# Patient Record
Sex: FEMALE | Race: WHITE | NOT HISPANIC OR LATINO | Employment: OTHER | URBAN - METROPOLITAN AREA
[De-identification: names, ages, dates, MRNs, and addresses within clinical notes are randomized per-mention and may not be internally consistent; named-entity substitution may affect disease eponyms.]

---

## 2018-02-26 ENCOUNTER — OFFICE VISIT (OUTPATIENT)
Dept: NEUROLOGY | Facility: HOSPITAL | Age: 53
End: 2018-02-26
Attending: SURGERY
Payer: COMMERCIAL

## 2018-02-26 VITALS
DIASTOLIC BLOOD PRESSURE: 74 MMHG | TEMPERATURE: 98 F | HEIGHT: 61 IN | BODY MASS INDEX: 32.89 KG/M2 | HEART RATE: 79 BPM | WEIGHT: 174.19 LBS | SYSTOLIC BLOOD PRESSURE: 104 MMHG

## 2018-02-26 DIAGNOSIS — Z09 POSTOP CHECK: Primary | ICD-10-CM

## 2018-02-26 PROCEDURE — 99213 OFFICE O/P EST LOW 20 MIN: CPT | Performed by: SURGERY

## 2018-02-26 RX ORDER — VALSARTAN 160 MG/1
160 TABLET ORAL DAILY
COMMUNITY
End: 2023-02-01

## 2018-02-26 NOTE — PROGRESS NOTES
S/p hep jej    Doing well  Eating having Bm    abd soft stplaes removed      Feeling great    Will f/u one month    Will obatain cbc and cmp today

## 2018-06-11 ENCOUNTER — TELEPHONE (OUTPATIENT)
Dept: NEUROLOGY | Facility: HOSPITAL | Age: 53
End: 2018-06-11

## 2018-06-11 NOTE — TELEPHONE ENCOUNTER
----- Message from Rajni Knox sent at 6/11/2018  9:29 AM CDT -----   Patients phone numbers are no longer working. Left a message on the patients mothers phone for patient to call back to schedule a follow up. Thank you  ----- Message -----  From: Marcela Bales LPN  Sent: 6/11/2018   9:22 AM  To: Rajni Mendoza    Can one of you please contact this pt to schedule f/u?  She has cancelled/no show multiple times, but Dr. Villanueva would like to see her at least 1 more time due to having major abdominal surgery    Thanks!  Whit

## 2022-07-07 ENCOUNTER — OFFICE VISIT (OUTPATIENT)
Dept: FAMILY MEDICINE | Facility: CLINIC | Age: 57
End: 2022-07-07
Payer: MEDICARE

## 2022-07-07 VITALS
HEART RATE: 92 BPM | OXYGEN SATURATION: 97 % | DIASTOLIC BLOOD PRESSURE: 72 MMHG | SYSTOLIC BLOOD PRESSURE: 118 MMHG | TEMPERATURE: 100 F | BODY MASS INDEX: 23.88 KG/M2 | HEIGHT: 61 IN | WEIGHT: 126.5 LBS

## 2022-07-07 DIAGNOSIS — Z13.31 POSITIVE DEPRESSION SCREENING: ICD-10-CM

## 2022-07-07 DIAGNOSIS — R05.9 COUGH: Primary | ICD-10-CM

## 2022-07-07 DIAGNOSIS — R21 RASH: ICD-10-CM

## 2022-07-07 DIAGNOSIS — Z13.220 ENCOUNTER FOR LIPID SCREENING FOR CARDIOVASCULAR DISEASE: ICD-10-CM

## 2022-07-07 DIAGNOSIS — B20 SYMPTOMATIC HIV INFECTION: ICD-10-CM

## 2022-07-07 DIAGNOSIS — Z13.6 ENCOUNTER FOR LIPID SCREENING FOR CARDIOVASCULAR DISEASE: ICD-10-CM

## 2022-07-07 PROCEDURE — 99204 PR OFFICE/OUTPT VISIT, NEW, LEVL IV, 45-59 MIN: ICD-10-PCS | Mod: S$GLB,,, | Performed by: FAMILY MEDICINE

## 2022-07-07 PROCEDURE — 3078F PR MOST RECENT DIASTOLIC BLOOD PRESSURE < 80 MM HG: ICD-10-PCS | Mod: CPTII,S$GLB,, | Performed by: FAMILY MEDICINE

## 2022-07-07 PROCEDURE — 1159F MED LIST DOCD IN RCRD: CPT | Mod: CPTII,S$GLB,, | Performed by: FAMILY MEDICINE

## 2022-07-07 PROCEDURE — 99204 OFFICE O/P NEW MOD 45 MIN: CPT | Mod: S$GLB,,, | Performed by: FAMILY MEDICINE

## 2022-07-07 PROCEDURE — 3078F DIAST BP <80 MM HG: CPT | Mod: CPTII,S$GLB,, | Performed by: FAMILY MEDICINE

## 2022-07-07 PROCEDURE — 3074F PR MOST RECENT SYSTOLIC BLOOD PRESSURE < 130 MM HG: ICD-10-PCS | Mod: CPTII,S$GLB,, | Performed by: FAMILY MEDICINE

## 2022-07-07 PROCEDURE — 1159F PR MEDICATION LIST DOCUMENTED IN MEDICAL RECORD: ICD-10-PCS | Mod: CPTII,S$GLB,, | Performed by: FAMILY MEDICINE

## 2022-07-07 PROCEDURE — 3008F PR BODY MASS INDEX (BMI) DOCUMENTED: ICD-10-PCS | Mod: CPTII,S$GLB,, | Performed by: FAMILY MEDICINE

## 2022-07-07 PROCEDURE — 3008F BODY MASS INDEX DOCD: CPT | Mod: CPTII,S$GLB,, | Performed by: FAMILY MEDICINE

## 2022-07-07 PROCEDURE — 3074F SYST BP LT 130 MM HG: CPT | Mod: CPTII,S$GLB,, | Performed by: FAMILY MEDICINE

## 2022-07-07 RX ORDER — MUPIROCIN 20 MG/G
OINTMENT TOPICAL 3 TIMES DAILY
Qty: 30 G | Refills: 2 | Status: SHIPPED | OUTPATIENT
Start: 2022-07-07 | End: 2022-07-20 | Stop reason: SDUPTHER

## 2022-07-07 RX ORDER — SULFAMETHOXAZOLE AND TRIMETHOPRIM 400; 80 MG/1; MG/1
1 TABLET ORAL 2 TIMES DAILY
Qty: 20 TABLET | Refills: 1 | Status: ON HOLD | OUTPATIENT
Start: 2022-07-07 | End: 2022-11-17 | Stop reason: HOSPADM

## 2022-07-07 NOTE — PROGRESS NOTES
SUBJECTIVE:    Patient ID: Amie Salmreon is a 57 y.o. female.    Chief Complaint: Establish Care, Rash, and Fever  56 yo female new to this provider here today with a complaint of she thinks either has scabies or lice.  She actually has 2 issues she has a systemic rash on her upper chest and upper back her left arm, bilateral lower extremities.  There is no rash on her right arm.  She has not been exposed to any sick contacts, the rash is been ongoing for several weeks dear multiple small excoriations and ulcers she states there intensely pruritic.    She has no rash on her face, neck or palms of her hands.She denies any prodromal period before the rash, no ( fever, intense headache, lymphadenopathy, back pain, myalgia, and severe fatigue).       She is also complaining of a cough for 2 months that is productive with green sputum.      On further questioning patient states his she has been HIV positive since 1981, she has not undergoing treatment for HIV, she is seeing some organization in the city with a  T-cell count.  She states her T-cell count is been good.  She has never sought treatment due to the stigma.  She has also never had a primary care doctor.      Rash  This is a new problem. The current episode started 1 to 4 weeks ago. The affected locations include the torso, back, left arm, right arm, left lower leg and right lower leg. The rash is characterized by redness and itchiness. She was exposed to nothing. Associated symptoms include congestion and coughing. Pertinent negatives include no anorexia, diarrhea, eye pain, facial edema, fatigue, fever, joint pain, nail changes, rhinorrhea, shortness of breath, sore throat or vomiting. Past treatments include nothing. The treatment provided no relief.   Cough  The current episode started more than 1 month ago. The problem has been unchanged. The problem occurs every few minutes. The cough is productive of sputum. Associated symptoms include a rash.  Pertinent negatives include no chest pain, fever, rhinorrhea, sore throat, shortness of breath, sweats or weight loss. Nothing aggravates the symptoms.         Past Medical History:   Diagnosis Date    Allergy     Arthritis     Asthma     Chronic pain     HIV infection     Hypertension     Overactive bladder     Spinal stenosis     Urine incontinence      Social History     Socioeconomic History    Marital status: Single   Occupational History    Occupation: on disability   Tobacco Use    Smoking status: Never Smoker    Smokeless tobacco: Never Used   Substance and Sexual Activity    Alcohol use: Yes     Comment: Socially    Drug use: No    Sexual activity: Yes     Partners: Male     Birth control/protection: None     Past Surgical History:   Procedure Laterality Date    ADENOIDECTOMY      ANKLE SURGERY      APPENDECTOMY      BACK SURGERY      GERALD    CYST REMOVAL      HYSTERECTOMY      JOINT REPLACEMENT Right     knee    KNEE SURGERY      RHIZOTOMY      TONSILLECTOMY       Family History   Problem Relation Age of Onset    Thyroid disease Mother     Hypertension Mother     Cancer Father         oral CA    Depression Sister     Hypertension Brother     Alcohol abuse Brother      Current Outpatient Medications   Medication Sig Dispense Refill    hydrocodone-acetaminophen 10-325mg (NORCO)  mg Tab Take 1 tablet by mouth continuous prn. Per Dr dowling [facility]      mupirocin (BACTROBAN) 2 % ointment Apply topically 3 (three) times daily. 30 g 2    rivaroxaban (XARELTO) 10 mg Tab Take 1 tablet (10 mg total) by mouth daily with dinner or evening meal. (Patient not taking: Reported on 7/7/2022) 21 tablet 0    sulfamethoxazole-trimethoprim 400-80mg (BACTRIM,SEPTRA) 400-80 mg per tablet Take 1 tablet by mouth 2 (two) times daily. 20 tablet 1    valsartan (DIOVAN) 160 MG tablet Take 160 mg by mouth once daily.       No current facility-administered medications for this  "visit.     Review of patient's allergies indicates:  No Known Allergies    Review of Systems   Constitutional: Negative for activity change, appetite change, fatigue, fever and weight loss.   HENT: Positive for congestion. Negative for rhinorrhea and sore throat.    Eyes: Negative for pain.   Respiratory: Positive for cough. Negative for chest tightness and shortness of breath.    Cardiovascular: Negative for chest pain and palpitations.   Gastrointestinal: Negative for abdominal distention, abdominal pain, anorexia, diarrhea and vomiting.   Musculoskeletal: Negative for joint pain.   Skin: Positive for rash. Negative for nail changes.          Blood pressure 118/72, pulse 92, temperature 99.7 °F (37.6 °C), temperature source Oral, height 5' 1" (1.549 m), weight 57.4 kg (126 lb 8 oz), SpO2 97 %. Body mass index is 23.9 kg/m².   Objective:      Physical Exam  Vitals reviewed.   Constitutional:       General: She is not in acute distress.     Appearance: Normal appearance. She is not ill-appearing or toxic-appearing.   HENT:      Head: Normocephalic and atraumatic.   Cardiovascular:      Rate and Rhythm: Normal rate and regular rhythm.      Heart sounds: Normal heart sounds. No murmur heard.  Pulmonary:      Effort: Pulmonary effort is normal. No respiratory distress.      Breath sounds: Rhonchi present. No wheezing.   Skin:     Capillary Refill: Capillary refill takes less than 2 seconds.      Findings: Rash present. Rash is crusting.      Comments: Multiple ulcerations and excoriations, unsure whatthe original rash's appearance.    Neurological:      Mental Status: She is alert and oriented to person, place, and time.             Assessment:       1. Cough    2. Rash    3. Symptomatic HIV infection    4. Encounter for lipid screening for cardiovascular disease    5. Positive depression screening         Plan:           Cough  -     X-Ray Chest PA And Lateral; Future; Expected date: 07/07/2022  -     CBC auto " differential; Future; Expected date: 07/07/2022  -     Comprehensive Metabolic Panel; Future; Expected date: 07/07/2022  Concerned about the hx of HIV will treat for PCP, and get a chest x-ray. I will place referral to ID for appropriate HIV follow up.    Rash  -     Comprehensive Metabolic Panel; Future; Expected date: 07/07/2022  Suspect staph infection will treat topically until completed her antibiotics for the cough.    Symptomatic HIV infection  -     Ambulatory referral/consult to Infectious Disease; Future; Expected date: 07/14/2022  -     HIV RNA, quantitative, PCR; Future; Expected date: 07/07/2022    Encounter for lipid screening for cardiovascular disease  -     Lipid Panel; Future; Expected date: 07/07/2022    Positive depression screening  Comments:  I have reviewed the positive depression score which warrants active treatment with psychotherapy and/or medications.  Will address at next visit.     Other orders  -     sulfamethoxazole-trimethoprim 400-80mg (BACTRIM,SEPTRA) 400-80 mg per tablet; Take 1 tablet by mouth 2 (two) times daily.  Dispense: 20 tablet; Refill: 1  -     mupirocin (BACTROBAN) 2 % ointment; Apply topically 3 (three) times daily.  Dispense: 30 g; Refill: 2

## 2022-07-20 RX ORDER — MUPIROCIN 20 MG/G
OINTMENT TOPICAL 3 TIMES DAILY
Qty: 30 G | Refills: 2 | Status: SHIPPED | OUTPATIENT
Start: 2022-07-20 | End: 2023-08-03

## 2022-07-20 NOTE — TELEPHONE ENCOUNTER
Patient called in requesting antibiotic refill for staph. She stated she is not sure that she can handle the antibiotic, after 4-5 days she had a hard time awaking, couldn't speak or walk. She stated she was only taking that at the time. She also needs a refill on mupirocin.

## 2022-07-21 NOTE — TELEPHONE ENCOUNTER
She stated she thought the antibiotic she was on was on was hard on her.She stated she had difficulty waking, speaking and walking. She wanted to know if there was another antibiotic that she could take for the staph.

## 2022-08-11 ENCOUNTER — TELEPHONE (OUTPATIENT)
Dept: FAMILY MEDICINE | Facility: CLINIC | Age: 57
End: 2022-08-11

## 2022-08-17 ENCOUNTER — TELEPHONE (OUTPATIENT)
Dept: FAMILY MEDICINE | Facility: CLINIC | Age: 57
End: 2022-08-17

## 2022-08-24 ENCOUNTER — TELEPHONE (OUTPATIENT)
Dept: FAMILY MEDICINE | Facility: CLINIC | Age: 57
End: 2022-08-24

## 2022-10-09 ENCOUNTER — HOSPITAL ENCOUNTER (EMERGENCY)
Facility: HOSPITAL | Age: 57
Discharge: HOME | End: 2022-10-09
Attending: EMERGENCY MEDICINE
Payer: MEDICARE

## 2022-10-09 VITALS
TEMPERATURE: 99 F | OXYGEN SATURATION: 96 % | WEIGHT: 126 LBS | HEIGHT: 61 IN | BODY MASS INDEX: 23.79 KG/M2 | HEART RATE: 77 BPM | SYSTOLIC BLOOD PRESSURE: 107 MMHG | RESPIRATION RATE: 18 BRPM | DIASTOLIC BLOOD PRESSURE: 67 MMHG

## 2022-10-09 DIAGNOSIS — H16.001 CORNEAL ULCER OF RIGHT EYE: Primary | ICD-10-CM

## 2022-10-09 DIAGNOSIS — H10.31 ACUTE BACTERIAL CONJUNCTIVITIS OF RIGHT EYE: ICD-10-CM

## 2022-10-09 PROCEDURE — 99284 EMERGENCY DEPT VISIT MOD MDM: CPT

## 2022-10-09 PROCEDURE — 25000003 PHARM REV CODE 250: Performed by: NURSE PRACTITIONER

## 2022-10-09 RX ORDER — HYDROCODONE BITARTRATE AND ACETAMINOPHEN 10; 325 MG/1; MG/1
1 TABLET ORAL EVERY 4 HOURS PRN
Qty: 12 TABLET | Refills: 0 | Status: ON HOLD | OUTPATIENT
Start: 2022-10-09 | End: 2022-12-08 | Stop reason: HOSPADM

## 2022-10-09 RX ORDER — PROPARACAINE HYDROCHLORIDE 5 MG/ML
1 SOLUTION/ DROPS OPHTHALMIC
Status: COMPLETED | OUTPATIENT
Start: 2022-10-09 | End: 2022-10-09

## 2022-10-09 RX ORDER — ERYTHROMYCIN 5 MG/G
OINTMENT OPHTHALMIC
Qty: 1 G | Refills: 0 | Status: SHIPPED | OUTPATIENT
Start: 2022-10-09 | End: 2023-02-01

## 2022-10-09 RX ORDER — DORZOLAMIDE HYDROCHLORIDE AND TIMOLOL MALEATE 20; 5 MG/ML; MG/ML
1 SOLUTION/ DROPS OPHTHALMIC 2 TIMES DAILY
Qty: 1 EACH | Refills: 0 | Status: ON HOLD | OUTPATIENT
Start: 2022-10-09 | End: 2024-03-25 | Stop reason: HOSPADM

## 2022-10-09 RX ORDER — MOXIFLOXACIN 5 MG/ML
1 SOLUTION/ DROPS OPHTHALMIC
Qty: 3 ML | Refills: 0 | Status: SHIPPED | OUTPATIENT
Start: 2022-10-09 | End: 2022-10-19

## 2022-10-09 RX ADMIN — FLUORESCEIN SODIUM 1 EACH: 1 STRIP OPHTHALMIC at 02:10

## 2022-10-09 RX ADMIN — PROPARACAINE HYDROCHLORIDE 1 DROP: 5 SOLUTION/ DROPS OPHTHALMIC at 02:10

## 2022-10-09 NOTE — ED NOTES
Pt presents to the ED per EMS with c/o right eye pain and redness x1 month. Pt states that over the last 3 days it has gotten progressively worse with redness and swelling to it. Pt reports blurry vision in the right eye. Right sclera is red and top eyelid is swollen and red.

## 2022-10-09 NOTE — DISCHARGE INSTRUCTIONS
"These will have to be from a compound pharmacy such as [facility]. Call ahead prior to going to make sure they carry "fortified" antibiotic eye drops.     No contact lenses are to be worn until cleared by ophthalmologist.     See ophthalmologist tomorrow as directed.  "

## 2022-10-09 NOTE — ED PROVIDER NOTES
Encounter Date: 10/9/2022    SCRIBE #1 NOTE: I, Ketan Toscano, am scribing for, and in the presence of,  AMPARO Clark.     History     Chief Complaint   Patient presents with    Eye Pain     Rt. Eye , blurred vision  x 1 month     Time seen by provider: 2:30 PM on 10/09/2022    Amie Salmeron is a 57 y.o. female who presents to the ED with an onset of eye pain for a month. The patient reports experiencing pain, blurred vision, and drainage in her right eye for a month. She notes having COVID around the same time she began experiencing pain in her right eye. The patient reports normally wearing contacts but hasn't worn them for about 3 weeks. She notes experiencing a foreign body sensation in her right eye that has resolved and photophobia in her right eye. The patient states having an ophthalmologist she sees in [place]. She mentions taking pain medication until she ran out. The patient denies allergies or any other symptoms at this time. PMHx of HTN. No pertinent PSHx.    The history is provided by the patient.   Review of patient's allergies indicates:  No Known Allergies  Past Medical History:   Diagnosis Date    Allergy     Arthritis     Asthma     Chronic pain     HIV infection     Hypertension     Overactive bladder     Spinal stenosis     Urine incontinence      Past Surgical History:   Procedure Laterality Date    ADENOIDECTOMY      ANKLE SURGERY      APPENDECTOMY      BACK SURGERY      GERALD    CYST REMOVAL      HYSTERECTOMY      JOINT REPLACEMENT Right     knee    KNEE SURGERY      RHIZOTOMY      TONSILLECTOMY       Family History   Problem Relation Age of Onset    Thyroid disease Mother     Hypertension Mother     Cancer Father         oral CA    Depression Sister     Hypertension Brother     Alcohol abuse Brother      Social History     Tobacco Use    Smoking status: Never    Smokeless tobacco: Never   Substance Use Topics    Alcohol use: Yes     Comment: Socially    Drug use: No     Review of Systems    Constitutional:  Negative for fever.   HENT:  Negative for sore throat.    Eyes:  Positive for photophobia (Right eye), pain, discharge and visual disturbance.   Respiratory:  Negative for shortness of breath.    Cardiovascular:  Negative for chest pain.   Gastrointestinal:  Negative for nausea.   Genitourinary:  Negative for dysuria.   Musculoskeletal:  Negative for back pain.   Skin:  Negative for rash.   Neurological:  Negative for weakness.   Hematological:  Does not bruise/bleed easily.     Physical Exam     Initial Vitals [10/09/22 1428]   BP Pulse Resp Temp SpO2   124/77 82 18 98.9 °F (37.2 °C) (!) 94 %      MAP       --         Physical Exam    Nursing note and vitals reviewed.  Constitutional: Vital signs are normal. She appears well-developed and well-nourished.   HENT:   Head: Normocephalic and atraumatic.   Eyes: EOM and lids are normal. Pupils are equal, round, and reactive to light. Right eye exhibits no chemosis, no discharge, no exudate and no hordeolum. Foreign body present in the right eye. Right conjunctiva is injected. No scleral icterus.   Slit lamp exam:       The right eye shows corneal ulcer, foreign body, hypopyon and fluorescein uptake. The right eye shows no corneal flare and no hyphema.   Right eye upper and lower lid swelling and erythema. Corneal opacity  of right eye. + hypopyon.   Neck: Neck supple.   Cardiovascular:  Normal rate, regular rhythm, normal heart sounds and intact distal pulses.     Exam reveals no gallop and no friction rub.       No murmur heard.  Pulmonary/Chest: Breath sounds normal. She has no wheezes. She has no rhonchi. She has no rales.   Abdominal: Abdomen is soft. Bowel sounds are normal. There is no abdominal tenderness.   Musculoskeletal:      Cervical back: Neck supple.     Neurological: She is alert and oriented to person, place, and time. She has normal strength.   Skin: Skin is warm, dry and intact.   Psychiatric: She has a normal mood and affect. Her  speech is normal and behavior is normal.       ED Course   Procedures  Labs Reviewed - No data to display       Imaging Results    None          Medications   fluorescein ophthalmic strip 1 each (1 each Both Eyes Given by Other 10/9/22 4931)   proparacaine 0.5 % ophthalmic solution 1 drop (1 drop Both Eyes Given by Other 10/9/22 0261)     Medical Decision Making:   History:   Old Medical Records: I decided to obtain old medical records.  Differential Diagnosis:   Iritis  Bacterial keratitis  Corneal ulcer     APC / Resident Notes:   Patient is a 57 y.o. female who presents to the ED 10/09/2022 who underwent emergent evaluation for right eye pain for 4 weeks.  Patient initially denies wearing a contact lens however I find a contact lens in her right eye.  It is removed.  Patient has significant conjunctival injection with a hazy cornea and an obvious ulcer with increased uptake.  No signs of globe rupture.  Increased intra-ocular pressures on my read however Dr. Kingsley ophthalmologist on-call presents to patient's bedside and does exam and feels pressures are normal on the right side.  He believes patient is stable for discharge and makes recommendations for outpatient antibiotic therapy and close follow-up in 1 day with Ophthalmology.  She is given prescriptions per recommendation by Dr. Kingsley and a referral to multiple ophthalmologist for 1 day follow-up.  He does not think acute angle closure glaucoma.  He does not think iritis.  No signs of periorbital cellulitis or orbital cellulitis or preseptal cellulitis.  No signs of other retained foreign body. Based on my clinical evaluation, I do not appreciate any immediate, emergent, or life threatening condition or etiology that warrants additional workup today and feel that the patient can be discharged with close follow up care. Case discussed with Dr. Hernandez who is agreeable to plan of care. Follow up and return precautions discussed; patient verbalized  understanding and is agreeable to plan of care. Patient discharged home in stable condition.              Scribe Attestation:   Scribe #1: I performed the above scribed service and the documentation accurately describes the services I performed. I attest to the accuracy of the note.    Attending Attestation:           Physician Attestation for Scribe:  Physician Attestation Statement for Scribe #1: I, Radha Hanley, reviewed documentation, as scribed by in my presence, and it is both accurate and complete.     Comments: I, AMPARO Clark, personally performed the services described in this documentation. All medical record entries made by the scribe were at my direction and in my presence.  I have reviewed the chart and agree that the record reflects my personal performance and is accurate and complete. AMPARO Clark.  9:16 PM 10/09/2022        ED Course as of 10/09/22 2120   Sun Oct 09, 2022   1537 Dr. Kingsley will come to evaluate pt at ED. We do not have fortified antibiotics at this hospital. Discussed with pharmacist. [EF]      ED Course User Index  [EF] Hawk Hernandez MD                 Clinical Impression:   Final diagnoses:  [H16.001] Corneal ulcer of right eye (Primary)  [H10.31] Acute bacterial conjunctivitis of right eye      ED Disposition Condition    Discharge Stable          ED Prescriptions       Medication Sig Dispense Start Date End Date Auth. Provider    dorzolamide-timolol 2-0.5% (COSOPT) 22.3-6.8 mg/mL ophthalmic solution Place 1 drop into the right eye 2 (two) times daily. for 14 days 1 each 10/9/2022 10/23/2022 Radha Hanley NP    erythromycin (ROMYCIN) ophthalmic ointment Place a 1/2 inch ribbon of ointment into the lower eyelid of right eye every night. 1 g 10/9/2022 -- Radha Hanley NP    moxifloxacin (VIGAMOX) 0.5 % ophthalmic solution Place 1 drop into the right eye every hour while awake. for 10 days 3 mL 10/9/2022 10/19/2022 Radha Hanley NP    vancomycin HCl (FORTIFIED  VANCOMYCIN 25 MG/ML OPHTH) 25 mg Drop Place 1 drop into the right eye every hour while awake. for 10 days 15 mL 10/9/2022 10/19/2022 Radha Hanley NP    Fortified Tobramycin 15 mg/ml Opht (TOBREX) 15 mg Drop Place 1 drop into the right eye every hour while awake. 7.5 mL 10/9/2022 -- Radha Hanley NP    HYDROcodone-acetaminophen (NORCO)  mg per tablet Take 1 tablet by mouth every 4 (four) hours as needed for Pain. 12 tablet 10/9/2022 -- Radha Hanley NP          Follow-up Information       Follow up With Specialties Details Why Contact Info    Sotero Ch MD Ophthalmology In 1 day  [address]  [address]  [facility]  [facility] [address]  [phone]      [facility] Emergency Medicine  As needed, If symptoms worsen [address] [address] [phone]    Mikala Mosqueda MD Ophthalmology In 1 day  [address]  [place] [address]  [phone]      Jeffrey Kingsley Jr., MD Ophthalmology In 1 day  [address] [facility] [address] [place] [address]  [phone]               Radha Hanley NP  10/09/22 2120

## 2022-10-11 ENCOUNTER — DOCUMENTATION ONLY (OUTPATIENT)
Dept: OPHTHALMOLOGY | Facility: CLINIC | Age: 57
End: 2022-10-11
Payer: MEDICARE

## 2022-10-11 NOTE — PROGRESS NOTES
CC: Right eye pain    HPI: Amie Salmeron is a 57 y.o. female  Right eye pain slept in contact lens for 3 weeks without taking them out    POH: contact lens wearer    Gtts: no drops      Past Medical History:   Diagnosis Date    Allergy     Arthritis     Asthma     Chronic pain     HIV infection     Hypertension     Overactive bladder     Spinal stenosis     Urine incontinence          Family History   Problem Relation Age of Onset    Thyroid disease Mother     Hypertension Mother     Cancer Father         oral CA    Depression Sister     Hypertension Brother     Alcohol abuse Brother            Current Outpatient Medications:     dorzolamide-timolol 2-0.5% (COSOPT) 22.3-6.8 mg/mL ophthalmic solution, Place 1 drop into the right eye 2 (two) times daily. for 14 days, Disp: 1 each, Rfl: 0    erythromycin (ROMYCIN) ophthalmic ointment, Place a 1/2 inch ribbon of ointment into the lower eyelid of right eye every night., Disp: 1 g, Rfl: 0    Fortified Tobramycin 15 mg/ml Opht (TOBREX) 15 mg Drop, Place 1 drop into the right eye every hour while awake., Disp: 7.5 mL, Rfl: 0    HYDROcodone-acetaminophen (NORCO)  mg per tablet, Take 1 tablet by mouth every 4 (four) hours as needed for Pain., Disp: 12 tablet, Rfl: 0    moxifloxacin (VIGAMOX) 0.5 % ophthalmic solution, Place 1 drop into the right eye every hour while awake. for 10 days, Disp: 3 mL, Rfl: 0    mupirocin (BACTROBAN) 2 % ointment, Apply topically 3 (three) times daily., Disp: 30 g, Rfl: 2    rivaroxaban (XARELTO) 10 mg Tab, Take 1 tablet (10 mg total) by mouth daily with dinner or evening meal. (Patient not taking: Reported on 7/7/2022), Disp: 21 tablet, Rfl: 0    sulfamethoxazole-trimethoprim 400-80mg (BACTRIM,SEPTRA) 400-80 mg per tablet, Take 1 tablet by mouth 2 (two) times daily., Disp: 20 tablet, Rfl: 1    valsartan (DIOVAN) 160 MG tablet, Take 160 mg by mouth once daily., Disp: , Rfl:     vancomycin HCl (FORTIFIED VANCOMYCIN 25 MG/ML OPHTH) 25 mg  Drop, Place 1 drop into the right eye every hour while awake. for 10 days, Disp: 15 mL, Rfl: 0      Review of patient's allergies indicates:  No Known Allergies      Social History     Tobacco Use    Smoking status: Never    Smokeless tobacco: Never   Substance Use Topics    Alcohol use: Yes     Comment: Socially    Drug use: No         Not recorded           Plan   A/P: Amie Salmeron is a 57 y.o. female    Corneal ulcer OD secondary poor contact lens hygiene  +small hypopyon, superior epithelial defect (7mm x 6mm), +infiltrate  VA OD 20/100  IOP OD 25 with tonopen  Start fortified vancomycin and tobramycin every hour at least 10-12 x daily right eye  Vigamox 1 drop every hour right eye 10-12x daily  Erythromycin ointment at bedtime OD  Cosopt 1 drop 2 x daily Right eye  CTL holiday  Patient lives in [place], can followup [place] [facility], but can see me in [place] on Tuesday to followup

## 2022-10-13 ENCOUNTER — HOSPITAL ENCOUNTER (INPATIENT)
Facility: HOSPITAL | Age: 57
LOS: 2 days | Discharge: LEFT BEFORE DISCHARGE | DRG: 975 | End: 2022-10-15
Attending: EMERGENCY MEDICINE | Admitting: INTERNAL MEDICINE
Payer: MEDICARE

## 2022-10-13 DIAGNOSIS — B20 SYMPTOMATIC HIV INFECTION: ICD-10-CM

## 2022-10-13 DIAGNOSIS — U07.1 COVID-19: ICD-10-CM

## 2022-10-13 DIAGNOSIS — J18.9 PNEUMONIA OF BOTH LUNGS DUE TO INFECTIOUS ORGANISM, UNSPECIFIED PART OF LUNG: Primary | ICD-10-CM

## 2022-10-13 DIAGNOSIS — J18.9 PNEUMONIA OF BOTH UPPER LOBES DUE TO INFECTIOUS ORGANISM: ICD-10-CM

## 2022-10-13 DIAGNOSIS — R63.4 WEIGHT LOSS: ICD-10-CM

## 2022-10-13 DIAGNOSIS — B37.0 THRUSH: ICD-10-CM

## 2022-10-13 DIAGNOSIS — R19.7 DIARRHEA, UNSPECIFIED TYPE: ICD-10-CM

## 2022-10-13 PROBLEM — J12.82 PNEUMONIA DUE TO COVID-19 VIRUS: Status: ACTIVE | Noted: 2022-10-13

## 2022-10-13 LAB
ALBUMIN SERPL BCP-MCNC: 2.5 G/DL (ref 3.5–5.2)
ALP SERPL-CCNC: 77 U/L (ref 55–135)
ALT SERPL W/O P-5'-P-CCNC: 9 U/L (ref 10–44)
ANION GAP SERPL CALC-SCNC: 7 MMOL/L (ref 8–16)
AST SERPL-CCNC: 23 U/L (ref 10–40)
BASOPHILS # BLD AUTO: 0.02 K/UL (ref 0–0.2)
BASOPHILS NFR BLD: 0.5 % (ref 0–1.9)
BILIRUB SERPL-MCNC: 0.7 MG/DL (ref 0.1–1)
BNP SERPL-MCNC: 171 PG/ML (ref 0–99)
BUN SERPL-MCNC: 6 MG/DL (ref 6–20)
CALCIUM SERPL-MCNC: 8 MG/DL (ref 8.7–10.5)
CHLORIDE SERPL-SCNC: 100 MMOL/L (ref 95–110)
CK SERPL-CCNC: 17 U/L (ref 20–180)
CO2 SERPL-SCNC: 30 MMOL/L (ref 23–29)
CREAT SERPL-MCNC: 0.3 MG/DL (ref 0.5–1.4)
CRP SERPL-MCNC: 1.56 MG/DL
DIFFERENTIAL METHOD: ABNORMAL
EOSINOPHIL # BLD AUTO: 0 K/UL (ref 0–0.5)
EOSINOPHIL NFR BLD: 0.5 % (ref 0–8)
ERYTHROCYTE [DISTWIDTH] IN BLOOD BY AUTOMATED COUNT: 15.9 % (ref 11.5–14.5)
EST. GFR  (NO RACE VARIABLE): >60 ML/MIN/1.73 M^2
FERRITIN SERPL-MCNC: 980 NG/ML (ref 20–300)
GLUCOSE SERPL-MCNC: 95 MG/DL (ref 70–110)
HCT VFR BLD AUTO: 31 % (ref 37–48.5)
HGB BLD-MCNC: 10 G/DL (ref 12–16)
IMM GRANULOCYTES # BLD AUTO: 0.18 K/UL (ref 0–0.04)
IMM GRANULOCYTES NFR BLD AUTO: 4.4 % (ref 0–0.5)
LACTATE SERPL-SCNC: 0.7 MMOL/L (ref 0.5–1.9)
LDH SERPL L TO P-CCNC: 262 U/L (ref 110–260)
LYMPHOCYTES # BLD AUTO: 1.3 K/UL (ref 1–4.8)
LYMPHOCYTES NFR BLD: 30.3 % (ref 18–48)
MCH RBC QN AUTO: 27.9 PG (ref 27–31)
MCHC RBC AUTO-ENTMCNC: 32.3 G/DL (ref 32–36)
MCV RBC AUTO: 87 FL (ref 82–98)
MONOCYTES # BLD AUTO: 0.5 K/UL (ref 0.3–1)
MONOCYTES NFR BLD: 12.1 % (ref 4–15)
NEUTROPHILS # BLD AUTO: 2.2 K/UL (ref 1.8–7.7)
NEUTROPHILS NFR BLD: 52.2 % (ref 38–73)
NRBC BLD-RTO: 0 /100 WBC
PLATELET # BLD AUTO: 279 K/UL (ref 150–450)
PMV BLD AUTO: 10.3 FL (ref 9.2–12.9)
POTASSIUM SERPL-SCNC: 2.8 MMOL/L (ref 3.5–5.1)
PROCALCITONIN SERPL IA-MCNC: 2 NG/ML (ref 0–0.5)
PROT SERPL-MCNC: 6.1 G/DL (ref 6–8.4)
RBC # BLD AUTO: 3.58 M/UL (ref 4–5.4)
SARS-COV-2 RDRP RESP QL NAA+PROBE: POSITIVE
SODIUM SERPL-SCNC: 137 MMOL/L (ref 136–145)
TROPONIN I SERPL DL<=0.01 NG/ML-MCNC: <0.03 NG/ML
WBC # BLD AUTO: 4.13 K/UL (ref 3.9–12.7)

## 2022-10-13 PROCEDURE — 99900035 HC TECH TIME PER 15 MIN (STAT)

## 2022-10-13 PROCEDURE — 63600175 PHARM REV CODE 636 W HCPCS: Performed by: INTERNAL MEDICINE

## 2022-10-13 PROCEDURE — 85025 COMPLETE CBC W/AUTO DIFF WBC: CPT | Performed by: EMERGENCY MEDICINE

## 2022-10-13 PROCEDURE — 99285 EMERGENCY DEPT VISIT HI MDM: CPT | Mod: 25

## 2022-10-13 PROCEDURE — 93005 ELECTROCARDIOGRAM TRACING: CPT | Performed by: SPECIALIST

## 2022-10-13 PROCEDURE — 86140 C-REACTIVE PROTEIN: CPT | Performed by: EMERGENCY MEDICINE

## 2022-10-13 PROCEDURE — 83880 ASSAY OF NATRIURETIC PEPTIDE: CPT | Performed by: EMERGENCY MEDICINE

## 2022-10-13 PROCEDURE — 25000003 PHARM REV CODE 250: Performed by: INTERNAL MEDICINE

## 2022-10-13 PROCEDURE — 12000002 HC ACUTE/MED SURGE SEMI-PRIVATE ROOM

## 2022-10-13 PROCEDURE — 93010 ELECTROCARDIOGRAM REPORT: CPT | Mod: ,,, | Performed by: SPECIALIST

## 2022-10-13 PROCEDURE — 87077 CULTURE AEROBIC IDENTIFY: CPT | Performed by: EMERGENCY MEDICINE

## 2022-10-13 PROCEDURE — 93010 EKG 12-LEAD: ICD-10-PCS | Mod: ,,, | Performed by: SPECIALIST

## 2022-10-13 PROCEDURE — 82550 ASSAY OF CK (CPK): CPT | Performed by: EMERGENCY MEDICINE

## 2022-10-13 PROCEDURE — 25000242 PHARM REV CODE 250 ALT 637 W/ HCPCS: Performed by: EMERGENCY MEDICINE

## 2022-10-13 PROCEDURE — 99900031 HC PATIENT EDUCATION (STAT)

## 2022-10-13 PROCEDURE — 94640 AIRWAY INHALATION TREATMENT: CPT

## 2022-10-13 PROCEDURE — 84484 ASSAY OF TROPONIN QUANT: CPT | Performed by: EMERGENCY MEDICINE

## 2022-10-13 PROCEDURE — 83615 LACTATE (LD) (LDH) ENZYME: CPT | Performed by: EMERGENCY MEDICINE

## 2022-10-13 PROCEDURE — 87040 BLOOD CULTURE FOR BACTERIA: CPT | Performed by: EMERGENCY MEDICINE

## 2022-10-13 PROCEDURE — 83605 ASSAY OF LACTIC ACID: CPT | Performed by: EMERGENCY MEDICINE

## 2022-10-13 PROCEDURE — 94799 UNLISTED PULMONARY SVC/PX: CPT

## 2022-10-13 PROCEDURE — U0002 COVID-19 LAB TEST NON-CDC: HCPCS | Performed by: EMERGENCY MEDICINE

## 2022-10-13 PROCEDURE — 21400001 HC TELEMETRY ROOM

## 2022-10-13 PROCEDURE — 82728 ASSAY OF FERRITIN: CPT | Performed by: EMERGENCY MEDICINE

## 2022-10-13 PROCEDURE — 94760 N-INVAS EAR/PLS OXIMETRY 1: CPT

## 2022-10-13 PROCEDURE — 80053 COMPREHEN METABOLIC PANEL: CPT | Performed by: EMERGENCY MEDICINE

## 2022-10-13 PROCEDURE — 87186 SC STD MICRODIL/AGAR DIL: CPT | Performed by: EMERGENCY MEDICINE

## 2022-10-13 PROCEDURE — 87147 CULTURE TYPE IMMUNOLOGIC: CPT | Performed by: EMERGENCY MEDICINE

## 2022-10-13 PROCEDURE — 84145 PROCALCITONIN (PCT): CPT | Performed by: EMERGENCY MEDICINE

## 2022-10-13 PROCEDURE — 25500020 PHARM REV CODE 255: Performed by: EMERGENCY MEDICINE

## 2022-10-13 RX ORDER — TALC
6 POWDER (GRAM) TOPICAL NIGHTLY PRN
Status: DISCONTINUED | OUTPATIENT
Start: 2022-10-13 | End: 2022-10-15 | Stop reason: HOSPADM

## 2022-10-13 RX ORDER — SODIUM,POTASSIUM PHOSPHATES 280-250MG
2 POWDER IN PACKET (EA) ORAL
Status: DISCONTINUED | OUTPATIENT
Start: 2022-10-13 | End: 2022-10-15 | Stop reason: HOSPADM

## 2022-10-13 RX ORDER — SULFAMETHOXAZOLE AND TRIMETHOPRIM 800; 160 MG/1; MG/1
1 TABLET ORAL 2 TIMES DAILY
Status: DISCONTINUED | OUTPATIENT
Start: 2022-10-13 | End: 2022-10-15 | Stop reason: HOSPADM

## 2022-10-13 RX ORDER — LANOLIN ALCOHOL/MO/W.PET/CERES
800 CREAM (GRAM) TOPICAL
Status: DISCONTINUED | OUTPATIENT
Start: 2022-10-13 | End: 2022-10-15 | Stop reason: HOSPADM

## 2022-10-13 RX ORDER — MOXIFLOXACIN 5 MG/ML
1 SOLUTION/ DROPS OPHTHALMIC
Status: DISCONTINUED | OUTPATIENT
Start: 2022-10-13 | End: 2022-10-15 | Stop reason: HOSPADM

## 2022-10-13 RX ORDER — PANTOPRAZOLE SODIUM 40 MG/1
40 TABLET, DELAYED RELEASE ORAL EVERY MORNING
Status: DISCONTINUED | OUTPATIENT
Start: 2022-10-14 | End: 2022-10-15 | Stop reason: HOSPADM

## 2022-10-13 RX ORDER — ONDANSETRON 4 MG/1
4 TABLET, ORALLY DISINTEGRATING ORAL EVERY 8 HOURS PRN
COMMUNITY
Start: 2022-09-10 | End: 2023-02-01

## 2022-10-13 RX ORDER — ERYTHROMYCIN 5 MG/G
OINTMENT OPHTHALMIC EVERY 6 HOURS
Status: DISCONTINUED | OUTPATIENT
Start: 2022-10-14 | End: 2022-10-15 | Stop reason: HOSPADM

## 2022-10-13 RX ORDER — PANTOPRAZOLE SODIUM 40 MG/1
40 TABLET, DELAYED RELEASE ORAL EVERY MORNING
COMMUNITY
Start: 2022-09-19 | End: 2023-02-01

## 2022-10-13 RX ORDER — HYDROCODONE BITARTRATE AND ACETAMINOPHEN 10; 325 MG/1; MG/1
1 TABLET ORAL EVERY 4 HOURS PRN
Status: DISCONTINUED | OUTPATIENT
Start: 2022-10-13 | End: 2022-10-15 | Stop reason: HOSPADM

## 2022-10-13 RX ORDER — IPRATROPIUM BROMIDE 0.5 MG/2.5ML
0.5 SOLUTION RESPIRATORY (INHALATION)
Status: COMPLETED | OUTPATIENT
Start: 2022-10-13 | End: 2022-10-13

## 2022-10-13 RX ORDER — ENOXAPARIN SODIUM 100 MG/ML
40 INJECTION SUBCUTANEOUS EVERY 24 HOURS
Status: DISCONTINUED | OUTPATIENT
Start: 2022-10-13 | End: 2022-10-15 | Stop reason: HOSPADM

## 2022-10-13 RX ORDER — LEVALBUTEROL INHALATION SOLUTION 1.25 MG/3ML
1.25 SOLUTION RESPIRATORY (INHALATION)
Status: COMPLETED | OUTPATIENT
Start: 2022-10-13 | End: 2022-10-13

## 2022-10-13 RX ORDER — VANCOMYCIN HCL IN 5 % DEXTROSE 1G/250ML
1000 PLASTIC BAG, INJECTION (ML) INTRAVENOUS ONCE
Status: COMPLETED | OUTPATIENT
Start: 2022-10-13 | End: 2022-10-14

## 2022-10-13 RX ORDER — DORZOLAMIDE HYDROCHLORIDE AND TIMOLOL MALEATE 20; 5 MG/ML; MG/ML
1 SOLUTION/ DROPS OPHTHALMIC 2 TIMES DAILY
Status: DISCONTINUED | OUTPATIENT
Start: 2022-10-13 | End: 2022-10-15 | Stop reason: HOSPADM

## 2022-10-13 RX ORDER — VALSARTAN 80 MG/1
160 TABLET ORAL DAILY
Status: DISCONTINUED | OUTPATIENT
Start: 2022-10-14 | End: 2022-10-15 | Stop reason: HOSPADM

## 2022-10-13 RX ADMIN — ENOXAPARIN SODIUM 40 MG: 40 INJECTION SUBCUTANEOUS at 09:10

## 2022-10-13 RX ADMIN — MOXIFLOXACIN HYDROCHLORIDE 1 DROP: 5 SOLUTION/ DROPS OPHTHALMIC at 11:10

## 2022-10-13 RX ADMIN — PIPERACILLIN SODIUM AND TAZOBACTAM SODIUM 3.38 G: 3; .375 INJECTION, POWDER, LYOPHILIZED, FOR SOLUTION INTRAVENOUS at 09:10

## 2022-10-13 RX ADMIN — VANCOMYCIN HYDROCHLORIDE 1000 MG: 1 INJECTION, POWDER, LYOPHILIZED, FOR SOLUTION INTRAVENOUS at 11:10

## 2022-10-13 RX ADMIN — POTASSIUM BICARBONATE 60 MEQ: 391 TABLET, EFFERVESCENT ORAL at 09:10

## 2022-10-13 RX ADMIN — IPRATROPIUM BROMIDE 0.5 MG: 0.5 SOLUTION RESPIRATORY (INHALATION) at 04:10

## 2022-10-13 RX ADMIN — LEVALBUTEROL HYDROCHLORIDE 1.25 MG: 1.25 SOLUTION RESPIRATORY (INHALATION) at 04:10

## 2022-10-13 RX ADMIN — DORZOLAMIDE HYDROCHLORIDE AND TIMOLOL MALEATE 1 DROP: 22.3; 6.8 SOLUTION/ DROPS OPHTHALMIC at 11:10

## 2022-10-13 RX ADMIN — IOHEXOL 75 ML: 350 INJECTION, SOLUTION INTRAVENOUS at 06:10

## 2022-10-13 RX ADMIN — ERYTHROMYCIN 1 INCH: 5 OINTMENT OPHTHALMIC at 11:10

## 2022-10-13 RX ADMIN — SULFAMETHOXAZOLE AND TRIMETHOPRIM 1 TABLET: 800; 160 TABLET ORAL at 09:10

## 2022-10-13 NOTE — ED PROVIDER NOTES
Encounter Date: 10/13/2022       History     Chief Complaint   Patient presents with    Shortness of Breath     Pt arrives via EMS with SOB, states she was dx with covid a few weeks ago and the SOB never went away. Pt placed on 2l NC by EMS for room air O2 91%     Patient presents emergency department with reported shortness of breath onset after marco antonio COVID 2 weeks ago states shortness of breath is never improved continues have cough fever patient does report she has a history of HIV is been 6 months since she has had a CD4 count at EMS arrival patient's oxygen saturations 91% on room air she is placed on 2 L per nasal cannula improvement saturations 95% emergency department she denies any nausea vomiting or diarrhea she is currently being treated for an ulcer of her cornea is on antibiotics for that cough sounds wet is not able to bring up any sputum patient does not smoke      Review of patient's allergies indicates:  No Known Allergies  Past Medical History:   Diagnosis Date    Allergy     Arthritis     Asthma     Chronic pain     HIV infection     Hypertension     Overactive bladder     Spinal stenosis     Urine incontinence      Past Surgical History:   Procedure Laterality Date    ADENOIDECTOMY      ANKLE SURGERY      APPENDECTOMY      BACK SURGERY      GERALD    CYST REMOVAL      HYSTERECTOMY      JOINT REPLACEMENT Right     knee    KNEE SURGERY      RHIZOTOMY      TONSILLECTOMY       Family History   Problem Relation Age of Onset    Thyroid disease Mother     Hypertension Mother     Cancer Father         oral CA    Depression Sister     Hypertension Brother     Alcohol abuse Brother      Social History     Tobacco Use    Smoking status: Never    Smokeless tobacco: Never   Substance Use Topics    Alcohol use: Yes     Comment: Socially    Drug use: No     Review of Systems   Constitutional:  Positive for activity change, appetite change, chills, fatigue and fever.   HENT:  Negative for congestion.     Respiratory:  Positive for cough and shortness of breath. Negative for chest tightness and wheezing.    Cardiovascular:  Negative for chest pain and leg swelling.   Gastrointestinal:  Negative for abdominal pain, diarrhea, nausea and vomiting.   All other systems reviewed and are negative.    Physical Exam     Initial Vitals   BP Pulse Resp Temp SpO2   10/13/22 1603 10/13/22 1603 10/13/22 1603 10/13/22 1605 10/13/22 1603   111/66 76 18 99.5 °F (37.5 °C) 95 %      MAP       --                Physical Exam    Constitutional: She appears well-developed and well-nourished. No distress.   HENT:   Head: Normocephalic and atraumatic.   Right Ear: External ear normal.   Left Ear: External ear normal.   Mouth/Throat: Oropharynx is clear and moist.   Eyes: Conjunctivae and EOM are normal. Pupils are equal, round, and reactive to light.   Neck: Neck supple.   Normal range of motion.  Cardiovascular:  Normal rate, regular rhythm, normal heart sounds and intact distal pulses.           Pulmonary/Chest: No respiratory distress. She has wheezes. She has rhonchi.   Abdominal: Abdomen is soft. Bowel sounds are normal. There is no abdominal tenderness.   Musculoskeletal:         General: No edema. Normal range of motion.      Cervical back: Normal range of motion and neck supple.     Neurological: She is alert and oriented to person, place, and time. GCS score is 15. GCS eye subscore is 4. GCS verbal subscore is 5. GCS motor subscore is 6.   Skin: Skin is warm and dry. Capillary refill takes less than 2 seconds. No rash noted.   Psychiatric: She has a normal mood and affect. Her behavior is normal.       ED Course   Procedures  Labs Reviewed   CBC W/ AUTO DIFFERENTIAL - Abnormal; Notable for the following components:       Result Value    RBC 3.58 (*)     Hemoglobin 10.0 (*)     Hematocrit 31.0 (*)     RDW 15.9 (*)     Immature Granulocytes 4.4 (*)     Immature Grans (Abs) 0.18 (*)     All other components within normal limits    COMPREHENSIVE METABOLIC PANEL - Abnormal; Notable for the following components:    Potassium 2.8 (*)     CO2 30 (*)     Creatinine 0.3 (*)     Calcium 8.0 (*)     Albumin 2.5 (*)     ALT 9 (*)     Anion Gap 7 (*)     All other components within normal limits    Narrative:      Potassium critical result(s) repeated. Called and verbal readback   obtained from [facility], RN by SLSEFERINO 10/13/2022 18:06   C-REACTIVE PROTEIN - Abnormal; Notable for the following components:    CRP 1.56 (*)     All other components within normal limits   FERRITIN - Abnormal; Notable for the following components:    Ferritin 980 (*)     All other components within normal limits   LACTATE DEHYDROGENASE - Abnormal; Notable for the following components:     (*)     All other components within normal limits   CK - Abnormal; Notable for the following components:    CPK 17 (*)     All other components within normal limits   SARS-COV-2 RNA AMPLIFICATION, QUAL - Abnormal; Notable for the following components:    SARS-CoV-2 RNA, Amplification, Qual Positive (*)     All other components within normal limits   PROCALCITONIN - Abnormal; Notable for the following components:    Procalcitonin 2.00 (*)     All other components within normal limits   B-TYPE NATRIURETIC PEPTIDE - Abnormal; Notable for the following components:     (*)     All other components within normal limits   CULTURE, BLOOD   CULTURE, BLOOD   LACTIC ACID, PLASMA   TROPONIN I        ECG Results              EKG 12-lead (In process)  Result time 10/13/22 16:29:41      In process by Interface, Lab In [facility] (10/13/22 16:29:41)                   Narrative:    Test Reason : U07.1,    Vent. Rate : 074 BPM     Atrial Rate : 074 BPM     P-R Int : 126 ms          QRS Dur : 074 ms      QT Int : 376 ms       P-R-T Axes : 058 044 057 degrees     QTc Int : 417 ms    Normal sinus rhythm  Possible Left atrial enlargement  Nonspecific ST and T wave abnormality  Abnormal  ECG  When compared with ECG of 15-APR-2015 11:19,  T wave inversion now evident in Anterior leads    Referred By:             Confirmed By:                                   Imaging Results              CTA Chest Non-Coronary (PE Studies) (Final result)  Result time 10/13/22 18:56:27      Final result by Jose Lynn MD (10/13/22 18:56:27)                   Narrative:    CMS MANDATED QUALITY DATA-CT RADIATION DOSE-436  All CT scans at this facility use dose modulation, iterative reconstruction, and or weight-based dosing when appropriate to reduce radiation dose to as low as reasonably achievable.    HISTORY: Pulmonary embolism (PE) suspected, high probability,  dyspnea.    FINDINGS: Thin axial imaging through the chest was performed with 75 mL Omnipaque 350 IV contrast, with sagittal and coronal reformatted images and MIP reconstructions performed, and images stored in the patient's permanent electronic medical record.    Comparison to multiple prior chest radiographs. There are no pulmonary arterial filling defects to suggest pulmonary thromboembolism. The central pulmonary arteries are normal in caliber.    Scattered calcified plaque involves normal caliber thoracic aorta, with no aortic dissection. The heart is normal in size, with no pericardial effusion. There are scattered coronary arterial calcifications, with no enlarged mediastinal or hilar lymph nodes.    The lungs are symmetrically inflated, with scattered centrilobular nodular densities and groundglass opacities in both lungs, most pronounced in the upper lobes, and both lung bases. There are reticular and bandlike opacities in both lung bases suggesting atelectasis, with scattered bronchial wall thickening. No central mucous plugging. No consolidation, pleural effusion, or pneumothorax.    There is pneumobilia, with images of the upper abdomen otherwise unremarkable. No acute fractures or destructive osseous lesions.    IMPRESSION:  1. Bilateral  multifocal bronchitis-bronchiolitis, presumably of an acute infectious etiology, with lower lobe subsegmental atelectasis.  2. Negative for pulmonary thromboembolism.  3. Aortic and coronary arterial calcifications.    Electronically signed by:  Jose Lynn MD  10/13/2022 6:56 PM CDT Workstation: [id number]                                     X-Ray Chest AP Portable (Final result)  Result time 10/13/22 16:37:21      Final result by Adrian Perez MD (10/13/22 16:37:21)                   Narrative:    Reason: COVID-19 SOB, Covid + a few weeks ago    FINDINGS:  Portable chest at 1615 compared with 4/15/2015 shows normal cardiomediastinal silhouette.    Vague groundglass alveolar opacities appear patchy bilaterally. No pleural effusion or pneumothorax. Central pulmonary vasculature is normal. No acute osseous abnormality.    IMPRESSION:  New vague bilateral pulmonary patchy groundglass alveolar opacities can be seen with viral pneumonia, although other potential etiologies include other infectious or inflammatory pneumonia, alveolar edema, or alveolar hemorrhage.    Electronically signed by:  Adrian Perez MD  10/13/2022 4:37 PM CDT Workstation: [id number]                                     Medications   levalbuterol nebulizer solution 1.25 mg (1.25 mg Nebulization Given 10/13/22 1643)   ipratropium 0.02 % nebulizer solution 0.5 mg (0.5 mg Nebulization Given 10/13/22 1644)   iohexoL (OMNIPAQUE 350) injection 75 mL (75 mLs Intravenous Given 10/13/22 1829)     Medical Decision Making:   ED Management:  Chest x-ray shows patchy infiltrates consistent with pneumonia CTA of the chest showed infiltrates as described also no evidence of pulmonary embolism of discussed patient's findings with Dr. Sevilla who will evaluate patient in the emergency department                        Clinical Impression:   Final diagnoses:  [U07.1] COVID-19  [J18.9] Pneumonia of both lungs due to infectious organism, unspecified part of lung  (Primary)        ED Disposition Condition    Admit Stable                Stefan Antunez MD  10/13/22 1945

## 2022-10-14 PROBLEM — R19.7 DIARRHEA: Status: ACTIVE | Noted: 2022-10-14

## 2022-10-14 PROBLEM — R63.4 WEIGHT LOSS: Status: ACTIVE | Noted: 2022-10-14

## 2022-10-14 PROBLEM — B37.0 THRUSH: Status: ACTIVE | Noted: 2022-10-14

## 2022-10-14 LAB
ALBUMIN SERPL BCP-MCNC: 2.4 G/DL (ref 3.5–5.2)
ALP SERPL-CCNC: 72 U/L (ref 55–135)
ALT SERPL W/O P-5'-P-CCNC: 8 U/L (ref 10–44)
ANION GAP SERPL CALC-SCNC: 6 MMOL/L (ref 8–16)
ANISOCYTOSIS BLD QL SMEAR: SLIGHT
AST SERPL-CCNC: 21 U/L (ref 10–40)
BASOPHILS NFR BLD: 1 % (ref 0–1.9)
BILIRUB SERPL-MCNC: 0.6 MG/DL (ref 0.1–1)
BUN SERPL-MCNC: 7 MG/DL (ref 6–20)
CALCIUM SERPL-MCNC: 7.8 MG/DL (ref 8.7–10.5)
CHLORIDE SERPL-SCNC: 100 MMOL/L (ref 95–110)
CO2 SERPL-SCNC: 29 MMOL/L (ref 23–29)
CREAT SERPL-MCNC: 0.4 MG/DL (ref 0.5–1.4)
DIFFERENTIAL METHOD: ABNORMAL
EOSINOPHIL NFR BLD: 1 % (ref 0–8)
ERYTHROCYTE [DISTWIDTH] IN BLOOD BY AUTOMATED COUNT: 15.8 % (ref 11.5–14.5)
EST. GFR  (NO RACE VARIABLE): >60 ML/MIN/1.73 M^2
GLUCOSE SERPL-MCNC: 98 MG/DL (ref 70–110)
HCT VFR BLD AUTO: 28.5 % (ref 37–48.5)
HGB BLD-MCNC: 9.2 G/DL (ref 12–16)
IMM GRANULOCYTES # BLD AUTO: ABNORMAL K/UL (ref 0–0.04)
IMM GRANULOCYTES NFR BLD AUTO: ABNORMAL % (ref 0–0.5)
LYMPHOCYTES NFR BLD: 26 % (ref 18–48)
MCH RBC QN AUTO: 27.8 PG (ref 27–31)
MCHC RBC AUTO-ENTMCNC: 32.3 G/DL (ref 32–36)
MCV RBC AUTO: 86 FL (ref 82–98)
MONOCYTES NFR BLD: 6 % (ref 4–15)
NEUTROPHILS NFR BLD: 59 % (ref 38–73)
NEUTS BAND NFR BLD MANUAL: 7 %
NRBC BLD-RTO: 0 /100 WBC
OVALOCYTES BLD QL SMEAR: ABNORMAL
PLATELET # BLD AUTO: 254 K/UL (ref 150–450)
PLATELET BLD QL SMEAR: ABNORMAL
PMV BLD AUTO: 10 FL (ref 9.2–12.9)
POIKILOCYTOSIS BLD QL SMEAR: ABNORMAL
POLYCHROMASIA BLD QL SMEAR: ABNORMAL
POTASSIUM SERPL-SCNC: 3.3 MMOL/L (ref 3.5–5.1)
PROT SERPL-MCNC: 5.8 G/DL (ref 6–8.4)
RBC # BLD AUTO: 3.31 M/UL (ref 4–5.4)
SODIUM SERPL-SCNC: 135 MMOL/L (ref 136–145)
TSH SERPL DL<=0.005 MIU/L-ACNC: 0.44 UIU/ML (ref 0.34–5.6)
WBC # BLD AUTO: 4.32 K/UL (ref 3.9–12.7)

## 2022-10-14 PROCEDURE — 80053 COMPREHEN METABOLIC PANEL: CPT | Performed by: INTERNAL MEDICINE

## 2022-10-14 PROCEDURE — 99900031 HC PATIENT EDUCATION (STAT)

## 2022-10-14 PROCEDURE — 86361 T CELL ABSOLUTE COUNT: CPT | Performed by: INTERNAL MEDICINE

## 2022-10-14 PROCEDURE — 27000221 HC OXYGEN, UP TO 24 HOURS

## 2022-10-14 PROCEDURE — 87641 MR-STAPH DNA AMP PROBE: CPT | Performed by: INTERNAL MEDICINE

## 2022-10-14 PROCEDURE — 99223 PR INITIAL HOSPITAL CARE,LEVL III: ICD-10-PCS | Mod: ,,, | Performed by: INTERNAL MEDICINE

## 2022-10-14 PROCEDURE — 99900035 HC TECH TIME PER 15 MIN (STAT)

## 2022-10-14 PROCEDURE — 25000003 PHARM REV CODE 250: Performed by: INTERNAL MEDICINE

## 2022-10-14 PROCEDURE — 85007 BL SMEAR W/DIFF WBC COUNT: CPT | Performed by: INTERNAL MEDICINE

## 2022-10-14 PROCEDURE — 63700000 PHARM REV CODE 250 ALT 637 W/O HCPCS: Performed by: INTERNAL MEDICINE

## 2022-10-14 PROCEDURE — 87633 RESP VIRUS 12-25 TARGETS: CPT | Performed by: INTERNAL MEDICINE

## 2022-10-14 PROCEDURE — 99223 1ST HOSP IP/OBS HIGH 75: CPT | Mod: ,,, | Performed by: INTERNAL MEDICINE

## 2022-10-14 PROCEDURE — 85027 COMPLETE CBC AUTOMATED: CPT | Performed by: INTERNAL MEDICINE

## 2022-10-14 PROCEDURE — 84443 ASSAY THYROID STIM HORMONE: CPT | Performed by: INTERNAL MEDICINE

## 2022-10-14 PROCEDURE — 63600175 PHARM REV CODE 636 W HCPCS: Performed by: INTERNAL MEDICINE

## 2022-10-14 PROCEDURE — 12000002 HC ACUTE/MED SURGE SEMI-PRIVATE ROOM

## 2022-10-14 PROCEDURE — 94761 N-INVAS EAR/PLS OXIMETRY MLT: CPT

## 2022-10-14 PROCEDURE — 36415 COLL VENOUS BLD VENIPUNCTURE: CPT | Performed by: INTERNAL MEDICINE

## 2022-10-14 RX ORDER — SIMETHICONE 80 MG
1 TABLET,CHEWABLE ORAL 3 TIMES DAILY PRN
Status: DISCONTINUED | OUTPATIENT
Start: 2022-10-14 | End: 2022-10-15 | Stop reason: HOSPADM

## 2022-10-14 RX ORDER — FLUCONAZOLE 100 MG/1
200 TABLET ORAL DAILY
Status: DISCONTINUED | OUTPATIENT
Start: 2022-10-14 | End: 2022-10-15 | Stop reason: HOSPADM

## 2022-10-14 RX ORDER — VANCOMYCIN HCL IN 5 % DEXTROSE 1G/250ML
1000 PLASTIC BAG, INJECTION (ML) INTRAVENOUS
Status: DISCONTINUED | OUTPATIENT
Start: 2022-10-14 | End: 2022-10-15 | Stop reason: HOSPADM

## 2022-10-14 RX ORDER — POTASSIUM CHLORIDE 20 MEQ/1
40 TABLET, EXTENDED RELEASE ORAL ONCE
Status: DISCONTINUED | OUTPATIENT
Start: 2022-10-14 | End: 2022-10-14

## 2022-10-14 RX ADMIN — MOXIFLOXACIN HYDROCHLORIDE 1 DROP: 5 SOLUTION/ DROPS OPHTHALMIC at 05:10

## 2022-10-14 RX ADMIN — ERYTHROMYCIN 1 INCH: 5 OINTMENT OPHTHALMIC at 12:10

## 2022-10-14 RX ADMIN — MOXIFLOXACIN HYDROCHLORIDE 1 DROP: 5 SOLUTION/ DROPS OPHTHALMIC at 07:10

## 2022-10-14 RX ADMIN — VANCOMYCIN HYDROCHLORIDE 1000 MG: 1 INJECTION, POWDER, LYOPHILIZED, FOR SOLUTION INTRAVENOUS at 12:10

## 2022-10-14 RX ADMIN — MOXIFLOXACIN HYDROCHLORIDE 1 DROP: 5 SOLUTION/ DROPS OPHTHALMIC at 10:10

## 2022-10-14 RX ADMIN — SULFAMETHOXAZOLE AND TRIMETHOPRIM 1 TABLET: 800; 160 TABLET ORAL at 08:10

## 2022-10-14 RX ADMIN — ERYTHROMYCIN 1 INCH: 5 OINTMENT OPHTHALMIC at 05:10

## 2022-10-14 RX ADMIN — MOXIFLOXACIN HYDROCHLORIDE 1 DROP: 5 SOLUTION/ DROPS OPHTHALMIC at 12:10

## 2022-10-14 RX ADMIN — MOXIFLOXACIN HYDROCHLORIDE 1 DROP: 5 SOLUTION/ DROPS OPHTHALMIC at 08:10

## 2022-10-14 RX ADMIN — DORZOLAMIDE HYDROCHLORIDE AND TIMOLOL MALEATE 1 DROP: 22.3; 6.8 SOLUTION/ DROPS OPHTHALMIC at 08:10

## 2022-10-14 RX ADMIN — SIMETHICONE 80 MG: 80 TABLET, CHEWABLE ORAL at 08:10

## 2022-10-14 RX ADMIN — MOXIFLOXACIN HYDROCHLORIDE 1 DROP: 5 SOLUTION/ DROPS OPHTHALMIC at 09:10

## 2022-10-14 RX ADMIN — FLUCONAZOLE 200 MG: 100 TABLET ORAL at 03:10

## 2022-10-14 RX ADMIN — MOXIFLOXACIN HYDROCHLORIDE 1 DROP: 5 SOLUTION/ DROPS OPHTHALMIC at 11:10

## 2022-10-14 RX ADMIN — MOXIFLOXACIN HYDROCHLORIDE 1 DROP: 5 SOLUTION/ DROPS OPHTHALMIC at 03:10

## 2022-10-14 RX ADMIN — POTASSIUM BICARBONATE 35 MEQ: 391 TABLET, EFFERVESCENT ORAL at 08:10

## 2022-10-14 RX ADMIN — PIPERACILLIN SODIUM AND TAZOBACTAM SODIUM 3.38 G: 3; .375 INJECTION, POWDER, LYOPHILIZED, FOR SOLUTION INTRAVENOUS at 05:10

## 2022-10-14 RX ADMIN — PANTOPRAZOLE SODIUM 40 MG: 40 TABLET, DELAYED RELEASE ORAL at 08:10

## 2022-10-14 RX ADMIN — POTASSIUM BICARBONATE 35 MEQ: 391 TABLET, EFFERVESCENT ORAL at 01:10

## 2022-10-14 RX ADMIN — ENOXAPARIN SODIUM 40 MG: 40 INJECTION SUBCUTANEOUS at 05:10

## 2022-10-14 RX ADMIN — VALSARTAN 160 MG: 80 TABLET, FILM COATED ORAL at 08:10

## 2022-10-14 NOTE — ASSESSMENT & PLAN NOTE
This patient in known to have HIV+ status (Have detected HIV PCR but never CD4 <200 or AIDS defining illness). Labs reviewed- No results found for: CD4, No results found for: HIVDNAPCR. Patient is not on HAART. Will consult ID for HAART. Prophylaxis was not indicated at this time- waiting CD 4. Continue to monitor routine labs. Last CD4 count was No results found for: ABSOLUTECD4    We will consult Infectious disease at this time. Evaluate and treat HIV-associated diseases as indicated by specific problems listed below.     Inpatient admission for aspiration pneumonia; does NOT need isolation for COVID (diagnosis > 10 days); continue Ophth regimen for corneal abrasion; continue home regimen for HTN; iv antibiotics for presumptive bacterial pneumonia; ID consultation for HIV status (to establish) and bacterial pneumonia

## 2022-10-14 NOTE — PROGRESS NOTES
Pharmacokinetic Initial Assessment: IV Vancomycin    Assessment/Plan:    Initiate intravenous vancomycin with loading dose of 1000 mg once followed by a maintenance dose of vancomycin 1000 mg IV every 12 hours  Desired empiric serum trough concentration is 10 to 15 mcg/mL  Draw vancomycin trough level 60 min prior to fourth dose on 10/15 at approximately 1100  Pharmacy will continue to follow and monitor vancomycin.      Please contact pharmacy at extension [phone] with any questions regarding this assessment.     Thank you for the consult,   Omar Mary       Patient brief summary:  Amie Salmeron is a 57 y.o. female initiated on antimicrobial therapy with IV Vancomycin for treatment of suspected lower respiratory infection    Drug Allergies:   Review of patient's allergies indicates:  No Known Allergies    Actual Body Weight:   59 kg    Renal Function:   Estimated Creatinine Clearance: 166.7 mL/min (A) (based on SCr of 0.3 mg/dL (L)).,     CBC (last 72 hours):  Recent Labs   Lab Result Units 10/13/22  1655   WBC K/uL 4.13   Hemoglobin g/dL 10.0*   Hematocrit % 31.0*   Platelets K/uL 279   Gran % % 52.2   Lymph % % 30.3   Mono % % 12.1   Eosinophil % % 0.5   Basophil % % 0.5   Differential Method  Automated       Metabolic Panel (last 72 hours):  Recent Labs   Lab Result Units 10/13/22  1655   Sodium mmol/L 137   Potassium mmol/L 2.8*   Chloride mmol/L 100   CO2 mmol/L 30*   Glucose mg/dL 95   BUN mg/dL 6   Creatinine mg/dL 0.3*   Albumin g/dL 2.5*   Total Bilirubin mg/dL 0.7   Alkaline Phosphatase U/L 77   AST U/L 23   ALT U/L 9*       Drug levels (last 3 results):  No results for input(s): VANCOMYCINRA, VANCORANDOM, VANCOMYCINPE, VANCOPEAK, VANCOMYCINTR, VANCOTROUGH in the last 72 hours.    Microbiologic Results:  Microbiology Results (last 7 days)       Procedure Component Value Units Date/Time    Blood culture x two cultures. Draw prior to antibiotics. [[id number]] Collected: 10/13/22 0922    Order Status: Completed  Specimen: Blood from Peripheral, Hand, Right Updated: 10/13/22 2358     Blood Culture, Routine No Growth to date    Narrative:      Aerobic and anaerobic    Blood culture x two cultures. Draw prior to antibiotics. [[id number]] Collected: 10/13/22 1708    Order Status: Completed Specimen: Blood from Peripheral, Antecubital, Right Updated: 10/13/22 2358     Blood Culture, Routine No Growth to date    Narrative:      Aerobic and anaerobic

## 2022-10-14 NOTE — CONSULTS
Consult Note  Infectious Disease    Reason for Consult:  pneumonia, HIV positive     HPI: Amie Salmeron is a 57 y.o. female diagnosed with COVID 9/19 by [facility], treated with Paxlovid. Presented to ED by EMS with SOB which she reported had never improved. Sat on EMS arrival was 91%.   In the ER her temp was 99.5 and CTA did not reval any pulmonary emboli but did show ground infiltrates upper lobes and stringy infiltrates peripheral lower lobes. She was tested for COVId again and still positive. (Original positive visible in care everywhere). I conferred with Dr. Sevilla and suggested zosyn for possible aspiration given pattern on CT and recorded history of dysphagia and reflux, procalcitonin was elevated >2.0.  steroids were held pending further history.    Today 1/4 blood cultures have a [facility] in [facility] and vanc was added .  She tells me that she had fever and intermittent diarrhea, for amonth prior to going to [facility] and finding COVID. She took paxlovid and fever resolved, but symptoms of COVID returned. Cough persisted and she felt SOB in the last few days. She has lost 12#. She did not suspect GI infection, food poisoning etc. She did not receive any steroids and did not recognize that she had thrush. She has been on bactrim for a week for her right eye corneal ulcer. She admits not seeking medical attention for fear of persecution or prejudice against her for HIV diagnosis. She has not revealed her status to all of her previous providers, only some.     She tells me that she was diagnosed with HIV in 1983 and that her CD4 count has always been normal and she has never initiated anti-retroviral treatment. She reports that she has been followed at [facility] in [place], last seen about a year ago, but I phoned this clinic and she has not been seen there since 2009 (Amie Amor).   She denies any other STD, denies positive TB test or exposure, denies Hepatitis. She has no history of IVDA. She lives with  her elderly parents. 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     Review of patient's allergies indicates:  No Known Allergies  Past Medical History:   Diagnosis Date    Allergy     Arthritis     Asthma     Chronic pain     HIV infection     Hypertension     Overactive bladder     Spinal stenosis     Urine incontinence      Past Surgical History:   Procedure Laterality Date    ADENOIDECTOMY      ANKLE SURGERY      APPENDECTOMY      BACK SURGERY      GERALD    CYST REMOVAL      HYSTERECTOMY      JOINT REPLACEMENT Right     knee    KNEE SURGERY      RHIZOTOMY      TONSILLECTOMY       Social History     Socioeconomic History    Marital status: Single   Occupational History    Occupation: on disability   Tobacco Use    Smoking status: Never    Smokeless tobacco: Never   Substance and Sexual Activity    Alcohol use: Yes     Comment: Socially    Drug use: No    Sexual activity: Yes     Partners: Male     Birth control/protection: None     Social Determinants of Health     Financial Resource Strain: Unknown    Difficulty of Paying Living Expenses: Patient refused   Food Insecurity: Unknown    Worried About Running Out of Food in the Last Year: Patient refused    Ran Out of Food in the Last Year: Patient refused   Transportation Needs: Unknown    Lack of Transportation (Medical): Patient refused    Lack of Transportation (Non-Medical): Patient refused   Physical Activity: Unknown    Days of Exercise per Week: Patient refused    Minutes of Exercise per Session: Patient refused   Stress: Unknown    Feeling of Stress : Patient refused   Social Connections: Unknown    Frequency of Communication with Friends and Family: Patient refused    Frequency of Social Gatherings with Friends and Family: Patient refused    Attends Adventism Services: Patient refused    Active Member of Clubs or Organizations: Patient refused    Attends Club or Organization Meetings: Patient refused    Marital Status:  Patient refused   Housing Stability: Unknown    Unable to Pay for Housing in the Last Year: Patient refused    Unstable Housing in the Last Year: Patient refused     Family History   Problem Relation Age of Onset    Thyroid disease Mother     Hypertension Mother     Cancer Father         oral CA    Depression Sister     Hypertension Brother     Alcohol abuse Brother          Review of Systems:   Right eye vision is cloudy and blurry.     sinus congestion, ?purulent nasal discharge, frequent post nasal drip no facial pain  No pain in mouth or throat. No problems with teeth, gums.does receive dental care  No chest pain, palpitations, syncope    cough, without sputum production, with shortness of breath,without dyspnea on exertion, pleurisy, hemoptysis  No nausea, vomiting, but intermittent 1-2 x/day diarrhea,without  constipation, blood in stool, or focal abd pain,  No severe dysphagia, odynophagia  No dysuria, hematuria, strangury,    No vaginal discharge or ulcers. UTD with gyn and mammography(no records in this system)  No swelling of joints, redness of joints, injuries, or new focal pain  No unusual headaches, dizziness, vertigo,   falls  No anxiety, depression, substance abuse,  but fear of stigma has kept her from seeing someone for medical care regularly.  No diabetes, thyroid, hypogonadal conditions  No bleeding, lymphadenopathy, anemia, malignancy, unusual bruising  No new rashes, lesions, or wounds  No TB exposure  No recent/prior steroids  Outdoor activities: works part time  Travel: none  Implants: bilateral knee replacements  Antibiotic History: see HPI    EXAM & DIAGNOSTICS REVIEWED:   Vitals:     Temp:  [98.1 °F (36.7 °C)-100 °F (37.8 °C)]   Temp: 98.1 °F (36.7 °C) (10/14/22 1159)  Pulse: 74 (10/14/22 1159)  Resp: 15 (10/14/22 1159)  BP: 95/65 (10/14/22 1159)  SpO2: (!) 94 % (10/14/22 1159)    Intake/Output Summary (Last 24 hours) at 10/14/2022 1520  Last data filed at 10/14/2022 0856  Gross per 24 hour    Intake 100 ml   Output 800 ml   Net -700 ml       General:  In NAD. Alert and attentive, cooperative, comfortable, very thin  Eyes:  Anicteric, PERRL, EOMI  ENT:  No ulcers, but does have thrush on soft palate, nares patent, dentition is good  Neck:  supple, no masses or adenopathy appreciated  Lungs: Right > left basilar crackles, no wheezes  Heart:  RRR, no gallop/murmur/rub noted  Abd:  Soft, scaphoid, NT, ND, normal BS, no masses or organomegaly appreciated. ?some stool incontinence  :  Voids , no flank tenderness  Musc:  Joints without effusion, swelling, erythema, synovitis, with generalized muscle wasting.   Skin:  No rashes. No palmar or plantar lesions. No subungual petechiae  Neuro:             Alert, attentive, speech fluent, face symmetric, moves all extremities, no focal weakness. Ambulatory. Content of history not accurate   Psych:  Calm, cooperative  Lymphatic:     No cervical, supraclavicular, axillary, or inguinal nodes  Extrem: No edema, erythema, phlebitis, cellulitis, warm and well perfused  VAD:   peripheral    Isolation:  Covid discontinued   Wound:            General Labs reviewed:  Recent Labs   Lab 10/13/22  1655 10/14/22  0510   WBC 4.13 4.32   HGB 10.0* 9.2*   HCT 31.0* 28.5*    254       Recent Labs   Lab 10/13/22  1655 10/14/22  0510    135*   K 2.8* 3.3*    100   CO2 30* 29   BUN 6 7   CREATININE 0.3* 0.4*   CALCIUM 8.0* 7.8*   PROT 6.1 5.8*   BILITOT 0.7 0.6   ALKPHOS 77 72   ALT 9* 8*   AST 23 21     Recent Labs   Lab 10/13/22  1655   CRP 1.56*         Micro:  Microbiology Results (last 7 days)       Procedure Component Value Units Date/Time    Blood culture x two cultures. Draw prior to antibiotics. [[id number]] Collected: 10/13/22 1708    Order Status: Completed Specimen: Blood from Peripheral, Antecubital, Right Updated: 10/14/22 1152     Blood Culture, Routine Gram stain sohail bottle: Gram positive cocci      Results called to and read back by: Jeanna Carroll  RN-BCARD;  10/14/2022      11:52 CJD    Narrative:      Aerobic and anaerobic    Blood culture x two cultures. Draw prior to antibiotics. [[id number]] Collected: 10/13/22 1712    Order Status: Completed Specimen: Blood from Peripheral, Hand, Right Updated: 10/13/22 8122     Blood Culture, Routine No Growth to date    Narrative:      Aerobic and anaerobic            Imaging Reviewed:   CXR   CTA chest  HISTORY: Pulmonary embolism (PE) suspected, high probability,  dyspnea.    Comparison to multiple prior chest radiographs. There are no pulmonary arterial filling defects to suggest pulmonary thromboembolism. The central pulmonary arteries are normal in caliber.   Scattered calcified plaque involves normal caliber thoracic aorta, with no aortic dissection. The heart is normal in size, with no pericardial effusion. There are scattered coronary arterial calcifications, with no enlarged mediastinal or hilar lymph nodes.   The lungs are symmetrically inflated, with scattered centrilobular nodular densities and groundglass opacities in both lungs, most pronounced in the upper lobes, and both lung bases. There are reticular and bandlike opacities in both lung bases suggesting atelectasis, with scattered bronchial wall thickening. No central mucous plugging. No consolidation, pleural effusion, or pneumothorax.   There is pneumobilia, with images of the upper abdomen otherwise unremarkable. No acute fractures or destructive osseous lesions.     IMPRESSION:  1. Bilateral multifocal bronchitis-bronchiolitis, presumably of an acute infectious etiology, with lower lobe subsegmental atelectasis.  2. Negative for pulmonary thromboembolism.  3. Aortic and coronary arterial calcifications.    Cardiology:    IMPRESSION & PLAN   1. Pneumonia, hypoxemia   Pattern not of COVID, ?aspiration, ?post viral bacterial pneumonia    2. HIV positive. History of long term non progressor (not lymphopenic and TP is low) but thrush suggests  otherwise   Chronic diarrhea   Weight loss   anemic    3. COVID positive 9/19, s/p Paxlovid, with rebound symptoms  4. Pneumobilia, asymptomatic from GI tract  5. 1/4 positive blood culture, significance to be determined      Recommendations:  COVID isolation is not needed  Continue vanc and zosyn today. If blood culture= coag net staph and MRSA nasal screen negative, discontinue vanc  Continue po bactrim BID. If infiltrates progress, consider change to IV pending CD4 count  MRSA nasal screen  Respiratory pcr panel  GI pcr panel  Hydration, nutritional support  Repeat CXR tomorrow  Diflucan for thrush and potential for esophagitis    Dr. Ramon to follow this weekend.     Medical Decision Making during this encounter was  [_] Low Complexity  [_] Moderate Complexity  [ xxxx ] High Complexity

## 2022-10-14 NOTE — PLAN OF CARE
10/14/22 1130   Patient Assessment/Suction   Level of Consciousness (AVPU) alert   Respiratory Effort Normal;Unlabored   PRE-TX-O2   O2 Device (Oxygen Therapy) nasal cannula   $ Is the patient on Low Flow Oxygen? Yes   Flow (L/min) 2   Pulse Oximetry Type Intermittent   $ Pulse Oximetry - Multiple Charge Pulse Oximetry - Multiple   Pulse 78   Resp 18   Education   $ Education 15 min   Respiratory Evaluation   $ Care Plan Tech Time 15 min

## 2022-10-14 NOTE — HPI
57 year old female with history of HTN, and HIV (never on therapy, does not know cell count), who tested positive for COVID on 09/12/22 presented to ED complaining of persisting SOB(E) since being diagnosed with COVID. Has had non-productive cough. No chest discomfort. Has low grade fevers. No orthopnea, PND, or edema. Has also right sided corneal abrasion--under care of Ophth at [facility].     In ED: Labs reviewed: no leukocytosis with mild normocytic anemia; ferritin is markedly elevated; hypokalemia (treated in ED) with normal renal function; CRP is elevated; lactate is normal; procalcitonin is elevated. Cultured in ED. CXR reviewed: bilateral alveolar opacities. EKG reviewed: sinus with anterolateral T wave flattening, but no acute segments. CTA Chest: no PE; pneumonia.    Discussed with ID and ED MD's: Inpatient admission for aspiration pneumonia; does NOT need isolation for COVID (diagnosis > 10 days); continue Ophth regimen for corneal abrasion; continue home regimen for HTN; iv antibiotics for presumptive bacterial pneumonia; ID consultation for HIV status (to establish) and bacterial pneumonia

## 2022-10-14 NOTE — ASSESSMENT & PLAN NOTE
Inpatient admission for aspiration pneumonia; does NOT need isolation for COVID (diagnosis > 10 days); continue Ophth regimen for corneal abrasion; continue home regimen for HTN; iv antibiotics for presumptive bacterial pneumonia; ID consultation for HIV status (to establish) and bacterial pneumonia; low salt diet

## 2022-10-14 NOTE — PLAN OF CARE
Problem: Adult Inpatient Plan of Care  Goal: Plan of Care Review  Outcome: Ongoing, Progressing  Goal: Optimal Comfort and Wellbeing  Outcome: Ongoing, Progressing     Problem: Fluid Imbalance (Pneumonia)  Goal: Fluid Balance  Outcome: Ongoing, Progressing     Problem: Infection (Pneumonia)  Goal: Resolution of Infection Signs and Symptoms  Outcome: Ongoing, Progressing     Problem: Respiratory Compromise (Pneumonia)  Goal: Effective Oxygenation and Ventilation  Outcome: Ongoing, Progressing     Problem: Skin Injury Risk Increased  Goal: Skin Health and Integrity  Outcome: Ongoing, Progressing

## 2022-10-14 NOTE — H&P
[facility] - Emergency Dept  Hospital Medicine  History & Physical    Patient Name: Amie Salmeron  MRN: [id number]  Patient Class: IP- Inpatient  Admission Date: 10/13/2022  Attending Physician: Pierce Sevilla MD  Primary Care Provider: Dustin Caro MD         Patient information was obtained from patient, past medical records, ER records and ED and ID MD's.     Subjective:     Principal Problem:Pneumonia due to infectious organism    Chief Complaint:   Chief Complaint   Patient presents with    Shortness of Breath     Pt arrives via EMS with SOB, states she was dx with covid a few weeks ago and the SOB never went away. Pt placed on 2l NC by EMS for room air O2 91%        HPI: 57 year old female with history of HTN, and HIV (never on therapy, does not know cell count), who tested positive for COVID on 09/12/22 presented to ED complaining of persisting SOB(E) since being diagnosed with COVID. Has had non-productive cough. No chest discomfort. Has low grade fevers. No orthopnea, PND, or edema. Has also right sided corneal abrasion--under care of Ophth at [facility].     In ED: Labs reviewed: no leukocytosis with mild normocytic anemia; ferritin is markedly elevated; hypokalemia (treated in ED) with normal renal function; CRP is elevated; lactate is normal; procalcitonin is elevated. Cultured in ED. CXR reviewed: bilateral alveolar opacities. EKG reviewed: sinus with anterolateral T wave flattening, but no acute segments. CTA Chest: no PE; pneumonia.    Discussed with ID and ED MD's: Inpatient admission for aspiration pneumonia; does NOT need isolation for COVID (diagnosis > 10 days); continue Ophth regimen for corneal abrasion; continue home regimen for HTN; iv antibiotics for presumptive bacterial pneumonia; ID consultation for HIV status (to establish) and bacterial pneumonia      Past Medical History:   Diagnosis Date    Allergy     Arthritis     Asthma     Chronic pain     HIV infection      Hypertension     Overactive bladder     Spinal stenosis     Urine incontinence        Past Surgical History:   Procedure Laterality Date    ADENOIDECTOMY      ANKLE SURGERY      APPENDECTOMY      BACK SURGERY      GERALD    CYST REMOVAL      HYSTERECTOMY      JOINT REPLACEMENT Right     knee    KNEE SURGERY      RHIZOTOMY      TONSILLECTOMY         Review of patient's allergies indicates:  No Known Allergies    No current facility-administered medications on file prior to encounter.     Current Outpatient Medications on File Prior to Encounter   Medication Sig    dorzolamide-timolol 2-0.5% (COSOPT) 22.3-6.8 mg/mL ophthalmic solution Place 1 drop into the right eye 2 (two) times daily. for 14 days    erythromycin (ROMYCIN) ophthalmic ointment Place a 1/2 inch ribbon of ointment into the lower eyelid of right eye every night.    Fortified Tobramycin 15 mg/ml Opht (TOBREX) 15 mg Drop Place 1 drop into the right eye every hour while awake.    HYDROcodone-acetaminophen (NORCO)  mg per tablet Take 1 tablet by mouth every 4 (four) hours as needed for Pain.    moxifloxacin (VIGAMOX) 0.5 % ophthalmic solution Place 1 drop into the right eye every hour while awake. for 10 days    mupirocin (BACTROBAN) 2 % ointment Apply topically 3 (three) times daily.    ondansetron (ZOFRAN-ODT) 4 MG TbDL Take 4 mg by mouth every 8 (eight) hours as needed.    pantoprazole (PROTONIX) 40 MG tablet Take 40 mg by mouth every morning.    rivaroxaban (XARELTO) 10 mg Tab Take 1 tablet (10 mg total) by mouth daily with dinner or evening meal. (Patient not taking: Reported on 7/7/2022)    sulfamethoxazole-trimethoprim 400-80mg (BACTRIM,SEPTRA) 400-80 mg per tablet Take 1 tablet by mouth 2 (two) times daily.    valsartan (DIOVAN) 160 MG tablet Take 160 mg by mouth once daily.    vancomycin HCl (FORTIFIED VANCOMYCIN 25 MG/ML OPHTH) 25 mg Drop Place 1 drop into the right eye every hour while awake. for 10 days     Family  History       Problem Relation (Age of Onset)    Alcohol abuse Brother    Cancer Father    Depression Sister    Hypertension Mother, Brother    Thyroid disease Mother          Tobacco Use    Smoking status: Never    Smokeless tobacco: Never   Substance and Sexual Activity    Alcohol use: Yes     Comment: Socially    Drug use: No    Sexual activity: Yes     Partners: Male     Birth control/protection: None     Review of Systems   Constitutional: Negative.    HENT: Negative.     Eyes:  Positive for pain, discharge and redness.   Respiratory:  Positive for cough and shortness of breath.    Cardiovascular: Negative.    Gastrointestinal: Negative.    Endocrine: Negative.    Genitourinary: Negative.    Musculoskeletal: Negative.    Skin: Negative.    Allergic/Immunologic: Negative.    Neurological: Negative.    Hematological: Negative.    All other systems reviewed and are negative.  Objective:     Vital Signs (Most Recent):  Temp: 99.6 °F (37.6 °C) (10/13/22 2014)  Pulse: 81 (10/13/22 2015)  Resp: 20 (10/13/22 2014)  BP: 101/60 (10/13/22 2015)  SpO2: 95 % (10/13/22 2015) Vital Signs (24h Range):  Temp:  [99.5 °F (37.5 °C)-99.6 °F (37.6 °C)] 99.6 °F (37.6 °C)  Pulse:  [76-87] 81  Resp:  [18-20] 20  SpO2:  [95 %-100 %] 95 %  BP: (101-125)/(60-78) 101/60     Weight: 59 kg (130 lb)  Body mass index is 24.56 kg/m².    Physical Exam  Vitals and nursing note reviewed.   Constitutional:       Appearance: She is well-developed.   HENT:      Head: Normocephalic and atraumatic.      Right Ear: External ear normal.      Left Ear: External ear normal.      Nose: Nose normal.   Eyes:      General:         Right eye: Discharge present.      Conjunctiva/sclera: Conjunctivae normal.      Pupils: Pupils are equal, round, and reactive to light.   Cardiovascular:      Rate and Rhythm: Normal rate and regular rhythm.      Heart sounds: Normal heart sounds.   Pulmonary:      Effort: Pulmonary effort is normal.      Breath sounds: Normal  breath sounds.      Comments: Decreased entry bases with coarse large airway sounds clearing with cough; no expiratory wheezes, nor opening crepitations currently appreciated  Abdominal:      General: Bowel sounds are normal.      Palpations: Abdomen is soft.   Musculoskeletal:         General: Normal range of motion.      Cervical back: Normal range of motion and neck supple.   Skin:     General: Skin is warm and dry.      Capillary Refill: Capillary refill takes less than 2 seconds.   Neurological:      Mental Status: She is alert and oriented to person, place, and time.   Psychiatric:         Behavior: Behavior normal.         Thought Content: Thought content normal.         Judgment: Judgment normal.         CRANIAL NERVES     CN III, IV, VI   Pupils are equal, round, and reactive to light.     Significant Labs: All pertinent labs within the past 24 hours have been reviewed.  CBC:   Recent Labs   Lab 10/13/22  1655   WBC 4.13   HGB 10.0*   HCT 31.0*        CMP:   Recent Labs   Lab 10/13/22  1655      K 2.8*      CO2 30*   GLU 95   BUN 6   CREATININE 0.3*   CALCIUM 8.0*   PROT 6.1   ALBUMIN 2.5*   BILITOT 0.7   ALKPHOS 77   AST 23   ALT 9*   ANIONGAP 7*     Cardiac Markers:   Recent Labs   Lab 10/13/22  1655   *     Lactic Acid:   Recent Labs   Lab 10/13/22  1655   LACTATE 0.7     Troponin:   Recent Labs   Lab 10/13/22  1655   TROPONINI <0.030       Significant Imaging: I have reviewed all pertinent imaging results/findings within the past 24 hours.    Assessment/Plan:     * Pneumonia due to infectious organism  Inpatient admission for aspiration pneumonia; does NOT need isolation for COVID (diagnosis > 10 days); continue Ophth regimen for corneal abrasion; continue home regimen for HTN; iv antibiotics (Vancomycin and Piperacillin) for presumptive bacterial pneumonia; ID consultation for HIV status (to establish) and bacterial pneumonia      HIV disease  This patient in known to have  HIV+ status (Have detected HIV PCR but never CD4 <200 or AIDS defining illness). Labs reviewed- No results found for: CD4, No results found for: HIVDNAPCR. Patient is not on HAART. Will consult ID for HAART. Prophylaxis was not indicated at this time- waiting CD 4. Continue to monitor routine labs. Last CD4 count was No results found for: ABSOLUTECD4    We will consult Infectious disease at this time. Evaluate and treat HIV-associated diseases as indicated by specific problems listed below.     Inpatient admission for aspiration pneumonia; does NOT need isolation for COVID (diagnosis > 10 days); continue Ophth regimen for corneal abrasion; continue home regimen for HTN; iv antibiotics for presumptive bacterial pneumonia; ID consultation for HIV status (to establish) and bacterial pneumonia      Essential hypertension, benign  Inpatient admission for aspiration pneumonia; does NOT need isolation for COVID (diagnosis > 10 days); continue Ophth regimen for corneal abrasion; continue home regimen for HTN; iv antibiotics for presumptive bacterial pneumonia; ID consultation for HIV status (to establish) and bacterial pneumonia; low salt diet        VTE Risk Mitigation (From admission, onward)    None             Pierce Sevilla MD  Department of Hospital Medicine   [facility] - Emergency Dept

## 2022-10-14 NOTE — ASSESSMENT & PLAN NOTE
Inpatient admission for aspiration pneumonia; does NOT need isolation for COVID (diagnosis > 10 days); continue Ophth regimen for corneal abrasion; continue home regimen for HTN; iv antibiotics (Vancomycin and Piperacillin) for presumptive bacterial pneumonia; ID consultation for HIV status (to establish) and bacterial pneumonia

## 2022-10-14 NOTE — SUBJECTIVE & OBJECTIVE
Past Medical History:   Diagnosis Date    Allergy     Arthritis     Asthma     Chronic pain     HIV infection     Hypertension     Overactive bladder     Spinal stenosis     Urine incontinence        Past Surgical History:   Procedure Laterality Date    ADENOIDECTOMY      ANKLE SURGERY      APPENDECTOMY      BACK SURGERY      GERALD    CYST REMOVAL      HYSTERECTOMY      JOINT REPLACEMENT Right     knee    KNEE SURGERY      RHIZOTOMY      TONSILLECTOMY         Review of patient's allergies indicates:  No Known Allergies    No current facility-administered medications on file prior to encounter.     Current Outpatient Medications on File Prior to Encounter   Medication Sig    dorzolamide-timolol 2-0.5% (COSOPT) 22.3-6.8 mg/mL ophthalmic solution Place 1 drop into the right eye 2 (two) times daily. for 14 days    erythromycin (ROMYCIN) ophthalmic ointment Place a 1/2 inch ribbon of ointment into the lower eyelid of right eye every night.    Fortified Tobramycin 15 mg/ml Opht (TOBREX) 15 mg Drop Place 1 drop into the right eye every hour while awake.    HYDROcodone-acetaminophen (NORCO)  mg per tablet Take 1 tablet by mouth every 4 (four) hours as needed for Pain.    moxifloxacin (VIGAMOX) 0.5 % ophthalmic solution Place 1 drop into the right eye every hour while awake. for 10 days    mupirocin (BACTROBAN) 2 % ointment Apply topically 3 (three) times daily.    ondansetron (ZOFRAN-ODT) 4 MG TbDL Take 4 mg by mouth every 8 (eight) hours as needed.    pantoprazole (PROTONIX) 40 MG tablet Take 40 mg by mouth every morning.    rivaroxaban (XARELTO) 10 mg Tab Take 1 tablet (10 mg total) by mouth daily with dinner or evening meal. (Patient not taking: Reported on 7/7/2022)    sulfamethoxazole-trimethoprim 400-80mg (BACTRIM,SEPTRA) 400-80 mg per tablet Take 1 tablet by mouth 2 (two) times daily.    valsartan (DIOVAN) 160 MG tablet Take 160 mg by mouth once daily.    vancomycin HCl (FORTIFIED VANCOMYCIN 25 MG/ML OPHTH) 25 mg  Drop Place 1 drop into the right eye every hour while awake. for 10 days     Family History       Problem Relation (Age of Onset)    Alcohol abuse Brother    Cancer Father    Depression Sister    Hypertension Mother, Brother    Thyroid disease Mother          Tobacco Use    Smoking status: Never    Smokeless tobacco: Never   Substance and Sexual Activity    Alcohol use: Yes     Comment: Socially    Drug use: No    Sexual activity: Yes     Partners: Male     Birth control/protection: None     Review of Systems   Constitutional: Negative.    HENT: Negative.     Eyes:  Positive for pain, discharge and redness.   Respiratory:  Positive for cough and shortness of breath.    Cardiovascular: Negative.    Gastrointestinal: Negative.    Endocrine: Negative.    Genitourinary: Negative.    Musculoskeletal: Negative.    Skin: Negative.    Allergic/Immunologic: Negative.    Neurological: Negative.    Hematological: Negative.    All other systems reviewed and are negative.  Objective:     Vital Signs (Most Recent):  Temp: 99.6 °F (37.6 °C) (10/13/22 2014)  Pulse: 81 (10/13/22 2015)  Resp: 20 (10/13/22 2014)  BP: 101/60 (10/13/22 2015)  SpO2: 95 % (10/13/22 2015) Vital Signs (24h Range):  Temp:  [99.5 °F (37.5 °C)-99.6 °F (37.6 °C)] 99.6 °F (37.6 °C)  Pulse:  [76-87] 81  Resp:  [18-20] 20  SpO2:  [95 %-100 %] 95 %  BP: (101-125)/(60-78) 101/60     Weight: 59 kg (130 lb)  Body mass index is 24.56 kg/m².    Physical Exam  Vitals and nursing note reviewed.   Constitutional:       Appearance: She is well-developed.   HENT:      Head: Normocephalic and atraumatic.      Right Ear: External ear normal.      Left Ear: External ear normal.      Nose: Nose normal.   Eyes:      General:         Right eye: Discharge present.      Conjunctiva/sclera: Conjunctivae normal.      Pupils: Pupils are equal, round, and reactive to light.   Cardiovascular:      Rate and Rhythm: Normal rate and regular rhythm.      Heart sounds: Normal heart sounds.    Pulmonary:      Effort: Pulmonary effort is normal.      Breath sounds: Normal breath sounds.      Comments: Decreased entry bases with coarse large airway sounds clearing with cough; no expiratory wheezes, nor opening crepitations currently appreciated  Abdominal:      General: Bowel sounds are normal.      Palpations: Abdomen is soft.   Musculoskeletal:         General: Normal range of motion.      Cervical back: Normal range of motion and neck supple.   Skin:     General: Skin is warm and dry.      Capillary Refill: Capillary refill takes less than 2 seconds.   Neurological:      Mental Status: She is alert and oriented to person, place, and time.   Psychiatric:         Behavior: Behavior normal.         Thought Content: Thought content normal.         Judgment: Judgment normal.         CRANIAL NERVES     CN III, IV, VI   Pupils are equal, round, and reactive to light.     Significant Labs: All pertinent labs within the past 24 hours have been reviewed.  CBC:   Recent Labs   Lab 10/13/22  1655   WBC 4.13   HGB 10.0*   HCT 31.0*        CMP:   Recent Labs   Lab 10/13/22  1655      K 2.8*      CO2 30*   GLU 95   BUN 6   CREATININE 0.3*   CALCIUM 8.0*   PROT 6.1   ALBUMIN 2.5*   BILITOT 0.7   ALKPHOS 77   AST 23   ALT 9*   ANIONGAP 7*     Cardiac Markers:   Recent Labs   Lab 10/13/22  1655   *     Lactic Acid:   Recent Labs   Lab 10/13/22  1655   LACTATE 0.7     Troponin:   Recent Labs   Lab 10/13/22  1655   TROPONINI <0.030       Significant Imaging: I have reviewed all pertinent imaging results/findings within the past 24 hours.

## 2022-10-14 NOTE — PLAN OF CARE
[facility]  Initial Discharge Assessment       Primary Care Provider: Dustin Caro MD    Admission Diagnosis: Pneumonia of both lungs due to infectious organism, unspecified part of lung [J18.9]    Admission Date: 10/13/2022  Expected Discharge Date:     Discharge Barriers Identified: None    Payor: HU[employer] MANAGED MEDICARE / Plan: HU[employer] MEDICARE HMO / Product Type: Capitation /     Extended Emergency Contact Information  Primary Emergency Contact: Elena Crandall   [facility]  Home Phone: [phone]  Mobile Phone: [phone]  Relation: Mother    Discharge Plan A: Home  Discharge Plan B: Home with family      [facility] [address]  [address]  [place] [address]  Phone: [phone] Fax: [phone]    [facility] - [place] - [address]  [address]  [place] [address]  Phone: [phone] Fax: [phone]    [facility] [place] - [address]  [address] [place] [address]  Phone: [phone] Fax: [phone]    Patient in isolation room for COVID 19 positive.  SW attempted to contact patient via hospital and personal phone without success.  SW spoke with patient's mother Elena Crandall via telephone [phone] to complete discharge planning assessment.  Mother verified all demographic information on facesheet is correct.  Mother verified PCP is Dr. Caro.  Mother verified primary health insurance is [employer].  Patient with NO home health or DME.  Patient with NO POA or Living Will.  Patient not on dialysis or medication coumadin.  Patient with no 30 day admission.  Patient with no financial issues at this time.  Patient family will provide transportation upon discharge from facility.  Patient independent with ADLs, live with mother, drives self.      Initial Assessment (most recent)       Adult Discharge Assessment - 10/14/22  1506          Discharge Assessment    Assessment Type Discharge Planning Assessment     Confirmed/corrected address, phone number and insurance Yes     Confirmed Demographics Correct on Facesheet     Source of Information family     Communicated MARK with patient/caregiver Date not available/Unable to determine     Lives With parent(s)     Facility Arrived From: home     Do you expect to return to your current living situation? Yes     Do you have help at home or someone to help you manage your care at home? Yes     Who are your caregiver(s) and their phone number(s)? mother     Prior to hospitilization cognitive status: Unable to Assess     Current cognitive status: Unable to Assess     Walking or Climbing Stairs Difficulty none     Dressing/Bathing Difficulty none     Equipment Currently Used at Home none     Readmission within 30 days? No     Patient currently being followed by outpatient case management? No     Do you currently have service(s) that help you manage your care at home? No     Do you take prescription medications? Yes     Do you have prescription coverage? Yes     Do you have any problems affording any of your prescribed medications? No     Is the patient taking medications as prescribed? yes     Who is going to help you get home at discharge? mother     How do you get to doctors appointments? car, drives self     Are you on dialysis? No     Discharge Plan A Home     Discharge Plan B Home with family     DME Needed Upon Discharge  oxygen     Discharge Plan discussed with: Parent(s)     Discharge Barriers Identified None        Physical Activity    On average, how many days per week do you engage in moderate to strenuous exercise (like a brisk walk)? Patient refused     On average, how many minutes do you engage in exercise at this level? Patient refused        Financial Resource Strain    How hard is it for you to pay for the very basics like food, housing, medical care, and heating? Patient refused         Housing Stability    In the last 12 months, was there a time when you were not able to pay the mortgage or rent on time? Patient refused     In the last 12 months, was there a time when you did not have a steady place to sleep or slept in a shelter (including now)? Patient refused        Transportation Needs    In the past 12 months, has lack of transportation kept you from medical appointments or from getting medications? Patient refused     In the past 12 months, has lack of transportation kept you from meetings, work, or from getting things needed for daily living? Patient refused        Food Insecurity    Within the past 12 months, you worried that your food would run out before you got the money to buy more. Patient refused     Within the past 12 months, the food you bought just didn't last and you didn't have money to get more. Patient refused        Stress    Do you feel stress - tense, restless, nervous, or anxious, or unable to sleep at night because your mind is troubled all the time - these days? Patient refused        Social Connections    In a typical week, how many times do you talk on the phone with family, friends, or neighbors? Patient refused     How often do you get together with friends or relatives? Patient refused     How often do you attend Buddhism or Evangelical services? Patient refused     Do you belong to any clubs or organizations such as Buddhism groups, unions, fraternal or athletic groups, or school groups? Patient refused     How often do you attend meetings of the clubs or organizations you belong to? Patient refused     Are you , , , , never , or living with a partner? Patient refused        Alcohol Use    Q1: How often do you have a drink containing alcohol? Patient refused     Q2: How many drinks containing alcohol do you have on a typical day when you are drinking? Patient refused     Q3: How often do you have six or more drinks on one  occasion? Patient refused

## 2022-10-15 VITALS
RESPIRATION RATE: 18 BRPM | WEIGHT: 129.69 LBS | HEART RATE: 78 BPM | BODY MASS INDEX: 24.49 KG/M2 | OXYGEN SATURATION: 92 % | DIASTOLIC BLOOD PRESSURE: 68 MMHG | HEIGHT: 61 IN | TEMPERATURE: 99 F | SYSTOLIC BLOOD PRESSURE: 116 MMHG

## 2022-10-15 DIAGNOSIS — U07.1 COVID-19 VIRUS DETECTED: ICD-10-CM

## 2022-10-15 LAB
ADENOVIRUS: NOT DETECTED
BASOPHILS # BLD AUTO: 0 X10E3/UL (ref 0–0.2)
BASOPHILS # BLD AUTO: 0.03 K/UL (ref 0–0.2)
BASOPHILS NFR BLD AUTO: 0 %
BASOPHILS NFR BLD: 1 % (ref 0–1.9)
BORDETELLA PARAPERTUSSIS (IS1001): NOT DETECTED
BORDETELLA PERTUSSIS (PTXP): NOT DETECTED
CD3+CD4+ CELLS # BLD: 26 /UL (ref 359–1519)
CD3+CD4+ CELLS NFR BLD: 2.9 % (ref 30.8–58.5)
CHLAMYDIA PNEUMONIAE: NOT DETECTED
CORONAVIRUS 229E, COMMON COLD VIRUS: NOT DETECTED
CORONAVIRUS HKU1, COMMON COLD VIRUS: NOT DETECTED
CORONAVIRUS NL63, COMMON COLD VIRUS: NOT DETECTED
CORONAVIRUS OC43, COMMON COLD VIRUS: NOT DETECTED
DIFFERENTIAL METHOD: ABNORMAL
EOSINOPHIL # BLD AUTO: 0 X10E3/UL (ref 0–0.4)
EOSINOPHIL # BLD AUTO: 0.1 K/UL (ref 0–0.5)
EOSINOPHIL NFR BLD AUTO: 0 %
EOSINOPHIL NFR BLD: 2.3 % (ref 0–8)
ERYTHROCYTE [DISTWIDTH] IN BLOOD BY AUTOMATED COUNT: 15.6 % (ref 11.7–15.4)
ERYTHROCYTE [DISTWIDTH] IN BLOOD BY AUTOMATED COUNT: 15.9 % (ref 11.5–14.5)
FLUBV RNA NPH QL NAA+NON-PROBE: NOT DETECTED
HCT VFR BLD AUTO: 28.7 % (ref 34–46.6)
HCT VFR BLD AUTO: 29.2 % (ref 37–48.5)
HGB BLD-MCNC: 9 G/DL (ref 11.1–15.9)
HGB BLD-MCNC: 9.3 G/DL (ref 12–16)
HPIV1 RNA NPH QL NAA+NON-PROBE: NOT DETECTED
HPIV2 RNA NPH QL NAA+NON-PROBE: NOT DETECTED
HPIV3 RNA NPH QL NAA+NON-PROBE: NOT DETECTED
HPIV4 RNA NPH QL NAA+NON-PROBE: NOT DETECTED
HUMAN METAPNEUMOVIRUS: NOT DETECTED
IMM GRANULOCYTES # BLD AUTO: 0.1 X10E3/UL (ref 0–0.1)
IMM GRANULOCYTES # BLD AUTO: 0.13 K/UL (ref 0–0.04)
IMM GRANULOCYTES NFR BLD AUTO: 3 %
IMM GRANULOCYTES NFR BLD AUTO: 4.2 % (ref 0–0.5)
INFLUENZA A (SUBTYPES H1,H1-2009,H3): NOT DETECTED
LYMPHOCYTES # BLD AUTO: 0.8 K/UL (ref 1–4.8)
LYMPHOCYTES # BLD AUTO: 0.9 X10E3/UL (ref 0.7–3.1)
LYMPHOCYTES NFR BLD AUTO: 19 %
LYMPHOCYTES NFR BLD: 26.4 % (ref 18–48)
MCH RBC QN AUTO: 27.2 PG (ref 26.6–33)
MCH RBC QN AUTO: 27.4 PG (ref 27–31)
MCHC RBC AUTO-ENTMCNC: 31.4 G/DL (ref 31.5–35.7)
MCHC RBC AUTO-ENTMCNC: 31.8 G/DL (ref 32–36)
MCV RBC AUTO: 86 FL (ref 82–98)
MCV RBC AUTO: 87 FL (ref 79–97)
MONOCYTES # BLD AUTO: 0.5 K/UL (ref 0.3–1)
MONOCYTES # BLD AUTO: 0.7 X10E3/UL (ref 0.1–0.9)
MONOCYTES NFR BLD AUTO: 15 %
MONOCYTES NFR BLD: 15.6 % (ref 4–15)
MRSA SCREEN BY PCR: POSITIVE
MYCOPLASMA PNEUMONIAE: NOT DETECTED
NEUTROPHILS # BLD AUTO: 1.6 K/UL (ref 1.8–7.7)
NEUTROPHILS # BLD AUTO: 2.9 X10E3/UL (ref 1.4–7)
NEUTROPHILS NFR BLD AUTO: 63 %
NEUTROPHILS NFR BLD: 50.5 % (ref 38–73)
NRBC BLD-RTO: 0 /100 WBC
PLATELET # BLD AUTO: 266 K/UL (ref 150–450)
PLATELET # BLD AUTO: 275 X10E3/UL (ref 150–450)
PMV BLD AUTO: 10.1 FL (ref 9.2–12.9)
RBC # BLD AUTO: 3.31 X10E6/UL (ref 3.77–5.28)
RBC # BLD AUTO: 3.39 M/UL (ref 4–5.4)
RESPIRATORY INFECTION PANEL SOURCE: ABNORMAL
RSV RNA NPH QL NAA+NON-PROBE: NOT DETECTED
RV+EV RNA NPH QL NAA+NON-PROBE: NOT DETECTED
SARS-COV-2 RNA RESP QL NAA+PROBE: DETECTED
WBC # BLD AUTO: 3.07 K/UL (ref 3.9–12.7)
WBC # BLD AUTO: 4.6 X10E3/UL (ref 3.4–10.8)

## 2022-10-15 PROCEDURE — 86592 SYPHILIS TEST NON-TREP QUAL: CPT | Performed by: INTERNAL MEDICINE

## 2022-10-15 PROCEDURE — 87536 HIV-1 QUANT&REVRSE TRNSCRPJ: CPT | Performed by: INTERNAL MEDICINE

## 2022-10-15 PROCEDURE — 85025 COMPLETE CBC W/AUTO DIFF WBC: CPT | Performed by: INTERNAL MEDICINE

## 2022-10-15 PROCEDURE — 86704 HEP B CORE ANTIBODY TOTAL: CPT | Performed by: INTERNAL MEDICINE

## 2022-10-15 PROCEDURE — 99900031 HC PATIENT EDUCATION (STAT)

## 2022-10-15 PROCEDURE — 87340 HEPATITIS B SURFACE AG IA: CPT | Performed by: INTERNAL MEDICINE

## 2022-10-15 PROCEDURE — 86480 TB TEST CELL IMMUN MEASURE: CPT | Performed by: INTERNAL MEDICINE

## 2022-10-15 PROCEDURE — 86706 HEP B SURFACE ANTIBODY: CPT | Performed by: INTERNAL MEDICINE

## 2022-10-15 PROCEDURE — 86708 HEPATITIS A ANTIBODY: CPT | Performed by: INTERNAL MEDICINE

## 2022-10-15 PROCEDURE — 36415 COLL VENOUS BLD VENIPUNCTURE: CPT | Performed by: INTERNAL MEDICINE

## 2022-10-15 PROCEDURE — 94761 N-INVAS EAR/PLS OXIMETRY MLT: CPT

## 2022-10-15 PROCEDURE — 25000003 PHARM REV CODE 250: Performed by: INTERNAL MEDICINE

## 2022-10-15 PROCEDURE — 86803 HEPATITIS C AB TEST: CPT | Performed by: INTERNAL MEDICINE

## 2022-10-15 PROCEDURE — 99900035 HC TECH TIME PER 15 MIN (STAT)

## 2022-10-15 PROCEDURE — 63600175 PHARM REV CODE 636 W HCPCS: Performed by: INTERNAL MEDICINE

## 2022-10-15 PROCEDURE — 87899 AGENT NOS ASSAY W/OPTIC: CPT | Performed by: INTERNAL MEDICINE

## 2022-10-15 RX ADMIN — MOXIFLOXACIN HYDROCHLORIDE 1 DROP: 5 SOLUTION/ DROPS OPHTHALMIC at 03:10

## 2022-10-15 RX ADMIN — ERYTHROMYCIN 1 INCH: 5 OINTMENT OPHTHALMIC at 12:10

## 2022-10-15 RX ADMIN — MOXIFLOXACIN HYDROCHLORIDE 1 DROP: 5 SOLUTION/ DROPS OPHTHALMIC at 05:10

## 2022-10-15 RX ADMIN — MOXIFLOXACIN HYDROCHLORIDE 1 DROP: 5 SOLUTION/ DROPS OPHTHALMIC at 02:10

## 2022-10-15 RX ADMIN — MOXIFLOXACIN HYDROCHLORIDE 1 DROP: 5 SOLUTION/ DROPS OPHTHALMIC at 04:10

## 2022-10-15 RX ADMIN — VANCOMYCIN HYDROCHLORIDE 1000 MG: 1 INJECTION, POWDER, LYOPHILIZED, FOR SOLUTION INTRAVENOUS at 12:10

## 2022-10-15 RX ADMIN — ERYTHROMYCIN 1 INCH: 5 OINTMENT OPHTHALMIC at 05:10

## 2022-10-15 RX ADMIN — MOXIFLOXACIN HYDROCHLORIDE 1 DROP: 5 SOLUTION/ DROPS OPHTHALMIC at 06:10

## 2022-10-15 RX ADMIN — PIPERACILLIN SODIUM AND TAZOBACTAM SODIUM 3.38 G: 3; .375 INJECTION, POWDER, LYOPHILIZED, FOR SOLUTION INTRAVENOUS at 01:10

## 2022-10-15 RX ADMIN — MOXIFLOXACIN HYDROCHLORIDE 1 DROP: 5 SOLUTION/ DROPS OPHTHALMIC at 12:10

## 2022-10-15 RX ADMIN — MOXIFLOXACIN HYDROCHLORIDE 1 DROP: 5 SOLUTION/ DROPS OPHTHALMIC at 01:10

## 2022-10-15 NOTE — NURSING
Despite our efforts, patient has decided to leave AMA. Patient has full decisional capacity and understands her condition and risks of leaving AMA. The patient's brother, Laureano, is aware of her condition and arrived to pick her up. IV and tele was removed by patient. Patient left the floor upon arrival of brother. MD notified about the situation.

## 2022-10-15 NOTE — PLAN OF CARE
Problem: Adult Inpatient Plan of Care  Goal: Plan of Care Review  Outcome: Ongoing, Progressing  Goal: Patient-Specific Goal (Individualized)  Outcome: Ongoing, Progressing  Goal: Absence of Hospital-Acquired Illness or Injury  Outcome: Ongoing, Progressing  Goal: Optimal Comfort and Wellbeing  Outcome: Ongoing, Progressing  Goal: Readiness for Transition of Care  Outcome: Ongoing, Progressing     Problem: Fluid Imbalance (Pneumonia)  Goal: Fluid Balance  Outcome: Ongoing, Progressing     Problem: Infection (Pneumonia)  Goal: Resolution of Infection Signs and Symptoms  Outcome: Ongoing, Progressing     Problem: Respiratory Compromise (Pneumonia)  Goal: Effective Oxygenation and Ventilation  Outcome: Ongoing, Progressing     Problem: Skin Injury Risk Increased  Goal: Skin Health and Integrity  Outcome: Ongoing, Progressing     Problem: Infection  Goal: Absence of Infection Signs and Symptoms  Outcome: Ongoing, Progressing

## 2022-10-15 NOTE — CARE UPDATE
10/15/22 0746   Patient Assessment/Suction   Level of Consciousness (AVPU) alert   Respiratory Effort Normal;Unlabored   Expansion/Accessory Muscles/Retractions expansion symmetric   All Lung Fields Breath Sounds diminished   Rhythm/Pattern, Respiratory unlabored   PRE-TX-O2   O2 Device (Oxygen Therapy) room air   SpO2 (!) 92 %   Pulse Oximetry Type Intermittent   $ Pulse Oximetry - Multiple Charge Pulse Oximetry - Multiple   Pulse 78   Resp 18   Education   $ Education 15 min   Respiratory Evaluation   $ Care Plan Tech Time 15 min

## 2022-10-15 NOTE — PROGRESS NOTES
[facility]  Progress Note    Patient Name: Amie Salmeron  MRN: [id number]  Patient Class: IP- Inpatient  Admission Date: 10/13/2022  Attending Physician: Venkat Deluca MD  Primary Care Provider: Dustin Caro MD  DOS: 10/14/2022    Subjective:     Principal Problem:Pneumonia due to infectious organism    Chief Complaint:   Chief Complaint   Patient presents with    Shortness of Breath     Pt arrives via EMS with SOB, states she was dx with covid a few weeks ago and the SOB never went away. Pt placed on 2l NC by EMS for room air O2 91%        HPI: 57 year old female with history of HTN, and HIV (never on therapy, does not know cell count), who tested positive for COVID on 09/12/22 presented to ED complaining of persisting SOB(E) since being diagnosed with COVID. Has had non-productive cough. No chest discomfort. Has low grade fevers. No orthopnea, PND, or edema. Has also right sided corneal abrasion--under care of Ophth at [facility].     In ED: Labs reviewed: no leukocytosis with mild normocytic anemia; ferritin is markedly elevated; hypokalemia (treated in ED) with normal renal function; CRP is elevated; lactate is normal; procalcitonin is elevated. Cultured in ED. CXR reviewed: bilateral alveolar opacities. EKG reviewed: sinus with anterolateral T wave flattening, but no acute segments. CTA Chest: no PE; pneumonia.    Discussed with ID and ED MD's: Inpatient admission for aspiration pneumonia; does NOT need isolation for COVID (diagnosis > 10 days); continue Ophth regimen for corneal abrasion; continue home regimen for HTN; iv antibiotics for presumptive bacterial pneumonia; ID consultation for HIV status (to establish) and bacterial pneumonia    INTERVAL HISTORY:  10/14: Patient seen and examined.  Patient reports feeling a little better but still has some persistent shortness of breath and cough, occurring intermittently, moderate intensity.  No fever or chills.  No chest  pain.  Eating okay today without nausea or vomiting.  Some generalized weakness.    ROS:  3 point review of systems negative except as per HPI below    Vitals reviewed   GEN: nontoxic, nondiaphoretic, comfortable  ENT: Moist mucous membranes  EYES: PERRLA, anicteric sclera, no conjunctival discharge  PULM: comfortable work of breathing, diminished breath sounds with rhonchi bilateral bases, frequent cough  CV: RRR, 2+ radial pulses  GI: abd soft NTND +BS  Skin: dry and warm no jaundice  Psych: mood calm, affect normal, insight good  Neuro: Nonfocal motor exam, fluent speech, A&O, follows commands    LABS:  Reviewed   potassium 3.3, hemoglobin 9   Blood culture Gram-positive cocci      Assessment/Plan:     * Pneumonia due to infectious organism  Gram-positive bacteremia likely secondary to pneumonia  Inpatient admission for aspiration pneumonia; does NOT need isolation for COVID (diagnosis > 10 days); continue Ophth regimen for corneal abrasion; continue home regimen for HTN; iv antibiotics (Vancomycin and Piperacillin) for presumptive bacterial pneumonia; ID consultation for HIV status (to establish) and bacterial pneumonia    Appreciate infectious disease  Continue empiric IV antibiotics with vancomycin and Zosyn   Bactrim added   Serial bronchodilator, mucolytics, and antitussives as needed  Follow up all culture data  Follow-up CD4 count  Repeat blood cultures  Supportive care measures  High-risk patient secondary to severe suppression of chronic illness      Symptomatic HIV infection  This patient in known to have HIV+ status (Have detected HIV PCR but never CD4 <200 or AIDS defining illness). Labs reviewed- No results found for: CD4, No results found for: HIVDNAPCR. Patient is not on HAART. Will consult ID for HAART. Prophylaxis was not indicated at this time- waiting CD 4. Continue to monitor routine labs. Last CD4 count was No results found for: ABSOLUTECD4    We will consult Infectious disease at this time.  Evaluate and treat HIV-associated diseases as indicated by specific problems listed below.     Inpatient admission for aspiration pneumonia; does NOT need isolation for COVID (diagnosis > 10 days); continue Ophth regimen for corneal abrasion; continue home regimen for HTN; iv antibiotics for presumptive bacterial pneumonia; ID consultation for HIV status (to establish) and bacterial pneumonia      Essential hypertension, benign  Inpatient admission for aspiration pneumonia; does NOT need isolation for COVID (diagnosis > 10 days); continue Ophth regimen for corneal abrasion; continue home regimen for HTN; iv antibiotics for presumptive bacterial pneumonia; ID consultation for HIV status (to establish) and bacterial pneumonia; low salt diet    Hypokalemia  Replace potassium, repeat a.m. labs        VTE Risk Mitigation (From admission, onward)           Ordered     enoxaparin injection 40 mg  Daily         10/13/22 2055     IP VTE HIGH RISK PATIENT  Once         10/13/22 2055     Place sequential compression device  Until discontinued         10/13/22 2055                       Venkat Deluca MD  Department of Hospital Medicine   [facility] - Emergency Dept

## 2022-10-16 LAB
BACTERIA BLD CULT: ABNORMAL
HAV AB SER QL IA: NEGATIVE
HBV CORE AB SERPL QL IA: NEGATIVE
HBV SURFACE AB SER QL: NON REACTIVE
HBV SURFACE AG SERPL QL IA: NEGATIVE
HCV AB S/CO SERPL IA: 0.2 S/CO RATIO (ref 0–0.9)

## 2022-10-16 NOTE — DISCHARGE SUMMARY
[facility]  AMA Discharge Summary      Patient Name: Amie Salmeron  MRN: [id number]  Patient Class: IP- Inpatient  Admission Date: 10/13/2022  Hospital Length of Stay: 2 days  Discharge Date and Time:  10/15/2022 7:44 PM  Attending Physician: No att. providers found   Discharging Provider: Venkat Deluca MD  Primary Care Provider: Dustin Caro MD    HPI:   57 year old female with history of HTN, and HIV (never on therapy, does not know cell count), who tested positive for COVID on 09/12/22 presented to ED complaining of persisting SOB(E) since being diagnosed with COVID. Has had non-productive cough. No chest discomfort. Has low grade fevers. No orthopnea, PND, or edema. Has also right sided corneal abrasion--under care of Ophth at [facility].     In ED: Labs reviewed: no leukocytosis with mild normocytic anemia; ferritin is markedly elevated; hypokalemia (treated in ED) with normal renal function; CRP is elevated; lactate is normal; procalcitonin is elevated. Cultured in ED. CXR reviewed: bilateral alveolar opacities. EKG reviewed: sinus with anterolateral T wave flattening, but no acute segments. CTA Chest: no PE; pneumonia.    Discussed with ID and ED MD's: Inpatient admission for aspiration pneumonia; does NOT need isolation for COVID (diagnosis > 10 days); continue Ophth regimen for corneal abrasion; continue home regimen for HTN; iv antibiotics for presumptive bacterial pneumonia; ID consultation for HIV status (to establish) and bacterial pneumonia    10/14: Patient seen and examined.  Patient reports feeling a little better but still has some persistent shortness of breath and cough, occurring intermittently, moderate intensity.  No fever or chills.  No chest pain.  Eating okay today without nausea or vomiting.  Some generalized weakness.    10/15:  Unfortunately the patient became unhappy with hospitalization and decided to leave the hospital against medical advice  prior to being seen by physician, despite warnings of worsening illness and/or death.  The patient left the hospital before being seen by physician and therefore did not receive discharge instructions, prescriptions, or other discharge planning.      Hospital Course by Problem:     Pneumonia due to infectious organism  Gram-positive bacteremia likely secondary to pneumonia  Inpatient admission for aspiration pneumonia; does NOT need isolation for COVID (diagnosis > 10 days); continue Ophth regimen for corneal abrasion; continue home regimen for HTN; iv antibiotics (Vancomycin and Piperacillin) for presumptive bacterial pneumonia; ID consultation for HIV status (to establish) and bacterial pneumonia    Appreciate infectious disease  Continue empiric IV antibiotics with vancomycin and Zosyn   Bactrim added   Serial bronchodilator, mucolytics, and antitussives as needed  Follow up all culture data  Follow-up CD4 count  Repeat blood cultures  Supportive care measures  High-risk patient secondary to severe suppression of chronic illness      Symptomatic HIV infection  This patient in known to have HIV+ status (Have detected HIV PCR but never CD4 <200 or AIDS defining illness). Labs reviewed- No results found for: CD4, No results found for: HIVDNAPCR. Patient is not on HAART. Will consult ID for HAART. Prophylaxis was not indicated at this time- waiting CD 4. Continue to monitor routine labs. Last CD4 count was No results found for: ABSOLUTECD4    We will consult Infectious disease at this time. Evaluate and treat HIV-associated diseases as indicated by specific problems listed below.     Inpatient admission for aspiration pneumonia; does NOT need isolation for COVID (diagnosis > 10 days); continue Ophth regimen for corneal abrasion; continue home regimen for HTN; iv antibiotics for presumptive bacterial pneumonia; ID consultation for HIV status (to establish) and bacterial pneumonia      Essential hypertension,  benign  Inpatient admission for aspiration pneumonia; does NOT need isolation for COVID (diagnosis > 10 days); continue Ophth regimen for corneal abrasion; continue home regimen for HTN; iv antibiotics for presumptive bacterial pneumonia; ID consultation for HIV status (to establish) and bacterial pneumonia; low salt diet    Hypokalemia  Replace potassium, repeat a.m. labs    Goals of Care Treatment Preferences:  Code Status: Full Code      Consults:   Consults (From admission, onward)          Status Ordering Provider     Inpatient consult to Infectious Diseases  Once        Provider:  Maddy Vann MD    Completed HECTOR RAUSCH            No new Assessment & Plan notes have been filed under this hospital service since the last note was generated.  Service: Hospital Medicine    Final Active Diagnoses:    Diagnosis Date Noted POA    PRINCIPAL PROBLEM:  Pneumonia due to infectious organism [J18.9] 10/13/2022 Yes    Weight loss [R63.4] 10/14/2022 Unknown    Diarrhea [R19.7] 10/14/2022 Unknown    Thrush [B37.0] 10/14/2022 Unknown    Symptomatic HIV infection [B20] 10/13/2022 Yes    Essential hypertension, benign [I10] 08/05/2014 Yes      Problems Resolved During this Admission:       Discharged Condition: guarded    Disposition: Left Against Medical Adv*    Follow Up/Patient Instructions:   No discharge procedures on file.      Pending Diagnostic Studies:       Procedure Component Value Units Date/Time    HIV RNA, quantitative, PCR [[id number]] Collected: 10/15/22 0603    Order Status: Sent Lab Status: In process Updated: 10/15/22 0615    Specimen: Blood     Hepatitis A antibody, total [[id number]] Collected: 10/15/22 0603    Order Status: Sent Lab Status: In process Updated: 10/15/22 0615    Specimen: Blood     Hepatitis B core antibody, total [[id number]] Collected: 10/15/22 0603    Order Status: Sent Lab Status: In process Updated: 10/15/22 0615    Specimen: Blood     Hepatitis B surface antibody [[id number]]  Collected: 10/15/22 0603    Order Status: Sent Lab Status: In process Updated: 10/15/22 0615    Specimen: Blood     Hepatitis B surface antigen [[id number]] Collected: 10/15/22 0603    Order Status: Sent Lab Status: In process Updated: 10/15/22 0615    Specimen: Blood     Hepatitis C antibody [[id number]] Collected: 10/15/22 0603    Order Status: Sent Lab Status: In process Updated: 10/15/22 0615    Specimen: Blood     QuantiFERON-TB Gold Plus [[id number]] Collected: 10/15/22 0603    Order Status: Sent Lab Status: In process Updated: 10/15/22 0615    Specimen: Blood     RPR [[id number]] Collected: 10/15/22 0603    Order Status: Sent Lab Status: In process Updated: 10/15/22 0615    Specimen: Blood            Medications:  None    Indwelling Lines/Drains at time of discharge:   Lines/Drains/Airways       None                           Venkat Deluca MD  Department of Hospital Medicine  [facility] hospital

## 2022-10-17 LAB
CRYPTOC AG SER QL IA.RAPID: NEGATIVE
RPR SER QL: NORMAL

## 2022-10-17 NOTE — PLAN OF CARE
Patient left against medical advice. No discharge needs noted. Cleared from case management.      10/17/22 0809   Final Note   Assessment Type Final Discharge Note   Anticipated Discharge Disposition Left Against

## 2022-10-18 LAB
BACTERIA BLD CULT: NORMAL
HIV1 RNA # SERPL NAA+PROBE: NORMAL COPIES/ML
HIV1 RNA SERPL NAA+PROBE-LOG#: 5.48 LOG10COPY/ML

## 2022-10-19 LAB
GAMMA INTERFERON BACKGROUND BLD IA-ACNC: 0.23 IU/ML
M TB IFN-G BLD-IMP: NEGATIVE
M TB IFN-G CD4+ BCKGRND COR BLD-ACNC: 0.24 IU/ML
M TB IFN-G CD4+CD8+ BCKGRND COR BLD-ACNC: 0.25 IU/ML
MITOGEN IGNF BLD-ACNC: >10 IU/ML
QUANTIFERON CRITERIA: NORMAL

## 2022-10-26 ENCOUNTER — TELEPHONE (OUTPATIENT)
Dept: INFECTIOUS DISEASES | Facility: CLINIC | Age: 57
End: 2022-10-26

## 2022-10-26 NOTE — LETTER
Maddy Vann MD  Infectious Disease  [address]  [place] [address]  [phone]        Patient Name: Amie Salmeron  YOB: 1965      Dear Ms. Salmeron:    We met on 10/14 when I saw you in consultation regarding your HIV infection and abnormal lung imaging. At that time I ordered a variety of blood tests in anticipation of your resuming your HIV outpatient care. After you left, we did not have an opportunity to discuss your results. Unfortunately, your CD4 count is very low, 26, and your viral load is very high , over 300,000. It is important that you either re-establish care at [facility] or call us for an appointment. There are 2 physicians here that can provide your HIV care. It is also important that you continue to take Bactrim (trimethoprim-sulfamethoxazole) double strength, one tablet daily as a preventative for pneumocystis pneumonia, as you are at risk for this with such a low CD4 count.   Please let us know that you have received this letter and what your plans to seek care are.       Sincerely,         Maddy Vann MD

## 2022-10-26 NOTE — TELEPHONE ENCOUNTER
Patient left [place] on 10/15.  I reviewed the labs that I had ordered prior to her departure and her CD 4 is 26 and viral load over 300,000.   I left a voicemail letting her know that I wanted to discuss her lab results and that I was mailing a copy of the results to her.

## 2022-11-14 ENCOUNTER — TELEPHONE (OUTPATIENT)
Dept: INFECTIOUS DISEASES | Facility: CLINIC | Age: 57
End: 2022-11-14

## 2022-11-14 NOTE — TELEPHONE ENCOUNTER
Patient called  [phone]    Patient states she would like to be re admitted to [facility]  And this time she will stay     Patient is c/o  no energy                           Achy all over                           Fevers of 100- 101    Please advise     [facility]     11/14/22      I spoke with patient to advise her to go to [facility] ED;  Per Dr Vann's orders     [facility]   11/14/22      1:54 pm  Patient called stating she is unable to make it to the ED  Today , but she can make it in the morning .  I will advise Dr Soo JONES [facility]  11/14/22      Patient went to ED at [facility] on 11/15/22  [facility]   11/16/22

## 2022-11-15 ENCOUNTER — HOSPITAL ENCOUNTER (OUTPATIENT)
Facility: HOSPITAL | Age: 57
Discharge: HOME | End: 2022-11-17
Attending: EMERGENCY MEDICINE | Admitting: INTERNAL MEDICINE
Payer: MEDICARE

## 2022-11-15 DIAGNOSIS — R63.4 WEIGHT LOSS: ICD-10-CM

## 2022-11-15 DIAGNOSIS — R05.9 COUGH WITH FEVER: ICD-10-CM

## 2022-11-15 DIAGNOSIS — J18.9 PNEUMONIA DUE TO INFECTIOUS ORGANISM, UNSPECIFIED LATERALITY, UNSPECIFIED PART OF LUNG: Primary | ICD-10-CM

## 2022-11-15 DIAGNOSIS — B20 HIV INFECTION, UNSPECIFIED SYMPTOM STATUS: ICD-10-CM

## 2022-11-15 DIAGNOSIS — B20 AIDS: ICD-10-CM

## 2022-11-15 DIAGNOSIS — R53.1 WEAKNESS: ICD-10-CM

## 2022-11-15 DIAGNOSIS — R50.9 FUO (FEVER OF UNKNOWN ORIGIN): ICD-10-CM

## 2022-11-15 DIAGNOSIS — R50.9 COUGH WITH FEVER: ICD-10-CM

## 2022-11-15 DIAGNOSIS — B37.81 CANDIDA ESOPHAGITIS: ICD-10-CM

## 2022-11-15 DIAGNOSIS — R07.9 CHEST PAIN: ICD-10-CM

## 2022-11-15 LAB
ALBUMIN SERPL BCP-MCNC: 2.6 G/DL (ref 3.5–5.2)
ALP SERPL-CCNC: 66 U/L (ref 55–135)
ALT SERPL W/O P-5'-P-CCNC: 13 U/L (ref 10–44)
ANION GAP SERPL CALC-SCNC: 7 MMOL/L (ref 8–16)
ANISOCYTOSIS BLD QL SMEAR: ABNORMAL
AST SERPL-CCNC: 29 U/L (ref 10–40)
BASOPHILS NFR BLD: 0 % (ref 0–1.9)
BILIRUB SERPL-MCNC: 0.5 MG/DL (ref 0.1–1)
BILIRUB UR QL STRIP: NEGATIVE
BUN SERPL-MCNC: 8 MG/DL (ref 6–20)
CALCIUM SERPL-MCNC: 7.7 MG/DL (ref 8.7–10.5)
CHLORIDE SERPL-SCNC: 99 MMOL/L (ref 95–110)
CLARITY UR: CLEAR
CO2 SERPL-SCNC: 26 MMOL/L (ref 23–29)
COLOR UR: YELLOW
CREAT SERPL-MCNC: 0.4 MG/DL (ref 0.5–1.4)
CRP SERPL-MCNC: 1.72 MG/DL
DIFFERENTIAL METHOD: ABNORMAL
EOSINOPHIL NFR BLD: 0 % (ref 0–8)
ERYTHROCYTE [DISTWIDTH] IN BLOOD BY AUTOMATED COUNT: 15.8 % (ref 11.5–14.5)
ERYTHROCYTE [SEDIMENTATION RATE] IN BLOOD BY WESTERGREN METHOD: 32 MM/HR (ref 0–20)
EST. GFR  (NO RACE VARIABLE): >60 ML/MIN/1.73 M^2
FERRITIN SERPL-MCNC: 1102 NG/ML (ref 20–300)
GLUCOSE SERPL-MCNC: 94 MG/DL (ref 70–110)
GLUCOSE UR QL STRIP: NEGATIVE
HCT VFR BLD AUTO: 32.6 % (ref 37–48.5)
HGB BLD-MCNC: 10.4 G/DL (ref 12–16)
HGB UR QL STRIP: NEGATIVE
IMM GRANULOCYTES # BLD AUTO: ABNORMAL K/UL (ref 0–0.04)
IMM GRANULOCYTES NFR BLD AUTO: ABNORMAL % (ref 0–0.5)
KETONES UR QL STRIP: NEGATIVE
LACTATE SERPL-SCNC: 1 MMOL/L (ref 0.5–1.9)
LEUKOCYTE ESTERASE UR QL STRIP: NEGATIVE
LIPASE SERPL-CCNC: 53 U/L (ref 4–60)
LYMPHOCYTES NFR BLD: 19 % (ref 18–48)
MAGNESIUM SERPL-MCNC: 1.7 MG/DL (ref 1.6–2.6)
MCH RBC QN AUTO: 28.6 PG (ref 27–31)
MCHC RBC AUTO-ENTMCNC: 31.9 G/DL (ref 32–36)
MCV RBC AUTO: 90 FL (ref 82–98)
MONOCYTES NFR BLD: 9 % (ref 4–15)
NEUTROPHILS NFR BLD: 72 % (ref 38–73)
NITRITE UR QL STRIP: NEGATIVE
NRBC BLD-RTO: 0 /100 WBC
OVALOCYTES BLD QL SMEAR: ABNORMAL
PH UR STRIP: 8 [PH] (ref 5–8)
PLATELET # BLD AUTO: 237 K/UL (ref 150–450)
PMV BLD AUTO: 10.7 FL (ref 9.2–12.9)
POIKILOCYTOSIS BLD QL SMEAR: ABNORMAL
POTASSIUM SERPL-SCNC: 2.6 MMOL/L (ref 3.5–5.1)
POTASSIUM SERPL-SCNC: 3.2 MMOL/L (ref 3.5–5.1)
PROCALCITONIN SERPL IA-MCNC: <0.05 NG/ML (ref 0–0.5)
PROT SERPL-MCNC: 6.1 G/DL (ref 6–8.4)
PROT UR QL STRIP: NEGATIVE
RBC # BLD AUTO: 3.64 M/UL (ref 4–5.4)
SCHISTOCYTES BLD QL SMEAR: PRESENT
SODIUM SERPL-SCNC: 132 MMOL/L (ref 136–145)
SP GR UR STRIP: 1.01 (ref 1–1.03)
URN SPEC COLLECT METH UR: ABNORMAL
UROBILINOGEN UR STRIP-ACNC: ABNORMAL EU/DL
WBC # BLD AUTO: 3.26 K/UL (ref 3.9–12.7)

## 2022-11-15 PROCEDURE — 99215 OFFICE O/P EST HI 40 MIN: CPT | Mod: ,,, | Performed by: INTERNAL MEDICINE

## 2022-11-15 PROCEDURE — 83605 ASSAY OF LACTIC ACID: CPT | Performed by: EMERGENCY MEDICINE

## 2022-11-15 PROCEDURE — 63600175 PHARM REV CODE 636 W HCPCS: Performed by: INTERNAL MEDICINE

## 2022-11-15 PROCEDURE — 85027 COMPLETE CBC AUTOMATED: CPT | Performed by: EMERGENCY MEDICINE

## 2022-11-15 PROCEDURE — 86361 T CELL ABSOLUTE COUNT: CPT | Performed by: EMERGENCY MEDICINE

## 2022-11-15 PROCEDURE — S0039 INJECTION, SULFAMETHOXAZOLE: HCPCS | Performed by: INTERNAL MEDICINE

## 2022-11-15 PROCEDURE — 82728 ASSAY OF FERRITIN: CPT | Performed by: EMERGENCY MEDICINE

## 2022-11-15 PROCEDURE — 36415 COLL VENOUS BLD VENIPUNCTURE: CPT | Performed by: INTERNAL MEDICINE

## 2022-11-15 PROCEDURE — 86140 C-REACTIVE PROTEIN: CPT | Performed by: EMERGENCY MEDICINE

## 2022-11-15 PROCEDURE — 81003 URINALYSIS AUTO W/O SCOPE: CPT | Performed by: EMERGENCY MEDICINE

## 2022-11-15 PROCEDURE — 96367 TX/PROPH/DG ADDL SEQ IV INF: CPT

## 2022-11-15 PROCEDURE — 93005 ELECTROCARDIOGRAM TRACING: CPT | Performed by: INTERNAL MEDICINE

## 2022-11-15 PROCEDURE — 93010 ELECTROCARDIOGRAM REPORT: CPT | Mod: ,,, | Performed by: INTERNAL MEDICINE

## 2022-11-15 PROCEDURE — G0378 HOSPITAL OBSERVATION PER HR: HCPCS

## 2022-11-15 PROCEDURE — 96366 THER/PROPH/DIAG IV INF ADDON: CPT

## 2022-11-15 PROCEDURE — 87040 BLOOD CULTURE FOR BACTERIA: CPT | Performed by: EMERGENCY MEDICINE

## 2022-11-15 PROCEDURE — 96372 THER/PROPH/DIAG INJ SC/IM: CPT | Performed by: INTERNAL MEDICINE

## 2022-11-15 PROCEDURE — 25000003 PHARM REV CODE 250: Performed by: INTERNAL MEDICINE

## 2022-11-15 PROCEDURE — 63600175 PHARM REV CODE 636 W HCPCS: Performed by: EMERGENCY MEDICINE

## 2022-11-15 PROCEDURE — 80053 COMPREHEN METABOLIC PANEL: CPT | Performed by: EMERGENCY MEDICINE

## 2022-11-15 PROCEDURE — 30000890 HC MISC. SEND OUT TEST

## 2022-11-15 PROCEDURE — 84145 PROCALCITONIN (PCT): CPT | Performed by: EMERGENCY MEDICINE

## 2022-11-15 PROCEDURE — 83735 ASSAY OF MAGNESIUM: CPT | Performed by: EMERGENCY MEDICINE

## 2022-11-15 PROCEDURE — 87901 NFCT AGT GNTYP ALYS HIV1 REV: CPT

## 2022-11-15 PROCEDURE — 87900 PHENOTYPE INFECT AGENT DRUG: CPT

## 2022-11-15 PROCEDURE — 93010 EKG 12-LEAD: ICD-10-PCS | Mod: ,,, | Performed by: INTERNAL MEDICINE

## 2022-11-15 PROCEDURE — 83690 ASSAY OF LIPASE: CPT | Performed by: EMERGENCY MEDICINE

## 2022-11-15 PROCEDURE — 96365 THER/PROPH/DIAG IV INF INIT: CPT | Mod: 59

## 2022-11-15 PROCEDURE — 25000003 PHARM REV CODE 250: Performed by: EMERGENCY MEDICINE

## 2022-11-15 PROCEDURE — 96375 TX/PRO/DX INJ NEW DRUG ADDON: CPT

## 2022-11-15 PROCEDURE — 85651 RBC SED RATE NONAUTOMATED: CPT | Performed by: EMERGENCY MEDICINE

## 2022-11-15 PROCEDURE — 87906 NFCT AGT GNTYP ALYS HIV1: CPT

## 2022-11-15 PROCEDURE — 84132 ASSAY OF SERUM POTASSIUM: CPT | Mod: 91 | Performed by: INTERNAL MEDICINE

## 2022-11-15 PROCEDURE — 96361 HYDRATE IV INFUSION ADD-ON: CPT

## 2022-11-15 PROCEDURE — 85007 BL SMEAR W/DIFF WBC COUNT: CPT | Performed by: EMERGENCY MEDICINE

## 2022-11-15 PROCEDURE — 99285 EMERGENCY DEPT VISIT HI MDM: CPT | Mod: 25

## 2022-11-15 PROCEDURE — 25500020 PHARM REV CODE 255: Performed by: INTERNAL MEDICINE

## 2022-11-15 PROCEDURE — 99215 PR OFFICE/OUTPT VISIT, EST, LEVL V, 40-54 MIN: ICD-10-PCS | Mod: ,,, | Performed by: INTERNAL MEDICINE

## 2022-11-15 RX ORDER — SODIUM,POTASSIUM PHOSPHATES 280-250MG
2 POWDER IN PACKET (EA) ORAL
Status: DISCONTINUED | OUTPATIENT
Start: 2022-11-15 | End: 2022-11-17 | Stop reason: HOSPADM

## 2022-11-15 RX ORDER — GLUCAGON 1 MG
1 KIT INJECTION
Status: DISCONTINUED | OUTPATIENT
Start: 2022-11-15 | End: 2022-11-17 | Stop reason: HOSPADM

## 2022-11-15 RX ORDER — SODIUM CHLORIDE 0.9 % (FLUSH) 0.9 %
10 SYRINGE (ML) INJECTION EVERY 6 HOURS PRN
Status: DISCONTINUED | OUTPATIENT
Start: 2022-11-15 | End: 2022-11-17 | Stop reason: HOSPADM

## 2022-11-15 RX ORDER — FLUCONAZOLE 2 MG/ML
400 INJECTION, SOLUTION INTRAVENOUS
Status: DISCONTINUED | OUTPATIENT
Start: 2022-11-15 | End: 2022-11-17 | Stop reason: HOSPADM

## 2022-11-15 RX ORDER — HYDROCODONE BITARTRATE AND ACETAMINOPHEN 5; 325 MG/1; MG/1
1 TABLET ORAL EVERY 6 HOURS PRN
Status: DISCONTINUED | OUTPATIENT
Start: 2022-11-15 | End: 2022-11-17 | Stop reason: HOSPADM

## 2022-11-15 RX ORDER — POTASSIUM CHLORIDE 20 MEQ/1
40 TABLET, EXTENDED RELEASE ORAL ONCE
Status: COMPLETED | OUTPATIENT
Start: 2022-11-15 | End: 2022-11-15

## 2022-11-15 RX ORDER — ACETAMINOPHEN 325 MG/1
650 TABLET ORAL EVERY 4 HOURS PRN
Status: DISCONTINUED | OUTPATIENT
Start: 2022-11-15 | End: 2022-11-17 | Stop reason: HOSPADM

## 2022-11-15 RX ORDER — ENOXAPARIN SODIUM 100 MG/ML
40 INJECTION SUBCUTANEOUS EVERY 24 HOURS
Status: DISCONTINUED | OUTPATIENT
Start: 2022-11-15 | End: 2022-11-17 | Stop reason: HOSPADM

## 2022-11-15 RX ORDER — ONDANSETRON 2 MG/ML
4 INJECTION INTRAMUSCULAR; INTRAVENOUS EVERY 6 HOURS PRN
Status: DISCONTINUED | OUTPATIENT
Start: 2022-11-15 | End: 2022-11-17 | Stop reason: HOSPADM

## 2022-11-15 RX ORDER — PROCHLORPERAZINE EDISYLATE 5 MG/ML
5 INJECTION INTRAMUSCULAR; INTRAVENOUS EVERY 6 HOURS PRN
Status: DISCONTINUED | OUTPATIENT
Start: 2022-11-15 | End: 2022-11-17 | Stop reason: HOSPADM

## 2022-11-15 RX ORDER — LANOLIN ALCOHOL/MO/W.PET/CERES
800 CREAM (GRAM) TOPICAL
Status: DISCONTINUED | OUTPATIENT
Start: 2022-11-15 | End: 2022-11-17 | Stop reason: HOSPADM

## 2022-11-15 RX ORDER — ACETAMINOPHEN 500 MG
1000 TABLET ORAL EVERY 8 HOURS PRN
Status: DISCONTINUED | OUTPATIENT
Start: 2022-11-15 | End: 2022-11-17 | Stop reason: HOSPADM

## 2022-11-15 RX ORDER — SODIUM CHLORIDE, SODIUM LACTATE, POTASSIUM CHLORIDE, CALCIUM CHLORIDE 600; 310; 30; 20 MG/100ML; MG/100ML; MG/100ML; MG/100ML
1000 INJECTION, SOLUTION INTRAVENOUS
Status: DISCONTINUED | OUTPATIENT
Start: 2022-11-15 | End: 2022-11-15

## 2022-11-15 RX ORDER — MORPHINE SULFATE 4 MG/ML
4 INJECTION, SOLUTION INTRAMUSCULAR; INTRAVENOUS EVERY 4 HOURS PRN
Status: DISCONTINUED | OUTPATIENT
Start: 2022-11-15 | End: 2022-11-17 | Stop reason: HOSPADM

## 2022-11-15 RX ORDER — NYSTATIN 100000 [USP'U]/ML
500000 SUSPENSION ORAL 4 TIMES DAILY
Status: DISCONTINUED | OUTPATIENT
Start: 2022-11-15 | End: 2022-11-17 | Stop reason: HOSPADM

## 2022-11-15 RX ORDER — TALC
9 POWDER (GRAM) TOPICAL NIGHTLY PRN
Status: DISCONTINUED | OUTPATIENT
Start: 2022-11-15 | End: 2022-11-17 | Stop reason: HOSPADM

## 2022-11-15 RX ORDER — SIMETHICONE 80 MG
1 TABLET,CHEWABLE ORAL 4 TIMES DAILY PRN
Status: DISCONTINUED | OUTPATIENT
Start: 2022-11-15 | End: 2022-11-17 | Stop reason: HOSPADM

## 2022-11-15 RX ORDER — NALOXONE HCL 0.4 MG/ML
0.02 VIAL (ML) INJECTION
Status: DISCONTINUED | OUTPATIENT
Start: 2022-11-15 | End: 2022-11-17 | Stop reason: HOSPADM

## 2022-11-15 RX ORDER — IBUPROFEN 200 MG
16 TABLET ORAL
Status: DISCONTINUED | OUTPATIENT
Start: 2022-11-15 | End: 2022-11-17 | Stop reason: HOSPADM

## 2022-11-15 RX ORDER — POLYETHYLENE GLYCOL 3350 17 G/17G
17 POWDER, FOR SOLUTION ORAL DAILY
Status: DISCONTINUED | OUTPATIENT
Start: 2022-11-15 | End: 2022-11-17 | Stop reason: HOSPADM

## 2022-11-15 RX ORDER — IBUPROFEN 200 MG
24 TABLET ORAL
Status: DISCONTINUED | OUTPATIENT
Start: 2022-11-15 | End: 2022-11-17 | Stop reason: HOSPADM

## 2022-11-15 RX ORDER — MAG HYDROX/ALUMINUM HYD/SIMETH 200-200-20
30 SUSPENSION, ORAL (FINAL DOSE FORM) ORAL 4 TIMES DAILY PRN
Status: DISCONTINUED | OUTPATIENT
Start: 2022-11-15 | End: 2022-11-17 | Stop reason: HOSPADM

## 2022-11-15 RX ADMIN — ENOXAPARIN SODIUM 40 MG: 100 INJECTION SUBCUTANEOUS at 05:11

## 2022-11-15 RX ADMIN — SODIUM CHLORIDE, SODIUM LACTATE, POTASSIUM CHLORIDE, AND CALCIUM CHLORIDE 130 ML: .6; .31; .03; .02 INJECTION, SOLUTION INTRAVENOUS at 12:11

## 2022-11-15 RX ADMIN — SULFAMETHOXAZOLE AND TRIMETHOPRIM 246 MG: 80; 16 INJECTION, SOLUTION, CONCENTRATE INTRAVENOUS at 05:11

## 2022-11-15 RX ADMIN — FLUCONAZOLE IN SODIUM CHLORIDE 400 MG: 2 INJECTION, SOLUTION INTRAVENOUS at 01:11

## 2022-11-15 RX ADMIN — ACETAMINOPHEN 1000 MG: 500 TABLET ORAL at 03:11

## 2022-11-15 RX ADMIN — NYSTATIN 500000 UNITS: 100000 SUSPENSION ORAL at 02:11

## 2022-11-15 RX ADMIN — SODIUM CHLORIDE, SODIUM LACTATE, POTASSIUM CHLORIDE, AND CALCIUM CHLORIDE 1000 ML: .6; .31; .03; .02 INJECTION, SOLUTION INTRAVENOUS at 11:11

## 2022-11-15 RX ADMIN — SODIUM CHLORIDE, SODIUM LACTATE, POTASSIUM CHLORIDE, AND CALCIUM CHLORIDE 1000 ML: .6; .31; .03; .02 INJECTION, SOLUTION INTRAVENOUS at 10:11

## 2022-11-15 RX ADMIN — POTASSIUM CHLORIDE 40 MEQ: 20 TABLET, EXTENDED RELEASE ORAL at 12:11

## 2022-11-15 RX ADMIN — POTASSIUM BICARBONATE 35 MEQ: 391 TABLET, EFFERVESCENT ORAL at 11:11

## 2022-11-15 RX ADMIN — NYSTATIN 500000 UNITS: 100000 SUSPENSION ORAL at 08:11

## 2022-11-15 RX ADMIN — IOHEXOL 100 ML: 350 INJECTION, SOLUTION INTRAVENOUS at 05:11

## 2022-11-15 RX ADMIN — ONDANSETRON 4 MG: 2 INJECTION INTRAMUSCULAR; INTRAVENOUS at 03:11

## 2022-11-15 NOTE — CONSULTS
Consult Note  Infectious Disease    Reason for Consult:  HIV,weakness    HPI: Amie Salmeron is a 57 y.o. female seen by me once previously on 10/14. At that time she had SOB, pulmonary infiltrates, COVID diagnosis mid September and was receiving treatment for corneal ulcer with Bactrim at that time.She told me that she was diagnosed with HIV in 1983 and that her CD4 count had always been normal and she had never initiated anti-retroviral treatment. She reports that she had been followed at [facility] in [place], last seen about a year ago, but I phoned this clinic and she has not been seen there since 2009 (Amie Amor).   She denied any other STD, denies positive TB test or exposure, denies Hepatitis. She had no history of IVDA. She lives with her elderly parents. 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 She left AMA (because the bed alarm was irritating her). I reviewed all of the lab tests ordered at that visit and her CD4 was 24 and viral load >445050. She did not answer phone calls and I sent a letter making her aware of these results and encouraging her to seek care again at [facility] or with our clinic. She phoned my office yesterday and requested admission. She was advised to come to the ED, but could not come yesterday and came in today.   She reports fever to 101 at least 4 days per week. She reports that she had been profoundly weak and has not had a bath in a month because of weakness and has only had 1 BM in the last month. She has not lost any weight in comparison to vital signs in October. CXR is improved, oxygen sats are improved compared to last visit. She is capable of walking down the nguyen to go to the restroom.     Review of patient's allergies indicates:  No Known Allergies  Past Medical History:   Diagnosis Date    Allergy     Arthritis     Asthma     Chronic pain     HIV infection     Hypertension     Overactive bladder     Spinal  stenosis     Urine incontinence      Past Surgical History:   Procedure Laterality Date    ADENOIDECTOMY      APPENDECTOMY      BACK SURGERY      GERALD    CYST REMOVAL      HYSTERECTOMY      JOINT REPLACEMENT Right     knee, with revision    KNEE SURGERY Left     knee replacement    RHIZOTOMY      TONSILLECTOMY      TOTAL ANKLE ARTHROPLASTY Right      Social History     Socioeconomic History    Marital status: Single   Occupational History    Occupation: on disability   Tobacco Use    Smoking status: Never    Smokeless tobacco: Never   Substance and Sexual Activity    Alcohol use: Yes     Comment: Socially    Drug use: No    Sexual activity: Yes     Partners: Male     Birth control/protection: None     Social Determinants of Health     Financial Resource Strain: Unknown    Difficulty of Paying Living Expenses: Patient refused   Food Insecurity: Unknown    Worried About Running Out of Food in the Last Year: Patient refused    Ran Out of Food in the Last Year: Patient refused   Transportation Needs: Unknown    Lack of Transportation (Medical): Patient refused    Lack of Transportation (Non-Medical): Patient refused   Physical Activity: Unknown    Days of Exercise per Week: Patient refused    Minutes of Exercise per Session: Patient refused   Stress: Unknown    Feeling of Stress : Patient refused   Social Connections: Unknown    Frequency of Communication with Friends and Family: Patient refused    Frequency of Social Gatherings with Friends and Family: Patient refused    Attends [employer] Services: Patient refused    Active Member of Clubs or Organizations: Patient refused    Attends Club or Organization Meetings: Patient refused    Marital Status: Patient refused   Housing Stability: Unknown    Unable to Pay for Housing in the Last Year: Patient refused    Unstable Housing in the Last Year: Patient refused     Family History   Problem Relation Age of Onset    Thyroid disease Mother     Hypertension Mother     Cancer  Father         oral CA    Depression Sister     Hypertension Brother     Alcohol abuse Brother          Review of Systems:     chills, fever, sweats,    No change in vision, loss of vision or diplopia  No sinus congestion, purulent nasal discharge, post nasal drip or facial pain  No pain in mouth or throat. But she has dysgeusia, and esophagitis symptoms.  No chest pain, palpitations, syncope    cough, white sputum production, no shortness of breath, dyspnea on exertion, pleurisy, hemoptysis  No nausea, vomiting, diarrhea, but has had constipation, without blood in stool, or focal abd pain,  mild odynophagia  No dysuria, hematuria,     No swelling of joints, redness of joints, injuries, or new focal pain  No unusual headaches, dizziness, vertigo, numbness, paresthesias, neuropathy, falls  No anxiety, depression, substance abuse, sleep disturbance, but she does have concerns about confidentiality and with the stigma of HIV infection  No diabetes, thyroid, hypogonadal conditions  No bleeding, lymphadenopathy,  malignancy, unusual bruising  No new rashes, lesions, or wounds  No TB exposure  No recent/prior steroids  Outdoor activities:none  Travel: none  Implants: none  Antibiotic History: recent bactrim  She cannot explain why she did not seek medical attention for her problems since last admission.      EXAM & DIAGNOSTICS REVIEWED:   Vitals:     Temp:  [98.1 °F (36.7 °C)]   Temp: 98.1 °F (36.7 °C) (11/15/22 0836)  Pulse: 77 (11/15/22 1021)  Resp: 17 (11/15/22 1021)  BP: 109/76 (11/15/22 1036)  SpO2: 97 % (11/15/22 1021)  No intake or output data in the 24 hours ending 11/15/22 1159    General:  In NAD. Alert and attentive, cooperative, comfortable  Eyes:  Anicteric, PERRL, EOMI  ENT:  Widespread oral candidiasis, dentition is fair  Neck:  supple, no masses or adenopathy appreciated  Lungs: Wet cough, loose rhonchi and scattered crackles. Sats good on RA  Heart:  RRR, no gallop/murmur/rub noted  Abd:  Soft, NT, ND,  normal BS, no masses or organomegaly appreciated.  :  Voids urine clear, no flank tenderness  Musc:  Joints without effusion, swelling, erythema, synovitis, muscle wasting.   Skin:  No rashes. No palmar or plantar lesions. No subungual petechiae  Neuro:             Alert, attentive, speech fluent, face symmetric, moves all extremities, no focal weakness. Ambulatory  Psych: Calm, cooperative. Insight is limited. History may not be reliable  Lymphatic:     No cervical, supraclavicular, axillary, or inguinal nodes  Extrem: No edema, erythema, phlebitis, cellulitis, warm and well perfused  VAD:       Isolation:    Wound:          General Labs reviewed:  Recent Labs   Lab 11/15/22  0930   WBC 3.26*   HGB 10.4*   HCT 32.6*          Recent Labs   Lab 11/15/22  0930   *   K 2.6*   CL 99   CO2 26   BUN 8   CREATININE 0.4*   CALCIUM 7.7*   PROT 6.1   BILITOT 0.5   ALKPHOS 66   ALT 13   AST 29     Recent Labs   Lab 11/15/22  0930   CRP 1.72*     CD4 = 26   Latest Reference Range & Units 10/15/22 06:03   Hep A Total Ab Negative  Negative   Hep B Core Total Ab Negative  Negative   Hep B S Ab  Non Reactive   Hepatitis B Surface Ag Negative  Negative   Hepatitis C Ab 0.0 - 0.9 s/co ratio 0.2   CRYPTOCOCCUS ANTIGEN, SERUM Negative  Negative   Quantiferon Mitogen Value IU/mL >10.00   Quantiferon Nil Value IU/mL 0.23   QuantiFERON-TB Gold Plus Negative  Negative   QuantiFERON TB1 Ag Value IU/mL 0.24   QuantiFERON TB2 Ag Value IU/mL 0.25   HIV 1 RNA Ultra copies/mL 123283   RPR Non-reactive  Non-reactive       Micro:  Microbiology Results (last 7 days)       Procedure Component Value Units Date/Time    Blood culture #2 **CANNOT BE ORDERED STAT** [[id number]] Collected: 11/15/22 1020    Order Status: Sent Specimen: Blood from Peripheral, Antecubital, Right Updated: 11/15/22 1031    Blood culture #1 **CANNOT BE ORDERED STAT** [[id number]] Collected: 11/15/22 0930    Order Status: Sent Specimen: Blood from Peripheral,  Antecubital, Left Updated: 11/15/22 0947            Imaging Reviewed:   CXR  CT chest abd pelvis without  1. 6.1 cm soft tissue density along the anterior and left anterolateral margin of the abdominal aorta in the mid abdomen as above. Several adjacent mildly prominent mesenteric lymph nodes are noted, the largest measuring 1.7 x 1.3 cm. The soft tissue density is suspicious for a confluent ilia mass, but could also be contributed to by unopacified small bowel loops. CT abdomen with oral and intravenous contrast is recommended for further assessment.  2. Previously noted bilateral upper lobe pulmonary infiltrates have shown near complete resolution. Scattered groundglass opacities in both lungs are likely infectious/inflammatory in nature, with slightly more prominent infiltrate in the lingula.  3. Additional chronic findings as above.    CT abdomen with        Cardiology:    IMPRESSION & PLAN   1. AIDS, with gradual progression of immunosuppression   Non compliance with HIV clinic follow up     2. Candida esophagitis  3. FUO?   Could be due to the candida esophagitis, from residual pneumonia(CT does not look like PJP)   R/o disseminated MAC. Retroperitoneal LAD on CT    4. Weight loss, stable x 1 month  5. Debility, weakness, malnutrition      Recommendations:  IVF and potassium  Diflucan 400 mg IV daily plus nystatin  Bactrim IV  Sputum culture and DFA for PJP  HIV genotype(genosure prime)  Blood culture for AFB, monitor temp curve  catharsis   CT chest /abd/pelvis to look for lymphoma, nodes, pulmonary infiltrates  She will need to start antivirals, azithromycin prophylaxis at discharge  She will need to follow up in the office  Hopefully a short stay    D/w ED provider    Medical Decision Making during this encounter was  [_] Low Complexity  [_] Moderate Complexity  [  xxx] High Complexity

## 2022-11-15 NOTE — H&P
[facility] Medicine History & Physical Examination   Patient Name: Amie Salmeron  MRN: [id number]  Patient Class: OP- Observation   Admission Date: 11/15/2022  8:37 AM  Length of Stay: 0  Attending Physician: Bela Barlow MD  Primary Care Provider: Dustin Caro MD  Face-to-Face encounter date: 11/15/2022  Code Status: Full Code  Chief Complaint: Body aches      HISTORY OF PRESENT ILLNESS:   Amie Salmeron is a 57 y.o. White female with known history HTN, and HIV (never on therapy, does not know cell count), who tested positive for COVID on 09/12/22 and left AMA from [facility] now presented again with progressive decline in her health, generalize weakness and fatigue. She reports persistent cough associated with it. She has no energy to perform her activities of daily living. She has difficulty standing and her legs give up with she tries to stand. She denies fever but reports feeling cold all the time. With these symptoms, she presented to the ER for evaluation.  Her initial lab workup showed anemia and leukopenia. Potassium of 2.6. CXR shows mild improvement from the previous one. CD4 cell count from previous admission a month ago was 26 and Viral load was 363252. Infectious disease has been consulted for further recommendations. Hospital medicine consulted for admission.      REVIEW OF SYSTEMS:   10 Point Review of System was performed and was found to be negative except for that mentioned already in the HPI above.     PAST MEDICAL HISTORY:     Past Medical History:   Diagnosis Date    Allergy     Arthritis     Asthma     Chronic pain     HIV infection     Hypertension     Overactive bladder     Spinal stenosis     Urine incontinence        PAST SURGICAL HISTORY:     Past Surgical History:   Procedure Laterality Date    ADENOIDECTOMY      APPENDECTOMY      BACK SURGERY      GERALD    CYST REMOVAL      HYSTERECTOMY      JOINT REPLACEMENT Right     knee, with revision    KNEE SURGERY Left      knee replacement    RHIZOTOMY      TONSILLECTOMY      TOTAL ANKLE ARTHROPLASTY Right        ALLERGIES:   Patient has no known allergies.    FAMILY HISTORY:     Family History   Problem Relation Age of Onset    Thyroid disease Mother     Hypertension Mother     Cancer Father         oral CA    Depression Sister     Hypertension Brother     Alcohol abuse Brother        SOCIAL HISTORY:     Social History     Tobacco Use    Smoking status: Never    Smokeless tobacco: Never   Substance Use Topics    Alcohol use: Yes     Comment: Socially        Social History     Substance and Sexual Activity   Sexual Activity Yes    Partners: Male    Birth control/protection: None        HOME MEDICATIONS:     Prior to Admission medications    Medication Sig Start Date End Date Taking? Authorizing Provider   dorzolamide-timolol 2-0.5% (COSOPT) 22.3-6.8 mg/mL ophthalmic solution Place 1 drop into the right eye 2 (two) times daily. for 14 days 10/9/22 11/15/22 Yes Radha Hanley NP   erythromycin (ROMYCIN) ophthalmic ointment Place a 1/2 inch ribbon of ointment into the lower eyelid of right eye every night.  Patient taking differently: Place 0.5 inches into the right eye every evening. Place a 1/2 inch ribbon of ointment into the lower eyelid of right eye every night. 10/9/22  Yes Radha Hanley NP   Fortified Tobramycin 15 mg/ml Opht (TOBREX) 15 mg Drop Place 1 drop into the right eye every hour while awake. 10/9/22   Radha Hanley NP   HYDROcodone-acetaminophen (NORCO)  mg per tablet Take 1 tablet by mouth every 4 (four) hours as needed for Pain. 10/9/22   Radha Hanley NP   mupirocin (BACTROBAN) 2 % ointment Apply topically 3 (three) times daily.  Patient taking differently: Apply 1 g topically 3 (three) times daily. 7/20/22   Dustin Caro MD   ondansetron (ZOFRAN-ODT) 4 MG TbDL Take 4 mg by mouth every 8 (eight) hours as needed. 9/10/22   Historical Provider   pantoprazole (PROTONIX) 40 MG tablet Take 40 mg by mouth every  "morning. 9/19/22   Historical Provider   rivaroxaban (XARELTO) 10 mg Tab Take 1 tablet (10 mg total) by mouth daily with dinner or evening meal. 9/21/14 10/13/22  Mauro Aden MD   sulfamethoxazole-trimethoprim 400-80mg (BACTRIM,SEPTRA) 400-80 mg per tablet Take 1 tablet by mouth 2 (two) times daily. 7/7/22   Dustin Caro MD   valsartan (DIOVAN) 160 MG tablet Take 160 mg by mouth once daily.    Historical Provider         PHYSICAL EXAM:   /76   Pulse 74   Temp 98.1 °F (36.7 °C) (Oral)   Resp 16   Ht 5' 1" (1.549 m)   Wt 49.2 kg (108 lb 6.4 oz)   SpO2 97%   BMI 20.48 kg/m²   Vitals Reviewed  General appearance: non-toxic and not acutely ill-appearingWhite female in no apparent distress.  Skin: No Rash.   Neuro: Motor and sensory exams grossly intact. Good tone. Power in all 4 extremities 5/5.   HENT: Atraumatic head. Moist mucous membranes of oral cavity.  Eyes: Normal extraocular movements.   Neck: Supple. No evidence of lymphadenopathy. No thyroidomegaly.  Lungs: Clear to auscultation bilaterally. No wheezing present.   Heart: Regular rate and rhythm. S1 and S2 present with no murmurs/gallop/rub. No pedal edema. No JVD present.   Abdomen: Soft, non-distended, non-tender. No rebound tenderness/guarding. No masses or organomegaly. Bowel sounds are normal. Bladder is not palpable.   Extremities: No cyanosis, clubbing.  Psych/mental status: Alert and oriented. Cooperative. Responds appropriately to questions.   EMERGENCY DEPARTMENT LABS AND IMAGING:     Labs Reviewed   CBC W/ AUTO DIFFERENTIAL - Abnormal; Notable for the following components:       Result Value    WBC 3.26 (*)     RBC 3.64 (*)     Hemoglobin 10.4 (*)     Hematocrit 32.6 (*)     MCHC 31.9 (*)     RDW 15.8 (*)     All other components within normal limits   COMPREHENSIVE METABOLIC PANEL - Abnormal; Notable for the following components:    Sodium 132 (*)     Potassium 2.6 (*)     Creatinine 0.4 (*)     Calcium 7.7 (*)     Albumin 2.6 " (*)     Anion Gap 7 (*)     All other components within normal limits    Narrative:     Potassium critical result(s) called and verbal readback obtained from   Vidhi Burgos RN by HS3 11/15/2022 10:42   SEDIMENTATION RATE - Abnormal; Notable for the following components:    Sed Rate 32 (*)     All other components within normal limits   C-REACTIVE PROTEIN - Abnormal; Notable for the following components:    CRP 1.72 (*)     All other components within normal limits   FERRITIN - Abnormal; Notable for the following components:    Ferritin 1,102 (*)     All other components within normal limits   CULTURE, BLOOD   CULTURE, BLOOD   CULTURE, RESPIRATORY   PNEUMOCYSTIS SMEAR BY DFA   AFB CULTURE & SMEAR   LACTIC ACID, PLASMA   LIPASE   MAGNESIUM   URINALYSIS, REFLEX TO URINE CULTURE   PROCALCITONIN   T-HELPER CELLS (CD4) COUNT   MISCELLANEOUS SENDOUT TEST, BLOOD       X-Ray Chest PA And Lateral   Final Result      CT Chest Abdomen Pelvis Without Contrast (XPD)    (Results Pending)       ASSESSMENT & PLAN:   Amie Salmeron is a 57 y.o. female admitted for    Active Hospital Problems    Diagnosis    *AIDS    Thrush    Essential hypertension, benign        Plan  Admit to Med-Surg Floor,   Infectious disease consultation,   Repeat CD4 cell count, viral load  Fluconazole + nystatin ordered  Bactrim ordered  AFB cultures, pneumocystis smear and respiratory cultures  CT Chest abdomen and pelvis pending  Supportive Care with Anti-pyretics, Anti-emetics,   Replacement of Electrolytes, and   resume home medications      Diet: Regular    DVT Prophylaxis: low molecular weight heparin and Encourage ambulation. OOB as tolerated.     Discharge Planning and Disposition:  Home with assistance from family    Expected LOS: 1-2 days    ________________________________________________________________________________    Face-to-Face encounter date: 11/15/2022  Encounter included review of the medical records, interviewing and examining the  patient face-to-face, discussion with family and other health care providers including emergency medicine physician, admission orders, interpreting lab/test results and formulating a plan of care.   Medical Decision Making during this encounter was High Complexity due to Patient has a condition that poses threat to life and bodily function: HIV & AIDS, requiring IV antibiotics and ID consultation  ________________________________________________________________________________    INPATIENT LIST OF MEDICATIONS     Current Facility-Administered Medications:     fluconazole (DIFLUCAN) IVPB 400 mg, 400 mg, Intravenous, Q24H, Maddy Vann MD    lactated ringers bolus 130 mL, 130 mL, Intravenous, Once, Sonja Uriostegui, FNP, Last Rate: 130 mL/hr at 11/15/22 1242, 130 mL at 11/15/22 1242    nystatin 100,000 unit/mL suspension 500,000 Units, 500,000 Units, Oral, QID, Maddy Vann MD    sulfamethoxazole-trimethoprim 400-80 mg/5 mL (BACTRIM) 246 mg in dextrose 5 % 246 mL IVPB, 15 mg/kg/day, Intravenous, Q8H, Maddy Vann MD    Current Outpatient Medications:     dorzolamide-timolol 2-0.5% (COSOPT) 22.3-6.8 mg/mL ophthalmic solution, Place 1 drop into the right eye 2 (two) times daily. for 14 days, Disp: 1 each, Rfl: 0    erythromycin (ROMYCIN) ophthalmic ointment, Place a 1/2 inch ribbon of ointment into the lower eyelid of right eye every night. (Patient taking differently: Place 0.5 inches into the right eye every evening. Place a 1/2 inch ribbon of ointment into the lower eyelid of right eye every night.), Disp: 1 g, Rfl: 0    Fortified Tobramycin 15 mg/ml Opht (TOBREX) 15 mg Drop, Place 1 drop into the right eye every hour while awake., Disp: 7.5 mL, Rfl: 0    HYDROcodone-acetaminophen (NORCO)  mg per tablet, Take 1 tablet by mouth every 4 (four) hours as needed for Pain., Disp: 12 tablet, Rfl: 0    mupirocin (BACTROBAN) 2 % ointment, Apply topically 3 (three) times daily. (Patient taking  differently: Apply 1 g topically 3 (three) times daily.), Disp: 30 g, Rfl: 2    ondansetron (ZOFRAN-ODT) 4 MG TbDL, Take 4 mg by mouth every 8 (eight) hours as needed., Disp: , Rfl:     pantoprazole (PROTONIX) 40 MG tablet, Take 40 mg by mouth every morning., Disp: , Rfl:     rivaroxaban (XARELTO) 10 mg Tab, Take 1 tablet (10 mg total) by mouth daily with dinner or evening meal., Disp: 21 tablet, Rfl: 0    sulfamethoxazole-trimethoprim 400-80mg (BACTRIM,SEPTRA) 400-80 mg per tablet, Take 1 tablet by mouth 2 (two) times daily., Disp: 20 tablet, Rfl: 1    valsartan (DIOVAN) 160 MG tablet, Take 160 mg by mouth once daily., Disp: , Rfl:       Scheduled Meds:   fluconazole (DIFLUCAN) IV (PEDS and ADULTS)  400 mg Intravenous Q24H    lactated ringers  130 mL Intravenous Once    nystatin  500,000 Units Oral QID    trimethoprim-sulfamethoxasole (BACTRIM) IVPB (fixed ratio)  15 mg/kg/day Intravenous Q8H     Continuous Infusions:  PRN Meds:.      Bela Barlow  [facility]  11/15/2022

## 2022-11-15 NOTE — ED NOTES
Pt to er sent by Dr. Vann. C/o cough, fever. Recent diagnosis of pheumonia. Speech clear. Skin w/dr/pk. Rr even/unlab. Maew. Steady gait. She c/o chronic pain all over. On cm, pulse ox, nibp. No fever at assessment.

## 2022-11-15 NOTE — ED PROVIDER NOTES
Encounter Date: 11/15/2022       History     Chief Complaint   Patient presents with    Fever     Fever, body aches x 3 months - sent by Dr. Vann for poss admission      57-year-old female presents to the emergency department has a past medical history of hypertension, and HIV.  Patient tested positive for COVID on September 12th she reports that she had symptoms for few days and got better however she states that she developed cough, and shortness of breath in October she was admitted here on October 13th for bilateral pneumonia however signed out against medical advice on October 15th.  The patient states that she continues to have cough, congestion, exertional shortness of breath and fevers T-max of 101° since she left the hospital.  On review of patient's chart she has a viral load of 305,000, with a CD4 count of 24.  The patient states she was diagnosed with HIV approximately 40 years ago she is never taken any antivirals and reports that her viral load has always been greater than 1200..  Patient is very concerned that her viral load is now 24 and reports that she has not felt better which is why she return to the emergency department    Review of patient's allergies indicates:  No Known Allergies  Past Medical History:   Diagnosis Date    Allergy     Arthritis     Asthma     Chronic pain     HIV infection     Hypertension     Overactive bladder     Spinal stenosis     Urine incontinence      Past Surgical History:   Procedure Laterality Date    ADENOIDECTOMY      APPENDECTOMY      BACK SURGERY      GERALD    CYST REMOVAL      HYSTERECTOMY      JOINT REPLACEMENT Right     knee, with revision    KNEE SURGERY Left     knee replacement    RHIZOTOMY      TONSILLECTOMY      TOTAL ANKLE ARTHROPLASTY Right      Family History   Problem Relation Age of Onset    Thyroid disease Mother     Hypertension Mother     Cancer Father         oral CA    Depression Sister     Hypertension Brother     Alcohol abuse Brother       Social History     Tobacco Use    Smoking status: Never    Smokeless tobacco: Never   Substance Use Topics    Alcohol use: Yes     Comment: Socially    Drug use: No     Review of Systems   Constitutional:  Positive for activity change, chills, fatigue and fever.   HENT:  Positive for congestion.    Respiratory:  Positive for cough and shortness of breath. Negative for wheezing and stridor.    Cardiovascular:  Negative for chest pain, palpitations and leg swelling.   Gastrointestinal: Negative.    Genitourinary: Negative.    Musculoskeletal:  Positive for arthralgias.   Neurological: Negative.    Hematological: Negative.    Psychiatric/Behavioral: Negative.       Physical Exam     Initial Vitals [11/15/22 0836]   BP Pulse Resp Temp SpO2   98/62 83 18 98.1 °F (36.7 °C) 95 %      MAP       --         Physical Exam    Nursing note and vitals reviewed.  Constitutional: She appears well-developed and well-nourished.   HENT:   Head: Normocephalic and atraumatic.   Eyes: Conjunctivae and EOM are normal. Pupils are equal, round, and reactive to light.   Pulmonary/Chest: No respiratory distress. She has rhonchi.   Abdominal: Abdomen is soft. Bowel sounds are normal. She exhibits no distension. There is no abdominal tenderness.   Musculoskeletal:         General: Normal range of motion.     Neurological: She is alert and oriented to person, place, and time. She has normal strength. GCS score is 15. GCS eye subscore is 4. GCS verbal subscore is 5. GCS motor subscore is 6.   Skin: Skin is warm.   Psychiatric: She has a normal mood and affect.       ED Course   Procedures  Labs Reviewed   CBC W/ AUTO DIFFERENTIAL - Abnormal; Notable for the following components:       Result Value    WBC 3.26 (*)     RBC 3.64 (*)     Hemoglobin 10.4 (*)     Hematocrit 32.6 (*)     MCHC 31.9 (*)     RDW 15.8 (*)     All other components within normal limits   COMPREHENSIVE METABOLIC PANEL - Abnormal; Notable for the following components:     Sodium 132 (*)     Potassium 2.6 (*)     Creatinine 0.4 (*)     Calcium 7.7 (*)     Albumin 2.6 (*)     Anion Gap 7 (*)     All other components within normal limits    Narrative:     Potassium critical result(s) called and verbal readback obtained from   Vidhi Burgos RN by HS3 11/15/2022 10:42   SEDIMENTATION RATE - Abnormal; Notable for the following components:    Sed Rate 32 (*)     All other components within normal limits   C-REACTIVE PROTEIN - Abnormal; Notable for the following components:    CRP 1.72 (*)     All other components within normal limits   FERRITIN - Abnormal; Notable for the following components:    Ferritin 1,102 (*)     All other components within normal limits   CULTURE, RESPIRATORY   PNEUMOCYSTIS SMEAR BY DFA   AFB CULTURE & SMEAR   LACTIC ACID, PLASMA   LIPASE   MAGNESIUM   T-HELPER CELLS (CD4) COUNT        ECG Results              EKG 12-lead (In process)  Result time 11/15/22 09:26:36      In process by Interface, Lab In [facility] (11/15/22 09:26:36)                   Narrative:    Test Reason : R05.9,R50.9,    Vent. Rate : 071 BPM     Atrial Rate : 071 BPM     P-R Int : 130 ms          QRS Dur : 080 ms      QT Int : 386 ms       P-R-T Axes : 064 049 039 degrees     QTc Int : 419 ms    Normal sinus rhythm  Normal ECG  When compared with ECG of 13-OCT-2022 16:26,  T wave inversion no longer evident in Anterior leads    Referred By: [email or web address]   SELF           Confirmed By:                                   Imaging Results              CT Chest Abdomen Pelvis Without Contrast (XPD) (Final result)  Result time 11/15/22 16:56:09      Final result by Jef Dodson MD (11/15/22 16:56:09)                   Narrative:    CT chest, abdomen, and pelvis without contrast    CLINICAL DATA: Fever of unknown origin    CMS MANDATED QUALITY DATA - CT RADIATION  436    All CT scans at this facility utilize dose modulation, iterative reconstruction, and/or weight based dosing when appropriate to reduce  radiation dose to as low as reasonably achievable.    Findings: Thin section noncontrast axial images were obtained from the thoracic inlet to the pubic symphysis. Exam is limited by the lack of intravenous contrast.    CT CHEST:    Comparison to October 2022. Heart size is normal. Multifocal coronary artery atherosclerotic calcification is noted. The thoracic aorta is normal in caliber. No mediastinal mass is identified. Normal-sized mediastinal lymph nodes are demonstrated. No pathologic axillary lymphadenopathy is identified.    Images at lung windows demonstrate fibrotic changes at the lung bases, similar to the prior study. Scattered small groundglass opacities are also noted, probably infectious/inflammatory, with mild focal infiltrate noted in the lingula. Previously demonstrated bilateral upper lobe infiltrates have shown near complete resolution. There are no pleural effusions. No acute osseous abnormality is identified.    CT ABDOMEN AND PELVIS:    The liver has a normal noncontrast appearance. The gallbladder and biliary tree are unremarkable. The spleen is normal in size and appearance. The pancreas is grossly normal, but suboptimally assessed without contrast small bilateral nonobstructing renal calculi are noted. The adrenal glands are unremarkable. The abdominal aorta is normal in caliber with mild atherosclerotic calcification.    There is no pathologic bowel wall thickening or evidence of obstruction. Postoperative changes involve small bowel loops in the left mid abdomen.    There is a soft tissue density measuring 6.1 cm in size along the anterior and left anterolateral margins of the aorta just below the level of the pancreas. Several adjacent mildly prominent mesenteric lymph nodes are noted, and this soft tissue density is concerning for confluent lymphadenopathy, although unopacified small bowel loops could also contribute. Follow-up CT with oral and intravenous contrast is  recommended.    Images of the pelvis demonstrate a normal-appearing urinary bladder. Bowel structures are unremarkable. There is no free fluid or lymphadenopathy.    No acute osseous abnormalities are demonstrated. There is multilevel degenerative disc disease in the lumbar spine.    IMPRESSION:  1. 6.1 cm soft tissue density along the anterior and left anterolateral margin of the abdominal aorta in the mid abdomen as above. Several adjacent mildly prominent mesenteric lymph nodes are noted, the largest measuring 1.7 x 1.3 cm. The soft tissue density is suspicious for a confluent ilia mass, but could also be contributed to by unopacified small bowel loops. CT abdomen with oral and intravenous contrast is recommended for further assessment.  2. Previously noted bilateral upper lobe pulmonary infiltrates have shown near complete resolution. Scattered groundglass opacities in both lungs are likely infectious/inflammatory in nature, with slightly more prominent infiltrate in the lingula.  3. Additional chronic findings as above.    Electronically signed by:  Jef Dodson MD  11/15/2022 4:56 PM CST Workstation: [id number]                                     X-Ray Chest PA And Lateral (Final result)  Result time 11/15/22 09:14:13      Final result by Adrian Perez MD (11/15/22 09:14:13)                   Narrative:    Reason: Cough with fever(Fever, body aches x 3 months - sent by Dr. Vann for poss admission ) Hx-Asthma    FINDINGS:  PA and lateral chest compared with 10/13/2022 show normal cardiomediastinal silhouette.    Interstitial opacities in left mid and lower lung zone and right lower lung zone persists, but appear to have improved in the interval. No new consolidation or pleural effusion. Pulmonary vasculature is normal. Degenerative changes of the spine are mild.    IMPRESSION:  Interval improvement of bilateral pulmonary opacities, with persistent interstitial opacities as above. These could reflect  incomplete resolution of infectious or inflammatory process, parenchymal scarring, atelectasis, or some combination thereof.    Electronically signed by:  Adrian Perez MD  11/15/2022 9:14 AM CST Workstation: [id number]                                     Medications   fluconazole (DIFLUCAN) IVPB 400 mg (0 mg Intravenous Stopped 11/15/22 1616)   nystatin 100,000 unit/mL suspension 500,000 Units ( Oral Canceled Entry 11/15/22 1700)   sodium chloride 0.9% flush 10 mL (has no administration in time range)   melatonin tablet 9 mg (has no administration in time range)   ondansetron injection 4 mg (4 mg Intravenous Given 11/15/22 1518)   prochlorperazine injection Soln 5 mg (has no administration in time range)   acetaminophen tablet 1,000 mg (1,000 mg Oral Given 11/15/22 1517)   simethicone chewable tablet 80 mg (has no administration in time range)   aluminum-magnesium hydroxide-simethicone 200-200-20 mg/5 mL suspension 30 mL (has no administration in time range)   acetaminophen tablet 650 mg (has no administration in time range)   HYDROcodone-acetaminophen 5-325 mg per tablet 1 tablet (has no administration in time range)   morphine injection 4 mg (has no administration in time range)   naloxone 0.4 mg/mL injection 0.02 mg (has no administration in time range)   glucose chewable tablet 16 g (has no administration in time range)   glucose chewable tablet 24 g (has no administration in time range)   glucagon (human recombinant) injection 1 mg (has no administration in time range)   dextrose 10% bolus 125 mL (has no administration in time range)   dextrose 10% bolus 250 mL (has no administration in time range)   enoxaparin injection 40 mg (40 mg Subcutaneous Given 11/15/22 1757)   sulfamethoxazole-trimethoprim 400-80 mg/5 mL (BACTRIM) 246 mg in dextrose 5 % 246 mL IVPB (246 mg Intravenous New Bag 11/15/22 1748)   pneumoc 20-isael conj-dip cr(PF) (PREVNAR-20 (PF)) injection Syrg 0.5 mL (has no administration in time range)    lactated ringers bolus 1,000 mL (0 mLs Intravenous Stopped 11/15/22 1323)   lactated ringers bolus 130 mL (0 mLs Intravenous Stopped 11/15/22 1342)   potassium chloride SA CR tablet 40 mEq (40 mEq Oral Given 11/15/22 1256)   iohexoL (OMNIPAQUE 350) injection 100 mL (100 mLs Intravenous Given 11/15/22 1731)     Medical Decision Making:   History:   Old Records Summarized: records from previous admission(s).  Initial Assessment:   57-year-old female presents to the emergency department has a past medical history of hypertension, and HIV.  Patient tested positive for COVID on September 12th she reports that she had symptoms for few days and got better however she states that she developed cough, and shortness of breath in October she was admitted here on October 13th for bilateral pneumonia however signed out against medical advice on October 15th.  The patient states that she continues to have cough, congestion, exertional shortness of breath and fevers T-max of 101° since she left the hospital.  On review of patient's chart she has a viral load of 305,000, with a CD4 count of 24.  The patient states she was diagnosed with HIV approximately 40 years ago she is never taken any antivirals and reports that her viral load has always been greater than 1200..  Patient is very concerned that her viral load is now 24 and reports that she has not felt better which is why she return to the emergency department    Differential Diagnosis:   Considerations include pneumonia, ACS, PE, electrolyte abnormalities, viral illness  ED Management:  Patient presents emergency department with known history of HIV for last 40 years has not taken any antiviral medications recently tested positive for COVID and reports has had a hard time feeling well after having her COVID which started in September.  Patient was diagnosed with bilateral pneumonia in October she was admitted to the hospital at that time however this signed out against  medical advice October 15th states she has had intermittent fevers for approximately 4-5 days a week since that time with fatigue, and weakness.  Patient has normocytic anemia at baseline for her, evidence of leukocytosis chest x-ray reveals bilateral pneumonia however she does have interval improvement she is very coarse breath sounds and rhonchi however they seem to clear with coughing.  Patient was able to ambulate down the nguyen without desaturation or respiratory distress was seen and evaluated in the emergency department by Dr. Vann  Infectious Disease and will be admitted to the hospital by Hospital Medicine Dr. chavez was given report           ED Course as of 11/15/22 1818   Tue Nov 15, 2022   1050 Have consulted Dr. Vann to see what antibiotics she would like her patient [MP]   1151 DR Vann here to evaluate patient  [MP]      ED Course User Index  [MP] ANITHA Saunders                 Clinical Impression:   Final diagnoses:  [R05.9, R50.9] Cough with fever  [J18.9] Pneumonia due to infectious organism, unspecified laterality, unspecified part of lung (Primary)  [B20] HIV infection, unspecified symptom status  [B20] AIDS        ED Disposition Condition    Observation                 ANITHA Saunders  11/15/22 1818

## 2022-11-16 LAB
ANION GAP SERPL CALC-SCNC: 4 MMOL/L (ref 8–16)
ANISOCYTOSIS BLD QL SMEAR: ABNORMAL
BASOPHILS NFR BLD: 0 % (ref 0–1.9)
BUN SERPL-MCNC: 6 MG/DL (ref 6–20)
CALCIUM SERPL-MCNC: 7.7 MG/DL (ref 8.7–10.5)
CHLORIDE SERPL-SCNC: 106 MMOL/L (ref 95–110)
CO2 SERPL-SCNC: 27 MMOL/L (ref 23–29)
CREAT SERPL-MCNC: 0.4 MG/DL (ref 0.5–1.4)
DIFFERENTIAL METHOD: ABNORMAL
EOSINOPHIL NFR BLD: 0 % (ref 0–8)
ERYTHROCYTE [DISTWIDTH] IN BLOOD BY AUTOMATED COUNT: 15.8 % (ref 11.5–14.5)
EST. GFR  (NO RACE VARIABLE): >60 ML/MIN/1.73 M^2
GLUCOSE SERPL-MCNC: 75 MG/DL (ref 70–110)
HCT VFR BLD AUTO: 32.3 % (ref 37–48.5)
HGB BLD-MCNC: 10.1 G/DL (ref 12–16)
IMM GRANULOCYTES # BLD AUTO: ABNORMAL K/UL (ref 0–0.04)
IMM GRANULOCYTES NFR BLD AUTO: ABNORMAL % (ref 0–0.5)
LDH SERPL L TO P-CCNC: 247 U/L (ref 110–260)
LYMPHOCYTES NFR BLD: 15 % (ref 18–48)
MAGNESIUM SERPL-MCNC: 1.7 MG/DL (ref 1.6–2.6)
MCH RBC QN AUTO: 27.7 PG (ref 27–31)
MCHC RBC AUTO-ENTMCNC: 31.3 G/DL (ref 32–36)
MCV RBC AUTO: 89 FL (ref 82–98)
MONOCYTES NFR BLD: 11 % (ref 4–15)
NEUTROPHILS NFR BLD: 69 % (ref 38–73)
NEUTS BAND NFR BLD MANUAL: 5 %
NRBC BLD-RTO: 0 /100 WBC
OVALOCYTES BLD QL SMEAR: ABNORMAL
PLATELET # BLD AUTO: 232 K/UL (ref 150–450)
PMV BLD AUTO: 11.1 FL (ref 9.2–12.9)
POIKILOCYTOSIS BLD QL SMEAR: ABNORMAL
POTASSIUM SERPL-SCNC: 4.8 MMOL/L (ref 3.5–5.1)
RBC # BLD AUTO: 3.64 M/UL (ref 4–5.4)
SCHISTOCYTES BLD QL SMEAR: PRESENT
SODIUM SERPL-SCNC: 137 MMOL/L (ref 136–145)
WBC # BLD AUTO: 3.08 K/UL (ref 3.9–12.7)

## 2022-11-16 PROCEDURE — 99215 PR OFFICE/OUTPT VISIT, EST, LEVL V, 40-54 MIN: ICD-10-PCS | Mod: ,,, | Performed by: STUDENT IN AN ORGANIZED HEALTH CARE EDUCATION/TRAINING PROGRAM

## 2022-11-16 PROCEDURE — 83735 ASSAY OF MAGNESIUM: CPT | Performed by: INTERNAL MEDICINE

## 2022-11-16 PROCEDURE — 83615 LACTATE (LD) (LDH) ENZYME: CPT | Performed by: STUDENT IN AN ORGANIZED HEALTH CARE EDUCATION/TRAINING PROGRAM

## 2022-11-16 PROCEDURE — 36415 COLL VENOUS BLD VENIPUNCTURE: CPT | Performed by: EMERGENCY MEDICINE

## 2022-11-16 PROCEDURE — 25000003 PHARM REV CODE 250: Performed by: INTERNAL MEDICINE

## 2022-11-16 PROCEDURE — 86361 T CELL ABSOLUTE COUNT: CPT | Performed by: EMERGENCY MEDICINE

## 2022-11-16 PROCEDURE — 25000003 PHARM REV CODE 250: Performed by: STUDENT IN AN ORGANIZED HEALTH CARE EDUCATION/TRAINING PROGRAM

## 2022-11-16 PROCEDURE — 96376 TX/PRO/DX INJ SAME DRUG ADON: CPT

## 2022-11-16 PROCEDURE — 85027 COMPLETE CBC AUTOMATED: CPT | Performed by: INTERNAL MEDICINE

## 2022-11-16 PROCEDURE — 87070 CULTURE OTHR SPECIMN AEROBIC: CPT | Performed by: INTERNAL MEDICINE

## 2022-11-16 PROCEDURE — 87205 SMEAR GRAM STAIN: CPT | Performed by: INTERNAL MEDICINE

## 2022-11-16 PROCEDURE — 63600175 PHARM REV CODE 636 W HCPCS: Performed by: INTERNAL MEDICINE

## 2022-11-16 PROCEDURE — G0378 HOSPITAL OBSERVATION PER HR: HCPCS

## 2022-11-16 PROCEDURE — 96372 THER/PROPH/DIAG INJ SC/IM: CPT | Performed by: INTERNAL MEDICINE

## 2022-11-16 PROCEDURE — 96366 THER/PROPH/DIAG IV INF ADDON: CPT

## 2022-11-16 PROCEDURE — 87281 PNEUMOCYSTIS CARINII AG IF: CPT | Performed by: INTERNAL MEDICINE

## 2022-11-16 PROCEDURE — 85007 BL SMEAR W/DIFF WBC COUNT: CPT | Performed by: INTERNAL MEDICINE

## 2022-11-16 PROCEDURE — 36415 COLL VENOUS BLD VENIPUNCTURE: CPT | Performed by: INTERNAL MEDICINE

## 2022-11-16 PROCEDURE — 80048 BASIC METABOLIC PNL TOTAL CA: CPT | Performed by: INTERNAL MEDICINE

## 2022-11-16 PROCEDURE — S0039 INJECTION, SULFAMETHOXAZOLE: HCPCS | Performed by: INTERNAL MEDICINE

## 2022-11-16 PROCEDURE — 63700000 PHARM REV CODE 250 ALT 637 W/O HCPCS: Performed by: STUDENT IN AN ORGANIZED HEALTH CARE EDUCATION/TRAINING PROGRAM

## 2022-11-16 PROCEDURE — 99215 OFFICE O/P EST HI 40 MIN: CPT | Mod: ,,, | Performed by: STUDENT IN AN ORGANIZED HEALTH CARE EDUCATION/TRAINING PROGRAM

## 2022-11-16 PROCEDURE — 36415 COLL VENOUS BLD VENIPUNCTURE: CPT | Performed by: STUDENT IN AN ORGANIZED HEALTH CARE EDUCATION/TRAINING PROGRAM

## 2022-11-16 RX ORDER — AZITHROMYCIN 250 MG/1
1200 TABLET, FILM COATED ORAL WEEKLY
Status: DISCONTINUED | OUTPATIENT
Start: 2022-11-16 | End: 2022-11-17 | Stop reason: HOSPADM

## 2022-11-16 RX ADMIN — ENOXAPARIN SODIUM 40 MG: 100 INJECTION SUBCUTANEOUS at 05:11

## 2022-11-16 RX ADMIN — AZITHROMYCIN MONOHYDRATE 1250 MG: 250 TABLET ORAL at 01:11

## 2022-11-16 RX ADMIN — SULFAMETHOXAZOLE AND TRIMETHOPRIM 246 MG: 80; 16 INJECTION, SOLUTION, CONCENTRATE INTRAVENOUS at 12:11

## 2022-11-16 RX ADMIN — ACETAMINOPHEN 650 MG: 325 TABLET ORAL at 11:11

## 2022-11-16 RX ADMIN — SULFAMETHOXAZOLE AND TRIMETHOPRIM 246 MG: 80; 16 INJECTION, SOLUTION, CONCENTRATE INTRAVENOUS at 08:11

## 2022-11-16 RX ADMIN — ONDANSETRON 4 MG: 2 INJECTION INTRAMUSCULAR; INTRAVENOUS at 10:11

## 2022-11-16 RX ADMIN — FLUCONAZOLE IN SODIUM CHLORIDE 400 MG: 2 INJECTION, SOLUTION INTRAVENOUS at 12:11

## 2022-11-16 RX ADMIN — POTASSIUM BICARBONATE 40 MEQ: 391 TABLET, EFFERVESCENT ORAL at 08:11

## 2022-11-16 RX ADMIN — NYSTATIN 500000 UNITS: 100000 SUSPENSION ORAL at 12:11

## 2022-11-16 RX ADMIN — ONDANSETRON 4 MG: 2 INJECTION INTRAMUSCULAR; INTRAVENOUS at 03:11

## 2022-11-16 RX ADMIN — NYSTATIN 500000 UNITS: 100000 SUSPENSION ORAL at 05:11

## 2022-11-16 RX ADMIN — SULFAMETHOXAZOLE AND TRIMETHOPRIM 246 MG: 80; 16 INJECTION, SOLUTION, CONCENTRATE INTRAVENOUS at 05:11

## 2022-11-16 RX ADMIN — POTASSIUM BICARBONATE 35 MEQ: 391 TABLET, EFFERVESCENT ORAL at 02:11

## 2022-11-16 RX ADMIN — NYSTATIN 500000 UNITS: 100000 SUSPENSION ORAL at 08:11

## 2022-11-16 RX ADMIN — POLYETHYLENE GLYCOL 3350 17 G: 17 POWDER, FOR SOLUTION ORAL at 08:11

## 2022-11-16 RX ADMIN — ONDANSETRON 4 MG: 2 INJECTION INTRAMUSCULAR; INTRAVENOUS at 12:11

## 2022-11-16 NOTE — PLAN OF CARE
[facility]  Initial Discharge Assessment       Primary Care Provider: Dustin Caro MD    Admission Diagnosis: AIDS [B20]    Admission Date: 11/15/2022  Expected Discharge Date: 11/17/2022    Social work intern met with Pt at bedside to complete discharge assessment. Pt AAOx4s. Demographics, PCP, and insurance verified. No home health. No dialysis. Pt reports ability to complete ADLs without assistance. Pt requested shower chair. Pt verbalized plan to discharge home via family transport. Pt has no other needs to be addressed at this time.    Discharge Barriers Identified: None    Payor: [employer] MANAGED MEDICARE / Plan: HU[employer] MEDICARE HMO / Product Type: Capitation /     Extended Emergency Contact Information  Primary Emergency Contact: Elena Crandall   [facility]  Home Phone: [phone]  Mobile Phone: [phone]  Relation: Mother    Discharge Plan A: Home with family  Discharge Plan B: Home with family      [facility] - [address]  [address]  [place] [address]  Phone: [phone] Fax: [phone]    [facility] [place] - [address]  [address]  [place] [address]  Phone: [phone] Fax: [phone]    [facility] - [place] - [address]  [address] [place] [address]  Phone: [phone] Fax: [phone]    [address] [place] - [address]  [address]  [place] [address]  Phone: [phone] Fax: [phone]      Initial Assessment (most recent)       Adult Discharge Assessment - 11/16/22 1111          Discharge Assessment    Assessment Type Discharge Planning Assessment     Confirmed/corrected address, phone number and insurance Yes     Confirmed Demographics Correct on Facesheet     Source of Information patient     Does patient/caregiver understand observation status Yes      Communicated MARK with patient/caregiver Yes     Reason For Admission AIDS     Lives With parent(s)     Facility Arrived From: Home     Do you expect to return to your current living situation? Yes     Do you have help at home or someone to help you manage your care at home? Yes     Who are your caregiver(s) and their phone number(s)? Elena Crandall (Mother)   [phone]     Prior to hospitilization cognitive status: Unable to Assess     Current cognitive status: Alert/Oriented     Walking or Climbing Stairs Difficulty none     Dressing/Bathing Difficulty bathing difficulty, assistance 1 person     Dressing/Bathing Management Pt bathes at sink     Home Accessibility wheelchair accessible     Home Layout Able to live on 1st floor     Equipment Currently Used at Home none     Readmission within 30 days? No     Patient currently being followed by outpatient case management? No     Do you currently have service(s) that help you manage your care at home? No     Do you take prescription medications? Yes     Do you have prescription coverage? Yes     Coverage Payor:  HU[employer] MANAGED MEDICARE - [email or web address] MEDICARE HMO     Do you have any problems affording any of your prescribed medications? No     Is the patient taking medications as prescribed? yes     Who is going to help you get home at discharge? Elena Crandall (Father)   [phone] (wife's number)     How do you get to doctors appointments? car, drives self     Are you on dialysis? No     Do you take coumadin? No     Discharge Plan A Home with family     Discharge Plan B Home with family     DME Needed Upon Discharge  3-in-1 commode     Discharge Plan discussed with: Patient     Discharge Barriers Identified None

## 2022-11-16 NOTE — ASSESSMENT & PLAN NOTE
Afebrile here  Follow improvement with mgmt of thrush  Continuing bactrim for now  Follow cultures and eval for disseminated MAC

## 2022-11-16 NOTE — PROGRESS NOTES
Social work intern met with Pt at bedside to complete MOON. Pt was explained FONTAINE. Pt verbalized understanding of FONTAINE and Pt signed. FONTAINE scanned to .       11/16/22 1015   FONTAINE Message   Medicare Outpatient and Observation Notification regarding financial responsibility Given to patient/caregiver;Explained to patient/caregiver;Signed/date by patient/caregiver   Date FONTAINE was signed 11/16/22   Time FONTAINE was signed 101

## 2022-11-16 NOTE — HPI
Amie Salmeron is a 57 y.o. White female with known history HTN, and HIV (never on therapy, does not know cell count), who tested positive for COVID on 09/12/22 and left AMA from [facility] now presented again with progressive decline in her health, generalize weakness and fatigue. She reports persistent cough associated with it. She has no energy to perform her activities of daily living. She has difficulty standing and her legs give up with she tries to stand. She denies fever but reports feeling cold all the time. With these symptoms, she presented to the ER for evaluation.  Her initial lab workup showed anemia and leukopenia. Potassium of 2.6. CXR shows mild improvement from the previous one. CD4 cell count from previous admission a month ago was 26 and Viral load was 735416. Infectious disease has been consulted for further recommendations. Hospital medicine consulted for admission.

## 2022-11-16 NOTE — SUBJECTIVE & OBJECTIVE
Interval History:  No acute events overnight, she is feeling better today.  Afebrile.  Swallowing is better with treatment of thrush.    Review of Systems   All other systems reviewed and are negative.  Objective:     Vital Signs (Most Recent):  Temp: 98.6 °F (37 °C) (11/16/22 0756)  Pulse: 89 (11/16/22 0756)  Resp: 18 (11/16/22 0756)  BP: 123/85 (11/16/22 0756)  SpO2: (!) 90 % (11/16/22 0756)   Vital Signs (24h Range):  Temp:  [98.1 °F (36.7 °C)-99.7 °F (37.6 °C)] 98.6 °F (37 °C)  Pulse:  [70-90] 89  Resp:  [16-20] 18  SpO2:  [90 %-98 %] 90 %  BP: ()/(62-86) 123/85     Weight: 50.4 kg (111 lb 3.2 oz)  Body mass index is 21.72 kg/m².    Intake/Output Summary (Last 24 hours) at 11/16/2022 0828  Last data filed at 11/16/2022 0122  Gross per 24 hour   Intake 1000 ml   Output 700 ml   Net 300 ml      Physical Exam  Constitutional:       Appearance: She is normal weight.      Comments: Cachectic   HENT:      Head: Normocephalic and atraumatic.      Nose: Nose normal.      Mouth/Throat:      Mouth: Mucous membranes are moist.   Eyes:      Conjunctiva/sclera: Conjunctivae normal.      Pupils: Pupils are equal, round, and reactive to light.   Cardiovascular:      Rate and Rhythm: Normal rate and regular rhythm.      Pulses: Normal pulses.      Heart sounds: Normal heart sounds. No murmur heard.    No friction rub. No gallop.   Pulmonary:      Effort: Pulmonary effort is normal.      Breath sounds: Normal breath sounds. No wheezing or rales.   Abdominal:      General: Abdomen is flat. Bowel sounds are normal. There is no distension.      Palpations: Abdomen is soft.      Tenderness: There is no abdominal tenderness. There is no guarding.   Musculoskeletal:         General: No swelling. Normal range of motion.      Cervical back: Normal range of motion and neck supple.   Skin:     General: Skin is warm and dry.   Neurological:      General: No focal deficit present.      Mental Status: She is alert.   Psychiatric:          Mood and Affect: Mood normal.         Thought Content: Thought content normal.         Judgment: Judgment normal.       Significant Labs: All pertinent labs within the past 24 hours have been reviewed.    Significant Imaging: I have reviewed all pertinent imaging results/findings within the past 24 hours.   Carotid stenosis Chronic back pain

## 2022-11-16 NOTE — ASSESSMENT & PLAN NOTE
This patient in known to have AIDS (from CD4 count has been less than 200). Labs reviewed- No results found for: CD4, No results found for: HIVDNAPCR. Patient is not on HAART. Will start HAART. Prophylaxis was given at this time- Bactrim, Azithromycin at discharge. Continue to monitor routine labs. Last CD4 count was   Lab Results   Component Value Date    ABSOLUTECD4 26 (L) 10/14/2022       We will consult Infectious disease at this time. Evaluate and treat HIV-associated diseases as indicated by specific problems listed below.

## 2022-11-16 NOTE — PROGRESS NOTES
Progress Note  Infectious Disease    Reason for Consult:  HIV,weakness    HPI: Amie Salmeron is a 57 y.o. female seen by me once previously on 10/14. At that time she had SOB, pulmonary infiltrates, COVID diagnosis mid September and was receiving treatment for corneal ulcer with Bactrim at that time.She told me that she was diagnosed with HIV in 1981 and that her CD4 count had always been normal and she had never initiated anti-retroviral treatment. She reports that she had been followed at [facility] in [place], last seen about a year ago, but I phoned this clinic and she has not been seen there since 2009 (Amie Amor).   She denied any other STD, denies positive TB test or exposure, denies Hepatitis. She had no history of IVDA. She lives with her elderly parents. 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 She left AMA (because the bed alarm was irritating her). I reviewed all of the lab tests ordered at that visit and her CD4 was 24 and viral load >579811. She did not answer phone calls and I sent a letter making her aware of these results and encouraging her to seek care again at [facility] or with our clinic. She phoned my office yesterday and requested admission. She was advised to come to the ED, but could not come yesterday and came in today.   She reports fever to 101 at least 4 days per week. She reports that she had been profoundly weak and has not had a bath in a month because of weakness and has only had 1 BM in the last month. She has not lost any weight in comparison to vital signs in October. CXR is improved, oxygen sats are improved compared to last visit. She is capable of walking down the nguyen to go to the restroom.     INTERVAL HISTORY:  11/16:  Interim reviewed, discussed with Dr. Vann, patient seen and examined at bedside, states she is feeling little better compared to yesterday although still very weak and fatigued.  Patient also  states she has lost about 70 lb since January.  She is willing to start and establish HIV care in clinic with myself.  Hemodynamically stable, O2 sat 93%, afebrile.  Labile BP, afebrile, O2 sat 93% on room air.  Adequate urine output, has not moved her bowels since admission to the hospital.  Labs reviewed, leukopenia 3, bands 5%, H&H 10.1/32.3, platelet count 232.  Awaiting CD4 count, viral load, and genotype.     Review of patient's allergies indicates:  No Known Allergies  Past Medical History:   Diagnosis Date    Allergy     Arthritis     Asthma     Chronic pain     HIV infection     Hypertension     Overactive bladder     Spinal stenosis     Urine incontinence      Past Surgical History:   Procedure Laterality Date    ADENOIDECTOMY      APPENDECTOMY      BACK SURGERY      GERALD    CYST REMOVAL      HYSTERECTOMY      JOINT REPLACEMENT Right     knee, with revision    KNEE SURGERY Left     knee replacement    RHIZOTOMY      TONSILLECTOMY      TOTAL ANKLE ARTHROPLASTY Right      Social History     Socioeconomic History    Marital status: Single   Occupational History    Occupation: on disability   Tobacco Use    Smoking status: Never    Smokeless tobacco: Never   Substance and Sexual Activity    Alcohol use: Yes     Comment: Socially    Drug use: No    Sexual activity: Yes     Partners: Male     Birth control/protection: None     Social Determinants of Health     Financial Resource Strain: Unknown    Difficulty of Paying Living Expenses: Patient refused   Food Insecurity: Unknown    Worried About Running Out of Food in the Last Year: Patient refused    Ran Out of Food in the Last Year: Patient refused   Transportation Needs: Unknown    Lack of Transportation (Medical): Patient refused    Lack of Transportation (Non-Medical): Patient refused   Physical Activity: Unknown    Days of Exercise per Week: Patient refused    Minutes of Exercise per Session: Patient refused   Stress: Unknown    Feeling of Stress : Patient  refused   Social Connections: Unknown    Frequency of Communication with Friends and Family: Patient refused    Frequency of Social Gatherings with Friends and Family: Patient refused    Attends Adventist Services: Patient refused    Active Member of Clubs or Organizations: Patient refused    Attends Club or Organization Meetings: Patient refused    Marital Status: Patient refused   Housing Stability: Unknown    Unable to Pay for Housing in the Last Year: Patient refused    Unstable Housing in the Last Year: Patient refused     Family History   Problem Relation Age of Onset    Thyroid disease Mother     Hypertension Mother     Cancer Father         oral CA    Depression Sister     Hypertension Brother     Alcohol abuse Brother          Review of Systems:     chills, fever, sweats, weight loss   No change in vision, loss of vision or diplopia  No sinus congestion, purulent nasal discharge, post nasal drip or facial pain  No pain in mouth or throat. But she has dysgeusia, and esophagitis symptoms.  No chest pain, palpitations, syncope    cough, white sputum production, no shortness of breath, dyspnea on exertion, pleurisy, hemoptysis  No nausea, vomiting, diarrhea, but has had constipation, without blood in stool, or focal abd pain,  mild odynophagia  No dysuria, hematuria,     No swelling of joints, redness of joints, injuries, or new focal pain  No unusual headaches, dizziness, vertigo, numbness, paresthesias, neuropathy, falls  No anxiety, depression, substance abuse, sleep disturbance, but she does have concerns about confidentiality and with the stigma of HIV infection  No diabetes, thyroid, hypogonadal conditions  No bleeding, lymphadenopathy,  malignancy, unusual bruising  No new rashes, lesions, or wounds  No TB exposure  No recent/prior steroids  Outdoor activities:none  Travel: none  Implants: none  Antibiotic History: recent bactrim  She cannot explain why she did not seek medical attention for her problems  since last admission.      EXAM & DIAGNOSTICS REVIEWED:   Vitals:     Temp:  [98.3 °F (36.8 °C)-99.2 °F (37.3 °C)]   Temp: 98.3 °F (36.8 °C) (11/16/22 1104)  Pulse: 78 (11/16/22 1104)  Resp: 18 (11/16/22 1104)  BP: 107/62 (11/16/22 1104)  SpO2: (!) 93 % (11/16/22 1104)    Intake/Output Summary (Last 24 hours) at 11/16/2022 1655  Last data filed at 11/16/2022 0925  Gross per 24 hour   Intake --   Output 1700 ml   Net -1700 ml       General:  Ill-appearing, in NAD. Alert and attentive, cooperative, comfortable  Eyes:  Anicteric, PERRL, EOMI  ENT:  Widespread oral candidiasis, dentition is fair  Neck:  supple, no masses or adenopathy appreciated  Lungs: Wet cough, loose rhonchi and scattered crackles. O2 sat 93% on room air   Heart:  S1/S2+, regular rhythm, no murmur   Abd:  Soft, NT, ND, normal BS, no masses or organomegaly appreciated.  :  Voids urine clear, no flank tenderness  Musc:  Generalized muscle wasting, very thin, Joints without effusion, swelling, erythema, synovitis, muscle wasting.   Skin:  No rashes. No palmar or plantar lesions. No subungual petechiae  Neuro:             Alert, attentive, speech fluent, face symmetric, moves all extremities, no focal weakness. Ambulatory  Psych:  Calm, cooperative. Insight is limited  Lymphatic:       Extrem: No edema, erythema, phlebitis, cellulitis, warm and well perfused  VAD:  Peripheral IV     Isolation:  none  Wound:          General Labs reviewed:  Recent Labs   Lab 11/15/22  0930 11/16/22  0527   WBC 3.26* 3.08*   HGB 10.4* 10.1*   HCT 32.6* 32.3*    232       Recent Labs   Lab 11/15/22  0930 11/15/22  2043 11/16/22  0527   *  --  137   K 2.6* 3.2* 4.8   CL 99  --  106   CO2 26  --  27   BUN 8  --  6   CREATININE 0.4*  --  0.4*   CALCIUM 7.7*  --  7.7*   PROT 6.1  --   --    BILITOT 0.5  --   --    ALKPHOS 66  --   --    ALT 13  --   --    AST 29  --   --      Recent Labs   Lab 11/15/22  0930   CRP 1.72*     CD4 = 26   Latest Reference Range &  Units 10/15/22 06:03   Hep A Total Ab Negative  Negative   Hep B Core Total Ab Negative  Negative   Hep B S Ab  Non Reactive   Hepatitis B Surface Ag Negative  Negative   Hepatitis C Ab 0.0 - 0.9 s/co ratio 0.2   CRYPTOCOCCUS ANTIGEN, SERUM Negative  Negative   Quantiferon Mitogen Value IU/mL >10.00   Quantiferon Nil Value IU/mL 0.23   QuantiFERON-TB Gold Plus Negative  Negative   QuantiFERON TB1 Ag Value IU/mL 0.24   QuantiFERON TB2 Ag Value IU/mL 0.25   HIV 1 RNA Ultra copies/mL 116224   RPR Non-reactive  Non-reactive         Micro:  Microbiology Results (last 7 days)       Procedure Component Value Units Date/Time    Blood culture #2 **CANNOT BE ORDERED STAT** [[id number]] Collected: 11/15/22 1020    Order Status: Completed Specimen: Blood from Peripheral, Antecubital, Right Updated: 11/16/22 1232     Blood Culture, Routine No Growth to date      No Growth to date    Blood culture #1 **CANNOT BE ORDERED STAT** [[id number]] Collected: 11/15/22 0930    Order Status: Completed Specimen: Blood from Peripheral, Antecubital, Left Updated: 11/16/22 1032     Blood Culture, Routine No Growth to date      No Growth to date    Culture, Respiratory with Gram Stain [[id number]]     Order Status: Sent Specimen: Respiratory from Sputum, Expectorated     Pneumocystis smear by DFA Sputum, Induced [[id number]]     Order Status: No result     AFB Culture & Smear [[id number]]     Order Status: Sent Specimen: Blood             Imaging Reviewed:   CXR  CT chest abd pelvis without  1. 6.1 cm soft tissue density along the anterior and left anterolateral margin of the abdominal aorta in the mid abdomen as above. Several adjacent mildly prominent mesenteric lymph nodes are noted, the largest measuring 1.7 x 1.3 cm. The soft tissue density is suspicious for a confluent ilia mass, but could also be contributed to by unopacified small bowel loops. CT abdomen with oral and intravenous contrast is recommended for further assessment.  2. Previously  noted bilateral upper lobe pulmonary infiltrates have shown near complete resolution. Scattered groundglass opacities in both lungs are likely infectious/inflammatory in nature, with slightly more prominent infiltrate in the lingula.  3. Additional chronic findings as above.    CT abdomen with        Cardiology:    IMPRESSION & PLAN     HIV/AIDS C3, last CD4 count 26, ,000, with gradual progression of immunosuppression   Non compliance with HIV clinic follow up     2.  Candida esophagitis  3.  FUO ? Could be due to the candida esophagitis, from residual pneumonia (CT does not look like PJP)   Afebrile since admission   R/o disseminated MAC, retroperitoneal LAD on CT    4.  Weight loss, stable x 1 month  5.  Debility, weakness, malnutrition      Recommendations:  LDH added-on  Continue Bactrim IV  Monitor kidney function and electrolytes  Gentle IV fluids    Fluconazole 400mg IV daily plus nystatin   Azithromycin 1200mg PO weekly for MAC ppx  Holding OFF ART for now, hoping to start within 2 weeks  Sputum culture and DFA for PJP  Follow cultures  Follow CD4 count, VL and HIV genotype(genosure prime)  Patient agreed to establish care in HIV clinic    D/w patient at length, Dr Allison     Medical Decision Making during this encounter was  [_] Low Complexity  [_] Moderate Complexity  [  xxx] High Complexity

## 2022-11-16 NOTE — PROGRESS NOTES
[facility]  Progress Note    Patient Name: Amie Salmeron  MRN: [id number]  Patient Class: OP- Observation   Admission Date: 11/15/2022  Length of Stay: 0 days  Attending Physician: Jeremy Allison MD  Primary Care Provider: Dustin Caro MD        Subjective:     Principal Problem:AIDS        HPI:  Amie Salmeron is a 57 y.o. White female with known history HTN, and HIV (never on therapy, does not know cell count), who tested positive for COVID on 09/12/22 and left AMA from [facility] now presented again with progressive decline in her health, generalize weakness and fatigue. She reports persistent cough associated with it. She has no energy to perform her activities of daily living. She has difficulty standing and her legs give up with she tries to stand. She denies fever but reports feeling cold all the time. With these symptoms, she presented to the ER for evaluation.  Her initial lab workup showed anemia and leukopenia. Potassium of 2.6. CXR shows mild improvement from the previous one. CD4 cell count from previous admission a month ago was 26 and Viral load was 005879. Infectious disease has been consulted for further recommendations. Hospital medicine consulted for admission.        Overview/Hospital Course:  No notes on file    Interval History:  No acute events overnight, she is feeling better today.  Afebrile.  Swallowing is better with treatment of thrush.    Review of Systems   All other systems reviewed and are negative.  Objective:     Vital Signs (Most Recent):  Temp: 98.6 °F (37 °C) (11/16/22 0756)  Pulse: 89 (11/16/22 0756)  Resp: 18 (11/16/22 0756)  BP: 123/85 (11/16/22 0756)  SpO2: (!) 90 % (11/16/22 0756)   Vital Signs (24h Range):  Temp:  [98.1 °F (36.7 °C)-99.7 °F (37.6 °C)] 98.6 °F (37 °C)  Pulse:  [70-90] 89  Resp:  [16-20] 18  SpO2:  [90 %-98 %] 90 %  BP: ()/(62-86) 123/85     Weight: 50.4 kg (111 lb 3.2 oz)  Body mass index is 21.72 kg/m².    Intake/Output  Summary (Last 24 hours) at 11/16/2022 0828  Last data filed at 11/16/2022 0122  Gross per 24 hour   Intake 1000 ml   Output 700 ml   Net 300 ml      Physical Exam  Constitutional:       Appearance: She is normal weight.      Comments: Cachectic   HENT:      Head: Normocephalic and atraumatic.      Nose: Nose normal.      Mouth/Throat:      Mouth: Mucous membranes are moist.   Eyes:      Conjunctiva/sclera: Conjunctivae normal.      Pupils: Pupils are equal, round, and reactive to light.   Cardiovascular:      Rate and Rhythm: Normal rate and regular rhythm.      Pulses: Normal pulses.      Heart sounds: Normal heart sounds. No murmur heard.    No friction rub. No gallop.   Pulmonary:      Effort: Pulmonary effort is normal.      Breath sounds: Normal breath sounds. No wheezing or rales.   Abdominal:      General: Abdomen is flat. Bowel sounds are normal. There is no distension.      Palpations: Abdomen is soft.      Tenderness: There is no abdominal tenderness. There is no guarding.   Musculoskeletal:         General: No swelling. Normal range of motion.      Cervical back: Normal range of motion and neck supple.   Skin:     General: Skin is warm and dry.   Neurological:      General: No focal deficit present.      Mental Status: She is alert.   Psychiatric:         Mood and Affect: Mood normal.         Thought Content: Thought content normal.         Judgment: Judgment normal.       Significant Labs: All pertinent labs within the past 24 hours have been reviewed.    Significant Imaging: I have reviewed all pertinent imaging results/findings within the past 24 hours.      Assessment/Plan:      * AIDS  This patient in known to have AIDS (from CD4 count has been less than 200). Labs reviewed- No results found for: CD4, No results found for: HIVDNAPCR. Patient is not on HAART. Will start HAART. Prophylaxis was given at this time- Bactrim, Azithromycin at discharge. Continue to monitor routine labs. Last CD4 count was    Lab Results   Component Value Date    ABSOLUTECD4 26 (L) 10/14/2022       We will consult Infectious disease at this time. Evaluate and treat HIV-associated diseases as indicated by specific problems listed below.       Weakness  PT eval      FUO (fever of unknown origin)  Afebrile here  Follow improvement with mgmt of thrush  Continuing bactrim for now  Follow cultures and eval for disseminated MAC      Cough with fever  Has been afebrile here  Continue IV bactrim, transition to po when able  Follow up ID recs      Candida esophagitis  Symptoms improving with IV fluconazole and nystatin  Continue current treatment      Thrush  Continue fluconazole IV for now      Essential hypertension, benign  Treat as needed      VTE Risk Mitigation (From admission, onward)         Ordered     enoxaparin injection 40 mg  Daily         11/15/22 1410     IP VTE HIGH RISK PATIENT  Once         11/15/22 1410     Place sequential compression device  Until discontinued         11/15/22 1410                Discharge Planning   MARK:      Code Status: Full Code   Is the patient medically ready for discharge?:     Reason for patient still in hospital (select all that apply): Treatment                     Jeremy Allison MD  Department of Hospital Medicine   [facility]

## 2022-11-16 NOTE — PLAN OF CARE
Problem: Malnutrition  Goal: Improved Nutritional Intake  Outcome: Ongoing, Progressing  Intervention: Promote and Optimize Oral Intake  Flowsheets (Taken 11/16/2022 7422)  Oral Nutrition Promotion:   calorie-dense foods provided   calorie-dense liquids provided   other (see comments)     Recommendations  1.) Continue regular diet with Ensure HP with meals.   2.) Suggest lactobacillus acidophilus.   3.) Monitor weights.   4.) Suggest adding severe protein-calorie malnutrition to problem list.     Goals:   1.) Pt to consume/tolerate >75% of meals and ONS.   2.) Wt to stabilize during admission.  Nutrition Goal Status: new

## 2022-11-16 NOTE — PROGRESS NOTES
[facility]  Adult Nutrition   Progress Note (Initial Assessment)     SUMMARY     Recommendations  1.) Continue regular diet with Ensure HP with meals.   2.) Suggest lactobacillus acidophilus.   3.) Monitor weights.   4.) Suggest adding severe protein-calorie malnutrition to problem list.    Goals:   1.) Pt to consume/tolerate >75% of meals and ONS.   2.) Wt to stabilize during admission.  Nutrition Goal Status: new    Dietitian Rounds Brief   Pt presents with progressive decline in her health, generalize weakness and fatigue after leaving AMA 9/2022. Pt reports poor PO intake d/t weakness. Observed Ensure to bedside. Encouraged pt to consume sips between meals. She denies chew/swallowing issues. No GI distress. LBM 11/13. Skin: intact per chart. Wt reviewed, noted 12% wt loss x 1 month; clinically severe. NFPE not completed d/t pt refusing. Due to poor PO intakes and wt loss, pt meets ASPEN criteria for severe protein-calorie malnutrition.   Pt consuming <75% of estimated nutritional needs >1 months.    Wt loss of 12% x1 month.     Diet order:   Current Diet Order: regular   Oral Nutrition Supplement: Ensure HP     Evaluation of Received Nutrient/Fluid Intake  Energy Calories Required: not meeting needs  Protein Required: not meeting needs  Fluid Required: not meeting needs  Tolerance: tolerating     % Intake of Estimated Energy Needs: 0 - 25 %  % Meal Intake: 0 - 25 %      Intake/Output Summary (Last 24 hours) at 11/16/2022 1555  Last data filed at 11/16/2022 0925  Gross per 24 hour   Intake --   Output 1700 ml   Net -1700 ml        Anthropometrics  Temp: 98.3 °F (36.8 °C)  Height Method: Stated  Height: 5' (152.4 cm)  Height (inches): 60 in  Weight Method: Bed Scale  Weight: 50.4 kg (111 lb 3.2 oz)  Weight (lb): 111.2 lb  Ideal Body Weight (IBW), Female: 100 lb  % Ideal Body Weight, Female (lb): 111.2 %  BMI (Calculated): 21.7  BMI Grade: 18.5-24.9 - normal  Weight Loss: unintentional  Usual Body  Weight (UBW), k kg (10/2022)  % Usual Body Weight: 88.68       Estimated/Assessed Needs  Weight Used For Calorie Calculations: 50.4 kg (111 lb 1.8 oz)  Energy Calorie Requirements (kcal): 0898-9261  Energy Need Method: Kcal/kg (30-35)  Protein Requirements: 51-61  Weight Used For Protein Calculations: 50.4 kg (111 lb 1.8 oz) (1.0-1.2)  Fluid Requirements (mL): 2433-3131     RDA Method (mL): 1512       Reason for Assessment  Relevant Medical History: HTN, HIV, COVID19  Nutrition Discharge Planning: Regular diet with Ensure High Protein daily    Nutrition/Diet History  Food Allergies: NKFA  Factors Affecting Nutritional Intake: decreased appetite    Nutrition Risk Screen  Nutrition Risk Screen: no indicators present, other (see comments) (weight loss without trying)     MST Score: 5  Have you recently lost weight without trying?: Yes: 34 lbs or more (since january)  Weight loss score: 4  Have you been eating poorly because of a decreased appetite?: Yes  Appetite score: 1       Weight History:  Wt Readings from Last 5 Encounters:   11/15/22 50.4 kg (111 lb 3.2 oz)   10/15/22 58.8 kg (129 lb 11.2 oz)   10/09/22 57.2 kg (126 lb)   22 57.4 kg (126 lb 8 oz)   18 79 kg (174 lb 2.6 oz)        Lab/Procedures/Meds: Pertinent Labs/Meds Reviewed    Medications:Pertinent Medications Reviewed  Scheduled Meds:   azithromycin  1,250 mg Oral Weekly    enoxaparin  40 mg Subcutaneous Daily    fluconazole (DIFLUCAN) IV (PEDS and ADULTS)  400 mg Intravenous Q24H    nystatin  500,000 Units Oral QID    polyethylene glycol  17 g Oral Daily    trimethoprim-sulfamethoxasole (BACTRIM) IVPB (fixed ratio)  15 mg/kg/day Intravenous Q8H     Continuous Infusions:  PRN Meds:.acetaminophen, acetaminophen, aluminum-magnesium hydroxide-simethicone, dextrose 10%, dextrose 10%, glucagon (human recombinant), glucose, glucose, HYDROcodone-acetaminophen, magnesium oxide, magnesium oxide, melatonin, morphine, naloxone, ondansetron, pneumoc  20-isael conj-dip cr(PF), potassium bicarbonate, potassium bicarbonate, potassium bicarbonate, potassium, sodium phosphates, potassium, sodium phosphates, potassium, sodium phosphates, prochlorperazine, simethicone, sodium chloride 0.9%    Labs: Pertinent Labs Reviewed  Clinical Chemistry:  Recent Labs   Lab 11/15/22  0930 11/15/22  2043 11/16/22  0527   *  --  137   K 2.6*   < > 4.8   CL 99  --  106   CO2 26  --  27   GLU 94  --  75   BUN 8  --  6   CREATININE 0.4*  --  0.4*   CALCIUM 7.7*  --  7.7*   PROT 6.1  --   --    ALBUMIN 2.6*  --   --    BILITOT 0.5  --   --    ALKPHOS 66  --   --    AST 29  --   --    ALT 13  --   --    ANIONGAP 7*  --  4*   MG 1.7  --  1.7   LIPASE 53  --   --     < > = values in this interval not displayed.     CBC:   Recent Labs   Lab 11/16/22 0527   WBC 3.08*   RBC 3.64*   HGB 10.1*   HCT 32.3*      MCV 89   MCH 27.7   MCHC 31.3*     Inflammatory Labs:  Recent Labs   Lab 11/15/22  0930   CRP 1.72*     Monitor and Evaluation  Food and Nutrient Intake: energy intake, food and beverage intake  Food and Nutrient Adminstration: diet order  Knowledge/Beliefs/Attitudes: food and nutrition knowledge/skill  Physical Activity and Function: nutrition-related ADLs and IADLs  Anthropometric Measurements: weight, weight change  Biochemical Data, Medical Tests and Procedures: electrolyte and renal panel, gastrointestinal profile, glucose/endocrine profile  Nutrition-Focused Physical Findings: overall appearance     Nutrition Risk  Level of Risk/Frequency of Follow-up: high     Nutrition Follow-Up  RD Follow-up?: Yes      Denise Greenwood, MARTHA 11/16/2022 3:55 PM

## 2022-11-17 ENCOUNTER — PATIENT MESSAGE (OUTPATIENT)
Dept: INFECTIOUS DISEASES | Facility: CLINIC | Age: 57
End: 2022-11-17

## 2022-11-17 VITALS
RESPIRATION RATE: 16 BRPM | HEART RATE: 89 BPM | HEIGHT: 60 IN | DIASTOLIC BLOOD PRESSURE: 67 MMHG | OXYGEN SATURATION: 95 % | SYSTOLIC BLOOD PRESSURE: 111 MMHG | TEMPERATURE: 99 F | BODY MASS INDEX: 21.83 KG/M2 | WEIGHT: 111.19 LBS

## 2022-11-17 DIAGNOSIS — R11.0 NAUSEA: Primary | ICD-10-CM

## 2022-11-17 LAB
ANION GAP SERPL CALC-SCNC: 4 MMOL/L (ref 8–16)
ANISOCYTOSIS BLD QL SMEAR: ABNORMAL
BASOPHILS # BLD AUTO: 0.1 X10E3/UL (ref 0–0.2)
BASOPHILS NFR BLD AUTO: 2 %
BASOPHILS NFR BLD: 0 % (ref 0–1.9)
BUN SERPL-MCNC: 7 MG/DL (ref 6–20)
CALCIUM SERPL-MCNC: 7.9 MG/DL (ref 8.7–10.5)
CD3+CD4+ CELLS # BLD: 43 /UL (ref 359–1519)
CD3+CD4+ CELLS NFR BLD: 4.3 % (ref 30.8–58.5)
CHLORIDE SERPL-SCNC: 103 MMOL/L (ref 95–110)
CO2 SERPL-SCNC: 27 MMOL/L (ref 23–29)
CREAT SERPL-MCNC: 0.6 MG/DL (ref 0.5–1.4)
DIFFERENTIAL METHOD: ABNORMAL
EOSINOPHIL # BLD AUTO: 0.1 X10E3/UL (ref 0–0.4)
EOSINOPHIL NFR BLD AUTO: 3 %
EOSINOPHIL NFR BLD: 0 % (ref 0–8)
ERYTHROCYTE [DISTWIDTH] IN BLOOD BY AUTOMATED COUNT: 15.8 % (ref 11.7–15.4)
ERYTHROCYTE [DISTWIDTH] IN BLOOD BY AUTOMATED COUNT: 15.9 % (ref 11.5–14.5)
EST. GFR  (NO RACE VARIABLE): >60 ML/MIN/1.73 M^2
GLUCOSE SERPL-MCNC: 80 MG/DL (ref 70–110)
HCT VFR BLD AUTO: 30.7 % (ref 34–46.6)
HCT VFR BLD AUTO: 32.8 % (ref 37–48.5)
HGB BLD-MCNC: 10.1 G/DL (ref 11.1–15.9)
HGB BLD-MCNC: 10.3 G/DL (ref 12–16)
IMM GRANULOCYTES # BLD AUTO: 0.1 X10E3/UL (ref 0–0.1)
IMM GRANULOCYTES # BLD AUTO: ABNORMAL K/UL (ref 0–0.04)
IMM GRANULOCYTES NFR BLD AUTO: 3 %
IMM GRANULOCYTES NFR BLD AUTO: ABNORMAL % (ref 0–0.5)
LYMPHOCYTES # BLD AUTO: 1 X10E3/UL (ref 0.7–3.1)
LYMPHOCYTES NFR BLD AUTO: 31 %
LYMPHOCYTES NFR BLD: 44 % (ref 18–48)
MAGNESIUM SERPL-MCNC: 1.8 MG/DL (ref 1.6–2.6)
MCH RBC QN AUTO: 27.8 PG (ref 27–31)
MCH RBC QN AUTO: 27.9 PG (ref 26.6–33)
MCHC RBC AUTO-ENTMCNC: 31.4 G/DL (ref 32–36)
MCHC RBC AUTO-ENTMCNC: 32.9 G/DL (ref 31.5–35.7)
MCV RBC AUTO: 85 FL (ref 79–97)
MCV RBC AUTO: 89 FL (ref 82–98)
MONOCYTES # BLD AUTO: 0.4 X10E3/UL (ref 0.1–0.9)
MONOCYTES NFR BLD AUTO: 11 %
MONOCYTES NFR BLD: 8 % (ref 4–15)
NEUTROPHILS # BLD AUTO: 1.5 X10E3/UL (ref 1.4–7)
NEUTROPHILS NFR BLD AUTO: 50 %
NEUTROPHILS NFR BLD: 41 % (ref 38–73)
NEUTS BAND NFR BLD MANUAL: 7 %
NRBC BLD-RTO: 0 /100 WBC
OVALOCYTES BLD QL SMEAR: ABNORMAL
PLATELET # BLD AUTO: 229 X10E3/UL (ref 150–450)
PLATELET # BLD AUTO: 239 K/UL (ref 150–450)
PMV BLD AUTO: 11.1 FL (ref 9.2–12.9)
POIKILOCYTOSIS BLD QL SMEAR: ABNORMAL
POTASSIUM SERPL-SCNC: 4.6 MMOL/L (ref 3.5–5.1)
RBC # BLD AUTO: 3.62 X10E6/UL (ref 3.77–5.28)
RBC # BLD AUTO: 3.7 M/UL (ref 4–5.4)
SODIUM SERPL-SCNC: 134 MMOL/L (ref 136–145)
WBC # BLD AUTO: 3.1 X10E3/UL (ref 3.4–10.8)
WBC # BLD AUTO: 3.36 K/UL (ref 3.9–12.7)

## 2022-11-17 PROCEDURE — 25000003 PHARM REV CODE 250: Performed by: INTERNAL MEDICINE

## 2022-11-17 PROCEDURE — 96366 THER/PROPH/DIAG IV INF ADDON: CPT

## 2022-11-17 PROCEDURE — S0039 INJECTION, SULFAMETHOXAZOLE: HCPCS | Performed by: INTERNAL MEDICINE

## 2022-11-17 PROCEDURE — 83735 ASSAY OF MAGNESIUM: CPT | Performed by: INTERNAL MEDICINE

## 2022-11-17 PROCEDURE — 63600175 PHARM REV CODE 636 W HCPCS: Performed by: INTERNAL MEDICINE

## 2022-11-17 PROCEDURE — 85007 BL SMEAR W/DIFF WBC COUNT: CPT | Performed by: INTERNAL MEDICINE

## 2022-11-17 PROCEDURE — 36415 COLL VENOUS BLD VENIPUNCTURE: CPT | Performed by: INTERNAL MEDICINE

## 2022-11-17 PROCEDURE — 99215 PR OFFICE/OUTPT VISIT, EST, LEVL V, 40-54 MIN: ICD-10-PCS | Mod: ,,, | Performed by: STUDENT IN AN ORGANIZED HEALTH CARE EDUCATION/TRAINING PROGRAM

## 2022-11-17 PROCEDURE — 96376 TX/PRO/DX INJ SAME DRUG ADON: CPT

## 2022-11-17 PROCEDURE — G0378 HOSPITAL OBSERVATION PER HR: HCPCS

## 2022-11-17 PROCEDURE — 85027 COMPLETE CBC AUTOMATED: CPT | Performed by: INTERNAL MEDICINE

## 2022-11-17 PROCEDURE — 80048 BASIC METABOLIC PNL TOTAL CA: CPT | Performed by: INTERNAL MEDICINE

## 2022-11-17 PROCEDURE — 99215 OFFICE O/P EST HI 40 MIN: CPT | Mod: ,,, | Performed by: STUDENT IN AN ORGANIZED HEALTH CARE EDUCATION/TRAINING PROGRAM

## 2022-11-17 RX ORDER — NYSTATIN 100000 [USP'U]/ML
500000 SUSPENSION ORAL 4 TIMES DAILY
Qty: 200 ML | Refills: 0 | Status: SHIPPED | OUTPATIENT
Start: 2022-11-17 | End: 2022-11-28

## 2022-11-17 RX ORDER — AZITHROMYCIN 600 MG/1
1200 TABLET, FILM COATED ORAL WEEKLY
Qty: 24 TABLET | Refills: 0 | Status: SHIPPED | OUTPATIENT
Start: 2022-11-23 | End: 2023-02-01

## 2022-11-17 RX ORDER — SULFAMETHOXAZOLE AND TRIMETHOPRIM 800; 160 MG/1; MG/1
1 TABLET ORAL DAILY
Qty: 90 TABLET | Refills: 0 | Status: ON HOLD | OUTPATIENT
Start: 2022-11-17 | End: 2022-12-08 | Stop reason: SDUPTHER

## 2022-11-17 RX ORDER — FLUCONAZOLE 200 MG/1
200 TABLET ORAL DAILY
Qty: 14 TABLET | Refills: 0 | Status: SHIPPED | OUTPATIENT
Start: 2022-11-17 | End: 2022-12-02

## 2022-11-17 RX ADMIN — NYSTATIN 500000 UNITS: 100000 SUSPENSION ORAL at 09:11

## 2022-11-17 RX ADMIN — SULFAMETHOXAZOLE AND TRIMETHOPRIM 246 MG: 80; 16 INJECTION, SOLUTION, CONCENTRATE INTRAVENOUS at 09:11

## 2022-11-17 RX ADMIN — POLYETHYLENE GLYCOL 3350 17 G: 17 POWDER, FOR SOLUTION ORAL at 09:11

## 2022-11-17 RX ADMIN — ONDANSETRON 4 MG: 2 INJECTION INTRAMUSCULAR; INTRAVENOUS at 09:11

## 2022-11-17 RX ADMIN — ONDANSETRON 4 MG: 2 INJECTION INTRAMUSCULAR; INTRAVENOUS at 03:11

## 2022-11-17 RX ADMIN — SULFAMETHOXAZOLE AND TRIMETHOPRIM 246 MG: 80; 16 INJECTION, SOLUTION, CONCENTRATE INTRAVENOUS at 12:11

## 2022-11-17 NOTE — PROGRESS NOTES
[facility] contacted r/t ID and pts request to have discharge medications delivered to room prior to discharge; awaiting delivery of meds to room; safety intact with assessment ongoing

## 2022-11-17 NOTE — DISCHARGE SUMMARY
[facility] Medicine  Discharge Summary      Patient Name: Amie Salmeron  MRN: [id number]  [facility]: [id number]  Patient Class: OP- Observation  Admission Date: 11/15/2022  Hospital Length of Stay: 0 days  Discharge Date and Time:  11/17/2022 9:33 AM  Attending Physician: Jeremy Allison MD   Discharging Provider: Jeremy Allison MD  Primary Care Provider: No primary care provider on file.    Primary Care Team: Networked reference to record PCT     HPI:   Amie Salmeron is a 57 y.o. White female with known history HTN, and HIV (never on therapy, does not know cell count), who tested positive for COVID on 09/12/22 and left AMA from [facility] now presented again with progressive decline in her health, generalize weakness and fatigue. She reports persistent cough associated with it. She has no energy to perform her activities of daily living. She has difficulty standing and her legs give up with she tries to stand. She denies fever but reports feeling cold all the time. With these symptoms, she presented to the ER for evaluation.  Her initial lab workup showed anemia and leukopenia. Potassium of 2.6. CXR shows mild improvement from the previous one. CD4 cell count from previous admission a month ago was 26 and Viral load was 836152. Infectious disease has been consulted for further recommendations. Hospital medicine consulted for admission.        * No surgery found *      Hospital Course:   Patient is a 57-year-old female with history of HTN, and HIV (never on therapy, does not know cell count), she presented with generalized malaise and respiratory symptoms.  Initially concerning for pneumonia.  She is found to have untreated progressive HIV.  Workup for PJP pneumonia was not consistent with this.  She was treated with IV fluconazole for likely esophagitis and with nystatin for thrush.  She had significant improvement in her symptoms.  She will continue on 14 days of fluconazole to complete treatment  for Candida esophagitis.  She will be started on Bactrim daily prophylaxis as well as azithromycin weekly prophylaxis.  Her CD4 count is 43, HIV RNA from 1 month ago is 305,000.  Infectious Disease was consulted and recommends close outpatient follow-up next Tuesday for initiation of ART.  I reviewed the discharge plan instructions with the patient on the day of discharge and strongly emphasized the need to follow-up in the clinic.  The patient had her medications delivered to her bedside prior to discharge.  We have discussed return precautions.  She was discharged in good condition with plans for close outpatient follow-up.       Goals of Care Treatment Preferences:  Code Status: Full Code      Consults:   Consults (From admission, onward)        Status Ordering Provider     Inpatient consult to Hospitalist  Once        Provider:  Bela Barlow MD    Acknowledged HANDY CASH     Inpatient consult to Infectious Diseases  Once        Provider:  Maddy Vann MD    Completed HANDY CASH          No new Assessment & Plan notes have been filed under this hospital service since the last note was generated.  Service: Hospital Medicine    Final Active Diagnoses:    Diagnosis Date Noted POA    PRINCIPAL PROBLEM:  AIDS [B20] 10/13/2022 Yes    Candida esophagitis [B37.81] 11/15/2022 Unknown    Cough with fever [R05.9, R50.9] 11/15/2022 Unknown    FUO (fever of unknown origin) [R50.9] 11/15/2022 Unknown    Weakness [R53.1] 11/15/2022 Unknown    Thrush [B37.0] 10/14/2022 Yes    Essential hypertension, benign [I10] 08/05/2014 Yes      Problems Resolved During this Admission:       Discharged Condition: good    Disposition: Home or Self Care    Follow Up:   Follow-up Information     Dorothea Etienne MD. Go on 11/22/2022.    Specialty: Infectious Diseases  Why: Appointment at 1pm  Contact information:  [address] [place] [address]  [address]  [facility] [address] [phone]                       Patient  Instructions:      Diet Adult Regular     No dressing needed     Activity as tolerated       Significant Diagnostic Studies: Labs:   BMP:   Recent Labs   Lab 11/15/22  2043 11/16/22  0527 11/17/22  0614   GLU  --  75 80   NA  --  137 134*   K 3.2* 4.8 4.6   CL  --  106 103   CO2  --  27 27   BUN  --  6 7   CREATININE  --  0.4* 0.6   CALCIUM  --  7.7* 7.9*   MG  --  1.7 1.8   , CBC   Recent Labs   Lab 11/16/22 0527 11/16/22  0901 11/17/22  0613   WBC 3.08* 3.1* 3.36*   HGB 10.1* 10.1* 10.3*   HCT 32.3* 30.7* 32.8*    229 239    and All labs within the past 24 hours have been reviewed    Pending Diagnostic Studies:     None         Medications:  Reconciled Home Medications:      Medication List      START taking these medications    azithromycin 600 MG Tab  Commonly known as: ZITHROMAX  Take 2 tablets (1,200 mg total) by mouth once a week.  Start taking on: November 23, 2022     fluconazole 200 MG Tab  Commonly known as: DIFLUCAN  Take 1 tablet (200 mg total) by mouth once daily. for 14 days     nystatin 100,000 unit/mL suspension  Commonly known as: MYCOSTATIN  Take 5 mLs (500,000 Units total) by mouth 4 (four) times daily. for 10 days     sulfamethoxazole-trimethoprim 800-160mg 800-160 mg Tab  Commonly known as: BACTRIM DS  Take 1 tablet by mouth once daily. prophylaxis  Replaces: sulfamethoxazole-trimethoprim 400-80mg 400-80 mg per tablet        CHANGE how you take these medications    erythromycin ophthalmic ointment  Commonly known as: ROMYCIN  Place a 1/2 inch ribbon of ointment into the lower eyelid of right eye every night.  What changed:   · how much to take  · how to take this  · when to take this     mupirocin 2 % ointment  Commonly known as: BACTROBAN  Apply topically 3 (three) times daily.  What changed: how much to take        CONTINUE taking these medications    dorzolamide-timolol 2-0.5% 22.3-6.8 mg/mL ophthalmic solution  Commonly known as: COSOPT  Place 1 drop into the right eye 2 (two) times  daily. for 14 days     Fortified Tobramycin 15 mg/ml Opht 15 mg Drop  Commonly known as: TOBREX  Place 1 drop into the right eye every hour while awake.     HYDROcodone-acetaminophen  mg per tablet  Commonly known as: NORCO  Take 1 tablet by mouth every 4 (four) hours as needed for Pain.     ondansetron 4 MG Tbdl  Commonly known as: ZOFRAN-ODT  Take 4 mg by mouth every 8 (eight) hours as needed.     pantoprazole 40 MG tablet  Commonly known as: PROTONIX  Take 40 mg by mouth every morning.     valsartan 160 MG tablet  Commonly known as: DIOVAN  Take 160 mg by mouth once daily.        STOP taking these medications    sulfamethoxazole-trimethoprim 400-80mg 400-80 mg per tablet  Commonly known as: BACTRIM,SEPTRA  Replaced by: sulfamethoxazole-trimethoprim 800-160mg 800-160 mg Tab        ASK your doctor about these medications    rivaroxaban 10 mg Tab  Commonly known as: XARELTO  Take 1 tablet (10 mg total) by mouth daily with dinner or evening meal.            Indwelling Lines/Drains at time of discharge:   Lines/Drains/Airways     None                 Time spent on the discharge of patient: 55 minutes         Jeremy Allison MD  Department of Hospital Medicine  [facility]

## 2022-11-17 NOTE — PLAN OF CARE
Problem: Adult Inpatient Plan of Care  Goal: Absence of Hospital-Acquired Illness or Injury  Outcome: Ongoing, Progressing  Goal: Optimal Comfort and Wellbeing  Outcome: Ongoing, Progressing     Problem: Fluid Imbalance (Pneumonia)  Goal: Fluid Balance  Outcome: Ongoing, Progressing     Problem: Infection (Pneumonia)  Goal: Resolution of Infection Signs and Symptoms  Outcome: Ongoing, Progressing     Problem: Respiratory Compromise (Pneumonia)  Goal: Effective Oxygenation and Ventilation  Outcome: Ongoing, Progressing     Problem: Malnutrition  Goal: Improved Nutritional Intake  Outcome: Ongoing, Progressing     Problem: Skin Injury Risk Increased  Goal: Skin Health and Integrity  Outcome: Ongoing, Progressing

## 2022-11-17 NOTE — HOSPITAL COURSE
Patient is a 57-year-old female with history of HTN, and HIV (never on therapy, does not know cell count), she presented with generalized malaise and respiratory symptoms.  Initially concerning for pneumonia.  She is found to have untreated progressive HIV.  Workup for PJP pneumonia was not consistent with this.  She was treated with IV fluconazole for likely esophagitis and with nystatin for thrush.  She had significant improvement in her symptoms.  She will continue on 14 days of fluconazole to complete treatment for Candida esophagitis.  She will be started on Bactrim daily prophylaxis as well as azithromycin weekly prophylaxis.  Her CD4 count is 43, HIV RNA from 1 month ago is 305,000.  Infectious Disease was consulted and recommends close outpatient follow-up next Tuesday for initiation of ART.  I reviewed the discharge plan instructions with the patient on the day of discharge and strongly emphasized the need to follow-up in the clinic.  The patient had her medications delivered to her bedside prior to discharge.  We have discussed return precautions.  She was discharged in good condition with plans for close outpatient follow-up.

## 2022-11-17 NOTE — PLAN OF CARE
Problem: Adult Inpatient Plan of Care  Goal: Plan of Care Review  Outcome: Ongoing, Progressing  Goal: Patient-Specific Goal (Individualized)  Outcome: Ongoing, Progressing  Goal: Absence of Hospital-Acquired Illness or Injury  Outcome: Ongoing, Progressing  Goal: Optimal Comfort and Wellbeing  Outcome: Ongoing, Progressing  Goal: Readiness for Transition of Care  Outcome: Ongoing, Progressing     Problem: Fluid Imbalance (Pneumonia)  Goal: Fluid Balance  Outcome: Ongoing, Progressing     Problem: Infection (Pneumonia)  Goal: Resolution of Infection Signs and Symptoms  Outcome: Ongoing, Progressing     Problem: Respiratory Compromise (Pneumonia)  Goal: Effective Oxygenation and Ventilation  Outcome: Ongoing, Progressing     Problem: Malnutrition  Goal: Improved Nutritional Intake  Outcome: Ongoing, Progressing     Problem: Skin Injury Risk Increased  Goal: Skin Health and Integrity  Outcome: Ongoing, Progressing

## 2022-11-17 NOTE — NURSING
Discharge education and instructions provided, all questions answered with understanding voiced; PIV(s) removed; Tele removed; All belongings in pts possession; Importance of compliance with follow ups and new medications stressed and understanding voiced; New medication dose/scheduling/directions reviewed with pt and understanding voiced    medications delivered to bedside by [facility] pharmacy; Will not give/hold pneumococcal vaccine at current time r/t immunocompromised state per Dr. Mayers (ID);     Pt transported off unit for discharge with safety intact; All medications and personal belongings sent home with pt;

## 2022-11-17 NOTE — PROGRESS NOTES
Progress Note  Infectious Disease    Reason for Consult:  HIV,weakness    HPI: Amie Salmeron is a 57 y.o. female seen by me once previously on 10/14. At that time she had SOB, pulmonary infiltrates, COVID diagnosis mid September and was receiving treatment for corneal ulcer with Bactrim at that time.She told me that she was diagnosed with HIV in 1981 and that her CD4 count had always been normal and she had never initiated anti-retroviral treatment. She reports that she had been followed at [facility] in [place], last seen about a year ago, but I phoned this clinic and she has not been seen there since 2009 (Amie Amor).   She denied any other STD, denies positive TB test or exposure, denies Hepatitis. She had no history of IVDA. She lives with her elderly parents. 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 She left AMA (because the bed alarm was irritating her). I reviewed all of the lab tests ordered at that visit and her CD4 was 24 and viral load >[id number]. She did not answer phone calls and I sent a letter making her aware of these results and encouraging her to seek care again at [facility] or with our clinic. She phoned my office yesterday and requested admission. She was advised to come to the ED, but could not come yesterday and came in today.   She reports fever to 101 at least 4 days per week. She reports that she had been profoundly weak and has not had a bath in a month because of weakness and has only had 1 BM in the last month. She has not lost any weight in comparison to vital signs in October. CXR is improved, oxygen sats are improved compared to last visit. She is capable of walking down the nguyen to go to the restroom.     INTERVAL HISTORY:  11/16:  Interim reviewed, discussed with Dr. Vann, patient seen and examined at bedside, states she is feeling little better compared to yesterday although still very weak and fatigued.  Patient also  states she has lost about 70 lb since January.  She is willing to start and establish HIV care in clinic with myself.  Hemodynamically stable, O2 sat 93%, afebrile.  Labile BP, afebrile, O2 sat 93% on room air.  Adequate urine output, has not moved her bowels since admission to the hospital.  Labs reviewed, leukopenia 3, bands 5%, H&H 10.1/32.3, platelet count 232.  Awaiting CD4 count, viral load, and genotype.     11/17: Interim reviewed, patient seen and examined at bedside, had low grade temp 100.6 overnight, currently afebrile. Patient states she is feeling slightly better, oral thrush improved, able to eat a little more. Labs reviewed, CD4 43, ratio 4.3% very low. Awaiting viral load and genotype.   Discussed with patient, she would like to go home today, she agreed to start ART, we will start Biktarvy today, she has follow up appt in clinic with me on 11/22 1pm. Respiratory culture, normal respiratory byron. Micro reviewed, blood cultures x 2 no growth to date, pending final.     Review of patient's allergies indicates:  No Known Allergies  Past Medical History:   Diagnosis Date    Allergy     Arthritis     Asthma     Chronic pain     HIV infection     Hypertension     Overactive bladder     Spinal stenosis     Urine incontinence      Past Surgical History:   Procedure Laterality Date    ADENOIDECTOMY      APPENDECTOMY      BACK SURGERY      GERALD    CYST REMOVAL      HYSTERECTOMY      JOINT REPLACEMENT Right     knee, with revision    KNEE SURGERY Left     knee replacement    RHIZOTOMY      TONSILLECTOMY      TOTAL ANKLE ARTHROPLASTY Right      Social History     Socioeconomic History    Marital status: Single   Occupational History    Occupation: on disability   Tobacco Use    Smoking status: Never    Smokeless tobacco: Never   Substance and Sexual Activity    Alcohol use: Yes     Comment: Socially    Drug use: No    Sexual activity: Yes     Partners: Male     Birth control/protection: None     Social  Determinants of Health     Financial Resource Strain: Unknown    Difficulty of Paying Living Expenses: Patient refused   Food Insecurity: Unknown    Worried About Running Out of Food in the Last Year: Patient refused    Ran Out of Food in the Last Year: Patient refused   Transportation Needs: Unknown    Lack of Transportation (Medical): Patient refused    Lack of Transportation (Non-Medical): Patient refused   Physical Activity: Unknown    Days of Exercise per Week: Patient refused    Minutes of Exercise per Session: Patient refused   Stress: Unknown    Feeling of Stress : Patient refused   Social Connections: Unknown    Frequency of Communication with Friends and Family: Patient refused    Frequency of Social Gatherings with Friends and Family: Patient refused    Attends [employer] Services: Patient refused    Active Member of Clubs or Organizations: Patient refused    Attends Club or Organization Meetings: Patient refused    Marital Status: Patient refused   Housing Stability: Unknown    Unable to Pay for Housing in the Last Year: Patient refused    Unstable Housing in the Last Year: Patient refused     Family History   Problem Relation Age of Onset    Thyroid disease Mother     Hypertension Mother     Cancer Father         oral CA    Depression Sister     Hypertension Brother     Alcohol abuse Brother          Review of Systems:     chills, fever, sweats, weight loss +  No change in vision, loss of vision or diplopia  No sinus congestion, purulent nasal discharge, post nasal drip or facial pain  No pain in mouth or throat. But she has dysgeusia, and esophagitis symptoms.  No chest pain, palpitations, syncope    cough, white sputum production, no shortness of breath, dyspnea on exertion, pleurisy, hemoptysis  No nausea, vomiting, diarrhea, but has had constipation, without blood in stool, or focal abd pain,  mild odynophagia  No dysuria, hematuria,     No swelling of joints, redness of joints, injuries, or new  focal pain  No unusual headaches, dizziness, vertigo, numbness, paresthesias, neuropathy, falls  No anxiety, depression, substance abuse, sleep disturbance, but she does have concerns about confidentiality and with the stigma of HIV infection  No diabetes, thyroid, hypogonadal conditions  No bleeding, lymphadenopathy,  malignancy, unusual bruising  No new rashes, lesions, or wounds  No TB exposure  No recent/prior steroids  Outdoor activities:none  Travel: none  Implants: none  Antibiotic History: recent bactrim. She cannot explain why she did not seek medical attention for her problems since last admission.      EXAM & DIAGNOSTICS REVIEWED:   Vitals:     Temp:  [98.4 °F (36.9 °C)-100.6 °F (38.1 °C)]   Temp: 98.4 °F (36.9 °C) (11/17/22 0752)  Pulse: 83 (11/17/22 0752)  Resp: 18 (11/17/22 0752)  BP: 99/68 (11/17/22 0752)  SpO2: (!) 93 % (11/17/22 0752)    Intake/Output Summary (Last 24 hours) at 11/17/2022 1128  Last data filed at 11/17/2022 0908  Gross per 24 hour   Intake --   Output 3800 ml   Net -3800 ml       General:  Ill-appearing, in NAD. Alert and attentive, cooperative, comfortable  Eyes:  Anicteric, PERRL, EOMI  ENT:  Oral thrush improved, scattered white plaques on soft palate  Neck:  supple  Lungs: Scattered wheezing b/l  Heart:  S1/S2+, regular rhythm, no murmur   Abd:  Soft, NT, ND, normal BS, no masses or organomegaly appreciated.  :  Voids urine clear, no flank tenderness  Musc:  Generalized muscle wasting, very thin, Joints without effusion, swelling, erythema, synovitis,  Skin:  No rashes. No palmar or plantar lesions. No subungual petechiae  Neuro:             Alert, attentive, speech fluent, face symmetric, moves all extremities, no focal weakness. Ambulatory  Psych:  Calm, cooperative. Insight is limited  Lymphatic:       Extrem:   VAD:  Peripheral IV     Isolation:  none  Wound:          General Labs reviewed:  Recent Labs   Lab 11/16/22 0527 11/16/22  0901 11/17/22  0613   WBC 3.08* 3.1*  3.36*   HGB 10.1* 10.1* 10.3*   HCT 32.3* 30.7* 32.8*    229 239       Recent Labs   Lab 11/15/22  0930 11/15/22  2043 11/16/22  0527 11/17/22  0614   *  --  137 134*   K 2.6* 3.2* 4.8 4.6   CL 99  --  106 103   CO2 26  --  27 27   BUN 8  --  6 7   CREATININE 0.4*  --  0.4* 0.6   CALCIUM 7.7*  --  7.7* 7.9*   PROT 6.1  --   --   --    BILITOT 0.5  --   --   --    ALKPHOS 66  --   --   --    ALT 13  --   --   --    AST 29  --   --   --      Recent Labs   Lab 11/15/22  0930   CRP 1.72*     CD4 = 26-->43, ratio 4.3% very low    Latest Reference Range & Units 10/15/22 06:03   Hep A Total Ab Negative  Negative   Hep B Core Total Ab Negative  Negative   Hep B S Ab  Non Reactive   Hepatitis B Surface Ag Negative  Negative   Hepatitis C Ab 0.0 - 0.9 s/co ratio 0.2   CRYPTOCOCCUS ANTIGEN, SERUM Negative  Negative   Quantiferon Mitogen Value IU/mL >10.00   Quantiferon Nil Value IU/mL 0.23   QuantiFERON-TB Gold Plus Negative  Negative   QuantiFERON TB1 Ag Value IU/mL 0.24   QuantiFERON TB2 Ag Value IU/mL 0.25   HIV 1 RNA Ultra copies/mL [id number]   RPR Non-reactive  Non-reactive         Micro:  Microbiology Results (last 7 days)       Procedure Component Value Units Date/Time    Blood culture #1 **CANNOT BE ORDERED STAT** [[id number]] Collected: 11/15/22 0930    Order Status: Completed Specimen: Blood from Peripheral, Antecubital, Left Updated: 11/17/22 1032     Blood Culture, Routine No Growth to date      No Growth to date      No Growth to date    Culture, Respiratory with Gram Stain [[id number]] Collected: 11/16/22 1715    Order Status: Completed Specimen: Respiratory from Sputum, Expectorated Updated: 11/17/22 0839     Respiratory Culture Normal respiratory byron    Pneumocystis smear by DFA Sputum, Induced [[id number]] Collected: 11/16/22 1715    Order Status: Sent Updated: 11/16/22 1720    Blood culture #2 **CANNOT BE ORDERED STAT** [[id number]] Collected: 11/15/22 1020    Order Status: Completed Specimen: Blood  from Peripheral, Antecubital, Right Updated: 11/16/22 1232     Blood Culture, Routine No Growth to date      No Growth to date    AFB Culture & Smear [[id number]]     Order Status: Sent Specimen: Blood             Imaging Reviewed:   CXR  CT chest abd pelvis without  1. 6.1 cm soft tissue density along the anterior and left anterolateral margin of the abdominal aorta in the mid abdomen as above. Several adjacent mildly prominent mesenteric lymph nodes are noted, the largest measuring 1.7 x 1.3 cm. The soft tissue density is suspicious for a confluent ilia mass, but could also be contributed to by unopacified small bowel loops. CT abdomen with oral and intravenous contrast is recommended for further assessment.  2. Previously noted bilateral upper lobe pulmonary infiltrates have shown near complete resolution. Scattered groundglass opacities in both lungs are likely infectious/inflammatory in nature, with slightly more prominent infiltrate in the lingula.  3. Additional chronic findings as above.    CT abdomen with        Cardiology:    IMPRESSION & PLAN     HIV/AIDS C3, CD4 46, last ,000, with gradual progression of immunosuppression   Non compliance with HIV clinic follow up - unclear if chronic non-suppressor     2.  Candida esophagitis, improving   3.  FUO ? Could be due to the candida esophagitis, from residual pneumonia (CT does not look like PJP)   Tmax 100/6 overnight, currently afebrile    R/o disseminated MAC, retroperitoneal LAD on CT   LDH normal     4.  Weight loss, stable x 1 month    5.  Debility, weakness, malnutrition    Recommendations:  Upon discharge, continue Bactrim DS 1 tab once a day for PJP ppx, Azithromycin 1,200mg PO weekly, next dose next week, Fluconazole     Fluconazole 200mg PO daily and nystatin for 14 days   Will start Biktarvy 1 tab PO daily  Encouraged fluid intake   Patient has appt in clinic 11/22 at 1pm  Follow up VL and genotype   Alarms symptoms given to patient to  contact the office     D/w patient at length, Dr Allison     Medical Decision Making during this encounter was  [_] Low Complexity  [_] Moderate Complexity  [  xxx] High Complexity

## 2022-11-17 NOTE — PLAN OF CARE
Hospital follow up appointment with Dr. Mayers on AVS.     Discharge orders and chart reviewed with no further post-acute discharge needs identified at this time.  At this time, patient is cleared for discharge from Case Management standpoint.        11/17/22 0936   Final Note   Assessment Type Final Discharge Note   Anticipated Discharge Disposition Home   Hospital Resources/Appts/Education Provided Appointments scheduled and added to AVS   Post-Acute Status   Discharge Delays None known at this time

## 2022-11-18 ENCOUNTER — PATIENT MESSAGE (OUTPATIENT)
Dept: INFECTIOUS DISEASES | Facility: CLINIC | Age: 57
End: 2022-11-18

## 2022-11-18 LAB
BACTERIA SPEC AEROBE CULT: NORMAL
GRAM STN SPEC: NORMAL

## 2022-11-18 RX ORDER — ONDANSETRON 4 MG/1
4 TABLET, FILM COATED ORAL EVERY 8 HOURS PRN
Qty: 40 TABLET | Refills: 1 | Status: SHIPPED | OUTPATIENT
Start: 2022-11-18 | End: 2022-12-02

## 2022-11-20 LAB
BACTERIA BLD CULT: NORMAL
BACTERIA BLD CULT: NORMAL

## 2022-11-21 LAB — P JIROVECII AG SPEC QL IF: NEGATIVE

## 2022-11-22 ENCOUNTER — OFFICE VISIT (OUTPATIENT)
Dept: INFECTIOUS DISEASES | Facility: CLINIC | Age: 57
End: 2022-11-22
Payer: MEDICARE

## 2022-11-22 ENCOUNTER — PATIENT MESSAGE (OUTPATIENT)
Dept: INFECTIOUS DISEASES | Facility: CLINIC | Age: 57
End: 2022-11-22

## 2022-11-22 VITALS
BODY MASS INDEX: 19.71 KG/M2 | HEIGHT: 60 IN | WEIGHT: 100.38 LBS | SYSTOLIC BLOOD PRESSURE: 102 MMHG | TEMPERATURE: 98 F | DIASTOLIC BLOOD PRESSURE: 58 MMHG

## 2022-11-22 DIAGNOSIS — Z00.00 HEALTHCARE MAINTENANCE: ICD-10-CM

## 2022-11-22 DIAGNOSIS — K59.00 CONSTIPATION, UNSPECIFIED CONSTIPATION TYPE: ICD-10-CM

## 2022-11-22 DIAGNOSIS — Z12.31 ENCOUNTER FOR SCREENING MAMMOGRAM FOR MALIGNANT NEOPLASM OF BREAST: Primary | ICD-10-CM

## 2022-11-22 DIAGNOSIS — Z92.89 HISTORY OF MAMMOGRAPHY, SCREENING: ICD-10-CM

## 2022-11-22 DIAGNOSIS — B20 HUMAN IMMUNODEFICIENCY VIRUS (HIV) DISEASE: ICD-10-CM

## 2022-11-22 DIAGNOSIS — N76.6 GENITAL ULCER, FEMALE: ICD-10-CM

## 2022-11-22 DIAGNOSIS — B20 AIDS: ICD-10-CM

## 2022-11-22 PROCEDURE — 3008F PR BODY MASS INDEX (BMI) DOCUMENTED: ICD-10-PCS | Mod: CPTII,S$GLB,, | Performed by: STUDENT IN AN ORGANIZED HEALTH CARE EDUCATION/TRAINING PROGRAM

## 2022-11-22 PROCEDURE — 1159F PR MEDICATION LIST DOCUMENTED IN MEDICAL RECORD: ICD-10-PCS | Mod: CPTII,S$GLB,, | Performed by: STUDENT IN AN ORGANIZED HEALTH CARE EDUCATION/TRAINING PROGRAM

## 2022-11-22 PROCEDURE — 3078F DIAST BP <80 MM HG: CPT | Mod: CPTII,S$GLB,, | Performed by: STUDENT IN AN ORGANIZED HEALTH CARE EDUCATION/TRAINING PROGRAM

## 2022-11-22 PROCEDURE — 3074F PR MOST RECENT SYSTOLIC BLOOD PRESSURE < 130 MM HG: ICD-10-PCS | Mod: CPTII,S$GLB,, | Performed by: STUDENT IN AN ORGANIZED HEALTH CARE EDUCATION/TRAINING PROGRAM

## 2022-11-22 PROCEDURE — 3074F SYST BP LT 130 MM HG: CPT | Mod: CPTII,S$GLB,, | Performed by: STUDENT IN AN ORGANIZED HEALTH CARE EDUCATION/TRAINING PROGRAM

## 2022-11-22 PROCEDURE — 99214 OFFICE O/P EST MOD 30 MIN: CPT | Mod: S$GLB,,, | Performed by: STUDENT IN AN ORGANIZED HEALTH CARE EDUCATION/TRAINING PROGRAM

## 2022-11-22 PROCEDURE — 3008F BODY MASS INDEX DOCD: CPT | Mod: CPTII,S$GLB,, | Performed by: STUDENT IN AN ORGANIZED HEALTH CARE EDUCATION/TRAINING PROGRAM

## 2022-11-22 PROCEDURE — 1159F MED LIST DOCD IN RCRD: CPT | Mod: CPTII,S$GLB,, | Performed by: STUDENT IN AN ORGANIZED HEALTH CARE EDUCATION/TRAINING PROGRAM

## 2022-11-22 PROCEDURE — 3078F PR MOST RECENT DIASTOLIC BLOOD PRESSURE < 80 MM HG: ICD-10-PCS | Mod: CPTII,S$GLB,, | Performed by: STUDENT IN AN ORGANIZED HEALTH CARE EDUCATION/TRAINING PROGRAM

## 2022-11-22 PROCEDURE — 99214 PR OFFICE/OUTPT VISIT, EST, LEVL IV, 30-39 MIN: ICD-10-PCS | Mod: S$GLB,,, | Performed by: STUDENT IN AN ORGANIZED HEALTH CARE EDUCATION/TRAINING PROGRAM

## 2022-11-22 RX ORDER — ACYCLOVIR 50 MG/G
CREAM TOPICAL 4 TIMES DAILY
Qty: 1 EACH | Refills: 1 | Status: SHIPPED | OUTPATIENT
Start: 2022-11-22 | End: 2022-11-29

## 2022-11-22 RX ORDER — SENNOSIDES 8.6 MG/1
1 CAPSULE, GELATIN COATED ORAL 2 TIMES DAILY
Qty: 60 EACH | Refills: 1 | Status: SHIPPED | OUTPATIENT
Start: 2022-11-22 | End: 2022-12-22

## 2022-11-22 RX ORDER — VALACYCLOVIR HYDROCHLORIDE 1 G/1
1000 TABLET, FILM COATED ORAL 2 TIMES DAILY
Qty: 20 TABLET | Refills: 0 | Status: SHIPPED | OUTPATIENT
Start: 2022-11-22 | End: 2022-12-02

## 2022-11-22 RX ORDER — BICTEGRAVIR SODIUM, EMTRICITABINE, AND TENOFOVIR ALAFENAMIDE FUMARATE 50; 200; 25 MG/1; MG/1; MG/1
1 TABLET ORAL DAILY
Qty: 30 TABLET | Refills: 3 | Status: SHIPPED | OUTPATIENT
Start: 2022-12-15 | End: 2022-12-29 | Stop reason: SDUPTHER

## 2022-11-22 NOTE — PROGRESS NOTES
Subjective: Hospital follow up - Establish HIV care       Patient ID: Amie Salmeron is a 57 y.o. female.    Chief Complaint:: hospital follow up visit  and HIV Positive/AIDS    HPI Amie Salmeron is a 57 y.o. female to me for recent admission to [facility] for fever, she has past medical history of HIV diagnosed in 1981, never on anti-retroviral therapy, last CD4 46, ,000, COVID 19 in October, and prior L eye corneal ulcer s/p . She was admitted for failure to thrive, and persistent fever at home with associated dysphagia, and persistent cough.  She was found to have esophageal candidiasis.  Workup while at the hospital including respiratory culture negative for PCP.  CT abdomen pelvis significant for intra-abdominal lymph nodes.    Patient was discharged home on Biktarvy 1 tablet once a day, Bactrim ds 1 tablet once a day for PCP prophylaxis, and azithromycin 1200 mg once a week for MAC prophylaxis.    She is here for follow-up, complaining of painful ulcers around her perianal area which she states started 3 days ago. She states she is compliant to Biktarvy, has not missed any doses since discharged from the hospital last week. She mentions she is feeling better and her appetite is improved, still complaints of constipation, passing gas. Denies headache, nausea or vomit, no dysuria, no abdominal pain. States her cough is better as well.     Epidemiology  Not currently sexually active, last encounter +30y ago  Works delivering pizza  Nonsmoker, no alcohol no IV drugs     Review of patient's allergies indicates:  No Known Allergies  Past Medical History:   Diagnosis Date    Allergy     Arthritis     Asthma     Chronic pain     HIV infection     Hypertension     Overactive bladder     Spinal stenosis     Urine incontinence      Past Surgical History:   Procedure Laterality Date    ADENOIDECTOMY      APPENDECTOMY      BACK SURGERY      GERALD    CYST REMOVAL      HYSTERECTOMY      JOINT REPLACEMENT Right     knee, with  revision    KNEE SURGERY Left     knee replacement    RHIZOTOMY      TONSILLECTOMY      TOTAL ANKLE ARTHROPLASTY Right      Social History     Tobacco Use    Smoking status: Never    Smokeless tobacco: Never   Substance Use Topics    Alcohol use: Yes     Comment: Socially     Social History     Occupational History    Occupation: on disability     Family History   Problem Relation Age of Onset    Thyroid disease Mother     Hypertension Mother     Cancer Father         oral CA    Depression Sister     Hypertension Brother     Alcohol abuse Brother          Review of Systems   Constitutional:  Positive for chills. Negative for fever.   HENT:  Negative for sinus pain.    Eyes:  Negative for pain.   Respiratory:  Negative for cough and shortness of breath.    Cardiovascular:  Negative for chest pain and leg swelling.   Gastrointestinal:  Positive for constipation. Negative for abdominal pain and nausea.   Genitourinary:  Negative for dysuria.   Musculoskeletal:  Negative for back pain.   Skin:  Positive for wound.   Neurological:  Negative for headaches.   Psychiatric/Behavioral:  Negative for confusion.          Objective:      Blood pressure (!) 102/58, temperature 97.6 °F (36.4 °C), height 5' (1.524 m), weight 45.5 kg (100 lb 6.4 oz). Body mass index is 19.61 kg/m².  Physical Exam  Constitutional:       Appearance: She is ill-appearing.   HENT:      Mouth/Throat:      Mouth: Mucous membranes are moist.      Pharynx: Oropharynx is clear.      Comments: Thrush resolved  Eyes:      Conjunctiva/sclera: Conjunctivae normal.      Pupils: Pupils are equal, round, and reactive to light.      Comments: Left eye corneal scar   Cardiovascular:      Rate and Rhythm: Normal rate and regular rhythm.      Pulses: Normal pulses.      Heart sounds: Normal heart sounds.   Pulmonary:      Effort: Pulmonary effort is normal.      Breath sounds: Normal breath sounds.   Abdominal:      General: Bowel sounds are normal.      Palpations:  Abdomen is soft.      Tenderness: There is no abdominal tenderness. There is no rebound.   Genitourinary:     General: Normal vulva.      Comments: 3 painful ulcers with erythematous borders around anal area suggestive of herpes  Musculoskeletal:         General: Normal range of motion.      Cervical back: Normal range of motion and neck supple.      Right lower leg: No edema.      Left lower leg: No edema.   Skin:     General: Skin is warm.      Capillary Refill: Capillary refill takes less than 2 seconds.   Neurological:      General: No focal deficit present.      Mental Status: She is alert and oriented to person, place, and time.      Sensory: No sensory deficit.      Motor: No weakness.   Psychiatric:         Thought Content: Thought content normal.           Recent Diagnostics:  CXR: Interval improvement of bilateral pulmonary opacities, with persistent interstitial opacities as above. These could reflect incomplete resolution of infectious or inflammatory process, parenchymal scarring, atelectasis, or some combination thereof.    CT abdomen/pelvis 11/15:   1. Negative for para-aortic mass or retroperitoneal lymphadenopathy. Previously described findings reflect normal small bowel.  2. A few enlarged mesenteric lymph nodes, nonspecific.    CT chest/abdomen/pelvis:   1. 6.1 cm soft tissue density along the anterior and left anterolateral margin of the abdominal aorta in the mid abdomen as above. Several adjacent mildly prominent mesenteric lymph nodes are noted, the largest measuring 1.7 x 1.3 cm. The soft tissue density is suspicious for a confluent ilia mass, but could also be contributed to by unopacified small bowel loops. CT abdomen with oral and intravenous contrast is recommended for further assessment.  2. Previously noted bilateral upper lobe pulmonary infiltrates have shown near complete resolution. Scattered groundglass opacities in both lungs are likely infectious/inflammatory in nature, with  slightly more prominent infiltrate in the lingula.     HIV table:  CD4 = 26-->43, ratio 4.3% very low      Latest Reference Range & Units 10/15/22 06:03   Hep A Total Ab Negative  Negative   Hep B Core Total Ab Negative  Negative   Hep B S Ab  Non Reactive   Hepatitis B Surface Ag Negative  Negative   Hepatitis C Ab 0.0 - 0.9 s/co ratio 0.2   CRYPTOCOCCUS ANTIGEN, SERUM Negative  Negative   Quantiferon Mitogen Value IU/mL >10.00   Quantiferon Nil Value IU/mL 0.23   QuantiFERON-TB Gold Plus Negative  Negative   QuantiFERON TB1 Ag Value IU/mL 0.24   QuantiFERON TB2 Ag Value IU/mL 0.25   HIV 1 RNA Ultra copies/mL [id number]   RPR Non-reactive  Non-reactive      Latest Reference Range & Units 11/16/22 17:15   Pneumocystis Smear, DFA Negative  Negative        Assessment and Plan:         1. HIV/AIDS C3, last CD4 46, ,000, genotype in process   Continue Biktarvy 1 tab PO once day   Bactrim DS 1 tab PO for PCP prophylaxis until CD4 >200   Azithromycin 1,200mg PO once a week for MAC prophylaxis, will stop once CD4 >50    Will start vaccinations once CD4 is >200 (pneumococcus, meningococcus, flu and COVID)   All questions answered    RTC in 1 month with labs    2. Genital painful ulcers c/w Herpes   Valtrex 1g PO twice a day for 7-10 days   Topical acyclovir 4 times a day to affected areas    3. Esophageal candidiasis, improved   Finish Fluconazole course   Nystatin as needed    4. Constipation    Stool softeners as needed    5. Healthcare maintenance   GI referral for colonoscopy, last one done >9y ago   Gyn referral for pap-smear   Mammogram ordered    AIDS    Genital ulcer, female          This note was created using Dragon voice recognition software that occasionally misinterpreted phrases or words.

## 2022-11-22 NOTE — PATIENT INSTRUCTIONS
Acyclovir topical 4 times a day to affected areas  Valtrex 1g PO twice a day for 7-10 days  Continue Biktarvy 1 tab PO once a day   Bactrim DS 1 tab PO once a day   Azithromycin 2 tabs PO once a week  Finish Fluconazole course  RTC in 1 month with labs

## 2022-11-23 ENCOUNTER — PATIENT MESSAGE (OUTPATIENT)
Dept: FAMILY MEDICINE | Facility: CLINIC | Age: 57
End: 2022-11-23
Payer: MEDICARE

## 2022-11-26 LAB
LABCORP MISC TEST CODE: NORMAL
LABCORP MISC TEST NAME: NORMAL
LABCORP MISCELLANEOUS TEST: NORMAL

## 2022-11-28 ENCOUNTER — TELEPHONE (OUTPATIENT)
Dept: INFECTIOUS DISEASES | Facility: HOSPITAL | Age: 57
End: 2022-11-28
Payer: MEDICARE

## 2022-11-28 DIAGNOSIS — Z92.89 HISTORY OF DIAGNOSTIC MAMMOGRAPHY: Primary | ICD-10-CM

## 2022-11-28 DIAGNOSIS — Z12.31 ENCOUNTER FOR SCREENING MAMMOGRAM FOR MALIGNANT NEOPLASM OF BREAST: ICD-10-CM

## 2022-11-30 ENCOUNTER — TELEPHONE (OUTPATIENT)
Dept: GASTROENTEROLOGY | Facility: CLINIC | Age: 57
End: 2022-11-30
Payer: MEDICARE

## 2022-11-30 ENCOUNTER — HOSPITAL ENCOUNTER (EMERGENCY)
Facility: HOSPITAL | Age: 57
Discharge: LEFT BEFORE DISCHARGE | End: 2022-11-30
Attending: EMERGENCY MEDICINE
Payer: MEDICARE

## 2022-11-30 VITALS
TEMPERATURE: 101 F | HEART RATE: 96 BPM | DIASTOLIC BLOOD PRESSURE: 54 MMHG | WEIGHT: 104 LBS | OXYGEN SATURATION: 95 % | BODY MASS INDEX: 20.42 KG/M2 | HEIGHT: 60 IN | RESPIRATION RATE: 18 BRPM | SYSTOLIC BLOOD PRESSURE: 94 MMHG

## 2022-11-30 DIAGNOSIS — R50.9 FEVER, UNSPECIFIED FEVER CAUSE: Primary | ICD-10-CM

## 2022-11-30 DIAGNOSIS — R50.9 FEVER: ICD-10-CM

## 2022-11-30 PROCEDURE — 25000003 PHARM REV CODE 250: Performed by: EMERGENCY MEDICINE

## 2022-11-30 PROCEDURE — 99283 EMERGENCY DEPT VISIT LOW MDM: CPT | Mod: 25

## 2022-11-30 RX ORDER — ACETAMINOPHEN 500 MG
1000 TABLET ORAL
Status: COMPLETED | OUTPATIENT
Start: 2022-11-30 | End: 2022-11-30

## 2022-11-30 RX ADMIN — ACETAMINOPHEN 1000 MG: 500 TABLET ORAL at 08:11

## 2022-11-30 NOTE — TELEPHONE ENCOUNTER
Sent message to portal for patient to schedule an appointment with our office. Called pt several times to try to schedule appointment from referral, unsuccessful.

## 2022-11-30 NOTE — Clinical Note
Date: 11/30/2022  Patient: Amie Salmeron  Admitted: 11/30/2022  6:25 PM  Attending Provider: Sandra Doan MD    Amie Salmeron or her authorized caregiver has made the decision for the patient to leave the emergency department against the advice  of the emergency department staff. She or her authorized caregiver has been informed and understands the inherent risks, including death, undiagnosed illness.  She or her authorized caregiver has decided to accept the responsibility for this decisjosue n. Amie Salmeron and all necessary parties have been advised that she may return for further evaluation or treatment. Her condition at time of discharge was unknown.  Amie Salmeron had current vital signs as follows:  BP (!) 94/54   Pulse 96   Te mp (!) 100.7 °F (38.2 °C) (Oral)   Resp 18   Ht 5' (1.524 m)   Wt 47.2 kg (104 lb)

## 2022-12-01 NOTE — FIRST PROVIDER EVALUATION
Medical screening examination initiated.  I have conducted a focused provider triage encounter, findings are as follows:    Brief history of present illness:  Fever, cough, congestion x 4 days. Denies vomiting, diarrhea, chest pain, SOB    Vitals:    11/30/22 1827 11/30/22 1830 11/30/22 1831   BP:   (!) 94/54   Pulse: 96     Resp: 18     Temp: (!) 100.7 °F (38.2 °C)     TempSrc: Oral     SpO2:   95%   Weight:  47.2 kg (104 lb)    Height:  5' (1.524 m)        Pertinent physical exam:  Normal exam    Brief workup plan:  Covid, influenza, urine    Preliminary workup initiated; this workup will be continued and followed by the physician or advanced practice provider that is assigned to the patient when roomed.

## 2022-12-01 NOTE — ED PROVIDER NOTES
Encounter Date: 11/30/2022       History     Chief Complaint   Patient presents with    Fever     Pt states she has been having fever all day up to 104 last time she took an asa was at 430     57-year-old female with history of HIV/AIDS, recently started on therapy and prophylactic antibiotics, presents to the emergency department for fever for the past 2 days.  Per triage note, T-max was 104°F.  She took aspirin earlier today, nothing since.  She denies any sinus congestion or pain, sore throat, cough, chest pain, shortness of breath, abdominal pain, urinary symptoms, or rash.  She was recently admitted for COVID, no other recent illness.  She has been eating and drinking well.  No other acute issues    The history is provided by the patient.   Review of patient's allergies indicates:  No Known Allergies  Past Medical History:   Diagnosis Date    Allergy     Arthritis     Asthma     Chronic pain     HIV infection     Hypertension     Overactive bladder     Spinal stenosis     Urine incontinence      Past Surgical History:   Procedure Laterality Date    ADENOIDECTOMY      APPENDECTOMY      BACK SURGERY      GERALD    CYST REMOVAL      HYSTERECTOMY      JOINT REPLACEMENT Right     knee, with revision    KNEE SURGERY Left     knee replacement    RHIZOTOMY      TONSILLECTOMY      TOTAL ANKLE ARTHROPLASTY Right      Family History   Problem Relation Age of Onset    Thyroid disease Mother     Hypertension Mother     Cancer Father         oral CA    Depression Sister     Hypertension Brother     Alcohol abuse Brother      Social History     Tobacco Use    Smoking status: Never    Smokeless tobacco: Never   Substance Use Topics    Alcohol use: Yes     Comment: Socially    Drug use: No     Review of Systems   Constitutional:  Positive for fever.   HENT:  Negative for rhinorrhea.    Respiratory:  Negative for cough and shortness of breath.    Cardiovascular:  Negative for chest pain.   Gastrointestinal:  Negative for abdominal  pain, constipation, diarrhea, nausea and vomiting.   Genitourinary:  Negative for dysuria.   Musculoskeletal:  Negative for back pain and neck pain.   Skin:  Negative for rash.   Neurological:  Negative for weakness, numbness and headaches.     Physical Exam     Initial Vitals   BP Pulse Resp Temp SpO2   11/30/22 1831 11/30/22 1827 11/30/22 1827 11/30/22 1827 11/30/22 1831   (!) 94/54 96 18 (!) 100.7 °F (38.2 °C) 95 %      MAP       --                Physical Exam    Nursing note and vitals reviewed.  Constitutional: She appears well-developed and well-nourished.   HENT:   Head: Normocephalic and atraumatic.   Lips dry   Eyes: Conjunctivae and EOM are normal.   Neck: Neck supple.   No meningismus   Normal range of motion.  Cardiovascular:  Normal rate, regular rhythm and intact distal pulses.           Pulmonary/Chest: Breath sounds normal. No respiratory distress. She has no wheezes. She has no rhonchi. She has no rales.   Abdominal: Abdomen is soft. She exhibits no distension. There is no abdominal tenderness.   Musculoskeletal:         General: Normal range of motion.      Cervical back: Normal range of motion and neck supple.     Neurological: She is alert and oriented to person, place, and time. She has normal strength.   Ambulatory without issue   Skin: Skin is warm and dry. Capillary refill takes less than 2 seconds.   Psychiatric: She has a normal mood and affect.       ED Course   Procedures  Labs Reviewed   CULTURE, BLOOD   CULTURE, BLOOD   INFLUENZA A AND B ANTIGEN   CBC W/ AUTO DIFFERENTIAL   COMPREHENSIVE METABOLIC PANEL   PROCALCITONIN          Imaging Results              X-Ray Chest PA And Lateral (Final result)  Result time 11/30/22 19:42:06      Final result by Venkat Daniels MD (11/30/22 19:42:06)                   Narrative:    Reason: Cough, fever.    FINDINGS:    PA and lateral chest with comparison chest x-ray November 15, 2022 show normal cardiomediastinal silhouette.  Mild prominence of the  interstitium of the lung bases. No confluent airspace opacities. Pulmonary vasculature is normal. No acute osseous abnormality.    IMPRESSION:    Mild prominence of the interstitium of the lung bases could reflect residual infectious or inflammatory infiltrates.    Electronically signed by:  Venkat Daniels   11/30/2022 7:42 PM CST Workstation: [id number]                                  X-Rays:   Independently Interpreted Readings:   Chest X-Ray: No acute abnormalities.   Medications   acetaminophen tablet 1,000 mg (1,000 mg Oral Given 11/30/22 2012)     Medical Decision Making:   Initial Assessment:   57-year-old female with HIV/AIDS here for fever.  She has no other focal signs of infection at this time.  I was asked to examine her as she has to leave the emergency department.  She tells me her mother broke her leg and she has to go see about her.  I did inform her that her evaluation is not complete and her last blood pressure was low.  I did attempt to repeat her vital signs, but apparently she had to leave prior to this.  She did accept a dose of Tylenol for her fever.  I did discuss that she can return to the emergency department at anytime to resume evaluation and is encouraged to do so.  She verbalizes understanding and tells me she will return once her mother is situated.  Patient is awake alert and oriented x4, not clinically intoxicated, and capable of making medical decisions.  She refused to sign the AMA form and refused to wait for discharge paperwork or repeat vital signs.  Again she was encouraged by myself and nursing staff to return and she says she will do so.  Differential Diagnosis:   Viral syndrome, pneumonia, UTI, bacteremia, and others  Independently Interpreted Test(s):   I have ordered and independently interpreted X-rays - see prior notes.  Clinical Tests:   Lab Tests: Ordered and Reviewed  Radiological Study: Ordered and Reviewed                        Clinical Impression:   Final  diagnoses:  [R50.9] Fever  [R50.9] Fever, unspecified fever cause (Primary)        ED Disposition Condition    AMA Jesse Doan MD  11/30/22 [id number]

## 2022-12-05 PROBLEM — E83.42 HYPOMAGNESEMIA: Status: ACTIVE | Noted: 2022-12-05

## 2022-12-05 PROBLEM — G93.41 ACUTE METABOLIC ENCEPHALOPATHY: Status: ACTIVE | Noted: 2022-12-05

## 2022-12-05 PROBLEM — E87.1 HYPONATREMIA: Status: ACTIVE | Noted: 2022-12-05

## 2022-12-05 PROBLEM — U07.1 PNEUMONIA DUE TO COVID-19 VIRUS: Status: ACTIVE | Noted: 2022-12-05

## 2022-12-05 PROBLEM — J12.82 PNEUMONIA DUE TO COVID-19 VIRUS: Status: ACTIVE | Noted: 2022-12-05

## 2022-12-08 PROBLEM — E83.42 HYPOMAGNESEMIA: Status: RESOLVED | Noted: 2022-12-05 | Resolved: 2022-12-08

## 2022-12-08 PROBLEM — E87.1 HYPONATREMIA: Status: RESOLVED | Noted: 2022-12-05 | Resolved: 2022-12-08

## 2022-12-08 PROBLEM — G93.41 ACUTE METABOLIC ENCEPHALOPATHY: Status: RESOLVED | Noted: 2022-12-05 | Resolved: 2022-12-08

## 2022-12-09 ENCOUNTER — PATIENT OUTREACH (OUTPATIENT)
Dept: ADMINISTRATIVE | Facility: CLINIC | Age: 57
End: 2022-12-09
Payer: MEDICARE

## 2022-12-09 ENCOUNTER — PATIENT MESSAGE (OUTPATIENT)
Dept: ADMINISTRATIVE | Facility: CLINIC | Age: 57
End: 2022-12-09
Payer: MEDICARE

## 2022-12-09 NOTE — PROGRESS NOTES
C3 nurse 2nd attempt to contact Amie Salmeron for a TCC post hospital discharge follow up call. No answer. PATIENT PORTAL MESSAGE SENT. The patient does not have a scheduled HOSFU appointment, and the pt [facility].

## 2022-12-09 NOTE — PROGRESS NOTES
C3 nurse attempted to contact Amie Salmeron for a TCC post hospital discharge follow up call. No answer. PATIENT PORTAL MESSAGE SENT. The patient does not have a scheduled HOSFU appointment, and the pt does not have an [facility] PCP.

## 2022-12-15 ENCOUNTER — HOSPITAL ENCOUNTER (EMERGENCY)
Facility: HOSPITAL | Age: 57
Discharge: HOME | End: 2022-12-15
Attending: EMERGENCY MEDICINE
Payer: MEDICARE

## 2022-12-15 VITALS
DIASTOLIC BLOOD PRESSURE: 80 MMHG | HEART RATE: 83 BPM | HEIGHT: 60 IN | SYSTOLIC BLOOD PRESSURE: 123 MMHG | TEMPERATURE: 98 F | OXYGEN SATURATION: 100 % | BODY MASS INDEX: 21.01 KG/M2 | WEIGHT: 107 LBS | RESPIRATION RATE: 18 BRPM

## 2022-12-15 DIAGNOSIS — R53.1 GENERALIZED WEAKNESS: Primary | ICD-10-CM

## 2022-12-15 DIAGNOSIS — U07.1 COVID-19 VIRUS INFECTION: ICD-10-CM

## 2022-12-15 LAB
ALBUMIN SERPL BCP-MCNC: 2.4 G/DL (ref 3.5–5.2)
ALP SERPL-CCNC: 79 U/L (ref 55–135)
ALT SERPL W/O P-5'-P-CCNC: 14 U/L (ref 10–44)
ANION GAP SERPL CALC-SCNC: 7 MMOL/L (ref 8–16)
ANISOCYTOSIS BLD QL SMEAR: SLIGHT
AST SERPL-CCNC: 27 U/L (ref 10–40)
BASOPHILS NFR BLD: 0 % (ref 0–1.9)
BILIRUB SERPL-MCNC: 0.8 MG/DL (ref 0.1–1)
BILIRUB UR QL STRIP: NEGATIVE
BNP SERPL-MCNC: 47 PG/ML (ref 0–99)
BUN SERPL-MCNC: 14 MG/DL (ref 6–20)
CALCIUM SERPL-MCNC: 8.3 MG/DL (ref 8.7–10.5)
CHLORIDE SERPL-SCNC: 99 MMOL/L (ref 95–110)
CLARITY UR REFRACT.AUTO: CLEAR
CO2 SERPL-SCNC: 29 MMOL/L (ref 23–29)
COLOR UR AUTO: NORMAL
CREAT SERPL-MCNC: 0.5 MG/DL (ref 0.5–1.4)
DIFFERENTIAL METHOD: ABNORMAL
EOSINOPHIL NFR BLD: 0 % (ref 0–8)
ERYTHROCYTE [DISTWIDTH] IN BLOOD BY AUTOMATED COUNT: 15.4 % (ref 11.5–14.5)
EST. GFR  (NO RACE VARIABLE): >60 ML/MIN/1.73 M^2
GLUCOSE SERPL-MCNC: 77 MG/DL (ref 70–110)
GLUCOSE UR QL STRIP: NEGATIVE
HCT VFR BLD AUTO: 36 % (ref 37–48.5)
HGB BLD-MCNC: 11.3 G/DL (ref 12–16)
HGB UR QL STRIP: NEGATIVE
IMM GRANULOCYTES # BLD AUTO: ABNORMAL K/UL (ref 0–0.04)
IMM GRANULOCYTES NFR BLD AUTO: ABNORMAL % (ref 0–0.5)
KETONES UR QL STRIP: NEGATIVE
LACTATE SERPL-SCNC: 0.9 MMOL/L (ref 0.5–2.2)
LEUKOCYTE ESTERASE UR QL STRIP: NEGATIVE
LYMPHOCYTES NFR BLD: 9 % (ref 18–48)
MAGNESIUM SERPL-MCNC: 1.7 MG/DL (ref 1.6–2.6)
MCH RBC QN AUTO: 28 PG (ref 27–31)
MCHC RBC AUTO-ENTMCNC: 31.4 G/DL (ref 32–36)
MCV RBC AUTO: 89 FL (ref 82–98)
METAMYELOCYTES NFR BLD MANUAL: 11 %
MONOCYTES NFR BLD: 8 % (ref 4–15)
MYELOCYTES NFR BLD MANUAL: 6 %
NEUTROPHILS NFR BLD: 61 % (ref 38–73)
NEUTS BAND NFR BLD MANUAL: 5 %
NITRITE UR QL STRIP: NEGATIVE
NRBC BLD-RTO: 0 /100 WBC
OVALOCYTES BLD QL SMEAR: ABNORMAL
PATH REV BLD -IMP: NORMAL
PH UR STRIP: 7 [PH] (ref 5–8)
PLATELET # BLD AUTO: 231 K/UL (ref 150–450)
PLATELET BLD QL SMEAR: ABNORMAL
PMV BLD AUTO: 9.3 FL (ref 9.2–12.9)
POIKILOCYTOSIS BLD QL SMEAR: SLIGHT
POTASSIUM SERPL-SCNC: 3.1 MMOL/L (ref 3.5–5.1)
PROT SERPL-MCNC: 6.1 G/DL (ref 6–8.4)
PROT UR QL STRIP: NEGATIVE
RBC # BLD AUTO: 4.03 M/UL (ref 4–5.4)
SODIUM SERPL-SCNC: 135 MMOL/L (ref 136–145)
SP GR UR STRIP: 1.02 (ref 1–1.03)
TROPONIN I SERPL DL<=0.01 NG/ML-MCNC: <0.006 NG/ML (ref 0–0.03)
TSH SERPL DL<=0.005 MIU/L-ACNC: 1.78 UIU/ML (ref 0.4–4)
URN SPEC COLLECT METH UR: NORMAL
WBC # BLD AUTO: 5.48 K/UL (ref 3.9–12.7)

## 2022-12-15 PROCEDURE — 80053 COMPREHEN METABOLIC PANEL: CPT | Performed by: PHYSICIAN ASSISTANT

## 2022-12-15 PROCEDURE — 83735 ASSAY OF MAGNESIUM: CPT | Performed by: PHYSICIAN ASSISTANT

## 2022-12-15 PROCEDURE — 99284 PR EMERGENCY DEPT VISIT,LEVEL IV: ICD-10-PCS | Mod: ,,, | Performed by: PHYSICIAN ASSISTANT

## 2022-12-15 PROCEDURE — 85060 PATHOLOGIST REVIEW: ICD-10-PCS | Mod: ,,, | Performed by: PATHOLOGY

## 2022-12-15 PROCEDURE — 84484 ASSAY OF TROPONIN QUANT: CPT | Performed by: PHYSICIAN ASSISTANT

## 2022-12-15 PROCEDURE — 93010 EKG 12-LEAD: ICD-10-PCS | Mod: ,,, | Performed by: INTERNAL MEDICINE

## 2022-12-15 PROCEDURE — 99284 EMERGENCY DEPT VISIT MOD MDM: CPT | Mod: ,,, | Performed by: PHYSICIAN ASSISTANT

## 2022-12-15 PROCEDURE — 83605 ASSAY OF LACTIC ACID: CPT | Performed by: PHYSICIAN ASSISTANT

## 2022-12-15 PROCEDURE — 93010 ELECTROCARDIOGRAM REPORT: CPT | Mod: ,,, | Performed by: INTERNAL MEDICINE

## 2022-12-15 PROCEDURE — 99285 EMERGENCY DEPT VISIT HI MDM: CPT | Mod: 25

## 2022-12-15 PROCEDURE — 85060 BLOOD SMEAR INTERPRETATION: CPT | Mod: ,,, | Performed by: PATHOLOGY

## 2022-12-15 PROCEDURE — 85007 BL SMEAR W/DIFF WBC COUNT: CPT | Performed by: PHYSICIAN ASSISTANT

## 2022-12-15 PROCEDURE — 83880 ASSAY OF NATRIURETIC PEPTIDE: CPT | Performed by: PHYSICIAN ASSISTANT

## 2022-12-15 PROCEDURE — 93005 ELECTROCARDIOGRAM TRACING: CPT

## 2022-12-15 PROCEDURE — 81003 URINALYSIS AUTO W/O SCOPE: CPT | Performed by: PHYSICIAN ASSISTANT

## 2022-12-15 PROCEDURE — 84443 ASSAY THYROID STIM HORMONE: CPT | Performed by: PHYSICIAN ASSISTANT

## 2022-12-15 PROCEDURE — 85027 COMPLETE CBC AUTOMATED: CPT | Performed by: PHYSICIAN ASSISTANT

## 2022-12-15 PROCEDURE — 25000003 PHARM REV CODE 250: Performed by: PHYSICIAN ASSISTANT

## 2022-12-15 RX ORDER — POTASSIUM CHLORIDE 20 MEQ/1
40 TABLET, EXTENDED RELEASE ORAL
Status: COMPLETED | OUTPATIENT
Start: 2022-12-15 | End: 2022-12-15

## 2022-12-15 RX ADMIN — POTASSIUM CHLORIDE 40 MEQ: 1500 TABLET, EXTENDED RELEASE ORAL at 01:12

## 2022-12-15 RX ADMIN — SODIUM CHLORIDE 1000 ML: 0.9 INJECTION, SOLUTION INTRAVENOUS at 11:12

## 2022-12-15 NOTE — ED NOTES
Patient given a pair of scrub pants since the patient soiled herself. DC paperwork provided to the patient and put into a lyft.Patient is to be taken to a shelter. Patient ambulatory to the car from the room. Steady gait noted.

## 2022-12-15 NOTE — DISCHARGE INSTRUCTIONS
Follow-up with your primary care provider for further evaluation. Use the resources provided by social work for assistance with shelter placement.    Return to the emergency room for new, worsening, or concerning symptoms.     Future Appointments   Date Time Provider Department Center   12/22/2022  2:30 PM Dorothea Etienne MD [facility] [id number]

## 2022-12-15 NOTE — ED PROVIDER NOTES
"Encounter Date: 12/15/2022       History     Chief Complaint   Patient presents with    Weakness     Pt recently seen at [facility] and positive 12/5. Per EMS, pt was living at home with her parents who kicked her out of the house for smoking weed. Living in her car in their driveway. HIV +.      The history is provided by the patient and medical records. No  was used.     Amie Salmeron is a 57 y.o. female with medical history of  seasonal allergies, osteoarthritis, asthma, chronic pain disorder, HIV/AIDS (CD4 count 43) on Bactrim, hypertension, overactive bladder, spinal stenosis presenting to the ED with the chief complaint of weakness.    Patient currently staying at her parents and was kicked out of her house after she was caught smoking marijuana. EMS was called to their residence for generalized weakness. Patient describes weakness as feeling fatigued and having no appetite. Reports associated sinus congestion, post nasal drip, urinary urgency, dry cough. Reports "falling out" two times within the past week, Describes episode as losing consciousness and falling to the ground. Denies headache, neck pain, chest pain, abdominal pain, vomiting, dysuria, hematuria, diarrhea, constipation. Patient reports continuously testing positive for COVID for the past year. Currently on Bactrim, Azithromycin, Biktarvy and last took her medications yesterday. Reports she is currently homeless. Denies ETOH use. No SI, HI, Hallucinations. Most recently COVID+ on 12/5/22.     Review of patient's allergies indicates:  No Known Allergies  Past Medical History:   Diagnosis Date    Allergy     Arthritis     Asthma     Chronic pain     HIV infection     Hypertension     Overactive bladder     Spinal stenosis     Urine incontinence      Past Surgical History:   Procedure Laterality Date    ADENOIDECTOMY      APPENDECTOMY      BACK SURGERY      GERALD    CYST REMOVAL      HYSTERECTOMY      JOINT REPLACEMENT Right     " knee, with revision    KNEE SURGERY Left     knee replacement    RHIZOTOMY      TONSILLECTOMY      TOTAL ANKLE ARTHROPLASTY Right      Family History   Problem Relation Age of Onset    Thyroid disease Mother     Hypertension Mother     Cancer Father         oral CA    Depression Sister     Hypertension Brother     Alcohol abuse Brother      Social History     Tobacco Use    Smoking status: Never    Smokeless tobacco: Never   Substance Use Topics    Alcohol use: Yes     Comment: Socially    Drug use: No     Review of Systems   Constitutional:  Positive for appetite change and fatigue. Negative for fever.   HENT:  Positive for congestion. Negative for sore throat.    Eyes:  Negative for pain.   Respiratory:  Positive for cough. Negative for shortness of breath.    Cardiovascular:  Negative for chest pain.   Gastrointestinal:  Negative for nausea.   Genitourinary:  Negative for dysuria.   Musculoskeletal:  Negative for back pain.   Skin:  Negative for rash.   Neurological:  Positive for weakness.   Hematological:  Does not bruise/bleed easily.     Physical Exam     Initial Vitals [12/15/22 1126]   BP Pulse Resp Temp SpO2   126/78 86 18 98 °F (36.7 °C) 97 %      MAP       --         Physical Exam    Constitutional: She appears well-developed and well-nourished. She is not diaphoretic. No distress.   HENT:   Head: Normocephalic and atraumatic.   Mouth/Throat: Oropharynx is clear and moist. No oropharyngeal exudate.   Eyes: Conjunctivae and EOM are normal. Pupils are equal, round, and reactive to light. No scleral icterus.   Neck: Neck supple.   Normal range of motion.  Cardiovascular:  Normal rate and regular rhythm.           Pulmonary/Chest: Breath sounds normal. No respiratory distress. She has no wheezes.   Abdominal: Abdomen is soft. She exhibits no distension. There is no abdominal tenderness. There is no rebound.   Musculoskeletal:         General: No tenderness or edema. Normal range of motion.      Cervical  back: Normal range of motion and neck supple.     Neurological: She is alert and oriented to person, place, and time. She has normal strength. No sensory deficit.   Skin: Skin is warm and dry. No rash noted. No erythema.   Psychiatric: She has a normal mood and affect.       ED Course   Procedures  Labs Reviewed   CBC W/ AUTO DIFFERENTIAL - Abnormal; Notable for the following components:       Result Value    Hemoglobin 11.3 (*)     Hematocrit 36.0 (*)     MCHC 31.4 (*)     RDW 15.4 (*)     Lymph % 9.0 (*)     All other components within normal limits   COMPREHENSIVE METABOLIC PANEL - Abnormal; Notable for the following components:    Sodium 135 (*)     Potassium 3.1 (*)     Calcium 8.3 (*)     Albumin 2.4 (*)     Anion Gap 7 (*)     All other components within normal limits   B-TYPE NATRIURETIC PEPTIDE   LACTIC ACID, PLASMA   URINALYSIS, REFLEX TO URINE CULTURE    Narrative:     Specimen Source->Urine   TROPONIN I   TSH   MAGNESIUM   PATHOLOGIST REVIEW        ECG Results              EKG 12-lead (Final result)  Result time 12/15/22 13:17:47      Final result by Interface, Lab In [facility] (12/15/22 13:17:47)                   Narrative:    Test Reason : R53.1,    Vent. Rate : 085 BPM     Atrial Rate : 085 BPM     P-R Int : 120 ms          QRS Dur : 076 ms      QT Int : 344 ms       P-R-T Axes : 068 070 057 degrees     QTc Int : 409 ms    Normal sinus rhythm  Normal ECG  When compared with ECG of 05-DEC-2022 18:19,  No significant change was found  Confirmed by Isra Dougherty MD ([id number]) on 12/15/2022 1:17:42 PM    Referred By: [email or web address]   SELF           Confirmed By:Isra Dougherty MD                                  Imaging Results              X-Ray Chest PA And Lateral (Final result)  Result time 12/15/22 12:52:46      Final result by Reyes Hernandez MD (12/15/22 12:52:46)                   Impression:      Improving bilateral lower lobe opacity, as above, possible pneumonia.  Additional follow-up  suggested..      Electronically signed by: Reyes Hernandez MD  Date:    12/15/2022  Time:    12:52               Narrative:    EXAMINATION:  XR CHEST PA AND LATERAL    CLINICAL HISTORY:  Weakness    TECHNIQUE:  PA and lateral views of the chest were performed.    COMPARISON:  12/05/2022    FINDINGS:  Heart size is normal.  Mediastinum shows aortic atherosclerosis.  Lungs are satisfactorily expanded.  Upper lung zones remain clear.  Bilateral lower lung zones show slight patchy ground-glass opacity, improved from prior examination consistent with resolving edema,   multifocal pneumonia, aspiration, etcetera.  No significant pleural fluid is seen.  The skeletal structures are intact.                                       Medications   sodium chloride 0.9% bolus 1,000 mL 1,000 mL (0 mLs Intravenous Stopped 12/15/22 1212)   potassium chloride SA CR tablet 40 mEq (40 mEq Oral Given 12/15/22 1328)     Medical Decision Making:   History:   Old Medical Records: I decided to obtain old medical records.  Old Records Summarized: records from clinic visits and records from previous admission(s).  Independently Interpreted Test(s):   I have ordered and independently interpreted EKG Reading(s) - see summary below       <> Summary of EKG Reading(s): NSR 85 bpm. No STEMI  Clinical Tests:   Lab Tests: Ordered and Reviewed  Radiological Study: Ordered and Reviewed  Medical Tests: Ordered and Reviewed     APC / Resident Notes:   57 y.o. female with medical history of  seasonal allergies, osteoarthritis, asthma, chronic pain disorder, HIV/AIDS (CD4 count 43) on Bactrim, hypertension, overactive bladder, spinal stenosis presenting to the ED c/o generalized weakness, sinus congestion, post nasal drip, cough, loss of appetite, ?syncopal episodes for the past few days. EMS called after patient was kicked out of her parents house for smoking marijuana. Reports she is currently homeless.     DDx includes but not limited to COVID, viral  syndrome, pneumonia, HIV/AIDs, electrolyte disturbance, ARMA, ACS, CHF, cardiac arrhythmia, UTI, thyroid disease.             Work-up reviewed. K slightly decreased, replaced PO. CXR with improving infiltrates from previous. Ambulated with lowest reading 98%. Lactate negative. Cardiac markers negative. Tolerated PO sandwich and water without difficulty. Ambulates without assistance. Okay for outpatient management. Social work consulted who evaluated and provided resource for shelter placement as patient currently does not have a place to go. Patient expresses understanding and agreeable to the plan. Return to ED precautions given for new, worsening, or concerning symptoms.       Clinical Impression:   Final diagnoses:  [R53.1] Generalized weakness (Primary)  [U07.1] COVID-19 virus infection        ED Disposition Condition    Discharge Stable          ED Prescriptions    None       Follow-up Information       Follow up With Specialties Details Why Contact Info    Dustin Caro MD Family Medicine Go in 1 week(s) See PCP in 1-2 weeks [address] [facility]  [address] [place] [id number]  [phone]      [facility] - Emergency Dept Emergency Medicine Go to If symptoms worsen [address] [facility] [id number] [phone]    [facility] Surgical Oncology, Orthopedic Surgery, Genetics, Physical Medicine and Rehabilitation, Occupational Therapy, Radiology Go to see your cardiologist in 2-3 weeks [address] [facility] [id number] [phone]               Franco Coyne PA-C  12/15/22 0925

## 2022-12-15 NOTE — ED NOTES
Patient identifiers verified and correct for Amie Salmeron.   LOC: The patient is awake, alert and aware of environment with an appropriate affect, the patient is oriented x 3 and speaking appropriately.   APPEARANCE: Patient appears comfortable and in no acute distress, patient appears clean, patient currently still wearing PJs.  SKIN: The skin is warm and dry, color consistent with ethnicity, patient has normal skin turgor and moist mucus membranes, skin intact, no breakdown or bruising noted.   MUSCULOSKELETAL: Patient moving all extremities spontaneously, no swelling noted. Patient states that she is weak, but able to bend at the knees and hold her bottom up for an extended period of time to help with changing.   RESPIRATORY: Airway is open and patent, respirations are spontaneous, patient has a normal effort and rate, no accessory muscle use noted, pt placed on continuous pulse ox with O2 sats noted at 100% on room air. Patient denies any SOB.  CARDIAC: Pt placed on cardiac monitor. Patient has a normal rate and regular rhythm, no edema noted, capillary refill < 3 seconds. Patient denies any chest pain or palpitations.   GASTRO: Soft and non tender to palpation, no distention noted, normoactive bowel sounds present in all four quadrants. Pt states bowel movements have been regular.  : Pt denies any pain, but reports frequency with urination. Patient states she is unable to hold her urine.   NEURO: Pt opens eyes spontaneously, behavior appropriate to situation, follows commands, facial expression symmetrical, bilateral hand grasp equal and even, purposeful motor response noted, normal sensation in all extremities when touched with a finger.

## 2022-12-15 NOTE — PLAN OF CARE
12/15/22 1509   Post-Acute Status   Post-Acute Authorization Other   Other Status Community Services   Hospital Resources/Appts/Education Provided Provided patient/caregiver with written discharge plan information   Discharge Delays None known at this time   Discharge Plan   Discharge Plan A Shelter

## 2022-12-15 NOTE — ED TRIAGE NOTES
Patient c/o being weak of being dx with COVID on 12/5. Per the patient she hasn't eaten anything in 2 days. Patient also reports falling to the ground the other day. Patient states she has had fevers, clogged ears, slight cough, and urgency to urinate. Patient denies any N/V/D, chest pain, or SOB.

## 2022-12-15 NOTE — PLAN OF CARE
Self reports newly homeless:    Agreed to go to a local shelter    [facility]  [address] [place] [address]    [facility] - Emergency Dept  Initial Discharge Assessment       Primary Care Provider: Dustin Caro MD    Admission Diagnosis: No admission diagnoses are documented for this encounter.    Admission Date: 12/15/2022  Expected Discharge Date:          Payor: HU[employer] MANAGED MEDICARE / Plan: HU[employer] MEDICARE HMO / Product Type: Capitation /     Extended Emergency Contact Information  Primary Emergency Contact: Elena Crandall   [facility]  Home Phone: [phone]  Mobile Phone: [phone]  Relation: Mother    Discharge Plan A: (P) Shelter         [place] [id number]  [place], [place] - [address] [facility]  [address]  [place] [address]  Phone: [phone] Fax: [phone]      Initial Assessment (most recent)       Adult Discharge Assessment - 12/15/22 1506          Discharge Assessment    Assessment Type Discharge Planning Assessment (P)      Source of Information patient;health record (P)      Do you have help at home or someone to help you manage your care at home? No (P)      Prior to hospitilization cognitive status: Alert/Oriented (P)      Current cognitive status: Alert/Oriented (P)      Equipment Currently Used at Home none (P)      How do you get to doctors appointments? family or friend will provide;public transportation;health plan transportation (P)      Are you on dialysis? No (P)      Discharge Plan A Shelter (P)

## 2022-12-25 ENCOUNTER — TELEPHONE (OUTPATIENT)
Dept: INFECTIOUS DISEASES | Facility: HOSPITAL | Age: 57
End: 2022-12-25
Payer: MEDICARE

## 2022-12-25 DIAGNOSIS — B20 HUMAN IMMUNODEFICIENCY VIRUS (HIV) DISEASE: Primary | ICD-10-CM

## 2022-12-25 RX ORDER — SULFAMETHOXAZOLE AND TRIMETHOPRIM 800; 160 MG/1; MG/1
1 TABLET ORAL 2 TIMES DAILY
Qty: 60 TABLET | Refills: 0 | Status: SHIPPED | OUTPATIENT
Start: 2022-12-25 | End: 2022-12-25

## 2022-12-25 RX ORDER — SULFAMETHOXAZOLE AND TRIMETHOPRIM 800; 160 MG/1; MG/1
1 TABLET ORAL DAILY
Qty: 30 TABLET | Refills: 0 | Status: SHIPPED | OUTPATIENT
Start: 2022-12-25 | End: 2023-01-24

## 2022-12-25 NOTE — TELEPHONE ENCOUNTER
Patient missed her appt last week, will reschedule.  Prescription for Biktarvy 1 tab once a day   and Bactrim modified to one pill once a day instead of 2 pills twice a day for PCP prophylaxis which was prescribed by another physician.

## 2022-12-29 ENCOUNTER — TELEPHONE (OUTPATIENT)
Dept: INFECTIOUS DISEASES | Facility: CLINIC | Age: 57
End: 2022-12-29

## 2022-12-29 DIAGNOSIS — B20 AIDS: Primary | ICD-10-CM

## 2022-12-29 DIAGNOSIS — B20 HUMAN IMMUNODEFICIENCY VIRUS (HIV) DISEASE: ICD-10-CM

## 2022-12-29 RX ORDER — SULFAMETHOXAZOLE AND TRIMETHOPRIM 800; 160 MG/1; MG/1
1 TABLET ORAL DAILY
Qty: 30 TABLET | Refills: 1 | Status: SHIPPED | OUTPATIENT
Start: 2022-12-29 | End: 2023-01-05 | Stop reason: SDUPTHER

## 2022-12-29 RX ORDER — BICTEGRAVIR SODIUM, EMTRICITABINE, AND TENOFOVIR ALAFENAMIDE FUMARATE 50; 200; 25 MG/1; MG/1; MG/1
1 TABLET ORAL DAILY
Qty: 30 TABLET | Refills: 5 | Status: SHIPPED | OUTPATIENT
Start: 2022-12-29 | End: 2023-01-28

## 2022-12-29 NOTE — TELEPHONE ENCOUNTER
--> script for Biktarvy and Bactrim sent to [facility] specialty pharmacy.       After several attempts to reach patient over the last 3 days  Patient called the office     I explained to the patient that I spoke with her pharmacy   [facility]  at [phone]    The pharmacist stated the Biktarvy is #[phone] after insurance and the reason is most likely that she needs  To use a mail off pharmacy ; [facility] .    I advised patient she needs to choose a mail off pharmacy   That her insurance will cover .  Patient was flustered and stated for us to pick a pharmacy   She did not care .  I advised her her insurance has to approve the pharmacy and she should call her insurance to inquire as to what   Pharmacy she should use.    Patient stated for us to use [facility]     I am sending a message to Dr Mayers to please   Send a script to [facility] for Biktarvy .    ROBERT Knowles  [facility]  12/29/22

## 2022-12-31 ENCOUNTER — HOSPITAL ENCOUNTER (EMERGENCY)
Facility: HOSPITAL | Age: 57
Discharge: HOME | End: 2022-12-31
Attending: EMERGENCY MEDICINE
Payer: MEDICARE

## 2022-12-31 VITALS
BODY MASS INDEX: 18.27 KG/M2 | HEART RATE: 83 BPM | HEIGHT: 64 IN | OXYGEN SATURATION: 97 % | TEMPERATURE: 98 F | WEIGHT: 107 LBS | RESPIRATION RATE: 17 BRPM | DIASTOLIC BLOOD PRESSURE: 87 MMHG | SYSTOLIC BLOOD PRESSURE: 142 MMHG

## 2022-12-31 DIAGNOSIS — B20 AIDS: ICD-10-CM

## 2022-12-31 DIAGNOSIS — B37.0 THRUSH, ORAL: Primary | ICD-10-CM

## 2022-12-31 PROCEDURE — 99282 EMERGENCY DEPT VISIT SF MDM: CPT

## 2022-12-31 RX ORDER — FLUCONAZOLE 200 MG/1
200 TABLET ORAL DAILY
Qty: 7 TABLET | Refills: 0 | Status: SHIPPED | OUTPATIENT
Start: 2022-12-31 | End: 2023-01-07

## 2022-12-31 NOTE — ED PROVIDER NOTES
"Encounter Date: 12/31/2022       History     Chief Complaint   Patient presents with    Sore Throat     "I think I might have thrush"     Emergent evaluation of Amie Salmeron is a 57 y.o. female with medical history of  seasonal allergies, osteoarthritis, asthma, chronic pain disorder, HIV/AIDS (CD4 count 43) on Bactrim, azithromycin and Biktarvy , hypertension, overactive bladder, spinal stenosis presenting to the ED with the chief complaint of sore throat patient is concerned she is thrush.  She reports she is been compliant with her HIV medications.  She developed sore throat over the past several days with white plaques.  No nasal congestion or rhinorrhea reports chronic cough that is unchanged no fevers.  Patient is currently homeless.    Review of patient's allergies indicates:  No Known Allergies  Past Medical History:   Diagnosis Date    Allergy     Arthritis     Asthma     Chronic pain     HIV infection     Hypertension     Overactive bladder     Spinal stenosis     Urine incontinence      Past Surgical History:   Procedure Laterality Date    ADENOIDECTOMY      APPENDECTOMY      BACK SURGERY      GERALD    CYST REMOVAL      HYSTERECTOMY      JOINT REPLACEMENT Right     knee, with revision    KNEE SURGERY Left     knee replacement    RHIZOTOMY      TONSILLECTOMY      TOTAL ANKLE ARTHROPLASTY Right      Family History   Problem Relation Age of Onset    Thyroid disease Mother     Hypertension Mother     Cancer Father         oral CA    Depression Sister     Hypertension Brother     Alcohol abuse Brother      Social History     Tobacco Use    Smoking status: Never    Smokeless tobacco: Never   Substance Use Topics    Alcohol use: Yes     Comment: Socially    Drug use: No     Review of Systems   Constitutional:  Negative for activity change, appetite change, chills, diaphoresis, fatigue and fever.   HENT:  Positive for mouth sores and sore throat. Negative for rhinorrhea, trouble swallowing and voice change.  "   Respiratory:  Negative for shortness of breath.    Cardiovascular:  Negative for chest pain.   Gastrointestinal:  Negative for abdominal pain.   Musculoskeletal:  Positive for myalgias.   Neurological:  Negative for dizziness and weakness.   Psychiatric/Behavioral:  Negative for confusion.    All other systems reviewed and are negative.    Physical Exam     Initial Vitals   BP Pulse Resp Temp SpO2   -- -- -- -- --      MAP       --         Physical Exam    Nursing note and vitals reviewed.  Constitutional: She appears well-developed and well-nourished. She is not diaphoretic. No distress.   HENT:   Head: Normocephalic and atraumatic.   Right Ear: External ear normal.   Left Ear: External ear normal.   Nose: Nose normal.   Mouth/Throat: Oropharyngeal exudate present.   Moderate thrush to buccal mucosa soft palate uvula and posterior pharynx   Eyes: Conjunctivae and EOM are normal. Pupils are equal, round, and reactive to light.   Neck: Neck supple. No tracheal deviation present.   Normal range of motion.  Cardiovascular:  Normal rate, regular rhythm, normal heart sounds and intact distal pulses.     Exam reveals no gallop and no friction rub.       No murmur heard.  Pulmonary/Chest: Breath sounds normal. No stridor. No respiratory distress. She has no wheezes. She has no rhonchi. She has no rales. She exhibits no tenderness.   Abdominal: Abdomen is soft. Bowel sounds are normal. She exhibits no distension and no mass. There is no abdominal tenderness. There is no rebound and no guarding.   Musculoskeletal:         General: No edema. Normal range of motion.      Cervical back: Normal range of motion and neck supple.     Neurological: She is alert and oriented to person, place, and time. She has normal strength. No cranial nerve deficit or sensory deficit.   Skin: Skin is warm and dry. No rash noted. No erythema. No pallor.   Psychiatric: She has a normal mood and affect. Her behavior is normal. Judgment and thought  content normal.       ED Course   Procedures  Labs Reviewed - No data to display       Imaging Results    None          Medications - No data to display  Medical Decision Making:   ED Management:  Emergent evaluation of Amie Salmeron is a 57 y.o. female with medical history of  seasonal allergies, osteoarthritis, asthma, chronic pain disorder, HIV/AIDS (CD4 count 43) on Bactrim, azithromycin and Biktarvy , hypertension, overactive bladder, spinal stenosis presenting to the ED with the chief complaint of sore throat patient is concerned she is thrush.  She reports she is been compliant with her HIV medications.  She developed sore throat over the past several days with white plaques.  No nasal congestion or rhinorrhea reports chronic cough that is unchanged no fevers.  Patient is currently homeless.    Patient is overall well-appearing in no acute distress with moderate thrush of the oropharynx no signs of peritonsillar abscess.  No stridor or hot potato voice no trismus.  Mild submandibular lymphadenopathy no neck swelling.  She denies any systemic symptoms.  She reports no other new symptoms she reports chronic myalgias.  She has her Biktarvy pills with her.  She was given a sandwich tray which she ate and also asked for orange juice.  She will be given fluconazole at discharge and will need to follow-up with Dr. Mayers.  Chanel Mireles M.D.  3:49 AM 12/31/2022                          Clinical Impression:   Final diagnoses:  [B37.0] Thrush, oral (Primary)  [B20] AIDS        ED Disposition Condition    Discharge Stable          ED Prescriptions       Medication Sig Dispense Start Date End Date Auth. Provider    fluconazole (DIFLUCAN) 200 MG Tab Take 1 tablet (200 mg total) by mouth once daily. for 7 days 7 tablet 12/31/2022 1/7/2023 Chanel Mireles MD          Follow-up Information       Follow up With Specialties Details Why Contact Info Additional Information    Dorothea Etienne MD Infectious  Diseases Schedule an appointment as soon as possible for a visit   [address]  [address]  [facility] [address]  [phone]       [facility] - Emergency Dept Emergency Medicine Go to  If symptoms worsen [address] [facility] [id number]  [phone] 1st floor             Chanel Mireles MD  12/31/22 0350

## 2023-01-03 ENCOUNTER — PATIENT MESSAGE (OUTPATIENT)
Dept: ADMINISTRATIVE | Facility: OTHER | Age: 58
End: 2023-01-03
Payer: MEDICARE

## 2023-01-11 ENCOUNTER — DOCUMENTATION ONLY (OUTPATIENT)
Dept: INFECTIOUS DISEASES | Facility: CLINIC | Age: 58
End: 2023-01-11

## 2023-01-11 NOTE — PROGRESS NOTES
Patient called me back on 1/12/23  I advised her I spoke with Sonja at [facility]   and advised patient pharmacy was waiting on her to call in order to finish the delivery of her Biktarvy   I gave patient the phone number for the pharmacy .  I gave patient the phone number for [place] [facility] so she  Can apply for assistance with medications.     ROBERT Knowles  [facility]  1/12/23        Patient called stating she only has 4 days of Biktarvy left    I attempted to call the patient ;  no answer ; I left a message for her to call me.    I called [facility] [phone]    I spoke with Sonja  pharmacy tech    She stated they spoke to patient about Biktarvy  Patient had concerns about co pay   They connected her to co pay assistance  They are waiting to hear back from the patent   In order to send the Biktarvy out.    Sonja stated patient's Bactrim was forwarded to and is being handled by the mail order service.    ROBERT s [facility]  1/11/23

## 2023-01-16 PROBLEM — J18.9 PNEUMONIA DUE TO INFECTIOUS ORGANISM: Status: RESOLVED | Noted: 2022-10-13 | Resolved: 2023-01-16

## 2023-02-01 ENCOUNTER — OFFICE VISIT (OUTPATIENT)
Dept: INFECTIOUS DISEASES | Facility: CLINIC | Age: 58
End: 2023-02-01
Payer: MEDICARE

## 2023-02-01 ENCOUNTER — LAB VISIT (OUTPATIENT)
Dept: LAB | Facility: HOSPITAL | Age: 58
End: 2023-02-01
Attending: STUDENT IN AN ORGANIZED HEALTH CARE EDUCATION/TRAINING PROGRAM
Payer: COMMERCIAL

## 2023-02-01 VITALS
HEIGHT: 64 IN | HEART RATE: 95 BPM | DIASTOLIC BLOOD PRESSURE: 70 MMHG | SYSTOLIC BLOOD PRESSURE: 126 MMHG | WEIGHT: 114.88 LBS | TEMPERATURE: 98 F | BODY MASS INDEX: 19.61 KG/M2 | OXYGEN SATURATION: 99 %

## 2023-02-01 DIAGNOSIS — Z12.31 ENCOUNTER FOR SCREENING MAMMOGRAM FOR MALIGNANT NEOPLASM OF BREAST: Primary | ICD-10-CM

## 2023-02-01 DIAGNOSIS — Z00.00 HEALTH CARE MAINTENANCE: ICD-10-CM

## 2023-02-01 DIAGNOSIS — B20 HUMAN IMMUNODEFICIENCY VIRUS (HIV) DISEASE: ICD-10-CM

## 2023-02-01 DIAGNOSIS — Z21 HIV POSITIVE: ICD-10-CM

## 2023-02-01 DIAGNOSIS — R10.9 ABDOMINAL PAIN, UNSPECIFIED ABDOMINAL LOCATION: ICD-10-CM

## 2023-02-01 LAB
ALBUMIN SERPL BCP-MCNC: 2.5 G/DL (ref 3.5–5.2)
ALP SERPL-CCNC: 132 U/L (ref 55–135)
ALT SERPL W/O P-5'-P-CCNC: 12 U/L (ref 10–44)
ANION GAP SERPL CALC-SCNC: 3 MMOL/L (ref 8–16)
ANISOCYTOSIS BLD QL SMEAR: SLIGHT
AST SERPL-CCNC: 25 U/L (ref 10–40)
BASOPHILS NFR BLD: 0 % (ref 0–1.9)
BILIRUB SERPL-MCNC: 0.3 MG/DL (ref 0.1–1)
BUN SERPL-MCNC: 18 MG/DL (ref 6–20)
CALCIUM SERPL-MCNC: 8.8 MG/DL (ref 8.7–10.5)
CHLORIDE SERPL-SCNC: 105 MMOL/L (ref 95–110)
CO2 SERPL-SCNC: 28 MMOL/L (ref 23–29)
CREAT SERPL-MCNC: 1 MG/DL (ref 0.5–1.4)
DIFFERENTIAL METHOD: ABNORMAL
EOSINOPHIL NFR BLD: 0 % (ref 0–8)
ERYTHROCYTE [DISTWIDTH] IN BLOOD BY AUTOMATED COUNT: 16.2 % (ref 11.5–14.5)
EST. GFR  (NO RACE VARIABLE): >60 ML/MIN/1.73 M^2
GLUCOSE SERPL-MCNC: 87 MG/DL (ref 70–110)
HCT VFR BLD AUTO: 34.4 % (ref 37–48.5)
HGB BLD-MCNC: 10.2 G/DL (ref 12–16)
HYPOCHROMIA BLD QL SMEAR: ABNORMAL
IMM GRANULOCYTES # BLD AUTO: ABNORMAL K/UL (ref 0–0.04)
IMM GRANULOCYTES NFR BLD AUTO: ABNORMAL % (ref 0–0.5)
LYMPHOCYTES NFR BLD: 16 % (ref 18–48)
MCH RBC QN AUTO: 26.5 PG (ref 27–31)
MCHC RBC AUTO-ENTMCNC: 29.7 G/DL (ref 32–36)
MCV RBC AUTO: 89 FL (ref 82–98)
MONOCYTES NFR BLD: 8 % (ref 4–15)
NEUTROPHILS NFR BLD: 71 % (ref 38–73)
NEUTS BAND NFR BLD MANUAL: 5 %
NRBC BLD-RTO: 0 /100 WBC
PLATELET # BLD AUTO: 449 K/UL (ref 150–450)
PLATELET BLD QL SMEAR: ABNORMAL
PMV BLD AUTO: 8.7 FL (ref 9.2–12.9)
POTASSIUM SERPL-SCNC: 4.1 MMOL/L (ref 3.5–5.1)
PROT SERPL-MCNC: 7.3 G/DL (ref 6–8.4)
RBC # BLD AUTO: 3.85 M/UL (ref 4–5.4)
SODIUM SERPL-SCNC: 136 MMOL/L (ref 136–145)
WBC # BLD AUTO: 7.44 K/UL (ref 3.9–12.7)

## 2023-02-01 PROCEDURE — 36415 COLL VENOUS BLD VENIPUNCTURE: CPT | Performed by: STUDENT IN AN ORGANIZED HEALTH CARE EDUCATION/TRAINING PROGRAM

## 2023-02-01 PROCEDURE — 1159F PR MEDICATION LIST DOCUMENTED IN MEDICAL RECORD: ICD-10-PCS | Mod: CPTII,S$GLB,, | Performed by: STUDENT IN AN ORGANIZED HEALTH CARE EDUCATION/TRAINING PROGRAM

## 2023-02-01 PROCEDURE — 3078F PR MOST RECENT DIASTOLIC BLOOD PRESSURE < 80 MM HG: ICD-10-PCS | Mod: CPTII,S$GLB,, | Performed by: STUDENT IN AN ORGANIZED HEALTH CARE EDUCATION/TRAINING PROGRAM

## 2023-02-01 PROCEDURE — 3078F DIAST BP <80 MM HG: CPT | Mod: CPTII,S$GLB,, | Performed by: STUDENT IN AN ORGANIZED HEALTH CARE EDUCATION/TRAINING PROGRAM

## 2023-02-01 PROCEDURE — 86361 T CELL ABSOLUTE COUNT: CPT | Performed by: STUDENT IN AN ORGANIZED HEALTH CARE EDUCATION/TRAINING PROGRAM

## 2023-02-01 PROCEDURE — 87536 HIV-1 QUANT&REVRSE TRNSCRPJ: CPT | Performed by: STUDENT IN AN ORGANIZED HEALTH CARE EDUCATION/TRAINING PROGRAM

## 2023-02-01 PROCEDURE — 99214 OFFICE O/P EST MOD 30 MIN: CPT | Mod: S$GLB,,, | Performed by: STUDENT IN AN ORGANIZED HEALTH CARE EDUCATION/TRAINING PROGRAM

## 2023-02-01 PROCEDURE — 85007 BL SMEAR W/DIFF WBC COUNT: CPT | Performed by: STUDENT IN AN ORGANIZED HEALTH CARE EDUCATION/TRAINING PROGRAM

## 2023-02-01 PROCEDURE — 3074F SYST BP LT 130 MM HG: CPT | Mod: CPTII,S$GLB,, | Performed by: STUDENT IN AN ORGANIZED HEALTH CARE EDUCATION/TRAINING PROGRAM

## 2023-02-01 PROCEDURE — 3008F PR BODY MASS INDEX (BMI) DOCUMENTED: ICD-10-PCS | Mod: CPTII,S$GLB,, | Performed by: STUDENT IN AN ORGANIZED HEALTH CARE EDUCATION/TRAINING PROGRAM

## 2023-02-01 PROCEDURE — 3008F BODY MASS INDEX DOCD: CPT | Mod: CPTII,S$GLB,, | Performed by: STUDENT IN AN ORGANIZED HEALTH CARE EDUCATION/TRAINING PROGRAM

## 2023-02-01 PROCEDURE — 80053 COMPREHEN METABOLIC PANEL: CPT | Performed by: STUDENT IN AN ORGANIZED HEALTH CARE EDUCATION/TRAINING PROGRAM

## 2023-02-01 PROCEDURE — 1159F MED LIST DOCD IN RCRD: CPT | Mod: CPTII,S$GLB,, | Performed by: STUDENT IN AN ORGANIZED HEALTH CARE EDUCATION/TRAINING PROGRAM

## 2023-02-01 PROCEDURE — 99214 PR OFFICE/OUTPT VISIT, EST, LEVL IV, 30-39 MIN: ICD-10-PCS | Mod: S$GLB,,, | Performed by: STUDENT IN AN ORGANIZED HEALTH CARE EDUCATION/TRAINING PROGRAM

## 2023-02-01 PROCEDURE — 85027 COMPLETE CBC AUTOMATED: CPT | Performed by: STUDENT IN AN ORGANIZED HEALTH CARE EDUCATION/TRAINING PROGRAM

## 2023-02-01 PROCEDURE — 3074F PR MOST RECENT SYSTOLIC BLOOD PRESSURE < 130 MM HG: ICD-10-PCS | Mod: CPTII,S$GLB,, | Performed by: STUDENT IN AN ORGANIZED HEALTH CARE EDUCATION/TRAINING PROGRAM

## 2023-02-01 RX ORDER — BICTEGRAVIR SODIUM, EMTRICITABINE, AND TENOFOVIR ALAFENAMIDE FUMARATE 50; 200; 25 MG/1; MG/1; MG/1
1 TABLET ORAL DAILY
Status: ON HOLD | COMMUNITY
End: 2024-03-25 | Stop reason: HOSPADM

## 2023-02-01 RX ORDER — TRAMADOL HYDROCHLORIDE 50 MG/1
50 TABLET ORAL EVERY 8 HOURS PRN
Qty: 30 TABLET | Refills: 0 | Status: SHIPPED | OUTPATIENT
Start: 2023-02-01 | End: 2023-02-15

## 2023-02-01 NOTE — PROGRESS NOTES
Subjective: Hospital follow up - Establish HIV care       Patient ID: Amie Salmeron is a 58 y.o. female.    Chief Complaint:: Follow-up    HPI Amie Salmeron is a 57 y.o. female to me for recent admission to [facility] for fever, she has past medical history of HIV diagnosed in 1981, never on anti-retroviral therapy, last CD4 46, ,000, COVID 19 in October, and prior L eye corneal ulcer s/p . She was admitted for failure to thrive, and persistent fever at home with associated dysphagia, and persistent cough.  She was found to have esophageal candidiasis.  Workup while at the hospital including respiratory culture negative for PCP.  CT abdomen pelvis significant for intra-abdominal lymph nodes.    Patient was discharged home on Biktarvy 1 tablet once a day, Bactrim ds 1 tablet once a day for PCP prophylaxis, and azithromycin 1200 mg once a week for MAC prophylaxis.    She is here for follow-up, complaining of painful ulcers around her perianal area which she states started 3 days ago. She states she is compliant to Biktarvy, has not missed any doses since discharged from the hospital last week. She mentions she is feeling better and her appetite is improved, still complaints of constipation, passing gas. Denies headache, nausea or vomit, no dysuria, no abdominal pain. States her cough is better as well.     Epidemiology  Not currently sexually active, last encounter +30y ago  Works delivering pizza  Nonsmoker, no alcohol no IV drugs     INTERVAL HISTORY:  2/1/2023:  Interim reviewed, patient is here for follow-up, mother present.  Patient was not able to come for follow-up as instructed last visit.  She states she is been dealing with a lot at home.  Was able to obtain [facility] for medical assistance.  She states compliance to Biktarvy, has gained 14 lb since last visit.  She is complaining of epigastric pain, she states she is been taking ibuprofen around the clock without improvement, requesting Norco.  Will refer to  "GI for endoscopy.  She did not do blood work for this visit, CD4 count viral load odor today.    Review of patient's allergies indicates:  No Known Allergies  Past Medical History:   Diagnosis Date    Allergy     Arthritis     Asthma     Chronic pain     HIV infection     Hypertension     Overactive bladder     Spinal stenosis     Urine incontinence      Past Surgical History:   Procedure Laterality Date    ADENOIDECTOMY      APPENDECTOMY      BACK SURGERY      GERALD    CYST REMOVAL      HYSTERECTOMY      JOINT REPLACEMENT Right     knee, with revision    KNEE SURGERY Left     knee replacement    RHIZOTOMY      TONSILLECTOMY      TOTAL ANKLE ARTHROPLASTY Right      Social History     Tobacco Use    Smoking status: Never    Smokeless tobacco: Never   Substance Use Topics    Alcohol use: Yes     Comment: Socially     Social History     Occupational History    Occupation: on disability     Family History   Problem Relation Age of Onset    Thyroid disease Mother     Hypertension Mother     Cancer Father         oral CA    Depression Sister     Hypertension Brother     Alcohol abuse Brother          Review of Systems   Constitutional:  Negative for chills and fever.   HENT:  Negative for sinus pain.    Eyes:  Negative for pain.   Respiratory:  Negative for cough and shortness of breath.    Cardiovascular:  Negative for chest pain and leg swelling.   Gastrointestinal:  Positive for abdominal pain. Negative for blood in stool, constipation and nausea.   Genitourinary:  Negative for dysuria.   Musculoskeletal:  Negative for back pain.   Skin:  Negative for wound.   Neurological:  Negative for headaches.   Psychiatric/Behavioral:  Negative for confusion.          Objective:      Blood pressure 126/70, pulse 95, temperature 97.8 °F (36.6 °C), height 5' 4" (1.626 m), weight 52.1 kg (114 lb 14.4 oz), SpO2 99 %. Body mass index is 19.72 kg/m².  Physical Exam  Constitutional:       Appearance: She is normal weight.      Comments: " She looks significantly better compared to last encounter, has gained 14 lb   HENT:      Mouth/Throat:      Mouth: Mucous membranes are moist.      Pharynx: Oropharynx is clear.      Comments: No thrush  Eyes:      Conjunctiva/sclera: Conjunctivae normal.      Pupils: Pupils are equal, round, and reactive to light.      Comments: Left eye corneal scar   Cardiovascular:      Rate and Rhythm: Normal rate and regular rhythm.      Pulses: Normal pulses.      Heart sounds: Normal heart sounds.   Pulmonary:      Effort: Pulmonary effort is normal.      Breath sounds: Normal breath sounds.   Abdominal:      General: Bowel sounds are normal.      Palpations: Abdomen is soft.      Tenderness: There is no abdominal tenderness. There is no rebound.   Musculoskeletal:         General: Normal range of motion.      Cervical back: Normal range of motion and neck supple.      Right lower leg: No edema.      Left lower leg: No edema.   Skin:     General: Skin is warm.      Capillary Refill: Capillary refill takes less than 2 seconds.   Neurological:      General: No focal deficit present.      Mental Status: She is alert and oriented to person, place, and time.      Sensory: No sensory deficit.      Motor: No weakness.   Psychiatric:         Thought Content: Thought content normal.           Recent Diagnostics:  CXR: Interval improvement of bilateral pulmonary opacities, with persistent interstitial opacities as above. These could reflect incomplete resolution of infectious or inflammatory process, parenchymal scarring, atelectasis, or some combination thereof.    CT abdomen/pelvis 11/15:   1. Negative for para-aortic mass or retroperitoneal lymphadenopathy. Previously described findings reflect normal small bowel.  2. A few enlarged mesenteric lymph nodes, nonspecific.    CT chest/abdomen/pelvis:   1. 6.1 cm soft tissue density along the anterior and left anterolateral margin of the abdominal aorta in the mid abdomen as above.  Several adjacent mildly prominent mesenteric lymph nodes are noted, the largest measuring 1.7 x 1.3 cm. The soft tissue density is suspicious for a confluent ilia mass, but could also be contributed to by unopacified small bowel loops. CT abdomen with oral and intravenous contrast is recommended for further assessment.  2. Previously noted bilateral upper lobe pulmonary infiltrates have shown near complete resolution. Scattered groundglass opacities in both lungs are likely infectious/inflammatory in nature, with slightly more prominent infiltrate in the lingula.     HIV table:  CD4 = 26-->43, ratio 4.3% very low      Latest Reference Range & Units 10/15/22 06:03   Hep A Total Ab Negative  Negative   Hep B Core Total Ab Negative  Negative   Hep B S Ab  Non Reactive   Hepatitis B Surface Ag Negative  Negative   Hepatitis C Ab 0.0 - 0.9 s/co ratio 0.2   CRYPTOCOCCUS ANTIGEN, SERUM Negative  Negative   Quantiferon Mitogen Value IU/mL >10.00   Quantiferon Nil Value IU/mL 0.23   QuantiFERON-TB Gold Plus Negative  Negative   QuantiFERON TB1 Ag Value IU/mL 0.24   QuantiFERON TB2 Ag Value IU/mL 0.25   HIV 1 RNA Ultra copies/mL [id number]   RPR Non-reactive  Non-reactive     Summary of Mutations Observed:   RT: K22K/R, V35V/I, V60V/I, K101T, Q102K, C162S, Q174K,   V179I, L210L/S, R211K, P243A, V245K, R277K, T286A,   A288S, V293I   IN: E10D, V72I, L101I, V113I, S119G, T122I, T124N, T125A,   V234L, D253E, D256E, D279D/G   AZ: I13V, E35D, M36I, K45R, L63P, I72V, V77I, I93I/L      Latest Reference Range & Units 11/16/22 17:15   Pneumocystis Smear, DFA Negative  Negative        Assessment and Plan:         1. HIV/AIDS C3, last CD4 46, ,000, genotype reviewed   Continue Biktarvy 1 tab PO once day   Bactrim DS 1 tab PO for PCP prophylaxis until CD4 >200   Will start vaccinations once CD4 is >200 (pneumococcus, meningococcus, flu and COVID)   All questions answered    RTC in 3 months with labs     2. Epigastric pain, has been  taking NSAIDs around the clock, Tylenol does not help the pain, requesting Norco  Tramadol q8h PRN              GI referral for EGD, rule out gastric ulcer     3. H/o genital Herpes, not active     4. Healthcare maintenance - all referrals re-ordered    GI referral for colonoscopy, last one done >9y ago   Gyn referral for pap-smear   Mammogram re-ordered    Human immunodeficiency virus (HIV) disease  -     CBC Auto Differential; Future; Expected date: 02/01/2023  -     Comprehensive Metabolic Panel; Future; Expected date: 02/01/2023  -     CD4 T-[place] Cells; Future; Expected date: 02/01/2023  -     HIV RNA, quantitative, PCR; Future; Expected date: 02/01/2023    HIV positive        This note was created using Dragon voice recognition software that occasionally misinterpreted phrases or words.

## 2023-02-02 ENCOUNTER — TELEPHONE (OUTPATIENT)
Dept: GASTROENTEROLOGY | Facility: CLINIC | Age: 58
End: 2023-02-02
Payer: MEDICARE

## 2023-02-02 NOTE — TELEPHONE ENCOUNTER
Call placed to Ms. Salmeron in regards to GI referral received. No answer, left message to return call.

## 2023-02-03 LAB
BASOPHILS # BLD AUTO: 0.1 X10E3/UL (ref 0–0.2)
BASOPHILS NFR BLD AUTO: 2 %
CD3+CD4+ CELLS # BLD: 125 /UL (ref 359–1519)
CD3+CD4+ CELLS NFR BLD: 8.3 % (ref 30.8–58.5)
EOSINOPHIL # BLD AUTO: 0.1 X10E3/UL (ref 0–0.4)
EOSINOPHIL NFR BLD AUTO: 1 %
ERYTHROCYTE [DISTWIDTH] IN BLOOD BY AUTOMATED COUNT: 15.4 % (ref 11.7–15.4)
HCT VFR BLD AUTO: 34.4 % (ref 34–46.6)
HGB BLD-MCNC: 10.1 G/DL (ref 11.1–15.9)
IMMATURE CELLS: ABNORMAL
LYMPHOCYTES # BLD AUTO: 1.5 X10E3/UL (ref 0.7–3.1)
LYMPHOCYTES NFR BLD AUTO: 20 %
MCH RBC QN AUTO: 26.7 PG (ref 26.6–33)
MCHC RBC AUTO-ENTMCNC: 29.4 G/DL (ref 31.5–35.7)
MCV RBC AUTO: 91 FL (ref 79–97)
MONOCYTES # BLD AUTO: 1 X10E3/UL (ref 0.1–0.9)
MONOCYTES NFR BLD AUTO: 14 %
MORPHOLOGY BLD-IMP: ABNORMAL
NEUTROPHILS # BLD AUTO: 4.2 X10E3/UL (ref 1.4–7)
NEUTROPHILS NFR BLD AUTO: 58 %
PLATELET # BLD AUTO: 517 X10E3/UL (ref 150–450)
RBC # BLD AUTO: 3.78 X10E6/UL (ref 3.77–5.28)
WBC # BLD AUTO: 7.3 X10E3/UL (ref 3.4–10.8)

## 2023-02-04 LAB
HIV1 RNA # SERPL NAA+PROBE: 30 COPIES/ML
HIV1 RNA SERPL NAA+PROBE-LOG#: 1.48 LOG10COPY/ML

## 2023-02-06 ENCOUNTER — TELEPHONE (OUTPATIENT)
Dept: GASTROENTEROLOGY | Facility: CLINIC | Age: 58
End: 2023-02-06
Payer: MEDICARE

## 2023-02-06 NOTE — TELEPHONE ENCOUNTER
Mom answered phone she states patient not with her at this time she will have her return call contact info provided.

## 2023-02-08 DIAGNOSIS — B20 AIDS: Primary | ICD-10-CM

## 2023-02-08 RX ORDER — SULFAMETHOXAZOLE AND TRIMETHOPRIM 800; 160 MG/1; MG/1
1 TABLET ORAL DAILY
Qty: 30 TABLET | Refills: 1 | Status: SHIPPED | OUTPATIENT
Start: 2023-02-08 | End: 2023-03-10

## 2023-02-22 ENCOUNTER — PATIENT MESSAGE (OUTPATIENT)
Dept: INFECTIOUS DISEASES | Facility: CLINIC | Age: 58
End: 2023-02-22

## 2023-02-22 NOTE — TELEPHONE ENCOUNTER
Spoke to patient over the phone, discussed to continue taking Biktarvy, and Bactrim - she has a prescription for augmentin and doxy for sinusitis given at [facility], advised to take it with meals and take probiotics.  Follow up in clinic in May/2023

## 2023-03-13 PROBLEM — U07.1 PNEUMONIA DUE TO COVID-19 VIRUS: Status: RESOLVED | Noted: 2022-12-05 | Resolved: 2023-03-13

## 2023-03-13 PROBLEM — J12.82 PNEUMONIA DUE TO COVID-19 VIRUS: Status: RESOLVED | Noted: 2022-12-05 | Resolved: 2023-03-13

## 2023-05-02 ENCOUNTER — LAB VISIT (OUTPATIENT)
Dept: LAB | Facility: HOSPITAL | Age: 58
End: 2023-05-02
Attending: STUDENT IN AN ORGANIZED HEALTH CARE EDUCATION/TRAINING PROGRAM
Payer: MEDICARE

## 2023-05-02 ENCOUNTER — OFFICE VISIT (OUTPATIENT)
Dept: INFECTIOUS DISEASES | Facility: CLINIC | Age: 58
End: 2023-05-02
Payer: MEDICARE

## 2023-05-02 VITALS
HEIGHT: 64 IN | TEMPERATURE: 97 F | WEIGHT: 117.13 LBS | DIASTOLIC BLOOD PRESSURE: 66 MMHG | BODY MASS INDEX: 20 KG/M2 | HEART RATE: 58 BPM | SYSTOLIC BLOOD PRESSURE: 122 MMHG | OXYGEN SATURATION: 99 %

## 2023-05-02 DIAGNOSIS — Z00.00 HEALTH CARE MAINTENANCE: ICD-10-CM

## 2023-05-02 DIAGNOSIS — Z12.31 SCREENING MAMMOGRAM FOR BREAST CANCER: ICD-10-CM

## 2023-05-02 DIAGNOSIS — Z21 HIV POSITIVE: ICD-10-CM

## 2023-05-02 DIAGNOSIS — Z21 HIV POSITIVE: Primary | ICD-10-CM

## 2023-05-02 LAB
ALBUMIN SERPL BCP-MCNC: 3.2 G/DL (ref 3.5–5.2)
ALP SERPL-CCNC: 162 U/L (ref 55–135)
ALT SERPL W/O P-5'-P-CCNC: 14 U/L (ref 10–44)
ANION GAP SERPL CALC-SCNC: 5 MMOL/L (ref 8–16)
AST SERPL-CCNC: 28 U/L (ref 10–40)
BASOPHILS # BLD AUTO: 0.04 K/UL (ref 0–0.2)
BASOPHILS NFR BLD: 0.6 % (ref 0–1.9)
BILIRUB SERPL-MCNC: 0.4 MG/DL (ref 0.1–1)
BUN SERPL-MCNC: 14 MG/DL (ref 6–20)
CALCIUM SERPL-MCNC: 9.2 MG/DL (ref 8.7–10.5)
CHLORIDE SERPL-SCNC: 102 MMOL/L (ref 95–110)
CO2 SERPL-SCNC: 30 MMOL/L (ref 23–29)
CREAT SERPL-MCNC: 0.7 MG/DL (ref 0.5–1.4)
DIFFERENTIAL METHOD: ABNORMAL
EOSINOPHIL # BLD AUTO: 0 K/UL (ref 0–0.5)
EOSINOPHIL NFR BLD: 0.4 % (ref 0–8)
ERYTHROCYTE [DISTWIDTH] IN BLOOD BY AUTOMATED COUNT: 16.6 % (ref 11.5–14.5)
EST. GFR  (NO RACE VARIABLE): >60 ML/MIN/1.73 M^2
GLUCOSE SERPL-MCNC: 92 MG/DL (ref 70–110)
HCT VFR BLD AUTO: 38.7 % (ref 37–48.5)
HGB BLD-MCNC: 11.8 G/DL (ref 12–16)
IMM GRANULOCYTES # BLD AUTO: 0.04 K/UL (ref 0–0.04)
IMM GRANULOCYTES NFR BLD AUTO: 0.6 % (ref 0–0.5)
LYMPHOCYTES # BLD AUTO: 2 K/UL (ref 1–4.8)
LYMPHOCYTES NFR BLD: 29.4 % (ref 18–48)
MCH RBC QN AUTO: 25.8 PG (ref 27–31)
MCHC RBC AUTO-ENTMCNC: 30.5 G/DL (ref 32–36)
MCV RBC AUTO: 85 FL (ref 82–98)
MONOCYTES # BLD AUTO: 1 K/UL (ref 0.3–1)
MONOCYTES NFR BLD: 14 % (ref 4–15)
NEUTROPHILS # BLD AUTO: 3.8 K/UL (ref 1.8–7.7)
NEUTROPHILS NFR BLD: 55 % (ref 38–73)
NRBC BLD-RTO: 0 /100 WBC
PLATELET # BLD AUTO: 363 K/UL (ref 150–450)
PMV BLD AUTO: 8.7 FL (ref 9.2–12.9)
POTASSIUM SERPL-SCNC: 4.1 MMOL/L (ref 3.5–5.1)
PROT SERPL-MCNC: 7.6 G/DL (ref 6–8.4)
RBC # BLD AUTO: 4.57 M/UL (ref 4–5.4)
SODIUM SERPL-SCNC: 137 MMOL/L (ref 136–145)
WBC # BLD AUTO: 6.81 K/UL (ref 3.9–12.7)

## 2023-05-02 PROCEDURE — 3078F PR MOST RECENT DIASTOLIC BLOOD PRESSURE < 80 MM HG: ICD-10-PCS | Mod: CPTII,S$GLB,, | Performed by: STUDENT IN AN ORGANIZED HEALTH CARE EDUCATION/TRAINING PROGRAM

## 2023-05-02 PROCEDURE — 3074F SYST BP LT 130 MM HG: CPT | Mod: CPTII,S$GLB,, | Performed by: STUDENT IN AN ORGANIZED HEALTH CARE EDUCATION/TRAINING PROGRAM

## 2023-05-02 PROCEDURE — 99214 OFFICE O/P EST MOD 30 MIN: CPT | Mod: S$GLB,,, | Performed by: STUDENT IN AN ORGANIZED HEALTH CARE EDUCATION/TRAINING PROGRAM

## 2023-05-02 PROCEDURE — 3074F PR MOST RECENT SYSTOLIC BLOOD PRESSURE < 130 MM HG: ICD-10-PCS | Mod: CPTII,S$GLB,, | Performed by: STUDENT IN AN ORGANIZED HEALTH CARE EDUCATION/TRAINING PROGRAM

## 2023-05-02 PROCEDURE — 80053 COMPREHEN METABOLIC PANEL: CPT | Performed by: STUDENT IN AN ORGANIZED HEALTH CARE EDUCATION/TRAINING PROGRAM

## 2023-05-02 PROCEDURE — 85025 COMPLETE CBC W/AUTO DIFF WBC: CPT | Performed by: STUDENT IN AN ORGANIZED HEALTH CARE EDUCATION/TRAINING PROGRAM

## 2023-05-02 PROCEDURE — 3008F PR BODY MASS INDEX (BMI) DOCUMENTED: ICD-10-PCS | Mod: CPTII,S$GLB,, | Performed by: STUDENT IN AN ORGANIZED HEALTH CARE EDUCATION/TRAINING PROGRAM

## 2023-05-02 PROCEDURE — 3078F DIAST BP <80 MM HG: CPT | Mod: CPTII,S$GLB,, | Performed by: STUDENT IN AN ORGANIZED HEALTH CARE EDUCATION/TRAINING PROGRAM

## 2023-05-02 PROCEDURE — 87536 HIV-1 QUANT&REVRSE TRNSCRPJ: CPT | Performed by: STUDENT IN AN ORGANIZED HEALTH CARE EDUCATION/TRAINING PROGRAM

## 2023-05-02 PROCEDURE — 86361 T CELL ABSOLUTE COUNT: CPT | Performed by: STUDENT IN AN ORGANIZED HEALTH CARE EDUCATION/TRAINING PROGRAM

## 2023-05-02 PROCEDURE — 1159F PR MEDICATION LIST DOCUMENTED IN MEDICAL RECORD: ICD-10-PCS | Mod: CPTII,S$GLB,, | Performed by: STUDENT IN AN ORGANIZED HEALTH CARE EDUCATION/TRAINING PROGRAM

## 2023-05-02 PROCEDURE — 1159F MED LIST DOCD IN RCRD: CPT | Mod: CPTII,S$GLB,, | Performed by: STUDENT IN AN ORGANIZED HEALTH CARE EDUCATION/TRAINING PROGRAM

## 2023-05-02 PROCEDURE — 99214 PR OFFICE/OUTPT VISIT, EST, LEVL IV, 30-39 MIN: ICD-10-PCS | Mod: S$GLB,,, | Performed by: STUDENT IN AN ORGANIZED HEALTH CARE EDUCATION/TRAINING PROGRAM

## 2023-05-02 PROCEDURE — 3008F BODY MASS INDEX DOCD: CPT | Mod: CPTII,S$GLB,, | Performed by: STUDENT IN AN ORGANIZED HEALTH CARE EDUCATION/TRAINING PROGRAM

## 2023-05-02 RX ORDER — BICTEGRAVIR SODIUM, EMTRICITABINE, AND TENOFOVIR ALAFENAMIDE FUMARATE 50; 200; 25 MG/1; MG/1; MG/1
1 TABLET ORAL DAILY
Qty: 30 TABLET | Refills: 5 | Status: SHIPPED | OUTPATIENT
Start: 2023-05-02 | End: 2023-06-01

## 2023-05-02 RX ORDER — SULFAMETHOXAZOLE AND TRIMETHOPRIM 800; 160 MG/1; MG/1
1 TABLET ORAL DAILY
Qty: 30 TABLET | Refills: 1 | Status: SHIPPED | OUTPATIENT
Start: 2023-05-02 | End: 2023-06-01

## 2023-05-02 NOTE — PROGRESS NOTES
Subjective: Hospital follow up - Establish HIV care       Patient ID: Amie Salmeron is a 58 y.o. female.    Chief Complaint:: Follow-up    HPI Amie Salmeron is a 57 y.o. female to me for recent admission to [facility] for fever, she has past medical history of HIV diagnosed in 1981, never on anti-retroviral therapy, last CD4 46, ,000, COVID 19 in October, and prior L eye corneal ulcer s/p . She was admitted for failure to thrive, and persistent fever at home with associated dysphagia, and persistent cough.  She was found to have esophageal candidiasis.  Workup while at the hospital including respiratory culture negative for PCP.  CT abdomen pelvis significant for intra-abdominal lymph nodes.    Patient was discharged home on Biktarvy 1 tablet once a day, Bactrim ds 1 tablet once a day for PCP prophylaxis, and azithromycin 1200 mg once a week for MAC prophylaxis.    She is here for follow-up, complaining of painful ulcers around her perianal area which she states started 3 days ago. She states she is compliant to Biktarvy, has not missed any doses since discharged from the hospital last week. She mentions she is feeling better and her appetite is improved, still complaints of constipation, passing gas. Denies headache, nausea or vomit, no dysuria, no abdominal pain. States her cough is better as well.     Epidemiology  Not currently sexually active, last encounter +30y ago  Works delivering pizza  Nonsmoker, no alcohol no IV drugs     INTERVAL HISTORY:  2/1/2023:  Interim reviewed, patient is here for follow-up, mother present.  Patient was not able to come for follow-up as instructed last visit.  She states she is been dealing with a lot at home.  Was able to obtain [facility] for medical assistance.  She states compliance to Biktarvy, has gained 14 lb since last visit.  She is complaining of epigastric pain, she states she is been taking ibuprofen around the clock without improvement, requesting Norco.  Will refer to  GI for endoscopy.  She did not do blood work for this visit, CD4 count viral load odor today.    5/2:  Interim reviewed, patient is here for follow-up.  Since last visit, she had a ER visit on February 22,2022, for chest pain, U tox positive for cocaine and opiates.  She reports she has not been partying since, she got scared with the symptoms she experienced after cocaine.  Explained at length importance of being substance free.  She reports compliance to Biktarvy, we do not have recent labs, we will get CD4 count and viral load today.  Last CD4 count less than 200, patient is still on Bactrim.  We will call her with results if Bactrim needs to be discontinued based on CD4 count.  She reports she is feeling great, has no acute complaints, requesting a letter to go back to work.  We will refer again for mammogram, gyn, and colonoscopy since she is due for all these tests.    Review of patient's allergies indicates:  No Known Allergies  Past Medical History:   Diagnosis Date    Allergy     Arthritis     Asthma     Chronic pain     HIV infection     Hypertension     Overactive bladder     Spinal stenosis     Urine incontinence      Past Surgical History:   Procedure Laterality Date    ADENOIDECTOMY      APPENDECTOMY      BACK SURGERY      GERALD    CYST REMOVAL      HYSTERECTOMY      JOINT REPLACEMENT Right     knee, with revision    KNEE SURGERY Left     knee replacement    RHIZOTOMY      TONSILLECTOMY      TOTAL ANKLE ARTHROPLASTY Right      Social History     Tobacco Use    Smoking status: Never    Smokeless tobacco: Never   Substance Use Topics    Alcohol use: Yes     Comment: Socially     Social History     Occupational History    Occupation: on disability     Family History   Problem Relation Age of Onset    Thyroid disease Mother     Hypertension Mother     Cancer Father         oral CA    Depression Sister     Hypertension Brother     Alcohol abuse Brother          Review of Systems   Constitutional:  Negative  "for chills and fever.   HENT:  Negative for sinus pain.    Eyes:  Negative for pain.   Respiratory:  Negative for cough and shortness of breath.    Cardiovascular:  Negative for chest pain and leg swelling.   Gastrointestinal:  Positive for abdominal pain. Negative for blood in stool, constipation and nausea.   Genitourinary:  Negative for dysuria.   Musculoskeletal:  Negative for back pain.   Skin:  Negative for wound.   Neurological:  Negative for headaches.   Psychiatric/Behavioral:  Negative for confusion.          Objective:      Blood pressure 122/66, pulse (!) 58, temperature 97.3 °F (36.3 °C), height 5' 4" (1.626 m), weight 53.1 kg (117 lb 1.6 oz), SpO2 99 %. Body mass index is 20.1 kg/m².  Physical Exam  Constitutional:       Appearance: She is normal weight.      Comments: She looks significantly better compared to last encounter, has gained 14 lb   HENT:      Mouth/Throat:      Mouth: Mucous membranes are moist.      Pharynx: Oropharynx is clear.      Comments: No thrush  Eyes:      Conjunctiva/sclera: Conjunctivae normal.      Pupils: Pupils are equal, round, and reactive to light.      Comments: Left eye corneal scar   Cardiovascular:      Rate and Rhythm: Normal rate and regular rhythm.      Pulses: Normal pulses.      Heart sounds: Normal heart sounds.   Pulmonary:      Effort: Pulmonary effort is normal.      Breath sounds: Normal breath sounds.   Abdominal:      General: Bowel sounds are normal.      Palpations: Abdomen is soft.      Tenderness: There is no abdominal tenderness. There is no rebound.   Musculoskeletal:         General: Normal range of motion.      Cervical back: Normal range of motion and neck supple.      Right lower leg: No edema.      Left lower leg: No edema.   Skin:     General: Skin is warm.      Capillary Refill: Capillary refill takes less than 2 seconds.   Neurological:      General: No focal deficit present.      Mental Status: She is alert and oriented to person, place, " and time.      Sensory: No sensory deficit.      Motor: No weakness.   Psychiatric:         Thought Content: Thought content normal.           Recent Diagnostics:  CXR: Interval improvement of bilateral pulmonary opacities, with persistent interstitial opacities as above. These could reflect incomplete resolution of infectious or inflammatory process, parenchymal scarring, atelectasis, or some combination thereof.    CT abdomen/pelvis 11/15:   1. Negative for para-aortic mass or retroperitoneal lymphadenopathy. Previously described findings reflect normal small bowel.  2. A few enlarged mesenteric lymph nodes, nonspecific.    CT chest/abdomen/pelvis:   1. 6.1 cm soft tissue density along the anterior and left anterolateral margin of the abdominal aorta in the mid abdomen as above. Several adjacent mildly prominent mesenteric lymph nodes are noted, the largest measuring 1.7 x 1.3 cm. The soft tissue density is suspicious for a confluent ilia mass, but could also be contributed to by unopacified small bowel loops. CT abdomen with oral and intravenous contrast is recommended for further assessment.  2. Previously noted bilateral upper lobe pulmonary infiltrates have shown near complete resolution. Scattered groundglass opacities in both lungs are likely infectious/inflammatory in nature, with slightly more prominent infiltrate in the lingula.     HIV table:  CD4 = 26-->43, ratio 4.3% very low      Latest Reference Range & Units 10/15/22 06:03   Hep A Total Ab Negative  Negative   Hep B Core Total Ab Negative  Negative   Hep B S Ab  Non Reactive   Hepatitis B Surface Ag Negative  Negative   Hepatitis C Ab 0.0 - 0.9 s/co ratio 0.2   CRYPTOCOCCUS ANTIGEN, SERUM Negative  Negative   Quantiferon Mitogen Value IU/mL >10.00   Quantiferon Nil Value IU/mL 0.23   QuantiFERON-TB Gold Plus Negative  Negative   QuantiFERON TB1 Ag Value IU/mL 0.24   QuantiFERON TB2 Ag Value IU/mL 0.25   HIV 1 RNA Ultra copies/mL [id number]   RPR  Non-reactive  Non-reactive     Summary of Mutations Observed:   RT: K22K/R, V35V/I, V60V/I, K101T, Q102K, C162S, Q174K,   V179I, L210L/S, R211K, P243A, V245K, R277K, T286A,   A288S, V293I   IN: E10D, V72I, L101I, V113I, S119G, T122I, T124N, T125A,   V234L, D253E, D256E, D279D/G   KS: I13V, E35D, M36I, K45R, L63P, I72V, V77I, I93I/L      Latest Reference Range & Units 11/16/22 17:15   Pneumocystis Smear, DFA Negative  Negative        Assessment and Plan:         1. HIV on Biktarvy, last CD4 125 (prior 46), VL 30 (prior 305,000)   Continue Biktarvy 1 tab PO once day   Bactrim DS 1 tab PO for PCP prophylaxis until CD4 >200   Will start vaccinations once CD4 is >200 (pneumococcus, meningococcus, flu and COVID)   All questions answered    RTC in 3 months for vaccines    2. Epigastric pain, has been taking NSAIDs around the clock, Tylenol does not help the pain, requesting [place]  GI referral for EGD/colonoscopy, ordered again     3. H/o genital Herpes, not active     4. Healthcare maintenance - all referrals re-ordered again today    GI referral for colonoscopy, last one done >9y ago   Gyn referral for pap-smear   Mammogram re-ordered    HIV positive  -     CBC auto differential; Future; Expected date: 05/02/2023  -     Comprehensive Metabolic Panel; Future; Expected date: 05/02/2023  -     CD4 T-[place] Cells; Future; Expected date: 05/02/2023  -     HIV RNA, quantitative, PCR; Future; Expected date: 05/02/2023  -     xiiafzgpo-rrugdajr-qslrzbk ala (BIKTARVY) -25 mg (25 kg or greater); Take 1 tablet by mouth once daily.  Dispense: 30 tablet; Refill: 5  -     sulfamethoxazole-trimethoprim 800-160mg (BACTRIM DS) 800-160 mg Tab; Take 1 tablet by mouth once daily.  Dispense: 30 tablet; Refill: 1    Health care maintenance  -     Ambulatory referral/consult to Gastroenterology; Future; Expected date: 05/09/2023  -     Ambulatory referral/consult to Gynecology; Future; Expected date: 05/09/2023    Screening mammogram for  breast cancer  -     Mammo Digital Screening Bilat; Future; Expected date: 05/02/2023          This note was created using Dragon voice recognition software that occasionally misinterpreted phrases or words.

## 2023-05-02 NOTE — PATIENT INSTRUCTIONS
Labs today  Biktarvy 1 tab daily  Bactrim DS 1 tab daily, will call you with CD4 count results  Gyn, GI and mammogram ordered  RTC in 3 months and will address vaccines

## 2023-05-04 ENCOUNTER — TELEPHONE (OUTPATIENT)
Dept: INFECTIOUS DISEASES | Facility: HOSPITAL | Age: 58
End: 2023-05-04

## 2023-05-04 DIAGNOSIS — Z21 HIV POSITIVE: ICD-10-CM

## 2023-05-04 LAB
BASOPHILS # BLD AUTO: 0 X10E3/UL (ref 0–0.2)
BASOPHILS NFR BLD AUTO: 1 %
CD3+CD4+ CELLS # BLD: 124 /UL (ref 359–1519)
CD3+CD4+ CELLS NFR BLD: 5.9 % (ref 30.8–58.5)
EOSINOPHIL # BLD AUTO: 0 X10E3/UL (ref 0–0.4)
EOSINOPHIL NFR BLD AUTO: 1 %
ERYTHROCYTE [DISTWIDTH] IN BLOOD BY AUTOMATED COUNT: 15.7 % (ref 11.7–15.4)
HCT VFR BLD AUTO: 38.2 % (ref 34–46.6)
HGB BLD-MCNC: 11.7 G/DL (ref 11.1–15.9)
IMM GRANULOCYTES # BLD AUTO: 0 X10E3/UL (ref 0–0.1)
IMM GRANULOCYTES NFR BLD AUTO: 1 %
LYMPHOCYTES # BLD AUTO: 2.1 X10E3/UL (ref 0.7–3.1)
LYMPHOCYTES NFR BLD AUTO: 31 %
MCH RBC QN AUTO: 25.7 PG (ref 26.6–33)
MCHC RBC AUTO-ENTMCNC: 30.6 G/DL (ref 31.5–35.7)
MCV RBC AUTO: 84 FL (ref 79–97)
MONOCYTES # BLD AUTO: 0.5 X10E3/UL (ref 0.1–0.9)
MONOCYTES NFR BLD AUTO: 7 %
NEUTROPHILS # BLD AUTO: 3.9 X10E3/UL (ref 1.4–7)
NEUTROPHILS NFR BLD AUTO: 59 %
PLATELET # BLD AUTO: 393 X10E3/UL (ref 150–450)
RBC # BLD AUTO: 4.56 X10E6/UL (ref 3.77–5.28)
WBC # BLD AUTO: 6.6 X10E3/UL (ref 3.4–10.8)

## 2023-05-04 RX ORDER — SULFAMETHOXAZOLE AND TRIMETHOPRIM 800; 160 MG/1; MG/1
1 TABLET ORAL DAILY
Qty: 30 TABLET | Refills: 3 | Status: SHIPPED | OUTPATIENT
Start: 2023-05-04 | End: 2023-06-03

## 2023-05-04 NOTE — TELEPHONE ENCOUNTER
CD4< 200, continue Bactrim for PJP ppx and will send more refills to her pharmacy.   Continue Biktarvy 1 tab PO daily  RTC in 3 months

## 2023-05-04 NOTE — PROGRESS NOTES
I spoke with patient to advise to stay on Bactrim as her CD4 count is still <200.  Dr Mayers sent in a one month supply with  Refills per Dr Mayers's orders  [facility]  5/04/23

## 2023-05-05 LAB
HIV1 RNA # SERPL NAA+PROBE: <20 COPIES/ML
HIV1 RNA SERPL NAA+PROBE-LOG#: NORMAL LOG10COPY/ML

## 2023-08-03 ENCOUNTER — TELEPHONE (OUTPATIENT)
Dept: INFECTIOUS DISEASES | Facility: CLINIC | Age: 58
End: 2023-08-03

## 2023-08-03 ENCOUNTER — OFFICE VISIT (OUTPATIENT)
Dept: INFECTIOUS DISEASES | Facility: CLINIC | Age: 58
End: 2023-08-03
Payer: MEDICARE

## 2023-08-03 VITALS
BODY MASS INDEX: 19.04 KG/M2 | SYSTOLIC BLOOD PRESSURE: 102 MMHG | TEMPERATURE: 97 F | DIASTOLIC BLOOD PRESSURE: 62 MMHG | WEIGHT: 111.5 LBS | HEIGHT: 64 IN

## 2023-08-03 DIAGNOSIS — F32.A DEPRESSION, UNSPECIFIED DEPRESSION TYPE: Chronic | ICD-10-CM

## 2023-08-03 DIAGNOSIS — Z21 HIV POSITIVE: ICD-10-CM

## 2023-08-03 DIAGNOSIS — M48.00 SPINAL STENOSIS, UNSPECIFIED SPINAL REGION: ICD-10-CM

## 2023-08-03 DIAGNOSIS — Z00.00 HEALTH CARE MAINTENANCE: Primary | ICD-10-CM

## 2023-08-03 PROCEDURE — 1159F PR MEDICATION LIST DOCUMENTED IN MEDICAL RECORD: ICD-10-PCS | Mod: CPTII,S$GLB,, | Performed by: STUDENT IN AN ORGANIZED HEALTH CARE EDUCATION/TRAINING PROGRAM

## 2023-08-03 PROCEDURE — 3074F PR MOST RECENT SYSTOLIC BLOOD PRESSURE < 130 MM HG: ICD-10-PCS | Mod: CPTII,S$GLB,, | Performed by: STUDENT IN AN ORGANIZED HEALTH CARE EDUCATION/TRAINING PROGRAM

## 2023-08-03 PROCEDURE — 3074F SYST BP LT 130 MM HG: CPT | Mod: CPTII,S$GLB,, | Performed by: STUDENT IN AN ORGANIZED HEALTH CARE EDUCATION/TRAINING PROGRAM

## 2023-08-03 PROCEDURE — 1159F MED LIST DOCD IN RCRD: CPT | Mod: CPTII,S$GLB,, | Performed by: STUDENT IN AN ORGANIZED HEALTH CARE EDUCATION/TRAINING PROGRAM

## 2023-08-03 PROCEDURE — 3008F PR BODY MASS INDEX (BMI) DOCUMENTED: ICD-10-PCS | Mod: CPTII,S$GLB,, | Performed by: STUDENT IN AN ORGANIZED HEALTH CARE EDUCATION/TRAINING PROGRAM

## 2023-08-03 PROCEDURE — 99214 OFFICE O/P EST MOD 30 MIN: CPT | Mod: S$GLB,,, | Performed by: STUDENT IN AN ORGANIZED HEALTH CARE EDUCATION/TRAINING PROGRAM

## 2023-08-03 PROCEDURE — 3008F BODY MASS INDEX DOCD: CPT | Mod: CPTII,S$GLB,, | Performed by: STUDENT IN AN ORGANIZED HEALTH CARE EDUCATION/TRAINING PROGRAM

## 2023-08-03 PROCEDURE — 3078F PR MOST RECENT DIASTOLIC BLOOD PRESSURE < 80 MM HG: ICD-10-PCS | Mod: CPTII,S$GLB,, | Performed by: STUDENT IN AN ORGANIZED HEALTH CARE EDUCATION/TRAINING PROGRAM

## 2023-08-03 PROCEDURE — 3078F DIAST BP <80 MM HG: CPT | Mod: CPTII,S$GLB,, | Performed by: STUDENT IN AN ORGANIZED HEALTH CARE EDUCATION/TRAINING PROGRAM

## 2023-08-03 PROCEDURE — 99214 PR OFFICE/OUTPT VISIT, EST, LEVL IV, 30-39 MIN: ICD-10-PCS | Mod: S$GLB,,, | Performed by: STUDENT IN AN ORGANIZED HEALTH CARE EDUCATION/TRAINING PROGRAM

## 2023-08-03 RX ORDER — LIDOCAINE 50 MG/G
1 PATCH TOPICAL DAILY
Qty: 30 PATCH | Refills: 2 | Status: SHIPPED | OUTPATIENT
Start: 2023-08-03 | End: 2023-09-02

## 2023-08-03 NOTE — PATIENT INSTRUCTIONS
Labs today  Lidocaine patch  Neurosurgery follow up at [facility] 8/21  Continue Biktarvy 1 tab PO daily\  Bactrim DS 1 tab daily  RTC in 3 months

## 2023-08-03 NOTE — TELEPHONE ENCOUNTER
GI referral: Phone & portal message with information to schedule appointment. Last used portal 2/2023

## 2023-08-03 NOTE — PROGRESS NOTES
Subjective: Hospital follow up - Establish HIV care       Patient ID: Amie Salmeron is a 58 y.o. female.    Chief Complaint:: Follow-up and HIV Positive/AIDS    HPI Amie Salmeron is a 58 y.o. female to me for recent admission to [facility] for fever, she has past medical history of HIV diagnosed in 1981, never on anti-retroviral therapy, last CD4 46, ,000, COVID 19 in October, and prior L eye corneal ulcer s/p . She was admitted for failure to thrive, and persistent fever at home with associated dysphagia, and persistent cough.  She was found to have esophageal candidiasis.  Workup while at the hospital including respiratory culture negative for PCP.  CT abdomen pelvis significant for intra-abdominal lymph nodes.    Patient was discharged home on Biktarvy 1 tablet once a day, Bactrim ds 1 tablet once a day for PCP prophylaxis, and azithromycin 1200 mg once a week for MAC prophylaxis.    She is here for follow-up, complaining of painful ulcers around her perianal area which she states started 3 days ago. She states she is compliant to Biktarvy, has not missed any doses since discharged from the hospital last week. She mentions she is feeling better and her appetite is improved, still complaints of constipation, passing gas. Denies headache, nausea or vomit, no dysuria, no abdominal pain. States her cough is better as well.     Epidemiology  Not currently sexually active, last encounter +30y ago  Works delivering [employer]  Nonsmoker, no alcohol no IV drugs     INTERVAL HISTORY:  2/1/2023:  Interim reviewed, patient is here for follow-up, mother present.  Patient was not able to come for follow-up as instructed last visit.  She states she is been dealing with a lot at home.  Was able to obtain [facility] for medical assistance.  She states compliance to Biktarvy, has gained 14 lb since last visit.  She is complaining of epigastric pain, she states she is been taking ibuprofen around the clock without improvement, requesting  Norco.  Will refer to GI for endoscopy.  She did not do blood work for this visit, CD4 count viral load odor today.    5/2:  Interim reviewed, patient is here for follow-up.  Since last visit, she had a ER visit on February 22,2022, for chest pain, U tox positive for cocaine and opiates.  She reports she has not been partying since, she got scared with the symptoms she experienced after cocaine.  Explained at length importance of being substance free.  She reports compliance to Biktarvy, we do not have recent labs, we will get CD4 count and viral load today.  Last CD4 count less than 200, patient is still on Bactrim.  We will call her with results if Bactrim needs to be discontinued based on CD4 count.  She reports she is feeling great, has no acute complaints, requesting a letter to go back to work.  We will refer again for mammogram, gyn, and colonoscopy since she is due for all these tests.    8/3: interim reviewed, patient is here for follow-up. 8/21 at [facility] Dr Bhatti for possible steroid injections. She  has lost 6 lb since last visit.  She reports she has not been able to eat much because of her low back pain.  She reports overall she feels better, denies any constitutional symptoms.  Unfortunately, did not follow-up with gyn or GI, will send referrals again.  We will do blood work today and check CD4 count if greater than 200 will stop Bactrim prophylaxis.  She reports compliance to Biktarvy, does not miss any doses.    Review of patient's allergies indicates:  No Known Allergies  Past Medical History:   Diagnosis Date    Allergy     Arthritis     Asthma     Chronic pain     HIV infection     Hypertension     Overactive bladder     Spinal stenosis     Urine incontinence      Past Surgical History:   Procedure Laterality Date    ADENOIDECTOMY      APPENDECTOMY      BACK SURGERY      GERALD    CYST REMOVAL      HYSTERECTOMY      JOINT REPLACEMENT Right     knee, with revision    KNEE SURGERY Left     knee  "replacement    RHIZOTOMY      TONSILLECTOMY      TOTAL ANKLE ARTHROPLASTY Right      Social History     Tobacco Use    Smoking status: Never    Smokeless tobacco: Never   Substance Use Topics    Alcohol use: Yes     Comment: Socially     Social History     Occupational History    Occupation: on disability     Family History   Problem Relation Age of Onset    Thyroid disease Mother     Hypertension Mother     Cancer Father         oral CA    Depression Sister     Hypertension Brother     Alcohol abuse Brother          Review of Systems   Constitutional:  Negative for chills and fever.   HENT:  Negative for sinus pain.    Eyes:  Negative for pain.   Respiratory:  Negative for cough and shortness of breath.    Cardiovascular:  Negative for chest pain and leg swelling.   Gastrointestinal:  Positive for constipation. Negative for abdominal pain, blood in stool and nausea.   Genitourinary:  Negative for dysuria.   Musculoskeletal:  Positive for back pain.   Skin:  Negative for wound.   Neurological:  Negative for headaches.   Psychiatric/Behavioral:  Negative for confusion.            Objective:      Blood pressure 102/62, temperature 97.4 °F (36.3 °C), height 5' 4" (1.626 m), weight 50.6 kg (111 lb 8 oz). Body mass index is 19.14 kg/m².  Physical Exam  Constitutional:       Appearance: She is normal weight.      Comments: She looks significantly better compared to last encounter, has gained 14 lb   HENT:      Mouth/Throat:      Mouth: Mucous membranes are moist.      Pharynx: Oropharynx is clear.      Comments: No thrush  Eyes:      Conjunctiva/sclera: Conjunctivae normal.      Pupils: Pupils are equal, round, and reactive to light.      Comments: Pinpoint pupils   Cardiovascular:      Rate and Rhythm: Normal rate and regular rhythm.      Pulses: Normal pulses.      Heart sounds: Normal heart sounds.   Pulmonary:      Effort: Pulmonary effort is normal.      Breath sounds: Normal breath sounds.   Abdominal:      General: " Bowel sounds are normal.      Palpations: Abdomen is soft.      Tenderness: There is no abdominal tenderness. There is no rebound.   Musculoskeletal:         General: Normal range of motion.      Cervical back: Normal range of motion and neck supple.      Right lower leg: No edema.      Left lower leg: No edema.      Comments: Low back pain   Skin:     General: Skin is warm.      Capillary Refill: Capillary refill takes less than 2 seconds.   Neurological:      General: No focal deficit present.      Mental Status: She is alert and oriented to person, place, and time.      Sensory: No sensory deficit.      Motor: No weakness.   Psychiatric:         Thought Content: Thought content normal.             Recent Diagnostics:  CXR: Interval improvement of bilateral pulmonary opacities, with persistent interstitial opacities as above. These could reflect incomplete resolution of infectious or inflammatory process, parenchymal scarring, atelectasis, or some combination thereof.    CT abdomen/pelvis 11/15:   1. Negative for para-aortic mass or retroperitoneal lymphadenopathy. Previously described findings reflect normal small bowel.  2. A few enlarged mesenteric lymph nodes, nonspecific.    CT chest/abdomen/pelvis:   1. 6.1 cm soft tissue density along the anterior and left anterolateral margin of the abdominal aorta in the mid abdomen as above. Several adjacent mildly prominent mesenteric lymph nodes are noted, the largest measuring 1.7 x 1.3 cm. The soft tissue density is suspicious for a confluent ilia mass, but could also be contributed to by unopacified small bowel loops. CT abdomen with oral and intravenous contrast is recommended for further assessment.  2. Previously noted bilateral upper lobe pulmonary infiltrates have shown near complete resolution. Scattered groundglass opacities in both lungs are likely infectious/inflammatory in nature, with slightly more prominent infiltrate in the lingula.     HIV table:  CD4  = 26-->43, ratio 4.3% very low      Latest Reference Range & Units 10/15/22 06:03   Hep A Total Ab Negative  Negative   Hep B Core Total Ab Negative  Negative   Hep B S Ab  Non Reactive   Hepatitis B Surface Ag Negative  Negative   Hepatitis C Ab 0.0 - 0.9 s/co ratio 0.2   CRYPTOCOCCUS ANTIGEN, SERUM Negative  Negative   Quantiferon Mitogen Value IU/mL >10.00   Quantiferon Nil Value IU/mL 0.23   QuantiFERON-TB Gold Plus Negative  Negative   QuantiFERON TB1 Ag Value IU/mL 0.24   QuantiFERON TB2 Ag Value IU/mL 0.25   HIV 1 RNA Ultra copies/mL 704026   RPR Non-reactive  Non-reactive     Summary of Mutations Observed:   RT: K22K/R, V35V/I, V60V/I, K101T, Q102K, C162S, Q174K,   V179I, L210L/S, R211K, P243A, V245K, R277K, T286A,   A288S, V293I   IN: E10D, V72I, L101I, V113I, S119G, T122I, T124N, T125A,   V234L, D253E, D256E, D279D/G   IL: I13V, E35D, M36I, K45R, L63P, I72V, V77I, I93I/L      Latest Reference Range & Units 11/16/22 17:15   Pneumocystis Smear, DFA Negative  Negative        Assessment and Plan:         1. HIV on Biktarvy, last CD4 (prior 125 & 46), VL <20, (prior 30 & 305,000)   Continue Biktarvy 1 tab PO once day   Bactrim DS 1 tab PO for PCP prophylaxis until CD4 >200   Will start vaccinations once CD4 is >200 (pneumococcus, meningococcus, flu and COVID)   RTC in 3 months for vaccines    2. Epigastric pain, has been taking NSAIDs around the clock, Tylenol does not help the pain, requesting Norco  Unable to do the appt, will refer to Gi AGAIN    3. Lower back pain, spinal stenosis    Neurosurgery at [facility], appt 8/21    4. H/o genital Herpes, not active     5. Healthcare maintenance - all referrals re-ordered again today    GI referral for colonoscopy, last one done >9y ago   Gyn referral for pap-smear AGAIN   Mammogram re-ordered    6. Suspecting drug use, pinpoint pupils on exam, denies active use     HIV positive    Health care maintenance    Spinal stenosis, unspecified spinal region    Depression,  unspecified depression type          This note was created using Dragon voice recognition software that occasionally misinterpreted phrases or words.

## 2023-08-04 ENCOUNTER — TELEPHONE (OUTPATIENT)
Dept: FAMILY MEDICINE | Facility: CLINIC | Age: 58
End: 2023-08-04
Payer: MEDICARE

## 2023-08-04 ENCOUNTER — LAB VISIT (OUTPATIENT)
Dept: LAB | Facility: HOSPITAL | Age: 58
End: 2023-08-04
Attending: STUDENT IN AN ORGANIZED HEALTH CARE EDUCATION/TRAINING PROGRAM
Payer: MEDICARE

## 2023-08-04 DIAGNOSIS — Z21 HIV POSITIVE: ICD-10-CM

## 2023-08-04 LAB
ALBUMIN SERPL BCP-MCNC: 3 G/DL (ref 3.5–5.2)
ALP SERPL-CCNC: 134 U/L (ref 55–135)
ALT SERPL W/O P-5'-P-CCNC: 16 U/L (ref 10–44)
ANION GAP SERPL CALC-SCNC: 10 MMOL/L (ref 8–16)
AST SERPL-CCNC: 30 U/L (ref 10–40)
BASOPHILS # BLD AUTO: 0.06 K/UL (ref 0–0.2)
BASOPHILS NFR BLD: 0.4 % (ref 0–1.9)
BILIRUB SERPL-MCNC: 0.1 MG/DL (ref 0.1–1)
BUN SERPL-MCNC: 32 MG/DL (ref 6–20)
CALCIUM SERPL-MCNC: 9.5 MG/DL (ref 8.7–10.5)
CHLORIDE SERPL-SCNC: 104 MMOL/L (ref 95–110)
CO2 SERPL-SCNC: 24 MMOL/L (ref 23–29)
CREAT SERPL-MCNC: 1 MG/DL (ref 0.5–1.4)
CRP SERPL-MCNC: 6.64 MG/DL
DIFFERENTIAL METHOD: ABNORMAL
EOSINOPHIL # BLD AUTO: 0 K/UL (ref 0–0.5)
EOSINOPHIL NFR BLD: 0.2 % (ref 0–8)
ERYTHROCYTE [DISTWIDTH] IN BLOOD BY AUTOMATED COUNT: 16 % (ref 11.5–14.5)
ERYTHROCYTE [SEDIMENTATION RATE] IN BLOOD BY WESTERGREN METHOD: 21 MM/HR (ref 0–20)
EST. GFR  (NO RACE VARIABLE): >60 ML/MIN/1.73 M^2
GLUCOSE SERPL-MCNC: 96 MG/DL (ref 70–110)
HCT VFR BLD AUTO: 35 % (ref 37–48.5)
HGB BLD-MCNC: 11.1 G/DL (ref 12–16)
IMM GRANULOCYTES # BLD AUTO: 0.09 K/UL (ref 0–0.04)
IMM GRANULOCYTES NFR BLD AUTO: 0.6 % (ref 0–0.5)
LYMPHOCYTES # BLD AUTO: 1.1 K/UL (ref 1–4.8)
LYMPHOCYTES NFR BLD: 7 % (ref 18–48)
MCH RBC QN AUTO: 26.1 PG (ref 27–31)
MCHC RBC AUTO-ENTMCNC: 31.7 G/DL (ref 32–36)
MCV RBC AUTO: 82 FL (ref 82–98)
MONOCYTES # BLD AUTO: 1 K/UL (ref 0.3–1)
MONOCYTES NFR BLD: 6 % (ref 4–15)
NEUTROPHILS # BLD AUTO: 13.9 K/UL (ref 1.8–7.7)
NEUTROPHILS NFR BLD: 85.8 % (ref 38–73)
NRBC BLD-RTO: 0 /100 WBC
PLATELET # BLD AUTO: 430 K/UL (ref 150–450)
PMV BLD AUTO: 8.6 FL (ref 9.2–12.9)
POTASSIUM SERPL-SCNC: 4 MMOL/L (ref 3.5–5.1)
PROT SERPL-MCNC: 7.4 G/DL (ref 6–8.4)
RBC # BLD AUTO: 4.25 M/UL (ref 4–5.4)
SODIUM SERPL-SCNC: 138 MMOL/L (ref 136–145)
WBC # BLD AUTO: 16.2 K/UL (ref 3.9–12.7)

## 2023-08-04 PROCEDURE — 85025 COMPLETE CBC W/AUTO DIFF WBC: CPT | Performed by: STUDENT IN AN ORGANIZED HEALTH CARE EDUCATION/TRAINING PROGRAM

## 2023-08-04 PROCEDURE — 36415 COLL VENOUS BLD VENIPUNCTURE: CPT | Performed by: STUDENT IN AN ORGANIZED HEALTH CARE EDUCATION/TRAINING PROGRAM

## 2023-08-04 PROCEDURE — 86140 C-REACTIVE PROTEIN: CPT | Performed by: STUDENT IN AN ORGANIZED HEALTH CARE EDUCATION/TRAINING PROGRAM

## 2023-08-04 PROCEDURE — 80053 COMPREHEN METABOLIC PANEL: CPT | Performed by: STUDENT IN AN ORGANIZED HEALTH CARE EDUCATION/TRAINING PROGRAM

## 2023-08-04 PROCEDURE — 87536 HIV-1 QUANT&REVRSE TRNSCRPJ: CPT | Performed by: STUDENT IN AN ORGANIZED HEALTH CARE EDUCATION/TRAINING PROGRAM

## 2023-08-04 PROCEDURE — 85651 RBC SED RATE NONAUTOMATED: CPT | Performed by: STUDENT IN AN ORGANIZED HEALTH CARE EDUCATION/TRAINING PROGRAM

## 2023-08-04 PROCEDURE — 86361 T CELL ABSOLUTE COUNT: CPT | Performed by: STUDENT IN AN ORGANIZED HEALTH CARE EDUCATION/TRAINING PROGRAM

## 2023-08-05 LAB
BASOPHILS # BLD AUTO: 0.1 X10E3/UL (ref 0–0.2)
BASOPHILS NFR BLD AUTO: 0 %
CD3+CD4+ CELLS # BLD: 91 /UL (ref 359–1519)
CD3+CD4+ CELLS NFR BLD: 7 % (ref 30.8–58.5)
EOSINOPHIL # BLD AUTO: 0 X10E3/UL (ref 0–0.4)
EOSINOPHIL NFR BLD AUTO: 0 %
ERYTHROCYTE [DISTWIDTH] IN BLOOD BY AUTOMATED COUNT: 15.3 % (ref 11.7–15.4)
HCT VFR BLD AUTO: 35.9 % (ref 34–46.6)
HGB BLD-MCNC: 11.3 G/DL (ref 11.1–15.9)
IMM GRANULOCYTES # BLD AUTO: 0.3 X10E3/UL (ref 0–0.1)
IMM GRANULOCYTES NFR BLD AUTO: 2 %
LYMPHOCYTES # BLD AUTO: 1.3 X10E3/UL (ref 0.7–3.1)
LYMPHOCYTES NFR BLD AUTO: 8 %
MCH RBC QN AUTO: 25.9 PG (ref 26.6–33)
MCHC RBC AUTO-ENTMCNC: 31.5 G/DL (ref 31.5–35.7)
MCV RBC AUTO: 82 FL (ref 79–97)
MONOCYTES # BLD AUTO: 0.8 X10E3/UL (ref 0.1–0.9)
MONOCYTES NFR BLD AUTO: 5 %
NEUTROPHILS # BLD AUTO: 13.5 X10E3/UL (ref 1.4–7)
NEUTROPHILS NFR BLD AUTO: 85 %
PLATELET # BLD AUTO: 464 X10E3/UL (ref 150–450)
RBC # BLD AUTO: 4.36 X10E6/UL (ref 3.77–5.28)
WBC # BLD AUTO: 16 X10E3/UL (ref 3.4–10.8)

## 2023-08-06 LAB
HIV1 RNA # SERPL NAA+PROBE: <20 COPIES/ML
HIV1 RNA SERPL NAA+PROBE-LOG#: NORMAL LOG10COPY/ML

## 2023-08-08 NOTE — PROGRESS NOTES
I spoke with patient to advise to continue taking Biktarvy and Bactrim as CD4 is 91;  She is undetectable; per Dr Mayers's orders  [facility]  8/08/23

## 2024-02-27 DIAGNOSIS — Z00.00 ENCOUNTER FOR MEDICARE ANNUAL WELLNESS EXAM: ICD-10-CM

## 2024-03-23 ENCOUNTER — HOSPITAL ENCOUNTER (INPATIENT)
Facility: HOSPITAL | Age: 59
LOS: 1 days | Discharge: LEFT BEFORE DISCHARGE | DRG: 975 | End: 2024-03-25
Attending: EMERGENCY MEDICINE | Admitting: STUDENT IN AN ORGANIZED HEALTH CARE EDUCATION/TRAINING PROGRAM
Payer: MEDICARE

## 2024-03-23 DIAGNOSIS — R07.9 CHEST PAIN: ICD-10-CM

## 2024-03-23 DIAGNOSIS — B20 SYMPTOMATIC HIV INFECTION: ICD-10-CM

## 2024-03-23 DIAGNOSIS — I95.9 HYPOTENSION, UNSPECIFIED HYPOTENSION TYPE: ICD-10-CM

## 2024-03-23 DIAGNOSIS — M54.9 BACK PAIN, UNSPECIFIED BACK LOCATION, UNSPECIFIED BACK PAIN LATERALITY, UNSPECIFIED CHRONICITY: Primary | ICD-10-CM

## 2024-03-23 DIAGNOSIS — B20 AIDS: ICD-10-CM

## 2024-03-23 DIAGNOSIS — R42 DIZZINESS: ICD-10-CM

## 2024-03-23 PROBLEM — M46.25: Status: ACTIVE | Noted: 2024-03-23

## 2024-03-23 PROBLEM — A41.9 SEPSIS: Status: ACTIVE | Noted: 2024-03-23

## 2024-03-23 PROBLEM — Z21 HIV (HUMAN IMMUNODEFICIENCY VIRUS INFECTION): Status: ACTIVE | Noted: 2022-10-13

## 2024-03-23 PROBLEM — A31.0 MYCOBACTERIUM AVIUM INFECTION: Status: ACTIVE | Noted: 2024-03-23

## 2024-03-23 PROBLEM — G47.00 INSOMNIA: Status: ACTIVE | Noted: 2024-03-23

## 2024-03-23 LAB
ALBUMIN SERPL BCP-MCNC: 2.2 G/DL (ref 3.5–5.2)
ALP SERPL-CCNC: 128 U/L (ref 55–135)
ALT SERPL W/O P-5'-P-CCNC: 15 U/L (ref 10–44)
ANION GAP SERPL CALC-SCNC: 8 MMOL/L (ref 8–16)
AST SERPL-CCNC: 28 U/L (ref 10–40)
BACTERIA #/AREA URNS AUTO: ABNORMAL /HPF
BASOPHILS # BLD AUTO: 0.03 K/UL (ref 0–0.2)
BASOPHILS NFR BLD: 0.5 % (ref 0–1.9)
BILIRUB SERPL-MCNC: 0.3 MG/DL (ref 0.1–1)
BILIRUB UR QL STRIP: NEGATIVE
BUN SERPL-MCNC: 17 MG/DL (ref 8–23)
CALCIUM SERPL-MCNC: 8.5 MG/DL (ref 8.7–10.5)
CHLORIDE SERPL-SCNC: 104 MMOL/L (ref 95–110)
CLARITY UR REFRACT.AUTO: CLEAR
CO2 SERPL-SCNC: 19 MMOL/L (ref 23–29)
COLOR UR AUTO: YELLOW
CREAT SERPL-MCNC: 0.5 MG/DL (ref 0.5–1.4)
CRP SERPL-MCNC: 42 MG/L (ref 0–8.2)
DIFFERENTIAL METHOD BLD: ABNORMAL
EOSINOPHIL # BLD AUTO: 0 K/UL (ref 0–0.5)
EOSINOPHIL NFR BLD: 0.7 % (ref 0–8)
ERYTHROCYTE [DISTWIDTH] IN BLOOD BY AUTOMATED COUNT: 17.7 % (ref 11.5–14.5)
ERYTHROCYTE [SEDIMENTATION RATE] IN BLOOD BY PHOTOMETRIC METHOD: 100 MM/HR (ref 0–36)
EST. GFR  (NO RACE VARIABLE): >60 ML/MIN/1.73 M^2
GLUCOSE SERPL-MCNC: 72 MG/DL (ref 70–110)
GLUCOSE UR QL STRIP: NEGATIVE
HCT VFR BLD AUTO: 28.3 % (ref 37–48.5)
HGB BLD-MCNC: 8.7 G/DL (ref 12–16)
HGB UR QL STRIP: NEGATIVE
HYALINE CASTS UR QL AUTO: 0 /LPF
IMM GRANULOCYTES # BLD AUTO: 0.11 K/UL (ref 0–0.04)
IMM GRANULOCYTES NFR BLD AUTO: 1.9 % (ref 0–0.5)
KETONES UR QL STRIP: NEGATIVE
LACTATE SERPL-SCNC: 0.7 MMOL/L (ref 0.5–2.2)
LACTATE SERPL-SCNC: 1.3 MMOL/L (ref 0.5–2.2)
LEUKOCYTE ESTERASE UR QL STRIP: NEGATIVE
LYMPHOCYTES # BLD AUTO: 1.3 K/UL (ref 1–4.8)
LYMPHOCYTES NFR BLD: 22.5 % (ref 18–48)
MCH RBC QN AUTO: 26.1 PG (ref 27–31)
MCHC RBC AUTO-ENTMCNC: 30.7 G/DL (ref 32–36)
MCV RBC AUTO: 85 FL (ref 82–98)
MICROSCOPIC COMMENT: ABNORMAL
MONOCYTES # BLD AUTO: 0.6 K/UL (ref 0.3–1)
MONOCYTES NFR BLD: 9.8 % (ref 4–15)
NEUTROPHILS # BLD AUTO: 3.7 K/UL (ref 1.8–7.7)
NEUTROPHILS NFR BLD: 64.6 % (ref 38–73)
NITRITE UR QL STRIP: NEGATIVE
NRBC BLD-RTO: 0 /100 WBC
OHS QRS DURATION: 84 MS
OHS QRS DURATION: 84 MS
OHS QTC CALCULATION: 401 MS
OHS QTC CALCULATION: 427 MS
PH UR STRIP: 7 [PH] (ref 5–8)
PLATELET # BLD AUTO: 310 K/UL (ref 150–450)
PMV BLD AUTO: 8.7 FL (ref 9.2–12.9)
POTASSIUM SERPL-SCNC: 3.8 MMOL/L (ref 3.5–5.1)
PROT SERPL-MCNC: 5.8 G/DL (ref 6–8.4)
PROT UR QL STRIP: ABNORMAL
RBC # BLD AUTO: 3.33 M/UL (ref 4–5.4)
RBC #/AREA URNS AUTO: 5 /HPF (ref 0–4)
SODIUM SERPL-SCNC: 131 MMOL/L (ref 136–145)
SP GR UR STRIP: 1.02 (ref 1–1.03)
SQUAMOUS #/AREA URNS AUTO: 0 /HPF
URN SPEC COLLECT METH UR: ABNORMAL
WBC # BLD AUTO: 5.79 K/UL (ref 3.9–12.7)
WBC #/AREA URNS AUTO: 4 /HPF (ref 0–5)

## 2024-03-23 PROCEDURE — 96365 THER/PROPH/DIAG IV INF INIT: CPT

## 2024-03-23 PROCEDURE — 96376 TX/PRO/DX INJ SAME DRUG ADON: CPT

## 2024-03-23 PROCEDURE — 25000003 PHARM REV CODE 250: Mod: HCNC

## 2024-03-23 PROCEDURE — 97161 PT EVAL LOW COMPLEX 20 MIN: CPT | Mod: HCNC

## 2024-03-23 PROCEDURE — 96374 THER/PROPH/DIAG INJ IV PUSH: CPT | Mod: 59

## 2024-03-23 PROCEDURE — G0378 HOSPITAL OBSERVATION PER HR: HCPCS | Mod: HCNC

## 2024-03-23 PROCEDURE — 99285 EMERGENCY DEPT VISIT HI MDM: CPT | Mod: 25

## 2024-03-23 PROCEDURE — 83605 ASSAY OF LACTIC ACID: CPT | Mod: 91,HCNC

## 2024-03-23 PROCEDURE — 85652 RBC SED RATE AUTOMATED: CPT | Mod: HCNC | Performed by: EMERGENCY MEDICINE

## 2024-03-23 PROCEDURE — 85025 COMPLETE CBC W/AUTO DIFF WBC: CPT | Mod: HCNC | Performed by: EMERGENCY MEDICINE

## 2024-03-23 PROCEDURE — 93005 ELECTROCARDIOGRAM TRACING: CPT | Mod: HCNC

## 2024-03-23 PROCEDURE — 97530 THERAPEUTIC ACTIVITIES: CPT | Mod: HCNC

## 2024-03-23 PROCEDURE — 96367 TX/PROPH/DG ADDL SEQ IV INF: CPT

## 2024-03-23 PROCEDURE — 81001 URINALYSIS AUTO W/SCOPE: CPT | Mod: HCNC | Performed by: EMERGENCY MEDICINE

## 2024-03-23 PROCEDURE — 96361 HYDRATE IV INFUSION ADD-ON: CPT

## 2024-03-23 PROCEDURE — 86140 C-REACTIVE PROTEIN: CPT | Mod: HCNC | Performed by: EMERGENCY MEDICINE

## 2024-03-23 PROCEDURE — 93010 ELECTROCARDIOGRAM REPORT: CPT | Mod: HCNC,,, | Performed by: INTERNAL MEDICINE

## 2024-03-23 PROCEDURE — 87040 BLOOD CULTURE FOR BACTERIA: CPT | Mod: HCNC | Performed by: EMERGENCY MEDICINE

## 2024-03-23 PROCEDURE — 63600175 PHARM REV CODE 636 W HCPCS: Mod: HCNC | Performed by: EMERGENCY MEDICINE

## 2024-03-23 PROCEDURE — 25000003 PHARM REV CODE 250: Mod: HCNC | Performed by: EMERGENCY MEDICINE

## 2024-03-23 PROCEDURE — 63600175 PHARM REV CODE 636 W HCPCS: Mod: HCNC

## 2024-03-23 PROCEDURE — 80053 COMPREHEN METABOLIC PANEL: CPT | Mod: HCNC | Performed by: EMERGENCY MEDICINE

## 2024-03-23 PROCEDURE — 97116 GAIT TRAINING THERAPY: CPT | Mod: HCNC

## 2024-03-23 PROCEDURE — 97165 OT EVAL LOW COMPLEX 30 MIN: CPT | Mod: HCNC

## 2024-03-23 PROCEDURE — 83605 ASSAY OF LACTIC ACID: CPT | Mod: HCNC | Performed by: EMERGENCY MEDICINE

## 2024-03-23 PROCEDURE — 96360 HYDRATION IV INFUSION INIT: CPT | Mod: 59

## 2024-03-23 RX ORDER — AZITHROMYCIN 250 MG/1
500 TABLET, FILM COATED ORAL DAILY
Status: DISCONTINUED | OUTPATIENT
Start: 2024-03-24 | End: 2024-03-25 | Stop reason: HOSPADM

## 2024-03-23 RX ORDER — OXYCODONE HYDROCHLORIDE 10 MG/1
10 TABLET ORAL EVERY 6 HOURS PRN
Status: DISCONTINUED | OUTPATIENT
Start: 2024-03-23 | End: 2024-03-24

## 2024-03-23 RX ORDER — SODIUM CHLORIDE 0.9 % (FLUSH) 0.9 %
10 SYRINGE (ML) INJECTION EVERY 12 HOURS PRN
Status: DISCONTINUED | OUTPATIENT
Start: 2024-03-23 | End: 2024-03-25 | Stop reason: HOSPADM

## 2024-03-23 RX ORDER — GLUCAGON 1 MG
1 KIT INJECTION
Status: DISCONTINUED | OUTPATIENT
Start: 2024-03-23 | End: 2024-03-25 | Stop reason: HOSPADM

## 2024-03-23 RX ORDER — ETHAMBUTOL HYDROCHLORIDE 400 MG/1
800 TABLET, FILM COATED ORAL DAILY
Status: DISCONTINUED | OUTPATIENT
Start: 2024-03-24 | End: 2024-03-25 | Stop reason: HOSPADM

## 2024-03-23 RX ORDER — LIDOCAINE 50 MG/G
1 PATCH TOPICAL
Status: DISCONTINUED | OUTPATIENT
Start: 2024-03-23 | End: 2024-03-25 | Stop reason: HOSPADM

## 2024-03-23 RX ORDER — GABAPENTIN 300 MG/1
300 CAPSULE ORAL 3 TIMES DAILY
Status: DISCONTINUED | OUTPATIENT
Start: 2024-03-23 | End: 2024-03-25 | Stop reason: HOSPADM

## 2024-03-23 RX ORDER — ACETAMINOPHEN 500 MG
1000 TABLET ORAL EVERY 8 HOURS
Status: DISCONTINUED | OUTPATIENT
Start: 2024-03-23 | End: 2024-03-25 | Stop reason: HOSPADM

## 2024-03-23 RX ORDER — OXYCODONE HYDROCHLORIDE 5 MG/1
5 TABLET ORAL EVERY 6 HOURS PRN
Status: DISCONTINUED | OUTPATIENT
Start: 2024-03-23 | End: 2024-03-25 | Stop reason: HOSPADM

## 2024-03-23 RX ORDER — NALOXONE HCL 0.4 MG/ML
0.02 VIAL (ML) INJECTION
Status: DISCONTINUED | OUTPATIENT
Start: 2024-03-23 | End: 2024-03-25 | Stop reason: HOSPADM

## 2024-03-23 RX ORDER — IBUPROFEN 200 MG
16 TABLET ORAL
Status: DISCONTINUED | OUTPATIENT
Start: 2024-03-23 | End: 2024-03-25 | Stop reason: HOSPADM

## 2024-03-23 RX ORDER — TALC
6 POWDER (GRAM) TOPICAL NIGHTLY PRN
Status: DISCONTINUED | OUTPATIENT
Start: 2024-03-23 | End: 2024-03-25 | Stop reason: HOSPADM

## 2024-03-23 RX ORDER — OXYCODONE HYDROCHLORIDE 5 MG/1
5 TABLET ORAL EVERY 6 HOURS PRN
Status: DISCONTINUED | OUTPATIENT
Start: 2024-03-23 | End: 2024-03-23

## 2024-03-23 RX ORDER — IBUPROFEN 200 MG
24 TABLET ORAL
Status: DISCONTINUED | OUTPATIENT
Start: 2024-03-23 | End: 2024-03-25 | Stop reason: HOSPADM

## 2024-03-23 RX ORDER — ETHAMBUTOL HYDROCHLORIDE 400 MG/1
800 TABLET, FILM COATED ORAL
Status: COMPLETED | OUTPATIENT
Start: 2024-03-23 | End: 2024-03-23

## 2024-03-23 RX ORDER — RIFABUTIN 150 MG/1
300 CAPSULE ORAL DAILY
Status: DISCONTINUED | OUTPATIENT
Start: 2024-03-24 | End: 2024-03-25 | Stop reason: HOSPADM

## 2024-03-23 RX ORDER — RIFABUTIN 150 MG/1
300 CAPSULE ORAL
Status: COMPLETED | OUTPATIENT
Start: 2024-03-23 | End: 2024-03-23

## 2024-03-23 RX ORDER — GABAPENTIN 300 MG/1
300 CAPSULE ORAL
Status: COMPLETED | OUTPATIENT
Start: 2024-03-23 | End: 2024-03-23

## 2024-03-23 RX ORDER — ACETAMINOPHEN 325 MG/1
650 TABLET ORAL EVERY 4 HOURS PRN
Status: DISCONTINUED | OUTPATIENT
Start: 2024-03-23 | End: 2024-03-23

## 2024-03-23 RX ORDER — HYDROMORPHONE HYDROCHLORIDE 1 MG/ML
0.5 INJECTION, SOLUTION INTRAMUSCULAR; INTRAVENOUS; SUBCUTANEOUS
Status: COMPLETED | OUTPATIENT
Start: 2024-03-23 | End: 2024-03-23

## 2024-03-23 RX ORDER — KETOROLAC TROMETHAMINE 30 MG/ML
15 INJECTION, SOLUTION INTRAMUSCULAR; INTRAVENOUS ONCE
Status: COMPLETED | OUTPATIENT
Start: 2024-03-23 | End: 2024-03-23

## 2024-03-23 RX ORDER — METHOCARBAMOL 500 MG/1
500 TABLET, FILM COATED ORAL EVERY 6 HOURS
Status: DISCONTINUED | OUTPATIENT
Start: 2024-03-23 | End: 2024-03-25 | Stop reason: HOSPADM

## 2024-03-23 RX ADMIN — GABAPENTIN 300 MG: 300 CAPSULE ORAL at 09:03

## 2024-03-23 RX ADMIN — OXYCODONE 10 MG: 5 TABLET ORAL at 04:03

## 2024-03-23 RX ADMIN — HYDROMORPHONE HYDROCHLORIDE 0.5 MG: 1 INJECTION, SOLUTION INTRAMUSCULAR; INTRAVENOUS; SUBCUTANEOUS at 08:03

## 2024-03-23 RX ADMIN — GABAPENTIN 300 MG: 300 CAPSULE ORAL at 08:03

## 2024-03-23 RX ADMIN — METHOCARBAMOL 500 MG: 500 TABLET ORAL at 01:03

## 2024-03-23 RX ADMIN — SODIUM CHLORIDE 1000 ML: 9 INJECTION, SOLUTION INTRAVENOUS at 06:03

## 2024-03-23 RX ADMIN — SODIUM CHLORIDE 500 ML: 9 INJECTION, SOLUTION INTRAVENOUS at 03:03

## 2024-03-23 RX ADMIN — KETOROLAC TROMETHAMINE 15 MG: 30 INJECTION, SOLUTION INTRAMUSCULAR; INTRAVENOUS at 01:03

## 2024-03-23 RX ADMIN — OXYCODONE 5 MG: 5 TABLET ORAL at 08:03

## 2024-03-23 RX ADMIN — RIFABUTIN 300 MG: 150 CAPSULE ORAL at 11:03

## 2024-03-23 RX ADMIN — ACETAMINOPHEN 1000 MG: 500 TABLET ORAL at 01:03

## 2024-03-23 RX ADMIN — AMIKACIN SULFATE 950 MG: 250 INJECTION, SOLUTION INTRAMUSCULAR; INTRAVENOUS at 09:03

## 2024-03-23 RX ADMIN — SODIUM CHLORIDE 1000 ML: 9 INJECTION, SOLUTION INTRAVENOUS at 05:03

## 2024-03-23 RX ADMIN — ETHAMBUTOL HYDROCHLORIDE 800 MG: 400 TABLET ORAL at 11:03

## 2024-03-23 RX ADMIN — SODIUM CHLORIDE 500 ML: 9 INJECTION, SOLUTION INTRAVENOUS at 09:03

## 2024-03-23 RX ADMIN — ACETAMINOPHEN 1000 MG: 500 TABLET ORAL at 09:03

## 2024-03-23 RX ADMIN — AZITHROMYCIN MONOHYDRATE 500 MG: 500 INJECTION, POWDER, LYOPHILIZED, FOR SOLUTION INTRAVENOUS at 08:03

## 2024-03-23 RX ADMIN — OXYCODONE 5 MG: 5 TABLET ORAL at 12:03

## 2024-03-23 NOTE — PT/OT/SLP EVAL
Physical Therapy Evaluation    Patient Name:  Amie Salmeron   MRN:  [id number]  Admit Date: 3/23/2024  Admitting Diagnosis:  <principal problem not specified>  Length of Stay: 0 days  Recent Surgery: * No surgery found *      Recommendations:     Discharge Recommendations:  High Intensity Therapy   Discharge Equipment Recommendations: none     Assessment:     Amie Salmeron is a 59 y.o. female admitted with a medical diagnosis of <principal problem not specified>. Pt found alert and cooperative. Pt's primary complaint at home is occasional dizziness with positional change and mobility increasing pt's fall risk. Pt is also caring for elderly parents and her brother who has autism. Pt was able to take a few side steps at the EOB today. Pt reported mild dizziness with positional change, but dizziness improved with prolonged sitting. MAPs between 60s-80s during session. Patient presents with good participation and motivation to return to prior level of function with High Intensity Therapy.  The Patient demonstrates appropriate endurance and strength to participate in up to 3 hours or 15hrs of combined therapy post acute.      Problem List: impaired endurance, weakness, impaired functional mobility, gait instability, impaired cardiopulmonary response to activity  Rehab Prognosis: Good; patient would benefit from acute skilled PT services to address these deficits and reach maximum level of function.      Plan:     During this hospitalization, patient to be seen 3 x/week to address the identified rehab impairments via gait training, therapeutic activities, therapeutic exercises, neuromuscular re-education and progress towards the established goals.    Plan of Care Expires:  04/23/24    Subjective   Communicated with RN prior to session.  Patient found HOB elevated upon PT entry to room, agreeable to evaluation. Amie Salmeron's alone during session.    Chief Complaint: Back Pain (Recent osteomyelitis of spine. Now c/o severe  "lower back pain to where she can barely walk and dizziness upon standing )    Patient/Family Comments/goals: to get better  Pain/Comfort:  Pain Rating 1: 8/10  Location 1:  (low back)  Pain Addressed 1: Reposition, Distraction  Pain Rating Post-Intervention 1: 8/10    Living Environment:  Pt lives with mother, father, and brother. Pt's parents are in their 80s, and pt's brother has autism. Pt was recently admitted at [facility] for osteomyelitis of her spine. Pt was home for 1 day before going to the ED for dizziness. At her baseline, pt ambulates with a RW and is modified ind with ADLs. Patient uses DME as follows: wheelchair, grab bar, bath bench, bedside commode, walker, rolling.     Pt does not have assistance at home.     Objective:   Patient found with: telemetry, pulse ox (continuous), blood pressure cuff, PureWick     General Precautions: Standard, Cardiac fall   Orthopedic Precautions:N/A   Braces: N/A   Oxygen Device: Room Air  Vitals: /71   Pulse 109   Temp 98.1 °F (36.7 °C) (Oral)   Resp 20   Ht 5' 4" (1.626 m)   Wt 54.4 kg (120 lb)   SpO2 99%   BMI 20.60 kg/m²     Exams:  Cognition:   Alert and Cooperative  Ox4  Command following: Follows multistep  commands  Fluency: clear/fluent    RLE ROM: WFL  RLE Strength: grossly 4/5  LLE ROM: WFL  LLE Strength: grossly 3+/5      Outcome Measures:  AM-PAC 6 CLICK MOBILITY  Turning over in bed (including adjusting bedclothes, sheets and blankets)?: 3  Sitting down on and standing up from a chair with arms (e.g., wheelchair, bedside commode, etc.): 3  Moving from lying on back to sitting on the side of the bed?: 3  Moving to and from a bed to a chair (including a wheelchair)?: 3  Need to walk in hospital room?: 3  Climbing 3-5 steps with a railing?: 2  Basic Mobility Total Score: 17     Functional Mobility:  Additional staff present: OT  Bed Mobility:  Supine to Sit: minimum assistance; HOB elevated  Log roll technique   Scooting anteriorly to EOB to have both " feet planted on floor: minimum assistance  Sit to Supine: maximal assistance and 2 persons; HOB elevated    Sitting Balance at Edge of Bed:  Assistance Level Required: SBA-min A  Time: 6 minutes  Comments:   Worked on activity tolerance sitting EOB, worked on tolerance to positional change, and worked on sitting balance   Pt demo'd an occasional posterior lean due to difficulty marco antonio core to maintain midline. This improved throughout the sitting trial.     Transfers:   Sit <> Stand Transfer: contact guard assistance with rolling walker from EOB      Gait:   Patient ambulated: 4 side steps to the right    Patient required: contact guard  Patient used: rolling walker  Gait Deviation(s): decreased step length and decreased irma  Impairments due to: pain  Comments:   No LOB; pt did not experience dizziness during gait trial   No SOB  Verbal cuing for RW management and advancement of BLE      Therapeutic Activities, Exercises, & Education:   Educated pt on PT role/POC  Educated pt on importance of OOB activity and daily ambulation   Pt verbalized understanding         Patient left HOB elevated with all lines intact, call button in reach, and RN notified.    GOALS:   Multidisciplinary Problems       Physical Therapy Goals          Problem: Physical Therapy    Goal Priority Disciplines Outcome Goal Variances Interventions   Physical Therapy Goal     PT, PT/OT Ongoing, Progressing     Description: Goals to be met by: 4/15/2024    Patient will increase functional independence with mobility by performin. Supine to sit with Supervision  2. Sit to stand transfer with Supervision using RW  3. Gait  x 50 feet with Supervision using RW.                          History:     Past Medical History:   Diagnosis Date    Allergy     Arthritis     Asthma     Chronic pain     HIV infection     Hypertension     Overactive bladder     Spinal stenosis     Urine incontinence        Past Surgical History:   Procedure Laterality  Date    ADENOIDECTOMY      APPENDECTOMY      BACK SURGERY      GERALD    CYST REMOVAL      HYSTERECTOMY      JOINT REPLACEMENT Right     knee, with revision    KNEE SURGERY Left     knee replacement    RHIZOTOMY      TONSILLECTOMY      TOTAL ANKLE ARTHROPLASTY Right        Time Tracking:     PT Received On: 03/23/24  PT Start Time: 1025     PT Stop Time: 1053  PT Total Time (min): 28 min     Billable Minutes: Evaluation 8 and Gait Training 8

## 2024-03-23 NOTE — ASSESSMENT & PLAN NOTE
Patient has hyponatremia which is uncontrolled,We will aim to correct the sodium by 4-6mEq in 24 hours. We will monitor sodium Daily. The hyponatremia is likely due to Dehydration/hypovolemia. The patient's sodium results have been reviewed and are listed below.  Recent Labs   Lab 03/23/24  0631   *     - Daily CMP  - Monitor for symptoms/neurological changes  - IVF as needed

## 2024-03-23 NOTE — PLAN OF CARE
Problem: Physical Therapy  Goal: Physical Therapy Goal  Description: Goals to be met by: 4/15/2024    Patient will increase functional independence with mobility by performin. Supine to sit with Supervision  2. Sit to stand transfer with Supervision using RW  3. Gait  x 50 feet with Supervision using RW.     Outcome: Ongoing, Progressing     Eval completed. Goals appropriate.

## 2024-03-23 NOTE — H&P
[facility] - Emergency Dept  [facility] Medicine  History & Physical    Patient Name: Amie Salmeron  MRN: [id number]  Patient Class: OP- Observation  Admission Date: 3/23/2024  Attending Physician: Luis Armando Moreland MD   Primary Care Provider: Dustin Caro MD         Patient information was obtained from patient, past medical records, and ER records.     Subjective:     Principal Problem:Sepsis    Chief Complaint:   Chief Complaint   Patient presents with    Back Pain     Recent osteomyelitis of spine. Now c/o severe lower back pain to where she can barely walk and dizziness upon standing         HPI: Ms. 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 She said that her pain was uncontrolled and led to an episode of nausea and vomiting. She states that she was discharged from [facility] yesterday and that her pain was never really controlled. She also states she was unable to have her pain medication prescription filled and she had to come back to the ER, but she [place] [facility] [place] than going back to [facility] because she had a very bad expeience with the staff and level of attention/care she was receiving. Reports full compliance with all of her home medications and antibiotics without any recent changes. She denies fevers, chills, abdominal pain, urinary frequency, and burning with urination. She says her pain is located in her left lower back just above her buttocks. She denies any pain near the surgical site.    Of note, she was discharged yesterday from [facility] from a nearly month long hospital course that was c/b pleural effusion requiring chest tube placement as well as TPA/DNAse of 6 doses. No invasive treatment was done, and pleural effusion improved sigificantly leading to discharge yesterday. She was discharged with PICC line. She was sent home  "with a percocet prescription but says that she was not able to fill the prescription after discharge.     In the ED, patient was afebrile and hemodynamically stable. Vitals on admission: Temp 98.5F, 86 HR, 101/53 BP, SpO2 98%, and 22 RR. She was treated with a continuation of her Abx regimen consisting of Amikacin, Azithromycin, Ethambutol, and Rifabutin. She was also given Dilaudid 0.5 mg, Gabapentin 300 mg, and 2 L NS bolus. Labs significant for no leukocytosis, stable Hgb 8.7, elevated ESR/CRP, 100/42, and mild Hyponatremia 131. Lactic Acid 1.3 and UA negative.  CXR significant for "new subsegmental band like opacity with surrounding increased interstitial markings at the left middle lung zone, possibly atelectasis, scarring or developing consolidation." Also evidence of L sided small layering pleural effusion. Following Dilaudid 0.5 mg, patient developed hypotension with MAP of 64-67, and MICU was consulted but recommended she was stable for the floor. Patient was admitted to  for pain management and sepsis workup.     Past Medical History:   Diagnosis Date    Allergy     Arthritis     Asthma     Chronic pain     HIV infection     Hypertension     Overactive bladder     Spinal stenosis     Urine incontinence        Past Surgical History:   Procedure Laterality Date    ADENOIDECTOMY      APPENDECTOMY      BACK SURGERY      GERALD    CYST REMOVAL      HYSTERECTOMY      JOINT REPLACEMENT Right     knee, with revision    KNEE SURGERY Left     knee replacement    RHIZOTOMY      TONSILLECTOMY      TOTAL ANKLE ARTHROPLASTY Right        Review of patient's allergies indicates:  No Known Allergies    No current facility-administered medications on file prior to encounter.     Current Outpatient Medications on File Prior to Encounter   Medication Sig    ozgpgajvx-zasrviks-sptrqxk ala (BIKTARVY) -25 mg (25 kg or greater) Take 1 tablet by mouth once daily.    dorzolamide-timolol 2-0.5% (COSOPT) 22.3-6.8 mg/mL " ophthalmic solution Place 1 drop into the right eye 2 (two) times daily. for 14 days    valACYclovir (VALTREX) 1000 MG tablet Take 1 tablet (1,000 mg total) by mouth 2 (two) times daily. for 10 days     Family History       Problem Relation (Age of Onset)    Alcohol abuse Brother    Cancer Father    Depression Sister    Hypertension Mother, Brother    Thyroid disease Mother          Tobacco Use    Smoking status: Never    Smokeless tobacco: Never   Substance and Sexual Activity    Alcohol use: Yes     Comment: Socially    Drug use: No    Sexual activity: Yes     Partners: Male     Birth control/protection: None     Review of Systems   Constitutional:  Negative for chills and fever.   HENT:  Negative for congestion, sore throat and trouble swallowing.    Eyes:  Negative for visual disturbance.   Respiratory:  Negative for cough, chest tightness and shortness of breath.    Cardiovascular:  Negative for chest pain and leg swelling.   Gastrointestinal:  Positive for nausea and vomiting. Negative for abdominal distention, abdominal pain, constipation and diarrhea.   Genitourinary:  Negative for difficulty urinating, dysuria and urgency.   Musculoskeletal:  Positive for back pain.   Skin:  Positive for pallor.   Neurological:  Positive for weakness. Negative for dizziness, light-headedness and headaches.   Psychiatric/Behavioral:  Negative for confusion and decreased concentration.      Objective:     Vital Signs (Most Recent):  Temp: 98.1 °F (36.7 °C) (03/23/24 0708)  Pulse: 96 (03/23/24 1244)  Resp: 16 (03/23/24 1244)  BP: 113/74 (03/23/24 1244)  SpO2: 99 % (03/23/24 1244) Vital Signs (24h Range):  Temp:  [98.1 °F (36.7 °C)-98.5 °F (36.9 °C)] 98.1 °F (36.7 °C)  Pulse:  [] 96  Resp:  [12-22] 16  SpO2:  [98 %-100 %] 99 %  BP: ()/(50-74) 113/74     Weight: 54.4 kg (120 lb)  Body mass index is 20.6 kg/m².     Physical Exam  Constitutional:       General: She is not in acute distress.     Appearance: She is  ill-appearing.   HENT:      Head: Normocephalic and atraumatic.      Mouth/Throat:      Mouth: Mucous membranes are dry.      Pharynx: Oropharynx is clear.   Eyes:      General: No scleral icterus.     Extraocular Movements: Extraocular movements intact.      Conjunctiva/sclera: Conjunctivae normal.      Pupils: Pupils are equal, round, and reactive to light.   Cardiovascular:      Rate and Rhythm: Normal rate and regular rhythm.      Pulses: Normal pulses.      Heart sounds: Normal heart sounds.   Pulmonary:      Effort: Pulmonary effort is normal. No respiratory distress.      Breath sounds: Normal breath sounds. No wheezing, rhonchi or rales.   Chest:      Chest wall: No tenderness.   Abdominal:      General: Abdomen is flat. There is no distension.      Palpations: Abdomen is soft.      Tenderness: There is abdominal tenderness. There is no guarding or rebound.   Musculoskeletal:         General: No tenderness.      Cervical back: Normal range of motion and neck supple. No rigidity.      Right lower leg: No edema.      Left lower leg: No edema.      Comments: Tenderness to palpation of left flank; no midline tenderness. No erythema or rash. Bandage present to T12-L1 region   Skin:     General: Skin is warm and dry.      Coloration: Skin is pale. Skin is not jaundiced.      Findings: No erythema.   Neurological:      General: No focal deficit present.      Mental Status: She is alert and oriented to person, place, and time.      Cranial Nerves: No cranial nerve deficit.      Motor: No weakness.              CRANIAL NERVES     CN III, IV, VI   Pupils are equal, round, and reactive to light.       Significant Labs: All pertinent labs within the past 24 hours have been reviewed.    Significant Imaging: I have reviewed all pertinent imaging results/findings within the past 24 hours.  Assessment/Plan:     * Sepsis  This patient does have evidence of infective focus  My overall impression is sepsis.  Source:  Unknown  Antibiotics given-   Antibiotics (72h ago, onward)      Start     Stop Route Frequency Ordered    03/24/24 0900  azithromycin tablet 500 mg         -- Oral Daily 03/23/24 1210    03/24/24 0900  ethambutoL tablet 800 mg         -- Oral Daily 03/23/24 1210    03/24/24 0900  rifabutin capsule 300 mg         -- Oral Daily 03/23/24 1210    03/23/24 0900  amikacin (AMIKIN) 950 mg in dextrose 5 % (D5W) 250 mL IVPB         -- IV Every Tues, Thurs, Sat 03/23/24 0743    03/23/24 0825  amikacin - pharmacy to dose  (amikacin IVPB (PEDS and ADULTS))        See Hyperspace for full Linked Orders Report.    -- IV pharmacy to manage frequency 03/23/24 0727          Latest lactate reviewed-  Recent Labs   Lab 03/23/24  0631   LACTATE 1.3       Fluid challenge Ideal Body Weight- The patient's ideal body weight is Ideal body weight: 54.7 kg (120 lb 9.5 oz) which will be used to calculate fluid bolus of 30 ml/kg for treatment of septic shock.      Post- resuscitation assessment No - Post resuscitation assessment not needed       Will Not start Pressors- Levophed for MAP of 65  Source control achieved by: antibiotics    Patient with recent disseminated MAC infection (on abx regimen) in the setting of uncontrolled HIV (on Biktarvy) who presented with uncontrolled back pain. On admission, she was afebrile and hemodynamically stable. While in ED, patient was treated with current abx regimen and 2 L NS boluses. Following 0.5 mg Dilaudid, patient developed hypotension with MAP's around 65 and MICU was consulted but deemed stable enough for the floor.     WBC 5.79. Lactate 1.3. UA non-infectious     Imaging: New subsegmental band like opacity with surrounding increased interstitial markings at the left middle lung zone, possibly atelectasis, scarring or developing consolidation. Blunting of the left costophrenic angle concerning for a small layering effusion.    Likely source: Lung vs recent corpectomy vs opportunistic infection in  the setting of AIDS    Continuing previous Abx regimen for MAC infection (Amikacin, Rifabutin, Azithromycin, and Ethambutol)  S/P Fluid resuscitation with 2L Normal Saline bolus in the ED; IVF as needed for hypotension  Telemetry  Lactate WNL  F/u Bcx  ID consult  Multimodal pain regimen: Tylenol, Gabapentin, Oxycodone, Robaxin, and Toradol  PT/OT consulted and recommend high intensity therapy; will likely need SNF placement at discharge      Osteomyelitis of vertebra of thoracolumbar region  Patient underwent corpectomy of T12-L1 on 02/27 due to osteomyelitis from disseminated MAC in the setting of uncontrolled HIV; patient reporting uncontrolled low back on admission. She was recently discharged from [facility] with Percocet prescription but was unable to fill it and returned to the hospital for better pain control.     - Continue Abx regimen for MAC infection  - ID consulted  - Multimodal pain regimen consisting of Oxycodone, tylenol, Robaxin, Toradol, Lidocaine patch, and gabapentin  - Will hold off on additional imaging for now      Insomnia  Takes seroquel 100mg qhs       Mycobacterium avium infection    Continue  ethambutol 800mg daily  Amikacin 950mg T,R,S   azithromycin 500 daily  rifampin 300 daily  ID Consulted for continued management, appreciate rec's      Hyponatremia  Patient has hyponatremia which is uncontrolled,We will aim to correct the sodium by 4-6mEq in 24 hours. We will monitor sodium Daily. The hyponatremia is likely due to Dehydration/hypovolemia. The patient's sodium results have been reviewed and are listed below.  Recent Labs   Lab 03/23/24  0631   *     - Daily CMP  - Monitor for symptoms/neurological changes  - IVF as needed    HIV (human immunodeficiency virus infection)  Patient reports initial diagnosis was approximately 42 years ago, but she has only been on ART for 1 year approximately. CD4 counts as low as 24 per chart review, and has been improving as of recently with Last CD4 113  (2/19/24) per chart review;     - Continue home BIKTARVY      Chronic pain    Gabapentin 600mg PO TID  Robaxin 750mg PO TID  Holding Amitriptyline             VTE Risk Mitigation (From admission, onward)           Ordered     IP VTE LOW RISK PATIENT  Once         03/23/24 1143     Place sequential compression device  Until discontinued         03/23/24 1143                           On 03/23/2024, patient should be placed in hospital observation services under my care in collaboration with Hospital Medicine Team 2.    Pharmacokinetic Initial Assessment: Amikacin    Assessment:  Weight utilized for dose calculation: Actual Body Weight  Dosing method utilized: extended interval dosing    Plan:   Patient recently discharged from [facility] after treatment of osteomyelitis; she was treated with amikacin since what appears to be ~2/29/24 and discharged on 950 mg on Tues, Thurs, Sat with home health. Patient doesn't remember much about regimen but last dose was Thurs 3/21.  Will resume the regimen she was discharged on and administer 950 mg today, 3/23  Amikacin trough ordered for Tues 3/26 prior to next dose     Pharmacy will continue to monitor.    Thank you for the consult,    Margoth Teresa       Patient brief summary:  Amie Salmeron is a 59 y.o. female initiated on aminoglycoside therapy for treatment of suspected bone/joint infection    Drug Allergies:   Review of patient's allergies indicates:  No Known Allergies    Actual Body Weight:   54.4 kg    Adjust Body Weight:   54.4 kg    Ideal Body Weight:  54.7 kg     Renal Function:   Estimated Creatinine Clearance: 101.5 mL/min (based on SCr of 0.5 mg/dL).,     Dialysis Method (if applicable):  N/A    CBC (last 72 hours):  Recent Labs   Lab Result Units 03/21/24  0440 03/22/24  0627 03/23/24  0631   WBC K/uL  --   --  5.79   Hemoglobin g/dL 8.9* 9.8* 8.7*   Hematocrit % 28.2* 30.0* 28.3*   Platelets K/uL  --   --  310   Gran % %  --   --  64.6   Lymph % %  --   --  22.5    Mono % %  --   --  9.8   Eosinophil % %  --   --  0.7   Basophil % %  --   --  0.5   Differential Method   --   --  Automated       Metabolic Panel (last 72 hours):  Recent Labs   Lab Result Units 03/21/24  0440 03/22/24  0627 03/23/24  0631   Sodium mmol/L  --   --  131*   Potassium mmol/L  --   --  3.8   Chloride mmol/L  --   --  104   CO2 mmol/L  --   --  19*   Glucose mg/dL  --   --  72   BUN mg/dL  --   --  17   Creatinine mg/dL  --   --  0.5   Albumin g/dL  --   --  2.2*   Total Bilirubin mg/dL  --   --  0.3   Alkaline Phosphatase U/L  --   --  128   AST U/L  --   --  28   ALT U/L  --   --  15   Magnesium Level mg/dL 1.8 1.8  --    Phosphorus Level mg/dL 3.5 4.0  --        Microbiologic Results:  Microbiology Results (last 7 days)       Procedure Component Value Units Date/Time    Blood Culture #1 **CANNOT BE ORDERED STAT** [[id number]] Collected: 03/23/24 0642    Order Status: Sent Specimen: Blood from Line, Femoral, Right Updated: 03/23/24 0720    Blood Culture #2 **CANNOT BE ORDERED STAT** [[id number]] Collected: 03/23/24 0641    Order Status: Sent Specimen: Blood from Peripheral, Hand, Right Updated: 03/23/24 0719            Silvio Osorio DO  Department of Hospital Medicine  [facility] - Emergency Dept

## 2024-03-23 NOTE — PLAN OF CARE
Problem: Occupational Therapy  Goal: Occupational Therapy Goal  Description: Goals to be met by: 4/23/24 (1 month)      Patient will increase functional independence with ADLs by performing:    UE Dressing with Curry.  LE Dressing with Curry.  Grooming while standing at sink with Modified Curry.  Toileting from toilet with Modified Curry for hygiene and clothing management.   Rolling to Bilateral with Curry.   Supine to sit with Curry.  Step transfer with Modified Curry  Toilet transfer to toilet with Modified Curry.    Evaluated pt and established OT POC. Continue OT as tolerated.  Theresa Ruiz OT  3/23/2024    Outcome: Ongoing, Progressing

## 2024-03-23 NOTE — SUBJECTIVE & OBJECTIVE
Past Medical History:   Diagnosis Date    Allergy     Arthritis     Asthma     Chronic pain     HIV infection     Hypertension     Overactive bladder     Spinal stenosis     Urine incontinence        Past Surgical History:   Procedure Laterality Date    ADENOIDECTOMY      APPENDECTOMY      BACK SURGERY      GERALD    CYST REMOVAL      HYSTERECTOMY      JOINT REPLACEMENT Right     knee, with revision    KNEE SURGERY Left     knee replacement    RHIZOTOMY      TONSILLECTOMY      TOTAL ANKLE ARTHROPLASTY Right        Review of patient's allergies indicates:  No Known Allergies    No current facility-administered medications on file prior to encounter.     Current Outpatient Medications on File Prior to Encounter   Medication Sig    qslxeyfvv-hhqmqfnz-kxeufbq ala (BIKTARVY) -25 mg (25 kg or greater) Take 1 tablet by mouth once daily.    dorzolamide-timolol 2-0.5% (COSOPT) 22.3-6.8 mg/mL ophthalmic solution Place 1 drop into the right eye 2 (two) times daily. for 14 days    valACYclovir (VALTREX) 1000 MG tablet Take 1 tablet (1,000 mg total) by mouth 2 (two) times daily. for 10 days     Family History       Problem Relation (Age of Onset)    Alcohol abuse Brother    Cancer Father    Depression Sister    Hypertension Mother, Brother    Thyroid disease Mother          Tobacco Use    Smoking status: Never    Smokeless tobacco: Never   Substance and Sexual Activity    Alcohol use: Yes     Comment: Socially    Drug use: No    Sexual activity: Yes     Partners: Male     Birth control/protection: None     Review of Systems   Constitutional:  Negative for chills and fever.   HENT:  Negative for congestion, sore throat and trouble swallowing.    Eyes:  Negative for visual disturbance.   Respiratory:  Negative for cough, chest tightness and shortness of breath.    Cardiovascular:  Negative for chest pain and leg swelling.   Gastrointestinal:  Positive for nausea and vomiting. Negative for abdominal distention, abdominal pain,  constipation and diarrhea.   Genitourinary:  Negative for difficulty urinating, dysuria and urgency.   Musculoskeletal:  Positive for back pain.   Skin:  Positive for pallor.   Neurological:  Positive for weakness. Negative for dizziness, light-headedness and headaches.   Psychiatric/Behavioral:  Negative for confusion and decreased concentration.      Objective:     Vital Signs (Most Recent):  Temp: 98.1 °F (36.7 °C) (03/23/24 0708)  Pulse: 96 (03/23/24 1244)  Resp: 16 (03/23/24 1244)  BP: 113/74 (03/23/24 1244)  SpO2: 99 % (03/23/24 1244) Vital Signs (24h Range):  Temp:  [98.1 °F (36.7 °C)-98.5 °F (36.9 °C)] 98.1 °F (36.7 °C)  Pulse:  [] 96  Resp:  [12-22] 16  SpO2:  [98 %-100 %] 99 %  BP: ()/(50-74) 113/74     Weight: 54.4 kg (120 lb)  Body mass index is 20.6 kg/m².     Physical Exam  Constitutional:       General: She is not in acute distress.     Appearance: She is ill-appearing.   HENT:      Head: Normocephalic and atraumatic.      Mouth/Throat:      Mouth: Mucous membranes are dry.      Pharynx: Oropharynx is clear.   Eyes:      General: No scleral icterus.     Extraocular Movements: Extraocular movements intact.      Conjunctiva/sclera: Conjunctivae normal.      Pupils: Pupils are equal, round, and reactive to light.   Cardiovascular:      Rate and Rhythm: Normal rate and regular rhythm.      Pulses: Normal pulses.      Heart sounds: Normal heart sounds.   Pulmonary:      Effort: Pulmonary effort is normal. No respiratory distress.      Breath sounds: Normal breath sounds. No wheezing, rhonchi or rales.   Chest:      Chest wall: No tenderness.   Abdominal:      General: Abdomen is flat. There is no distension.      Palpations: Abdomen is soft.      Tenderness: There is abdominal tenderness. There is no guarding or rebound.   Musculoskeletal:         General: No tenderness.      Cervical back: Normal range of motion and neck supple. No rigidity.      Right lower leg: No edema.      Left lower leg:  No edema.      Comments: Tenderness to palpation of left flank; no midline tenderness. No erythema or rash. Bandage present to T12-L1 region   Skin:     General: Skin is warm and dry.      Coloration: Skin is pale. Skin is not jaundiced.      Findings: No erythema.   Neurological:      General: No focal deficit present.      Mental Status: She is alert and oriented to person, place, and time.      Cranial Nerves: No cranial nerve deficit.      Motor: No weakness.              CRANIAL NERVES     CN III, IV, VI   Pupils are equal, round, and reactive to light.       Significant Labs: All pertinent labs within the past 24 hours have been reviewed.    Significant Imaging: I have reviewed all pertinent imaging results/findings within the past 24 hours.

## 2024-03-23 NOTE — HPI
"59F with h/o HTN, HIV with dissiminated MAC, osteomyelitis s/p T12-L1 corpectomy with T11-L2 fusion admitted 3/23 with uncontrolled back pain. Reports her pain wasn't controlled at d/c from [facility] the day before and she was upset with a nurse at [facility], so decided to come to [facility]. Reports pain at top of left hip bone, but denies other complaints. Denies sob/cough. Denies dysuria. Denies intolerances to medications.  Pt reports she was hospitalized at [facility] for a month. Says she was walking at prior admit, but not able to walk since discharge. Estab with Mely ID at [facility] HOP clinic reports 100% compliance with [facility] (d/c'd [facility] on dolutegravir-lamivudine  mg Tab ), as outpatient, but not sure if she received inpatient. Discharged with  IV amikacin on Tuesday, Thursday, and Saturday and Azithromycin 500 mg daily, Ethambutol 800 mg, and Rifabutin 300 mg.  Not interested in having anymore surgeries.    Reviewed records from [facility] ([facility]).   Admitted 12/2023 with back pain. found to have spinal phlegmon/abscess. Biopsy t12 done, grew m.avium. susceptibilities done, but aren't available for review. Per notes, " intermediate to IV amikacin but daiana to macrolides"    Has been on MAC therapy ~ 2 months now. Originally on Levofloxacin/Azithromycin/ethambutol (not on rifamycin because of concern for ddi with biktarvy), but transitioned to  Azithromycin/ethambutol/Rifabutin/IV Amikacin on 2/23. Underwent T12-L1 corpectomy with T11-L2 fixation (with new hardware placement) on 2/27. Says all hardware in back is new. path from T12 vertebral body 2/2023 consistent with osteomyelitis. Yumi stain highlights scattered acid fast bacilli.     ID at OSH was concerned Retroperitoneal fluid collection (adjacent to hardware) could also be MAC infection as well     L chest tube placed during [facility] admit, removed at d/c. Plans for outpatient decortication CT surgery.           Denies any recent alcohol use, tobacco use, or illicit " "substance use and denies a lifetime history of IVDU.     Reports stable housing. Lives in [place] with her parents and brother.     Says her hiv was diagnosed ~40 years ago, but was told she didn't need to start immediately because her immune system was good. Also said she was told to wait because once she started taking meds, she would have to continue taking for the rest of her life.       Component 4 wk ago   HIV Viral Load HIV-1 RNA Not Detected     CD4% 37.0 - 63.0 % 13.4 Low                             CD4ABS 228 - 2290 /cu mm 184 Low        12/2023 biopsy  Grew M.avium, susceptibilities not available via [facility]    Component 3 mo ago   Test Name AFB Susceptibility   Resulting Agency [facility]   Narrative    This result has an attachment that is not available.          ID consulted for "Assistance with antibiotic management of known disseminated MAC infection "  "

## 2024-03-23 NOTE — ED NOTES
Patient identifiers for Amie Salmeron 59 y.o. female checked and correct.  Chief Complaint   Patient presents with    Back Pain     Recent osteomyelitis of spine. Now c/o severe lower back pain to where she can barely walk and dizziness upon standing      Past Medical History:   Diagnosis Date    Allergy     Arthritis     Asthma     Chronic pain     HIV infection     Hypertension     Overactive bladder     Spinal stenosis     Urine incontinence      Allergies reported: Review of patient's allergies indicates:  No Known Allergies      LOC: Patient is awake, alert, and aware of environment with an appropriate affect. Patient is oriented x 4 and speaking appropriately.  APPEARANCE: Patient resting comfortably and in no acute distress. Patient is clean and well groomed, patient's clothing is properly fastened.  HEENT: WDL  SKIN: The skin is warm and dry. Patient has normal skin turgor and moist mucus membranes.   MUSKULOSKELETAL: Patient is moving all extremities well, no obvious deformities noted. Pulses intact.   RESPIRATORY: Airway is open and patent. Respirations are spontaneous and non-labored with normal effort and rate.  CARDIAC: Patient has a normal rate and rhythm. 80 on cardiac monitor. No peripheral edema noted. Denies chest pain and SOB at at time of assessment.   ABDOMEN: No distention noted. Soft and non-tender upon palpation.  NEUROLOGICAL: pupils 2mm, PERRL. Facial expression is symmetrical. Hand grasps are equal bilaterally. Normal sensation in all extremities when touched with finger.

## 2024-03-23 NOTE — PROVIDER PROGRESS NOTES - EMERGENCY DEPT.
Encounter Date: 3/23/2024    ED Physician Progress Notes        Physician Note:   59-year-old female, with history of HIV (on antiviral), hypertension,  T12-L4 osteomyelitis status post corpectomy, disseminated MAC, presents to the ED for  acute on chronic back pain.  Patient is frail-appearing, afebrile, found to be hypotensive on arrival. Point of care ultrasound was obtained, no pericardial effusion, normal ejection fraction, IVC collapsible.    She was signed out to my care pending remaining labs for admission to the hospital.  She does not have any elevated lactate.  On chart review her baseline blood pressure is often 100 systolic, but persistently with systolic in the 80s despite 2 L IV fluid.  There was a decrease in her blood pressure after receiving Dilaudid.  Patient states that she typically does well with [place] [facility] her ICU fellow.  ICU fellow states patient is stable for the floor.  Will bring into hospital medicine for continued monitoring and treatment.

## 2024-03-23 NOTE — PROGRESS NOTES
Pharmacokinetic Initial Assessment: Amikacin    Assessment:  Weight utilized for dose calculation: Actual Body Weight  Dosing method utilized: extended interval dosing    Plan:   Patient recently discharged from [facility] after treatment of osteomyelitis; she was treated with amikacin since what appears to be ~2/29/24 and discharged on 950 mg on Tues, Thurs, Sat with home health. Patient doesn't remember much about regimen but last dose was Thurs 3/21.  Will resume the regimen she was discharged on and administer 950 mg today, 3/23  Amikacin trough ordered for Tues 3/26 prior to next dose     Pharmacy will continue to monitor.    Thank you for the consult,    Margoth Teresa       Patient brief summary:  Amie Salmeron is a 59 y.o. female initiated on aminoglycoside therapy for treatment of suspected bone/joint infection    Drug Allergies:   Review of patient's allergies indicates:  No Known Allergies    Actual Body Weight:   54.4 kg    Adjust Body Weight:   54.4 kg    Ideal Body Weight:  54.7 kg     Renal Function:   Estimated Creatinine Clearance: 101.5 mL/min (based on SCr of 0.5 mg/dL).,     Dialysis Method (if applicable):  N/A    CBC (last 72 hours):  Recent Labs   Lab Result Units 03/21/24 0440 03/22/24  0627 03/23/24  0631   WBC K/uL  --   --  5.79   Hemoglobin g/dL 8.9* 9.8* 8.7*   Hematocrit % 28.2* 30.0* 28.3*   Platelets K/uL  --   --  310   Gran % %  --   --  64.6   Lymph % %  --   --  22.5   Mono % %  --   --  9.8   Eosinophil % %  --   --  0.7   Basophil % %  --   --  0.5   Differential Method   --   --  Automated       Metabolic Panel (last 72 hours):  Recent Labs   Lab Result Units 03/21/24 0440 03/22/24  0627 03/23/24  0631   Sodium mmol/L  --   --  131*   Potassium mmol/L  --   --  3.8   Chloride mmol/L  --   --  104   CO2 mmol/L  --   --  19*   Glucose mg/dL  --   --  72   BUN mg/dL  --   --  17   Creatinine mg/dL  --   --  0.5   Albumin g/dL  --   --  2.2*   Total Bilirubin mg/dL  --   --  0.3    Alkaline Phosphatase U/L  --   --  128   AST U/L  --   --  28   ALT U/L  --   --  15   Magnesium Level mg/dL 1.8 1.8  --    Phosphorus Level mg/dL 3.5 4.0  --        Microbiologic Results:  Microbiology Results (last 7 days)       Procedure Component Value Units Date/Time    Blood Culture #1 **CANNOT BE ORDERED STAT** [[id number]] Collected: 03/23/24 0642    Order Status: Sent Specimen: Blood from Line, Femoral, Right Updated: 03/23/24 0720    Blood Culture #2 **CANNOT BE ORDERED STAT** [[id number]] Collected: 03/23/24 0641    Order Status: Sent Specimen: Blood from Peripheral, Hand, Right Updated: 03/23/24 0719

## 2024-03-23 NOTE — CONSULTS
Pulmonary & Critical Care Medicine Plan of Care Note    58 yo F with h/o HIV on ART (viral load undetectable), disseminated MAC (on amikacin, rifapentine, azithro, and ethambutol) s/p recent corpectomy due to thoracolumbar osteomyelitis with percutaneous screw fixation (2/27), HTN with recent prolonged hospitalization at [facility] for complicated (culture negative) pleural effusion s/p chest tube and received tpa/DNAse 3/16-3/18 for a total of 6 doses. CT surgery was consulted and deferred invasive therapy. Pleural effusion improved and pt was discharged yesterday although she states she was still overall weak from prolonged hospital stay and her pain limits her activity at times. Denies any fevers, chest pain or SOB, N/V, diarrhea. Feeling at her baseline besides pain on her back.     In the ED, blood pressures on the low side in the ED, worse after dilaudid for pain. Lactic acid normal, other labs stable. BP improved with fluids and as pain medication wore off.     Physical Exam  Vitals and nursing note reviewed.   Constitutional:       General: She is not in acute distress.     Appearance: Normal appearance. She is not toxic-appearing.      Comments: Thin appearing female, A&Ox4, no acute distress   HENT:      Head: Normocephalic and atraumatic.      Nose: Nose normal.      Mouth/Throat:      Mouth: Mucous membranes are moist.   Eyes:      General:         Right eye: No discharge.         Left eye: No discharge.      Conjunctiva/sclera: Conjunctivae normal.   Cardiovascular:      Rate and Rhythm: Normal rate and regular rhythm.      Pulses: Normal pulses.   Pulmonary:      Effort: Pulmonary effort is normal.      Breath sounds: Normal breath sounds. No wheezing, rhonchi or rales.   Abdominal:      General: There is no distension.      Palpations: Abdomen is soft.   Skin:     General: Skin is warm and dry.      Capillary Refill: Capillary refill takes less than 2 seconds.   Neurological:      General: No focal deficit  present.      Mental Status: She is alert and oriented to person, place, and time.         Hypotension, resolved:  - hypotension related likely to pain medication  - Pt states [place] 10 typically works for her pain, recommend oral medications if possible  - Lactic acid WNL, BP documented on lower side throughout prior hospitalizations, continue MAP goal 65 and if worsens recommend further workup off IV pain meds    Disseminated MAC:  HIV on ART with undetectable viral load:   - recommend ID consult while inpatient and continuing home abx      Thank you for this consult, MICU will sign off at this time. Please call back if pt has recurrent hypotension unresponsive to fluids.     Gifty Del Cid MD  [facility] Fellow  03/23/2024 11:20 AM

## 2024-03-23 NOTE — ED PROVIDER NOTES
Encounter Date: 3/23/2024       History     Chief Complaint   Patient presents with    Back Pain     Recent osteomyelitis of spine. Now c/o severe lower back pain to where she can barely walk and dizziness upon standing      HPI  59-year-old female, with history of HIV (on antiviral), hypertension,  T12-L4 osteomyelitis status post corpectomy, disseminated MAC, presents to the ED for back pain.  Patient reports that she was discharged home from [facility] yesterday s/p corpectomy, however, patient states that she is still very weak and severe pain, unable to control with PO meds. Denies any new weakness, numbness, urinary or fecal incontinence.    Per chart review, patient was discharged from [facility] to home health, she is on IV amikacin on Tuesday, Thursday, and Saturday, medications to treat the spinal MAC infection which includes the following: Azithromycin 500 mg daily, Ethambutol 800 mg, and Rifabutin 300 mg. They plan for SNF, however, PT and OT recommended home health because patient did not show motivation on rehab process.  Patient stated that she is happy to be home, however, her parents are old, unable to take care of her at this point. She agreed to go to rehab if possible.  Review of patient's allergies indicates:  No Known Allergies  Past Medical History:   Diagnosis Date    Allergy     Arthritis     Asthma     Chronic pain     HIV infection     Hypertension     Overactive bladder     Spinal stenosis     Urine incontinence      Past Surgical History:   Procedure Laterality Date    ADENOIDECTOMY      APPENDECTOMY      BACK SURGERY      GERALD    CYST REMOVAL      HYSTERECTOMY      JOINT REPLACEMENT Right     knee, with revision    KNEE SURGERY Left     knee replacement    RHIZOTOMY      TONSILLECTOMY      TOTAL ANKLE ARTHROPLASTY Right      Family History   Problem Relation Age of Onset    Thyroid disease Mother     Hypertension Mother     Cancer Father         oral CA    Depression Sister     Hypertension Brother      Alcohol abuse Brother      Social History     Tobacco Use    Smoking status: Never    Smokeless tobacco: Never   Substance Use Topics    Alcohol use: Yes     Comment: Socially    Drug use: No     Review of Systems    Physical Exam     Initial Vitals [03/23/24 0428]   BP Pulse Resp Temp SpO2   (!) 101/53 86 (!) 22 98.5 °F (36.9 °C) 98 %      MAP       --         Physical Exam    Nursing note and vitals reviewed.  Constitutional: She appears well-developed. No distress.   HENT:   Head: Normocephalic and atraumatic.   Mouth/Throat: Oropharynx is clear and moist.   Eyes: Conjunctivae and EOM are normal.   Neck: No JVD present.   Normal range of motion.  Cardiovascular:  Normal rate, regular rhythm, normal heart sounds and intact distal pulses.           Pulmonary/Chest: Breath sounds normal. No respiratory distress.   Abdominal: Abdomen is soft. She exhibits no distension. There is no abdominal tenderness.   Musculoskeletal:         General: No tenderness or edema. Normal range of motion.      Cervical back: Normal range of motion.      Comments: PICC line present right upper arm  Bilateral lower extremities with equal strength,  no sensory deficits, palpable DP and TP pulses   No saddle paresthesia     Neurological: She is alert and oriented to person, place, and time. She has normal strength.   Skin: Skin is warm and dry. Capillary refill takes less than 2 seconds.         ED Course   Procedures  Labs Reviewed   CBC W/ AUTO DIFFERENTIAL - Abnormal; Notable for the following components:       Result Value    RBC 3.33 (*)     Hemoglobin 8.7 (*)     Hematocrit 28.3 (*)     MCH 26.1 (*)     MCHC 30.7 (*)     RDW 17.7 (*)     MPV 8.7 (*)     Immature Granulocytes 1.9 (*)     Immature Grans (Abs) 0.11 (*)     All other components within normal limits   COMPREHENSIVE METABOLIC PANEL - Abnormal; Notable for the following components:    Sodium 131 (*)     CO2 19 (*)     Calcium 8.5 (*)     Total Protein 5.8 (*)     Albumin  2.2 (*)     All other components within normal limits   CULTURE, BLOOD   CULTURE, BLOOD   LACTIC ACID, PLASMA   C-REACTIVE PROTEIN   SEDIMENTATION RATE   HEPATITIS C ANTIBODY   URINALYSIS, REFLEX TO URINE CULTURE   SEDIMENTATION RATE     EKG Readings: (Independently Interpreted)   Initial Reading: No STEMI. Rhythm: Normal Sinus Rhythm. Heart Rate: 83. Ectopy: No Ectopy. Conduction: Normal. ST Segments: Normal ST Segments. T Waves: Normal. Axis: Normal. Clinical Impression: Normal Sinus Rhythm       Imaging Results              X-Ray Chest AP Portable (Final result)  Result time 03/23/24 07:32:33      Final result by Abram Avalos MD (03/23/24 07:32:33)                   Impression:      1. Right-sided PICC line with tip projecting over the distal SVC/cavoatrial junction.  2. New subsegmental band like opacity with surrounding increased interstitial markings at the left middle lung zone, possibly atelectasis, scarring or developing consolidation.  3. Blunting of the left costophrenic angle concerning for a small layering effusion.      Electronically signed by: Abram vAalos MD  Date:    03/23/2024  Time:    07:32               Narrative:    EXAMINATION:  XR CHEST AP PORTABLE    CLINICAL HISTORY:  hypotension;    TECHNIQUE:  Single frontal view of the chest was performed.    COMPARISON:  Radiographs 12/15/2022.    FINDINGS:  Right-sided PICC line with tip projecting over the distal SVC/cavoatrial junction.  Cardiac monitoring leads project over the bilateral hemithoraces.  Mediastinal structures are midline.  Hilar contours are unremarkable.  Cardiac silhouette is normal in size.  Atherosclerotic calcification of the aortic arch.  Lung volumes are symmetric.  New subsegmental band like opacity with surrounding increased interstitial markings at the left middle lung zone, possibly atelectasis, scarring are developing consolidation.  Blunting of the left costophrenic angle concerning for a small pleural  effusion.  No pneumothorax.  No free air beneath the diaphragm.  Partially visualized postoperative changes of the spine.  Generalized osteopenia.  Degenerative changes of the spine.  Soft tissues appear within normal limits.                                       Medications   HYDROmorphone injection 0.5 mg (has no administration in time range)   gabapentin capsule 300 mg (has no administration in time range)   amikacin - pharmacy to dose (has no administration in time range)   azithromycin (ZITHROMAX) 500 mg in dextrose 5 % (D5W) 250 mL IVPB (Vial-Mate) (has no administration in time range)   ethambutoL tablet 800 mg (has no administration in time range)   rifabutin capsule 300 mg (has no administration in time range)   amikacin (AMIKIN) 950 mg in dextrose 5 % (D5W) 250 mL IVPB (has no administration in time range)   sodium chloride 0.9% bolus 1,000 mL 1,000 mL (0 mLs Intravenous Stopped 3/23/24 0643)   sodium chloride 0.9% bolus 1,000 mL 1,000 mL (0 mLs Intravenous Stopped 3/23/24 0745)     Medical Decision Making  59-year-old female, with history of HIV (on antiviral), hypertension,  T12-L4 osteomyelitis status post corpectomy, disseminated MAC, presents to the ED for  acute on chronic back pain.  Patient is frail-appearing, afebrile,   found to be hypotensive on arrival. Point of care ultrasound was obtained, no pericardial effusion, normal ejection fraction, IVC collapsible.      Differential including but not limited to  acute on chronic back pain, osteomyelitis,  sepsis doubt epidural abscess, cauda equina     Sign-out to oncoming team, pending labs results. Anticipate admit to Hospital Medicine, PT, OT,  consultations. Will order her home antibiotics.    Amount and/or Complexity of Data Reviewed  Labs: ordered. Decision-making details documented in ED Course.  Radiology: ordered and independent interpretation performed. Decision-making details documented in ED Course.  ECG/medicine tests:  ordered and independent interpretation performed. Decision-making details documented in ED Course.    Risk  Prescription drug management.  Decision regarding hospitalization.               ED Course as of 03/23/24 0815   Sat Mar 23, 2024   0813 CBC auto differential(!)   No leukocytosis, hemoglobin appear to be at baseline [NC]   0814 Comprehensive metabolic panel(!)   Mild hyponatremia, normal renal function, normal liver enzymes [NC]   0814 Lactic Acid Level: 1.3   Within normal limits [NC]   0814 X-Ray Chest AP Portable  New subsegmental band like opacity with surrounding increased interstitial markings at the left middle lung zone, possibly atelectasis, scarring or developing consolidation. [NC]      ED Course User Index  [NC] Robert Higgins MD                           Clinical Impression:  Final diagnoses:  [R42] Dizziness  [M54.9] Back pain, unspecified back location, unspecified back pain laterality, unspecified chronicity (Primary)  [I95.9] Hypotension, unspecified hypotension type                 Robert Higgins MD  Resident  03/23/24 0816

## 2024-03-23 NOTE — ASSESSMENT & PLAN NOTE
Continue  ethambutol 800mg daily  Amikacin 950mg T,R,S   azithromycin 500 daily  rifampin 300 daily  ID Consulted for continued management, appreciate ryan's

## 2024-03-23 NOTE — PT/OT/SLP EVAL
Occupational Therapy   Co-Evaluation and Co-Treatment    Name: Amie Salmeron  MRN: [id number]  Admitting Diagnosis: <principal problem not specified>  Recent Surgery: * No surgery found *      Recommendations:     Discharge Recommendations: High Intensity Therapy  Discharge Equipment Recommendations:  none  Barriers to discharge:  None    Assessment:     Amie Salmeron is a 59 y.o. female with a medical diagnosis of <principal problem not specified>.  She presents with impaired ADL and mobility performance deficits. Pt found upright in ED and agreeable for therapy. Pt limited today 2/2 8.5/10 voiced pain in back/sacrum and with noted debility. Pt tolerated standing with CGA taking several steps at bedside using a RW. PTA, pt with prolonged ~1  month hospitalization at OSH causing diffuse weakness.  Pt lives with family and is supervises/performs IADL management of home to her elderly parents and brother with autism. At this time, pt is a high fall risk and not at their baseline. Prior to hospitalizations, pt was mod I for ADLs/mobility.  Performance deficits affecting function: weakness, impaired endurance, impaired self care skills, impaired functional mobility, gait instability, impaired balance, pain.      Rehab Prognosis: Good; patient would benefit from acute skilled OT services to address these deficits and reach maximum level of function.       Plan:     Patient to be seen 3 x/week to address the above listed problems via self-care/home management, therapeutic activities, therapeutic exercises, neuromuscular re-education  Plan of Care Expires:    Plan of Care Reviewed with: patient    Subjective     Chief Complaint: 8.5/10 back pain   Patient/Family Comments/goals: return home after getting stronger; pt states she wants to walk again    Occupational Profile:  Living Environment: Pt  lives with her elderly mother, father, and adult brother (with autism) in a [facility] with 1 ESTEFANY. Pt has a shower/tub combo for bathing  with bath bench.  Previous level of function: Pt with 1 month hospitalization where pt reports limited opportunity for mobility causing decline in fx. Pt typically mod I for Adls/mobility using a RW prn, otherwise furniture A gait.  Roles and Routines: Pt enjoys playing card games and watching TV, spending time with family. Home dwelling.  Equipment Used at Home: walker, rolling, bath bench, bedside commode, wheelchair  Assistance upon Discharge: family however limited 2/2 parents are elderly and brother with a development delay.    Pain/Comfort:  Pain Rating 1: 0/10  Pain Rating Post-Intervention 1: 0/10  Pain Rating Post-Intervention 2: 0/10    Patients cultural, spiritual, Judaism conflicts given the current situation: no    Objective:   Co-treatment with PT for maximal pt participation, safety, and activity tolerance     Communicated with: RN prior to session.  Patient found HOB elevated with telemetry, PureWick upon OT entry to room.    General Precautions: Standard,    Orthopedic Precautions: N/A  Braces: N/A  Respiratory Status: Room air    Occupational Performance:    Bed Mobility:    Patient completed Rolling/Turning to Right with minimum assistance  Patient completed Scooting/Bridging with minimum assistance  Patient completed Supine to Sit with minimum assistance  Patient completed Sit to Supine with minimum assistance    Functional Mobility/Transfers:  Patient completed Sit <> Stand Transfer with contact guard assistance  with  rolling walker   Functional Mobility: Pt stood with CGA using a RW taking several steps towards HOB with SBA.     Activities of Daily Living:  Upper Body Dressing: moderate assistance adjusting gown fit at EOB   Toileting: total A using purewick  Cognitive/Visual Perceptual:  Cognitive/Psychosocial Skills:     -       Oriented to: Person, Place, Time, and Situation   -       Follows Commands/attention:Follows multistep  commands  -       Communication: clear/fluent  -        Memory: No Deficits noted  -       Safety awareness/insight to disability: intact   -       Mood/Affect/Coping skills/emotional control: Pleasant    Physical Exam:  Balance:    -       demo good sitting and fair standing balance using a RW  Upper Extremity Range of Motion:     -       Right Upper Extremity: WFL  -       Left Upper Extremity: WFL  Upper Extremity Strength:    -       Right Upper Extremity: WFL  -       Left Upper Extremity: WFL   Strength:    -       Right Upper Extremity: WFL  -       Left Upper Extremity: WFL    AMPAC 6 Click ADL:  AMPAC Total Score: 18    Treatment & Education:  Pt educated on role of occupational therapy, POC, and safety during ADLs and functional mobility. Pt and OT discussed importance of safe, continued mobility to optimize daily living skills. Pt verbalized understanding.     White board updated during session. Pt given instruction to call for medical staff/nurse for assistance.       Patient left HOB elevated with all lines intact, call button in reach, and rn notified    GOALS:   Multidisciplinary Problems       Occupational Therapy Goals          Problem: Occupational Therapy    Goal Priority Disciplines Outcome Interventions   Occupational Therapy Goal     OT, PT/OT Ongoing, Progressing    Description: Goals to be met by: 4/23/24 (1 month)      Patient will increase functional independence with ADLs by performing:    UE Dressing with [place].  LE Dressing with [place].  Grooming while standing at sink with Modified [place].  Toileting from toilet with Modified [place] for hygiene and clothing management.   Rolling to Bilateral with [place].   Supine to sit with [place].  Step transfer with Modified [place]  Toilet transfer to toilet with Modified [place].                         History:     Past Medical History:   Diagnosis Date    Allergy     Arthritis     Asthma     Chronic pain     HIV infection     Hypertension      Overactive bladder     Spinal stenosis     Urine incontinence          Past Surgical History:   Procedure Laterality Date    ADENOIDECTOMY      APPENDECTOMY      BACK SURGERY      GERALD    CYST REMOVAL      HYSTERECTOMY      JOINT REPLACEMENT Right     knee, with revision    KNEE SURGERY Left     knee replacement    RHIZOTOMY      TONSILLECTOMY      TOTAL ANKLE ARTHROPLASTY Right        Time Tracking:     OT Date of Treatment: 03/23/24  OT Start Time: 1026  OT Stop Time: 1049  OT Total Time (min): 23 min    Billable Minutes:Evaluation 10 MIN  Therapeutic Activity 13 MIN    3/23/2024

## 2024-03-23 NOTE — HPI
"Ms. 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 She said that her pain was uncontrolled and led to an episode of nausea and vomiting. She states that she was discharged from [facility] yesterday and that her pain was never really controlled. She also states she was unable to have her pain medication prescription filled and she had to come back to the ER, but [place] [facility] [place] going back to [facility] because she had a very bad expeience with the staff and level of attention/care she was receiving. Reports full compliance with all of her home medications and antibiotics without any recent changes. She denies fevers, chills, abdominal pain, urinary frequency, and burning with urination. She says her pain is located in her left lower back just above her buttocks. She denies any pain near the surgical site.    Of note, she was discharged yesterday from [facility] from a nearly month long hospital course that was c/b pleural effusion requiring chest tube placement as well as TPA/DNAse of 6 doses. No invasive treatment was done, and pleural effusion improved sigificantly leading to discharge yesterday. She was discharged with PICC line. She was sent home with a percocet prescription but says that she was not able to fill the prescription after discharge.     In the ED, patient was afebrile and hemodynamically stable. Vitals on admission: Temp 98.5F, 86 HR, 101/53 BP, SpO2 98%, and 22 RR. She was treated with a continuation of her Abx regimen consisting of Amikacin, Azithromycin, Ethambutol, and Rifabutin. She was also given Dilaudid 0.5 mg, Gabapentin 300 mg, and 2 L NS bolus. Labs significant for no leukocytosis, stable Hgb 8.7, elevated ESR/CRP, 100/42, and mild Hyponatremia 131. Lactic Acid 1.3 and UA negative.  CXR significant for "new subsegmental " "band like opacity with surrounding increased interstitial markings at the left middle lung zone, possibly atelectasis, scarring or developing consolidation." Also evidence of L sided small layering pleural effusion. Following Dilaudid 0.5 mg, patient developed hypotension with MAP of 64-67, and MICU was consulted but recommended she was stable for the floor. Patient was admitted to  for pain management and sepsis workup.   "

## 2024-03-23 NOTE — ASSESSMENT & PLAN NOTE
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 She was recently discharged from [facility] with Percocet prescription but was unable to fill it and returned to the hospital for better pain control.     - Continue Abx regimen for MAC infection  - ID consulted  - Multimodal pain regimen consisting of Oxycodone, tylenol, Robaxin, Toradol, Lidocaine patch, and gabapentin  - Will hold off on additional imaging for now

## 2024-03-23 NOTE — ASSESSMENT & PLAN NOTE
This patient does have evidence of infective focus  My overall impression is sepsis.  Source: Unknown  Antibiotics given-   Antibiotics (72h ago, onward)      Start     Stop Route Frequency Ordered    03/24/24 0900  azithromycin tablet 500 mg         -- Oral Daily 03/23/24 1210    03/24/24 0900  ethambutoL tablet 800 mg         -- Oral Daily 03/23/24 1210    03/24/24 0900  rifabutin capsule 300 mg         -- Oral Daily 03/23/24 1210    03/23/24 0900  amikacin (AMIKIN) 950 mg in dextrose 5 % (D5W) 250 mL IVPB         -- IV Every Tues, Thurs, Sat 03/23/24 0743    03/23/24 0825  amikacin - pharmacy to dose  (amikacin IVPB (PEDS and ADULTS))        See Hyperspace for full Linked Orders Report.    -- IV pharmacy to manage frequency 03/23/24 0727          Latest lactate reviewed-  Recent Labs   Lab 03/23/24  0631   LACTATE 1.3       Fluid challenge Ideal Body Weight- The patient's ideal body weight is Ideal body weight: 54.7 kg (120 lb 9.5 oz) which will be used to calculate fluid bolus of 30 ml/kg for treatment of septic shock.      Post- resuscitation assessment No - Post resuscitation assessment not needed       Will Not start Pressors- Levophed for MAP of 65  Source control achieved by: antibiotics    Patient with recent disseminated MAC infection (on abx regimen) in the setting of uncontrolled HIV (on Biktarvy) who presented with uncontrolled back pain. On admission, she was afebrile and hemodynamically stable. While in ED, patient was treated with current abx regimen and 2 L NS boluses. Following 0.5 mg Dilaudid, patient developed hypotension with MAP's around 65 and MICU was consulted but deemed stable enough for the floor.     WBC 5.79. Lactate 1.3. UA non-infectious     Imaging: New subsegmental band like opacity with surrounding increased interstitial markings at the left middle lung zone, possibly atelectasis, scarring or developing consolidation. Blunting of the left costophrenic angle concerning for a small  layering effusion.    Likely source: Lung vs recent corpectomy vs opportunistic infection in the setting of AIDS    Continuing previous Abx regimen for MAC infection (Amikacin, Rifabutin, Azithromycin, and Ethambutol)  S/P Fluid resuscitation with 2L Normal Saline bolus in the ED; IVF as needed for hypotension  Telemetry  Lactate WNL  F/u Bcx  ID consult  Multimodal pain regimen: Tylenol, Gabapentin, Oxycodone, Robaxin, and Toradol  PT/OT consulted and recommend high intensity therapy; will likely need SNF placement at discharge

## 2024-03-23 NOTE — ED NOTES
Patient arrived to the ED complaining of back pain. Patient had osteomyelitis on her back, had surgery at [facility]. Was in the hospital for almost a month, was discharged from [facility] yesterday with a PICC line for IV abx. Patient states that she has not been able to move much on her own due to weakness and back pain. Pain is rated 8 out of 10 at this time. MD at bedside. Alert x 4.

## 2024-03-23 NOTE — ASSESSMENT & PLAN NOTE
Patient reports initial diagnosis was approximately 42 years ago, but she has only been on ART for 1 year approximately. CD4 counts as low as 24 per chart review, and has been improving as of recently with Last CD4 113 (2/19/24) per chart review;     - Continue home BIKTARVY

## 2024-03-24 LAB
ALBUMIN SERPL BCP-MCNC: 2.3 G/DL (ref 3.5–5.2)
ALP SERPL-CCNC: 133 U/L (ref 55–135)
ALT SERPL W/O P-5'-P-CCNC: 16 U/L (ref 10–44)
ANION GAP SERPL CALC-SCNC: 5 MMOL/L (ref 8–16)
ANISOCYTOSIS BLD QL SMEAR: SLIGHT
AST SERPL-CCNC: 24 U/L (ref 10–40)
BASOPHILS # BLD AUTO: 0.04 K/UL (ref 0–0.2)
BASOPHILS NFR BLD: 0.5 % (ref 0–1.9)
BILIRUB SERPL-MCNC: 0.3 MG/DL (ref 0.1–1)
BUN SERPL-MCNC: 15 MG/DL (ref 8–23)
CALCIUM SERPL-MCNC: 8.4 MG/DL (ref 8.7–10.5)
CHLORIDE SERPL-SCNC: 109 MMOL/L (ref 95–110)
CO2 SERPL-SCNC: 21 MMOL/L (ref 23–29)
CREAT SERPL-MCNC: 0.6 MG/DL (ref 0.5–1.4)
DIFFERENTIAL METHOD BLD: ABNORMAL
EOSINOPHIL # BLD AUTO: 0.1 K/UL (ref 0–0.5)
EOSINOPHIL NFR BLD: 0.8 % (ref 0–8)
ERYTHROCYTE [DISTWIDTH] IN BLOOD BY AUTOMATED COUNT: 18.6 % (ref 11.5–14.5)
EST. GFR  (NO RACE VARIABLE): >60 ML/MIN/1.73 M^2
GLUCOSE SERPL-MCNC: 72 MG/DL (ref 70–110)
HCT VFR BLD AUTO: 34.1 % (ref 37–48.5)
HGB BLD-MCNC: 10.3 G/DL (ref 12–16)
HYPOCHROMIA BLD QL SMEAR: ABNORMAL
IMM GRANULOCYTES # BLD AUTO: 0.13 K/UL (ref 0–0.04)
IMM GRANULOCYTES NFR BLD AUTO: 1.6 % (ref 0–0.5)
LYMPHOCYTES # BLD AUTO: 1.2 K/UL (ref 1–4.8)
LYMPHOCYTES NFR BLD: 14.3 % (ref 18–48)
MAGNESIUM SERPL-MCNC: 1.9 MG/DL (ref 1.6–2.6)
MCH RBC QN AUTO: 26.3 PG (ref 27–31)
MCHC RBC AUTO-ENTMCNC: 30.2 G/DL (ref 32–36)
MCV RBC AUTO: 87 FL (ref 82–98)
MONOCYTES # BLD AUTO: 0.9 K/UL (ref 0.3–1)
MONOCYTES NFR BLD: 10.7 % (ref 4–15)
NEUTROPHILS # BLD AUTO: 6 K/UL (ref 1.8–7.7)
NEUTROPHILS NFR BLD: 72.1 % (ref 38–73)
NRBC BLD-RTO: 0 /100 WBC
PHOSPHATE SERPL-MCNC: 3.5 MG/DL (ref 2.7–4.5)
PLATELET # BLD AUTO: 396 K/UL (ref 150–450)
PLATELET BLD QL SMEAR: ABNORMAL
PMV BLD AUTO: 9.1 FL (ref 9.2–12.9)
POIKILOCYTOSIS BLD QL SMEAR: SLIGHT
POTASSIUM SERPL-SCNC: 4.3 MMOL/L (ref 3.5–5.1)
PROT SERPL-MCNC: 6.1 G/DL (ref 6–8.4)
RBC # BLD AUTO: 3.92 M/UL (ref 4–5.4)
SODIUM SERPL-SCNC: 135 MMOL/L (ref 136–145)
WBC # BLD AUTO: 8.32 K/UL (ref 3.9–12.7)

## 2024-03-24 PROCEDURE — 25000003 PHARM REV CODE 250

## 2024-03-24 PROCEDURE — 36415 COLL VENOUS BLD VENIPUNCTURE: CPT

## 2024-03-24 PROCEDURE — 80053 COMPREHEN METABOLIC PANEL: CPT

## 2024-03-24 PROCEDURE — 99214 OFFICE O/P EST MOD 30 MIN: CPT | Mod: ,,, | Performed by: INTERNAL MEDICINE

## 2024-03-24 PROCEDURE — G0378 HOSPITAL OBSERVATION PER HR: HCPCS

## 2024-03-24 PROCEDURE — 85025 COMPLETE CBC W/AUTO DIFF WBC: CPT

## 2024-03-24 PROCEDURE — 96375 TX/PRO/DX INJ NEW DRUG ADDON: CPT

## 2024-03-24 PROCEDURE — 63600175 PHARM REV CODE 636 W HCPCS: Mod: HCNC

## 2024-03-24 PROCEDURE — 84100 ASSAY OF PHOSPHORUS: CPT

## 2024-03-24 PROCEDURE — 83735 ASSAY OF MAGNESIUM: CPT

## 2024-03-24 PROCEDURE — 63700000 PHARM REV CODE 250 ALT 637 W/O HCPCS

## 2024-03-24 PROCEDURE — 63600175 PHARM REV CODE 636 W HCPCS: Performed by: STUDENT IN AN ORGANIZED HEALTH CARE EDUCATION/TRAINING PROGRAM

## 2024-03-24 RX ORDER — KETOROLAC TROMETHAMINE 30 MG/ML
30 INJECTION, SOLUTION INTRAMUSCULAR; INTRAVENOUS EVERY 6 HOURS PRN
Status: DISCONTINUED | OUTPATIENT
Start: 2024-03-24 | End: 2024-03-25 | Stop reason: HOSPADM

## 2024-03-24 RX ORDER — LAMIVUDINE 150 MG/1
300 TABLET, FILM COATED ORAL DAILY
Status: DISCONTINUED | OUTPATIENT
Start: 2024-03-24 | End: 2024-03-25 | Stop reason: HOSPADM

## 2024-03-24 RX ORDER — ACETAMINOPHEN 325 MG/1
650 TABLET ORAL EVERY 6 HOURS PRN
Status: DISCONTINUED | OUTPATIENT
Start: 2024-03-24 | End: 2024-03-25 | Stop reason: HOSPADM

## 2024-03-24 RX ORDER — ONDANSETRON HYDROCHLORIDE 2 MG/ML
4 INJECTION, SOLUTION INTRAVENOUS EVERY 8 HOURS PRN
Status: DISCONTINUED | OUTPATIENT
Start: 2024-03-24 | End: 2024-03-25 | Stop reason: HOSPADM

## 2024-03-24 RX ADMIN — METHOCARBAMOL 500 MG: 500 TABLET ORAL at 12:03

## 2024-03-24 RX ADMIN — GABAPENTIN 300 MG: 300 CAPSULE ORAL at 09:03

## 2024-03-24 RX ADMIN — ACETAMINOPHEN 1000 MG: 500 TABLET ORAL at 09:03

## 2024-03-24 RX ADMIN — METHOCARBAMOL 500 MG: 500 TABLET ORAL at 05:03

## 2024-03-24 RX ADMIN — ACETAMINOPHEN 1000 MG: 500 TABLET ORAL at 06:03

## 2024-03-24 RX ADMIN — KETOROLAC TROMETHAMINE 30 MG: 30 INJECTION, SOLUTION INTRAMUSCULAR; INTRAVENOUS at 07:03

## 2024-03-24 RX ADMIN — DOLUTEGRAVIR SODIUM 50 MG: 50 TABLET, FILM COATED ORAL at 11:03

## 2024-03-24 RX ADMIN — LAMIVUDINE 300 MG: 150 TABLET, FILM COATED ORAL at 10:03

## 2024-03-24 RX ADMIN — AZITHROMYCIN DIHYDRATE 500 MG: 250 TABLET ORAL at 09:03

## 2024-03-24 RX ADMIN — RIFABUTIN 300 MG: 150 CAPSULE ORAL at 11:03

## 2024-03-24 RX ADMIN — ETHAMBUTOL HYDROCHLORIDE 800 MG: 400 TABLET ORAL at 09:03

## 2024-03-24 RX ADMIN — METHOCARBAMOL 500 MG: 500 TABLET ORAL at 06:03

## 2024-03-24 RX ADMIN — ONDANSETRON 4 MG: 2 INJECTION INTRAMUSCULAR; INTRAVENOUS at 02:03

## 2024-03-24 NOTE — ASSESSMENT & PLAN NOTE
This patient does have evidence of infective focus  My overall impression is sepsis.  Source: Unknown  Antibiotics given-   Antibiotics (72h ago, onward)    Start     Stop Route Frequency Ordered    03/24/24 0900  azithromycin tablet 500 mg         -- Oral Daily 03/23/24 1210    03/24/24 0900  ethambutoL tablet 800 mg         -- Oral Daily 03/23/24 1210    03/24/24 0900  rifabutin capsule 300 mg         -- Oral Daily 03/23/24 1210    03/23/24 0900  amikacin (AMIKIN) 950 mg in dextrose 5 % (D5W) 250 mL IVPB         -- IV Every Tues, Thurs, Sat 03/23/24 0743    03/23/24 0825  amikacin - pharmacy to dose  (amikacin IVPB (PEDS and ADULTS))        See Hyperspace for full Linked Orders Report.    -- IV pharmacy to manage frequency 03/23/24 0727        Latest lactate reviewed-  Recent Labs   Lab 03/23/24  1637   LACTATE 0.7         Fluid challenge Ideal Body Weight- The patient's ideal body weight is Ideal body weight: 54.7 kg (120 lb 9.5 oz) which will be used to calculate fluid bolus of 30 ml/kg for treatment of septic shock.      Post- resuscitation assessment No - Post resuscitation assessment not needed       Will Not start Pressors- Levophed for MAP of 65  Source control achieved by: antibiotics    Patient with recent disseminated MAC infection (on abx regimen) in the setting of uncontrolled HIV (on Biktarvy) who presented with uncontrolled back pain. On admission, she was afebrile and hemodynamically stable. While in ED, patient was treated with current abx regimen and 2 L NS boluses. Following 0.5 mg Dilaudid, patient developed hypotension with MAP's around 65 and MICU was consulted but deemed stable enough for the floor.     WBC 5.79. Lactate 1.3. UA non-infectious     Imaging: New subsegmental band like opacity with surrounding increased interstitial markings at the left middle lung zone, possibly atelectasis, scarring or developing consolidation. Blunting of the left costophrenic angle concerning for a small  layering effusion.    Likely source: Lung vs recent corpectomy vs opportunistic infection in the setting of AIDS    Continuing previous Abx regimen for MAC infection (Amikacin, Rifabutin, Azithromycin, and Ethambutol)  S/P Fluid resuscitation with 2L Normal Saline bolus in the ED; IVF as needed for hypotension  Telemetry  Lactate WNL  F/u Bcx  ID consult  Multimodal pain regimen: Tylenol, Gabapentin, Oxycodone, Robaxin, and Toradol  PT/OT consulted and recommend high intensity therapy; will likely need SNF placement at discharge

## 2024-03-24 NOTE — PLAN OF CARE
Problem: Adult Inpatient Plan of Care  Goal: Plan of Care Review  Outcome: Ongoing, Progressing  Goal: Patient-Specific Goal (Individualized)  Outcome: Ongoing, Progressing  Goal: Absence of Hospital-Acquired Illness or Injury  Outcome: Ongoing, Progressing  Goal: Optimal Comfort and Wellbeing  Outcome: Ongoing, Progressing  Goal: Readiness for Transition of Care  Outcome: Ongoing, Progressing     Problem: Infection  Goal: Absence of Infection Signs and Symptoms  Outcome: Ongoing, Progressing     Problem: Impaired Wound Healing  Goal: Optimal Wound Healing  Outcome: Ongoing, Progressing     Problem: Adjustment to Illness (Sepsis/Septic Shock)  Goal: Optimal Coping  Outcome: Ongoing, Progressing     Problem: Bleeding (Sepsis/Septic Shock)  Goal: Absence of Bleeding  Outcome: Ongoing, Progressing     Problem: Glycemic Control Impaired (Sepsis/Septic Shock)  Goal: Blood Glucose Level Within Desired Range  Outcome: Ongoing, Progressing     Problem: Infection Progression (Sepsis/Septic Shock)  Goal: Absence of Infection Signs and Symptoms  Outcome: Ongoing, Progressing     Problem: Nutrition Impaired (Sepsis/Septic Shock)  Goal: Optimal Nutrition Intake  Outcome: Ongoing, Progressing     Problem: Skin Injury Risk Increased  Goal: Skin Health and Integrity  Outcome: Ongoing, Progressing    Pt had an uneventful night. Pt bp soft and given 500 ml bolus. Sys BP in low 100s Pt given prn  antiemtic for nausea . Pt didn't void this shift and is refusing nurse to straight cath her at this time Pt is free of falls and injuries. Bed in lowest position and call light within reach Safety maintained

## 2024-03-24 NOTE — PROGRESS NOTES
[facility] - [facility] ([address] Medicine  Progress Note    Patient Name: Amie Salmeron  MRN: [id number]  Patient Class: OP- Observation   Admission Date: 3/23/2024  Length of Stay: 0 days  Attending Physician: Luis Armando Moreland MD  Primary Care Provider: Dustin Caro MD        Subjective:     Principal Problem:Sepsis        HPI:  Ms. 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 She said that her pain was uncontrolled and led to an episode of nausea and vomiting. She states that she was discharged from [facility] yesterday and that her pain was never really controlled. She also states she was unable to have her pain medication prescription filled and she had to come back to the ER, but she came to [facility] rather than going back to [facility] because she had a very bad expeience with the staff and level of attention/care she was receiving. Reports full compliance with all of her home medications and antibiotics without any recent changes. She denies fevers, chills, abdominal pain, urinary frequency, and burning with urination. She says her pain is located in her left lower back just above her buttocks. She denies any pain near the surgical site.    Of note, she was discharged yesterday from [facility] from a nearly month long hospital course that was c/b pleural effusion requiring chest tube placement as well as TPA/DNAse of 6 doses. No invasive treatment was done, and pleural effusion improved sigificantly leading to discharge yesterday. She was discharged with PICC line. She was sent home with a percocet prescription but says that she was not able to fill the prescription after discharge.     In the ED, patient was afebrile and hemodynamically stable. Vitals on admission: Temp 98.5F, 86 HR, 101/53 BP, SpO2 98%, and 22 RR. She was treated with a  "continuation of her Abx regimen consisting of Amikacin, Azithromycin, Ethambutol, and Rifabutin. She was also given Dilaudid 0.5 mg, Gabapentin 300 mg, and 2 L NS bolus. Labs significant for no leukocytosis, stable Hgb 8.7, elevated ESR/CRP, 100/42, and mild Hyponatremia 131. Lactic Acid 1.3 and UA negative.  CXR significant for "new subsegmental band like opacity with surrounding increased interstitial markings at the left middle lung zone, possibly atelectasis, scarring or developing consolidation." Also evidence of L sided small layering pleural effusion. Following Dilaudid 0.5 mg, patient developed hypotension with MAP of 64-67, and MICU was consulted but recommended she was stable for the floor. Patient was admitted to  for pain management and sepsis workup.     Overview/Hospital Course:  Patient was admitted for sepsis workup and pain management in the setting of disseminated MAC infection and HIV. Her antibiotic regimen (Amikacin, Rifabutin, Azithromycin, and Ethambutol) that she was previously started on at [facility] was restarted, and her Dolutegravir/Lamivudine was restarted also. Her baseline BP runs fairly low, and her BP dropped to a MAP of 61-65 a few times, requiring multiple fluid boluses. MICU was consulted, but she has remained stable for the floor with hypotension that has been responsive to fluids. Continuing to work with PT/OT, and is planned for SNF/rehab placement due to ongoing debility/weakness.    Interval History: Overnight, patient received a 500 cc bolus due to relative hypotension but was asymptomatic. AFVSS. This morning, she states she feels better overall, but she is insisting on leaving the hospital. She would like to see her family and says her mother and father are older and unable to come see her in the hospital. Also, she is very distrusting of the entire hospital and states the entire process is "shady." After a long discussion, patient seemed amenable to staying till tomorrow to " discuss SNF/rehab placement with SW.    Review of Systems  Objective:     Vital Signs (Most Recent):  Temp: 97.8 °F (36.6 °C) (03/24/24 1202)  Pulse: 93 (03/24/24 1202)  Resp: 18 (03/24/24 1202)  BP: 94/63 (03/24/24 1202)  SpO2: 98 % (03/24/24 1202) Vital Signs (24h Range):  Temp:  [96.7 °F (35.9 °C)-99.5 °F (37.5 °C)] 97.8 °F (36.6 °C)  Pulse:  [82-95] 93  Resp:  [14-20] 18  SpO2:  [97 %-100 %] 98 %  BP: ()/(51-74) 94/63     Weight: 54.4 kg (120 lb)  Body mass index is 20.6 kg/m².    Intake/Output Summary (Last 24 hours) at 3/24/2024 1415  Last data filed at 3/24/2024 0556  Gross per 24 hour   Intake 480 ml   Output --   Net 480 ml         Physical Exam  Constitutional:       General: She is not in acute distress.     Appearance: She is ill-appearing.   HENT:      Head: Normocephalic and atraumatic.      Mouth/Throat:      Mouth: Mucous membranes are dry.      Pharynx: Oropharynx is clear.   Eyes:      General: No scleral icterus.     Extraocular Movements: Extraocular movements intact.      Conjunctiva/sclera: Conjunctivae normal.      Pupils: Pupils are equal, round, and reactive to light.   Cardiovascular:      Rate and Rhythm: Normal rate and regular rhythm.      Pulses: Normal pulses.      Heart sounds: Normal heart sounds.   Pulmonary:      Effort: Pulmonary effort is normal. No respiratory distress.      Breath sounds: Normal breath sounds. No wheezing, rhonchi or rales.   Chest:      Chest wall: No tenderness.   Abdominal:      General: Abdomen is flat. There is no distension.      Palpations: Abdomen is soft.      Tenderness: There is abdominal tenderness. There is no guarding or rebound.   Musculoskeletal:         General: No tenderness.      Cervical back: Normal range of motion and neck supple. No rigidity.      Right lower leg: No edema.      Left lower leg: No edema.      Comments: Tenderness to palpation of left flank; no midline tenderness. No erythema or rash. Bandage present to T12-L1 region    Skin:     General: Skin is warm and dry.      Coloration: Skin is pale. Skin is not jaundiced.      Findings: No erythema.   Neurological:      General: No focal deficit present.      Mental Status: She is alert and oriented to person, place, and time.      Cranial Nerves: No cranial nerve deficit.      Motor: No weakness.             Significant Labs: All pertinent labs within the past 24 hours have been reviewed.    Significant Imaging: I have reviewed all pertinent imaging results/findings within the past 24 hours.    Assessment/Plan:      * Sepsis  This patient does have evidence of infective focus  My overall impression is sepsis.  Source: Unknown  Antibiotics given-   Antibiotics (72h ago, onward)      Start     Stop Route Frequency Ordered    03/24/24 0900  azithromycin tablet 500 mg         -- Oral Daily 03/23/24 1210    03/24/24 0900  ethambutoL tablet 800 mg         -- Oral Daily 03/23/24 1210    03/24/24 0900  rifabutin capsule 300 mg         -- Oral Daily 03/23/24 1210    03/23/24 0900  amikacin (AMIKIN) 950 mg in dextrose 5 % (D5W) 250 mL IVPB         -- IV Every Tues, Thurs, Sat 03/23/24 0743    03/23/24 0825  amikacin - pharmacy to dose  (amikacin IVPB (PEDS and ADULTS))        See Hyperspace for full Linked Orders Report.    -- IV pharmacy to manage frequency 03/23/24 0727          Latest lactate reviewed-  Recent Labs   Lab 03/23/24  1637   LACTATE 0.7         Fluid challenge Ideal Body Weight- The patient's ideal body weight is Ideal body weight: 54.7 kg (120 lb 9.5 oz) which will be used to calculate fluid bolus of 30 ml/kg for treatment of septic shock.      Post- resuscitation assessment No - Post resuscitation assessment not needed       Will Not start Pressors- Levophed for MAP of 65  Source control achieved by: antibiotics    Patient with recent disseminated MAC infection (on abx regimen) in the setting of uncontrolled HIV (on Biktarvy) who presented with uncontrolled back pain. On  admission, she was afebrile and hemodynamically stable. While in ED, patient was treated with current abx regimen and 2 L NS boluses. Following 0.5 mg Dilaudid, patient developed hypotension with MAP's around 65 and MICU was consulted but deemed stable enough for the floor.     WBC 5.79. Lactate 1.3. UA non-infectious     Imaging: New subsegmental band like opacity with surrounding increased interstitial markings at the left middle lung zone, possibly atelectasis, scarring or developing consolidation. Blunting of the left costophrenic angle concerning for a small layering effusion.    Likely source: Lung vs recent corpectomy vs opportunistic infection in the setting of AIDS    Continuing previous Abx regimen for MAC infection (Amikacin, Rifabutin, Azithromycin, and Ethambutol)  S/P Fluid resuscitation with 2L Normal Saline bolus in the ED; IVF as needed for hypotension  Telemetry  Lactate WNL  F/u Bcx  ID consult  Multimodal pain regimen: Tylenol, Gabapentin, Oxycodone, Robaxin, and Toradol  PT/OT consulted and recommend high intensity therapy; will likely need SNF placement at discharge      Osteomyelitis of vertebra of thoracolumbar region  Patient underwent corpectomy of T12-L1 on 02/27 due to osteomyelitis from disseminated MAC in the setting of uncontrolled HIV; patient reporting uncontrolled low back on admission. She was recently discharged from [facility] with Percocet prescription but was unable to fill it and returned to the hospital for better pain control.     - Continue Abx regimen for MAC infection  - ID consulted  - Multimodal pain regimen consisting of Oxycodone, tylenol, Robaxin, Toradol, Lidocaine patch, and gabapentin  - Will hold off on additional imaging for now      Insomnia  Takes seroquel 100mg qhs       Mycobacterium avium infection    Continue  ethambutol 800mg daily  Amikacin 950mg T,R,S   azithromycin 500 daily  rifampin 300 daily  ID Consulted for continued management, appreciate  rec's      Hyponatremia  Patient has hyponatremia which is uncontrolled,We will aim to correct the sodium by 4-6mEq in 24 hours. We will monitor sodium Daily. The hyponatremia is likely due to Dehydration/hypovolemia. The patient's sodium results have been reviewed and are listed below.  Recent Labs   Lab 03/23/24  0631   *     - Daily CMP  - Monitor for symptoms/neurological changes  - IVF as needed    HIV (human immunodeficiency virus infection)  Patient reports initial diagnosis was approximately 42 years ago, but she has only been on ART for 1 year approximately. CD4 counts as low as 24 per chart review, and has been improving as of recently with Last CD4 113 (2/19/24) per chart review; BIKTARVY discontinued due to interactions with MAC antibiotic regimen    - Continue home Dolutegravir/Lamivudine      Chronic pain    Gabapentin 600mg PO TID  Robaxin 750mg PO TID  Holding Amitriptyline             VTE Risk Mitigation (From admission, onward)           Ordered     IP VTE LOW RISK PATIENT  Once         03/23/24 1143     Place sequential compression device  Until discontinued         03/23/24 1143                    Discharge Planning   MARK: 3/25/2024     Code Status: DNR   Is the patient medically ready for discharge?:     Reason for patient still in hospital (select all that apply): Patient trending condition, PT / OT recommendations, and Pending disposition  Discharge Plan A: Home with family                  Silvio Osorio DO  Department of Hospital Medicine   [facility] ([place]

## 2024-03-24 NOTE — SUBJECTIVE & OBJECTIVE
"Interval History: Overnight, patient received a 500 cc bolus due to relative hypotension but was asymptomatic. AFVSS. This morning, she states she feels better overall, but she is insisting on leaving the hospital. She would like to see her family and says her mother and father are older and unable to come see her in the hospital. Also, she is very distrusting of the entire hospital and states the entire process is "shady." After a long discussion, patient seemed amenable to staying till tomorrow to discuss SNF/rehab placement with SW.    Review of Systems  Objective:     Vital Signs (Most Recent):  Temp: 97.8 °F (36.6 °C) (03/24/24 1202)  Pulse: 93 (03/24/24 1202)  Resp: 18 (03/24/24 1202)  BP: 94/63 (03/24/24 1202)  SpO2: 98 % (03/24/24 1202) Vital Signs (24h Range):  Temp:  [96.7 °F (35.9 °C)-99.5 °F (37.5 °C)] 97.8 °F (36.6 °C)  Pulse:  [82-95] 93  Resp:  [14-20] 18  SpO2:  [97 %-100 %] 98 %  BP: ()/(51-74) 94/63     Weight: 54.4 kg (120 lb)  Body mass index is 20.6 kg/m².    Intake/Output Summary (Last 24 hours) at 3/24/2024 1415  Last data filed at 3/24/2024 0556  Gross per 24 hour   Intake 480 ml   Output --   Net 480 ml         Physical Exam  Constitutional:       General: She is not in acute distress.     Appearance: She is ill-appearing.   HENT:      Head: Normocephalic and atraumatic.      Mouth/Throat:      Mouth: Mucous membranes are dry.      Pharynx: Oropharynx is clear.   Eyes:      General: No scleral icterus.     Extraocular Movements: Extraocular movements intact.      Conjunctiva/sclera: Conjunctivae normal.      Pupils: Pupils are equal, round, and reactive to light.   Cardiovascular:      Rate and Rhythm: Normal rate and regular rhythm.      Pulses: Normal pulses.      Heart sounds: Normal heart sounds.   Pulmonary:      Effort: Pulmonary effort is normal. No respiratory distress.      Breath sounds: Normal breath sounds. No wheezing, rhonchi or rales.   Chest:      Chest wall: No " tenderness.   Abdominal:      General: Abdomen is flat. There is no distension.      Palpations: Abdomen is soft.      Tenderness: There is abdominal tenderness. There is no guarding or rebound.   Musculoskeletal:         General: No tenderness.      Cervical back: Normal range of motion and neck supple. No rigidity.      Right lower leg: No edema.      Left lower leg: No edema.      Comments: Tenderness to palpation of left flank; no midline tenderness. No erythema or rash. Bandage present to T12-L1 region   Skin:     General: Skin is warm and dry.      Coloration: Skin is pale. Skin is not jaundiced.      Findings: No erythema.   Neurological:      General: No focal deficit present.      Mental Status: She is alert and oriented to person, place, and time.      Cranial Nerves: No cranial nerve deficit.      Motor: No weakness.             Significant Labs: All pertinent labs within the past 24 hours have been reviewed.    Significant Imaging: I have reviewed all pertinent imaging results/findings within the past 24 hours.

## 2024-03-24 NOTE — NURSING
Pt wanted to get up. Tested sitting BP - 103/63. Standing 101/69.     1200 - Pt will constantly call the nursing station wanting various things. Forgetful. Pt. Wants to get up and walk around the unit but almost doesn't understand the issues with her BP and dizziness. Ordered a telesitter. Pt said she called her father and brother who can come pick her up to take her home. Pt. Still addiment about PT even though she is waiting for her family to come get her and possibly leave AMA. Notified on call Dr. Rick - Spoke with niece (Janett) of pt. Who says pt lives at home with elderly parents and brother who has autism. Feels that its not a good home  environment because no one can take care of her and she is a one person assist at least. Also believes pt. Is addicted to cocaine.

## 2024-03-24 NOTE — MEDICAL/APP STUDENT
[facility] - [facility] ([address] Medicine  Progress Note    Patient Name: Amie Salmeron  MRN: [id number]  Patient Class: OP- Observation   Admission Date: 3/23/2024  Length of Stay: 0 days  Attending Physician: Luis Armando Moreland MD  Primary Care Provider: Dustin Caro MD        Subjective:     Principal Problem:Sepsis        HPI:  Ms. 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 She said that her pain was uncontrolled and led to an episode of nausea and vomiting. She states that she was discharged from [facility] yesterday and that her pain was never really controlled. She also states she was unable to have her pain medication prescription filled and she had to come back to the ER, but she came to [facility] rather than going back to [facility] because she had a very bad expeience with the staff and level of attention/care she was receiving. Reports full compliance with all of her home medications and antibiotics without any recent changes. She denies fevers, chills, abdominal pain, urinary frequency, and burning with urination. She says her pain is located in her left lower back just above her buttocks. She denies any pain near the surgical site.    Of note, she was discharged yesterday from [facility] from a nearly month long hospital course that was c/b pleural effusion requiring chest tube placement as well as TPA/DNAse of 6 doses. No invasive treatment was done, and pleural effusion improved sigificantly leading to discharge yesterday. She was discharged with PICC line. She was sent home with a percocet prescription but says that she was not able to fill the prescription after discharge.     In the ED, patient was afebrile and hemodynamically stable. Vitals on admission: Temp 98.5F, 86 HR, 101/53 BP, SpO2 98%, and 22 RR. She was treated with a  "continuation of her Abx regimen consisting of Amikacin, Azithromycin, Ethambutol, and Rifabutin. She was also given Dilaudid 0.5 mg, Gabapentin 300 mg, and 2 L NS bolus. Labs significant for no leukocytosis, stable Hgb 8.7, elevated ESR/CRP, 100/42, and mild Hyponatremia 131. Lactic Acid 1.3 and UA negative.  CXR significant for "new subsegmental band like opacity with surrounding increased interstitial markings at the left middle lung zone, possibly atelectasis, scarring or developing consolidation." Also evidence of L sided small layering pleural effusion. Following Dilaudid 0.5 mg, patient developed hypotension with MAP of 64-67, and MICU was consulted but recommended she was stable for the floor. Patient was admitted to  for pain management and sepsis workup.       Overview/Hospital Course:  Patient was admitted for sepsis workup and pain management in the setting of disseminated MAC infection and HIV. Her antibiotic regimen (Amikacin, Rifabutin, Azithromycin, and Ethambutol) that she was previously started on at [facility] was restarted, and her Dolutegravir/Lamivudine was restarted also. Her baseline BP runs fairly low, and her BP dropped to a MAP of 61-65 a few times, requiring multiple fluid boluses. MICU was consulted, but she has remained stable for the floor with hypotension that has been responsive to fluids. Continuing to work with PT/OT, and is planned for SNF/rehab placement due to ongoing debility/weakness.      Interval History: 500 mL bolus for hypotension. Overnight, bladder scan showed >300 mL. Straight cath attempted but was unsuccessful and pt refused after d/t pain. She was eventually able to release urine. On exam, she would only respond to yes/no questions but was alert on team rounds. In the late morning, she was demanding to walk, and after staff refused, demanded to leave and said her father and brother would pick her up. Hospital medicine team was notified and rounded on her again, during " "which she reported feeling uncomfortable with the telesitter, that she was held against her will, that she has not been pleased with her care, that the system is "shady", and that she wants to go home to see her family and would return the next day for SNF placement. There was concern for passive suicidal ideation after she said she would die in response to Dr. Moreland's concern that she may not be able to receive appropriate care at home, though she later denied this later in the conversation. She eventually agreed to stay after a long discussion.      Review of Systems   Constitutional:  Negative for fever.   Respiratory:  Negative for apnea, chest tightness and shortness of breath.    Cardiovascular:  Negative for chest pain.   Gastrointestinal:  Positive for nausea. Negative for blood in stool, constipation and diarrhea.   Genitourinary:  Negative for decreased urine volume, difficulty urinating, dysuria, frequency and urgency.   Musculoskeletal:  Positive for back pain.   Neurological:  Positive for weakness. Negative for dizziness.   Psychiatric/Behavioral:  Negative for suicidal ideas.      Objective:   [unfilled]Weight: 54.4 kg (120 lb)  Body mass index is 20.6 kg/m².    Intake/Output Summary (Last 24 hours) at 3/24/2024 1422  Last data filed at 3/24/2024 0556  Gross per 24 hour   Intake 480 ml   Output --   Net 480 ml     Physical Exam  Constitutional:       General: She is not in acute distress.     Appearance: She is cachectic. She is ill-appearing. She is not toxic-appearing or diaphoretic.   HENT:      Head: Normocephalic and atraumatic.   Eyes:      Extraocular Movements: Extraocular movements intact.   Cardiovascular:      Rate and Rhythm: Normal rate and regular rhythm.      Pulses: Normal pulses.      Heart sounds: Normal heart sounds.   Pulmonary:      Effort: Pulmonary effort is normal. No respiratory distress.      Breath sounds: Normal breath sounds.   Abdominal:      General: Bowel sounds " are normal. There is no distension.      Palpations: Abdomen is soft.      Tenderness: There is abdominal tenderness (left-sided). There is guarding. There is no rebound.   Musculoskeletal:      Right lower leg: No edema.      Left lower leg: No edema.   Skin:     General: Skin is warm and dry.      Coloration: Skin is not jaundiced.   Neurological:      Mental Status: She is alert and oriented to person, place, and time.   Psychiatric:         Attention and Perception: Attention normal.         Mood and Affect: Affect is flat.         Significant Labs: All pertinent labs within the past 24 hours have been reviewed.  CBC:   Recent Labs   Lab 03/23/24  0631 03/24/24  0454   WBC 5.79 8.32   HGB 8.7* 10.3*   HCT 28.3* 34.1*    396     CMP:   Recent Labs   Lab 03/23/24  0631 03/24/24  0454   * 135*   K 3.8 4.3    109   CO2 19* 21*   GLU 72 72   BUN 17 15   CREATININE 0.5 0.6   CALCIUM 8.5* 8.4*   PROT 5.8* 6.1   ALBUMIN 2.2* 2.3*   BILITOT 0.3 0.3   ALKPHOS 128 133   AST 28 24   ALT 15 16   ANIONGAP 8 5*     Magnesium:   Recent Labs   Lab 03/24/24  0454   MG 1.9     Phosphorus:  Recent Labs   Lab 03/24/24  0454   PHOS 3.5     Significant Imaging: I have reviewed all pertinent imaging results/findings within the past 24 hours.      Assessment/Plan:      * Sepsis  This patient does have evidence of infective focus  My overall impression is sepsis.  Source: Unknown  Antibiotics given-   Antibiotics (72h ago, onward)      Start     Stop Route Frequency Ordered    03/24/24 0900  azithromycin tablet 500 mg         -- Oral Daily 03/23/24 1210    03/24/24 0900  ethambutoL tablet 800 mg         -- Oral Daily 03/23/24 1210    03/24/24 0900  rifabutin capsule 300 mg         -- Oral Daily 03/23/24 1210 03/23/24 0900  amikacin (AMIKIN) 950 mg in dextrose 5 % (D5W) 250 mL IVPB         -- IV Every Tues, Thurs, Sat 03/23/24 0743    03/23/24 0825  amikacin - pharmacy to dose  (amikacin IVPB (PEDS and ADULTS))         See Hyperspace for full Linked Orders Report.    -- IV pharmacy to manage frequency 03/23/24 0727          Latest lactate reviewed-  Recent Labs   Lab 03/23/24  1637   LACTATE 0.7         Fluid challenge Ideal Body Weight- The patient's ideal body weight is Ideal body weight: 54.7 kg (120 lb 9.5 oz) which will be used to calculate fluid bolus of 30 ml/kg for treatment of septic shock.      Post- resuscitation assessment No - Post resuscitation assessment not needed       Will Not start Pressors- Levophed for MAP of 65  Source control achieved by: antibiotics    Patient with recent disseminated MAC infection (on abx regimen) in the setting of uncontrolled HIV (on Biktarvy) who presented with uncontrolled back pain. On admission, she was afebrile and hemodynamically stable. While in ED, patient was treated with current abx regimen and 2 L NS boluses. Following 0.5 mg Dilaudid, patient developed hypotension with MAP's around 65 and MICU was consulted but deemed stable enough for the floor.     WBC 5.79. Lactate 1.3. UA non-infectious     Imaging: New subsegmental band like opacity with surrounding increased interstitial markings at the left middle lung zone, possibly atelectasis, scarring or developing consolidation. Blunting of the left costophrenic angle concerning for a small layering effusion.    Likely source: Lung vs recent corpectomy vs opportunistic infection in the setting of AIDS    Continuing previous Abx regimen for MAC infection (Amikacin, Rifabutin, Azithromycin, and Ethambutol)  S/P Fluid resuscitation with 2L Normal Saline bolus in the ED; IVF as needed for hypotension  Telemetry  Lactate WNL  F/u Bcx  ID consult  Multimodal pain regimen: Tylenol, Gabapentin, Oxycodone, Robaxin, and Toradol  PT/OT consulted and recommend high intensity therapy; will likely need SNF placement at discharge      Osteomyelitis of vertebra of thoracolumbar region  Patient underwent corpectomy of T12-L1 on 02/27 due to  osteomyelitis from disseminated MAC in the setting of uncontrolled HIV; patient reporting uncontrolled low back on admission. She was recently discharged from [facility] with Percocet prescription but was unable to fill it and returned to the hospital for better pain control.     - Continue Abx regimen for MAC infection  - ID consulted  - Multimodal pain regimen consisting of Oxycodone, tylenol, Robaxin, Toradol, Lidocaine patch, and gabapentin  - Will hold off on additional imaging for now      Insomnia  Takes seroquel 100mg qhs       Mycobacterium avium infection    Continue  ethambutol 800mg daily  Amikacin 950mg T,R,S   azithromycin 500 daily  rifampin 300 daily  ID Consulted for continued management, appreciate rec's      Hyponatremia  Patient has hyponatremia which is uncontrolled,We will aim to correct the sodium by 4-6mEq in 24 hours. We will monitor sodium Daily. The hyponatremia is likely due to Dehydration/hypovolemia. The patient's sodium results have been reviewed and are listed below.  Recent Labs   Lab 03/23/24  0631   *     - Daily CMP  - Monitor for symptoms/neurological changes  - IVF as needed    HIV (human immunodeficiency virus infection)  Patient reports initial diagnosis was approximately 42 years ago, but she has only been on ART for 1 year approximately. CD4 counts as low as 24 per chart review, and has been improving as of recently with Last CD4 113 (2/19/24) per chart review; BIKTARVY discontinued due to interactions with MAC antibiotic regimen    - Continue home Dolutegravir/Lamivudine      Chronic pain    Gabapentin 600mg PO TID  Robaxin 750mg PO TID  Holding Amitriptyline             VTE Risk Mitigation (From admission, onward)           Ordered     IP VTE LOW RISK PATIENT  Once         03/23/24 1143     Place sequential compression device  Until discontinued         03/23/24 1143                    Discharge Planning   MARK: 3/25/2024     Code Status: DNR   Is the patient medically ready  for discharge?:     Reason for patient still in hospital (select all that apply): Treatment and Pending disposition  Discharge Plan A: Home with family              Jayden King, MS4  Department of Hospital Medicine   [facility] ([place]

## 2024-03-24 NOTE — HOSPITAL COURSE
Patient was admitted for sepsis workup and pain management in the setting of disseminated MAC infection and HIV. Her antibiotic regimen (Amikacin, Rifabutin, Azithromycin, and Ethambutol) that she was previously started on at [facility] was restarted, and her Dolutegravir/Lamivudine was restarted also. Her baseline BP runs fairly low, and her BP dropped to a MAP of 61-65 a few times, requiring multiple fluid boluses. MICU was consulted, but she has remained stable for the floor with hypotension that has been responsive to fluids. Pt stable overnight of 3/24. She was amenable to SNF but unhappy with speed of placement. Pt attempted to be re-directed by multiple team members, was able to verbalize risk of death or serious bodily injury if leaving against medical advice. Pt adamant to get re-evaluated at different facility, signed AMA papers. Medical team reached out to [facility] to confirm she continues to have outpatient IV abx follow up, has f/u appointments at [facility].

## 2024-03-24 NOTE — NURSING
"Pt didn't void this shift. Nurse bladder scan pt and greater than 300 ml of urine detected in bladder. Team 2 notified with new orders. Nurse attempted to straight cath pt using  sterile technique once and was unsuccessful . Nurse went to retry with new straight cath kit and pt refused. Pt stated " that hurts" "I can't do that right now" Nurse explained to pt that eventually she will have to be straight cath because she is retaining urine.   "

## 2024-03-24 NOTE — PLAN OF CARE
"[facility]rian [facility] - Med Surg ([facility])  Initial Discharge Assessment       Primary Care Provider: Dustin Caro MD    Admission Diagnosis: Dizziness [R42]  AIDS [B20]  Chest pain [R07.9]  Hypotension, unspecified hypotension type [I95.9]  Back pain, unspecified back location, unspecified back pain laterality, unspecified chronicity [M54.9]    Admission Date: 3/23/2024  Expected Discharge Date:          Payor: HU[employer] MANAGED MEDICARE / Plan: HU[employer] MEDICARE HMO / Product Type: Capitation /     Extended Emergency Contact Information  Primary Emergency Contact: Elena Crandall  Address: [address] [place], [place] [id number] [place] [address]  Home Phone: [phone]  Mobile Phone: [phone]  Relation: Mother    Discharge Plan A: (P) Home with family  Discharge Plan B: (P) Home      [facility] - [place], [place] - [address]  [address]  [place] [address]  Phone: [phone] Fax: [phone]    [facility] - [place], [place] - [address]  [address] [address]  Phone: [phone] Fax: [phone]    [address], [place] - [address]  [address]  [place] [address]  Phone: [phone] Fax: [phone]      Initial Assessment (most recent)       Adult Discharge Assessment - 03/24/24 1350          Discharge Assessment    Assessment Type Discharge Planning Assessment (P)      Confirmed/corrected address, phone number and insurance Yes (P)      Confirmed Demographics Correct on Facesheet (P)      Source of Information patient (P)      Does patient/caregiver understand observation status Yes (P)      Communicated MARK with patient/caregiver Yes (P)      Reason For Admission "mac infection" (P)      People in Home parent(s);sibling(s) (P)      Do you expect to return to your current living situation? Yes (P)      Do you have help at home or someone to help " you manage your care at home? Yes (P)      Who are your caregiver(s) and their phone number(s)? Elena Crandall (Mother) [phone] (P)      Prior to hospitilization cognitive status: Alert/Oriented (P)      Current cognitive status: Alert/Oriented (P)      Walking or Climbing Stairs Difficulty yes (P)      Walking or Climbing Stairs ambulation difficulty, requires equipment (P)      Mobility Management walker (P)      Dressing/Bathing Difficulty no (P)      Home Accessibility wheelchair accessible (P)      Home Layout Able to live on 1st floor (P)      Equipment Currently Used at Home walker, rolling (P)      Readmission within 30 days? No (P)      Patient currently being followed by outpatient case management? No (P)      Do you currently have service(s) that help you manage your care at home? No (P)      Do you take prescription medications? No (P)      Who is going to help you get home at discharge? Elena Crandall (Mother) [phone] (P)      How do you get to doctors appointments? family or friend will provide (P)      Are you on dialysis? No (P)      Do you take coumadin? No (P)      Discharge Plan A Home with family (P)      Discharge Plan B Home (P)      DME Needed Upon Discharge  none (P)      Discharge Plan discussed with: Patient (P)         Physical Activity    On average, how many days per week do you engage in moderate to strenuous exercise (like a brisk walk)? 0 days (P)      On average, how many minutes do you engage in exercise at this level? 0 min (P)         Financial Resource Strain    How hard is it for you to pay for the very basics like food, housing, medical care, and heating? Not hard at all (P)         Housing Stability    In the last 12 months, was there a time when you were not able to pay the mortgage or rent on time? No (P)      In the last 12 months, how many places have you lived? 1 (P)      In the last 12 months, was there a time when you did not have a steady place to sleep or  slept in a shelter (including now)? No (P)         Transportation Needs    In the past 12 months, has lack of transportation kept you from medical appointments or from getting medications? No (P)      In the past 12 months, has lack of transportation kept you from meetings, work, or from getting things needed for daily living? No (P)         Food Insecurity    Within the past 12 months, you worried that your food would run out before you got the money to buy more. Never true (P)      Within the past 12 months, the food you bought just didn't last and you didn't have money to get more. Never true (P)         Social Connections    In a typical week, how many times do you talk on the phone with family, friends, or neighbors? More than three times a week (P)      How often do you get together with friends or relatives? More than three times a week (P)      Are you , , , , never , or living with a partner?  (P)         Alcohol Use    Q1: How often do you have a drink containing alcohol? Never (P)      Q2: How many drinks containing alcohol do you have on a typical day when you are drinking? Patient does not drink (P)      Q3: How often do you have six or more drinks on one occasion? Never (P)         OTHER    Name(s) of People in Home Elena Crandall (Mother) [phone] (P)                  Discharge Plan A and Plan B have been determined by review of patient's clinical status, future medical and therapeutic needs, and coverage/benefits for post-acute care in coordination with multidisciplinary team members.                     ATTILA Hill, LMSW  [facility]  Case Management  Ext. [phone]

## 2024-03-24 NOTE — ASSESSMENT & PLAN NOTE
Patient reports initial diagnosis was approximately 42 years ago, but she has only been on ART for 1 year approximately. CD4 counts as low as 24 per chart review, and has been improving as of recently with Last CD4 113 (2/19/24) per chart review; BIKTARVY discontinued due to interactions with MAC antibiotic regimen    - Continue home Dolutegravir/Lamivudine

## 2024-03-24 NOTE — ACP (ADVANCE CARE PLANNING)
Advance Care Planning  Code Status  In light of the patients advanced and terminal illness, we reviewed what the patients preferences for care would be at the very end of life.  The patient wishes to have a natural, peaceful death.  Along those lines, the patient does not wish to have CPR or other invasive treatments performed when her heart and/or breathing stops.  I communicated to the patient that a DNR order would be placed in her medical record to reflect this preference.  A DNR form was completed and will be scanned into EPIC.   I spent a total of 5 minutes engaging the patient in this advance care planning discussion.

## 2024-03-24 NOTE — NURSING
.Nurses Note -- 4 Eyes      3/23/24  11:33 PM      Skin assessed during: Admit      [] No Altered Skin Integrity Present    []Prevention Measures Documented      [x] Yes- Altered Skin Integrity Present or Discovered   [] LDA Added if Not in Epic (Describe Wound)   [x] New Altered Skin Integrity was Present on Admit and Documented in LDA   [] Wound Image Taken    Wound Care Consulted? Yes    Attending Nurse:  DAKOTA Abdi    Second RN/Staff Member:  DAKOTA Abdi LPN    Pt has redness in perineum area open   Pt has moisture dermatitis on Sacrum ,skin tear on buttocks

## 2024-03-25 VITALS
BODY MASS INDEX: 20.49 KG/M2 | SYSTOLIC BLOOD PRESSURE: 105 MMHG | OXYGEN SATURATION: 97 % | HEIGHT: 64 IN | TEMPERATURE: 99 F | HEART RATE: 91 BPM | RESPIRATION RATE: 17 BRPM | WEIGHT: 120 LBS | DIASTOLIC BLOOD PRESSURE: 64 MMHG

## 2024-03-25 PROBLEM — A31.2: Status: ACTIVE | Noted: 2024-03-25

## 2024-03-25 LAB
ALBUMIN SERPL BCP-MCNC: 2.4 G/DL (ref 3.5–5.2)
ALP SERPL-CCNC: 131 U/L (ref 55–135)
ALT SERPL W/O P-5'-P-CCNC: 13 U/L (ref 10–44)
ANION GAP SERPL CALC-SCNC: 7 MMOL/L (ref 8–16)
ANISOCYTOSIS BLD QL SMEAR: SLIGHT
AST SERPL-CCNC: 23 U/L (ref 10–40)
BASO STIPL BLD QL SMEAR: ABNORMAL
BASOPHILS # BLD AUTO: 0.03 K/UL (ref 0–0.2)
BASOPHILS NFR BLD: 0.5 % (ref 0–1.9)
BILIRUB SERPL-MCNC: 0.3 MG/DL (ref 0.1–1)
BUN SERPL-MCNC: 14 MG/DL (ref 8–23)
CALCIUM SERPL-MCNC: 8.5 MG/DL (ref 8.7–10.5)
CHLORIDE SERPL-SCNC: 107 MMOL/L (ref 95–110)
CO2 SERPL-SCNC: 20 MMOL/L (ref 23–29)
CREAT SERPL-MCNC: 0.6 MG/DL (ref 0.5–1.4)
DACRYOCYTES BLD QL SMEAR: ABNORMAL
DIFFERENTIAL METHOD BLD: ABNORMAL
DOHLE BOD BLD QL SMEAR: PRESENT
EOSINOPHIL # BLD AUTO: 0 K/UL (ref 0–0.5)
EOSINOPHIL NFR BLD: 0.5 % (ref 0–8)
ERYTHROCYTE [DISTWIDTH] IN BLOOD BY AUTOMATED COUNT: 18.6 % (ref 11.5–14.5)
EST. GFR  (NO RACE VARIABLE): >60 ML/MIN/1.73 M^2
GLUCOSE SERPL-MCNC: 73 MG/DL (ref 70–110)
HCT VFR BLD AUTO: 34.4 % (ref 37–48.5)
HGB BLD-MCNC: 10.5 G/DL (ref 12–16)
HYPOCHROMIA BLD QL SMEAR: ABNORMAL
IMM GRANULOCYTES # BLD AUTO: 0.09 K/UL (ref 0–0.04)
IMM GRANULOCYTES NFR BLD AUTO: 1.4 % (ref 0–0.5)
LYMPHOCYTES # BLD AUTO: 1.2 K/UL (ref 1–4.8)
LYMPHOCYTES NFR BLD: 18.7 % (ref 18–48)
MAGNESIUM SERPL-MCNC: 1.8 MG/DL (ref 1.6–2.6)
MCH RBC QN AUTO: 26.3 PG (ref 27–31)
MCHC RBC AUTO-ENTMCNC: 30.5 G/DL (ref 32–36)
MCV RBC AUTO: 86 FL (ref 82–98)
MONOCYTES # BLD AUTO: 0.5 K/UL (ref 0.3–1)
MONOCYTES NFR BLD: 7.8 % (ref 4–15)
NEUTROPHILS # BLD AUTO: 4.6 K/UL (ref 1.8–7.7)
NEUTROPHILS NFR BLD: 71.1 % (ref 38–73)
NRBC BLD-RTO: 0 /100 WBC
PHOSPHATE SERPL-MCNC: 3.5 MG/DL (ref 2.7–4.5)
PLATELET # BLD AUTO: 402 K/UL (ref 150–450)
PLATELET BLD QL SMEAR: ABNORMAL
PMV BLD AUTO: 9 FL (ref 9.2–12.9)
POLYCHROMASIA BLD QL SMEAR: ABNORMAL
POTASSIUM SERPL-SCNC: 4.3 MMOL/L (ref 3.5–5.1)
PROT SERPL-MCNC: 5.6 G/DL (ref 6–8.4)
RBC # BLD AUTO: 4 M/UL (ref 4–5.4)
SCHISTOCYTES BLD QL SMEAR: ABNORMAL
SCHISTOCYTES BLD QL SMEAR: PRESENT
SODIUM SERPL-SCNC: 134 MMOL/L (ref 136–145)
TOXIC GRANULES BLD QL SMEAR: PRESENT
WBC # BLD AUTO: 6.43 K/UL (ref 3.9–12.7)
WBC TOXIC VACUOLES BLD QL SMEAR: PRESENT

## 2024-03-25 PROCEDURE — 36415 COLL VENOUS BLD VENIPUNCTURE: CPT

## 2024-03-25 PROCEDURE — 84100 ASSAY OF PHOSPHORUS: CPT

## 2024-03-25 PROCEDURE — 97116 GAIT TRAINING THERAPY: CPT | Mod: CQ

## 2024-03-25 PROCEDURE — 85025 COMPLETE CBC W/AUTO DIFF WBC: CPT

## 2024-03-25 PROCEDURE — 83735 ASSAY OF MAGNESIUM: CPT

## 2024-03-25 PROCEDURE — 11000001 HC ACUTE MED/SURG PRIVATE ROOM

## 2024-03-25 PROCEDURE — 25000003 PHARM REV CODE 250

## 2024-03-25 PROCEDURE — 80053 COMPREHEN METABOLIC PANEL: CPT

## 2024-03-25 PROCEDURE — 63700000 PHARM REV CODE 250 ALT 637 W/O HCPCS

## 2024-03-25 RX ORDER — LAMIVUDINE 300 MG/1
300 TABLET, FILM COATED ORAL DAILY
Qty: 30 TABLET | Refills: 11
Start: 2024-03-26 | End: 2025-03-26

## 2024-03-25 RX ORDER — ETHAMBUTOL HYDROCHLORIDE 400 MG/1
800 TABLET, FILM COATED ORAL DAILY
Start: 2024-03-26

## 2024-03-25 RX ORDER — RIFAMPIN 300 MG/1
300 CAPSULE ORAL DAILY
Start: 2024-03-25

## 2024-03-25 RX ORDER — AZITHROMYCIN 500 MG/1
500 TABLET, FILM COATED ORAL DAILY
Qty: 2 TABLET
Start: 2024-03-26

## 2024-03-25 RX ADMIN — ETHAMBUTOL HYDROCHLORIDE 800 MG: 400 TABLET ORAL at 08:03

## 2024-03-25 RX ADMIN — LAMIVUDINE 300 MG: 150 TABLET, FILM COATED ORAL at 08:03

## 2024-03-25 RX ADMIN — AZITHROMYCIN DIHYDRATE 500 MG: 250 TABLET ORAL at 08:03

## 2024-03-25 RX ADMIN — RIFABUTIN 300 MG: 150 CAPSULE ORAL at 08:03

## 2024-03-25 RX ADMIN — DOLUTEGRAVIR SODIUM 50 MG: 50 TABLET, FILM COATED ORAL at 08:03

## 2024-03-25 RX ADMIN — METHOCARBAMOL 500 MG: 500 TABLET ORAL at 05:03

## 2024-03-25 RX ADMIN — ACETAMINOPHEN 1000 MG: 500 TABLET ORAL at 05:03

## 2024-03-25 RX ADMIN — METHOCARBAMOL 500 MG: 500 TABLET ORAL at 12:03

## 2024-03-25 RX ADMIN — GABAPENTIN 300 MG: 300 CAPSULE ORAL at 08:03

## 2024-03-25 NOTE — DISCHARGE SUMMARY
Adrian [facility] - Med Surg ([place] [address])  [facility] Medicine  Discharge Summary      Patient Name: Amie Salmeron  MRN: [id number]  VIKTORIA: [id number]  Patient Class: IP- Inpatient  Admission Date: 3/23/2024  Hospital Length of Stay: 1 days  Discharge Date and Time:  03/25/2024 4:06 PM  Attending Physician: Anuradha att. providers found   Discharging Provider: Kandace Simon MD  Primary Care Provider: Dustin Caro MD  [facility] Medicine Team: [facility] HOSP MED 2 Kandace Simon MD  Primary Care Team: [facility] MED 2    HPI:   Ms. 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 She said that her pain was uncontrolled and led to an episode of nausea and vomiting. She states that she was discharged from [facility] yesterday and that her pain was never really controlled. She also states she was unable to have her pain medication prescription filled and she had to come back to the ER, but she came to [facility] rather than going back to [facility] because she had a very bad expeience with the staff and level of attention/care she was receiving. Reports full compliance with all of her home medications and antibiotics without any recent changes. She denies fevers, chills, abdominal pain, urinary frequency, and burning with urination. She says her pain is located in her left lower back just above her buttocks. She denies any pain near the surgical site.    Of note, she was discharged yesterday from [facility] from a nearly month long hospital course that was c/b pleural effusion requiring chest tube placement as well as TPA/DNAse of 6 doses. No invasive treatment was done, and pleural effusion improved sigificantly leading to discharge yesterday. She was discharged with PICC line. She was sent home with a percocet prescription but says that she was not able to fill the prescription after discharge.     In  "the ED, patient was afebrile and hemodynamically stable. Vitals on admission: Temp 98.5F, 86 HR, 101/53 BP, SpO2 98%, and 22 RR. She was treated with a continuation of her Abx regimen consisting of Amikacin, Azithromycin, Ethambutol, and Rifabutin. She was also given Dilaudid 0.5 mg, Gabapentin 300 mg, and 2 L NS bolus. Labs significant for no leukocytosis, stable Hgb 8.7, elevated ESR/CRP, 100/42, and mild Hyponatremia 131. Lactic Acid 1.3 and UA negative.  CXR significant for "new subsegmental band like opacity with surrounding increased interstitial markings at the left middle lung zone, possibly atelectasis, scarring or developing consolidation." Also evidence of L sided small layering pleural effusion. Following Dilaudid 0.5 mg, patient developed hypotension with MAP of 64-67, and MICU was consulted but recommended she was stable for the floor. Patient was admitted to  for pain management and sepsis workup.     * No surgery found *      Hospital Course:   Patient was admitted for evaluation of sepsis and pain management in the setting of disseminated MAC infection and HIV. Her antibiotic regimen (Amikacin, Rifabutin, Azithromycin, and Ethambutol) that she was previously started on at [facility] was restarted, and her Dolutegravir/Lamivudine was restarted also. Her baseline BP runs fairly low, and her BP dropped to a MAP of 61-65 a few times, requiring multiple fluid boluses. MICU was consulted, but she has remained stable for the floor with hypotension that has been responsive to fluids. Pt stable overnight of 3/24. She was amenable to SNF but unhappy with speed of placement. Pt attempted to be re-directed by multiple team members, was able to verbalize risk of death or serious bodily injury if leaving against medical advice. Pt adamant to get re-evaluated at different facility, signed AMA papers. Medical team reached out to [facility] to confirm she continues to have outpatient IV abx follow up, has f/u appointments at " [facility].      Goals of Care Treatment Preferences:  Code Status: DNR      Consults:   Consults (From admission, onward)          Status Ordering Provider     Inpatient consult to Infectious Diseases  Once        Provider:  (Not yet assigned)    Completed BETHANY AGRAWAL     Inpatient consult to Critical Care Medicine  Once        Provider:  (Not yet assigned)    Completed RM DELGADO     Pharmacy to dose Aminoglycosides consult  Once        Provider:  (Not yet assigned)   See [facility] for full Linked Orders Report.    Acknowledged MARIA C ROCA     Inpatient consult to Social Work  Once        Provider:  (Not yet assigned)    Completed MARIA C ROCA            Final Active Diagnoses:    Diagnosis Date Noted POA    PRINCIPAL PROBLEM:  Sepsis [A41.9] 03/23/2024 Yes    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] 03/25/2024 Yes    Mycobacterium avium infection [A31.0] 03/23/2024 Yes    Insomnia [G47.00] 03/23/2024 Yes    Osteomyelitis of vertebra of thoracolumbar region [M46.25] 03/23/2024 Yes    Hyponatremia [E87.1] 12/05/2022 Yes    HIV (human immunodeficiency virus infection) [B20] 10/13/2022 Yes    Chronic pain [G89.29] 08/05/2014 Yes     Chronic      Problems Resolved During this Admission:       Discharged Condition: stable    Disposition: Left Against Medical Adv*    Follow Up:  PCP, Infectious Diseases    Patient Instructions:      Ambulatory referral/consult to Outpatient Case Management   Referral Priority: Routine Referral Type: Consultation   Referral Reason: Specialty Services Required   Number of Visits Requested: 1       Significant Diagnostic Studies: Labs: CMP   Recent Labs   Lab 03/24/24  0454 03/25/24  0530   * 134*   K 4.3 4.3    107   CO2 21* 20*   GLU 72 73   BUN 15 14   CREATININE 0.6 0.6   CALCIUM 8.4* 8.5*   PROT 6.1 5.6*   ALBUMIN 2.3* 2.4*   BILITOT 0.3 0.3   ALKPHOS 133 131   AST 24 23   ALT 16 13   ANIONGAP 5* 7*   , CBC   Recent Labs   Lab 03/24/24  0454  03/25/24  0530   WBC 8.32 6.43   HGB 10.3* 10.5*   HCT 34.1* 34.4*    402   , and All labs within the past 24 hours have been reviewed  Microbiology: Blood Culture   Lab Results   Component Value Date    LABBLOO No Growth to date 03/23/2024    LABBLOO No Growth to date 03/23/2024    LABBLOO No Growth to date 03/23/2024       Pending Diagnostic Studies:       Procedure Component Value Units Date/Time    Hepatitis C Antibody [[id number]] Collected: 03/23/24 0631    Order Status: Sent Lab Status: No result     Specimen: Blood            Medications:  Reconciled Home Medications:      Medication List        START taking these medications      azithromycin 500 MG tablet  Commonly known as: ZITHROMAX  Take 1 tablet (500 mg total) by mouth once daily.  Start taking on: March 26, 2024     dextrose 5 % (D5W) SolP 250 mL with amikacin 250 mg/mL Soln 950 mg  Inject 950 mg into the vein every Tues, Thurs, Sat.  Start taking on: March 26, 2024     dolutegravir 50 mg Tab  Commonly known as: TIVICAY  Take 1 tablet (50 mg total) by mouth once daily.  Start taking on: March 26, 2024     ethambutoL 400 MG Tab  Commonly known as: MYAMBUTOL  Take 2 tablets (800 mg total) by mouth once daily.  Start taking on: March 26, 2024     lamiVUDine 300 mg tablet  Commonly known as: EPIVIR  Take 1 tablet (300 mg total) by mouth once daily.  Start taking on: March 26, 2024     rifAMpin 300 MG capsule  Commonly known as: RIFADIN  Take 1 capsule (300 mg total) by mouth once daily. Take as instructed by [facility] Infectious Disease.            CONTINUE taking these medications      valACYclovir 1000 MG tablet  Commonly known as: VALTREX  Take 1 tablet (1,000 mg total) by mouth 2 (two) times daily. for 10 days            STOP taking these medications      BIKTARVY -25 mg (25 kg or greater)  Generic drug: rqublahna-zevwmykr-hlvlmff ala     dorzolamide-timolol 2-0.5% 22.3-6.8 mg/mL ophthalmic solution  Commonly known as: COSOPT               Indwelling Lines/Drains at time of discharge:   Lines/Drains/Airways       None                   Time spent on the discharge of patient: 40 minutes         Kandace Simon MD  Department of Hospital Medicine  [facility] Surg ([place]-16)

## 2024-03-25 NOTE — NURSING
"Patient insisted on leaving the hospital against medical advice.  She remained in the hallway in a wheelchair and demanded that she leave the hospital.  She verablaized understanding of the risks involved if she decided to leave and she stated she was aware "that she could die and did not care".  She took all of her belongings except for some personal papers and a pillow, signed the AMA papers and wheeled herself off of the unit to the elevator.  A PICC line that was placed in her upper right arm at another facility was left in place.  "

## 2024-03-25 NOTE — ASSESSMENT & PLAN NOTE
"59F with h/o HTN, HIV (well controlled, recently changed biktarvy --> dovato 2/2 ddi) with dissiminated MAC, osteomyelitis s/p T12-L1 corpectomy with T11-L2 fusion admitted 3/23 with uncontrolled back pain immediately following d/c from another hospital. Recently found to have spinal phlegmon/abscess, biopsy T12 with m.avium (reportedly S- macrolides, I- amikacin, but no SHANTE data available for review), but underwent T11-L2 fixation with hardware placement on 2/27 and pathology concerning for afb+. During prior admit she was also treated for a pleural effusion (with chest tube) and a retroperitoneal fluid collection (adjacent to hardware) that ID at OSH was concerned could be mac as well. ID consulted for "Assistance with antibiotic management of known disseminated MAC infection "    Fortunately patient's hiv is now under good control, but she still has M.avium vertebral osteo with hardware involvement. Extrapulmonary NTM infections are primarily a surgical problem. Needs adequate surgical debridement of all infected tissues to clean tissue margins of at least a centimeter of clean tissue past any abscess containing tissue (including effusion and retroperitoneal fluid collection communicating with hardware). Even though her spinal hardware is new, it is almost certainly now infected (placed into an infected site with smear+path and communicates with the retroperitoneal fluid collection). Pt clearly states that she is not interested in additional surgery, even it would be a way to cure her infection. Pt is established with Mely NGUYEN at [facility] and they have her on a reasonable regimen of azithromycin, ethambutol, rifabutin, amikacin 3x/wk iv (and  dolutegravir-lamivudine  mg for HIV ). Reviewed medications and potential side effects. The duration of abx will be challenging and will depend on f/u imaging to ensure radiographic resolution of abscessess/infection and will almost certainly be more than a year, but could " be indefinitely    While on amikacin- Baseline and q2-4 week audiogram. Pt aware of risk of vestibular and nephrotoxicity of aminoglycosides. If any ear ringing or fullness or balance issues, pt aware to not take another dose and to let ID doctor know that happened    While on ethambutol- self vision screens daily. If blurry vision lasts 2 days in a row, patient known to stop ethambutol and call eye doctor for exam and let ID doctor know that happened      Note I am not making changes to her HIV meds or MAC meds and she should follow up with her regular ID doctors at [facility] for continuity of care    Discussed with hospitalist

## 2024-03-25 NOTE — DISCHARGE INSTRUCTIONS
PLEASE FOLLOW UP WITH [facility] DOCTORS AS PRIOR:    Plan of Treatment  - documented as of this encounter  Plan of Treatment - Upcoming Encounters  Upcoming Encounters  Date Type Department Care Team (Latest Contact Info) Description   03/27/2024 1:20 PM CDT Appointment [facility]   [address] [place] [id number] [phone]  Tani Figueroa MD   [address] [place] [address]   [phone] (Work)   [phone] (Fax)      04/09/2024 1:30 PM CDT Appointment [facility]   [address]nd [place] [address] [phone]  Neil Nova MD   [address]   [place], [place] [id number] [phone] (Work)   [phone] (Fax)      04/25/2024 9:30 AM CDT Appointment [facility]   [address] [place] [address] [phone]  Romero Andrew,    [address] [place], [place] [id number] [phone] (Work)   [phone] (Fax)      05/01/2024 10:00 AM CDT Appointment [facility]   [address] [place] [phone]  Pierce Mcdermott Jr., MD   [address] [place], [place] [id number] [phone] (Work)   [phone] (Fax)

## 2024-03-25 NOTE — PLAN OF CARE
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       Follow Up Appointments:  PLEASE FOLLOW UP WITH [facility] DOCTORS AS PRIOR:    Plan of Treatment  - documented as of this encounter  Plan of Treatment - Upcoming Encounters  Upcoming Encounters  Date Type Department Care Team (Latest Contact Info) Description   03/27/2024 1:20 PM CDT Appointment [facility]   [address]th [place] [phone]  Tani Figueroa MD   [address] [place] [address]   [phone] (Work)   [phone] (Fax)      04/09/2024 1:30 PM CDT Appointment [facility]   [address] [facility] [address]nd [place] [phone]  Neil Nova MD   [address]   [place], [place] [id number] [phone] (Work)   [phone] (Fax)      04/25/2024 9:30 AM CDT Appointment [facility]   [address]th [place] [phone]  Romero Andrew,    [address] [place]  [address]   [phone] (Work)   [phone] (Fax)      05/01/2024 10:00 AM CDT Appointment [facility]   [address]th [place] [phone]  Pierce Mcdermott Jr., MD   [address] [place] [address]   [phone] (Work)   [phone] (Fax)         Allergies:Review of patient's allergies indicates:  No Known Allergies    Medications: Review discharge medications with patient and family and provide education.    Current Facility-Administered Medications   Medication Dose Route Frequency Provider Last Rate Last Admin    acetaminophen tablet 1,000 mg  1,000 mg Oral Q8H Daniel Pichardo MD   1,000 mg at 03/25/24 0539    acetaminophen tablet 650 mg  650 mg Oral Q6H PRN Luis Armando Moreland MD        amikacin (AMIKIN) 950 mg in dextrose 5 % (D5W) 250 mL IVPB  950 mg Intravenous Every Tues, Thurs, Sat Venkat Oh MD   Stopped at 03/23/24 0959    amikacin - pharmacy to dose   Intravenous pharmacy to manage frequency Robert Higgins MD        azithromycin tablet 500 mg  500 mg Oral Daily Seele, Silvio, DO   500 mg at 03/25/24 0857    dextrose 10% bolus 125 mL 125 mL  12.5 g Intravenous PRN Seele, Silvio, DO        dextrose 10% bolus 250 mL 250 mL  25 g Intravenous PRN Seele, Silvio, DO        dolutegravir Tab 50 mg  50 mg Oral Daily Seele, Silvio, DO   50 mg at 03/25/24 0857    ethambutoL tablet 800 mg  800 mg Oral Daily Seele, Silvio, DO   800 mg at 03/25/24 0857    gabapentin capsule 300 mg  300 mg Oral TID Daniel Pichardo MD   300 mg at 03/25/24 0857    glucagon (human recombinant) injection 1 mg  1 mg Intramuscular PRN Seele, Silvio, DO        glucose chewable tablet 16 g  16 g Oral PRN Seele, Silvio, DO        glucose chewable tablet 24 g  24 g Oral PRN Seele, Silvio, DO        ketorolac injection 30 mg  30 mg Intravenous Q6H PRN Luis Armando Moreland MD   30 mg at 03/24/24 1917    lamiVUDine tablet 300 mg  300 mg  Oral Daily Silvio Osorio, DO   300 mg at 03/25/24 0857    LIDOcaine 5 % patch 1 patch  1 patch Transdermal Q24H Daniel Pichardo MD        melatonin tablet 6 mg  6 mg Oral Nightly PRN Silvio Osorio, DO        methocarbamoL tablet 500 mg  500 mg Oral Q6H Daniel Pichardo MD   500 mg at 03/25/24 0539    naloxone 0.4 mg/mL injection 0.02 mg  0.02 mg Intravenous PRN Silvio Osorio, DO        ondansetron injection 4 mg  4 mg Intravenous Q8H PRN Barrington Rodgers MD   4 mg at 03/24/24 0208    oxyCODONE immediate release tablet 5 mg  5 mg Oral Q6H PRN Silvio Osorio DO   5 mg at 03/23/24 2009    rifabutin capsule 300 mg  300 mg Oral Daily Silvio Osorio, DO   300 mg at 03/25/24 0857    sodium chloride 0.9% flush 10 mL  10 mL Intravenous Q12H PRN Silvio Osorio, DO         Current Discharge Medication List        START taking these medications    Details   dextrose 5 % (D5W) SolP 250 mL with amikacin 250 mg/mL Soln 950 mg Inject 950 mg into the vein every Tues, Thurs, Sat.      rifAMpin (RIFADIN) 300 MG capsule Take 1 capsule (300 mg total) by mouth once daily. Take as instructed by [facility] Infectious Disease.           CONTINUE these medications which have NOT CHANGED    Details   ltmbdsmbl-chkqpuei-vfjthjo ala (BIKTARVY) -25 mg (25 kg or greater) Take 1 tablet by mouth once daily.      valACYclovir (VALTREX) 1000 MG tablet Take 1 tablet (1,000 mg total) by mouth 2 (two) times daily. for 10 days  Qty: 20 tablet, Refills: 0    Associated Diagnoses: Genital ulcer, female           STOP taking these medications       dorzolamide-timolol 2-0.5% (COSOPT) 22.3-6.8 mg/mL ophthalmic solution Comments:   Reason for Stopping:                 I have seen and examined this patient within the last 30 days. My clinical findings that support the need for the home health skilled services and home bound status are the following:no   Weakness/numbness causing balance and gait disturbance due to Infection, Weakness/Debility, and HIV  making it taxing to leave home.  Patient with medication mismanagement issues requiring home bound status as evidenced by  Poor understanding of medication regimen/dosage and Poor adherence to medication regimen/dosage.     Diet:   regular diet      Referrals/ Consults  Physical Therapy to evaluate and treat. Evaluate for home safety and equipment needs; Establish/upgrade home exercise program. Perform / instruct on therapeutic exercises, gait training, transfer training, and Range of Motion.  Occupational Therapy to evaluate and treat. Evaluate home environment for safety and equipment needs. Perform/Instruct on transfers, ADL training, ROM, and therapeutic exercises.   to evaluate for community resources/long-range planning.  Aide to provide assistance with personal care, ADLs, and vital signs.    Activities:   activity as tolerated    Nursing:   Agency to admit patient within 24 hours of hospital discharge unless specified on physician order or at patient request    SN to complete comprehensive assessment including routine vital signs. Instruct on disease process and s/s of complications to report to MD. Review/verify medication list sent home with the patient at time of discharge  and instruct patient/caregiver as needed. Frequency may be adjusted depending on start of care date.     Skilled nurse to perform up to 3 visits PRN for symptoms related to diagnosis    Notify MD if SBP > 160 or < 90; DBP > 90 or < 50; HR > 120 or < 50; Temp > 101; O2 < 88%    Ok to schedule additional visits based on staff availability and patient request on consecutive days within the home health episode.    When multiple disciplines ordered:    Start of Care occurs on Sunday - Wednesday schedule remaining discipline evaluations as ordered on separate consecutive days following the start of care.    Thursday SOC -schedule subsequent evaluations Friday and Monday the following week.     Friday - Saturday SOC - schedule  subsequent discipline evaluations on consecutive days starting Monday of the following week.    For all post-discharge communication and subsequent orders please contact patient's primary care physician. If unable to reach primary care physician or do not receive response within 30 minutes, please contact Romero Andrew DO   [address] [place] [address]   [phone] (Work)   [phone] (Fax)  for clinical staff order clarification      Home Health Aide:  Nursing every 48 hours, Physical Therapy Three times weekly, Medical Social Work Three times weekly, and Home Health Aide Three times weekly    Wound Care Orders  no    I certify that this patient is confined to her home and needs intermittent skilled nursing care, physical therapy, and occupational therapy.

## 2024-03-25 NOTE — PT/OT/SLP PROGRESS
"Physical Therapy Treatment    Patient Name:  Amie Salmeron   MRN:  [id number]    Recommendations:     Discharge Recommendations: High Intensity Therapy  Discharge Equipment Recommendations: none  Barriers to discharge: None    Assessment:     Amie Salmeron is a 59 y.o. female admitted with a medical diagnosis of Sepsis.  She presents with the following impairments/functional limitations: impaired endurance, impaired functional mobility, gait instability, impaired cardiopulmonary response to activity.    Pt agreeable to therapy at this time. Pt tolerates session well with emphasis on bed mobility, transfers, and gait training. Pt became agitated at end of session when this PTA requested she scoot her hips to the middle of the bed and asked where the elevator was. Nursing notified. Pt making progress towards therapy goals as indicated by increased total ambulation distance.  Pt will continue to benefit from skilled therapy services.    Rehab Prognosis: Good; patient would benefit from acute skilled PT services to address these deficits and reach maximum level of function.    Recent Surgery: * No surgery found *      Plan:     During this hospitalization, patient to be seen 3 x/week to address the identified rehab impairments via gait training, therapeutic activities, therapeutic exercises, neuromuscular re-education and progress toward the following goals:    Plan of Care Expires:  04/23/24    Subjective     Chief Complaint: `"I'm not gonna do that because then you will put up that fucking rail and keep me in assisted, you did it to me all week." -when asked pt to scoot hips to the middle of the bed  Patient/Family Comments/goals: "Where is the elevator"  Pain/Comfort:  Pain Rating 1: 0/10  Pain Rating Post-Intervention 1: 0/10      Objective:     Communicated with RN (Sandy) prior to session.  Patient found HOB elevated with  (No active lines) upon PTA entry to room.     General Precautions: Standard, fall  Orthopedic " Precautions: N/A  Braces: N/A  Respiratory Status: Room air     Functional Mobility:  Bed Mobility:     Scooting: anteriorly to EOB stand by assistance  Supine to Sit: stand by assistance  Sit to Supine: stand by assistance  Transfers:     Sit to Stand:  x1 from EOB and x1 from bedside commode contact guard assistance with rolling walker  Bed to Bedside Commode: CGA with RW via step transfer  Gait: Pt ambulates x2 trials 40 feet each Close CGA with RW. Pt with slow gait and mild left posterior lean with no noted LOB.      AM-PAC 6 CLICK MOBILITY  Turning over in bed (including adjusting bedclothes, sheets and blankets)?: 3  Sitting down on and standing up from a chair with arms (e.g., wheelchair, bedside commode, etc.): 3  Moving from lying on back to sitting on the side of the bed?: 3  Moving to and from a bed to a chair (including a wheelchair)?: 3  Need to walk in hospital room?: 3  Climbing 3-5 steps with a railing?: 2  Basic Mobility Total Score: 17       Treatment & Education:  Patient provided with daily orientation and goals of this PT session.     Pt educated to call for assistance and to transfer with hospital staff only.  Also, pt was educated on the effects of prolonged immobility and the importance of performing OOB activity and exercises to promote healing and reduce recovery time.    Patient left HOB elevated with all lines intact, call button in reach, and RN notified.    GOALS:   Multidisciplinary Problems       Physical Therapy Goals          Problem: Physical Therapy    Goal Priority Disciplines Outcome Goal Variances Interventions   Physical Therapy Goal     PT, PT/OT Ongoing, Progressing     Description: Goals to be met by: 4/15/2024    Patient will increase functional independence with mobility by performin. Supine to sit with Supervision  2. Sit to stand transfer with Supervision using RW  3. Gait  x 50 feet with Supervision using RW.                          Time Tracking:     PT  Received On: 03/25/24  PT Start Time: 0957     PT Stop Time: 1012  PT Total Time (min): 15 min     Billable Minutes: Gait Training 15    Treatment Type: Treatment  PT/PTA: PTA     Number of PTA visits since last PT visit: 1 03/25/2024

## 2024-03-25 NOTE — ASSESSMENT & PLAN NOTE
Continue  ethambutol 800mg daily  Amikacin 950mg T,R,S   azithromycin 500 daily  rifampin 300 daily  Follow by [facility], has f/u appt in 1 month along with ortho surgery appt this week

## 2024-03-25 NOTE — ASSESSMENT & PLAN NOTE
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 She was recently discharged from [facility] with Percocet prescription but was unable to fill it and returned to the hospital for better pain control.     - Continue Abx regimen for MAC infection

## 2024-03-25 NOTE — PLAN OF CARE
CM ordered OPCM.    CM spoke with pt in room.  She is agreeable to SNF, wants a facility close to [place]; NO [facility] or [place].  Pt expresses she hopes that she will be d/c'd today; CM instructed that there are several steps to get SNF admission, it doesn't happen immediately.    Per Dr. Moreland about 15 minutes later, pt is now sitting by nurses' station in a w/c stating she's about to leave, her brother will come pick her up.      Per MD, pt no longer agreeable to SNF.    Per MD, pt will need home IV infusions; he thinks this may have already been set up prior to pt being admitted.      2:06 PM  Per Henry with [facility], pt was active with [facility].  CM called [facility] [phone], spoke with Christina, she requests CM fax orders, demographics, recent clinicals to [phone].  They can take her; they never started service with her.  She was not assigned to ; this needs to be arranged.  ANGELY faxed requested information to [facility].    ANGELY sent referrals to the following in network HH: [facility], [facility], [facility], [facility], [facility], [facility], [facility], [facility], [facility], [facility], [facility], [facility], [facility], [facility], [facility], [facility], [facility], [facility], [facility].    3:25 PM  Per CP, no accepting HH agencies.    4:47 PM  Per email, fax did not go through.  ANGELY accidentally sent to phone number.  CM refaxed requested information to correct number.    AXEL PorterN, BS, RN, CCM

## 2024-03-25 NOTE — PLAN OF CARE
Problem: Adult Inpatient Plan of Care  Goal: Plan of Care Review  Outcome: Ongoing, Progressing  Goal: Patient-Specific Goal (Individualized)  Outcome: Ongoing, Progressing  Goal: Absence of Hospital-Acquired Illness or Injury  Outcome: Ongoing, Progressing  Goal: Optimal Comfort and Wellbeing  Outcome: Ongoing, Progressing  Goal: Readiness for Transition of Care  Outcome: Ongoing, Progressing     Problem: Infection  Goal: Absence of Infection Signs and Symptoms  Outcome: Ongoing, Progressing     Problem: Impaired Wound Healing  Goal: Optimal Wound Healing  Outcome: Ongoing, Progressing     Problem: Adjustment to Illness (Sepsis/Septic Shock)  Goal: Optimal Coping  Outcome: Ongoing, Progressing     Problem: Bleeding (Sepsis/Septic Shock)  Goal: Absence of Bleeding  Outcome: Ongoing, Progressing     Problem: Glycemic Control Impaired (Sepsis/Septic Shock)  Goal: Blood Glucose Level Within Desired Range  Outcome: Ongoing, Progressing     Problem: Infection Progression (Sepsis/Septic Shock)  Goal: Absence of Infection Signs and Symptoms  Outcome: Ongoing, Progressing     Problem: Nutrition Impaired (Sepsis/Septic Shock)  Goal: Optimal Nutrition Intake  Outcome: Ongoing, Progressing     Problem: Skin Injury Risk Increased  Goal: Skin Health and Integrity  Outcome: Ongoing, Progressing       Pt had an uneventful night. VSS. Pt had a large bm. Pt given scheduled prn meds. Pt is is free of falls and injuries.  Bed in lowest position and call light within reach .Safety  maintained

## 2024-03-25 NOTE — PLAN OF CARE
Inpatient Upgrade Note    Amie Salmeron has warranted treatment spanning two or more midnights of hospital level care for the management of  Sepsis, Osteomyelitis of vertebra, Disseminated MAC, back pain  . She continues to require IV antibiotics, daily labs, monitoring of vital signs, IV pain medication, medication adjustments, and further evaluation by consultants. Her condition is also complicated by the following comorbidities: Hypertension and HIV .

## 2024-03-25 NOTE — SUBJECTIVE & OBJECTIVE
Past Medical History:   Diagnosis Date    Allergy     Arthritis     Asthma     Chronic pain     HIV infection     Hypertension     Overactive bladder     Spinal stenosis     Urine incontinence        Past Surgical History:   Procedure Laterality Date    ADENOIDECTOMY      APPENDECTOMY      BACK SURGERY      GERALD    CYST REMOVAL      HYSTERECTOMY      JOINT REPLACEMENT Right     knee, with revision    KNEE SURGERY Left     knee replacement    RHIZOTOMY      TONSILLECTOMY      TOTAL ANKLE ARTHROPLASTY Right        Review of patient's allergies indicates:  No Known Allergies    No current facility-administered medications on file prior to encounter.     Current Outpatient Medications on File Prior to Encounter   Medication Sig    qixdltdnn-iraswyxg-sxyvedw ala (BIKTARVY) -25 mg (25 kg or greater) Take 1 tablet by mouth once daily.    dorzolamide-timolol 2-0.5% (COSOPT) 22.3-6.8 mg/mL ophthalmic solution Place 1 drop into the right eye 2 (two) times daily. for 14 days    valACYclovir (VALTREX) 1000 MG tablet Take 1 tablet (1,000 mg total) by mouth 2 (two) times daily. for 10 days     Family History       Problem Relation (Age of Onset)    Alcohol abuse Brother    Cancer Father    Depression Sister    Hypertension Mother, Brother    Thyroid disease Mother          Tobacco Use    Smoking status: Never    Smokeless tobacco: Never   Substance and Sexual Activity    Alcohol use: Yes     Comment: Socially    Drug use: No    Sexual activity: Yes     Partners: Male     Birth control/protection: None     Review of Systems   Constitutional:  Negative for chills and fever.   HENT:  Negative for congestion, sore throat and trouble swallowing.    Eyes:  Negative for visual disturbance.   Respiratory:  Negative for cough, chest tightness and shortness of breath.    Cardiovascular:  Negative for chest pain and leg swelling.   Gastrointestinal:  Positive for nausea and vomiting. Negative for abdominal distention, abdominal pain,  constipation and diarrhea.   Genitourinary:  Negative for difficulty urinating, dysuria and urgency.   Musculoskeletal:  Positive for back pain.   Skin:  Positive for pallor.   Neurological:  Positive for weakness. Negative for dizziness, light-headedness and headaches.   Psychiatric/Behavioral:  Negative for confusion and decreased concentration.      Objective:     Vital Signs (Most Recent):  Temp: 97.9 °F (36.6 °C) (03/24/24 2346)  Pulse: 87 (03/24/24 2346)  Resp: 17 (03/24/24 2346)  BP: 99/62 (03/24/24 2346)  SpO2: 98 % (03/24/24 2346) Vital Signs (24h Range):  Temp:  [96.8 °F (36 °C)-98.9 °F (37.2 °C)] 97.9 °F (36.6 °C)  Pulse:  [82-95] 87  Resp:  [17-18] 17  SpO2:  [97 %-100 %] 98 %  BP: ()/(57-72) 99/62     Weight: 54.4 kg (120 lb)  Body mass index is 20.6 kg/m².     Physical Exam  Constitutional:       General: She is not in acute distress.     Appearance: She is ill-appearing.   HENT:      Head: Normocephalic and atraumatic.      Mouth/Throat:      Mouth: Mucous membranes are dry.      Pharynx: Oropharynx is clear.   Eyes:      General: No scleral icterus.     Extraocular Movements: Extraocular movements intact.      Conjunctiva/sclera: Conjunctivae normal.      Pupils: Pupils are equal, round, and reactive to light.   Cardiovascular:      Rate and Rhythm: Normal rate and regular rhythm.      Pulses: Normal pulses.      Heart sounds: Normal heart sounds.   Pulmonary:      Effort: Pulmonary effort is normal. No respiratory distress.      Breath sounds: Normal breath sounds. No wheezing, rhonchi or rales.   Chest:      Chest wall: No tenderness.   Abdominal:      General: Abdomen is flat. There is no distension.      Palpations: Abdomen is soft.      Tenderness: There is abdominal tenderness. There is no guarding or rebound.   Musculoskeletal:         General: No tenderness.      Cervical back: Normal range of motion and neck supple. No rigidity.      Right lower leg: No edema.      Left lower leg: No  edema.      Comments: Tenderness to palpation of left flank; no midline tenderness. No erythema or rash. Bandage present to T12-L1 region   Skin:     General: Skin is warm and dry.      Coloration: Skin is pale. Skin is not jaundiced.      Findings: No erythema.   Neurological:      General: No focal deficit present.      Mental Status: She is alert and oriented to person, place, and time.      Cranial Nerves: No cranial nerve deficit.      Motor: No weakness.              CRANIAL NERVES     CN III, IV, VI   Pupils are equal, round, and reactive to light.       Significant Labs: All pertinent labs within the past 24 hours have been reviewed.    Significant Imaging: I have reviewed all pertinent imaging results/findings within the past 24 hours.

## 2024-03-25 NOTE — CONSULTS
"[facility] ([place] [address])  Infectious Disease  Consult Note    Patient Name: Amie Salmeron  MRN: [id number]  Admission Date: 3/23/2024  Hospital Length of Stay: 0 days  Attending Physician: Luis Armando Moreland MD  Primary Care Provider: Dustin Caro MD     Isolation Status: No active isolations    Patient information was obtained from patient and ER records.      Inpatient consult to Infectious Diseases  Consult performed by: Libra Larose MD  Consult ordered by: Silvio Osorio DO        Assessment/Plan:     ID  Disseminated mycobacterium avium-intracellulare complex  59F with h/o HTN, HIV (well controlled, recently changed biktarvy --> dovato 2/2 ddi) with dissiminated MAC, osteomyelitis s/p T12-L1 corpectomy with T11-L2 fusion admitted 3/23 with uncontrolled back pain immediately following d/c from another hospital. Recently found to have spinal phlegmon/abscess, biopsy T12 with m.avium (reportedly S- macrolides, I- amikacin, but no SHANTE data available for review), but underwent T11-L2 fixation with hardware placement on 2/27 and pathology concerning for afb+. During prior admit she was also treated for a pleural effusion (with chest tube) and a retroperitoneal fluid collection (adjacent to hardware) that ID at OSH was concerned could be mac as well. ID consulted for "Assistance with antibiotic management of known disseminated MAC infection "    Fortunately patient's hiv is now under good control, but she still has M.avium vertebral osteo with hardware involvement. Extrapulmonary NTM infections are primarily a surgical problem. Needs adequate surgical debridement of all infected tissues to clean tissue margins of at least a centimeter of clean tissue past any abscess containing tissue (including effusion and retroperitoneal fluid collection communicating with hardware). Even though her spinal hardware is new, it is almost certainly now infected (placed into an infected site with smear+path and " communicates with the retroperitoneal fluid collection). Pt clearly states that she is not interested in additional surgery, even it would be a way to cure her infection. Pt is established with Mely NGUYEN at [facility] and they have her on a reasonable regimen of azithromycin, ethambutol, rifabutin, amikacin 3x/wk iv (and  dolutegravir-lamivudine  mg for HIV ). Reviewed medications and potential side effects. The duration of abx will be challenging and will depend on f/u imaging to ensure radiographic resolution of abscessess/infection and will almost certainly be more than a year, but could be indefinitely    While on amikacin- Baseline and q2-4 week audiogram. Pt aware of risk of vestibular and nephrotoxicity of aminoglycosides. If any ear ringing or fullness or balance issues, pt aware to not take another dose and to let ID doctor know that happened    While on ethambutol- self vision screens daily. If blurry vision lasts 2 days in a row, patient known to stop ethambutol and call eye doctor for exam and let ID doctor know that happened      Note I am not making changes to her HIV meds or MAC meds and she should follow up with her regular ID doctors at [facility] for continuity of care    Discussed with hospitalist               Thank you for your consult. I will sign off. Please contact us if you have any additional questions.    Libra Larose MD  Infectious Disease  [facility] ([place])    Subjective:     Principal Problem: Sepsis    HPI: 59F with h/o HTN, HIV with dissiminated MAC, osteomyelitis s/p T12-L1 corpectomy with T11-L2 fusion admitted 3/23 with uncontrolled back pain. Reports her pain wasn't controlled at d/c from [facility] the day before and she was upset with a nurse at [facility], so decided to come to [facility]. Reports pain at top of left hip bone, but denies other complaints. Denies sob/cough. Denies dysuria. Denies intolerances to medications.  Pt reports she was hospitalized at [facility] for a  "month. Says she was walking at prior admit, but not able to walk since discharge. Estab with Mely ID at [facility] clinic reports 100% compliance with [facility] (d/c'd [facility] on dolutegravir-lamivudine  mg Tab ), as outpatient, but not sure if she received inpatient. Discharged with  IV amikacin on Tuesday, Thursday, and Saturday and Azithromycin 500 mg daily, Ethambutol 800 mg, and Rifabutin 300 mg.  Not interested in having anymore surgeries.    Reviewed records from [facility] ([facility]).   Admitted 12/2023 with back pain. found to have spinal phlegmon/abscess. Biopsy t12 done, grew m.avium. susceptibilities done, but aren't available for review. Per notes, " intermediate to IV amikacin but daiana to macrolides"    Has been on MAC therapy ~ 2 months now. Originally on Levofloxacin/Azithromycin/ethambutol (not on rifamycin because of concern for ddi with biktarvy), but transitioned to  Azithromycin/ethambutol/Rifabutin/IV Amikacin on 2/23. Underwent T12-L1 corpectomy with T11-L2 fixation (with new hardware placement) on 2/27. Says all hardware in back is new. path from T12 vertebral body 2/2023 consistent with osteomyelitis. Yumi stain highlights scattered acid fast bacilli.     ID at OSH was concerned Retroperitoneal fluid collection (adjacent to hardware) could also be MAC infection as well     L chest tube placed during [facility] admit, removed at d/c. Plans for outpatient decortication CT surgery.           Denies any recent alcohol use, tobacco use, or illicit substance use and denies a lifetime history of IVDU.     Reports stable housing. Lives in [place] with her parents and brother.     Says her hiv was diagnosed ~40 years ago, but was told she didn't need to start immediately because her immune system was good. Also said she was told to wait because once she started taking meds, she would have to continue taking for the rest of her life.       Component 4 wk ago   HIV Viral Load HIV-1 RNA Not Detected     CD4% 37.0 " "- 63.0 % 13.4 Low                             CD4ABS 228 - 2290 /cu mm 184 Low        12/2023 biopsy  Grew M.avium, susceptibilities not available via [facility]    Component 3 mo ago   Test Name AFB Susceptibility   Resulting Agency [facility]   Narrative    This result has an attachment that is not available.          ID consulted for "Assistance with antibiotic management of known disseminated MAC infection "    Past Medical History:   Diagnosis Date    Allergy     Arthritis     Asthma     Chronic pain     HIV infection     Hypertension     Overactive bladder     Spinal stenosis     Urine incontinence        Past Surgical History:   Procedure Laterality Date    ADENOIDECTOMY      APPENDECTOMY      BACK SURGERY      GERALD    CYST REMOVAL      HYSTERECTOMY      JOINT REPLACEMENT Right     knee, with revision    KNEE SURGERY Left     knee replacement    RHIZOTOMY      TONSILLECTOMY      TOTAL ANKLE ARTHROPLASTY Right        Review of patient's allergies indicates:  No Known Allergies    No current facility-administered medications on file prior to encounter.     Current Outpatient Medications on File Prior to Encounter   Medication Sig    mmrckdlnn-nxpxndbh-dounxyw ala (BIKTARVY) -25 mg (25 kg or greater) Take 1 tablet by mouth once daily.    dorzolamide-timolol 2-0.5% (COSOPT) 22.3-6.8 mg/mL ophthalmic solution Place 1 drop into the right eye 2 (two) times daily. for 14 days    valACYclovir (VALTREX) 1000 MG tablet Take 1 tablet (1,000 mg total) by mouth 2 (two) times daily. for 10 days     Family History       Problem Relation (Age of Onset)    Alcohol abuse Brother    Cancer Father    Depression Sister    Hypertension Mother, Brother    Thyroid disease Mother          Tobacco Use    Smoking status: Never    Smokeless tobacco: Never   Substance and Sexual Activity    Alcohol use: Yes     Comment: Socially    Drug use: No    Sexual activity: Yes     Partners: Male     Birth control/protection: None "     Review of Systems   Constitutional:  Negative for chills and fever.   HENT:  Negative for congestion, sore throat and trouble swallowing.    Eyes:  Negative for visual disturbance.   Respiratory:  Negative for cough, chest tightness and shortness of breath.    Cardiovascular:  Negative for chest pain and leg swelling.   Gastrointestinal:  Positive for nausea and vomiting. Negative for abdominal distention, abdominal pain, constipation and diarrhea.   Genitourinary:  Negative for difficulty urinating, dysuria and urgency.   Musculoskeletal:  Positive for back pain.   Skin:  Positive for pallor.   Neurological:  Positive for weakness. Negative for dizziness, light-headedness and headaches.   Psychiatric/Behavioral:  Negative for confusion and decreased concentration.      Objective:     Vital Signs (Most Recent):  Temp: 97.9 °F (36.6 °C) (03/24/24 2346)  Pulse: 87 (03/24/24 2346)  Resp: 17 (03/24/24 2346)  BP: 99/62 (03/24/24 2346)  SpO2: 98 % (03/24/24 2346) Vital Signs (24h Range):  Temp:  [96.8 °F (36 °C)-98.9 °F (37.2 °C)] 97.9 °F (36.6 °C)  Pulse:  [82-95] 87  Resp:  [17-18] 17  SpO2:  [97 %-100 %] 98 %  BP: ()/(57-72) 99/62     Weight: 54.4 kg (120 lb)  Body mass index is 20.6 kg/m².     Physical Exam  Constitutional:       General: She is not in acute distress.     Appearance: She is ill-appearing.   HENT:      Head: Normocephalic and atraumatic.      Mouth/Throat:      Mouth: Mucous membranes are dry.      Pharynx: Oropharynx is clear.   Eyes:      General: No scleral icterus.     Extraocular Movements: Extraocular movements intact.      Conjunctiva/sclera: Conjunctivae normal.      Pupils: Pupils are equal, round, and reactive to light.   Cardiovascular:      Rate and Rhythm: Normal rate and regular rhythm.      Pulses: Normal pulses.      Heart sounds: Normal heart sounds.   Pulmonary:      Effort: Pulmonary effort is normal. No respiratory distress.      Breath sounds: Normal breath sounds. No  wheezing, rhonchi or rales.   Chest:      Chest wall: No tenderness.   Abdominal:      General: Abdomen is flat. There is no distension.      Palpations: Abdomen is soft.      Tenderness: There is abdominal tenderness. There is no guarding or rebound.   Musculoskeletal:         General: No tenderness.      Cervical back: Normal range of motion and neck supple. No rigidity.      Right lower leg: No edema.      Left lower leg: No edema.      Comments: Tenderness to palpation of left flank; no midline tenderness. No erythema or rash. Bandage present to T12-L1 region   Skin:     General: Skin is warm and dry.      Coloration: Skin is pale. Skin is not jaundiced.      Findings: No erythema.   Neurological:      General: No focal deficit present.      Mental Status: She is alert and oriented to person, place, and time.      Cranial Nerves: No cranial nerve deficit.      Motor: No weakness.              CRANIAL NERVES     CN III, IV, VI   Pupils are equal, round, and reactive to light.       Significant Labs: All pertinent labs within the past 24 hours have been reviewed.    Significant Imaging: I have reviewed all pertinent imaging results/findings within the past 24 hours.

## 2024-03-28 LAB
BACTERIA BLD CULT: NORMAL
BACTERIA BLD CULT: NORMAL

## 2024-04-11 ENCOUNTER — OUTPATIENT CASE MANAGEMENT (OUTPATIENT)
Dept: ADMINISTRATIVE | Facility: OTHER | Age: 59
End: 2024-04-11
Payer: MEDICARE

## 2024-04-11 NOTE — LETTER
Amie Salmeron  [address] [place] [address]      Dear Amie Salmeron,     I work with [facility]'s Outpatient Care Management Department. We received a referral to call you to discuss your medical history. These services are free of charge and are offered to [facility] patients who have recently been discharged from any of our facilities or who have medical conditions that may require the skill of a nurse to assist with management.     I am a Registered Nurse who specializes in connecting patients with available medical and financial resources as well as addressing any educational needs that may be indicated.    I attempted to reach you by telephone, but I was unsuccessful. Please call our department so that we can go over some questions with you, regarding your health.    The Outpatient Care Management Department can be reached at [phone], from 8:00AM to 4:30 PM, on Monday thru Friday.     Additionally, [facility] also has a program where a nurse is available 24/7 to answer questions or provide medical advice, their number is [phone].      Thanks,        Aleksandra Galdamez RN  Outpatient Care Management  Phone #: [phone]

## 2024-04-11 NOTE — PROGRESS NOTES
4/11/2024  1st attempt to complete Initial Assessment  for Outpatient Care Management, unable to leave message, voicemail full. Will mail unable to assess letter.

## 2024-04-12 ENCOUNTER — OUTPATIENT CASE MANAGEMENT (OUTPATIENT)
Dept: ADMINISTRATIVE | Facility: OTHER | Age: 59
End: 2024-04-12
Payer: MEDICARE

## 2024-04-12 NOTE — PROGRESS NOTES
4/12/2024  2nd attempt to complete Initial Assessment  for Outpatient Care Management, unable to leave message, voicemail full.

## 2024-04-15 ENCOUNTER — OUTPATIENT CASE MANAGEMENT (OUTPATIENT)
Dept: ADMINISTRATIVE | Facility: OTHER | Age: 59
End: 2024-04-15
Payer: MEDICARE

## 2024-04-15 NOTE — PROGRESS NOTES
4/15/2024  3rd attempt to complete Initial Assessment  for Outpatient Care Management, left message. Case closure.

## 2024-05-28 ENCOUNTER — PATIENT OUTREACH (OUTPATIENT)
Dept: ADMINISTRATIVE | Facility: CLINIC | Age: 59
End: 2024-05-28
Payer: MEDICARE

## 2024-05-28 RX ORDER — AMITRIPTYLINE HYDROCHLORIDE 50 MG/1
50 TABLET, FILM COATED ORAL NIGHTLY
COMMUNITY
Start: 2024-03-08

## 2024-05-28 RX ORDER — SODIUM CHLORIDE 9 MG/ML
INJECTION, SOLUTION INTRAVENOUS DAILY
COMMUNITY
Start: 2024-03-23

## 2024-05-28 RX ORDER — HYDROXYZINE PAMOATE 25 MG/1
25 CAPSULE ORAL 3 TIMES DAILY PRN
COMMUNITY
Start: 2024-05-21

## 2024-05-28 RX ORDER — POLYETHYLENE GLYCOL 3350 17 G/17G
17 POWDER, FOR SOLUTION ORAL 2 TIMES DAILY
COMMUNITY
Start: 2024-03-22

## 2024-05-28 RX ORDER — BISACODYL 5 MG
1 TABLET, DELAYED RELEASE (ENTERIC COATED) ORAL EVERY OTHER DAY
COMMUNITY
Start: 2024-03-23

## 2024-05-28 RX ORDER — SENNOSIDES 8.6 MG/1
17.2 TABLET ORAL 2 TIMES DAILY
COMMUNITY
Start: 2024-03-22

## 2024-05-28 RX ORDER — MIRTAZAPINE 15 MG/1
1 TABLET, ORALLY DISINTEGRATING ORAL NIGHTLY
COMMUNITY
Start: 2024-05-21

## 2024-05-28 RX ORDER — MEGESTROL ACETATE 40 MG/ML
400 SUSPENSION ORAL DAILY
COMMUNITY
Start: 2024-03-22

## 2024-05-28 RX ORDER — NALOXONE HYDROCHLORIDE 4 MG/.1ML
1 SPRAY NASAL ONCE
COMMUNITY
Start: 2024-03-26

## 2024-05-28 RX ORDER — CYCLOBENZAPRINE HCL 5 MG
5 TABLET ORAL 3 TIMES DAILY PRN
COMMUNITY
Start: 2024-05-21

## 2024-05-28 RX ORDER — GABAPENTIN 400 MG/1
400 CAPSULE ORAL 2 TIMES DAILY
COMMUNITY
Start: 2024-02-18

## 2024-05-28 RX ORDER — LIDOCAINE 50 MG/G
1 PATCH TOPICAL DAILY
COMMUNITY
Start: 2024-03-23

## 2024-05-28 RX ORDER — HYDROCODONE BITARTRATE AND ACETAMINOPHEN 10; 325 MG/1; MG/1
1 TABLET ORAL EVERY 6 HOURS PRN
COMMUNITY
Start: 2024-03-09

## 2024-05-28 RX ORDER — METHOCARBAMOL 500 MG/1
500 TABLET, FILM COATED ORAL 4 TIMES DAILY
COMMUNITY
Start: 2024-01-19

## 2024-05-28 RX ORDER — ZINC OXIDE 16 %-40 %
OINTMENT (GRAM) TOPICAL
COMMUNITY
Start: 2024-05-23

## 2024-05-28 RX ORDER — RIFABUTIN 150 MG/1
300 CAPSULE ORAL DAILY
COMMUNITY
Start: 2024-05-21

## 2024-05-28 RX ORDER — SULFAMETHOXAZOLE AND TRIMETHOPRIM 800; 160 MG/1; MG/1
1 TABLET ORAL
COMMUNITY
Start: 2024-01-19

## 2024-05-28 RX ORDER — LEVOFLOXACIN 500 MG/1
500 TABLET, FILM COATED ORAL DAILY
COMMUNITY
Start: 2024-02-18

## 2024-05-28 RX ORDER — OXYCODONE HYDROCHLORIDE 10 MG/1
10 TABLET ORAL EVERY 6 HOURS PRN
COMMUNITY
Start: 2024-01-19

## 2024-05-28 RX ORDER — AMIKACIN SULFATE 250 MG/ML
500 INJECTION, SOLUTION INTRAMUSCULAR; INTRAVENOUS DAILY
COMMUNITY
Start: 2024-03-23

## 2024-05-28 RX ORDER — QUETIAPINE FUMARATE 100 MG/1
100 TABLET, FILM COATED ORAL NIGHTLY
COMMUNITY
Start: 2024-03-22

## 2024-05-28 NOTE — PROGRESS NOTES
C3 nurse spoke with Amie Salmeron for a TCC post hospital discharge follow up call. Patient does not have a PCP and declined appt line number to get established with a PCP.

## 2024-05-28 NOTE — PATIENT INSTRUCTIONS
No DINA education exists for Mycobacterium avium-intracellulare infection. Amie Jaron instructed to report fatigue, chronic cough, shortness of breath, night sweats, coughing up blood and weight loss. She verbalized understanding.

## 2024-06-24 PROBLEM — A41.9 SEPSIS: Status: RESOLVED | Noted: 2024-03-23 | Resolved: 2024-06-24

## 2024-07-19 ENCOUNTER — PATIENT OUTREACH (OUTPATIENT)
Dept: ADMINISTRATIVE | Facility: CLINIC | Age: 59
End: 2024-07-19
Payer: MEDICARE

## 2024-07-19 NOTE — PROGRESS NOTES
C3 nurse attempted to contact Amie Salmeron for a TCC post hospital discharge follow up call. The patient is unable to conduct the call @ this time. The patient requested a callback on Monday 07/22/24.

## 2024-07-19 NOTE — PROGRESS NOTES
C3 nurse attempted to contact Amie Salmeron's mother Elena for a TCC post hospital discharge follow up call. The patient is unable to conduct the call @ this time. The patient requested a callback.    Non PCP listed/

## 2024-07-22 NOTE — PROGRESS NOTES
3rd attempt for TCC Call; No answer. No voicemail.       3rd attempt to contact patient. Manually enrolled in Post Discharge Tracker.

## 2024-08-06 ENCOUNTER — TELEPHONE (OUTPATIENT)
Dept: PRIMARY CARE CLINIC | Facility: CLINIC | Age: 59
End: 2024-08-06
Payer: MEDICARE

## 2024-08-11 PROBLEM — M79.10 MYALGIA: Status: ACTIVE | Noted: 2024-08-11

## 2024-08-11 PROBLEM — E87.6 HYPOKALEMIA: Status: ACTIVE | Noted: 2024-08-11

## 2024-08-12 ENCOUNTER — ANESTHESIA EVENT (OUTPATIENT)
Dept: INTERVENTIONAL RADIOLOGY/VASCULAR | Facility: HOSPITAL | Age: 59
End: 2024-08-12
Payer: MEDICARE

## 2024-08-12 ENCOUNTER — HOSPITAL ENCOUNTER (INPATIENT)
Facility: HOSPITAL | Age: 59
LOS: 12 days | Discharge: NURSING, REHAB OR LONG-TERM CARE | DRG: 974 | End: 2024-08-24
Attending: HOSPITALIST | Admitting: NEUROLOGICAL SURGERY
Payer: MEDICARE

## 2024-08-12 DIAGNOSIS — B20 SYMPTOMATIC HIV INFECTION: ICD-10-CM

## 2024-08-12 DIAGNOSIS — Z66 DNR (DO NOT RESUSCITATE): ICD-10-CM

## 2024-08-12 DIAGNOSIS — B20 AIDS: Primary | ICD-10-CM

## 2024-08-12 DIAGNOSIS — R65.21 SEPTIC SHOCK: ICD-10-CM

## 2024-08-12 DIAGNOSIS — Z51.5 PALLIATIVE CARE ENCOUNTER: ICD-10-CM

## 2024-08-12 DIAGNOSIS — Z71.89 GOALS OF CARE, COUNSELING/DISCUSSION: ICD-10-CM

## 2024-08-12 DIAGNOSIS — A41.9 SEPTIC SHOCK: ICD-10-CM

## 2024-08-12 DIAGNOSIS — M46.20 SPINAL ABSCESS: ICD-10-CM

## 2024-08-12 DIAGNOSIS — M46.25 OSTEOMYELITIS OF VERTEBRA OF THORACOLUMBAR REGION: ICD-10-CM

## 2024-08-12 DIAGNOSIS — Z22.322 MRSA (METHICILLIN RESISTANT STAPH AUREUS) CULTURE POSITIVE: ICD-10-CM

## 2024-08-12 DIAGNOSIS — R53.81 DEBILITY: ICD-10-CM

## 2024-08-12 DIAGNOSIS — R07.9 CHEST PAIN: ICD-10-CM

## 2024-08-12 DIAGNOSIS — A31.2 DISSEMINATED MYCOBACTERIUM AVIUM-INTRACELLULARE COMPLEX: ICD-10-CM

## 2024-08-12 DIAGNOSIS — Z71.89 ADVANCE CARE PLANNING: ICD-10-CM

## 2024-08-12 LAB
ABO + RH BLD: NORMAL
ALBUMIN SERPL BCP-MCNC: 1.8 G/DL (ref 3.5–5.2)
ALLENS TEST: ABNORMAL
ALLENS TEST: NORMAL
ALP SERPL-CCNC: 110 U/L (ref 55–135)
ALT SERPL W/O P-5'-P-CCNC: 5 U/L (ref 10–44)
ANION GAP SERPL CALC-SCNC: 8 MMOL/L (ref 8–16)
ANION GAP SERPL CALC-SCNC: 9 MMOL/L (ref 8–16)
ANISOCYTOSIS BLD QL SMEAR: SLIGHT
ANISOCYTOSIS BLD QL SMEAR: SLIGHT
APPEARANCE FLD: NORMAL
APTT PPP: 34.3 SEC (ref 21–32)
AST SERPL-CCNC: 20 U/L (ref 10–40)
BASOPHILS # BLD AUTO: 0.05 K/UL (ref 0–0.2)
BASOPHILS # BLD AUTO: 0.08 K/UL (ref 0–0.2)
BASOPHILS # BLD AUTO: 0.08 K/UL (ref 0–0.2)
BASOPHILS NFR BLD: 0.3 % (ref 0–1.9)
BASOPHILS NFR BLD: 0.3 % (ref 0–1.9)
BASOPHILS NFR BLD: 0.4 % (ref 0–1.9)
BILIRUB SERPL-MCNC: 0.3 MG/DL (ref 0.1–1)
BLD GP AB SCN CELLS X3 SERPL QL: NORMAL
BODY FLD TYPE: NORMAL
BODY FLUID COMMENTS: NORMAL
BUN SERPL-MCNC: 10 MG/DL (ref 8–23)
BUN SERPL-MCNC: 17 MG/DL (ref 8–23)
BURR CELLS BLD QL SMEAR: ABNORMAL
BURR CELLS BLD QL SMEAR: ABNORMAL
CALCIUM SERPL-MCNC: 7.5 MG/DL (ref 8.7–10.5)
CALCIUM SERPL-MCNC: 8.3 MG/DL (ref 8.7–10.5)
CHLORIDE SERPL-SCNC: 103 MMOL/L (ref 95–110)
CHLORIDE SERPL-SCNC: 105 MMOL/L (ref 95–110)
CO2 SERPL-SCNC: 21 MMOL/L (ref 23–29)
CO2 SERPL-SCNC: 22 MMOL/L (ref 23–29)
COLOR FLD: NORMAL
CREAT SERPL-MCNC: 0.5 MG/DL (ref 0.5–1.4)
CREAT SERPL-MCNC: 0.6 MG/DL (ref 0.5–1.4)
DACRYOCYTES BLD QL SMEAR: ABNORMAL
DELSYS: ABNORMAL
DIFFERENTIAL METHOD BLD: ABNORMAL
EOSINOPHIL # BLD AUTO: 0 K/UL (ref 0–0.5)
EOSINOPHIL NFR BLD: 0 % (ref 0–8)
EOSINOPHIL NFR BLD: 0.1 % (ref 0–8)
EOSINOPHIL NFR BLD: 0.1 % (ref 0–8)
ERYTHROCYTE [DISTWIDTH] IN BLOOD BY AUTOMATED COUNT: 18.6 % (ref 11.5–14.5)
ERYTHROCYTE [DISTWIDTH] IN BLOOD BY AUTOMATED COUNT: 18.7 % (ref 11.5–14.5)
ERYTHROCYTE [DISTWIDTH] IN BLOOD BY AUTOMATED COUNT: 18.9 % (ref 11.5–14.5)
EST. GFR  (NO RACE VARIABLE): >60 ML/MIN/1.73 M^2
EST. GFR  (NO RACE VARIABLE): >60 ML/MIN/1.73 M^2
GLUCOSE SERPL-MCNC: 160 MG/DL (ref 70–110)
GLUCOSE SERPL-MCNC: 96 MG/DL (ref 70–110)
GRAM STN SPEC: NORMAL
HCO3 UR-SCNC: 22.2 MMOL/L (ref 24–28)
HCT VFR BLD AUTO: 25.7 % (ref 37–48.5)
HCT VFR BLD AUTO: 29.1 % (ref 37–48.5)
HCT VFR BLD AUTO: 32 % (ref 37–48.5)
HCT VFR BLD CALC: 24 %PCV (ref 36–54)
HGB BLD-MCNC: 8.2 G/DL (ref 12–16)
HGB BLD-MCNC: 9.2 G/DL (ref 12–16)
HGB BLD-MCNC: 9.7 G/DL (ref 12–16)
HYPOCHROMIA BLD QL SMEAR: ABNORMAL
HYPOCHROMIA BLD QL SMEAR: ABNORMAL
IMM GRANULOCYTES # BLD AUTO: 0.01 K/UL (ref 0–0.04)
IMM GRANULOCYTES # BLD AUTO: 0.02 K/UL (ref 0–0.04)
IMM GRANULOCYTES # BLD AUTO: 0.03 K/UL (ref 0–0.04)
IMM GRANULOCYTES NFR BLD AUTO: 0.1 % (ref 0–0.5)
INR PPP: 1.2 (ref 0.8–1.2)
LACTATE SERPL-SCNC: 2 MMOL/L (ref 0.5–2.2)
LDH SERPL L TO P-CCNC: 1.9 MMOL/L (ref 0.5–2.2)
LYMPHOCYTES # BLD AUTO: 0.7 K/UL (ref 1–4.8)
LYMPHOCYTES # BLD AUTO: 0.8 K/UL (ref 1–4.8)
LYMPHOCYTES # BLD AUTO: 1 K/UL (ref 1–4.8)
LYMPHOCYTES NFR BLD: 2.8 % (ref 18–48)
LYMPHOCYTES NFR BLD: 4 % (ref 18–48)
LYMPHOCYTES NFR BLD: 5.5 % (ref 18–48)
MAGNESIUM SERPL-MCNC: 2.2 MG/DL (ref 1.6–2.6)
MCH RBC QN AUTO: 23.3 PG (ref 27–31)
MCH RBC QN AUTO: 24.1 PG (ref 27–31)
MCH RBC QN AUTO: 24.5 PG (ref 27–31)
MCHC RBC AUTO-ENTMCNC: 30.3 G/DL (ref 32–36)
MCHC RBC AUTO-ENTMCNC: 31.6 G/DL (ref 32–36)
MCHC RBC AUTO-ENTMCNC: 31.9 G/DL (ref 32–36)
MCV RBC AUTO: 76 FL (ref 82–98)
MCV RBC AUTO: 77 FL (ref 82–98)
MCV RBC AUTO: 77 FL (ref 82–98)
MONOCYTES # BLD AUTO: 0.7 K/UL (ref 0.3–1)
MONOCYTES # BLD AUTO: 0.8 K/UL (ref 0.3–1)
MONOCYTES # BLD AUTO: 1 K/UL (ref 0.3–1)
MONOCYTES NFR BLD: 3.5 % (ref 4–15)
MONOCYTES NFR BLD: 3.7 % (ref 4–15)
MONOCYTES NFR BLD: 3.9 % (ref 4–15)
NEUTROPHILS # BLD AUTO: 16.9 K/UL (ref 1.8–7.7)
NEUTROPHILS # BLD AUTO: 19.5 K/UL (ref 1.8–7.7)
NEUTROPHILS # BLD AUTO: 22.6 K/UL (ref 1.8–7.7)
NEUTROPHILS NFR BLD: 90.5 % (ref 38–73)
NEUTROPHILS NFR BLD: 91.7 % (ref 38–73)
NEUTROPHILS NFR BLD: 92.9 % (ref 38–73)
NRBC BLD-RTO: 0 /100 WBC
OVALOCYTES BLD QL SMEAR: ABNORMAL
OVALOCYTES BLD QL SMEAR: ABNORMAL
PCO2 BLDA: 37.5 MMHG (ref 35–45)
PH SMN: 7.38 [PH] (ref 7.35–7.45)
PHOSPHATE SERPL-MCNC: 2.2 MG/DL (ref 2.7–4.5)
PLATELET # BLD AUTO: 412 K/UL (ref 150–450)
PLATELET # BLD AUTO: 426 K/UL (ref 150–450)
PLATELET # BLD AUTO: 501 K/UL (ref 150–450)
PLATELET BLD QL SMEAR: ABNORMAL
PLATELET BLD QL SMEAR: ABNORMAL
PMV BLD AUTO: 10.5 FL (ref 9.2–12.9)
PMV BLD AUTO: 9.5 FL (ref 9.2–12.9)
PMV BLD AUTO: 9.8 FL (ref 9.2–12.9)
PO2 BLDA: 32 MMHG (ref 40–60)
POC BE: -3 MMOL/L
POC IONIZED CALCIUM: 1.13 MMOL/L (ref 1.06–1.42)
POC SATURATED O2: 61 % (ref 95–100)
POC TCO2: 23 MMOL/L (ref 24–29)
POCT GLUCOSE: 112 MG/DL (ref 70–110)
POCT GLUCOSE: 70 MG/DL (ref 70–110)
POCT GLUCOSE: 70 MG/DL (ref 70–110)
POCT GLUCOSE: 73 MG/DL (ref 70–110)
POCT GLUCOSE: 74 MG/DL (ref 70–110)
POCT GLUCOSE: 80 MG/DL (ref 70–110)
POCT GLUCOSE: 95 MG/DL (ref 70–110)
POCT GLUCOSE: 95 MG/DL (ref 70–110)
POIKILOCYTOSIS BLD QL SMEAR: SLIGHT
POIKILOCYTOSIS BLD QL SMEAR: SLIGHT
POLYCHROMASIA BLD QL SMEAR: ABNORMAL
POLYCHROMASIA BLD QL SMEAR: ABNORMAL
POTASSIUM BLD-SCNC: 4.5 MMOL/L (ref 3.5–5.1)
POTASSIUM SERPL-SCNC: 4.2 MMOL/L (ref 3.5–5.1)
POTASSIUM SERPL-SCNC: 4.6 MMOL/L (ref 3.5–5.1)
PROT SERPL-MCNC: 5.9 G/DL (ref 6–8.4)
PROTHROMBIN TIME: 12.5 SEC (ref 9–12.5)
RBC # BLD AUTO: 3.4 M/UL (ref 4–5.4)
RBC # BLD AUTO: 3.76 M/UL (ref 4–5.4)
RBC # BLD AUTO: 4.17 M/UL (ref 4–5.4)
SAMPLE: ABNORMAL
SAMPLE: NORMAL
SCHISTOCYTES BLD QL SMEAR: ABNORMAL
SCHISTOCYTES BLD QL SMEAR: ABNORMAL
SCHISTOCYTES BLD QL SMEAR: PRESENT
SITE: ABNORMAL
SITE: NORMAL
SODIUM BLD-SCNC: 136 MMOL/L (ref 136–145)
SODIUM SERPL-SCNC: 133 MMOL/L (ref 136–145)
SODIUM SERPL-SCNC: 135 MMOL/L (ref 136–145)
SPECIMEN OUTDATE: NORMAL
SPHEROCYTES BLD QL SMEAR: ABNORMAL
WBC # BLD AUTO: 18.6 K/UL (ref 3.9–12.7)
WBC # BLD AUTO: 21.23 K/UL (ref 3.9–12.7)
WBC # BLD AUTO: 24.35 K/UL (ref 3.9–12.7)
WBC # FLD: NORMAL /CU MM

## 2024-08-12 PROCEDURE — 25000003 PHARM REV CODE 250: Mod: HCNC

## 2024-08-12 PROCEDURE — 99291 CRITICAL CARE FIRST HOUR: CPT | Mod: HCNC,,, | Performed by: NURSE PRACTITIONER

## 2024-08-12 PROCEDURE — 51701 INSERT BLADDER CATHETER: CPT | Mod: HCNC

## 2024-08-12 PROCEDURE — 86850 RBC ANTIBODY SCREEN: CPT | Mod: HCNC

## 2024-08-12 PROCEDURE — D9220A PRA ANESTHESIA: Mod: HCNC,CRNA,, | Performed by: NURSE ANESTHETIST, CERTIFIED REGISTERED

## 2024-08-12 PROCEDURE — 80048 BASIC METABOLIC PNL TOTAL CA: CPT | Mod: HCNC,XB | Performed by: NURSE PRACTITIONER

## 2024-08-12 PROCEDURE — 85730 THROMBOPLASTIN TIME PARTIAL: CPT | Mod: HCNC

## 2024-08-12 PROCEDURE — 85025 COMPLETE CBC W/AUTO DIFF WBC: CPT | Mod: HCNC

## 2024-08-12 PROCEDURE — 86901 BLOOD TYPING SEROLOGIC RH(D): CPT | Mod: HCNC

## 2024-08-12 PROCEDURE — 63600175 PHARM REV CODE 636 W HCPCS: Mod: HCNC

## 2024-08-12 PROCEDURE — 87015 SPECIMEN INFECT AGNT CONCNTJ: CPT | Mod: HCNC | Performed by: STUDENT IN AN ORGANIZED HEALTH CARE EDUCATION/TRAINING PROGRAM

## 2024-08-12 PROCEDURE — 63600175 PHARM REV CODE 636 W HCPCS: Mod: HCNC | Performed by: NURSE ANESTHETIST, CERTIFIED REGISTERED

## 2024-08-12 PROCEDURE — A9585 GADOBUTROL INJECTION: HCPCS | Mod: HCNC | Performed by: INTERNAL MEDICINE

## 2024-08-12 PROCEDURE — 25000003 PHARM REV CODE 250: Mod: HCNC | Performed by: NURSE ANESTHETIST, CERTIFIED REGISTERED

## 2024-08-12 PROCEDURE — 0W9H30Z DRAINAGE OF RETROPERITONEUM WITH DRAINAGE DEVICE, PERCUTANEOUS APPROACH: ICD-10-PCS | Performed by: STUDENT IN AN ORGANIZED HEALTH CARE EDUCATION/TRAINING PROGRAM

## 2024-08-12 PROCEDURE — 87102 FUNGUS ISOLATION CULTURE: CPT | Mod: HCNC | Performed by: STUDENT IN AN ORGANIZED HEALTH CARE EDUCATION/TRAINING PROGRAM

## 2024-08-12 PROCEDURE — 87076 CULTURE ANAEROBE IDENT EACH: CPT | Mod: HCNC | Performed by: STUDENT IN AN ORGANIZED HEALTH CARE EDUCATION/TRAINING PROGRAM

## 2024-08-12 PROCEDURE — 87116 MYCOBACTERIA CULTURE: CPT | Mod: HCNC | Performed by: STUDENT IN AN ORGANIZED HEALTH CARE EDUCATION/TRAINING PROGRAM

## 2024-08-12 PROCEDURE — 87075 CULTR BACTERIA EXCEPT BLOOD: CPT | Mod: HCNC | Performed by: STUDENT IN AN ORGANIZED HEALTH CARE EDUCATION/TRAINING PROGRAM

## 2024-08-12 PROCEDURE — D9220A PRA ANESTHESIA: Mod: HCNC,ANES,, | Performed by: ANESTHESIOLOGY

## 2024-08-12 PROCEDURE — 25500020 PHARM REV CODE 255: Mod: HCNC | Performed by: INTERNAL MEDICINE

## 2024-08-12 PROCEDURE — 87206 SMEAR FLUORESCENT/ACID STAI: CPT | Mod: HCNC | Performed by: STUDENT IN AN ORGANIZED HEALTH CARE EDUCATION/TRAINING PROGRAM

## 2024-08-12 PROCEDURE — 80053 COMPREHEN METABOLIC PANEL: CPT | Mod: HCNC

## 2024-08-12 PROCEDURE — 20000000 HC ICU ROOM: Mod: HCNC

## 2024-08-12 PROCEDURE — 25000003 PHARM REV CODE 250: Mod: HCNC | Performed by: CLINICAL NURSE SPECIALIST

## 2024-08-12 PROCEDURE — 85610 PROTHROMBIN TIME: CPT | Mod: HCNC

## 2024-08-12 PROCEDURE — 83735 ASSAY OF MAGNESIUM: CPT | Mod: HCNC

## 2024-08-12 PROCEDURE — 25000003 PHARM REV CODE 250: Mod: HCNC | Performed by: NURSE PRACTITIONER

## 2024-08-12 PROCEDURE — 36415 COLL VENOUS BLD VENIPUNCTURE: CPT | Mod: HCNC

## 2024-08-12 PROCEDURE — 83605 ASSAY OF LACTIC ACID: CPT | Mod: HCNC | Performed by: NEUROLOGICAL SURGERY

## 2024-08-12 PROCEDURE — 94761 N-INVAS EAR/PLS OXIMETRY MLT: CPT | Mod: HCNC

## 2024-08-12 PROCEDURE — 86900 BLOOD TYPING SEROLOGIC ABO: CPT | Mod: HCNC

## 2024-08-12 PROCEDURE — 87070 CULTURE OTHR SPECIMN AEROBIC: CPT | Mod: HCNC | Performed by: STUDENT IN AN ORGANIZED HEALTH CARE EDUCATION/TRAINING PROGRAM

## 2024-08-12 PROCEDURE — 99223 1ST HOSP IP/OBS HIGH 75: CPT | Mod: HCNC,,, | Performed by: STUDENT IN AN ORGANIZED HEALTH CARE EDUCATION/TRAINING PROGRAM

## 2024-08-12 PROCEDURE — 85025 COMPLETE CBC W/AUTO DIFF WBC: CPT | Mod: 91,HCNC | Performed by: INTERNAL MEDICINE

## 2024-08-12 PROCEDURE — 89051 BODY FLUID CELL COUNT: CPT | Mod: HCNC | Performed by: STUDENT IN AN ORGANIZED HEALTH CARE EDUCATION/TRAINING PROGRAM

## 2024-08-12 PROCEDURE — 51798 US URINE CAPACITY MEASURE: CPT | Mod: HCNC

## 2024-08-12 PROCEDURE — 85025 COMPLETE CBC W/AUTO DIFF WBC: CPT | Mod: 91,HCNC

## 2024-08-12 PROCEDURE — 87186 SC STD MICRODIL/AGAR DIL: CPT | Mod: HCNC | Performed by: STUDENT IN AN ORGANIZED HEALTH CARE EDUCATION/TRAINING PROGRAM

## 2024-08-12 PROCEDURE — 84100 ASSAY OF PHOSPHORUS: CPT | Mod: HCNC

## 2024-08-12 PROCEDURE — 87205 SMEAR GRAM STAIN: CPT | Mod: HCNC | Performed by: STUDENT IN AN ORGANIZED HEALTH CARE EDUCATION/TRAINING PROGRAM

## 2024-08-12 PROCEDURE — 76937 US GUIDE VASCULAR ACCESS: CPT | Mod: HCNC

## 2024-08-12 RX ORDER — METHOCARBAMOL 500 MG/1
500 TABLET, FILM COATED ORAL 4 TIMES DAILY
Status: DISCONTINUED | OUTPATIENT
Start: 2024-08-12 | End: 2024-08-13

## 2024-08-12 RX ORDER — PHENYLEPHRINE HCL IN 0.9% NACL 1 MG/10 ML
SYRINGE (ML) INTRAVENOUS
Status: DISCONTINUED | OUTPATIENT
Start: 2024-08-12 | End: 2024-08-12

## 2024-08-12 RX ORDER — HYDROCODONE BITARTRATE AND ACETAMINOPHEN 10; 325 MG/1; MG/1
1 TABLET ORAL EVERY 6 HOURS PRN
Status: DISCONTINUED | OUTPATIENT
Start: 2024-08-12 | End: 2024-08-12

## 2024-08-12 RX ORDER — DEXTROSE MONOHYDRATE 50 MG/ML
INJECTION, SOLUTION INTRAVENOUS CONTINUOUS
Status: DISCONTINUED | OUTPATIENT
Start: 2024-08-12 | End: 2024-08-12

## 2024-08-12 RX ORDER — MORPHINE SULFATE 2 MG/ML
2 INJECTION, SOLUTION INTRAMUSCULAR; INTRAVENOUS EVERY 6 HOURS PRN
Status: DISCONTINUED | OUTPATIENT
Start: 2024-08-12 | End: 2024-08-18

## 2024-08-12 RX ORDER — NALOXONE HCL 0.4 MG/ML
0.02 VIAL (ML) INJECTION
Status: DISCONTINUED | OUTPATIENT
Start: 2024-08-12 | End: 2024-08-24 | Stop reason: HOSPADM

## 2024-08-12 RX ORDER — ETHAMBUTOL HYDROCHLORIDE 400 MG/1
800 TABLET, FILM COATED ORAL DAILY
Status: DISCONTINUED | OUTPATIENT
Start: 2024-08-12 | End: 2024-08-12

## 2024-08-12 RX ORDER — PROPOFOL 10 MG/ML
VIAL (ML) INTRAVENOUS CONTINUOUS PRN
Status: DISCONTINUED | OUTPATIENT
Start: 2024-08-12 | End: 2024-08-12

## 2024-08-12 RX ORDER — MUPIROCIN 20 MG/G
1 OINTMENT TOPICAL 2 TIMES DAILY
Status: COMPLETED | OUTPATIENT
Start: 2024-08-12 | End: 2024-08-12

## 2024-08-12 RX ORDER — GADOBUTROL 604.72 MG/ML
5 INJECTION INTRAVENOUS
Status: COMPLETED | OUTPATIENT
Start: 2024-08-12 | End: 2024-08-12

## 2024-08-12 RX ORDER — RIFAMPIN 300 MG/1
300 CAPSULE ORAL DAILY
Status: DISCONTINUED | OUTPATIENT
Start: 2024-08-12 | End: 2024-08-12

## 2024-08-12 RX ORDER — LAMIVUDINE 150 MG/1
300 TABLET, FILM COATED ORAL DAILY
Status: DISCONTINUED | OUTPATIENT
Start: 2024-08-12 | End: 2024-08-24 | Stop reason: HOSPADM

## 2024-08-12 RX ORDER — QUETIAPINE FUMARATE 25 MG/1
100 TABLET, FILM COATED ORAL NIGHTLY
Status: DISCONTINUED | OUTPATIENT
Start: 2024-08-12 | End: 2024-08-14

## 2024-08-12 RX ORDER — IBUPROFEN 200 MG
16 TABLET ORAL
Status: DISCONTINUED | OUTPATIENT
Start: 2024-08-12 | End: 2024-08-24 | Stop reason: HOSPADM

## 2024-08-12 RX ORDER — GABAPENTIN 400 MG/1
400 CAPSULE ORAL 2 TIMES DAILY
Status: DISCONTINUED | OUTPATIENT
Start: 2024-08-12 | End: 2024-08-14

## 2024-08-12 RX ORDER — ENOXAPARIN SODIUM 100 MG/ML
40 INJECTION SUBCUTANEOUS EVERY 24 HOURS
Status: DISCONTINUED | OUTPATIENT
Start: 2024-08-13 | End: 2024-08-13

## 2024-08-12 RX ORDER — SODIUM CHLORIDE, SODIUM LACTATE, POTASSIUM CHLORIDE, CALCIUM CHLORIDE 600; 310; 30; 20 MG/100ML; MG/100ML; MG/100ML; MG/100ML
INJECTION, SOLUTION INTRAVENOUS CONTINUOUS
Status: DISCONTINUED | OUTPATIENT
Start: 2024-08-12 | End: 2024-08-12

## 2024-08-12 RX ORDER — SULFAMETHOXAZOLE AND TRIMETHOPRIM 800; 160 MG/1; MG/1
1 TABLET ORAL
Status: DISCONTINUED | OUTPATIENT
Start: 2024-08-12 | End: 2024-08-24 | Stop reason: HOSPADM

## 2024-08-12 RX ORDER — MUPIROCIN 20 MG/G
OINTMENT TOPICAL
Status: DISCONTINUED | OUTPATIENT
Start: 2024-08-12 | End: 2024-08-24 | Stop reason: HOSPADM

## 2024-08-12 RX ORDER — INSULIN ASPART 100 [IU]/ML
0-5 INJECTION, SOLUTION INTRAVENOUS; SUBCUTANEOUS
Status: DISCONTINUED | OUTPATIENT
Start: 2024-08-12 | End: 2024-08-12

## 2024-08-12 RX ORDER — SODIUM CHLORIDE 0.9 % (FLUSH) 0.9 %
10 SYRINGE (ML) INJECTION EVERY 12 HOURS PRN
Status: DISCONTINUED | OUTPATIENT
Start: 2024-08-12 | End: 2024-08-24 | Stop reason: HOSPADM

## 2024-08-12 RX ORDER — NOREPINEPHRINE BITARTRATE/D5W 4MG/250ML
0-.2 PLASTIC BAG, INJECTION (ML) INTRAVENOUS CONTINUOUS
Status: DISPENSED | OUTPATIENT
Start: 2024-08-12 | End: 2024-08-13

## 2024-08-12 RX ORDER — FENTANYL CITRATE 50 UG/ML
INJECTION, SOLUTION INTRAMUSCULAR; INTRAVENOUS
Status: DISCONTINUED | OUTPATIENT
Start: 2024-08-12 | End: 2024-08-12

## 2024-08-12 RX ORDER — GLUCAGON 1 MG
1 KIT INJECTION
Status: DISCONTINUED | OUTPATIENT
Start: 2024-08-12 | End: 2024-08-24 | Stop reason: HOSPADM

## 2024-08-12 RX ORDER — IBUPROFEN 200 MG
24 TABLET ORAL
Status: DISCONTINUED | OUTPATIENT
Start: 2024-08-12 | End: 2024-08-24 | Stop reason: HOSPADM

## 2024-08-12 RX ADMIN — PIPERACILLIN SODIUM AND TAZOBACTAM SODIUM 4.5 G: 4; .5 INJECTION, POWDER, FOR SOLUTION INTRAVENOUS at 05:08

## 2024-08-12 RX ADMIN — MUPIROCIN 1 G: 20 OINTMENT TOPICAL at 10:08

## 2024-08-12 RX ADMIN — SODIUM CHLORIDE, POTASSIUM CHLORIDE, SODIUM LACTATE AND CALCIUM CHLORIDE 500 ML: 600; 310; 30; 20 INJECTION, SOLUTION INTRAVENOUS at 05:08

## 2024-08-12 RX ADMIN — GADOBUTROL 5 ML: 604.72 INJECTION INTRAVENOUS at 10:08

## 2024-08-12 RX ADMIN — NOREPINEPHRINE BITARTRATE 0.14 MCG/KG/MIN: 4 INJECTION, SOLUTION INTRAVENOUS at 11:08

## 2024-08-12 RX ADMIN — SULFAMETHOXAZOLE AND TRIMETHOPRIM 1 TABLET: 800; 160 TABLET ORAL at 09:08

## 2024-08-12 RX ADMIN — GABAPENTIN 400 MG: 300 CAPSULE ORAL at 11:08

## 2024-08-12 RX ADMIN — METHOCARBAMOL 500 MG: 500 TABLET ORAL at 10:08

## 2024-08-12 RX ADMIN — LAMIVUDINE 300 MG: 150 TABLET, FILM COATED ORAL at 10:08

## 2024-08-12 RX ADMIN — NOREPINEPHRINE BITARTRATE 0.02 MCG/KG/MIN: 4 INJECTION, SOLUTION INTRAVENOUS at 11:08

## 2024-08-12 RX ADMIN — SODIUM CHLORIDE, SODIUM LACTATE, POTASSIUM CHLORIDE, AND CALCIUM CHLORIDE 500 ML: .6; .31; .03; .02 INJECTION, SOLUTION INTRAVENOUS at 07:08

## 2024-08-12 RX ADMIN — MUPIROCIN 1 G: 20 OINTMENT TOPICAL at 09:08

## 2024-08-12 RX ADMIN — DOLUTEGRAVIR SODIUM 50 MG: 50 TABLET, FILM COATED ORAL at 10:08

## 2024-08-12 RX ADMIN — VANCOMYCIN HYDROCHLORIDE 1000 MG: 1 INJECTION, POWDER, LYOPHILIZED, FOR SOLUTION INTRAVENOUS at 11:08

## 2024-08-12 RX ADMIN — DEXTROSE MONOHYDRATE 125 ML: 10 INJECTION, SOLUTION INTRAVENOUS at 08:08

## 2024-08-12 RX ADMIN — METHOCARBAMOL 500 MG: 500 TABLET ORAL at 11:08

## 2024-08-12 RX ADMIN — FENTANYL CITRATE 12.5 MCG: 50 INJECTION, SOLUTION INTRAMUSCULAR; INTRAVENOUS at 01:08

## 2024-08-12 RX ADMIN — Medication 100 MCG: at 01:08

## 2024-08-12 RX ADMIN — PIPERACILLIN SODIUM AND TAZOBACTAM SODIUM 4.5 G: 4; .5 INJECTION, POWDER, FOR SOLUTION INTRAVENOUS at 09:08

## 2024-08-12 RX ADMIN — GABAPENTIN 400 MG: 300 CAPSULE ORAL at 10:08

## 2024-08-12 RX ADMIN — MUPIROCIN: 20 OINTMENT TOPICAL at 09:08

## 2024-08-12 RX ADMIN — PROPOFOL 50 MCG/KG/MIN: 10 INJECTION, EMULSION INTRAVENOUS at 01:08

## 2024-08-12 RX ADMIN — DEXTROSE MONOHYDRATE 125 ML: 10 INJECTION, SOLUTION INTRAVENOUS at 11:08

## 2024-08-12 RX ADMIN — SODIUM CHLORIDE, POTASSIUM CHLORIDE, SODIUM LACTATE AND CALCIUM CHLORIDE 500 ML: 600; 310; 30; 20 INJECTION, SOLUTION INTRAVENOUS at 03:08

## 2024-08-12 RX ADMIN — DEXTROSE MONOHYDRATE: 5 INJECTION, SOLUTION INTRAVENOUS at 01:08

## 2024-08-12 RX ADMIN — SODIUM CHLORIDE, POTASSIUM CHLORIDE, SODIUM LACTATE AND CALCIUM CHLORIDE: 600; 310; 30; 20 INJECTION, SOLUTION INTRAVENOUS at 10:08

## 2024-08-12 NOTE — PLAN OF CARE
Problem: Adult Inpatient Plan of Care  Goal: Plan of Care Review  Outcome: Not Progressing  Flowsheets (Taken 8/12/2024 1209)  Plan of Care Reviewed With: patient  Goal: Patient-Specific Goal (Individualized)  Outcome: Not Progressing  Flowsheets (Taken 8/12/2024 1209)  Individualized Care Needs: stabilize bp/hypotensive/sepsis  Anxieties, Fears or Concerns: none  Patient/Family-Specific Goals (Include Timeframe): family notified of updates  Goal: Absence of Hospital-Acquired Illness or Injury  Outcome: Not Progressing  Intervention: Identify and Manage Fall Risk  Flowsheets (Taken 8/12/2024 1209)  Safety Promotion/Fall Prevention: assistive device/personal item within reach  Intervention: Prevent Skin Injury  Flowsheets (Taken 8/12/2024 1209)  Body Position:   log-rolled   legs elevated  Skin Protection: incontinence pads utilized  Device Skin Pressure Protection: absorbent pad utilized/changed  Intervention: Prevent and Manage VTE (Venous Thromboembolism) Risk  Flowsheets (Taken 8/12/2024 1209)  VTE Prevention/Management: remove, assess skin, and reapply sequential compression device  Intervention: Prevent Infection  Flowsheets (Taken 8/12/2024 1209)  Infection Prevention:   cohorting utilized   environmental surveillance performed   equipment surfaces disinfected   personal protective equipment utilized   hand hygiene promoted   single patient room provided  Goal: Optimal Comfort and Wellbeing  Outcome: Not Progressing  Goal: Readiness for Transition of Care  Outcome: Not Progressing     Problem: Wound  Goal: Optimal Coping  Outcome: Not Progressing  Goal: Optimal Functional Ability  Outcome: Not Progressing  Goal: Absence of Infection Signs and Symptoms  Outcome: Not Progressing  Goal: Improved Oral Intake  Outcome: Not Progressing  Goal: Optimal Pain Control and Function  Outcome: Not Progressing  Goal: Skin Health and Integrity  Outcome: Not Progressing  Goal: Optimal Wound Healing  Outcome: Not  Progressing     Problem: Skin Injury Risk Increased  Goal: Skin Health and Integrity  Outcome: Not Progressing

## 2024-08-12 NOTE — ASSESSMENT & PLAN NOTE
Imaging showed Large left paravertebral retroperitoneal abscess increased from the prior study measuring approximately 14.0 x 5.8 x 15.5 cm. The abscess crosses the midline to the right involving the vertebral bodies and surgical hardware also. 3.7 cm right periaortic component of the abscess at T12-L1 level. See above comments. Paravertebral phlegmon with findings of discitis/osteomyelitis at T8-9, T9-10, L2-3 and L3-4     - vanc/zosyn continued, can de escalate as needed  - ID consulted  - NSGY, IR consulted  - NPO in case of procedure  - f/u blood and urine cultures from OSH

## 2024-08-12 NOTE — HPI
Pt is a 59F w/ HTN, HIV (CD4 184 in Feb), s/p osteo following T12-L1 corpectomy w/ ortho @ [facility] who presents from OSH w/ malaise, fatigue, and back pain for 1 week. OSH work up significant for WCC 16.6, K 2.4, normal lactate. Given vanc/zosyn/K supplementation at OSH and transferred to [facility] for higher level of care. No b/b issues, no blood thinners. Endorses mid and lower back pain, no radiculopathy, saddle anesthesia, or signs of neurogenic claudication. CT Csp/Tsp at OSH significant for left paravertebral / retroperitoneal abscess and osteomyelitis / discitis at L2-L4, T8-T10.

## 2024-08-12 NOTE — ASSESSMENT & PLAN NOTE
59F w/ history of AIDS (CD4<200 in February), s/p T12-L1 corpectomy presents w/ osteomyelitis/discitis in multiple levels of the thoracic and lumbar spine. No red flag symptoms    CT Tsp/Lsp w/ contrast 8/11: 14.0x5.8x15.5cm paravertebral abscess on left side, osteomyelitis/discitis at T8-10, L2-4     Plan:  --Patient admitted to  on telemetry; hospital medicine team primary    -q1h neurochecks in ICU, q2h neurochecks in stepdown, q4h neurochecks on floor   --All labs and diagnostics reviewed   --Follow-up spine MRI T/L spine w/wo contrast   --Maintain TLSO brace at all times  --Full infectious w/u to include ESR/CRP/Procalcitonin, CXR, UA/UCx, BCx x2, TTE vs TEJAL;         -Reccomend holding abx until infectious work-up complete unless patient is septic         -Consult ID team for appropriate recs   --Hold AC/AP meds  --External urinary catheter w/ suction placed  --Follow-up full pre-op labs (CBC/CMP/PT-INR/PTT/T&S)   --Keep pt NPO at this time for possible surgical intervention   --Continue to monitor clinically, notify NSGY immediately with any changes in neuro status     Dispo: ongoing

## 2024-08-12 NOTE — NURSING
Patient transfer to 70 icu called gave report to Gila DUNCAN any further questions she can secure chat.

## 2024-08-12 NOTE — PLAN OF CARE
Pt tolerated procedure well under crna care,  left lateral dressing c/d/I, 380cc removed and sent to lab, transfer back to room, report given @ bedside

## 2024-08-12 NOTE — ASSESSMENT & PLAN NOTE
Suspect septic shock in the setting of AIDS with large spinal abscess. Remained hypotensive despite adequate fluid resuscitation.    - continue vancomycin and pip-tazo; appreciate ID input  - f/u cultures  - s/p abscess drainage by IR  - titrate norepi for MAP >65

## 2024-08-12 NOTE — PLAN OF CARE
Pt arrived to 189 for left paravertebral retroperitoneal abscess T11-12. Pt oriented to unit and staff, Pt safely transferred from stretcher to procedural table. Fall risk reviewed and comfort measures utilized with interventions. Safety strap applied, position pillows to minimize pressure points. Blankets applied. Pt prepped and draped utilizing standard sterile technique. Patient placed on continuous monitoring, as required by sedation policy. Timeouts implemented utilizing standard universal time-out per department and facility policy. CRNA to remain at bedside with continuous monitoring. Pt resting comfortably. Denies pain/discomfort. Will continue to monitor. See flow sheets for monitoring, medication administration, and updates. patient verbalizes understanding.

## 2024-08-12 NOTE — CARE UPDATE
RAPID RESPONSE NURSE FOLLOW-UP NOTE       Followed up with patient for proactive rounding.  Patient remains hypotensive despite IVF. Critical care transfer orders placed for vasopressor support. Reviewed plan of care with charge RNMarcos .   Patient will transfer to [facility].  Please call Rapid Response RN, Emiliana Pena RN with any questions or concerns at [phone].

## 2024-08-12 NOTE — CONSULTS
Wound care consult received for buttock. Unable to see patient as stepped up to higher care this am and OTU for procedure this afternoon. Wound care will attempt to see patient again tomorrow. Nursing to continue care.

## 2024-08-12 NOTE — ASSESSMENT & PLAN NOTE
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

## 2024-08-12 NOTE — HPI
Ms. Amie Salmeron is a 58 yo F with PMHx of HTN, uncontrolled HIV noncompliant with HAART, spinal MAC, spinal osteo s/p T11-L2 corpectomy with fixation by ortho on 2/27/24 at [facility] who presents to [facility] as a transfer from [place] on 8/11/24 with generalized malaise, fatigue, and lower left sided back pain x 1 week. Pt denies fevers, chills, bowel/bladder incontinence, weakness, or numbness. In the [facility] ED, patient was afebrile but borderline hypotensive which improved with IV fluids. Noted to have WBC 16.6, K 2.4, albumin 1.9, normal lactic. Blood cultures were obtained. Patient was admitted to  at [place] and CT TL w/ contrast showed large left T11-L2 paravertebral retroperitoneal abscess increased from the prior study measuring approximately 14.0 x 5.8 x 15.5 cm.  The abscess crosses the midline to the right involving the vertebral bodies and surgical hardware also 3.7 cm right periaortic component of the abscess at T12-L1 level. Potassium was repleted and vanc/zosyn given prior to transfer to to  at [facility] for neurosurgery and IR evaluation.    Critical Care Medicine was consulted for persistent hypotension despite IVF bolus this AM, and she transferred to the ICU for vasopressor support.

## 2024-08-12 NOTE — SUBJECTIVE & OBJECTIVE
Past Medical History:   Diagnosis Date    Allergy     Arthritis     Asthma     Chronic pain     HIV infection     Hypertension     Overactive bladder     Spinal stenosis     Urine incontinence        Past Surgical History:   Procedure Laterality Date    ADENOIDECTOMY      APPENDECTOMY      BACK SURGERY      GERALD    CYST REMOVAL      HYSTERECTOMY      JOINT REPLACEMENT Right     knee, with revision    KNEE SURGERY Left     knee replacement    RHIZOTOMY      TONSILLECTOMY      TOTAL ANKLE ARTHROPLASTY Right        Review of patient's allergies indicates:  No Known Allergies    No current facility-administered medications on file prior to encounter.     Current Outpatient Medications on File Prior to Encounter   Medication Sig    dolutegravir (TIVICAY) 50 mg Tab Take 50 mg by mouth once daily.    ethambutoL (MYAMBUTOL) 400 MG Tab Take 2 tablets (800 mg total) by mouth once daily.    gabapentin (NEURONTIN) 400 MG capsule Take 400 mg by mouth 3 (three) times daily.    lamiVUDine (EPIVIR) 300 mg tablet Take 1 tablet (300 mg total) by mouth once daily.    albuterol (PROVENTIL/VENTOLIN HFA) 90 mcg/actuation inhaler Inhale 2 puffs into the lungs every 6 (six) hours as needed. Rescue    bisacodyL (DULCOLAX) 5 mg EC tablet Take 1 tablet by mouth every other day.    cyclobenzaprine (FLEXERIL) 5 MG tablet Take 5 mg by mouth 3 (three) times daily as needed.    hydrOXYzine pamoate (VISTARIL) 25 MG Cap Take 25 mg by mouth 3 (three) times daily as needed.    Lactobacillus rhamnosus GG (CULTURELLE) 10 billion cell capsule Take 1 capsule by mouth 2 (two) times daily.    LIDOcaine (LIDODERM) 5 % Place 1 patch onto the skin once daily.    mirtazapine (REMERON SOL-TAB) 15 MG disintegrating tablet Take 1 tablet by mouth every evening.    naloxone (NARCAN) 4 mg/actuation Spry 1 spray by Nasal route once.    polyethylene glycol (GLYCOLAX) 17 gram PwPk Take 17 g by mouth 2 (two) times daily.    QUEtiapine (SEROQUEL) 100 MG Tab Take 100 mg by  mouth every evening.    rifabutin (MYCOBUTIN) 150 mg Cap Take 300 mg by mouth once daily.    senna (SENOKOT) 8.6 mg tablet Take 17.2 mg by mouth 2 (two) times daily.    sulfamethoxazole-trimethoprim 800-160mg (BACTRIM DS) 800-160 mg Tab Take 1 tablet by mouth 3 (three) times a week.    zinc oxide (BOUDREAUXS BUTT PASTE) 16 % Oint ointment Apply topically.    [DISCONTINUED] 0.9 % sodium chloride (SODIUM CHLORIDE 0.9%) solution Inject into the vein once daily.    [DISCONTINUED] amitriptyline (ELAVIL) 50 MG tablet Take 50 mg by mouth every evening.    [DISCONTINUED] dextrose 5 % (D5W) SolP 250 mL with amikacin 250 mg/mL Soln 950 mg Inject 950 mg into the vein every Tues, Thurs, Sat.    [DISCONTINUED] dolutegravir (TIVICAY) 50 mg Tab Take 1 tablet (50 mg total) by mouth once daily.    [DISCONTINUED] dolutegravir-lamivudine  mg Tab Take 1 tablet by mouth once daily.    [DISCONTINUED] HYDROcodone-acetaminophen (NORCO)  mg per tablet Take 1 tablet by mouth every 6 (six) hours as needed.    [DISCONTINUED] levoFLOXacin (LEVAQUIN) 500 MG tablet Take 1 tablet (500 mg total) by mouth once daily.    [DISCONTINUED] megestroL (MEGACE) 400 mg/10 mL (40 mg/mL) Susp Take 400 mg by mouth once daily.    [DISCONTINUED] methocarbamoL (ROBAXIN) 500 MG Tab Take 500 mg by mouth 4 (four) times daily.    [DISCONTINUED] oxyCODONE (ROXICODONE) 10 mg Tab immediate release tablet Take 10 mg by mouth every 6 (six) hours as needed.    [DISCONTINUED] rifAMpin (RIFADIN) 300 MG capsule Take 1 capsule (300 mg total) by mouth once daily. Take as instructed by [facility].    [DISCONTINUED] valACYclovir (VALTREX) 1000 MG tablet Take 1 tablet (1,000 mg total) by mouth 2 (two) times daily. for 10 days     Family History       Problem Relation (Age of Onset)    Alcohol abuse Brother    Cancer Father    Depression Sister    Hypertension Mother, Brother    Thyroid disease Mother          Tobacco Use    Smoking status: Never    Smokeless  tobacco: Never   Substance and Sexual Activity    Alcohol use: Yes     Comment: Socially    Drug use: No    Sexual activity: Yes     Partners: Male     Birth control/protection: None     Review of Systems   Constitutional:  Positive for activity change and fatigue. Negative for chills and fever.   Eyes:  Negative for visual disturbance.   Respiratory:  Negative for shortness of breath and wheezing.    Cardiovascular:  Negative for chest pain and leg swelling.   Gastrointestinal:  Negative for abdominal pain, nausea and vomiting.        - bowel incontinence   Genitourinary:  Negative for difficulty urinating.        - urinary incontinence   Musculoskeletal:  Positive for back pain and myalgias.   Neurological:  Negative for weakness.     Objective:     Vital Signs (Most Recent):  Temp: 98.1 °F (36.7 °C) (08/13/24 1100)  Pulse: 99 (08/13/24 1400)  Resp: (!) 24 (08/13/24 1400)  BP: (!) 88/49 (08/13/24 1400)  SpO2: 100 % (08/13/24 1400) Vital Signs (24h Range):  Temp:  [98.1 °F (36.7 °C)-98.7 °F (37.1 °C)] 98.1 °F (36.7 °C)  Pulse:  [] 99  Resp:  [16-24] 24  SpO2:  [96 %-100 %] 100 %  BP: ()/(49-79) 88/49     Weight: 46.1 kg (101 lb 10.1 oz)  Body mass index is 17.45 kg/m².     Physical Exam  Vitals reviewed.   Constitutional:       Appearance: She is ill-appearing (cachectic).   HENT:      Head: Normocephalic and atraumatic.      Right Ear: External ear normal.      Left Ear: External ear normal.   Eyes:      Extraocular Movements: Extraocular movements intact.      Conjunctiva/sclera: Conjunctivae normal.   Cardiovascular:      Rate and Rhythm: Normal rate and regular rhythm.   Pulmonary:      Effort: Pulmonary effort is normal. No respiratory distress.      Breath sounds: Normal breath sounds.   Abdominal:      Palpations: Abdomen is soft.      Tenderness: There is no abdominal tenderness.   Musculoskeletal:         General: Tenderness (lower lumbar spine) present.      Cervical back: Normal range of  motion and neck supple.      Right lower leg: No edema.      Left lower leg: No edema.   Neurological:      General: No focal deficit present.      Mental Status: She is alert and oriented to person, place, and time.      Sensory: No sensory deficit.      Motor: No weakness.                Significant Labs: All pertinent labs within the past 24 hours have been reviewed.    Significant Imaging: I have reviewed all pertinent imaging results/findings within the past 24 hours.

## 2024-08-12 NOTE — CARE UPDATE
RAPID RESPONSE NURSE PROACTIVE ROUNDING NOTE       Time of Visit: 945    Admit Date: 2024  LOS: 0  Code Status: Full Code   Date of Visit: 2024  : 1965  Age: 59 y.o.  Sex: female  Race: White  Bed: [facility]/55 A:   MRN: [id number]  Was the patient discharged from an ICU this admission? No   Was the patient discharged from a PACU within last 24 hours? No   Did the patient receive conscious sedation/general anesthesia in last 24 hours? No  Was the patient in the ED within the past 24 hours? No  Was the patient on NIPPV within the past 24 hours? No   Attending Physician: Pierce Norris MD  Primary Service: [facility]   Time spent at the bedside: 15 -30 min    SITUATION    Notified by previous RRN during handoff.  Reason for alert: Hypotension  Called to evaluate the patient for Dysrythmia    BACKGROUND     Why is the patient in the hospital?: Osteomyelitis of vertebra of thoracolumbar region    Patient has a past medical history of Allergy, Arthritis, Asthma, Chronic pain, HIV infection, Hypertension, Overactive bladder, Spinal stenosis, and Urine incontinence.    Last Vitals:  Temp: 97.9 °F (36.6 °C) (749)  Pulse: 96 ( 101)  Resp: 16 ( 0749)  BP: 85/53 ( 1017)  SpO2: 100 % ( 101)    24 Hours Vitals Range:  Temp:  [97.3 °F (36.3 °C)-98.9 °F (37.2 °C)]   Pulse:  []   Resp:  [14-20]   BP: ()/(49-69)   SpO2:  [96 %-100 %]     Labs:  Recent Labs     24  0253 24  0645 24  0708   WBC 16.66* 18.60*  --  24.35*   HGB 10.1* 9.7*  --  8.2*   HCT 32.4* 32.0* 24* 25.7*   * 501*  --  426       Recent Labs     24    135*   K 2.4* 4.6    105   CO2 22* 21*   BUN 17 17   CREATININE 0.7 0.6   GLU 98 96   PHOS  --  2.2*   MG  --  2.2        Recent Labs     24  0645   PH 7.381   PCO2 37.5   PO2 32*   HCO3 22.2*   POCSATURATED 61   BE -3*    Patient in bed. Opens eyes to voice but drowsy. Alert x4.  Patient states she has a pain level of 9 in her hips. Unable to treat with narcotics due to hypotension. 96 Hr 85/53 (65). MRI planned for today to find source of osteomylitis     ASSESSMENT      Physical Exam  Vitals and nursing note reviewed.   Constitutional:       Appearance: She is ill-appearing.   Cardiovascular:      Rate and Rhythm: Normal rate and regular rhythm.   Pulmonary:      Breath sounds: Normal breath sounds and air entry.   Neurological:      Mental Status: She is lethargic.      GCS: GCS eye subscore is 3. GCS verbal subscore is 5. GCS motor subscore is 6.   Psychiatric:         Speech: Speech is slurred.         INTERVENTIONS    The patient was seen for Cardiac problem. Staff concerns included hypotension. The following interventions were performed: normal saline 1000 ml IV bolus .    RECOMMENDATIONS    -maintain MAP> 65  -Continuous telemetry  -maintain IV access  -lights on during the day and off at night to help prevent delirium.    PROVIDER ESCALATION    Yes/No  Yes    Orders received and case discussed with  Sandy Schrader, NP.    Disposition: Remain in room 556.    FOLLOW-UP    Charge Marcos DUNCAN  updated on plan of care. Instructed to call the Rapid Response Nurse, Kavin Barrios RN at [phone] for additional questions or concerns.

## 2024-08-12 NOTE — SUBJECTIVE & OBJECTIVE
Past Medical History:   Diagnosis Date    Allergy     Arthritis     Asthma     Chronic pain     HIV infection     Hypertension     Overactive bladder     Spinal stenosis     Urine incontinence        Past Surgical History:   Procedure Laterality Date    ADENOIDECTOMY      APPENDECTOMY      BACK SURGERY      GERALD    CYST REMOVAL      HYSTERECTOMY      JOINT REPLACEMENT Right     knee, with revision    KNEE SURGERY Left     knee replacement    RHIZOTOMY      TONSILLECTOMY      TOTAL ANKLE ARTHROPLASTY Right        Review of patient's allergies indicates:  No Known Allergies    Family History       Problem Relation (Age of Onset)    Alcohol abuse Brother    Cancer Father    Depression Sister    Hypertension Mother, Brother    Thyroid disease Mother          Tobacco Use    Smoking status: Never    Smokeless tobacco: Never   Substance and Sexual Activity    Alcohol use: Yes     Comment: Socially    Drug use: No    Sexual activity: Yes     Partners: Male     Birth control/protection: None      Review of Systems   Constitutional:  Positive for activity change and fatigue. Negative for chills and fever.   Eyes:  Negative for visual disturbance.   Respiratory:  Negative for shortness of breath and wheezing.    Cardiovascular:  Negative for chest pain and leg swelling.   Gastrointestinal:  Negative for abdominal pain, nausea and vomiting.        - bowel incontinence   Genitourinary:  Negative for difficulty urinating.        - urinary incontinence   Musculoskeletal:  Positive for back pain and myalgias.   Neurological:  Positive for weakness.     Objective:     Vital Signs (Most Recent):  Temp: 98.2 °F (36.8 °C) (08/12/24 1500)  Pulse: 99 (08/12/24 1700)  Resp: 20 (08/12/24 1700)  BP: (!) 93/56 (08/12/24 1700)  SpO2: 100 % (08/12/24 1700) Vital Signs (24h Range):  Temp:  [97.3 °F (36.3 °C)-98.9 °F (37.2 °C)] 98.2 °F (36.8 °C)  Pulse:  [] 99  Resp:  [14-22] 20  SpO2:  [96 %-100 %] 100 %  BP: ()/(43-69) 93/56    Weight: 46.1 kg (101 lb 10.1 oz)  Body mass index is 17.45 kg/m².      Intake/Output Summary (Last 24 hours) at 8/12/2024 1750  Last data filed at 8/12/2024 1700  Gross per 24 hour   Intake 2906.78 ml   Output 1405 ml   Net 1501.78 ml          Physical Exam  Vitals and nursing note reviewed.   Constitutional:       Appearance: She is cachectic. She is ill-appearing.   Cardiovascular:      Rate and Rhythm: Normal rate.      Pulses: Decreased pulses.   Pulmonary:      Effort: No respiratory distress.      Breath sounds: Normal breath sounds.   Musculoskeletal:        Arms:       Comments: Area of erythema and warmth; picture in media tab   Skin:     General: Skin is cool.   Neurological:      Mental Status: She is alert.      GCS: GCS eye subscore is 4. GCS verbal subscore is 5. GCS motor subscore is 6.      Motor: Weakness present.   Psychiatric:         Behavior: Behavior is cooperative.        Vents:     Lines/Drains/Airways       Drain  Duration                  Urethral Catheter 08/12/24 1519 <1 day              Peripheral Intravenous Line  Duration                  Peripheral IV - Single Lumen 08/11/24 1446 18 G Right Antecubital 1 day         Peripheral IV - Single Lumen 08/11/24 2325 20 G Anterior;Left Wrist <1 day         Peripheral IV - Single Lumen 08/12/24 0401 20 G Anterior;Right Upper Arm <1 day                  Significant Labs:    CBC/Anemia Profile:  Recent Labs   Lab 08/12/24  0253 08/12/24  0645 08/12/24  0708 08/12/24  1035   WBC 18.60*  --  24.35* 21.23*   HGB 9.7*  --  8.2* 9.2*   HCT 32.0* 24* 25.7* 29.1*   *  --  426 412   MCV 77*  --  76* 77*   RDW 18.7*  --  18.6* 18.9*        Chemistries:  Recent Labs   Lab 08/11/24  1449 08/12/24  0253    135*   K 2.4* 4.6    105   CO2 22* 21*   BUN 17 17   CREATININE 0.7 0.6   CALCIUM 8.6* 8.3*   ALBUMIN 1.9* 1.8*   PROT 6.2 5.9*   BILITOT 0.4 0.3   ALKPHOS 110 110   ALT 6* 5*   AST 17 20   MG  --  2.2   PHOS  --  2.2*       All  pertinent labs within the past 24 hours have been reviewed.    Significant Imaging: I have reviewed all pertinent imaging results/findings within the past 24 hours.

## 2024-08-12 NOTE — H&P
[facility] Medicine  History & Physical    Patient Name: Amie Salmeron  MRN: [id number]  Patient Class: IP- Inpatient  Admission Date: 8/12/2024  Attending Physician: Patrick Chinchilla MD   Primary Care Provider: Anuradha, Primary Doctor         Patient information was obtained from patient, past medical records, and ER records.     Subjective:     Principal Problem:<principal problem not specified>    Chief Complaint: No chief complaint on file.       HPI: Mrs. Salmeron is a 60 yo F with PMHx of HTN, uncontrolled HIV noncompliant with HAART, spinal MAC, spinal osteo s/p T11-L2 corpectomy with fixation by ortho on 2/27/24 at [facility] who presents to [facility] as a transfer from [place] on 8/11/24 with generalized malaise, fatigue, and lower left sided back pain x 1 week. Pt denies fevers, chills, bowel/bladder incontinence, weakness, or numbness. In the [facility] ED, patient was afebrile but borderline hypotensive which improved with IV fluids. Noted to have WBC 16.6, K 2.4, albumin 1.9, normal lactic. Blood cultures were obtained. Patient was admitted to  at [place] and CT TL w/ contrast showed large left T11-L2 paravertebral retroperitoneal abscess increased from the prior study measuring approximately 14.0 x 5.8 x 15.5 cm.  The abscess crosses the midline to the right involving the vertebral bodies and surgical hardware also.  3.7 cm right periaortic component of the abscess at T12-L1 level. Potassium was repleted and vanc/zosyn given prior to transfer to [facility]. Neurosurgery and IR consult with admission to .    On my evaluation, patient borderline hypotensive and afebrile. Currently complaining of pain but no focal neurologic symptoms. Pain localized to left lower back. OSH lab work pending.     Pt admitted to hospital medicine with NSGY, IR, and ID consulted to assist with management.     Past Medical History:   Diagnosis Date    Allergy     Arthritis     Asthma     Chronic pain     HIV infection      Hypertension     Overactive bladder     Spinal stenosis     Urine incontinence        Past Surgical History:   Procedure Laterality Date    ADENOIDECTOMY      APPENDECTOMY      BACK SURGERY      GERALD    CYST REMOVAL      HYSTERECTOMY      JOINT REPLACEMENT Right     knee, with revision    KNEE SURGERY Left     knee replacement    RHIZOTOMY      TONSILLECTOMY      TOTAL ANKLE ARTHROPLASTY Right        Review of patient's allergies indicates:  No Known Allergies    Current Facility-Administered Medications on File Prior to Encounter   Medication    [COMPLETED] iohexoL (OMNIPAQUE 350) injection 80 mL    [COMPLETED] magnesium sulfate 2g in water 50mL IVPB (premix)    [COMPLETED] potassium bicarbonate disintegrating tablet 50 mEq    [COMPLETED] potassium chloride 10 mEq in 100 mL IVPB    [COMPLETED] potassium chloride 10 mEq in 100 mL IVPB    [COMPLETED] sodium chloride 0.9% bolus 1,000 mL 1,000 mL    [COMPLETED] vancomycin (VANCOCIN) 1,000 mg in D5W 250 mL IVPB (Vial-Mate)    [DISCONTINUED] 0.9 % NaCl with KCl 40 mEq infusion    [DISCONTINUED] acetaminophen tablet 650 mg    [DISCONTINUED] amitriptyline tablet 50 mg    [DISCONTINUED] cyclobenzaprine tablet 5 mg    [DISCONTINUED] dextrose 10% bolus 125 mL 125 mL    [DISCONTINUED] dextrose 10% bolus 250 mL 250 mL    [DISCONTINUED] dolutegravir Tab 50 mg    [DISCONTINUED] gabapentin capsule 400 mg    [DISCONTINUED] GENERIC EXTERNAL MEDICATION    [DISCONTINUED] GENERIC EXTERNAL MEDICATION    [DISCONTINUED] GENERIC EXTERNAL MEDICATION    [DISCONTINUED] GENERIC EXTERNAL MEDICATION    [DISCONTINUED] GENERIC EXTERNAL MEDICATION    [DISCONTINUED] glucagon (human recombinant) injection 1 mg    [DISCONTINUED] glucose chewable tablet 16 g    [DISCONTINUED] glucose chewable tablet 24 g    [DISCONTINUED] lamiVUDine tablet 300 mg    [DISCONTINUED] mirtazapine tablet 15 mg    [DISCONTINUED] naloxone 0.4 mg/mL injection 0.02 mg    [DISCONTINUED] ondansetron injection 4 mg     [DISCONTINUED] piperacillin-tazobactam (ZOSYN) 4.5 g in D5W 100 mL IVPB (MB+)    [DISCONTINUED] potassium bicarbonate disintegrating tablet 50 mEq    [DISCONTINUED] sodium chloride 0.9% flush 10 mL    [DISCONTINUED] sulfamethoxazole-trimethoprim 800-160mg per tablet 1 tablet    [DISCONTINUED] vancomycin (VANCOCIN) 1,000 mg in D5W 250 mL IVPB (Vial-Mate)    [DISCONTINUED] vancomycin - pharmacy to dose     Current Outpatient Medications on File Prior to Encounter   Medication Sig    0.9 % sodium chloride (SODIUM CHLORIDE 0.9%) solution Inject into the vein once daily.    albuterol (PROVENTIL/VENTOLIN HFA) 90 mcg/actuation inhaler Inhale 2 puffs into the lungs every 6 (six) hours as needed. Rescue    amitriptyline (ELAVIL) 50 MG tablet Take 50 mg by mouth every evening.    bisacodyL (DULCOLAX) 5 mg EC tablet Take 1 tablet by mouth every other day.    cyclobenzaprine (FLEXERIL) 5 MG tablet Take 5 mg by mouth 3 (three) times daily as needed.    dextrose 5 % (D5W) SolP 250 mL with amikacin 250 mg/mL Soln 950 mg Inject 950 mg into the vein every Tues, Thurs, Sat.    dolutegravir (TIVICAY) 50 mg Tab Take 1 tablet (50 mg total) by mouth once daily.    dolutegravir-lamivudine  mg Tab Take 1 tablet by mouth once daily.    ethambutoL (MYAMBUTOL) 400 MG Tab Take 2 tablets (800 mg total) by mouth once daily.    gabapentin (NEURONTIN) 400 MG capsule Take 400 mg by mouth 2 (two) times daily.    HYDROcodone-acetaminophen (NORCO)  mg per tablet Take 1 tablet by mouth every 6 (six) hours as needed.    hydrOXYzine pamoate (VISTARIL) 25 MG Cap Take 25 mg by mouth 3 (three) times daily as needed.    Lactobacillus rhamnosus GG (CULTURELLE) 10 billion cell capsule Take 1 capsule by mouth 2 (two) times daily.    lamiVUDine (EPIVIR) 300 mg tablet Take 1 tablet (300 mg total) by mouth once daily.    levoFLOXacin (LEVAQUIN) 500 MG tablet Take 1 tablet (500 mg total) by mouth once daily.    LIDOcaine (LIDODERM) 5 % Place 1 patch  onto the skin once daily.    megestroL (MEGACE) 400 mg/10 mL (40 mg/mL) Susp Take 400 mg by mouth once daily.    methocarbamoL (ROBAXIN) 500 MG Tab Take 500 mg by mouth 4 (four) times daily.    mirtazapine (REMERON SOL-TAB) 15 MG disintegrating tablet Take 1 tablet by mouth every evening.    naloxone (NARCAN) 4 mg/actuation Spry 1 spray by Nasal route once.    oxyCODONE (ROXICODONE) 10 mg Tab immediate release tablet Take 10 mg by mouth every 6 (six) hours as needed.    polyethylene glycol (GLYCOLAX) 17 gram PwPk Take 17 g by mouth 2 (two) times daily.    QUEtiapine (SEROQUEL) 100 MG Tab Take 100 mg by mouth every evening.    rifabutin (MYCOBUTIN) 150 mg Cap Take 300 mg by mouth once daily.    rifAMpin (RIFADIN) 300 MG capsule Take 1 capsule (300 mg total) by mouth once daily. Take as instructed by [facility].    senna (SENOKOT) 8.6 mg tablet Take 17.2 mg by mouth 2 (two) times daily.    sulfamethoxazole-trimethoprim 800-160mg (BACTRIM DS) 800-160 mg Tab Take 1 tablet by mouth 3 (three) times a week.    valACYclovir (VALTREX) 1000 MG tablet Take 1 tablet (1,000 mg total) by mouth 2 (two) times daily. for 10 days    zinc oxide (BOUDREAUXS BUTT PASTE) 16 % Oint ointment Apply topically.     Family History       Problem Relation (Age of Onset)    Alcohol abuse Brother    Cancer Father    Depression Sister    Hypertension Mother, Brother    Thyroid disease Mother          Tobacco Use    Smoking status: Never    Smokeless tobacco: Never   Substance and Sexual Activity    Alcohol use: Yes     Comment: Socially    Drug use: No    Sexual activity: Yes     Partners: Male     Birth control/protection: None     Review of Systems   Constitutional:  Positive for activity change and fatigue. Negative for chills and fever.   Eyes:  Negative for visual disturbance.   Respiratory:  Negative for shortness of breath and wheezing.    Cardiovascular:  Negative for chest pain and leg swelling.   Gastrointestinal:  Negative  for abdominal pain, nausea and vomiting.        - bowel incontinence   Genitourinary:  Negative for difficulty urinating.        - urinary incontinence   Musculoskeletal:  Positive for back pain and myalgias.   Neurological:  Negative for weakness.     Objective:     Vital Signs (Most Recent):  Temp: 98.5 °F (36.9 °C) (08/12/24 0634)  Pulse: 92 (08/12/24 0645)  Resp: 16 (08/12/24 0624)  BP: (!) 79/52 (08/12/24 0645)  SpO2: 100 % (08/12/24 0645) Vital Signs (24h Range):  Temp:  [97.3 °F (36.3 °C)-98.9 °F (37.2 °C)] 98.5 °F (36.9 °C)  Pulse:  [] 92  Resp:  [14-20] 16  SpO2:  [97 %-100 %] 100 %  BP: ()/(49-69) 79/52     Weight: 46.1 kg (101 lb 10.1 oz)  Body mass index is 17.45 kg/m².     Physical Exam  Vitals reviewed.   Constitutional:       Appearance: She is ill-appearing (cachectic).   HENT:      Head: Normocephalic and atraumatic.      Right Ear: External ear normal.      Left Ear: External ear normal.   Eyes:      Extraocular Movements: Extraocular movements intact.      Conjunctiva/sclera: Conjunctivae normal.   Cardiovascular:      Rate and Rhythm: Normal rate and regular rhythm.   Pulmonary:      Effort: Pulmonary effort is normal. No respiratory distress.      Breath sounds: Normal breath sounds.   Abdominal:      Palpations: Abdomen is soft.      Tenderness: There is no abdominal tenderness.   Musculoskeletal:         General: Tenderness (lower lumbar spine) present.      Cervical back: Normal range of motion and neck supple.      Right lower leg: No edema.      Left lower leg: No edema.   Neurological:      General: No focal deficit present.      Mental Status: She is alert and oriented to person, place, and time.      Sensory: No sensory deficit.      Motor: No weakness.                Significant Labs: All pertinent labs within the past 24 hours have been reviewed.    Significant Imaging: I have reviewed all pertinent imaging results/findings within the past 24 hours.  Assessment/Plan:      Disseminated mycobacterium avium-intracellulare complex  Hx of spinal MAC  [facility] previous ID note showed continuation of ethambutol, rifampin, and azithromycin    - ID consulted  - continued ethambutol, rifampin      Osteomyelitis of vertebra of thoracolumbar region  Imagine showed Large left paravertebral retroperitoneal abscess increased from the prior study measuring approximately 14.0 x 5.8 x 15.5 cm. The abscess crosses the midline to the right involving the vertebral bodies and surgical hardware also. 3.7 cm right periaortic component of the abscess at T12-L1 level. See above comments. Paravertebral phlegmon with findings of discitis/osteomyelitis at T8-9, T9-10, L2-3 and L3-4     - vanc/zosyn continued, can de escalate as needed  - ID consulted  - NSGY, IR consulted  - NPO in case of procedure  - f/u blood and urine cultures from OSH      Insomnia  Seroquel nightly      AIDS  Continued dolutegravir and lamivudine, continued pjp prophylactic bactrim    - f/u CD4 and HIV RNA collected at OSH      Chronic pain  Secondary to osteo.     Continue gabapentin and robaxin        VTE Risk Mitigation (From admission, onward)           Ordered     IP VTE LOW RISK PATIENT  Once         08/12/24 0240     Place sequential compression device  Until discontinued         08/12/24 0240                               Pharmacokinetic Initial Assessment: IV Vancomycin    Assessment/Plan:    Patient was transferred from [facility]. Initiate intravenous vancomycin with loading dose of 1000 mg once (administered) followed by a maintenance dose of vancomycin 1000 mg IV every 24 hours  Desired empiric serum trough concentration is 15 to 20 mcg/mL  Draw vancomycin trough level 60 min prior to third dose on 8/13/24 at approximately 22:00 or sooner if renal function changes significantly  Pharmacy will continue to follow and monitor vancomycin.      Please contact pharmacy at extension [phone] with any questions regarding this  "assessment.     Thank you for the consult,   Rodo Valdez       Patient brief summary:  Amie Salmeron is a 59 y.o. female initiated on antimicrobial therapy with IV Vancomycin for treatment of suspected bone/joint infection    Drug Allergies:   Review of patient's allergies indicates:  No Known Allergies    Actual Body Weight:   46.1 kg    Renal Function:   Estimated Creatinine Clearance: 61.4 mL/min (based on SCr of 0.7 mg/dL).,     Dialysis Method (if applicable):  N/A    CBC (last 72 hours):  Recent Labs   Lab Result Units 08/11/24  1449   WBC K/uL 16.66*   Hemoglobin g/dL 10.1*   Hematocrit % 32.4*   Platelets K/uL 492*   Gran % % 77.0*   Lymph % % 11.0*   Mono % % 7.0   Eosinophil % % 3.0   Basophil % % 0.0   Differential Method  Manual       Metabolic Panel (last 72 hours):  Recent Labs   Lab Result Units 08/11/24  1449 08/11/24  2151   Sodium mmol/L 137  --    Potassium mmol/L 2.4*  --    Chloride mmol/L 103  --    CO2 mmol/L 22*  --    Glucose mg/dL 98  --    Glucose, UA   --  Negative   BUN mg/dL 17  --    Creatinine mg/dL 0.7  --    Albumin g/dL 1.9*  --    Total Bilirubin mg/dL 0.4  --    Alkaline Phosphatase U/L 110  --    AST U/L 17  --    ALT U/L 6*  --        Drug levels (last 3 results):  No results for input(s): "VANCOMYCINRA", "VANCORANDOM", "VANCOMYCINPE", "VANCOPEAK", "VANCOMYCINTR", "VANCOTROUGH" in the last 72 hours.    Microbiologic Results:  Microbiology Results (last 7 days)       ** No results found for the last 168 hours. **              Aneta Chiang MD  Department of [facility] Medicine  [facility] - Surgery          "

## 2024-08-12 NOTE — H&P
Radiology History & Physical      SUBJECTIVE:     Chief Complaint: back pain    History of Present Illness:  Amie Salmeron is a 59 y.o. female with PMHx including uncontrolled HIV, HTN, spinal MAC, and spinal osteomyelitis s/p T11-12 corpectomy with fixation (2/27/24) who was transferred from OSH for higher level of care. She has had left sided back pain and fever/malaise for a week. CT imaging revealed a large left paravertebral retroperitoneal abscess at the level of T11-12.    Patient decompensated earlier this morning and was transferred to the ICU for vasopressor support 2/2 septic shock. IR consult was placed for drainage of the abscess.    Past Medical History:   Diagnosis Date    Allergy     Arthritis     Asthma     Chronic pain     HIV infection     Hypertension     Overactive bladder     Spinal stenosis     Urine incontinence      Past Surgical History:   Procedure Laterality Date    ADENOIDECTOMY      APPENDECTOMY      BACK SURGERY      GERALD    CYST REMOVAL      HYSTERECTOMY      JOINT REPLACEMENT Right     knee, with revision    KNEE SURGERY Left     knee replacement    RHIZOTOMY      TONSILLECTOMY      TOTAL ANKLE ARTHROPLASTY Right        Home Meds:   Prior to Admission medications    Medication Sig Start Date End Date Taking? Authorizing Provider   0.9 % sodium chloride (SODIUM CHLORIDE 0.9%) solution Inject into the vein once daily. 3/23/24   Provider, Historical   albuterol (PROVENTIL/VENTOLIN HFA) 90 mcg/actuation inhaler Inhale 2 puffs into the lungs every 6 (six) hours as needed. Rescue 7/19/24   Ulisses Wyatt MD   amitriptyline (ELAVIL) 50 MG tablet Take 50 mg by mouth every evening. 3/8/24   Provider, Historical   bisacodyL (DULCOLAX) 5 mg EC tablet Take 1 tablet by mouth every other day. 3/23/24   Provider, Historical   cyclobenzaprine (FLEXERIL) 5 MG tablet Take 5 mg by mouth 3 (three) times daily as needed. 5/21/24   Provider, Historical   dextrose 5 % (D5W) SolP 250 mL with amikacin 250  mg/mL Soln 950 mg Inject 950 mg into the vein every Tues, Thurs, Sat. 3/26/24   Kandace Simon MD   dolutegravir (TIVICAY) 50 mg Tab Take 1 tablet (50 mg total) by mouth once daily. 3/26/24   Luis Armando Moreland MD   dolutegravir-lamivudine  mg Tab Take 1 tablet by mouth once daily. 3/22/24   Provider, Historical   ethambutoL (MYAMBUTOL) 400 MG Tab Take 2 tablets (800 mg total) by mouth once daily. 3/26/24   Luis Armando Moreland MD   gabapentin (NEURONTIN) 400 MG capsule Take 400 mg by mouth 2 (two) times daily. 2/18/24   Provider, Historical   HYDROcodone-acetaminophen (NORCO)  mg per tablet Take 1 tablet by mouth every 6 (six) hours as needed. 3/9/24   Provider, Historical   hydrOXYzine pamoate (VISTARIL) 25 MG Cap Take 25 mg by mouth 3 (three) times daily as needed. 5/21/24   Provider, Historical   Lactobacillus rhamnosus GG (CULTURELLE) 10 billion cell capsule Take 1 capsule by mouth 2 (two) times daily. 3/22/24   Provider, Historical   lamiVUDine (EPIVIR) 300 mg tablet Take 1 tablet (300 mg total) by mouth once daily. 3/26/24 3/26/25  Luis Armando Moreland MD   levoFLOXacin (LEVAQUIN) 500 MG tablet Take 1 tablet (500 mg total) by mouth once daily. 7/19/24   Ulisses Wyatt MD   LIDOcaine (LIDODERM) 5 % Place 1 patch onto the skin once daily. 3/23/24   Provider, Historical   megestroL (MEGACE) 400 mg/10 mL (40 mg/mL) Susp Take 400 mg by mouth once daily. 3/22/24   Provider, Historical   methocarbamoL (ROBAXIN) 500 MG Tab Take 500 mg by mouth 4 (four) times daily. 1/19/24   Provider, Historical   mirtazapine (REMERON SOL-TAB) 15 MG disintegrating tablet Take 1 tablet by mouth every evening. 5/21/24   Provider, Historical   naloxone (NARCAN) 4 mg/actuation Spry 1 spray by Nasal route once. 3/26/24   Provider, Historical   oxyCODONE (ROXICODONE) 10 mg Tab immediate release tablet Take 10 mg by mouth every 6 (six) hours as needed. 1/19/24   Provider, Historical   polyethylene glycol (GLYCOLAX) 17 gram  PwPk Take 17 g by mouth 2 (two) times daily. 3/22/24   Provider, Historical   QUEtiapine (SEROQUEL) 100 MG Tab Take 100 mg by mouth every evening. 3/22/24   Provider, Historical   rifabutin (MYCOBUTIN) 150 mg Cap Take 300 mg by mouth once daily. 5/21/24   Provider, Historical   rifAMpin (RIFADIN) 300 MG capsule Take 1 capsule (300 mg total) by mouth once daily. Take as instructed by [facility]. 3/25/24   Kandace Simon MD   senna (SENOKOT) 8.6 mg tablet Take 17.2 mg by mouth 2 (two) times daily. 3/22/24   Provider, Historical   sulfamethoxazole-trimethoprim 800-160mg (BACTRIM DS) 800-160 mg Tab Take 1 tablet by mouth 3 (three) times a week. 1/19/24   Provider, Historical   valACYclovir (VALTREX) 1000 MG tablet Take 1 tablet (1,000 mg total) by mouth 2 (two) times daily. for 10 days 11/22/22 12/2/22  Dorothea Alfaro MD   zinc oxide (BOUDREAUXS BUTT PASTE) 16 % Oint ointment Apply topically. 5/23/24   Provider, Historical     Anticoagulants/Antiplatelets: no anticoagulation    Allergies: Review of patient's allergies indicates:  No Known Allergies  Sedation History:  no adverse reactions    Review of Systems:   Unable to assess due to patient's mental status        OBJECTIVE:     Vital Signs (Most Recent)  Temp: 97.3 °F (36.3 °C) (08/12/24 1151)  Pulse: 98 (08/12/24 1151)  Resp: (!) 22 (08/12/24 1151)  BP: (!) 93/54 (08/12/24 1151)  SpO2: 97 % (08/12/24 1151)    Physical Exam:  ASA: class 4  Mallampati: class III    General: acutely ill appearing, somnolent, but responds to some questions with yes and no.  HEENT: normocephalic, atraumatic  Chest: unlabored breathing  Heart: regular heart rate  Abdomen: nondistended  Extremity: moves all extremities    Laboratory  Lab Results   Component Value Date    INR 1.2 08/12/2024       Lab Results   Component Value Date    WBC 21.23 (H) 08/12/2024    HGB 9.2 (L) 08/12/2024    HCT 29.1 (L) 08/12/2024    MCV 77 (L) 08/12/2024     08/12/2024      Lab  Results   Component Value Date    GLU 96 08/12/2024     (L) 08/12/2024    K 4.6 08/12/2024     08/12/2024    CO2 21 (L) 08/12/2024    BUN 17 08/12/2024    CREATININE 0.6 08/12/2024    CALCIUM 8.3 (L) 08/12/2024    MG 2.2 08/12/2024    ALT 5 (L) 08/12/2024    AST 20 08/12/2024    ALBUMIN 1.8 (L) 08/12/2024    BILITOT 0.3 08/12/2024       ASSESSMENT/PLAN:     Sedation Plan: per anesthesia team    Due to the patient's mental status, the procedure was discussed with the patient's mother over the telephone. After thorough discussion regarding the risks and benefits of the procedure, the patient's mother elected to proceed, and formal consent was obtained. Patient will undergo US guided percutaneous aspiration and drainage of her retroperitoneal abscess.    Isra Payne MD PGY-2  Department of Radiology  [facility]

## 2024-08-12 NOTE — ASSESSMENT & PLAN NOTE
CT Tsp/Lsp w/ contrast 8/11: 14.0x5.8x15.5cm paravertebral abscess on left side, osteomyelitis/discitis at T8-10, L2-4     - frequent neurochecks  - neurosurgery consulted, appreciate input  - MRI w/ wo contrast of thoracic and lumbar spine pending

## 2024-08-12 NOTE — PLAN OF CARE
Infectious Diseases Plan of Care Note    Chart reviewed only. Patient out of room for IR procedure.     Amie Salmeron is a 59 year old woman with advanced HIV (on dovato), vertebral MAC (recently on rifabutin, azithromycin and ethambutol - previously on amikacin) who was admitted for dyspnea and hypotension. Imaging demonstrated large thoracic paravertebral/retroperitoneal abscess. At least until 7/15 she was on directed therapy for MAC.  She was admitted to MICU for hypotension requiring pressors.     Recommendations  - continue vancomycin and pip-tazo  - continue dolutegravir/lamivudine  - continue bactrim for PJP ppx  - AFB culture added to retroperitoneal abscess   - will follow blood and urine cultures    Full consult to follow     Zina Middleton MD  Infectious Diseases Staff

## 2024-08-12 NOTE — HPI
60 yo female with HIV (followed by [facility], on dovato), spinal MAC (reportedly on ALESSIA) s/p T11-L2 corpectomy 2/2024 transferred from [facility] for higher level of care for weakness and L back pain. ID consulted for antibiotic recommendations. CT at OSH notable for large paravertebral abscesses, paravertebral phlegmon with OM T8-L4 and LLL pulm nodule. Blood cx growing MRSA. Pt is currently on vancomycin, zosyn. Of note, pt was recently admitted at [facility] for back pain 2/2 to her dMAC - she was discharged on ALESSIA therapy. Infectious Disease consulted for further management.

## 2024-08-12 NOTE — NURSING
Nurses Note -- 4 Eyes      8/12/2024   2:41 AM      Skin assessed during: Admit      [] No Altered Skin Integrity Present    []Prevention Measures Documented      [x] Yes- Altered Skin Integrity Present or Discovered   [] LDA Added if Not in Epic (Describe Wound)   [] New Altered Skin Integrity was Present on Admit and Documented in LDA   [] Wound Image Taken    Wound Care Consulted? Yes    Attending Nurse:   Bill Jensen RN    Second RN/Staff Member:   Neha Villa RN      Redness to buttock. Foam applied.

## 2024-08-12 NOTE — ANESTHESIA PREPROCEDURE EVALUATION
[facility]  Anesthesia Pre-Operative Evaluation         Patient Name: Amie Salmeron  YOB: 1965  MRN: [id number]    SUBJECTIVE:     Pre-operative evaluation for IR Abscess Drainage with Tube Placement     08/12/2024    Amie Salmeron is a 59 y.o. female w/ a significant PMHx of HTN, uncontrolled HIV noncompliant with HAART, spinal MAC, spinal osteo s/p T11-L2 corpectomy with fixation by ortho on 2/27/24 at [facility] who [place] [facility] [place] on 8/11/24 with generalized malaise, fatigue, and lower left sided back pain x 1 week. CT TL w/ contrast showed large left T11-L2 paravertebral retroperitoneal abscess increased from the prior study measuring approximately 14.0 x 5.8 x 15.5 cm. The abscess crosses the midline to the right involving the vertebral bodies and surgical hardware also. 3.7 cm right periaortic component of the abscess at T12-L1 level. PT currently on pressors in the ICU 2/2 hypotension 2/2 sepsis.    Patient now presents for the above procedure(s).      LDA:        Peripheral IV - Single Lumen 08/11/24 1446 18 G Right Antecubital (Active)   Site Assessment Clean;Dry;Intact;No redness;No swelling 08/12/24 1200   Extremity Assessment Distal to IV No abnormal discoloration;No redness;No swelling;No warmth 08/12/24 1200   Line Status Flushed;Blood return noted;Infusing 08/12/24 1200   Dressing Status Clean;Dry;Intact 08/12/24 1200   Dressing Intervention Integrity maintained 08/12/24 1200   Dressing Change Due 08/15/24 08/12/24 1200   Site Change Due 08/15/24 08/12/24 1200   Reason Not Rotated Not due 08/12/24 1200   Number of days: 0            Peripheral IV - Single Lumen 08/11/24 2325 20 G Anterior;Left Wrist (Active)   Site Assessment Clean;Dry;Intact;No redness;No swelling 08/12/24 1200   Extremity Assessment Distal to IV No abnormal discoloration;No redness;No swelling;No warmth 08/12/24 1200   Line Status Flushed;Blood return  noted;Infusing 08/12/24 1200   Dressing Status Clean;Dry;Intact 08/12/24 1200   Dressing Intervention Integrity maintained 08/12/24 1200   Dressing Change Due 08/15/24 08/12/24 1200   Site Change Due 08/15/24 08/12/24 1200   Reason Not Rotated Not due 08/12/24 1200   Number of days: 0       Female External Urinary Catheter w/ Suction 08/11/24 1600 (Active)   Skin no redness;no breakdown 08/12/24 1200   Tolerance no signs/symptoms of discomfort 08/12/24 1200   Suction Continuous suction at 60 mmHg 08/12/24 1200   Date of last wick change 08/12/24 08/12/24 0238   Time of last wick change 1925 08/11/24 1931   Output (mL) 100 mL 08/11/24 2204   Number of days: 0       Prev airway: None documented.    Drips: None documented.      Patient Active Problem List   Diagnosis    Essential hypertension, benign    Chronic pain    Depression    Overactive bladder    Hypercholesteremia    Arthrosis of knee    Deviated nasal septum    AIDS    Weight loss    Diarrhea    Thrush    Candida esophagitis    Cough with fever    FUO (fever of unknown origin)    Weakness    Hyponatremia    Mycobacterium avium infection    Insomnia    Osteomyelitis of vertebra of thoracolumbar region    Disseminated mycobacterium avium-intracellulare complex    Hypokalemia    Myalgia    Vertebral abscess       Review of patient's allergies indicates:  No Known Allergies    Current Inpatient Medications:      Current Facility-Administered Medications on File Prior to Visit   Medication Dose Route Frequency Provider Last Rate Last Admin    dextrose 10% bolus 125 mL 125 mL  12.5 g Intravenous PRN Aneta Chiang MD   Stopped at 08/12/24 1107    dextrose 10% bolus 250 mL 250 mL  25 g Intravenous PRN Aneta Chiang MD        dolutegravir Tab 50 mg  50 mg Oral Daily Aneta Chiang MD   50 mg at 08/12/24 1014    gabapentin capsule 400 mg  400 mg Oral BID Aneta Chiang MD   400 mg at 08/12/24 1014    glucagon (human recombinant) injection 1 mg  1 mg Intramuscular PRN  Aneta Chiang MD        glucose chewable tablet 16 g  16 g Oral PRN Aneta Chiang MD        glucose chewable tablet 24 g  24 g Oral PRN Aneta Chiang MD        insulin aspart U-100 pen 0-5 Units  0-5 Units Subcutaneous QID (AC + HS) PRN Aneta Chiang MD        lactated ringers infusion   Intravenous Continuous Dylon Zamora  mL/hr at 08/12/24 1200 Rate Verify at 08/12/24 1200    lamiVUDine tablet 300 mg  300 mg Oral Daily Aneta Chiang MD   300 mg at 08/12/24 1014    methocarbamoL tablet 500 mg  500 mg Oral QID Aneta Chiang MD   500 mg at 08/12/24 1014    morphine injection 2 mg  2 mg Intravenous Q6H PRN Sandy Schrader H., NP        mupirocin 2 % ointment 1 g  1 g Nasal BID Mally Novak MD   1 g at 08/12/24 1015    mupirocin 2 % ointment   Nasal On Call Procedure Mally Novak MD   Given at 08/12/24 0940    naloxone 0.4 mg/mL injection 0.02 mg  0.02 mg Intravenous PRN Aneta Chiang MD        NORepinephrine bitartrate-D5W 4 mg/250 mL (16 mcg/mL) PERIPHERAL access infusion  0-0.2 mcg/kg/min Intravenous Continuous RacielSandy, NP 3.5 mL/hr at 08/12/24 1200 0.02 mcg/kg/min at 08/12/24 1200    piperacillin-tazobactam (ZOSYN) 4.5 g in D5W 100 mL IVPB (MB+)  4.5 g Intravenous Q8H Aneta Chiang MD 25 mL/hr at 08/12/24 1200 Rate Verify at 08/12/24 1200    QUEtiapine tablet 100 mg  100 mg Oral QHS Aneta Chiang MD        sodium chloride 0.9% flush 10 mL  10 mL Intravenous Q12H PRN Aneta Chiang MD        sulfamethoxazole-trimethoprim 800-160mg per tablet 1 tablet  1 tablet Oral Once per day on Monday Wednesday Friday Aneta Chiang MD   1 tablet at 08/12/24 0900    vancomycin (VANCOCIN) 1,000 mg in D5W 250 mL IVPB (Vial-Mate)  1,000 mg Intravenous Q24H Aneta Chiang MD        vancomycin - pharmacy to dose   Intravenous pharmacy to manage frequency Aneta Chiang MD         Current Outpatient Medications on File Prior to Visit   Medication Sig Dispense Refill    0.9 % sodium chloride (SODIUM CHLORIDE  0.9%) solution Inject into the vein once daily.      albuterol (PROVENTIL/VENTOLIN HFA) 90 mcg/actuation inhaler Inhale 2 puffs into the lungs every 6 (six) hours as needed. Rescue 8 g 0    amitriptyline (ELAVIL) 50 MG tablet Take 50 mg by mouth every evening.      bisacodyL (DULCOLAX) 5 mg EC tablet Take 1 tablet by mouth every other day.      cyclobenzaprine (FLEXERIL) 5 MG tablet Take 5 mg by mouth 3 (three) times daily as needed.      dextrose 5 % (D5W) SolP 250 mL with amikacin 250 mg/mL Soln 950 mg Inject 950 mg into the vein every Tues, Thurs, Sat.      dolutegravir (TIVICAY) 50 mg Tab Take 1 tablet (50 mg total) by mouth once daily.      dolutegravir-lamivudine  mg Tab Take 1 tablet by mouth once daily.      ethambutoL (MYAMBUTOL) 400 MG Tab Take 2 tablets (800 mg total) by mouth once daily.      gabapentin (NEURONTIN) 400 MG capsule Take 400 mg by mouth 2 (two) times daily.      HYDROcodone-acetaminophen (NORCO)  mg per tablet Take 1 tablet by mouth every 6 (six) hours as needed.      hydrOXYzine pamoate (VISTARIL) 25 MG Cap Take 25 mg by mouth 3 (three) times daily as needed.      Lactobacillus rhamnosus GG (CULTURELLE) 10 billion cell capsule Take 1 capsule by mouth 2 (two) times daily.      lamiVUDine (EPIVIR) 300 mg tablet Take 1 tablet (300 mg total) by mouth once daily. 30 tablet 11    levoFLOXacin (LEVAQUIN) 500 MG tablet Take 1 tablet (500 mg total) by mouth once daily. 7 tablet 0    LIDOcaine (LIDODERM) 5 % Place 1 patch onto the skin once daily.      megestroL (MEGACE) 400 mg/10 mL (40 mg/mL) Susp Take 400 mg by mouth once daily.      methocarbamoL (ROBAXIN) 500 MG Tab Take 500 mg by mouth 4 (four) times daily.      mirtazapine (REMERON SOL-TAB) 15 MG disintegrating tablet Take 1 tablet by mouth every evening.      naloxone (NARCAN) 4 mg/actuation Spry 1 spray by Nasal route once.      oxyCODONE (ROXICODONE) 10 mg Tab immediate release tablet Take 10 mg by mouth every 6 (six) hours as  needed.      polyethylene glycol (GLYCOLAX) 17 gram PwPk Take 17 g by mouth 2 (two) times daily.      QUEtiapine (SEROQUEL) 100 MG Tab Take 100 mg by mouth every evening.      rifabutin (MYCOBUTIN) 150 mg Cap Take 300 mg by mouth once daily.      rifAMpin (RIFADIN) 300 MG capsule Take 1 capsule (300 mg total) by mouth once daily. Take as instructed by [facility].      senna (SENOKOT) 8.6 mg tablet Take 17.2 mg by mouth 2 (two) times daily.      sulfamethoxazole-trimethoprim 800-160mg (BACTRIM DS) 800-160 mg Tab Take 1 tablet by mouth 3 (three) times a week.      valACYclovir (VALTREX) 1000 MG tablet Take 1 tablet (1,000 mg total) by mouth 2 (two) times daily. for 10 days 20 tablet 0    zinc oxide (BOUDREAUXS BUTT PASTE) 16 % Oint ointment Apply topically.         Past Surgical History:   Procedure Laterality Date    ADENOIDECTOMY      APPENDECTOMY      BACK SURGERY      GERALD    CYST REMOVAL      HYSTERECTOMY      JOINT REPLACEMENT Right     knee, with revision    KNEE SURGERY Left     knee replacement    RHIZOTOMY      TONSILLECTOMY      TOTAL ANKLE ARTHROPLASTY Right        Social History     Socioeconomic History    Marital status: Single   Occupational History    Occupation: on disability   Tobacco Use    Smoking status: Never    Smokeless tobacco: Never   Substance and Sexual Activity    Alcohol use: Yes     Comment: Socially    Drug use: No    Sexual activity: Yes     Partners: Male     Birth control/protection: None     Social Determinants of Health     Financial Resource Strain: Low Risk  (3/24/2024)    Overall Financial Resource Strain (CARDIA)     Difficulty of Paying Living Expenses: Not hard at all   Food Insecurity: Patient Declined (4/24/2024)    Received from [facility]    Hunger Vital Sign     Worried About Running Out of Food in the Last Year: Patient declined     Ran Out of Food in the Last Year: Patient declined   Transportation Needs: Patient Declined (4/24/2024)    Received from [facility]    PRAPARE - Transportation     Lack of Transportation (Medical): Patient declined     Lack of Transportation (Non-Medical): Patient declined   Physical Activity: Inactive (3/24/2024)    Exercise Vital Sign     Days of Exercise per Week: 0 days     Minutes of Exercise per Session: 0 min   Stress: No Stress Concern Present (12/7/2022)    [employer] [place] [employer] - Occupational Stress Questionnaire     Feeling of Stress : Only a little   Housing Stability: Low Risk  (3/24/2024)    Housing Stability Vital Sign     Unable to Pay for Housing in the Last Year: No     Number of Places Lived in the Last Year: 1     Unstable Housing in the Last Year: No       OBJECTIVE:     Vital Signs Range (Last 24H):  Temp:  [36.3 °C (97.3 °F)-37.2 °C (98.9 °F)]   Pulse:  []   Resp:  [14-22]   BP: ()/(43-69)   SpO2:  [96 %-100 %]       Significant Labs:  Lab Results   Component Value Date    WBC 21.23 (H) 08/12/2024    HGB 9.2 (L) 08/12/2024    HCT 29.1 (L) 08/12/2024     08/12/2024    CHOL 234 (H) 04/15/2015    TRIG 165 (H) 04/15/2015    HDL 45 04/15/2015    ALT 5 (L) 08/12/2024    AST 20 08/12/2024     (L) 08/12/2024    K 4.6 08/12/2024     08/12/2024    CREATININE 0.6 08/12/2024    BUN 17 08/12/2024    CO2 21 (L) 08/12/2024    TSH 1.52 07/27/2024    INR 1.2 08/12/2024    HGBA1C 5.5 12/10/2023       Diagnostic Studies: No relevant studies.    EKG:   Results for orders placed or performed during the hospital encounter of 08/11/24   EKG 12-lead    Collection Time: 08/11/24  2:43 PM   Result Value Ref Range    QRS Duration 84 ms    O[facility] QTC Calculation 359 ms    Narrative    Test Reason : R06.02,    Vent. Rate : 080 BPM     Atrial Rate : 080 BPM     P-R Int : 120 ms          QRS Dur : 084 ms      QT Int : 312 ms       P-R-T Axes : 061 041 024 degrees     QTc Int : 359 ms    Normal sinus rhythm  Nonspecific T wave abnormality  Abnormal ECG  When compared with ECG of 15-TANG-2024  08:32,  Nonspecific T wave abnormality, worse in Inferior leads  Nonspecific T wave abnormality, improved in Anterior leads  Confirmed by Shreya ALLEN, Carlos DE LEÓN ([id number]) on 8/12/2024 11:53:07 AM    Referred By: [email or web address]   SELF           Confirmed By:Carlos Cash MD       2D ECHO:  TTE:  No results found for this or any previous visit.    TEJAL:  No results found for this or any previous visit.    ASSESSMENT/PLAN:                                                                                                    08/12/2024  Amie Salmeron is a 59 y.o., female.      Pre-op Assessment    I have reviewed the Patient Summary Reports.       I have reviewed the Medications.     Review of Systems  Anesthesia Hx:  No problems with previous Anesthesia   History of prior surgery of interest to airway management or planning:  Previous anesthesia: General         Personal Hx of Anesthesia complications   Difficult Intubation, according to patient history                  Social:  Non-Smoker, No Alcohol Use       EENT/Dental:  chronic allergic rhinitis           Cardiovascular:     Hypertension           hyperlipidemia                       Hypertension         Pulmonary:    Asthma       Asthma:               Musculoskeletal:  Arthritis               Psych:  Psychiatric History  depression                Physical Exam  General: Well nourished, Cooperative, Alert and Oriented    Airway:  Mallampati: II   Mouth Opening: Normal  TM Distance: Normal  Tongue: Normal  Neck ROM: Normal ROM    Dental:  Intact        Anesthesia Plan  Type of Anesthesia, risks & benefits discussed:    Anesthesia Type: MAC, Gen ETT, Gen Supraglottic Airway  Intra-op Monitoring Plan: Standard ASA Monitors  Post Op Pain Control Plan: multimodal analgesia  Induction:  IV  ASA Score: 4 Emergent  Day of Surgery Review of History & Physical: H&P Update referred to the surgeon/provider.    Ready For Surgery From Anesthesia Perspective.     .

## 2024-08-12 NOTE — H&P
[facility]  Critical Care Medicine  History & Physical    Patient Name: Amie Salmeron  MRN: [id number]  Admission Date: 8/12/2024  Hospital Length of Stay: 0 days  Code Status: Full Code  Attending Physician: Dre Thacker MD   Primary Care Provider: Anuradha, Primary Doctor   Principal Problem: Osteomyelitis of vertebra of thoracolumbar region    Subjective:     HPI:  Ms. Amie Salmeron is a 60 yo F with PMHx of HTN, uncontrolled HIV noncompliant with HAART, spinal MAC, spinal osteo s/p T11-L2 corpectomy with fixation by ortho on 2/27/24 at [facility] who presents to [facility] as a transfer from [place] on 8/11/24 with generalized malaise, fatigue, and lower left sided back pain x 1 week. Pt denies fevers, chills, bowel/bladder incontinence, weakness, or numbness. In the [facility] ED, patient was afebrile but borderline hypotensive which improved with IV fluids. Noted to have WBC 16.6, K 2.4, albumin 1.9, normal lactic. Blood cultures were obtained. Patient was admitted to  at [place] and CT TL w/ contrast showed large left T11-L2 paravertebral retroperitoneal abscess increased from the prior study measuring approximately 14.0 x 5.8 x 15.5 cm.  The abscess crosses the midline to the right involving the vertebral bodies and surgical hardware also 3.7 cm right periaortic component of the abscess at T12-L1 level. Potassium was repleted and vanc/zosyn given prior to transfer to to  at [facility] for neurosurgery and IR evaluation.    Critical Care Medicine was consulted for persistent hypotension despite IVF bolus this AM, and she transferred to the ICU for vasopressor support.      Hospital/ICU Course:  No notes on file     Past Medical History:   Diagnosis Date    Allergy     Arthritis     Asthma     Chronic pain     HIV infection     Hypertension     Overactive bladder     Spinal stenosis     Urine incontinence        Past Surgical History:   Procedure Laterality Date    ADENOIDECTOMY      APPENDECTOMY      BACK  SURGERY      GERALD    CYST REMOVAL      HYSTERECTOMY      JOINT REPLACEMENT Right     knee, with revision    KNEE SURGERY Left     knee replacement    RHIZOTOMY      TONSILLECTOMY      TOTAL ANKLE ARTHROPLASTY Right        Review of patient's allergies indicates:  No Known Allergies    Family History       Problem Relation (Age of Onset)    Alcohol abuse Brother    Cancer Father    Depression Sister    Hypertension Mother, Brother    Thyroid disease Mother          Tobacco Use    Smoking status: Never    Smokeless tobacco: Never   Substance and Sexual Activity    Alcohol use: Yes     Comment: Socially    Drug use: No    Sexual activity: Yes     Partners: Male     Birth control/protection: None      Review of Systems   Constitutional:  Positive for activity change and fatigue. Negative for chills and fever.   Eyes:  Negative for visual disturbance.   Respiratory:  Negative for shortness of breath and wheezing.    Cardiovascular:  Negative for chest pain and leg swelling.   Gastrointestinal:  Negative for abdominal pain, nausea and vomiting.        - bowel incontinence   Genitourinary:  Negative for difficulty urinating.        - urinary incontinence   Musculoskeletal:  Positive for back pain and myalgias.   Neurological:  Positive for weakness.     Objective:     Vital Signs (Most Recent):  Temp: 98.2 °F (36.8 °C) (08/12/24 1500)  Pulse: 99 (08/12/24 1700)  Resp: 20 (08/12/24 1700)  BP: (!) 93/56 (08/12/24 1700)  SpO2: 100 % (08/12/24 1700) Vital Signs (24h Range):  Temp:  [97.3 °F (36.3 °C)-98.9 °F (37.2 °C)] 98.2 °F (36.8 °C)  Pulse:  [] 99  Resp:  [14-22] 20  SpO2:  [96 %-100 %] 100 %  BP: ()/(43-69) 93/56   Weight: 46.1 kg (101 lb 10.1 oz)  Body mass index is 17.45 kg/m².      Intake/Output Summary (Last 24 hours) at 8/12/2024 1750  Last data filed at 8/12/2024 1700  Gross per 24 hour   Intake 2906.78 ml   Output 1405 ml   Net 1501.78 ml          Physical Exam  Vitals and nursing note reviewed.    Constitutional:       Appearance: She is cachectic. She is ill-appearing.   Cardiovascular:      Rate and Rhythm: Normal rate.      Pulses: Decreased pulses.   Pulmonary:      Effort: No respiratory distress.      Breath sounds: Normal breath sounds.   Musculoskeletal:        Arms:       Comments: Area of erythema and warmth; picture in media tab   Skin:     General: Skin is cool.   Neurological:      Mental Status: She is alert.      GCS: GCS eye subscore is 4. GCS verbal subscore is 5. GCS motor subscore is 6.      Motor: Weakness present.   Psychiatric:         Behavior: Behavior is cooperative.        Vents:     Lines/Drains/Airways       Drain  Duration                  Urethral Catheter 08/12/24 1519 <1 day              Peripheral Intravenous Line  Duration                  Peripheral IV - Single Lumen 08/11/24 1446 18 G Right Antecubital 1 day         Peripheral IV - Single Lumen 08/11/24 2325 20 G Anterior;Left Wrist <1 day         Peripheral IV - Single Lumen 08/12/24 0401 20 G Anterior;Right Upper Arm <1 day                  Significant Labs:    CBC/Anemia Profile:  Recent Labs   Lab 08/12/24  0253 08/12/24  0645 08/12/24  0708 08/12/24  1035   WBC 18.60*  --  24.35* 21.23*   HGB 9.7*  --  8.2* 9.2*   HCT 32.0* 24* 25.7* 29.1*   *  --  426 412   MCV 77*  --  76* 77*   RDW 18.7*  --  18.6* 18.9*        Chemistries:  Recent Labs   Lab 08/11/24  1449 08/12/24  0253    135*   K 2.4* 4.6    105   CO2 22* 21*   BUN 17 17   CREATININE 0.7 0.6   CALCIUM 8.6* 8.3*   ALBUMIN 1.9* 1.8*   PROT 6.2 5.9*   BILITOT 0.4 0.3   ALKPHOS 110 110   ALT 6* 5*   AST 17 20   MG  --  2.2   PHOS  --  2.2*       All pertinent labs within the past 24 hours have been reviewed.    Significant Imaging: I have reviewed all pertinent imaging results/findings within the past 24 hours.  Assessment/Plan:     ID  * Osteomyelitis of vertebra of thoracolumbar region  CT Tsp/Lsp w/ contrast 8/11: 14.0x5.8x15.5cm  paravertebral abscess on left side, osteomyelitis/discitis at T8-10, L2-4     - frequent neurochecks  - neurosurgery consulted, appreciate input  - MRI w/ wo contrast of thoracic and lumbar spine pending     Septic shock  Suspect septic shock in the setting of AIDS with large spinal abscess. Remained hypotensive despite adequate fluid resuscitation.    - continue vancomycin and pip-tazo; appreciate ID input  - f/u cultures  - s/p abscess drainage by IR  - titrate norepi for MAP >65    Disseminated mycobacterium avium-intracellulare complex  Vertebral MAC (recently on rifabutin, azithromycin and ethambutol - previously on amikacin)     - ID consulted; appreciate input    AIDS  Advanced HIV (on dovato). Reported noncompliance noted.    - continue dolutegravir/lamivudine  - continue bactrim for PJP ppx       Critical Care Time: 45 minutes  Critical secondary to Patient has a condition that poses threat to life and bodily function: shock on vasopressor     Critical care was time spent personally by me on the following activities: development of treatment plan with patient or surrogate and bedside caregivers, discussions with consultants, evaluation of patient's response to treatment, examination of patient, ordering and performing treatments and interventions, ordering and review of laboratory studies, ordering and review of radiographic studies, pulse oximetry, re-evaluation of patient's condition. This critical care time did not overlap with that of any other provider or involve time for any procedures.     Sandy Schrader NP  Critical Care Medicine  [facility]

## 2024-08-12 NOTE — ASSESSMENT & PLAN NOTE
Continued dolutegravir and lamivudine, continued pjp prophylactic bactrim    - f/u CD4 and HIV RNA collected at OSH

## 2024-08-12 NOTE — NURSING
Called notified team med 1 md west bp soft ordered another bolus of lr fluid now lr running at 100ml/hr patient retaining urine received order to straight cath straight cathed out 500ml. Patient blood sugar 70 gave 125 bolus of glucose. Follow up bg was 80. Patient lethargic falling asleep mid sentence is able to state name and place and knows month is August. Able to move all extremities and hold up for ten seconds smile and eye brows raised up even. Patient npo able to take meds with sip of water pain 8/10. Bp 86/55-92/56. CHG bath and labs orders. MRI ordered asked for kub and skull xray needed for mri md made aware. Rapid was at bedside at 1000 to check on patient bp still soft. Plan to go to ir to drain abscess and place drain they are going to check with md to see if patient is stable for the prcedure. Charge nurse, Marcos, came by bedside multiple times to also check on patient called md at 1000 for updates about current bp still running soft and how sugar went up to 80 and that he will follow up with mri to put in orders for xrays

## 2024-08-12 NOTE — HPI
Mrs. Salmeron is a 60 yo F with PMHx of HTN, uncontrolled HIV noncompliant with HAART, spinal MAC, spinal osteo s/p T11-L2 corpectomy with fixation by ortho on 2/27/24 at [facility] who presents to [facility] as a transfer from [place] on 8/11/24 with generalized malaise, fatigue, and lower left sided back pain x 1 week. Pt denies fevers, chills, bowel/bladder incontinence, weakness, or numbness. In the [facility] ED, patient was afebrile but borderline hypotensive which improved with IV fluids. Noted to have WBC 16.6, K 2.4, albumin 1.9, normal lactic. Blood cultures were obtained. Patient was admitted to  at [place] and CT TL w/ contrast showed large left T11-L2 paravertebral retroperitoneal abscess increased from the prior study measuring approximately 14.0 x 5.8 x 15.5 cm.  The abscess crosses the midline to the right involving the vertebral bodies and surgical hardware also.  3.7 cm right periaortic component of the abscess at T12-L1 level. Potassium was repleted and vanc/zosyn given prior to transfer to [facility]. Neurosurgery and IR consult with admission to .    On my evaluation, patient borderline hypotensive and afebrile. Currently complaining of pain but no focal neurologic symptoms. Pain localized to left lower back. OSH lab work pending.     Pt admitted to hospital medicine with NSGY, IR, and ID consulted to assist with management.

## 2024-08-12 NOTE — ASSESSMENT & PLAN NOTE
Advanced HIV (on dovato). Reported noncompliance noted.    - continue dolutegravir/lamivudine  - continue bactrim for PJP ppx

## 2024-08-12 NOTE — CONSULTS
"Medical Emergency Team Consult Note  Critical Care Medicine    CC: Osteomyelitis of vertebra of thoracolumbar region  Date: 08/12/2024  Admit Date: 8/12/2024  Hospital Length of Stay: 0  MRN: [id number]  The patient location is: Bed 556/556 A  Dx: Osteomyelitis of vertebra of thoracolumbar region  Code Status: Full Code   Chart Reviewed: 08/12/2024, 11:21 AM      SUBJECTIVE:     HPI:  Amie Salmeron has a past medical history of Allergy, Arthritis, Asthma, Chronic pain, HIV infection, Hypertension, Overactive bladder, Spinal stenosis, and Urine incontinence.    Significant Events: Called urgently to the bedside to evaluate the patient  for hypotension by the  nurse.         OBJECTIVE:     Physical Exam  Vitals and nursing note reviewed.   Constitutional:       Appearance: She is cachectic. She is ill-appearing.   Cardiovascular:      Rate and Rhythm: Normal rate.      Pulses: Decreased pulses.   Pulmonary:      Effort: No respiratory distress.      Breath sounds: Normal breath sounds.   Musculoskeletal:        Arms:       Comments: Area of erythema and warmth; picture in media tab   Skin:     General: Skin is cool.   Neurological:      Mental Status: She is alert.      GCS: GCS eye subscore is 4. GCS verbal subscore is 5. GCS motor subscore is 6.      Motor: Weakness present.   Psychiatric:         Behavior: Behavior is cooperative.       Last VS: BP (!) 72/43   Pulse 98   Temp 97.9 °F (36.6 °C) (Oral)   Resp 16   Ht 5' 4" (1.626 m)   Wt 46.1 kg (101 lb 10.1 oz)   SpO2 99%   BMI 17.45 kg/m²     24H Vital Sign Range:    Temp:  [97.3 °F (36.3 °C)-98.9 °F (37.2 °C)]   Pulse:  []   Resp:  [14-20]   BP: ()/(43-69)   SpO2:  [96 %-100 %]     Level of Consciousness (AVPU): alert      Intake/Output Summary (Last 24 hours) at 8/12/2024 1121  Last data filed at 8/12/2024 0800  Gross per 24 hour   Intake 500 ml   Output 500 ml   Net 0 ml       Recent Labs     08/11/24  1449 08/12/24  0253 08/12/24  0645 " 08/12/24  0708 08/12/24  1035   WBC 16.66* 18.60*  --  24.35* 21.23*   HGB 10.1* 9.7*  --  8.2* 9.2*   HCT 32.4* 32.0* 24* 25.7* 29.1*   * 501*  --  426  --        Recent Labs     08/11/24  1449 08/12/24  0253    135*   K 2.4* 4.6    105   CO2 22* 21*   BUN 17 17   CREATININE 0.7 0.6   GLU 98 96   PHOS  --  2.2*   MG  --  2.2        Recent Labs     08/12/24  0645   PH 7.381   PCO2 37.5   PO2 32*   HCO3 22.2*   POCSATURATED 61   BE -3*        Lab Results   Component Value Date    LACTATE 2.0 08/12/2024    LACTATE 0.7 03/23/2024    LACTATE 1.3 03/23/2024         ASSESSMENT AND PLAN :     Hypotension  Suspect 2/2 sepsis; spinal abscess noted. S/p 2L IVF's with only transient improvement in BP. Mentation is adequate.      - s/p volume resuscitation; transfer to ICU for norepi infusion   - Strict I+O  - Trend chemistries  - f/u imaging  - f/u cultures  - Neurosurgery and IR evaluations pending     Uninterrupted Critical Care/Counseling Time (not including procedures): 40 mins  Critical care was time spent personally by me on the following activities: development of treatment plan with patient or surrogate and bedside caregivers, discussions with consultants, evaluation of patient's response to treatment, examination of patient, ordering and performing treatments and interventions, ordering and review of laboratory studies, ordering and review of radiographic studies, pulse oximetry, re-evaluation of patient's condition. This critical care time did not overlap with that of any other provider or involve time for any procedures.    Sandy Schrader NP  Critical Care Medicine

## 2024-08-12 NOTE — ASSESSMENT & PLAN NOTE
Hx of spinal MAC  [facility] previous ID note showed continuation of ethambutol, rifampin, and azithromycin    - ID consulted  - continued ethambutol, rifampin

## 2024-08-12 NOTE — PLAN OF CARE
MICU DAILY GOALS     Family/Goals of care/Code Status   Code Status: Full Code    24H Vital Sign Range  Temp:  [97.3 °F (36.3 °C)-98.9 °F (37.2 °C)]   Pulse:  []   Resp:  [14-22]   BP: ()/(43-69)   SpO2:  [96 %-100 %]      Shift Events (include procedures and significant events)   No acute events throughout shift, patient admitted to MICU at 1200- AAOx4, levo started to maintain MAP >65; patient taken to IR for placement of drain in left retroperitoneal abscess- 380cc of fluid drained from abscess, 50mcg of fentanyl and propofol given during procedure- patient responding to stimulation and pain post procedure, CCT aware and okay with letting patient rest; sanchez placed for urinary retention- immediate output of 350cc urine;  Xray of skull and KUB ordered before f/u MRI of spine can be completed; continuous D5 at 50ml/hr started for persistent hypoglycemia; levo infusing at .12mcg/kg/hr; vitals stable; WCTM.      AWAKE RASS: Goal -    Actual -      Restraint necessity: Not necessary   BREATHE SBT: Not intubated    Coordinate A & B, analgesics/sedatives Pain: managed   SAT: Not intubated   Delirium CAM-ICU: Overall CAM-ICU: Negative   Early(intubated/ Progressive (non-intubated) Mobility MOVE Screen (INTUBATED ONLY): Not intubated    Activity: Activity Management: Rolling - L1   Feeding/Nutrition Diet order: Diet/Nutrition Received: NPO,     Thrombus DVT prophylaxis: VTE Required Core Measure: Pharmacological prophylaxis initiated/maintained   HOB Elevation Head of Bed (HOB) Positioning: HOB at 30 degrees   Ulcer Prophylaxis GI: no   Glucose control managed     Skin Skin assessed during: Daily Assessment    Sacrum intact/not altered? Yes  Heels intact/not altered? Yes  Surgical wound? Yes    CHECK ONE!   (no altered skin or altered skin) and sub boxes:  [] No Altered Skin Integrity Present    []Prevention Measures Documented    [] Altered Skin Integrity Present or Discovered   [] LDA present in EPIC, daily  doc completed              [] LDA added if not in EPIC (describe wound).                    When describing wound, do not stage, use descriptive words only.    [] Wound Image Taken (required on admit,                   transfer/discharge and every Tuesday)    Wound Care Consulted? No    4 EYES:  Attending Nurse (1st set of eyes):     Second RN/Staff Member (2nd set of eyes):    Bowel Function no issues    Indwelling Catheter Necessity      Urethral Catheter 08/12/24 1519-Reason for Continuing Urinary Catheterization: Urinary retention, Critically ill in ICU and requiring hourly monitoring of intake/output       yes   De-escalation Antibiotics No        VS and assessment per flow sheet, patient progressing towards goals as tolerated, plan of care reviewed with  patient and mother , all concerns addressed, will continue to monitor.

## 2024-08-12 NOTE — PROCEDURES
"  Pre Op Diagnosis: left retroperitoneal abscess  Post Op Diagnosis: Same    Procedure: drain placement    Procedure performed by: Otilio    Written Informed Consent Obtained: Yes  Specimen Removed: YES see imaging for total  Estimated Blood Loss: Minimal    Findings:   Successful placement of 14 fr drain in left retroperitoneal abscess.     Patient tolerated procedure well.    Waldemar Yañez MD ([place])  Interventional Radiology  [phone]      "

## 2024-08-12 NOTE — H&P
[facility] Medicine  History & Physical    Patient Name: Amie Salmeron  MRN: [id number]  Patient Class: IP- Inpatient  Admission Date: 8/12/2024  Attending Physician: Patrick Chinchilla MD   Primary Care Provider: Anuradha Primary Doctor         Patient information was obtained from patient, past medical records, and ER records.     Subjective:     Principal Problem:Osteomyelitis of vertebra of thoracolumbar region    Chief Complaint: No chief complaint on file.       HPI: Mrs. Salmeron is a 60 yo F with PMHx of HTN, uncontrolled HIV noncompliant with HAART, spinal MAC, spinal osteo s/p T11-L2 corpectomy with fixation by ortho on 2/27/24 at [facility] who presents to [facility] as a transfer from [place] on 8/11/24 with generalized malaise, fatigue, and lower left sided back pain x 1 week. Pt denies fevers, chills, bowel/bladder incontinence, weakness, or numbness. In the [facility] ED, patient was afebrile but borderline hypotensive which improved with IV fluids. Noted to have WBC 16.6, K 2.4, albumin 1.9, normal lactic. Blood cultures were obtained. Patient was admitted to  at [place] and CT TL w/ contrast showed large left T11-L2 paravertebral retroperitoneal abscess increased from the prior study measuring approximately 14.0 x 5.8 x 15.5 cm.  The abscess crosses the midline to the right involving the vertebral bodies and surgical hardware also.  3.7 cm right periaortic component of the abscess at T12-L1 level. Potassium was repleted and vanc/zosyn given prior to transfer to [facility]. Neurosurgery and IR consult with admission to .    On my evaluation, patient borderline hypotensive and afebrile. Currently complaining of pain but no focal neurologic symptoms. Pain localized to left lower back. OSH lab work pending.     Pt admitted to hospital medicine with NSGY, IR, and ID consulted to assist with management.     Past Medical History:   Diagnosis Date    Allergy     Arthritis     Asthma     Chronic pain     HIV  infection     Hypertension     Overactive bladder     Spinal stenosis     Urine incontinence        Past Surgical History:   Procedure Laterality Date    ADENOIDECTOMY      APPENDECTOMY      BACK SURGERY      GERALD    CYST REMOVAL      HYSTERECTOMY      JOINT REPLACEMENT Right     knee, with revision    KNEE SURGERY Left     knee replacement    RHIZOTOMY      TONSILLECTOMY      TOTAL ANKLE ARTHROPLASTY Right        Review of patient's allergies indicates:  No Known Allergies    Current Facility-Administered Medications on File Prior to Encounter   Medication    [COMPLETED] iohexoL (OMNIPAQUE 350) injection 80 mL    [COMPLETED] magnesium sulfate 2g in water 50mL IVPB (premix)    [COMPLETED] potassium bicarbonate disintegrating tablet 50 mEq    [COMPLETED] potassium chloride 10 mEq in 100 mL IVPB    [COMPLETED] potassium chloride 10 mEq in 100 mL IVPB    [COMPLETED] sodium chloride 0.9% bolus 1,000 mL 1,000 mL    [COMPLETED] vancomycin (VANCOCIN) 1,000 mg in D5W 250 mL IVPB (Vial-Mate)    [DISCONTINUED] 0.9 % NaCl with KCl 40 mEq infusion    [DISCONTINUED] acetaminophen tablet 650 mg    [DISCONTINUED] amitriptyline tablet 50 mg    [DISCONTINUED] cyclobenzaprine tablet 5 mg    [DISCONTINUED] dextrose 10% bolus 125 mL 125 mL    [DISCONTINUED] dextrose 10% bolus 250 mL 250 mL    [DISCONTINUED] dolutegravir Tab 50 mg    [DISCONTINUED] gabapentin capsule 400 mg    [DISCONTINUED] GENERIC EXTERNAL MEDICATION    [DISCONTINUED] GENERIC EXTERNAL MEDICATION    [DISCONTINUED] GENERIC EXTERNAL MEDICATION    [DISCONTINUED] GENERIC EXTERNAL MEDICATION    [DISCONTINUED] GENERIC EXTERNAL MEDICATION    [DISCONTINUED] glucagon (human recombinant) injection 1 mg    [DISCONTINUED] glucose chewable tablet 16 g    [DISCONTINUED] glucose chewable tablet 24 g    [DISCONTINUED] lamiVUDine tablet 300 mg    [DISCONTINUED] mirtazapine tablet 15 mg    [DISCONTINUED] naloxone 0.4 mg/mL injection 0.02 mg    [DISCONTINUED] ondansetron injection 4 mg     [DISCONTINUED] piperacillin-tazobactam (ZOSYN) 4.5 g in D5W 100 mL IVPB (MB+)    [DISCONTINUED] potassium bicarbonate disintegrating tablet 50 mEq    [DISCONTINUED] sodium chloride 0.9% flush 10 mL    [DISCONTINUED] sulfamethoxazole-trimethoprim 800-160mg per tablet 1 tablet    [DISCONTINUED] vancomycin (VANCOCIN) 1,000 mg in D5W 250 mL IVPB (Vial-Mate)    [DISCONTINUED] vancomycin - pharmacy to dose     Current Outpatient Medications on File Prior to Encounter   Medication Sig    0.9 % sodium chloride (SODIUM CHLORIDE 0.9%) solution Inject into the vein once daily.    albuterol (PROVENTIL/VENTOLIN HFA) 90 mcg/actuation inhaler Inhale 2 puffs into the lungs every 6 (six) hours as needed. Rescue    amitriptyline (ELAVIL) 50 MG tablet Take 50 mg by mouth every evening.    bisacodyL (DULCOLAX) 5 mg EC tablet Take 1 tablet by mouth every other day.    cyclobenzaprine (FLEXERIL) 5 MG tablet Take 5 mg by mouth 3 (three) times daily as needed.    dextrose 5 % (D5W) SolP 250 mL with amikacin 250 mg/mL Soln 950 mg Inject 950 mg into the vein every Tues, Thurs, Sat.    dolutegravir (TIVICAY) 50 mg Tab Take 1 tablet (50 mg total) by mouth once daily.    dolutegravir-lamivudine  mg Tab Take 1 tablet by mouth once daily.    ethambutoL (MYAMBUTOL) 400 MG Tab Take 2 tablets (800 mg total) by mouth once daily.    gabapentin (NEURONTIN) 400 MG capsule Take 400 mg by mouth 2 (two) times daily.    HYDROcodone-acetaminophen (NORCO)  mg per tablet Take 1 tablet by mouth every 6 (six) hours as needed.    hydrOXYzine pamoate (VISTARIL) 25 MG Cap Take 25 mg by mouth 3 (three) times daily as needed.    Lactobacillus rhamnosus GG (CULTURELLE) 10 billion cell capsule Take 1 capsule by mouth 2 (two) times daily.    lamiVUDine (EPIVIR) 300 mg tablet Take 1 tablet (300 mg total) by mouth once daily.    levoFLOXacin (LEVAQUIN) 500 MG tablet Take 1 tablet (500 mg total) by mouth once daily.    LIDOcaine (LIDODERM) 5 % Place 1 patch  onto the skin once daily.    megestroL (MEGACE) 400 mg/10 mL (40 mg/mL) Susp Take 400 mg by mouth once daily.    methocarbamoL (ROBAXIN) 500 MG Tab Take 500 mg by mouth 4 (four) times daily.    mirtazapine (REMERON SOL-TAB) 15 MG disintegrating tablet Take 1 tablet by mouth every evening.    naloxone (NARCAN) 4 mg/actuation Spry 1 spray by Nasal route once.    oxyCODONE (ROXICODONE) 10 mg Tab immediate release tablet Take 10 mg by mouth every 6 (six) hours as needed.    polyethylene glycol (GLYCOLAX) 17 gram PwPk Take 17 g by mouth 2 (two) times daily.    QUEtiapine (SEROQUEL) 100 MG Tab Take 100 mg by mouth every evening.    rifabutin (MYCOBUTIN) 150 mg Cap Take 300 mg by mouth once daily.    rifAMpin (RIFADIN) 300 MG capsule Take 1 capsule (300 mg total) by mouth once daily. Take as instructed by [facility].    senna (SENOKOT) 8.6 mg tablet Take 17.2 mg by mouth 2 (two) times daily.    sulfamethoxazole-trimethoprim 800-160mg (BACTRIM DS) 800-160 mg Tab Take 1 tablet by mouth 3 (three) times a week.    valACYclovir (VALTREX) 1000 MG tablet Take 1 tablet (1,000 mg total) by mouth 2 (two) times daily. for 10 days    zinc oxide (BOUDREAUXS BUTT PASTE) 16 % Oint ointment Apply topically.     Family History       Problem Relation (Age of Onset)    Alcohol abuse Brother    Cancer Father    Depression Sister    Hypertension Mother, Brother    Thyroid disease Mother          Tobacco Use    Smoking status: Never    Smokeless tobacco: Never   Substance and Sexual Activity    Alcohol use: Yes     Comment: Socially    Drug use: No    Sexual activity: Yes     Partners: Male     Birth control/protection: None     Review of Systems   Constitutional:  Positive for activity change and fatigue. Negative for chills and fever.   Eyes:  Negative for visual disturbance.   Respiratory:  Negative for shortness of breath and wheezing.    Cardiovascular:  Negative for chest pain and leg swelling.   Gastrointestinal:  Negative  for abdominal pain, nausea and vomiting.        - bowel incontinence   Genitourinary:  Negative for difficulty urinating.        - urinary incontinence   Musculoskeletal:  Positive for back pain and myalgias.   Neurological:  Negative for weakness.     Objective:     Vital Signs (Most Recent):  Temp: 98.5 °F (36.9 °C) (08/12/24 0634)  Pulse: 92 (08/12/24 0645)  Resp: 16 (08/12/24 0624)  BP: (!) 79/52 (08/12/24 0645)  SpO2: 100 % (08/12/24 0645) Vital Signs (24h Range):  Temp:  [97.3 °F (36.3 °C)-98.9 °F (37.2 °C)] 98.5 °F (36.9 °C)  Pulse:  [] 92  Resp:  [14-20] 16  SpO2:  [97 %-100 %] 100 %  BP: ()/(49-69) 79/52     Weight: 46.1 kg (101 lb 10.1 oz)  Body mass index is 17.45 kg/m².     Physical Exam  Vitals reviewed.   Constitutional:       Appearance: She is ill-appearing (cachectic).   HENT:      Head: Normocephalic and atraumatic.      Right Ear: External ear normal.      Left Ear: External ear normal.   Eyes:      Extraocular Movements: Extraocular movements intact.      Conjunctiva/sclera: Conjunctivae normal.   Cardiovascular:      Rate and Rhythm: Normal rate and regular rhythm.   Pulmonary:      Effort: Pulmonary effort is normal. No respiratory distress.      Breath sounds: Normal breath sounds.   Abdominal:      Palpations: Abdomen is soft.      Tenderness: There is no abdominal tenderness.   Musculoskeletal:         General: Tenderness (lower lumbar spine) present.      Cervical back: Normal range of motion and neck supple.      Right lower leg: No edema.      Left lower leg: No edema.   Neurological:      General: No focal deficit present.      Mental Status: She is alert and oriented to person, place, and time.      Sensory: No sensory deficit.      Motor: No weakness.                Significant Labs: All pertinent labs within the past 24 hours have been reviewed.    Significant Imaging: I have reviewed all pertinent imaging results/findings within the past 24 hours.  Assessment/Plan:     *  Osteomyelitis of vertebra of thoracolumbar region  Imaging showed Large left paravertebral retroperitoneal abscess increased from the prior study measuring approximately 14.0 x 5.8 x 15.5 cm. The abscess crosses the midline to the right involving the vertebral bodies and surgical hardware also. 3.7 cm right periaortic component of the abscess at T12-L1 level. See above comments. Paravertebral phlegmon with findings of discitis/osteomyelitis at T8-9, T9-10, L2-3 and L3-4     - vanc/zosyn continued, can de escalate as needed  - ID consulted  - NSGY, IR consulted  - NPO in case of procedure  - f/u blood and urine cultures from OSH      Disseminated mycobacterium avium-intracellulare complex  Hx of spinal MAC  [facility] previous ID note showed continuation of ethambutol, rifampin, and azithromycin    - ID consulted  - continued ethambutol, rifampin      Insomnia  Seroquel nightly      AIDS  Continued dolutegravir and lamivudine, continued pjp prophylactic bactrim    - f/u CD4 and HIV RNA collected at OSH      Chronic pain  Secondary to osteo.     Continue gabapentin and robaxin        VTE Risk Mitigation (From admission, onward)           Ordered     IP VTE LOW RISK PATIENT  Once         08/12/24 0240     Place sequential compression device  Until discontinued         08/12/24 0240                               Pharmacokinetic Initial Assessment: IV Vancomycin    Assessment/Plan:    Patient was transferred from [facility]. Initiate intravenous vancomycin with loading dose of 1000 mg once (administered) followed by a maintenance dose of vancomycin 1000 mg IV every 24 hours  Desired empiric serum trough concentration is 15 to 20 mcg/mL  Draw vancomycin trough level 60 min prior to third dose on 8/13/24 at approximately 22:00 or sooner if renal function changes significantly  Pharmacy will continue to follow and monitor vancomycin.      Please contact pharmacy at extension [phone] with any questions regarding this  "assessment.     Thank you for the consult,   Rodo Valdez       Patient brief summary:  Amie Salmeron is a 59 y.o. female initiated on antimicrobial therapy with IV Vancomycin for treatment of suspected bone/joint infection    Drug Allergies:   Review of patient's allergies indicates:  No Known Allergies    Actual Body Weight:   46.1 kg    Renal Function:   Estimated Creatinine Clearance: 61.4 mL/min (based on SCr of 0.7 mg/dL).,     Dialysis Method (if applicable):  N/A    CBC (last 72 hours):  Recent Labs   Lab Result Units 08/11/24  1449   WBC K/uL 16.66*   Hemoglobin g/dL 10.1*   Hematocrit % 32.4*   Platelets K/uL 492*   Gran % % 77.0*   Lymph % % 11.0*   Mono % % 7.0   Eosinophil % % 3.0   Basophil % % 0.0   Differential Method  Manual       Metabolic Panel (last 72 hours):  Recent Labs   Lab Result Units 08/11/24  1449 08/11/24  2151   Sodium mmol/L 137  --    Potassium mmol/L 2.4*  --    Chloride mmol/L 103  --    CO2 mmol/L 22*  --    Glucose mg/dL 98  --    Glucose, UA   --  Negative   BUN mg/dL 17  --    Creatinine mg/dL 0.7  --    Albumin g/dL 1.9*  --    Total Bilirubin mg/dL 0.4  --    Alkaline Phosphatase U/L 110  --    AST U/L 17  --    ALT U/L 6*  --        Drug levels (last 3 results):  No results for input(s): "VANCOMYCINRA", "VANCORANDOM", "VANCOMYCINPE", "VANCOPEAK", "VANCOMYCINTR", "VANCOTROUGH" in the last 72 hours.    Microbiologic Results:  Microbiology Results (last 7 days)       ** No results found for the last 168 hours. **              Aneta Chiang MD  Department of [facility] Medicine  [facility] - Surgery          "

## 2024-08-12 NOTE — NURSING
The pt has been having low Bps throughout the shift, and when I informed the MD a 500 ml bolus of LR was given. The BP was still low after the initial 500 ml dose and another one was ordered.    But while in the room but I noticed that the pt was not responding and had altered mental status . Rapid was called and they  assessed her.     After checking her the they found it to be 73, a glucose bolus was given per orders.     After more fluid the last bg was 93/57 per rapid.    The pt is still altered but able to respond slightly better and she is currently resting. Pt care ongoing.

## 2024-08-12 NOTE — SUBJECTIVE & OBJECTIVE
Past Medical History:   Diagnosis Date    Allergy     Arthritis     Asthma     Chronic pain     HIV infection     Hypertension     Overactive bladder     Spinal stenosis     Urine incontinence        Past Surgical History:   Procedure Laterality Date    ADENOIDECTOMY      APPENDECTOMY      BACK SURGERY      GERALD    CYST REMOVAL      HYSTERECTOMY      JOINT REPLACEMENT Right     knee, with revision    KNEE SURGERY Left     knee replacement    RHIZOTOMY      TONSILLECTOMY      TOTAL ANKLE ARTHROPLASTY Right        Review of patient's allergies indicates:  No Known Allergies    Current Facility-Administered Medications on File Prior to Encounter   Medication    [COMPLETED] iohexoL (OMNIPAQUE 350) injection 80 mL    [COMPLETED] magnesium sulfate 2g in water 50mL IVPB (premix)    [COMPLETED] potassium bicarbonate disintegrating tablet 50 mEq    [COMPLETED] potassium chloride 10 mEq in 100 mL IVPB    [COMPLETED] potassium chloride 10 mEq in 100 mL IVPB    [COMPLETED] sodium chloride 0.9% bolus 1,000 mL 1,000 mL    [COMPLETED] vancomycin (VANCOCIN) 1,000 mg in D5W 250 mL IVPB (Vial-Mate)    [DISCONTINUED] 0.9 % NaCl with KCl 40 mEq infusion    [DISCONTINUED] acetaminophen tablet 650 mg    [DISCONTINUED] amitriptyline tablet 50 mg    [DISCONTINUED] cyclobenzaprine tablet 5 mg    [DISCONTINUED] dextrose 10% bolus 125 mL 125 mL    [DISCONTINUED] dextrose 10% bolus 250 mL 250 mL    [DISCONTINUED] dolutegravir Tab 50 mg    [DISCONTINUED] gabapentin capsule 400 mg    [DISCONTINUED] GENERIC EXTERNAL MEDICATION    [DISCONTINUED] GENERIC EXTERNAL MEDICATION    [DISCONTINUED] GENERIC EXTERNAL MEDICATION    [DISCONTINUED] GENERIC EXTERNAL MEDICATION    [DISCONTINUED] GENERIC EXTERNAL MEDICATION    [DISCONTINUED] glucagon (human recombinant) injection 1 mg    [DISCONTINUED] glucose chewable tablet 16 g    [DISCONTINUED] glucose chewable tablet 24 g    [DISCONTINUED] lamiVUDine tablet 300 mg    [DISCONTINUED] mirtazapine tablet 15 mg     [DISCONTINUED] naloxone 0.4 mg/mL injection 0.02 mg    [DISCONTINUED] ondansetron injection 4 mg    [DISCONTINUED] piperacillin-tazobactam (ZOSYN) 4.5 g in D5W 100 mL IVPB (MB+)    [DISCONTINUED] potassium bicarbonate disintegrating tablet 50 mEq    [DISCONTINUED] sodium chloride 0.9% flush 10 mL    [DISCONTINUED] sulfamethoxazole-trimethoprim 800-160mg per tablet 1 tablet    [DISCONTINUED] vancomycin (VANCOCIN) 1,000 mg in D5W 250 mL IVPB (Vial-Mate)    [DISCONTINUED] vancomycin - pharmacy to dose     Current Outpatient Medications on File Prior to Encounter   Medication Sig    0.9 % sodium chloride (SODIUM CHLORIDE 0.9%) solution Inject into the vein once daily.    albuterol (PROVENTIL/VENTOLIN HFA) 90 mcg/actuation inhaler Inhale 2 puffs into the lungs every 6 (six) hours as needed. Rescue    amitriptyline (ELAVIL) 50 MG tablet Take 50 mg by mouth every evening.    bisacodyL (DULCOLAX) 5 mg EC tablet Take 1 tablet by mouth every other day.    cyclobenzaprine (FLEXERIL) 5 MG tablet Take 5 mg by mouth 3 (three) times daily as needed.    dextrose 5 % (D5W) SolP 250 mL with amikacin 250 mg/mL Soln 950 mg Inject 950 mg into the vein every Tues, Thurs, Sat.    dolutegravir (TIVICAY) 50 mg Tab Take 1 tablet (50 mg total) by mouth once daily.    dolutegravir-lamivudine  mg Tab Take 1 tablet by mouth once daily.    ethambutoL (MYAMBUTOL) 400 MG Tab Take 2 tablets (800 mg total) by mouth once daily.    gabapentin (NEURONTIN) 400 MG capsule Take 400 mg by mouth 2 (two) times daily.    HYDROcodone-acetaminophen (NORCO)  mg per tablet Take 1 tablet by mouth every 6 (six) hours as needed.    hydrOXYzine pamoate (VISTARIL) 25 MG Cap Take 25 mg by mouth 3 (three) times daily as needed.    Lactobacillus rhamnosus GG (CULTURELLE) 10 billion cell capsule Take 1 capsule by mouth 2 (two) times daily.    lamiVUDine (EPIVIR) 300 mg tablet Take 1 tablet (300 mg total) by mouth once daily.    levoFLOXacin (LEVAQUIN) 500 MG  tablet Take 1 tablet (500 mg total) by mouth once daily.    LIDOcaine (LIDODERM) 5 % Place 1 patch onto the skin once daily.    megestroL (MEGACE) 400 mg/10 mL (40 mg/mL) Susp Take 400 mg by mouth once daily.    methocarbamoL (ROBAXIN) 500 MG Tab Take 500 mg by mouth 4 (four) times daily.    mirtazapine (REMERON SOL-TAB) 15 MG disintegrating tablet Take 1 tablet by mouth every evening.    naloxone (NARCAN) 4 mg/actuation Spry 1 spray by Nasal route once.    oxyCODONE (ROXICODONE) 10 mg Tab immediate release tablet Take 10 mg by mouth every 6 (six) hours as needed.    polyethylene glycol (GLYCOLAX) 17 gram PwPk Take 17 g by mouth 2 (two) times daily.    QUEtiapine (SEROQUEL) 100 MG Tab Take 100 mg by mouth every evening.    rifabutin (MYCOBUTIN) 150 mg Cap Take 300 mg by mouth once daily.    rifAMpin (RIFADIN) 300 MG capsule Take 1 capsule (300 mg total) by mouth once daily. Take as instructed by [facility].    senna (SENOKOT) 8.6 mg tablet Take 17.2 mg by mouth 2 (two) times daily.    sulfamethoxazole-trimethoprim 800-160mg (BACTRIM DS) 800-160 mg Tab Take 1 tablet by mouth 3 (three) times a week.    valACYclovir (VALTREX) 1000 MG tablet Take 1 tablet (1,000 mg total) by mouth 2 (two) times daily. for 10 days    zinc oxide (BOUDREAUXS BUTT PASTE) 16 % Oint ointment Apply topically.     Family History       Problem Relation (Age of Onset)    Alcohol abuse Brother    Cancer Father    Depression Sister    Hypertension Mother, Brother    Thyroid disease Mother          Tobacco Use    Smoking status: Never    Smokeless tobacco: Never   Substance and Sexual Activity    Alcohol use: Yes     Comment: Socially    Drug use: No    Sexual activity: Yes     Partners: Male     Birth control/protection: None     Review of Systems   Constitutional:  Positive for activity change and fatigue. Negative for chills and fever.   Eyes:  Negative for visual disturbance.   Respiratory:  Negative for shortness of breath and  wheezing.    Cardiovascular:  Negative for chest pain and leg swelling.   Gastrointestinal:  Negative for abdominal pain, nausea and vomiting.        - bowel incontinence   Genitourinary:  Negative for difficulty urinating.        - urinary incontinence   Musculoskeletal:  Positive for back pain and myalgias.   Neurological:  Negative for weakness.     Objective:     Vital Signs (Most Recent):  Temp: 98.5 °F (36.9 °C) (08/12/24 0634)  Pulse: 92 (08/12/24 0645)  Resp: 16 (08/12/24 0624)  BP: (!) 79/52 (08/12/24 0645)  SpO2: 100 % (08/12/24 0645) Vital Signs (24h Range):  Temp:  [97.3 °F (36.3 °C)-98.9 °F (37.2 °C)] 98.5 °F (36.9 °C)  Pulse:  [] 92  Resp:  [14-20] 16  SpO2:  [97 %-100 %] 100 %  BP: ()/(49-69) 79/52     Weight: 46.1 kg (101 lb 10.1 oz)  Body mass index is 17.45 kg/m².     Physical Exam  Vitals reviewed.   Constitutional:       Appearance: She is ill-appearing (cachectic).   HENT:      Head: Normocephalic and atraumatic.      Right Ear: External ear normal.      Left Ear: External ear normal.   Eyes:      Extraocular Movements: Extraocular movements intact.      Conjunctiva/sclera: Conjunctivae normal.   Cardiovascular:      Rate and Rhythm: Normal rate and regular rhythm.   Pulmonary:      Effort: Pulmonary effort is normal. No respiratory distress.      Breath sounds: Normal breath sounds.   Abdominal:      Palpations: Abdomen is soft.      Tenderness: There is no abdominal tenderness.   Musculoskeletal:         General: Tenderness (lower lumbar spine) present.      Cervical back: Normal range of motion and neck supple.      Right lower leg: No edema.      Left lower leg: No edema.   Neurological:      General: No focal deficit present.      Mental Status: She is alert and oriented to person, place, and time.      Sensory: No sensory deficit.      Motor: No weakness.                Significant Labs: All pertinent labs within the past 24 hours have been reviewed.    Significant Imaging: I  have reviewed all pertinent imaging results/findings within the past 24 hours.

## 2024-08-12 NOTE — SUBJECTIVE & OBJECTIVE
Medications Prior to Admission   Medication Sig Dispense Refill Last Dose    0.9 % sodium chloride (SODIUM CHLORIDE 0.9%) solution Inject into the vein once daily.   Unknown    albuterol (PROVENTIL/VENTOLIN HFA) 90 mcg/actuation inhaler Inhale 2 puffs into the lungs every 6 (six) hours as needed. Rescue 8 g 0 Unknown    amitriptyline (ELAVIL) 50 MG tablet Take 50 mg by mouth every evening.   Unknown    bisacodyL (DULCOLAX) 5 mg EC tablet Take 1 tablet by mouth every other day.   Unknown    cyclobenzaprine (FLEXERIL) 5 MG tablet Take 5 mg by mouth 3 (three) times daily as needed.   Unknown    dextrose 5 % (D5W) SolP 250 mL with amikacin 250 mg/mL Soln 950 mg Inject 950 mg into the vein every Tues, Thurs, Sat.   Unknown    dolutegravir (TIVICAY) 50 mg Tab Take 1 tablet (50 mg total) by mouth once daily.   Unknown    dolutegravir-lamivudine  mg Tab Take 1 tablet by mouth once daily.   Unknown    ethambutoL (MYAMBUTOL) 400 MG Tab Take 2 tablets (800 mg total) by mouth once daily.   Unknown    gabapentin (NEURONTIN) 400 MG capsule Take 400 mg by mouth 2 (two) times daily.   Unknown    HYDROcodone-acetaminophen (NORCO)  mg per tablet Take 1 tablet by mouth every 6 (six) hours as needed.   Unknown    hydrOXYzine pamoate (VISTARIL) 25 MG Cap Take 25 mg by mouth 3 (three) times daily as needed.   Unknown    Lactobacillus rhamnosus GG (CULTURELLE) 10 billion cell capsule Take 1 capsule by mouth 2 (two) times daily.   Unknown    lamiVUDine (EPIVIR) 300 mg tablet Take 1 tablet (300 mg total) by mouth once daily. 30 tablet 11 Unknown    levoFLOXacin (LEVAQUIN) 500 MG tablet Take 1 tablet (500 mg total) by mouth once daily. 7 tablet 0 Unknown    LIDOcaine (LIDODERM) 5 % Place 1 patch onto the skin once daily.   Unknown    megestroL (MEGACE) 400 mg/10 mL (40 mg/mL) Susp Take 400 mg by mouth once daily.   Unknown    methocarbamoL (ROBAXIN) 500 MG Tab Take 500 mg by mouth 4 (four) times daily.   Unknown    mirtazapine  (REMERON SOL-TAB) 15 MG disintegrating tablet Take 1 tablet by mouth every evening.   Unknown    naloxone (NARCAN) 4 mg/actuation Spry 1 spray by Nasal route once.   Unknown    oxyCODONE (ROXICODONE) 10 mg Tab immediate release tablet Take 10 mg by mouth every 6 (six) hours as needed.   Unknown    polyethylene glycol (GLYCOLAX) 17 gram PwPk Take 17 g by mouth 2 (two) times daily.   Unknown    QUEtiapine (SEROQUEL) 100 MG Tab Take 100 mg by mouth every evening.   Unknown    rifabutin (MYCOBUTIN) 150 mg Cap Take 300 mg by mouth once daily.   Unknown    rifAMpin (RIFADIN) 300 MG capsule Take 1 capsule (300 mg total) by mouth once daily. Take as instructed by [facility].   Unknown    senna (SENOKOT) 8.6 mg tablet Take 17.2 mg by mouth 2 (two) times daily.   Unknown    sulfamethoxazole-trimethoprim 800-160mg (BACTRIM DS) 800-160 mg Tab Take 1 tablet by mouth 3 (three) times a week.       valACYclovir (VALTREX) 1000 MG tablet Take 1 tablet (1,000 mg total) by mouth 2 (two) times daily. for 10 days 20 tablet 0     zinc oxide (BOUDREAUXS BUTT PASTE) 16 % Oint ointment Apply topically.   Unknown       Review of patient's allergies indicates:  No Known Allergies    Past Medical History:   Diagnosis Date    Allergy     Arthritis     Asthma     Chronic pain     HIV infection     Hypertension     Overactive bladder     Spinal stenosis     Urine incontinence      Past Surgical History:   Procedure Laterality Date    ADENOIDECTOMY      APPENDECTOMY      BACK SURGERY      GERALD    CYST REMOVAL      HYSTERECTOMY      JOINT REPLACEMENT Right     knee, with revision    KNEE SURGERY Left     knee replacement    RHIZOTOMY      TONSILLECTOMY      TOTAL ANKLE ARTHROPLASTY Right      Family History       Problem Relation (Age of Onset)    Alcohol abuse Brother    Cancer Father    Depression Sister    Hypertension Mother, Brother    Thyroid disease Mother          Tobacco Use    Smoking status: Never    Smokeless tobacco: Never  "  Substance and Sexual Activity    Alcohol use: Yes     Comment: Socially    Drug use: No    Sexual activity: Yes     Partners: Male     Birth control/protection: None     Review of Systems  Objective:     Weight: 46.1 kg (101 lb 10.1 oz)  Body mass index is 17.45 kg/m².  Vital Signs (Most Recent):  Temp: 98.5 °F (36.9 °C) (08/12/24 0634)  Pulse: 94 (08/12/24 0706)  Resp: 16 (08/12/24 0624)  BP: (!) 79/52 (08/12/24 0645)  SpO2: 100 % (08/12/24 0645) Vital Signs (24h Range):  Temp:  [97.3 °F (36.3 °C)-98.9 °F (37.2 °C)] 98.5 °F (36.9 °C)  Pulse:  [] 94  Resp:  [14-20] 16  SpO2:  [97 %-100 %] 100 %  BP: ()/(49-69) 79/52                         Female External Urinary Catheter w/ Suction 08/11/24 1600 (Active)   Skin no redness;no breakdown 08/12/24 0238   Tolerance no signs/symptoms of discomfort 08/12/24 0238   Suction Continuous suction at 70 mmHg 08/12/24 0238   Date of last wick change 08/12/24 08/12/24 0238   Time of last wick change 1925 08/11/24 1931   Output (mL) 100 mL 08/11/24 2204          Physical Exam         Neurosurgery Physical Exam    [id number]  A/Ox3  Sleepy, slow to respond  Generalized weakness, 4/5 everywhere, nonfocal  Tender to palpation T/L spine  SILT  No calvin  No clonus    Significant Labs:  Recent Labs   Lab 08/11/24  1449 08/12/24  0253   GLU 98 96    135*   K 2.4* 4.6    105   CO2 22* 21*   BUN 17 17   CREATININE 0.7 0.6   CALCIUM 8.6* 8.3*   MG  --  2.2     Recent Labs   Lab 08/11/24  1449 08/12/24  0253 08/12/24  0645 08/12/24  0708   WBC 16.66* 18.60*  --  24.35*   HGB 10.1* 9.7*  --  8.2*   HCT 32.4* 32.0* 24* 25.7*   * 501*  --  426     No results for input(s): "LABPT", "INR", "APTT" in the last 48 hours.  Microbiology Results (last 7 days)       ** No results found for the last 168 hours. **          All pertinent labs from the last 24 hours have been reviewed.    Significant Diagnostics:    I have reviewed all pertinent imaging results/findings within " the past 24 hours.

## 2024-08-12 NOTE — PROGRESS NOTES
"Pharmacokinetic Initial Assessment: IV Vancomycin    Assessment/Plan:    Patient was transferred from [facility]. Initiate intravenous vancomycin with loading dose of 1000 mg once (administered) followed by a maintenance dose of vancomycin 1000 mg IV every 24 hours  Desired empiric serum trough concentration is 15 to 20 mcg/mL  Draw vancomycin trough level 60 min prior to third dose on 8/13/24 at approximately 22:00 or sooner if renal function changes significantly  Pharmacy will continue to follow and monitor vancomycin.      Please contact pharmacy at extension [phone] with any questions regarding this assessment.     Thank you for the consult,   Rodo Valdez       Patient brief summary:  Amie Salmeron is a 59 y.o. female initiated on antimicrobial therapy with IV Vancomycin for treatment of suspected bone/joint infection    Drug Allergies:   Review of patient's allergies indicates:  No Known Allergies    Actual Body Weight:   46.1 kg    Renal Function:   Estimated Creatinine Clearance: 61.4 mL/min (based on SCr of 0.7 mg/dL).,     Dialysis Method (if applicable):  N/A    CBC (last 72 hours):  Recent Labs   Lab Result Units 08/11/24  1449   WBC K/uL 16.66*   Hemoglobin g/dL 10.1*   Hematocrit % 32.4*   Platelets K/uL 492*   Gran % % 77.0*   Lymph % % 11.0*   Mono % % 7.0   Eosinophil % % 3.0   Basophil % % 0.0   Differential Method  Manual       Metabolic Panel (last 72 hours):  Recent Labs   Lab Result Units 08/11/24  1449 08/11/24  2151   Sodium mmol/L 137  --    Potassium mmol/L 2.4*  --    Chloride mmol/L 103  --    CO2 mmol/L 22*  --    Glucose mg/dL 98  --    Glucose, UA   --  Negative   BUN mg/dL 17  --    Creatinine mg/dL 0.7  --    Albumin g/dL 1.9*  --    Total Bilirubin mg/dL 0.4  --    Alkaline Phosphatase U/L 110  --    AST U/L 17  --    ALT U/L 6*  --        Drug levels (last 3 results):  No results for input(s): "VANCOMYCINRA", "VANCORANDOM", "VANCOMYCINPE", "VANCOPEAK", "VANCOMYCINTR", " ""TRAM" in the last 72 hours.    Microbiologic Results:  Microbiology Results (last 7 days)       ** No results found for the last 168 hours. **            "

## 2024-08-12 NOTE — PROCEDURES
RAPID RESPONSE VASCULAR ACCESS NOTE       Bed:7096/7096 A    Single lumen 20G, 2.25IN AccuCath placed in the right brachial vein. Needle advanced into the vessel under real time ultrasound guidance.    Attempts: 1  Max dwell date: 8/19/2024  Lot number: [id number]    Call [phone] for concerns or assistance.    Vineet Ashley RN

## 2024-08-12 NOTE — CONSULTS
[facility] - Surgery  Neurosurgery  Consult Note    Inpatient consult to Neurosurgery  Consult performed by: Mally Novak MD  Consult ordered by: Aneta Chiang MD        Subjective:     Chief Complaint/Reason for Admission: malaise and back pain    History of Present Illness: Pt is a 59F w/ HTN, HIV (CD4 184 in Feb), s/p osteo following T12-L1 corpectomy w/ ortho @ [facility] who presents from OSH w/ malaise, fatigue, and back pain for 1 week. OSH work up significant for WCC 16.6, K 2.4, normal lactate. Given vanc/zosyn/K supplementation at OSH and transferred to [facility] for higher level of care. No b/b issues, no blood thinners. Endorses mid and lower back pain, no radiculopathy, saddle anesthesia, or signs of neurogenic claudication. CT Csp/Tsp at OSH significant for left paravertebral / retroperitoneal abscess and osteomyelitis / discitis at L2-L4, T8-T10.     Medications Prior to Admission   Medication Sig Dispense Refill Last Dose    0.9 % sodium chloride (SODIUM CHLORIDE 0.9%) solution Inject into the vein once daily.   Unknown    albuterol (PROVENTIL/VENTOLIN HFA) 90 mcg/actuation inhaler Inhale 2 puffs into the lungs every 6 (six) hours as needed. Rescue 8 g 0 Unknown    amitriptyline (ELAVIL) 50 MG tablet Take 50 mg by mouth every evening.   Unknown    bisacodyL (DULCOLAX) 5 mg EC tablet Take 1 tablet by mouth every other day.   Unknown    cyclobenzaprine (FLEXERIL) 5 MG tablet Take 5 mg by mouth 3 (three) times daily as needed.   Unknown    dextrose 5 % (D5W) SolP 250 mL with amikacin 250 mg/mL Soln 950 mg Inject 950 mg into the vein every Tues, Thurs, Sat.   Unknown    dolutegravir (TIVICAY) 50 mg Tab Take 1 tablet (50 mg total) by mouth once daily.   Unknown    dolutegravir-lamivudine  mg Tab Take 1 tablet by mouth once daily.   Unknown    ethambutoL (MYAMBUTOL) 400 MG Tab Take 2 tablets (800 mg total) by mouth once daily.   Unknown    gabapentin (NEURONTIN) 400 MG capsule Take 400 mg by mouth 2 (two) times  daily.   Unknown    HYDROcodone-acetaminophen (NORCO)  mg per tablet Take 1 tablet by mouth every 6 (six) hours as needed.   Unknown    hydrOXYzine pamoate (VISTARIL) 25 MG Cap Take 25 mg by mouth 3 (three) times daily as needed.   Unknown    Lactobacillus rhamnosus GG (CULTURELLE) 10 billion cell capsule Take 1 capsule by mouth 2 (two) times daily.   Unknown    lamiVUDine (EPIVIR) 300 mg tablet Take 1 tablet (300 mg total) by mouth once daily. 30 tablet 11 Unknown    levoFLOXacin (LEVAQUIN) 500 MG tablet Take 1 tablet (500 mg total) by mouth once daily. 7 tablet 0 Unknown    LIDOcaine (LIDODERM) 5 % Place 1 patch onto the skin once daily.   Unknown    megestroL (MEGACE) 400 mg/10 mL (40 mg/mL) Susp Take 400 mg by mouth once daily.   Unknown    methocarbamoL (ROBAXIN) 500 MG Tab Take 500 mg by mouth 4 (four) times daily.   Unknown    mirtazapine (REMERON SOL-TAB) 15 MG disintegrating tablet Take 1 tablet by mouth every evening.   Unknown    naloxone (NARCAN) 4 mg/actuation Spry 1 spray by Nasal route once.   Unknown    oxyCODONE (ROXICODONE) 10 mg Tab immediate release tablet Take 10 mg by mouth every 6 (six) hours as needed.   Unknown    polyethylene glycol (GLYCOLAX) 17 gram PwPk Take 17 g by mouth 2 (two) times daily.   Unknown    QUEtiapine (SEROQUEL) 100 MG Tab Take 100 mg by mouth every evening.   Unknown    rifabutin (MYCOBUTIN) 150 mg Cap Take 300 mg by mouth once daily.   Unknown    rifAMpin (RIFADIN) 300 MG capsule Take 1 capsule (300 mg total) by mouth once daily. Take as instructed by [facility].   Unknown    senna (SENOKOT) 8.6 mg tablet Take 17.2 mg by mouth 2 (two) times daily.   Unknown    sulfamethoxazole-trimethoprim 800-160mg (BACTRIM DS) 800-160 mg Tab Take 1 tablet by mouth 3 (three) times a week.       valACYclovir (VALTREX) 1000 MG tablet Take 1 tablet (1,000 mg total) by mouth 2 (two) times daily. for 10 days 20 tablet 0     zinc oxide (BOUDREAUXS BUTT PASTE) 16 % Oint  ointment Apply topically.   Unknown       Review of patient's allergies indicates:  No Known Allergies    Past Medical History:   Diagnosis Date    Allergy     Arthritis     Asthma     Chronic pain     HIV infection     Hypertension     Overactive bladder     Spinal stenosis     Urine incontinence      Past Surgical History:   Procedure Laterality Date    ADENOIDECTOMY      APPENDECTOMY      BACK SURGERY      GERALD    CYST REMOVAL      HYSTERECTOMY      JOINT REPLACEMENT Right     knee, with revision    KNEE SURGERY Left     knee replacement    RHIZOTOMY      TONSILLECTOMY      TOTAL ANKLE ARTHROPLASTY Right      Family History       Problem Relation (Age of Onset)    Alcohol abuse Brother    Cancer Father    Depression Sister    Hypertension Mother, Brother    Thyroid disease Mother          Tobacco Use    Smoking status: Never    Smokeless tobacco: Never   Substance and Sexual Activity    Alcohol use: Yes     Comment: Socially    Drug use: No    Sexual activity: Yes     Partners: Male     Birth control/protection: None     Review of Systems  Objective:     Weight: 46.1 kg (101 lb 10.1 oz)  Body mass index is 17.45 kg/m².  Vital Signs (Most Recent):  Temp: 98.5 °F (36.9 °C) (08/12/24 0634)  Pulse: 94 (08/12/24 0706)  Resp: 16 (08/12/24 0624)  BP: (!) 79/52 (08/12/24 0645)  SpO2: 100 % (08/12/24 0645) Vital Signs (24h Range):  Temp:  [97.3 °F (36.3 °C)-98.9 °F (37.2 °C)] 98.5 °F (36.9 °C)  Pulse:  [] 94  Resp:  [14-20] 16  SpO2:  [97 %-100 %] 100 %  BP: ()/(49-69) 79/52                         Female External Urinary Catheter w/ Suction 08/11/24 1600 (Active)   Skin no redness;no breakdown 08/12/24 0238   Tolerance no signs/symptoms of discomfort 08/12/24 0238   Suction Continuous suction at 70 mmHg 08/12/24 0238   Date of last wick change 08/12/24 08/12/24 0238   Time of last wick change 1925 08/11/24 1931   Output (mL) 100 mL 08/11/24 2204          Physical Exam         Neurosurgery Physical  "Exam    [id number]  A/Ox3  Sleepy, slow to respond  Generalized weakness, 4/5 everywhere, nonfocal  Tender to palpation T/L spine  SILT  No calvin  No clonus    Significant Labs:  Recent Labs   Lab 08/11/24  1449 08/12/24  0253   GLU 98 96    135*   K 2.4* 4.6    105   CO2 22* 21*   BUN 17 17   CREATININE 0.7 0.6   CALCIUM 8.6* 8.3*   MG  --  2.2     Recent Labs   Lab 08/11/24  1449 08/12/24  0253 08/12/24  0645 08/12/24  0708   WBC 16.66* 18.60*  --  24.35*   HGB 10.1* 9.7*  --  8.2*   HCT 32.4* 32.0* 24* 25.7*   * 501*  --  426     No results for input(s): "LABPT", "INR", "APTT" in the last 48 hours.  Microbiology Results (last 7 days)       ** No results found for the last 168 hours. **          All pertinent labs from the last 24 hours have been reviewed.    Significant Diagnostics:    I have reviewed all pertinent imaging results/findings within the past 24 hours.  Assessment/Plan:     * Osteomyelitis of vertebra of thoracolumbar region  59F w/ history of AIDS (CD4<200 in February), s/p T12-L1 corpectomy presents w/ osteomyelitis/discitis in multiple levels of the thoracic and lumbar spine. No red flag symptoms    CT Tsp/Lsp w/ contrast 8/11: 14.0x5.8x15.5cm paravertebral abscess on left side, osteomyelitis/discitis at T8-10, L2-4     Plan:  --Patient admitted to  on telemetry; hospital medicine team primary    -q1h neurochecks in ICU, q2h neurochecks in stepdown, q4h neurochecks on floor   --All labs and diagnostics reviewed   --Follow-up spine MRI T/L spine w/wo contrast   --Maintain TLSO brace at all times  --Full infectious w/u to include ESR/CRP/Procalcitonin, CXR, UA/UCx, BCx x2, TTE vs TEJAL;         -Reccomend holding abx until infectious work-up complete unless patient is septic         -Consult ID team for appropriate recs   --Hold AC/AP meds  --External urinary catheter w/ suction placed  --Follow-up full pre-op labs (CBC/CMP/PT-INR/PTT/T&S)   --Keep pt NPO at this time for possible " surgical intervention   --Continue to monitor clinically, notify NSGY immediately with any changes in neuro status     Dispo: ongoing          Thank you for your consult. I will follow-up with patient. Please contact us if you have any additional questions.    Mally Novak MD  Neurosurgery  [facility] - Surgery

## 2024-08-12 NOTE — TRANSFER OF CARE
"Anesthesia Transfer of Care Note    Patient: Amie Salmeron    Procedure(s) Performed: * No procedures listed *    Patient location: ICU    Anesthesia Type: general    Transport from OR: Transported from OR on 6-10 L/min O2 by face mask with adequate spontaneous ventilation    Post pain: adequate analgesia    Post assessment: no apparent anesthetic complications and tolerated procedure well    Post vital signs: stable    Level of consciousness: awake and alert    Nausea/Vomiting: no nausea/vomiting    Complications: none    Transfer of care protocol was followed    Last vitals: Visit Vitals  BP (!) 90/50 (BP Location: Left arm, Patient Position: Lying)   Pulse 99   Temp 36.3 °C (97.3 °F) (Axillary)   Resp 18   Ht 5' 4" (1.626 m)   Wt 46.1 kg (101 lb 10.1 oz)   SpO2 100%   BMI 17.45 kg/m²     "

## 2024-08-13 PROBLEM — N39.0 UTI (URINARY TRACT INFECTION): Status: ACTIVE | Noted: 2024-08-13

## 2024-08-13 PROBLEM — Z71.89 ADVANCE CARE PLANNING: Status: ACTIVE | Noted: 2024-08-13

## 2024-08-13 PROBLEM — R53.81 DEBILITY: Status: ACTIVE | Noted: 2024-08-13

## 2024-08-13 PROBLEM — Z51.5 PALLIATIVE CARE ENCOUNTER: Status: ACTIVE | Noted: 2024-08-13

## 2024-08-13 PROBLEM — Z71.89 GOALS OF CARE, COUNSELING/DISCUSSION: Status: ACTIVE | Noted: 2024-08-13

## 2024-08-13 PROBLEM — Z66 DNR (DO NOT RESUSCITATE): Status: ACTIVE | Noted: 2024-08-13

## 2024-08-13 LAB
ACANTHOCYTES BLD QL SMEAR: PRESENT
ACID FAST MOD KINY STN SPEC: ABNORMAL
ACID FAST MOD KINY STN SPEC: ABNORMAL
ALBUMIN SERPL BCP-MCNC: 1.5 G/DL (ref 3.5–5.2)
ALP SERPL-CCNC: 102 U/L (ref 55–135)
ALT SERPL W/O P-5'-P-CCNC: 7 U/L (ref 10–44)
ANION GAP SERPL CALC-SCNC: 8 MMOL/L (ref 8–16)
ANISOCYTOSIS BLD QL SMEAR: SLIGHT
AST SERPL-CCNC: 23 U/L (ref 10–40)
BASOPHILS # BLD AUTO: ABNORMAL K/UL (ref 0–0.2)
BASOPHILS NFR BLD: 1 % (ref 0–1.9)
BILIRUB SERPL-MCNC: 0.4 MG/DL (ref 0.1–1)
BUN SERPL-MCNC: 9 MG/DL (ref 8–23)
BURR CELLS BLD QL SMEAR: ABNORMAL
CALCIUM SERPL-MCNC: 8.1 MG/DL (ref 8.7–10.5)
CHLORIDE SERPL-SCNC: 102 MMOL/L (ref 95–110)
CO2 SERPL-SCNC: 22 MMOL/L (ref 23–29)
CREAT SERPL-MCNC: 0.6 MG/DL (ref 0.5–1.4)
DACRYOCYTES BLD QL SMEAR: ABNORMAL
DIFFERENTIAL METHOD BLD: ABNORMAL
DOHLE BOD BLD QL SMEAR: PRESENT
EOSINOPHIL # BLD AUTO: ABNORMAL K/UL (ref 0–0.5)
EOSINOPHIL NFR BLD: 0 % (ref 0–8)
ERYTHROCYTE [DISTWIDTH] IN BLOOD BY AUTOMATED COUNT: 18.9 % (ref 11.5–14.5)
EST. GFR  (NO RACE VARIABLE): >60 ML/MIN/1.73 M^2
GLUCOSE SERPL-MCNC: 159 MG/DL (ref 70–110)
HCT VFR BLD AUTO: 27.5 % (ref 37–48.5)
HGB BLD-MCNC: 8.6 G/DL (ref 12–16)
HYPOCHROMIA BLD QL SMEAR: ABNORMAL
IMM GRANULOCYTES # BLD AUTO: ABNORMAL K/UL (ref 0–0.04)
IMM GRANULOCYTES NFR BLD AUTO: ABNORMAL % (ref 0–0.5)
LYMPHOCYTES # BLD AUTO: ABNORMAL K/UL (ref 1–4.8)
LYMPHOCYTES NFR BLD: 7 % (ref 18–48)
MAGNESIUM SERPL-MCNC: 1.7 MG/DL (ref 1.6–2.6)
MCH RBC QN AUTO: 23.7 PG (ref 27–31)
MCHC RBC AUTO-ENTMCNC: 31.3 G/DL (ref 32–36)
MCV RBC AUTO: 76 FL (ref 82–98)
MONOCYTES # BLD AUTO: ABNORMAL K/UL (ref 0.3–1)
MONOCYTES NFR BLD: 4 % (ref 4–15)
MYCOBACTERIUM SPEC QL CULT: ABNORMAL
MYCOBACTERIUM SPEC QL CULT: NORMAL
MYELOCYTES NFR BLD MANUAL: 1 %
NEUTROPHILS NFR BLD: 84 % (ref 38–73)
NEUTS BAND NFR BLD MANUAL: 3 %
NRBC BLD-RTO: 0 /100 WBC
OVALOCYTES BLD QL SMEAR: ABNORMAL
PHOSPHATE SERPL-MCNC: 3 MG/DL (ref 2.7–4.5)
PLATELET # BLD AUTO: 474 K/UL (ref 150–450)
PLATELET BLD QL SMEAR: ABNORMAL
PMV BLD AUTO: 9.8 FL (ref 9.2–12.9)
POCT GLUCOSE: 112 MG/DL (ref 70–110)
POCT GLUCOSE: 119 MG/DL (ref 70–110)
POCT GLUCOSE: 147 MG/DL (ref 70–110)
POCT GLUCOSE: 98 MG/DL (ref 70–110)
POIKILOCYTOSIS BLD QL SMEAR: ABNORMAL
POLYCHROMASIA BLD QL SMEAR: ABNORMAL
POTASSIUM SERPL-SCNC: 4 MMOL/L (ref 3.5–5.1)
PROT SERPL-MCNC: 5.2 G/DL (ref 6–8.4)
RBC # BLD AUTO: 3.63 M/UL (ref 4–5.4)
SCHISTOCYTES BLD QL SMEAR: ABNORMAL
SCHISTOCYTES BLD QL SMEAR: PRESENT
SODIUM SERPL-SCNC: 132 MMOL/L (ref 136–145)
SPHEROCYTES BLD QL SMEAR: ABNORMAL
TOXIC GRANULES BLD QL SMEAR: PRESENT
VANCOMYCIN TROUGH SERPL-MCNC: 6.2 UG/ML (ref 10–22)
WBC # BLD AUTO: 19.41 K/UL (ref 3.9–12.7)

## 2024-08-13 PROCEDURE — 99233 SBSQ HOSP IP/OBS HIGH 50: CPT | Mod: HCNC,,, | Performed by: STUDENT IN AN ORGANIZED HEALTH CARE EDUCATION/TRAINING PROGRAM

## 2024-08-13 PROCEDURE — 94761 N-INVAS EAR/PLS OXIMETRY MLT: CPT | Mod: HCNC

## 2024-08-13 PROCEDURE — 63600175 PHARM REV CODE 636 W HCPCS: Mod: HCNC

## 2024-08-13 PROCEDURE — 83735 ASSAY OF MAGNESIUM: CPT | Mod: HCNC

## 2024-08-13 PROCEDURE — 80053 COMPREHEN METABOLIC PANEL: CPT | Mod: HCNC

## 2024-08-13 PROCEDURE — 87040 BLOOD CULTURE FOR BACTERIA: CPT | Mod: 59,HCNC | Performed by: NURSE PRACTITIONER

## 2024-08-13 PROCEDURE — 63600175 PHARM REV CODE 636 W HCPCS: Mod: HCNC | Performed by: NURSE PRACTITIONER

## 2024-08-13 PROCEDURE — 84100 ASSAY OF PHOSPHORUS: CPT | Mod: HCNC

## 2024-08-13 PROCEDURE — 85027 COMPLETE CBC AUTOMATED: CPT | Mod: HCNC

## 2024-08-13 PROCEDURE — 99497 ADVNCD CARE PLAN 30 MIN: CPT | Mod: HCNC,25,, | Performed by: CLINICAL NURSE SPECIALIST

## 2024-08-13 PROCEDURE — 85007 BL SMEAR W/DIFF WBC COUNT: CPT | Mod: HCNC

## 2024-08-13 PROCEDURE — 25000003 PHARM REV CODE 250: Mod: HCNC | Performed by: CLINICAL NURSE SPECIALIST

## 2024-08-13 PROCEDURE — 27000207 HC ISOLATION: Mod: HCNC

## 2024-08-13 PROCEDURE — 25000003 PHARM REV CODE 250: Mod: HCNC

## 2024-08-13 PROCEDURE — 20000000 HC ICU ROOM: Mod: HCNC

## 2024-08-13 PROCEDURE — 99291 CRITICAL CARE FIRST HOUR: CPT | Mod: HCNC,95,GC, | Performed by: STUDENT IN AN ORGANIZED HEALTH CARE EDUCATION/TRAINING PROGRAM

## 2024-08-13 PROCEDURE — 63600175 PHARM REV CODE 636 W HCPCS: Mod: HCNC | Performed by: INTERNAL MEDICINE

## 2024-08-13 PROCEDURE — 63600175 PHARM REV CODE 636 W HCPCS: Mod: HCNC | Performed by: CLINICAL NURSE SPECIALIST

## 2024-08-13 PROCEDURE — 99223 1ST HOSP IP/OBS HIGH 75: CPT | Mod: HCNC,,, | Performed by: CLINICAL NURSE SPECIALIST

## 2024-08-13 PROCEDURE — 80202 ASSAY OF VANCOMYCIN: CPT | Mod: HCNC

## 2024-08-13 PROCEDURE — 25000003 PHARM REV CODE 250: Mod: HCNC | Performed by: INTERNAL MEDICINE

## 2024-08-13 RX ORDER — ENOXAPARIN SODIUM 100 MG/ML
30 INJECTION SUBCUTANEOUS EVERY 24 HOURS
Status: DISCONTINUED | OUTPATIENT
Start: 2024-08-13 | End: 2024-08-24 | Stop reason: HOSPADM

## 2024-08-13 RX ORDER — BALSAM PERU/CASTOR OIL
OINTMENT (GRAM) TOPICAL 2 TIMES DAILY
Status: DISCONTINUED | OUTPATIENT
Start: 2024-08-13 | End: 2024-08-22

## 2024-08-13 RX ORDER — LANOLIN ALCOHOL/MO/W.PET/CERES
400 CREAM (GRAM) TOPICAL ONCE
Status: COMPLETED | OUTPATIENT
Start: 2024-08-13 | End: 2024-08-13

## 2024-08-13 RX ORDER — NOREPINEPHRINE BITARTRATE/D5W 4MG/250ML
0-.2 PLASTIC BAG, INJECTION (ML) INTRAVENOUS CONTINUOUS
Status: DISCONTINUED | OUTPATIENT
Start: 2024-08-13 | End: 2024-08-14

## 2024-08-13 RX ADMIN — LAMIVUDINE 300 MG: 150 TABLET, FILM COATED ORAL at 09:08

## 2024-08-13 RX ADMIN — METHOCARBAMOL 500 MG: 500 TABLET ORAL at 09:08

## 2024-08-13 RX ADMIN — PIPERACILLIN SODIUM AND TAZOBACTAM SODIUM 4.5 G: 4; .5 INJECTION, POWDER, FOR SOLUTION INTRAVENOUS at 01:08

## 2024-08-13 RX ADMIN — MORPHINE SULFATE 2 MG: 2 INJECTION, SOLUTION INTRAMUSCULAR; INTRAVENOUS at 12:08

## 2024-08-13 RX ADMIN — PHENTOLAMINE MESYLATE 0.5 ML: 5 INJECTION INTRAMUSCULAR; INTRAVENOUS at 05:08

## 2024-08-13 RX ADMIN — MUPIROCIN: 20 OINTMENT TOPICAL at 09:08

## 2024-08-13 RX ADMIN — SODIUM CHLORIDE, POTASSIUM CHLORIDE, SODIUM LACTATE AND CALCIUM CHLORIDE 1000 ML: 600; 310; 30; 20 INJECTION, SOLUTION INTRAVENOUS at 11:08

## 2024-08-13 RX ADMIN — PIPERACILLIN SODIUM AND TAZOBACTAM SODIUM 4.5 G: 4; .5 INJECTION, POWDER, FOR SOLUTION INTRAVENOUS at 09:08

## 2024-08-13 RX ADMIN — ENOXAPARIN SODIUM 30 MG: 30 INJECTION SUBCUTANEOUS at 04:08

## 2024-08-13 RX ADMIN — Medication: at 09:08

## 2024-08-13 RX ADMIN — PIPERACILLIN SODIUM AND TAZOBACTAM SODIUM 4.5 G: 4; .5 INJECTION, POWDER, FOR SOLUTION INTRAVENOUS at 04:08

## 2024-08-13 RX ADMIN — GABAPENTIN 400 MG: 300 CAPSULE ORAL at 09:08

## 2024-08-13 RX ADMIN — QUETIAPINE FUMARATE 100 MG: 25 TABLET ORAL at 09:08

## 2024-08-13 RX ADMIN — Medication 400 MG: at 02:08

## 2024-08-13 RX ADMIN — Medication: at 01:08

## 2024-08-13 RX ADMIN — DOLUTEGRAVIR SODIUM 50 MG: 50 TABLET, FILM COATED ORAL at 09:08

## 2024-08-13 RX ADMIN — NOREPINEPHRINE BITARTRATE 0.06 MCG/KG/MIN: 4 INJECTION, SOLUTION INTRAVENOUS at 12:08

## 2024-08-13 NOTE — ASSESSMENT & PLAN NOTE
Recommendations:  - Urine culture GNB, follow up on final sensitivities  - Continue Vancomycin pharmacy to dose IV and Piperacillin-Tazobactam 4.5 g IV Q8H

## 2024-08-13 NOTE — ASSESSMENT & PLAN NOTE
Recommendations:  - Likely source of infection from retroperitoneal abscess and vertebral MAC infection  - Blood cultures growing MRSA, follow up on final sensitivities  - Continue Vancomycin pharmacy to dose IV and Piperacillin-Tazobactam 4.5 g IV Q8H

## 2024-08-13 NOTE — ASSESSMENT & PLAN NOTE
59F w/ history of AIDS (CD4<200 in February), s/p T12-L1 corpectomy presents w/ osteomyelitis/discitis in multiple levels of the thoracic and lumbar spine. No red flag symptoms    CT Tsp/Lsp w/ contrast 8/11: 14.0x5.8x15.5cm paravertebral abscess on left side, osteomyelitis/discitis at T8-10, L2-4   MRI T/L sp 8/13: multilevel osteodiscitis w/o epidural abscess, phlegmon    Plan:  --Patient admitted to  on telemetry; hospital medicine team primary  --All labs and diagnostics reviewed   --MRI T/L reviewed and d/w Dr. Putnam. No epidural abscess/compressive phlegmon, no neurosurgical intervention warranted at this time  --Maintain TLSO when OOB for comfort  --Abx/HIV management per ID   --Rec palliative care consultation for patient     Dispo: ongoing    NSGY will sign off    Please contact the on call neurosurgery provider with questions or concerns. Can be reached via on-call schedule and/or .      D/w Dr. Putnam

## 2024-08-13 NOTE — CONSULTS
[facility] - Card[facility] Medical [facility]  Wound Care    Patient Name:  Amie Salmeron   MRN:  [id number]  Date: 8/13/2024  Diagnosis: Osteomyelitis of vertebra of thoracolumbar region    History:     Past Medical History:   Diagnosis Date    Allergy     Arthritis     Asthma     Chronic pain     HIV infection     Hypertension     Overactive bladder     Spinal stenosis     Urine incontinence        Social History     Socioeconomic History    Marital status: Single   Occupational History    Occupation: on disability   Tobacco Use    Smoking status: Never    Smokeless tobacco: Never   Substance and Sexual Activity    Alcohol use: Yes     Comment: Socially    Drug use: No    Sexual activity: Yes     Partners: Male     Birth control/protection: None     Social Determinants of Health     Financial Resource Strain: Low Risk  (3/24/2024)    Overall Financial Resource Strain (CARDIA)     Difficulty of Paying Living Expenses: Not hard at all   Food Insecurity: Patient Declined (4/24/2024)    Received from [facility]    Hunger Vital Sign     Worried About Running Out of Food in the Last Year: Patient declined     Ran Out of Food in the Last Year: Patient declined   Transportation Needs: Patient Declined (4/24/2024)    [facility]     Lack of Transportation (Medical): Patient declined     Lack of Transportation (Non-Medical): Patient declined   Physical Activity: Inactive (3/24/2024)    Exercise Vital Sign     Days of Exercise per Week: 0 days     Minutes of Exercise per Session: 0 min   Stress: No Stress Concern Present (12/7/2022)    [employer] - Occupational Stress Questionnaire     Feeling of Stress : Only a little   Housing Stability: Low Risk  (3/24/2024)    Housing Stability Vital Sign     Unable to Pay for Housing in the Last Year: No     Number of Places Lived in the Last Year: 1     Unstable Housing in the Last Year: No       Precautions:     Allergies as of  08/11/2024    (No Known Allergies)       [facility] Assessment Details/Treatment     Patient seen for inpatient wound care consult. Patient agreeable to inpatient wound care assessment at this time. Upon assessment, blanchable area of erythema noted to sacrum with no open wounds, active drainage, or indications of pressure. Patient states she is having pain in back and hips.         Reviewed chart for this encounter.  See flowsheet for findings.      Recommendations: Apply BPCO to sacrum BID and PRN for papillary stimulation. Cover with mepilex foam border dressing for protection against friction of shearing. Immerse surface ordered for pressure redistribution and microclimate. No other issue or concerns at this time. Will follow up 8/20/2024 or sooner if necessary.        Discussed POC with patient and primary nurse.  See EMR for orders and patient education.    Bedside nursing to continue care and monitoring.  Bedside to maintain pressure injury prevention interventions.         08/13/24 0830   WOCN Assessment   WOCN Total Time (mins) 30   Visit Date 08/13/24   Visit Time 0830   Consult Type New   WOCN Speciality Wound   Intervention assessed;changed;applied;chart review;coordination of care;orders   Teaching on-going       Orders placed.   Manoj REYNAN, RN  08/13/2024

## 2024-08-13 NOTE — NURSING
Infiltration site with increased redness. CCT3 notifed and ordered phentolamine. Medication administered subq into surrounding site. Will monitor site after administration.

## 2024-08-13 NOTE — HPI
Pt is a 60 yo F with PMHx of HTN, uncontrolled HIV noncompliant with HAART, spinal MAC, spinal osteo s/p T11-L2 corpectomy with fixation by ortho on 2/27/24 at [facility] who presents to [facility] as a transfer from [place] on 8/11/24 with generalized malaise, fatigue, and lower left sided back pain x 1 week. Pt denies fevers, chills, bowel/bladder incontinence, weakness, or numbness. In the [facility] ED, patient was afebrile but borderline hypotensive which improved with IV fluids. Noted to have WBC 16.6, K 2.4, albumin 1.9, normal lactic. Blood cultures were obtained. Patient was admitted to  at [place] and CT TL w/ contrast showed large left T11-L2 paravertebral retroperitoneal abscess increased from the prior study measuring approximately 14.0 x 5.8 x 15.5 cm.  The abscess crosses the midline to the right involving the vertebral bodies and surgical hardware also 3.7 cm right periaortic component of the abscess at T12-L1 level. Potassium was repleted and vanc/zosyn given prior to transfer to to  at [facility] for neurosurgery and IR evaluation.     Critical Care Medicine was consulted for persistent hypotension despite IVF bolus this AM, and she transferred to the ICU for vasopressor support.      Pt made DNR today. Neuro seeing pt for spinal abscesses.

## 2024-08-13 NOTE — ASSESSMENT & PLAN NOTE
Recommendations:  - CD4 pending, HIV viral load 05869 copies/mL  - Continue Dolutegravir 50 mg-Lamivudine 300 mg  - Continue Bactrim for PJP prophylaxis

## 2024-08-13 NOTE — CONSULTS
[facility]  Palliative Medicine  Consult Note    Patient Name: Amie Salmeron  MRN: [id number]  Admission Date: 8/12/2024  Hospital Length of Stay: 1 days  Code Status: DNR   Attending Provider: Dre Thacker MD  Consulting Provider: LOAN Hutton  Primary Care Physician: Anuradha, Primary Doctor  Principal Problem:Osteomyelitis of vertebra of thoracolumbar region    Patient information was obtained from patient and primary team.      Inpatient consult to Palliative Care  Consult performed by: Doris Aldridge, CNS  Consult ordered by: Sandy Schrader, MADELEINE        Assessment/Plan:     Palliative Care  Palliative care encounter  Impression: Pt is a 60 y/o female with AIDs, large spinal abscess, MAC with septic shock. Pt on levophed. Pt is AAOx3. Flat effect noted. Pt made DNR today per pt's wishes.     Reason for consult: GOC/ACP. Spoke to MADELEINE Delgado CCS.    Goals of care/ACP:   Met with pt who is aware of her medical issues. We discussed her spinal abscess. Pt waing for neuro to see her today to see if any surgical options for abscesses. We discussed her other co-morbidities and performance status. Pt weak and uses walker at home. We discussed possibility neuro will be unable to offer options. We discussed pt being on pressor support and antibiotics for infection. We started discussion on possibility of comfort focus. Pt's goal at this time is to speak to neuro to see if any options and then go from there.     We discussed code status: Pt is agreeable to DNR status. Pt does not want CPR or be put on vent.    Symptom management:     Pain:   Pt reports pain to back and hip are described as throbbing.     Current meds:  Moprhine 2 mg IVP q 6 hrs prn.   Let pt know she needs to ask for pain meds when needed.     Debility:   Pt reports she uses a walker at home to get around but weakness noted.   Would have PT work with pt when able.     Plan:   Pt made DNR after conversation.   Communicated with Sandy  NP CCS.   Neuro to see pt today.   Will follow.                 Thank you for your consult. I will follow-up with patient. Please contact us if you have any additional questions.    Subjective:     HPI:   Pt is a 58 yo F with PMHx of HTN, uncontrolled HIV noncompliant with HAART, spinal MAC, spinal osteo s/p T11-L2 corpectomy with fixation by ortho on 2/27/24 at [facility] who [place] [facility] [place] on [place]/11/24 with generalized malaise, fatigue, and lower left sided back pain x 1 week. Pt denies fevers, chills, bowel/bladder incontinence, weakness, or numbness. In the [facility] ED, patient was afebrile but borderline hypotensive which improved with IV fluids. Noted to have WBC 16.6, K 2.4, albumin 1.9, normal lactic. Blood cultures were obtained. Patient was admitted to  at [place] and CT TL w/ contrast showed large left T11-L2 paravertebral retroperitoneal abscess increased from the prior study measuring approximately 14.0 x 5.8 x 15.5 cm.  The abscess crosses the midline to the right involving the vertebral bodies and surgical hardware also 3.7 cm right periaortic component of the abscess at T12-L1 level. Potassium was repleted and vanc/zosyn given prior to transfer to to  at [facility] [place] and IR evaluation.     Critical Care Medicine was consulted for persistent hypotension despite IVF bolus this AM, and she transferred to the ICU for vasopressor support.      Pt made DNR today. Neuro seeing pt for spinal abscesses.     Hospital Course:  No notes on file    Interval History: Pt made DNR today  per pt's wishes    Past Medical History:   Diagnosis Date    Allergy     Arthritis     Asthma     Chronic pain     HIV infection     Hypertension     Overactive bladder     Spinal stenosis     Urine incontinence        Past Surgical History:   Procedure Laterality Date    ADENOIDECTOMY      APPENDECTOMY      BACK SURGERY      GERALD    CYST REMOVAL      HYSTERECTOMY      JOINT REPLACEMENT Right      knee, with revision    KNEE SURGERY Left     knee replacement    RHIZOTOMY      TONSILLECTOMY      TOTAL ANKLE ARTHROPLASTY Right        Review of patient's allergies indicates:  No Known Allergies    Medications:  Continuous Infusions:   NORepinephrine bitartrate-D5W  0-0.2 mcg/kg/min Intravenous Continuous 10.4 mL/hr at 08/13/24 0933 0.06 mcg/kg/min at 08/13/24 0933     Scheduled Meds:   dolutegravir  50 mg Oral Daily    enoxparin  30 mg Subcutaneous Q24H (prophylaxis, 1700)    gabapentin  400 mg Oral BID    lamiVUDine  300 mg Oral Daily    methocarbamoL  500 mg Oral QID    piperacillin-tazobactam (Zosyn) IV (PEDS and ADULTS) (extended infusion is not appropriate)  4.5 g Intravenous Q8H    QUEtiapine  100 mg Oral QHS    sulfamethoxazole-trimethoprim 800-160mg  1 tablet Oral Once per day on Monday Wednesday Friday    vancomycin (VANCOCIN) IV (PEDS and ADULTS)  1,000 mg Intravenous Q24H     PRN Meds:  Current Facility-Administered Medications:     dextrose 10%, 12.5 g, Intravenous, PRN    dextrose 10%, 25 g, Intravenous, PRN    glucagon (human recombinant), 1 mg, Intramuscular, PRN    glucose, 16 g, Oral, PRN    glucose, 24 g, Oral, PRN    morphine, 2 mg, Intravenous, Q6H PRN    mupirocin, , Nasal, On Call Procedure    naloxone, 0.02 mg, Intravenous, PRN    sodium chloride 0.9%, 10 mL, Intravenous, Q12H PRN    Pharmacy to dose Vancomycin consult, , , Once **AND** vancomycin - pharmacy to dose, , Intravenous, pharmacy to manage frequency    Family History       Problem Relation (Age of Onset)    Alcohol abuse Brother    Cancer Father    Depression Sister    Hypertension Mother, Brother    Thyroid disease Mother          Tobacco Use    Smoking status: Never    Smokeless tobacco: Never   Substance and Sexual Activity    Alcohol use: Yes     Comment: Socially    Drug use: No    Sexual activity: Yes     Partners: Male     Birth control/protection: None       Review of Systems   HENT:  Negative for trouble swallowing.     Respiratory:  Positive for shortness of breath.    Musculoskeletal:  Positive for back pain and gait problem.   Skin:  Positive for pallor.   Neurological:  Positive for weakness.     Objective:     Vital Signs (Most Recent):  Temp: 98.3 °F (36.8 °C) (08/13/24 0700)  Pulse: 96 (08/13/24 0900)  Resp: 19 (08/13/24 0900)  BP: (!) 92/58 (08/13/24 0900)  SpO2: 100 % (08/13/24 0900) Vital Signs (24h Range):  Temp:  [97.3 °F (36.3 °C)-98.7 °F (37.1 °C)] 98.3 °F (36.8 °C)  Pulse:  [] 96  Resp:  [16-23] 19  SpO2:  [96 %-100 %] 100 %  BP: ()/(43-79) 92/58     Weight: 46.1 kg (101 lb 10.1 oz)  Body mass index is 17.45 kg/m².       Physical Exam  Constitutional:       General: She is not in acute distress.     Appearance: She is cachectic. She is ill-appearing.   HENT:      Head: Normocephalic and atraumatic.   Eyes:      General: Lids are normal.      Conjunctiva/sclera: Conjunctivae normal.   Pulmonary:      Effort: Pulmonary effort is normal. No respiratory distress.   Musculoskeletal:      Cervical back: Full passive range of motion without pain and normal range of motion.      Comments: Weakness noted   Skin:     Coloration: Skin is pale.   Neurological:      Mental Status: She is alert.   Psychiatric:         Mood and Affect: Affect is flat.            Review of Symptoms      Symptom Assessment (ESAS 0-10 Scale)  Pain:  0  Dyspnea:  0  Anxiety:  0  Nausea:  0  Depression:  0  Anorexia:  0  Fatigue:  0  Insomnia:  0  Restlessness:  0  Agitation:  0         Living Arrangements:  Lives with family    Psychosocial/Cultural:   See Palliative Psychosocial Note: No  Pt lives with her two elderly parents. Mother uses walker. Pt has a niece Janett who is involved in her care.   **Primary  to Follow**  Palliative Care  Consult: No        Advance Care Planning  Advance Directives:   Living Will: No    Do Not Resuscitate Status: Yes    Medical Power of : No      Decision Making:  Patient  answered questions  Goals of Care: The patient endorses that what is most important right now is to focus on improvement in condition but with limits to invasive therapies (DNR)    Accordingly, we have decided that the best plan to meet the patient's goals includes continuing with treatment         Significant Labs: All pertinent labs within the past 24 hours have been reviewed.  CBC:   Recent Labs   Lab 08/13/24 0337   WBC 19.41*   HGB 8.6*   HCT 27.5*   MCV 76*   *     BMP:  Recent Labs   Lab 08/13/24 0337   *   *   K 4.0      CO2 22*   BUN 9   CREATININE 0.6   CALCIUM 8.1*   MG 1.7     LFT:  Lab Results   Component Value Date    AST 23 08/13/2024    ALKPHOS 102 08/13/2024    BILITOT 0.4 08/13/2024     Albumin:   Albumin   Date Value Ref Range Status   08/13/2024 1.5 (L) 3.5 - 5.2 g/dL Final     Protein:   Total Protein   Date Value Ref Range Status   08/13/2024 5.2 (L) 6.0 - 8.4 g/dL Final     Lactic acid:   Lab Results   Component Value Date    LACTATE 2.0 08/12/2024    LACTATE 0.7 03/23/2024       Significant Imaging: I have reviewed all pertinent imaging results/findings within the past 24 hours.    20 minutes of ACP completed      Doris Aldridge, CNS  Palliative Medicine  [facility]

## 2024-08-13 NOTE — PLAN OF CARE
MICU DAILY GOALS     Family/Goals of care/Code Status   Code Status: Full Code    24H Vital Sign Range  Temp:  [97.3 °F (36.3 °C)-98.7 °F (37.1 °C)]   Pulse:  []   Resp:  [14-23]   BP: ()/(43-79)   SpO2:  [96 %-100 %]      Shift Events (include procedures and significant events)   Pt lethargic for majority of shift but easily arousable. Oriented x2-3. Delayed following of commands, moving all extremities. Remains on Levophed. Post procedural MRI completed. Persistent leaking from JEYSON drain site requiring multiple dressing changes. Minimal drainage output noted. Bulb not maintaining suction. Flushed without complication per order. Positive blood cultures, redrawn this am. Pain managed well. ICU team aware of all updates and changes. Will continue to monitor.      AWAKE RASS: Goal - RASS Goal: 0-->alert and calm  Actual - RASS (Lee Agitation-Sedation Scale): drowsy    Restraint necessity: Not necessary   BREATHE SBT: Not intubated    Coordinate A & B, analgesics/sedatives Pain: managed   SAT: Not intubated   Delirium CAM-ICU: Overall CAM-ICU: Negative   Early(intubated/ Progressive (non-intubated) Mobility MOVE Screen (INTUBATED ONLY): Not intubated    Activity: Activity Management: Arm raise - L1, Ankle pumps - L1, Rolling - L1   Feeding/Nutrition Diet order: Diet/Nutrition Received: NPO, sips of water,     Thrombus DVT prophylaxis: VTE Required Core Measure: (SCDs) Sequential compression device initiated/maintained   HOB Elevation Head of Bed (HOB) Positioning: HOB at 30 degrees   Ulcer Prophylaxis GI: yes   Glucose control managed     Skin Skin assessed during: Q Shift Change    Sacrum intact/not altered? Yes  Heels intact/not altered? Yes  Surgical wound? Yes    CHECK ONE!   (no altered skin or altered skin) and sub boxes:  [] No Altered Skin Integrity Present    []Prevention Measures Documented    [x] Altered Skin Integrity Present or Discovered   [x] LDA present in EPIC, daily doc completed               [] LDA added if not in EPIC (describe wound).                    When describing wound, do not stage, use descriptive words only.    [] Wound Image Taken (required on admit,                   transfer/discharge and every Tuesday)    Wound Care Consulted? Yes    4 EYES:  Attending Nurse (1st set of eyes):     Second RN/Staff Member (2nd set of eyes):    Bowel Function no issues    Indwelling Catheter Necessity      Urethral Catheter 08/12/24 1519-Reason for Continuing Urinary Catheterization: Urinary retention, Critically ill in ICU and requiring hourly monitoring of intake/output          De-escalation Antibiotics No        VS and assessment per flow sheet, patient progressing towards goals as tolerated, plan of care reviewed with  patient , all concerns addressed, will continue to monitor.

## 2024-08-13 NOTE — ASSESSMENT & PLAN NOTE
Impression: Pt is a 60 y/o female with AIDs, large spinal abscess, MAC with septic shock. Pt on levophed. Pt is AAOx3. Flat effect noted. Pt made DNR today per pt's wishes.     Reason for consult: GOC/ACP. Spoke to MADELEINE Delgado CCS.    Goals of care/ACP:   Met with pt who is aware of her medical issues. We discussed her spinal abscess. Pt waing for neuro to see her today to see if any surgical options for abscesses. We discussed her other co-morbidities and performance status. Pt weak and uses walker at home. We discussed possibility neuro will be unable to offer options. We discussed pt being on pressor support and antibiotics for infection. We started discussion on possibility of comfort focus. Pt's goal at this time is to speak to neuro to see if any options and then go from there.     We discussed code status: Pt is agreeable to DNR status. Pt does not want CPR or be put on vent.    Symptom management:     Pain:   Pt reports pain to back and hip are described as throbbing.     Current meds:  Moprhine 2 mg IVP q 6 hrs prn.   Let pt know she needs to ask for pain meds when needed.     Debility:   Pt reports she uses a walker at home to get around but weakness noted.   Would have PT work with pt when able.     Plan:   Pt made DNR after conversation.   Communicated with MADELEINE Delgado CCS.   Neuro to see pt today.   Will follow.

## 2024-08-13 NOTE — CONSULTS
[facility]  Infectious Disease  Consult Note    Patient Name: Amie Salmeron  MRN: [id number]  Admission Date: 8/12/2024  Hospital Length of Stay: 1 days  Attending Physician: Dre Thacker MD  Primary Care Provider: Anuradha, Primary Doctor     Isolation Status: Contact    Patient information was obtained from relative(s), ER records, and primary team.      Inpatient consult to Infectious Diseases  Consult performed by: Skylar Dalton MD  Consult ordered by: Aneta Chiang MD        Assessment/Plan:     Renal/  UTI (urinary tract infection)  Recommendations:  - Urine culture GNB, follow up on final sensitivities  - Continue Vancomycin pharmacy to dose IV and Piperacillin-Tazobactam 4.5 g IV Q8H     ID  * Osteomyelitis of vertebra of thoracolumbar region  Recommendations:  - History of vertebral MAC infection at [facility]  - Spoke with Janett (nephew and mPOA) who does not recall her taking MAC treatment and has poor compliance with medications  - Follow up on abdominal abscess cultures  - Continue Vancomycin pharmacy to dose IV and Piperacillin-Tazobactam 4.5 g IV Q8H    Septic shock  Recommendations:  - Likely source of infection from retroperitoneal abscess and vertebral MAC infection  - Blood cultures growing MRSA, follow up on final sensitivities  - Continue Vancomycin pharmacy to dose IV and Piperacillin-Tazobactam 4.5 g IV Q8H    Disseminated mycobacterium avium-intracellulare complex  See above    AIDS  Recommendations:  - CD4 pending, HIV viral load 43634 copies/mL  - Continue Dolutegravir 50 mg-Lamivudine 300 mg  - Continue Bactrim for PJP prophylaxis         Thank you for your consult. I will follow-up with patient. Please contact us if you have any additional questions.    Skylar Dalton MD - PGY4  Infectious Disease  [facility]    Subjective:     Principal Problem: Osteomyelitis of vertebra of thoracolumbar region    HPI: 58 yo female with HIV (followed by [facility], on dovato),  spinal MAC (reportedly on ALESSIA) s/p T11-L2 corpectomy 2/2024 transferred from [facility] for higher level of care for weakness and L back pain. ID consulted for antibiotic recommendations. CT at OSH notable for large paravertebral abscesses, paravertebral phlegmon with OM T8-L4 and LLL pulm nodule. Blood cx growing MRSA. Pt is currently on vancomycin, zosyn. Of note, pt was recently admitted at [facility] for [place] pain 2/2 to her dMAC - she was discharged on ALESSIA therapy. Infectious Disease consulted for further management.    Past Medical History:   Diagnosis Date    Allergy     Arthritis     Asthma     Chronic pain     HIV infection     Hypertension     Overactive bladder     Spinal stenosis     Urine incontinence        Past Surgical History:   Procedure Laterality Date    ADENOIDECTOMY      APPENDECTOMY      BACK SURGERY      GERALD    CYST REMOVAL      HYSTERECTOMY      JOINT REPLACEMENT Right     knee, with revision    KNEE SURGERY Left     knee replacement    RHIZOTOMY      TONSILLECTOMY      TOTAL ANKLE ARTHROPLASTY Right        Review of patient's allergies indicates:  No Known Allergies    No current facility-administered medications on file prior to encounter.     Current Outpatient Medications on File Prior to Encounter   Medication Sig    dolutegravir (TIVICAY) 50 mg Tab Take 50 mg by mouth once daily.    ethambutoL (MYAMBUTOL) 400 MG Tab Take 2 tablets (800 mg total) by mouth once daily.    gabapentin (NEURONTIN) 400 MG capsule Take 400 mg by mouth 3 (three) times daily.    lamiVUDine (EPIVIR) 300 mg tablet Take 1 tablet (300 mg total) by mouth once daily.    albuterol (PROVENTIL/VENTOLIN HFA) 90 mcg/actuation inhaler Inhale 2 puffs into the lungs every 6 (six) hours as needed. Rescue    bisacodyL (DULCOLAX) 5 mg EC tablet Take 1 tablet by mouth every other day.    cyclobenzaprine (FLEXERIL) 5 MG tablet Take 5 mg by mouth 3 (three) times daily as needed.    hydrOXYzine pamoate (VISTARIL) 25 MG Cap Take 25 mg by  mouth 3 (three) times daily as needed.    Lactobacillus rhamnosus GG (CULTURELLE) 10 billion cell capsule Take 1 capsule by mouth 2 (two) times daily.    LIDOcaine (LIDODERM) 5 % Place 1 patch onto the skin once daily.    mirtazapine (REMERON SOL-TAB) 15 MG disintegrating tablet Take 1 tablet by mouth every evening.    naloxone (NARCAN) 4 mg/actuation Spry 1 spray by Nasal route once.    polyethylene glycol (GLYCOLAX) 17 gram PwPk Take 17 g by mouth 2 (two) times daily.    QUEtiapine (SEROQUEL) 100 MG Tab Take 100 mg by mouth every evening.    rifabutin (MYCOBUTIN) 150 mg Cap Take 300 mg by mouth once daily.    senna (SENOKOT) 8.6 mg tablet Take 17.2 mg by mouth 2 (two) times daily.    sulfamethoxazole-trimethoprim 800-160mg (BACTRIM DS) 800-160 mg Tab Take 1 tablet by mouth 3 (three) times a week.    zinc oxide (BOUDREAUXS BUTT PASTE) 16 % Oint ointment Apply topically.    [DISCONTINUED] 0.9 % sodium chloride (SODIUM CHLORIDE 0.9%) solution Inject into the vein once daily.    [DISCONTINUED] amitriptyline (ELAVIL) 50 MG tablet Take 50 mg by mouth every evening.    [DISCONTINUED] dextrose 5 % (D5W) SolP 250 mL with amikacin 250 mg/mL Soln 950 mg Inject 950 mg into the vein every Tues, Thurs, Sat.    [DISCONTINUED] dolutegravir (TIVICAY) 50 mg Tab Take 1 tablet (50 mg total) by mouth once daily.    [DISCONTINUED] dolutegravir-lamivudine  mg Tab Take 1 tablet by mouth once daily.    [DISCONTINUED] HYDROcodone-acetaminophen (NORCO)  mg per tablet Take 1 tablet by mouth every 6 (six) hours as needed.    [DISCONTINUED] levoFLOXacin (LEVAQUIN) 500 MG tablet Take 1 tablet (500 mg total) by mouth once daily.    [DISCONTINUED] megestroL (MEGACE) 400 mg/10 mL (40 mg/mL) Susp Take 400 mg by mouth once daily.    [DISCONTINUED] methocarbamoL (ROBAXIN) 500 MG Tab Take 500 mg by mouth 4 (four) times daily.    [DISCONTINUED] oxyCODONE (ROXICODONE) 10 mg Tab immediate release tablet Take 10 mg by mouth every 6 (six) hours  as needed.    [DISCONTINUED] rifAMpin (RIFADIN) 300 MG capsule Take 1 capsule (300 mg total) by mouth once daily. Take as instructed by [facility] Infectious Disease.    [DISCONTINUED] valACYclovir (VALTREX) 1000 MG tablet Take 1 tablet (1,000 mg total) by mouth 2 (two) times daily. for 10 days     Family History       Problem Relation (Age of Onset)    Alcohol abuse Brother    Cancer Father    Depression Sister    Hypertension Mother, Brother    Thyroid disease Mother          Tobacco Use    Smoking status: Never    Smokeless tobacco: Never   Substance and Sexual Activity    Alcohol use: Yes     Comment: Socially    Drug use: No    Sexual activity: Yes     Partners: Male     Birth control/protection: None     Review of Systems   Constitutional:  Positive for activity change and fatigue. Negative for chills and fever.   Eyes:  Negative for visual disturbance.   Respiratory:  Negative for shortness of breath and wheezing.    Cardiovascular:  Negative for chest pain and leg swelling.   Gastrointestinal:  Negative for abdominal pain, nausea and vomiting.        - bowel incontinence   Genitourinary:  Negative for difficulty urinating.        - urinary incontinence   Musculoskeletal:  Positive for back pain and myalgias.   Neurological:  Negative for weakness.     Objective:     Vital Signs (Most Recent):  Temp: 98.1 °F (36.7 °C) (08/13/24 1100)  Pulse: 99 (08/13/24 1400)  Resp: (!) 24 (08/13/24 1400)  BP: (!) 88/49 (08/13/24 1400)  SpO2: 100 % (08/13/24 1400) Vital Signs (24h Range):  Temp:  [98.1 °F (36.7 °C)-98.7 °F (37.1 °C)] 98.1 °F (36.7 °C)  Pulse:  [] 99  Resp:  [16-24] 24  SpO2:  [96 %-100 %] 100 %  BP: ()/(49-79) 88/49     Weight: 46.1 kg (101 lb 10.1 oz)  Body mass index is 17.45 kg/m².     Physical Exam  Vitals reviewed.   Constitutional:       Appearance: She is ill-appearing (cachectic).   HENT:      Head: Normocephalic and atraumatic.      Right Ear: External ear normal.      Left Ear: External ear  normal.   Eyes:      Extraocular Movements: Extraocular movements intact.      Conjunctiva/sclera: Conjunctivae normal.   Cardiovascular:      Rate and Rhythm: Normal rate and regular rhythm.   Pulmonary:      Effort: Pulmonary effort is normal. No respiratory distress.      Breath sounds: Normal breath sounds.   Abdominal:      Palpations: Abdomen is soft.      Tenderness: There is no abdominal tenderness.   Musculoskeletal:         General: Tenderness (lower lumbar spine) present.      Cervical back: Normal range of motion and neck supple.      Right lower leg: No edema.      Left lower leg: No edema.   Neurological:      General: No focal deficit present.      Mental Status: She is alert and oriented to person, place, and time.      Sensory: No sensory deficit.      Motor: No weakness.                Significant Labs: All pertinent labs within the past 24 hours have been reviewed.    Significant Imaging: I have reviewed all pertinent imaging results/findings within the past 24 hours.

## 2024-08-13 NOTE — ANESTHESIA POSTPROCEDURE EVALUATION
Anesthesia Post Evaluation    Patient: Amie Salmeron    Procedure(s) Performed: * No procedures listed *    Final Anesthesia Type: general      Patient location during evaluation: PACU  Patient participation: Yes- Able to Participate  Level of consciousness: awake and alert  Post-procedure vital signs: reviewed and stable  Pain management: adequate  Airway patency: patent    PONV status at discharge: No PONV  Anesthetic complications: no      Cardiovascular status: blood pressure returned to baseline  Respiratory status: unassisted  Hydration status: euvolemic  Follow-up not needed.              Vitals Value Taken Time   /62 08/13/24 1532   Temp 36.7 °C (98.1 °F) 08/13/24 1500   Pulse 100 08/13/24 1541   Resp 22 08/13/24 1541   SpO2 100 % 08/13/24 1541   Vitals shown include unfiled device data.      No case tracking events are documented in the log.      Pain/Johan Score: Pain Rating Prior to Med Admin: 7 (8/13/2024 12:21 AM)  Pain Rating Post Med Admin: 3 (8/13/2024 12:51 AM)

## 2024-08-13 NOTE — SUBJECTIVE & OBJECTIVE
Interval History: Pt made DNR today  per pt's wishes    Past Medical History:   Diagnosis Date    Allergy     Arthritis     Asthma     Chronic pain     HIV infection     Hypertension     Overactive bladder     Spinal stenosis     Urine incontinence        Past Surgical History:   Procedure Laterality Date    ADENOIDECTOMY      APPENDECTOMY      BACK SURGERY      GERALD    CYST REMOVAL      HYSTERECTOMY      JOINT REPLACEMENT Right     knee, with revision    KNEE SURGERY Left     knee replacement    RHIZOTOMY      TONSILLECTOMY      TOTAL ANKLE ARTHROPLASTY Right        Review of patient's allergies indicates:  No Known Allergies    Medications:  Continuous Infusions:   NORepinephrine bitartrate-D5W  0-0.2 mcg/kg/min Intravenous Continuous 10.4 mL/hr at 08/13/24 0933 0.06 mcg/kg/min at 08/13/24 0933     Scheduled Meds:   dolutegravir  50 mg Oral Daily    enoxparin  30 mg Subcutaneous Q24H (prophylaxis, 1700)    gabapentin  400 mg Oral BID    lamiVUDine  300 mg Oral Daily    methocarbamoL  500 mg Oral QID    piperacillin-tazobactam (Zosyn) IV (PEDS and ADULTS) (extended infusion is not appropriate)  4.5 g Intravenous Q8H    QUEtiapine  100 mg Oral QHS    sulfamethoxazole-trimethoprim 800-160mg  1 tablet Oral Once per day on Monday Wednesday Friday    vancomycin (VANCOCIN) IV (PEDS and ADULTS)  1,000 mg Intravenous Q24H     PRN Meds:  Current Facility-Administered Medications:     dextrose 10%, 12.5 g, Intravenous, PRN    dextrose 10%, 25 g, Intravenous, PRN    glucagon (human recombinant), 1 mg, Intramuscular, PRN    glucose, 16 g, Oral, PRN    glucose, 24 g, Oral, PRN    morphine, 2 mg, Intravenous, Q6H PRN    mupirocin, , Nasal, On Call Procedure    naloxone, 0.02 mg, Intravenous, PRN    sodium chloride 0.9%, 10 mL, Intravenous, Q12H PRN    Pharmacy to dose Vancomycin consult, , , Once **AND** vancomycin - pharmacy to dose, , Intravenous, pharmacy to manage frequency    Family History       Problem Relation (Age of  Onset)    Alcohol abuse Brother    Cancer Father    Depression Sister    Hypertension Mother, Brother    Thyroid disease Mother          Tobacco Use    Smoking status: Never    Smokeless tobacco: Never   Substance and Sexual Activity    Alcohol use: Yes     Comment: Socially    Drug use: No    Sexual activity: Yes     Partners: Male     Birth control/protection: None       Review of Systems   HENT:  Negative for trouble swallowing.    Respiratory:  Positive for shortness of breath.    Musculoskeletal:  Positive for back pain and gait problem.   Skin:  Positive for pallor.   Neurological:  Positive for weakness.     Objective:     Vital Signs (Most Recent):  Temp: 98.3 °F (36.8 °C) (08/13/24 0700)  Pulse: 96 (08/13/24 0900)  Resp: 19 (08/13/24 0900)  BP: (!) 92/58 (08/13/24 0900)  SpO2: 100 % (08/13/24 0900) Vital Signs (24h Range):  Temp:  [97.3 °F (36.3 °C)-98.7 °F (37.1 °C)] 98.3 °F (36.8 °C)  Pulse:  [] 96  Resp:  [16-23] 19  SpO2:  [96 %-100 %] 100 %  BP: ()/(43-79) 92/58     Weight: 46.1 kg (101 lb 10.1 oz)  Body mass index is 17.45 kg/m².       Physical Exam  Constitutional:       General: She is not in acute distress.     Appearance: She is cachectic. She is ill-appearing.   HENT:      Head: Normocephalic and atraumatic.   Eyes:      General: Lids are normal.      Conjunctiva/sclera: Conjunctivae normal.   Pulmonary:      Effort: Pulmonary effort is normal. No respiratory distress.   Musculoskeletal:      Cervical back: Full passive range of motion without pain and normal range of motion.      Comments: Weakness noted   Skin:     Coloration: Skin is pale.   Neurological:      Mental Status: She is alert.   Psychiatric:         Mood and Affect: Affect is flat.            Review of Symptoms      Symptom Assessment (ESAS 0-10 Scale)  Pain:  0  Dyspnea:  0  Anxiety:  0  Nausea:  0  Depression:  0  Anorexia:  0  Fatigue:  0  Insomnia:  0  Restlessness:  0  Agitation:  0         Living Arrangements:   Lives with family    Psychosocial/Cultural:   See Palliative Psychosocial Note: No  Pt lives with her two elderly parents. Mother uses walker. Pt has a niece Janett who is involved in her care.   **Primary  to Follow**  Palliative Care  Consult: No        Advance Care Planning   Advance Directives:   Living Will: No    Do Not Resuscitate Status: Yes    Medical Power of : No      Decision Making:  Patient answered questions  Goals of Care: The patient endorses that what is most important right now is to focus on improvement in condition but with limits to invasive therapies (DNR)    Accordingly, we have decided that the best plan to meet the patient's goals includes continuing with treatment         Significant Labs: All pertinent labs within the past 24 hours have been reviewed.  CBC:   Recent Labs   Lab 08/13/24 0337   WBC 19.41*   HGB 8.6*   HCT 27.5*   MCV 76*   *     BMP:  Recent Labs   Lab 08/13/24 0337   *   *   K 4.0      CO2 22*   BUN 9   CREATININE 0.6   CALCIUM 8.1*   MG 1.7     LFT:  Lab Results   Component Value Date    AST 23 08/13/2024    ALKPHOS 102 08/13/2024    BILITOT 0.4 08/13/2024     Albumin:   Albumin   Date Value Ref Range Status   08/13/2024 1.5 (L) 3.5 - 5.2 g/dL Final     Protein:   Total Protein   Date Value Ref Range Status   08/13/2024 5.2 (L) 6.0 - 8.4 g/dL Final     Lactic acid:   Lab Results   Component Value Date    LACTATE 2.0 08/12/2024    LACTATE 0.7 03/23/2024       Significant Imaging: I have reviewed all pertinent imaging results/findings within the past 24 hours.

## 2024-08-13 NOTE — NURSING
Patient with infiltrated IV in right antecubital vein- post levophed infusion.  Infiltration site red and warm to the touch, but patient not complaining of pain on or around site.  Fiona Winterbottom APRN, CNS instructed to remove IV catheter and monitor for increased redness. Orders completed. WCTM.

## 2024-08-13 NOTE — SUBJECTIVE & OBJECTIVE
Interval History: NAEON. Contrasted MRIs reviewed and d/w Dr. Putnam this AM. No clear epidural abscess/compressive phlegmon appreciated on imaging. Neuro stable, patient frail/deconditioned w/o focal neuro exam.     Medications:  Continuous Infusions:   NORepinephrine bitartrate-D5W  0-0.2 mcg/kg/min Intravenous Continuous 10.4 mL/hr at 08/13/24 0933 0.06 mcg/kg/min at 08/13/24 0933     Scheduled Meds:   dolutegravir  50 mg Oral Daily    enoxparin  30 mg Subcutaneous Q24H (prophylaxis, 1700)    gabapentin  400 mg Oral BID    lamiVUDine  300 mg Oral Daily    methocarbamoL  500 mg Oral QID    piperacillin-tazobactam (Zosyn) IV (PEDS and ADULTS) (extended infusion is not appropriate)  4.5 g Intravenous Q8H    QUEtiapine  100 mg Oral QHS    sulfamethoxazole-trimethoprim 800-160mg  1 tablet Oral Once per day on Monday Wednesday Friday    vancomycin (VANCOCIN) IV (PEDS and ADULTS)  1,000 mg Intravenous Q24H     PRN Meds:  Current Facility-Administered Medications:     dextrose 10%, 12.5 g, Intravenous, PRN    dextrose 10%, 25 g, Intravenous, PRN    glucagon (human recombinant), 1 mg, Intramuscular, PRN    glucose, 16 g, Oral, PRN    glucose, 24 g, Oral, PRN    morphine, 2 mg, Intravenous, Q6H PRN    mupirocin, , Nasal, On Call Procedure    naloxone, 0.02 mg, Intravenous, PRN    sodium chloride 0.9%, 10 mL, Intravenous, Q12H PRN    Pharmacy to dose Vancomycin consult, , , Once **AND** vancomycin - pharmacy to dose, , Intravenous, pharmacy to manage frequency     Review of Systems  Objective:     Weight: 46.1 kg (101 lb 10.1 oz)  Body mass index is 17.45 kg/m².  Vital Signs (Most Recent):  Temp: 98.3 °F (36.8 °C) (08/13/24 0700)  Pulse: 96 (08/13/24 0900)  Resp: 19 (08/13/24 0900)  BP: (!) 92/58 (08/13/24 0900)  SpO2: 100 % (08/13/24 0900) Vital Signs (24h Range):  Temp:  [97.3 °F (36.3 °C)-98.7 °F (37.1 °C)] 98.3 °F (36.8 °C)  Pulse:  [] 96  Resp:  [16-23] 19  SpO2:  [96 %-100 %] 100 %  BP: ()/(43-79) 92/58  "    Date 08/13/24 0700 - 08/14/24 0659   Shift 9561-3494 7627-9572 4626-1904 24 Hour Total   INTAKE   I.V.(mL/kg) 53.9(1.2)   53.9(1.2)   IV Piggyback 6.3   6.3   Shift Total(mL/kg) 60.3(1.3)   60.3(1.3)   OUTPUT   Urine(mL/kg/hr) 195   195   Shift Total(mL/kg) 195(4.2)   195(4.2)   Weight (kg) 46.1 46.1 46.1 46.1                            Closed/Suction Drain 08/12/24 1400 Lateral;Left Back Bulb (Active)   Site Description Leaking at site 08/13/24 0710   Dressing Type Central line dressing 08/13/24 0710   Dressing Status New drainage 08/13/24 0710   Dressing Intervention Other (Comment) 08/13/24 0710   Drainage Serosanguineous 08/13/24 0710   Status To bulb suction 08/13/24 0710   Output (mL) 20 mL 08/13/24 0600            Urethral Catheter 08/12/24 1519 (Active)   Site Assessment Clean;Intact 08/13/24 0710   Collection Container Urimeter 08/13/24 0710   Securement Method secured to top of thigh w/ adhesive device 08/13/24 0710   Catheter Care Performed yes 08/13/24 0710   Reason for Continuing Urinary Catheterization Urinary retention;Critically ill in ICU and requiring hourly monitoring of intake/output 08/13/24 0710   CAUTI Prevention Bundle Drainage bag/urimeter off the floor;Intact seal between catheter & drainage tubing;Securement Device in place with 1" slack;Sheeting clip in use;No dependent loops or kinks;Drainage bag/urimeter not overfilled (<2/3 full);Drainage bag/urimeter below bladder 08/13/24 0710   Output (mL) 20 mL 08/13/24 0900          Physical Exam         Neurosurgery Physical Exam  [id number]  A/Ox3  Sleepy, slow to respond  Generalized weakness, 4/5 everywhere, nonfocal  Tender to palpation T/L spine  SILT  No calvin  No clonus    Significant Labs:  Recent Labs   Lab 08/12/24  0253 08/12/24  2113 08/13/24  0337   GLU 96 160* 159*   * 133* 132*   K 4.6 4.2 4.0    103 102   CO2 21* 22* 22*   BUN 17 10 9   CREATININE 0.6 0.5 0.6   CALCIUM 8.3* 7.5* 8.1*   MG 2.2  --  1.7     Recent Labs "   Lab 08/12/24  0708 08/12/24  1035 08/13/24  0337   WBC 24.35* 21.23* 19.41*   HGB 8.2* 9.2* 8.6*   HCT 25.7* 29.1* 27.5*    412 474*     Recent Labs   Lab 08/12/24  1030   INR 1.2   APTT 34.3*     Microbiology Results (last 7 days)       Procedure Component Value Units Date/Time    Culture, Anaerobe [[id number]] Collected: 08/12/24 1346    Order Status: Completed Specimen: Abscess from Abdomen Updated: 08/13/24 0722     Anaerobic Culture Culture in progress    Narrative:      ADD PER JAMIE ALEXANDER MD CXAFB [id number] 08/12/2024  20:15     Aerobic culture [[id number]]  (Abnormal) Collected: 08/12/24 1346    Order Status: Completed Specimen: Abscess from Abdomen Updated: 08/13/24 0713     Aerobic Bacterial Culture GRAM NEGATIVE PETER  Many  Identification and susceptibility pending      Narrative:      Left lateral abd    Blood culture [[id number]] Collected: 08/13/24 0352    Order Status: Sent Specimen: Blood from Peripheral, Upper Arm, Left Updated: 08/13/24 0411    Blood culture [[id number]]     Order Status: Sent Specimen: Blood     AFB Culture & Smear [[id number]] Collected: 08/12/24 2018    Order Status: No result Specimen: Abscess from Abdomen Updated: 08/12/24 2018    AFB Culture & Smear [[id number]] Collected: 08/12/24 1346    Order Status: No result Specimen: Abdominal from Abdomen Updated: 08/12/24 1940    Gram stain [[id number]] Collected: 08/12/24 1346    Order Status: Completed Specimen: Abscess from Abdomen Updated: 08/12/24 1631     Gram Stain Result Many WBC's      Many Gram positive cocci      Many Gram negative rods      Few Gram positive rods    Fungus culture [[id number]] Collected: 08/12/24 1346    Order Status: Sent Specimen: Abscess from Abdomen Updated: 08/12/24 1440    AFB Culture & Smear [[id number]]     Order Status: No result Specimen: Abscess from Abdomen           All pertinent labs from the last 24 hours have been reviewed.    Significant Diagnostics:  I have reviewed all  pertinent imaging results/findings within the past 24 hours.    X-Ray Abdomen AP 1 View    Result Date: 8/13/2024  As above. Electronically signed by: Ta Mcleod Date:    08/13/2024 Time:    08:02    X-Ray Skull Ltd Less Than 4 Views    Result Date: 8/13/2024  As above. Electronically signed by: Ta Mcleod Date:    08/13/2024 Time:    07:59    MRI Thoracic Spine W WO Cont    Result Date: 8/13/2024  Multilevel spondylo discitis in the inferior endplate of T8, at the level of T9-T10 and L3-L4.  Peripherally enhancing lesions at L3 and L4 suspicious for intraosseous abscesses.  Enhancement of the left facet joint at L3-4 suspicious for septic facet arthritis.  Very small focus of vertebral infection also suggestion in the anterior inferior aspect of the T4 vertebral body. No epidural phlegmon or abscess is visualized in the thoracic lumbar spine, allowing for poor visualization of the vertebral canal contents at the level of the so lumbar surgical hardware.. Interval drain placement in the left paraspinal fluid collection, significantly decreased in size.  Some residual fluid within the left psoas without abnormal enhancement. Degenerative changes as above. This report was flagged in Epic as abnormal. This report was created using an electronic voice-recognition transcription system.  There may be occasional transcription errors secondary to the inherent limitations of the transcription software.  Although the radiologist does proof-read the report, some transcription errors might escape the radiologist's detection.  When reading the report please recognize, using context, where unintentional substitutions may have occurred.  Please do not hesitate to contact the radiologist directly if clarification is needed. Electronically signed by resident: Marcial Machaod Date:    08/13/2024 Time:    00:13 Electronically signed by: Felice Reid Date:    08/13/2024 Time:    01:24    MRI Lumbar Spine W WO Cont    Result Date:  8/13/2024  Multilevel spondylo discitis in the inferior endplate of T8, at the level of T9-T10 and L3-L4.  Peripherally enhancing lesions at L3 and L4 suspicious for intraosseous abscesses.  Enhancement of the left facet joint at L3-4 suspicious for septic facet arthritis.  Very small focus of vertebral infection also suggestion in the anterior inferior aspect of the T4 vertebral body. No epidural phlegmon or abscess is visualized in the thoracic lumbar spine, allowing for poor visualization of the vertebral canal contents at the level of the so lumbar surgical hardware.. Interval drain placement in the left paraspinal fluid collection, significantly decreased in size.  Some residual fluid within the left psoas without abnormal enhancement. Degenerative changes as above. This report was flagged in Epic as abnormal. This report was created using an electronic voice-recognition transcription system.  There may be occasional transcription errors secondary to the inherent limitations of the transcription software.  Although the radiologist does proof-read the report, some transcription errors might escape the radiologist's detection.  When reading the report please recognize, using context, where unintentional substitutions may have occurred.  Please do not hesitate to contact the radiologist directly if clarification is needed. Electronically signed by resident: Marcial Machado Date:    08/13/2024 Time:    00:13 Electronically signed by: Felice Reid Date:    08/13/2024 Time:    01:24    IR Abscess Drainage With Tube Placement    Result Date: 8/12/2024  Percutaneous placement of a 14 Samoan drainage catheter into left retroperitoneal space, yielding 380 mL of purulent fluid. Plan: Leave drain in place until less than 10 cc of fluid is aspirated daily for 2 days. ______________________________________________________________________ Electronically signed by: Waldemar Yañez Date:    08/12/2024 Time:    14:27

## 2024-08-13 NOTE — PROGRESS NOTES
[facility] - Cardiac Medical ICU  Neurosurgery  Progress Note    Subjective:     History of Present Illness: Pt is a 59F w/ HTN, HIV (CD4 184 in Feb), s/p osteo following T12-L1 corpectomy w/ ortho @ [facility] who presents from OSH w/ malaise, fatigue, and back pain for 1 week. OSH work up significant for WCC 16.6, K 2.4, normal lactate. Given vanc/zosyn/K supplementation at OSH and transferred to [facility] for higher level of care. No b/b issues, no blood thinners. Endorses mid and lower back pain, no radiculopathy, saddle anesthesia, or signs of neurogenic claudication. CT Csp/Tsp at OSH significant for left paravertebral / retroperitoneal abscess and osteomyelitis / discitis at L2-L4, T8-T10.     Post-Op Info:  * No surgery found *       Interval History: NAEON. Contrasted MRIs reviewed and d/w Dr. Putnam this AM. No clear epidural abscess/compressive phlegmon appreciated on imaging. Neuro stable, patient frail/deconditioned w/o focal neuro exam.     Medications:  Continuous Infusions:   NORepinephrine bitartrate-D5W  0-0.2 mcg/kg/min Intravenous Continuous 10.4 mL/hr at 08/13/24 0933 0.06 mcg/kg/min at 08/13/24 0933     Scheduled Meds:   dolutegravir  50 mg Oral Daily    enoxparin  30 mg Subcutaneous Q24H (prophylaxis, 1700)    gabapentin  400 mg Oral BID    lamiVUDine  300 mg Oral Daily    methocarbamoL  500 mg Oral QID    piperacillin-tazobactam (Zosyn) IV (PEDS and ADULTS) (extended infusion is not appropriate)  4.5 g Intravenous Q8H    QUEtiapine  100 mg Oral QHS    sulfamethoxazole-trimethoprim 800-160mg  1 tablet Oral Once per day on Monday Wednesday Friday    vancomycin (VANCOCIN) IV (PEDS and ADULTS)  1,000 mg Intravenous Q24H     PRN Meds:  Current Facility-Administered Medications:     dextrose 10%, 12.5 g, Intravenous, PRN    dextrose 10%, 25 g, Intravenous, PRN    glucagon (human recombinant), 1 mg, Intramuscular, PRN    glucose, 16 g, Oral, PRN    glucose, 24 g, Oral, PRN    morphine, 2 mg, Intravenous, Q6H  PRN    mupirocin, , Nasal, On Call Procedure    naloxone, 0.02 mg, Intravenous, PRN    sodium chloride 0.9%, 10 mL, Intravenous, Q12H PRN    Pharmacy to dose Vancomycin consult, , , Once **AND** vancomycin - pharmacy to dose, , Intravenous, pharmacy to manage frequency     Review of Systems  Objective:     Weight: 46.1 kg (101 lb 10.1 oz)  Body mass index is 17.45 kg/m².  Vital Signs (Most Recent):  Temp: 98.3 °F (36.8 °C) (08/13/24 0700)  Pulse: 96 (08/13/24 0900)  Resp: 19 (08/13/24 0900)  BP: (!) 92/58 (08/13/24 0900)  SpO2: 100 % (08/13/24 0900) Vital Signs (24h Range):  Temp:  [97.3 °F (36.3 °C)-98.7 °F (37.1 °C)] 98.3 °F (36.8 °C)  Pulse:  [] 96  Resp:  [16-23] 19  SpO2:  [96 %-100 %] 100 %  BP: ()/(43-79) 92/58     Date 08/13/24 0700 - 08/14/24 0659   Shift 5993-7929 1684-1633 0410-4105 24 Hour Total   INTAKE   I.V.(mL/kg) 53.9(1.2)   53.9(1.2)   IV Piggyback 6.3   6.3   Shift Total(mL/kg) 60.3(1.3)   60.3(1.3)   OUTPUT   Urine(mL/kg/hr) 195   195   Shift Total(mL/kg) 195(4.2)   195(4.2)   Weight (kg) 46.1 46.1 46.1 46.1                            Closed/Suction Drain 08/12/24 1400 Lateral;Left Back Bulb (Active)   Site Description Leaking at site 08/13/24 0710   Dressing Type Central line dressing 08/13/24 0710   Dressing Status New drainage 08/13/24 0710   Dressing Intervention Other (Comment) 08/13/24 0710   Drainage Serosanguineous 08/13/24 0710   Status To bulb suction 08/13/24 0710   Output (mL) 20 mL 08/13/24 0600            Urethral Catheter 08/12/24 1519 (Active)   Site Assessment Clean;Intact 08/13/24 0710   Collection Container Urimeter 08/13/24 0710   Securement Method secured to top of thigh w/ adhesive device 08/13/24 0710   Catheter Care Performed yes 08/13/24 0710   Reason for Continuing Urinary Catheterization Urinary retention;Critically ill in ICU and requiring hourly monitoring of intake/output 08/13/24 0710   CAUTI Prevention Bundle Drainage bag/urimeter off the floor;Intact  "seal between catheter & drainage tubing;Securement Device in place with 1" slack;Sheeting clip in use;No dependent loops or kinks;Drainage bag/urimeter not overfilled (<2/3 full);Drainage bag/urimeter below bladder 08/13/24 0710   Output (mL) 20 mL 08/13/24 0900          Physical Exam         Neurosurgery Physical Exam  [id number]  A/Ox3  Sleepy, slow to respond  Generalized weakness, 4/5 everywhere, nonfocal  Tender to palpation T/L spine  SILT  No calvin  No clonus    Significant Labs:  Recent Labs   Lab 08/12/24  0253 08/12/24  2113 08/13/24  0337   GLU 96 160* 159*   * 133* 132*   K 4.6 4.2 4.0    103 102   CO2 21* 22* 22*   BUN 17 10 9   CREATININE 0.6 0.5 0.6   CALCIUM 8.3* 7.5* 8.1*   MG 2.2  --  1.7     Recent Labs   Lab 08/12/24  0708 08/12/24  1035 08/13/24  0337   WBC 24.35* 21.23* 19.41*   HGB 8.2* 9.2* 8.6*   HCT 25.7* 29.1* 27.5*    412 474*     Recent Labs   Lab 08/12/24  1030   INR 1.2   APTT 34.3*     Microbiology Results (last 7 days)       Procedure Component Value Units Date/Time    Culture, Anaerobe [[id number]] Collected: 08/12/24 1346    Order Status: Completed Specimen: Abscess from Abdomen Updated: 08/13/24 0722     Anaerobic Culture Culture in progress    Narrative:      ADD PER JAMIE ALEXANDER MD CXAFB [id number] 08/12/2024  20:15     Aerobic culture [[id number]]  (Abnormal) Collected: 08/12/24 1346    Order Status: Completed Specimen: Abscess from Abdomen Updated: 08/13/24 0713     Aerobic Bacterial Culture GRAM NEGATIVE PETER  Many  Identification and susceptibility pending      Narrative:      Left lateral abd    Blood culture [[id number]] Collected: 08/13/24 0352    Order Status: Sent Specimen: Blood from Peripheral, Upper Arm, Left Updated: 08/13/24 0411    Blood culture [[id number]]     Order Status: Sent Specimen: Blood     AFB Culture & Smear [[id number]] Collected: 08/12/24 2018    Order Status: No result Specimen: Abscess from Abdomen Updated: 08/12/24 2018    " AFB Culture & Smear [[id number]] Collected: 08/12/24 1346    Order Status: No result Specimen: Abdominal from Abdomen Updated: 08/12/24 1940    Gram stain [[id number]] Collected: 08/12/24 1346    Order Status: Completed Specimen: Abscess from Abdomen Updated: 08/12/24 1631     Gram Stain Result Many WBC's      Many Gram positive cocci      Many Gram negative rods      Few Gram positive rods    Fungus culture [[id number]] Collected: 08/12/24 1346    Order Status: Sent Specimen: Abscess from Abdomen Updated: 08/12/24 1440    AFB Culture & Smear [[id number]]     Order Status: No result Specimen: Abscess from Abdomen           All pertinent labs from the last 24 hours have been reviewed.    Significant Diagnostics:  I have reviewed all pertinent imaging results/findings within the past 24 hours.    X-Ray Abdomen AP 1 View    Result Date: 8/13/2024  As above. Electronically signed by: Ta Mcleod Date:    08/13/2024 Time:    08:02    X-Ray Skull Ltd Less Than 4 Views    Result Date: 8/13/2024  As above. Electronically signed by: Ta Mcleod Date:    08/13/2024 Time:    07:59    MRI Thoracic Spine W WO Cont    Result Date: 8/13/2024  Multilevel spondylo discitis in the inferior endplate of T8, at the level of T9-T10 and L3-L4.  Peripherally enhancing lesions at L3 and L4 suspicious for intraosseous abscesses.  Enhancement of the left facet joint at L3-4 suspicious for septic facet arthritis.  Very small focus of vertebral infection also suggestion in the anterior inferior aspect of the T4 vertebral body. No epidural phlegmon or abscess is visualized in the thoracic lumbar spine, allowing for poor visualization of the vertebral canal contents at the level of the so lumbar surgical hardware.. Interval drain placement in the left paraspinal fluid collection, significantly decreased in size.  Some residual fluid within the left psoas without abnormal enhancement. Degenerative changes as above. This report was flagged in Epic  as abnormal. This report was created using an electronic voice-recognition transcription system.  There may be occasional transcription errors secondary to the inherent limitations of the transcription software.  Although the radiologist does proof-read the report, some transcription errors might escape the radiologist's detection.  When reading the report please recognize, using context, where unintentional substitutions may have occurred.  Please do not hesitate to contact the radiologist directly if clarification is needed. Electronically signed by resident: Marcial Machado Date:    08/13/2024 Time:    00:13 Electronically signed by: Felice Reid Date:    08/13/2024 Time:    01:24    MRI Lumbar Spine W WO Cont    Result Date: 8/13/2024  Multilevel spondylo discitis in the inferior endplate of T8, at the level of T9-T10 and L3-L4.  Peripherally enhancing lesions at L3 and L4 suspicious for intraosseous abscesses.  Enhancement of the left facet joint at L3-4 suspicious for septic facet arthritis.  Very small focus of vertebral infection also suggestion in the anterior inferior aspect of the T4 vertebral body. No epidural phlegmon or abscess is visualized in the thoracic lumbar spine, allowing for poor visualization of the vertebral canal contents at the level of the so lumbar surgical hardware.. Interval drain placement in the left paraspinal fluid collection, significantly decreased in size.  Some residual fluid within the left psoas without abnormal enhancement. Degenerative changes as above. This report was flagged in Epic as abnormal. This report was created using an electronic voice-recognition transcription system.  There may be occasional transcription errors secondary to the inherent limitations of the transcription software.  Although the radiologist does proof-read the report, some transcription errors might escape the radiologist's detection.  When reading the report please recognize, using context,  where unintentional substitutions may have occurred.  Please do not hesitate to contact the radiologist directly if clarification is needed. Electronically signed by resident: Marcial Machado Date:    08/13/2024 Time:    00:13 Electronically signed by: Felice Reid Date:    08/13/2024 Time:    01:24    IR Abscess Drainage With Tube Placement    Result Date: 8/12/2024  Percutaneous placement of a 14 French drainage catheter into left retroperitoneal space, yielding 380 mL of purulent fluid. Plan: Leave drain in place until less than 10 cc of fluid is aspirated daily for 2 days. ______________________________________________________________________ Electronically signed by: Waldemar Yañez Date:    08/12/2024 Time:    14:27     Assessment/Plan:     * Osteomyelitis of vertebra of thoracolumbar region  59F w/ history of AIDS (CD4<200 in February), s/p T12-L1 corpectomy presents w/ osteomyelitis/discitis in multiple levels of the thoracic and lumbar spine. No red flag symptoms    CT Tsp/Lsp w/ contrast 8/11: 14.0x5.8x15.5cm paravertebral abscess on left side, osteomyelitis/discitis at T8-10, L2-4   MRI T/L sp 8/13: multilevel osteodiscitis w/o epidural abscess, phlegmon    Plan:  --Patient admitted to  on telemetry; hospital medicine team primary  --All labs and diagnostics reviewed   --MRI T/L reviewed and d/w Dr. Putnam. No epidural abscess/compressive phlegmon, no neurosurgical intervention warranted at this time  --Maintain TLSO when OOB for comfort  --Abx/HIV management per ID   --Rec palliative care consultation for patient     Dispo: ongoing    NSGY will sign off    Please contact the on call neurosurgery provider with questions or concerns. Can be reached via on-call schedule and/or .      D/w Dr. Indy Atwood MD  Neurosurgery  [facility] - Cardiac Medical ICU

## 2024-08-13 NOTE — ASSESSMENT & PLAN NOTE
Recommendations:  - History of vertebral MAC infection at [facility]  - Spoke with Janett (nephew and mPOA) who does not recall her taking MAC treatment and has poor compliance with medications  - Follow up on abdominal abscess cultures  - Continue Vancomycin pharmacy to dose IV and Piperacillin-Tazobactam 4.5 g IV Q8H

## 2024-08-13 NOTE — PROGRESS NOTES
Suspicion for Levophed infiltration. Mild swelling, lateral redness, pain when palpated. Infusion stopped and changed to alternate IV access. Tyrell NP to bedside to assess. Awaiting further instruction.

## 2024-08-14 PROBLEM — G47.00 INSOMNIA: Status: RESOLVED | Noted: 2024-03-23 | Resolved: 2024-08-14

## 2024-08-14 LAB
ACANTHOCYTES BLD QL SMEAR: PRESENT
ALBUMIN SERPL BCP-MCNC: 1.3 G/DL (ref 3.5–5.2)
ALP SERPL-CCNC: 75 U/L (ref 55–135)
ALT SERPL W/O P-5'-P-CCNC: <5 U/L (ref 10–44)
ANION GAP SERPL CALC-SCNC: 8 MMOL/L (ref 8–16)
ANISOCYTOSIS BLD QL SMEAR: ABNORMAL
ANISOCYTOSIS BLD QL SMEAR: SLIGHT
AST SERPL-CCNC: 13 U/L (ref 10–40)
AV INDEX (PROSTH): 0.95
AV MEAN GRADIENT: 3 MMHG
AV PEAK GRADIENT: 5 MMHG
AV VALVE AREA BY VELOCITY RATIO: 2.42 CM²
AV VALVE AREA: 2.51 CM²
AV VELOCITY RATIO: 0.92
BACTERIA SPEC AEROBE CULT: ABNORMAL
BASOPHILS # BLD AUTO: 0.02 K/UL (ref 0–0.2)
BASOPHILS # BLD AUTO: 0.04 K/UL (ref 0–0.2)
BASOPHILS NFR BLD: 0.3 % (ref 0–1.9)
BASOPHILS NFR BLD: 0.6 % (ref 0–1.9)
BILIRUB SERPL-MCNC: 0.2 MG/DL (ref 0.1–1)
BSA FOR ECHO PROCEDURE: 1.44 M2
BUN SERPL-MCNC: 12 MG/DL (ref 8–23)
BURR CELLS BLD QL SMEAR: ABNORMAL
BURR CELLS BLD QL SMEAR: ABNORMAL
CALCIUM SERPL-MCNC: 7.6 MG/DL (ref 8.7–10.5)
CHLORIDE SERPL-SCNC: 108 MMOL/L (ref 95–110)
CO2 SERPL-SCNC: 24 MMOL/L (ref 23–29)
CREAT SERPL-MCNC: 0.6 MG/DL (ref 0.5–1.4)
CV ECHO LV RWT: 0.61 CM
DACRYOCYTES BLD QL SMEAR: ABNORMAL
DACRYOCYTES BLD QL SMEAR: ABNORMAL
DIFFERENTIAL METHOD BLD: ABNORMAL
DIFFERENTIAL METHOD BLD: ABNORMAL
DOHLE BOD BLD QL SMEAR: PRESENT
DOP CALC AO PEAK VEL: 1.11 M/S
DOP CALC AO VTI: 18.3 CM
DOP CALC LVOT AREA: 2.6 CM2
DOP CALC LVOT DIAMETER: 1.83 CM
DOP CALC LVOT PEAK VEL: 1.02 M/S
DOP CALC LVOT STROKE VOLUME: 45.9 CM3
DOP CALCLVOT PEAK VEL VTI: 17.46 CM
E WAVE DECELERATION TIME: 266.14 MSEC
E/A RATIO: 0.8
E/E' RATIO: 9.1 M/S
ECHO LV POSTERIOR WALL: 1.1 CM (ref 0.6–1.1)
EOSINOPHIL # BLD AUTO: 0 K/UL (ref 0–0.5)
EOSINOPHIL # BLD AUTO: 0.1 K/UL (ref 0–0.5)
EOSINOPHIL NFR BLD: 0.5 % (ref 0–8)
EOSINOPHIL NFR BLD: 0.9 % (ref 0–8)
ERYTHROCYTE [DISTWIDTH] IN BLOOD BY AUTOMATED COUNT: 18.7 % (ref 11.5–14.5)
ERYTHROCYTE [DISTWIDTH] IN BLOOD BY AUTOMATED COUNT: 18.8 % (ref 11.5–14.5)
EST. GFR  (NO RACE VARIABLE): >60 ML/MIN/1.73 M^2
FRACTIONAL SHORTENING: 46 % (ref 28–44)
GLUCOSE SERPL-MCNC: 88 MG/DL (ref 70–110)
HCT VFR BLD AUTO: 22.8 % (ref 37–48.5)
HCT VFR BLD AUTO: 23.1 % (ref 37–48.5)
HGB BLD-MCNC: 6.9 G/DL (ref 12–16)
HGB BLD-MCNC: 7.3 G/DL (ref 12–16)
HYPOCHROMIA BLD QL SMEAR: ABNORMAL
HYPOCHROMIA BLD QL SMEAR: ABNORMAL
IMM GRANULOCYTES # BLD AUTO: 0.06 K/UL (ref 0–0.04)
IMM GRANULOCYTES # BLD AUTO: 0.08 K/UL (ref 0–0.04)
IMM GRANULOCYTES NFR BLD AUTO: 0.9 % (ref 0–0.5)
IMM GRANULOCYTES NFR BLD AUTO: 1.1 % (ref 0–0.5)
INTERVENTRICULAR SEPTUM: 0.87 CM (ref 0.6–1.1)
LA MAJOR: 3.85 CM
LA MINOR: 4.71 CM
LA WIDTH: 2.88 CM
LEFT ATRIUM SIZE: 3.09 CM
LEFT ATRIUM VOLUME INDEX: 22 ML/M2
LEFT ATRIUM VOLUME: 32.05 CM3
LEFT INTERNAL DIMENSION IN SYSTOLE: 1.96 CM (ref 2.1–4)
LEFT VENTRICLE DIASTOLIC VOLUME INDEX: 37.82 ML/M2
LEFT VENTRICLE DIASTOLIC VOLUME: 55.22 ML
LEFT VENTRICLE MASS INDEX: 73 G/M2
LEFT VENTRICLE SYSTOLIC VOLUME INDEX: 8.3 ML/M2
LEFT VENTRICLE SYSTOLIC VOLUME: 12.16 ML
LEFT VENTRICULAR INTERNAL DIMENSION IN DIASTOLE: 3.62 CM (ref 3.5–6)
LEFT VENTRICULAR MASS: 106.46 G
LV LATERAL E/E' RATIO: 9.1 M/S
LV SEPTAL E/E' RATIO: 9.1 M/S
LYMPHOCYTES # BLD AUTO: 1 K/UL (ref 1–4.8)
LYMPHOCYTES # BLD AUTO: 1.1 K/UL (ref 1–4.8)
LYMPHOCYTES NFR BLD: 14.3 % (ref 18–48)
LYMPHOCYTES NFR BLD: 15.1 % (ref 18–48)
MAGNESIUM SERPL-MCNC: 1.8 MG/DL (ref 1.6–2.6)
MCH RBC QN AUTO: 23.1 PG (ref 27–31)
MCH RBC QN AUTO: 24.1 PG (ref 27–31)
MCHC RBC AUTO-ENTMCNC: 29.9 G/DL (ref 32–36)
MCHC RBC AUTO-ENTMCNC: 32 G/DL (ref 32–36)
MCV RBC AUTO: 75 FL (ref 82–98)
MCV RBC AUTO: 77 FL (ref 82–98)
MONOCYTES # BLD AUTO: 0.4 K/UL (ref 0.3–1)
MONOCYTES # BLD AUTO: 0.6 K/UL (ref 0.3–1)
MONOCYTES NFR BLD: 5.1 % (ref 4–15)
MONOCYTES NFR BLD: 7.3 % (ref 4–15)
MV PEAK A VEL: 1.14 M/S
MV PEAK E VEL: 0.91 M/S
MV STENOSIS PRESSURE HALF TIME: 77.18 MS
MV VALVE AREA P 1/2 METHOD: 2.85 CM2
NEUTROPHILS # BLD AUTO: 5.3 K/UL (ref 1.8–7.7)
NEUTROPHILS # BLD AUTO: 5.7 K/UL (ref 1.8–7.7)
NEUTROPHILS NFR BLD: 75.7 % (ref 38–73)
NEUTROPHILS NFR BLD: 78.2 % (ref 38–73)
NRBC BLD-RTO: 0 /100 WBC
NRBC BLD-RTO: 0 /100 WBC
OVALOCYTES BLD QL SMEAR: ABNORMAL
OVALOCYTES BLD QL SMEAR: ABNORMAL
PHOSPHATE SERPL-MCNC: 3.5 MG/DL (ref 2.7–4.5)
PLATELET # BLD AUTO: 319 K/UL (ref 150–450)
PLATELET # BLD AUTO: 328 K/UL (ref 150–450)
PLATELET BLD QL SMEAR: ABNORMAL
PLATELET BLD QL SMEAR: ABNORMAL
PMV BLD AUTO: 9.3 FL (ref 9.2–12.9)
PMV BLD AUTO: 9.7 FL (ref 9.2–12.9)
POCT GLUCOSE: 100 MG/DL (ref 70–110)
POCT GLUCOSE: 101 MG/DL (ref 70–110)
POCT GLUCOSE: 122 MG/DL (ref 70–110)
POCT GLUCOSE: 80 MG/DL (ref 70–110)
POIKILOCYTOSIS BLD QL SMEAR: ABNORMAL
POIKILOCYTOSIS BLD QL SMEAR: SLIGHT
POLYCHROMASIA BLD QL SMEAR: ABNORMAL
POTASSIUM SERPL-SCNC: 3.4 MMOL/L (ref 3.5–5.1)
PROT SERPL-MCNC: 4.3 G/DL (ref 6–8.4)
RA MAJOR: 4.17 CM
RA PRESSURE ESTIMATED: 3 MMHG
RA WIDTH: 2.04 CM
RBC # BLD AUTO: 2.99 M/UL (ref 4–5.4)
RBC # BLD AUTO: 3.03 M/UL (ref 4–5.4)
RIGHT VENTRICLE DIASTOLIC BASEL DIMENSION: 2.3 CM
SCHISTOCYTES BLD QL SMEAR: ABNORMAL
SCHISTOCYTES BLD QL SMEAR: ABNORMAL
SCHISTOCYTES BLD QL SMEAR: PRESENT
SCHISTOCYTES BLD QL SMEAR: PRESENT
SINUS: 3.04 CM
SODIUM SERPL-SCNC: 140 MMOL/L (ref 136–145)
STJ: 2.28 CM
TDI LATERAL: 0.1 M/S
TDI SEPTAL: 0.1 M/S
TDI: 0.1 M/S
TRICUSPID ANNULAR PLANE SYSTOLIC EXCURSION: 1.62 CM
WBC # BLD AUTO: 6.8 K/UL (ref 3.9–12.7)
WBC # BLD AUTO: 7.5 K/UL (ref 3.9–12.7)
Z-SCORE OF LEFT VENTRICULAR DIMENSION IN END DIASTOLE: -1.94
Z-SCORE OF LEFT VENTRICULAR DIMENSION IN END SYSTOLE: -2.61

## 2024-08-14 PROCEDURE — 99291 CRITICAL CARE FIRST HOUR: CPT | Mod: HCNC,,, | Performed by: NURSE PRACTITIONER

## 2024-08-14 PROCEDURE — 63600175 PHARM REV CODE 636 W HCPCS: Mod: HCNC | Performed by: NURSE PRACTITIONER

## 2024-08-14 PROCEDURE — 99291 CRITICAL CARE FIRST HOUR: CPT | Mod: HCNC,,, | Performed by: STUDENT IN AN ORGANIZED HEALTH CARE EDUCATION/TRAINING PROGRAM

## 2024-08-14 PROCEDURE — 94761 N-INVAS EAR/PLS OXIMETRY MLT: CPT | Mod: HCNC

## 2024-08-14 PROCEDURE — 84100 ASSAY OF PHOSPHORUS: CPT | Mod: HCNC

## 2024-08-14 PROCEDURE — 20000000 HC ICU ROOM: Mod: HCNC

## 2024-08-14 PROCEDURE — 63600175 PHARM REV CODE 636 W HCPCS: Mod: HCNC | Performed by: INTERNAL MEDICINE

## 2024-08-14 PROCEDURE — 87116 MYCOBACTERIA CULTURE: CPT | Mod: HCNC | Performed by: STUDENT IN AN ORGANIZED HEALTH CARE EDUCATION/TRAINING PROGRAM

## 2024-08-14 PROCEDURE — 25000003 PHARM REV CODE 250: Mod: HCNC | Performed by: INTERNAL MEDICINE

## 2024-08-14 PROCEDURE — 27000207 HC ISOLATION: Mod: HCNC

## 2024-08-14 PROCEDURE — 25000003 PHARM REV CODE 250: Mod: HCNC

## 2024-08-14 PROCEDURE — 85025 COMPLETE CBC W/AUTO DIFF WBC: CPT | Mod: HCNC | Performed by: NURSE PRACTITIONER

## 2024-08-14 PROCEDURE — 63600175 PHARM REV CODE 636 W HCPCS: Mod: HCNC

## 2024-08-14 PROCEDURE — 83735 ASSAY OF MAGNESIUM: CPT | Mod: HCNC

## 2024-08-14 PROCEDURE — 80053 COMPREHEN METABOLIC PANEL: CPT | Mod: HCNC

## 2024-08-14 PROCEDURE — 99497 ADVNCD CARE PLAN 30 MIN: CPT | Mod: HCNC,,, | Performed by: CLINICAL NURSE SPECIALIST

## 2024-08-14 PROCEDURE — 85025 COMPLETE CBC W/AUTO DIFF WBC: CPT | Mod: 91,HCNC

## 2024-08-14 PROCEDURE — 99233 SBSQ HOSP IP/OBS HIGH 50: CPT | Mod: HCNC,,, | Performed by: CLINICAL NURSE SPECIALIST

## 2024-08-14 RX ORDER — POTASSIUM CHLORIDE 7.45 MG/ML
10 INJECTION INTRAVENOUS
Status: COMPLETED | OUTPATIENT
Start: 2024-08-14 | End: 2024-08-14

## 2024-08-14 RX ADMIN — PIPERACILLIN SODIUM AND TAZOBACTAM SODIUM 4.5 G: 4; .5 INJECTION, POWDER, FOR SOLUTION INTRAVENOUS at 02:08

## 2024-08-14 RX ADMIN — VANCOMYCIN HYDROCHLORIDE 750 MG: 750 INJECTION, POWDER, LYOPHILIZED, FOR SOLUTION INTRAVENOUS at 12:08

## 2024-08-14 RX ADMIN — MORPHINE SULFATE 2 MG: 2 INJECTION, SOLUTION INTRAMUSCULAR; INTRAVENOUS at 12:08

## 2024-08-14 RX ADMIN — LAMIVUDINE 300 MG: 150 TABLET, FILM COATED ORAL at 09:08

## 2024-08-14 RX ADMIN — SODIUM CHLORIDE, POTASSIUM CHLORIDE, SODIUM LACTATE AND CALCIUM CHLORIDE 500 ML: 600; 310; 30; 20 INJECTION, SOLUTION INTRAVENOUS at 03:08

## 2024-08-14 RX ADMIN — PIPERACILLIN SODIUM AND TAZOBACTAM SODIUM 4.5 G: 4; .5 INJECTION, POWDER, FOR SOLUTION INTRAVENOUS at 08:08

## 2024-08-14 RX ADMIN — GABAPENTIN 400 MG: 300 CAPSULE ORAL at 09:08

## 2024-08-14 RX ADMIN — SULFAMETHOXAZOLE AND TRIMETHOPRIM 1 TABLET: 800; 160 TABLET ORAL at 09:08

## 2024-08-14 RX ADMIN — Medication: at 10:08

## 2024-08-14 RX ADMIN — SODIUM CHLORIDE, POTASSIUM CHLORIDE, SODIUM LACTATE AND CALCIUM CHLORIDE 500 ML: 600; 310; 30; 20 INJECTION, SOLUTION INTRAVENOUS at 10:08

## 2024-08-14 RX ADMIN — MORPHINE SULFATE 2 MG: 2 INJECTION, SOLUTION INTRAMUSCULAR; INTRAVENOUS at 11:08

## 2024-08-14 RX ADMIN — DOLUTEGRAVIR SODIUM 50 MG: 50 TABLET, FILM COATED ORAL at 09:08

## 2024-08-14 RX ADMIN — POTASSIUM CHLORIDE 10 MEQ: 7.46 INJECTION, SOLUTION INTRAVENOUS at 01:08

## 2024-08-14 RX ADMIN — POTASSIUM CHLORIDE 10 MEQ: 7.46 INJECTION, SOLUTION INTRAVENOUS at 11:08

## 2024-08-14 RX ADMIN — MEROPENEM 2 G: 2 INJECTION, POWDER, FOR SOLUTION INTRAVENOUS at 10:08

## 2024-08-14 RX ADMIN — POTASSIUM CHLORIDE 10 MEQ: 7.46 INJECTION, SOLUTION INTRAVENOUS at 12:08

## 2024-08-14 RX ADMIN — VANCOMYCIN HYDROCHLORIDE 750 MG: 750 INJECTION, POWDER, LYOPHILIZED, FOR SOLUTION INTRAVENOUS at 11:08

## 2024-08-14 RX ADMIN — VANCOMYCIN HYDROCHLORIDE 750 MG: 750 INJECTION, POWDER, LYOPHILIZED, FOR SOLUTION INTRAVENOUS at 01:08

## 2024-08-14 RX ADMIN — MORPHINE SULFATE 2 MG: 2 INJECTION, SOLUTION INTRAMUSCULAR; INTRAVENOUS at 04:08

## 2024-08-14 RX ADMIN — Medication: at 08:08

## 2024-08-14 RX ADMIN — ENOXAPARIN SODIUM 30 MG: 30 INJECTION SUBCUTANEOUS at 04:08

## 2024-08-14 RX ADMIN — MEROPENEM 2 G: 2 INJECTION, POWDER, FOR SOLUTION INTRAVENOUS at 03:08

## 2024-08-14 NOTE — SUBJECTIVE & OBJECTIVE
Interval History: Pt is DNR.     Past Medical History:   Diagnosis Date    Allergy     Arthritis     Asthma     Chronic pain     HIV infection     Hypertension     Overactive bladder     Spinal stenosis     Urine incontinence        Past Surgical History:   Procedure Laterality Date    ADENOIDECTOMY      APPENDECTOMY      BACK SURGERY      GERALD    CYST REMOVAL      HYSTERECTOMY      JOINT REPLACEMENT Right     knee, with revision    KNEE SURGERY Left     knee replacement    RHIZOTOMY      TONSILLECTOMY      TOTAL ANKLE ARTHROPLASTY Right        Review of patient's allergies indicates:  No Known Allergies    Medications:  Continuous Infusions:   NORepinephrine bitartrate-D5W  0-0.2 mcg/kg/min Intravenous Continuous 3.5 mL/hr at 08/14/24 0900 0.02 mcg/kg/min at 08/14/24 0900     Scheduled Meds:   balsam peru-castor oiL   Topical (Top) BID    dolutegravir  50 mg Oral Daily    enoxparin  30 mg Subcutaneous Q24H (prophylaxis, 1700)    lactated ringers  500 mL Intravenous Once    lamiVUDine  300 mg Oral Daily    piperacillin-tazobactam (Zosyn) IV (PEDS and ADULTS) (extended infusion is not appropriate)  4.5 g Intravenous Q8H    potassium chloride  10 mEq Intravenous Q1H    sulfamethoxazole-trimethoprim 800-160mg  1 tablet Oral Once per day on Monday Wednesday Friday    vancomycin (VANCOCIN) IV (PEDS and ADULTS)  750 mg Intravenous Q12H     PRN Meds:  Current Facility-Administered Medications:     dextrose 10%, 12.5 g, Intravenous, PRN    dextrose 10%, 25 g, Intravenous, PRN    glucagon (human recombinant), 1 mg, Intramuscular, PRN    glucose, 16 g, Oral, PRN    glucose, 24 g, Oral, PRN    morphine, 2 mg, Intravenous, Q6H PRN    mupirocin, , Nasal, On Call Procedure    naloxone, 0.02 mg, Intravenous, PRN    sodium chloride 0.9%, 10 mL, Intravenous, Q12H PRN    Pharmacy to dose Vancomycin consult, , , Once **AND** vancomycin - pharmacy to dose, , Intravenous, pharmacy to manage frequency    Family History       Problem  Relation (Age of Onset)    Alcohol abuse Brother    Cancer Father    Depression Sister    Hypertension Mother, Brother    Thyroid disease Mother          Tobacco Use    Smoking status: Never    Smokeless tobacco: Never   Substance and Sexual Activity    Alcohol use: Yes     Comment: Socially    Drug use: No    Sexual activity: Yes     Partners: Male     Birth control/protection: None       Review of Systems   HENT:  Negative for trouble swallowing.    Respiratory:  Positive for shortness of breath.    Musculoskeletal:  Positive for back pain and gait problem.   Skin:  Positive for pallor.   Neurological:  Positive for weakness.     Objective:     Vital Signs (Most Recent):  Temp: 98 °F (36.7 °C) (08/14/24 0700)  Pulse: 79 (08/14/24 0900)  Resp: 15 (08/14/24 0900)  BP: 91/61 (08/14/24 0900)  SpO2: 100 % (08/14/24 0900) Vital Signs (24h Range):  Temp:  [96.5 °F (35.8 °C)-99.2 °F (37.3 °C)] 98 °F (36.7 °C)  Pulse:  [] 79  Resp:  [12-24] 15  SpO2:  [98 %-100 %] 100 %  BP: ()/(49-63) 91/61     Weight: 46.1 kg (101 lb 10.1 oz)  Body mass index is 17.45 kg/m².       Physical Exam  Constitutional:       General: She is not in acute distress.     Appearance: She is cachectic. She is ill-appearing.   HENT:      Head: Normocephalic and atraumatic.   Eyes:      General: Lids are normal.      Conjunctiva/sclera: Conjunctivae normal.   Pulmonary:      Effort: Pulmonary effort is normal. No respiratory distress.   Musculoskeletal:      Cervical back: Full passive range of motion without pain and normal range of motion.      Comments: Weakness noted   Skin:     Coloration: Skin is pale.   Neurological:      Mental Status: She is alert.   Psychiatric:         Mood and Affect: Affect is flat.            Review of Symptoms      Symptom Assessment (ESAS 0-10 Scale)  Pain:  0  Dyspnea:  0  Anxiety:  0  Nausea:  0  Depression:  0  Anorexia:  0  Fatigue:  0  Insomnia:  0  Restlessness:  0  Agitation:  0         Living  Arrangements:  Lives with family    Psychosocial/Cultural:   See Palliative Psychosocial Note: No  Pt lives with her two elderly parents. Mother uses walker. Pt has a niece Janett who is involved in her care.   **Primary  to Follow**  Palliative Care  Consult: No        Advance Care Planning   Advance Directives:   Living Will: No    Do Not Resuscitate Status: Yes    Medical Power of : No      Decision Making:  Patient answered questions  Goals of Care: The patient endorses that what is most important right now is to focus on improvement in condition but with limits to invasive therapies (DNR)    Accordingly, we have decided that the best plan to meet the patient's goals includes continuing with treatment         Significant Labs: All pertinent labs within the past 24 hours have been reviewed.  CBC:   Recent Labs   Lab 08/14/24  0926   WBC 6.80   HGB 7.3*   HCT 22.8*   MCV 75*        BMP:  Recent Labs   Lab 08/14/24  0519   GLU 88      K 3.4*      CO2 24   BUN 12   CREATININE 0.6   CALCIUM 7.6*   MG 1.8     LFT:  Lab Results   Component Value Date    AST 13 08/14/2024    ALKPHOS 75 08/14/2024    BILITOT 0.2 08/14/2024     Albumin:   Albumin   Date Value Ref Range Status   08/14/2024 1.3 (L) 3.5 - 5.2 g/dL Final     Protein:   Total Protein   Date Value Ref Range Status   08/14/2024 4.3 (L) 6.0 - 8.4 g/dL Final     Lactic acid:   Lab Results   Component Value Date    LACTATE 2.0 08/12/2024    LACTATE 0.7 03/23/2024       Significant Imaging: I have reviewed all pertinent imaging results/findings within the past 24 hours.

## 2024-08-14 NOTE — PROGRESS NOTES
[facility]  Infectious Disease  Progress Note    Patient Name: Amie Salmeron  MRN: [id number]  Admission Date: 8/12/2024  Length of Stay: 2 days  Attending Physician: Dre Thacker MD  Primary Care Provider: Anuradha, Primary Doctor    Isolation Status: Contact  Assessment/Plan:      ID  * Osteomyelitis of vertebra of thoracolumbar region  Amie Salmeron is a 59 year old woman with advanced HIV with poor adherence (CD4 76%6.5%), vertebral MAC (recently on rifabutin, azithromycin and ethambutol - previously on amikacin but per family has been off therapy for about 1 month) who was admitted for dyspnea and hypotension. Imaging demonstrated large thoracic paravertebral/retroperitoneal abscess. She was admitted to MICU for hypotension requiring pressors. Blood cultures with MRSA, abscess with + AFB smear and ESBL E. coli. She is inflammation in her esophagus, could represent CMV esophagitis. Prognosis is poor in the setting of apparent non-adherence to therapy and presumably inability to care for herself at home. She is potentially planning on hospice. Will hold on MAC treatment for now in the setting of intermittent adherence.      Recommendations  - continue vancomycin goal trough 15-20 mcg/ml, dosing per pharmacy  - continue meropenem 2 gram q8 hours  - continue dolutegravir/lamivudine  - continue bactrim for PJP ppx  - will obtain MAC susceptibilities from [facility], continue to hold MAC treatment given her lack of adherence to treatment   - repeat blood cultures q48 hours   - will follow TTE         Above discussed with primary team.     Critical care time: 40 minutes  I personally spent critical care time on the following: evaluating this patient's organ dysfunction, development of treatment plan, discussing treatment plan with patient or surrogate and bedside caregivers, discussions with critical care service and/or consultants, evaluation of patient's response to treatment, physical examination of patient,  ordering and review of treatments interventions, laboratory studies, and radiographic studies, re-evaluation of patient's condition. This critical care time did not overlap with that of any other provider of the same specialty or involve time for procedures. This patient has decreasing pressor requirements, has worsening CNS end organ damage related to their infection.They continue to be critically ill.     Anticipated Disposition: TBD    Thank you for your consult. I will follow-up with patient. Please contact us if you have any additional questions.    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 ID consulted for antibiotic recommendations. CT at OSH notable for large paravertebral abscesses, paravertebral phlegmon with OM T8-L4 and LLL pulm nodule. Blood cx growing MRSA. Pt is currently on vancomycin, zosyn. Of note, pt was recently admitted at [facility] for back pain 2/2 to her dMAC - she was discharged on ALESSIA therapy. Infectious Disease consulted for further management.  Interval History: feeling a little better than day prior. Does not recall taking any medications after leaving [facility] last month. Does not think she can return home after hospital discharge.     Review of Systems   Constitutional:  Positive for activity change, fatigue and unexpected weight change.   Gastrointestinal:  Positive for abdominal pain.   Musculoskeletal:  Positive for back pain.     Objective:     Vital Signs (Most Recent):  Temp: 98 °F (36.7 °C) (08/14/24 0700)  Pulse: 100 (08/14/24 1400)  Resp: (!) 23 (08/14/24 1400)  BP: (!) 86/57 (08/14/24 1400)  SpO2: 100 % (08/14/24 1400) Vital Signs (24h Range):  Temp:  [96.5 °F (35.8  °C)-99.2 °F (37.3 °C)] 98 °F (36.7 °C)  Pulse:  [] 100  Resp:  [12-24] 23  SpO2:  [98 %-100 %] 100 %  BP: ()/(50-70) 86/57     Weight: 45.8 kg (101 lb)  Body mass index is 17.34 kg/m².    Estimated Creatinine Clearance: 71.2 mL/min (based on SCr of 0.6 mg/dL).     Physical Exam  Vitals reviewed.   Constitutional:       Appearance: She is ill-appearing.   HENT:      Head: Normocephalic.   Pulmonary:      Effort: Pulmonary effort is normal. No respiratory distress.   Abdominal:      General: There is no distension.      Palpations: Abdomen is soft.   Musculoskeletal:      Comments: L flank drain, tenderness to palpation   Skin:     General: Skin is warm and dry.   Neurological:      Mental Status: She is alert.          Significant Labs:   Microbiology Results (last 7 days)       Procedure Component Value Units Date/Time    Blood culture [[id number]] Collected: 08/13/24 1031    Order Status: Completed Specimen: Blood from Peripheral, Forearm, Left Updated: 08/14/24 1212     Blood Culture, Routine No Growth to date      No Growth to date    Aerobic culture [[id number]]  (Abnormal)  (Susceptibility) Collected: 08/12/24 1346    Order Status: Completed Specimen: Abscess from Abdomen Updated: 08/14/24 1101     Aerobic Bacterial Culture ESCHERICHIA COLI ESBL  Many      Narrative:      Left lateral abd    AFB Culture & Smear [[id number]] Collected: 08/14/24 0519    Order Status: No result Specimen: Blood Updated: 08/14/24 0944    Blood culture [[id number]] Collected: 08/13/24 0352    Order Status: Completed Specimen: Blood from Peripheral, Upper Arm, Left Updated: 08/14/24 0614     Blood Culture, Routine No Growth to date      No Growth to date    AFB culture, blood [[id number]] Collected: 08/14/24 0519    Order Status: Canceled Specimen: Blood     AFB Culture & Smear [[id number]]  (Abnormal) Collected: 08/12/24 2018    Order Status: Completed Specimen: Abscess from Abdomen Updated: 08/13/24 2127     AFB Culture  & Smear Culture in progress     AFB CULTURE STAIN Positive for acid fast bacilli, 4 orgs/ 10 fields     AFB CULTURE STAIN Results called to and read back by: Gila Stevens RN  08/13/2024  13:48    Narrative:      ADD PER JAMIE ALEXANDER MD CXAFB [id number] 08/12/2024  20:15     AFB Culture & Smear [[id number]] Collected: 08/12/24 1346    Order Status: Completed Specimen: Abdominal from Abdomen Updated: 08/13/24 2127     AFB Culture & Smear Culture in progress    Narrative:      ADD ON TESTS AFB CULTURE AND SMEAR PER DR JAMIE ALEXANDER    08/12/2024  19:39     Culture, Anaerobe [[id number]] Collected: 08/12/24 1346    Order Status: Completed Specimen: Abscess from Abdomen Updated: 08/13/24 0722     Anaerobic Culture Culture in progress    Narrative:      ADD PER JAMIE ALEXANDER MD CXAFB [id number] 08/12/2024  20:15     Gram stain [[id number]] Collected: 08/12/24 1346    Order Status: Completed Specimen: Abscess from Abdomen Updated: 08/12/24 1631     Gram Stain Result Many WBC's      Many Gram positive cocci      Many Gram negative rods      Few Gram positive rods    Fungus culture [[id number]] Collected: 08/12/24 1346    Order Status: Sent Specimen: Abscess from Abdomen Updated: 08/12/24 1440    AFB Culture & Smear [[id number]]     Order Status: No result Specimen: Abscess from Abdomen             Significant Imaging: I have reviewed all pertinent imaging results/findings within the past 24 hours.

## 2024-08-14 NOTE — SUBJECTIVE & OBJECTIVE
Interval History: feeling a little better than day prior. Does not recall taking any medications after leaving [facility] last month. Does not think she can return home after hospital discharge.     Review of Systems   Constitutional:  Positive for activity change, fatigue and unexpected weight change.   Gastrointestinal:  Positive for abdominal pain.   Musculoskeletal:  Positive for back pain.     Objective:     Vital Signs (Most Recent):  Temp: 98 °F (36.7 °C) (08/14/24 0700)  Pulse: 100 (08/14/24 1400)  Resp: (!) 23 (08/14/24 1400)  BP: (!) 86/57 (08/14/24 1400)  SpO2: 100 % (08/14/24 1400) Vital Signs (24h Range):  Temp:  [96.5 °F (35.8 °C)-99.2 °F (37.3 °C)] 98 °F (36.7 °C)  Pulse:  [] 100  Resp:  [12-24] 23  SpO2:  [98 %-100 %] 100 %  BP: ()/(50-70) 86/57     Weight: 45.8 kg (101 lb)  Body mass index is 17.34 kg/m².    Estimated Creatinine Clearance: 71.2 mL/min (based on SCr of 0.6 mg/dL).     Physical Exam  Vitals reviewed.   Constitutional:       Appearance: She is ill-appearing.   HENT:      Head: Normocephalic.   Pulmonary:      Effort: Pulmonary effort is normal. No respiratory distress.   Abdominal:      General: There is no distension.      Palpations: Abdomen is soft.   Musculoskeletal:      Comments: L flank drain, tenderness to palpation   Skin:     General: Skin is warm and dry.   Neurological:      Mental Status: She is alert.          Significant Labs:   Microbiology Results (last 7 days)       Procedure Component Value Units Date/Time    Blood culture [[id number]] Collected: 08/13/24 1031    Order Status: Completed Specimen: Blood from Peripheral, Forearm, Left Updated: 08/14/24 1212     Blood Culture, Routine No Growth to date      No Growth to date    Aerobic culture [[id number]]  (Abnormal)  (Susceptibility) Collected: 08/12/24 1346    Order Status: Completed Specimen: Abscess from Abdomen Updated: 08/14/24 1101     Aerobic Bacterial Culture ESCHERICHIA COLI ESBL  Many      Narrative:       Left lateral abd    AFB Culture & Smear [[id number]] Collected: 08/14/24 0519    Order Status: No result Specimen: Blood Updated: 08/14/24 0944    Blood culture [[id number]] Collected: 08/13/24 0352    Order Status: Completed Specimen: Blood from Peripheral, Upper Arm, Left Updated: 08/14/24 0614     Blood Culture, Routine No Growth to date      No Growth to date    AFB culture, blood [[id number]] Collected: 08/14/24 0519    Order Status: Canceled Specimen: Blood     AFB Culture & Smear [[id number]]  (Abnormal) Collected: 08/12/24 2018    Order Status: Completed Specimen: Abscess from Abdomen Updated: 08/13/24 2127     AFB Culture & Smear Culture in progress     AFB CULTURE STAIN Positive for acid fast bacilli, 4 orgs/ 10 fields     AFB CULTURE STAIN Results called to and read back by: Gila Stevens RN  08/13/2024  13:48    Narrative:      ADD PER JAMIE ALEXANDER MD CXAFB [id number] 08/12/2024  20:15     AFB Culture & Smear [[id number]] Collected: 08/12/24 1346    Order Status: Completed Specimen: Abdominal from Abdomen Updated: 08/13/24 2127     AFB Culture & Smear Culture in progress    Narrative:      ADD ON TESTS AFB CULTURE AND SMEAR PER DR JAMIE ALEXANDER    08/12/2024  19:39     Culture, Anaerobe [[id number]] Collected: 08/12/24 1346    Order Status: Completed Specimen: Abscess from Abdomen Updated: 08/13/24 0722     Anaerobic Culture Culture in progress    Narrative:      ADD PER JAMIE ALEXANDER MD CXAFB [id number] 08/12/2024  20:15     Gram stain [[id number]] Collected: 08/12/24 1346    Order Status: Completed Specimen: Abscess from Abdomen Updated: 08/12/24 1631     Gram Stain Result Many WBC's      Many Gram positive cocci      Many Gram negative rods      Few Gram positive rods    Fungus culture [[id number]] Collected: 08/12/24 1346    Order Status: Sent Specimen: Abscess from Abdomen Updated: 08/12/24 1440    AFB Culture & Smear [[id number]]     Order Status: No result Specimen: Abscess  from Abdomen             Significant Imaging: I have reviewed all pertinent imaging results/findings within the past 24 hours.

## 2024-08-14 NOTE — PROGRESS NOTES
[facility] - Cardiac Medical [facility]  Critical Care Medicine  Progress Note    Patient Name: Amie Salmeron  MRN: [id number]  Admission Date: 8/12/2024  Hospital Length of Stay: 2 days  Code Status: DNR  Attending Provider: Dre Thacker MD  Primary Care Provider: Anuradha, Primary Doctor   Principal Problem: Osteomyelitis of vertebra of thoracolumbar region    Subjective:     HPI:  Ms. Amie Salmeron is a 60 yo F with PMHx of HTN, uncontrolled HIV noncompliant with HAART, spinal MAC, spinal osteo s/p T11-L2 corpectomy with fixation by ortho on 2/27/24 at [facility] who [place] [facility] [place] on 8/11/24 with generalized malaise, fatigue, and lower left sided back pain x 1 week. Pt denies fevers, chills, bowel/bladder incontinence, weakness, or numbness. In the [facility] ED, patient was afebrile but borderline hypotensive which improved with IV fluids. Noted to have WBC 16.6, K 2.4, albumin 1.9, normal lactic. Blood cultures were obtained. Patient was admitted to  at [facility] and CT TL w/ contrast showed large left T11-L2 paravertebral retroperitoneal abscess increased from the prior study measuring approximately 14.0 x 5.8 x 15.5 cm.  The abscess crosses the midline to the right involving the vertebral bodies and surgical hardware also 3.7 cm right periaortic component of the abscess at T12-L1 level. Potassium was repleted and vanc/zosyn given prior to transfer to to  at [facility] [place] and IR evaluation.    Critical Care Medicine was consulted for persistent hypotension despite IVF bolus this AM, and she transferred to the ICU for vasopressor support.      Hospital/ICU Course:  Ms Salmeron  was admitted to ICU for hypotension after drainage of a paravertebral retroperitoneal abscess. She required Levophed for blood pressure support and is on broad spectrum antibiotics. 8/13 Neurosurgery does not plan any additional interventions and recommended Palliative Care consultation.     Interval History/Significant  Events: Remains on Levo 0.02, no events overnight    Review of Systems   Constitutional:  Negative for fever.   Respiratory:  Negative for shortness of breath.    Cardiovascular:  Negative for chest pain.   Gastrointestinal:  Positive for diarrhea. Negative for abdominal pain and vomiting.   Musculoskeletal:  Positive for back pain.     Objective:     Vital Signs (Most Recent):  Temp: 98 °F (36.7 °C) (08/14/24 0700)  Pulse: 92 (08/14/24 1200)  Resp: 17 (08/14/24 1200)  BP: (!) 88/57 (08/14/24 1200)  SpO2: 100 % (08/14/24 1200) Vital Signs (24h Range):  Temp:  [96.5 °F (35.8 °C)-99.2 °F (37.3 °C)] 98 °F (36.7 °C)  Pulse:  [] 92  Resp:  [12-24] 17  SpO2:  [98 %-100 %] 100 %  BP: ()/(49-63) 88/57   Weight: 46.1 kg (101 lb 10.1 oz)  Body mass index is 17.45 kg/m².      Intake/Output Summary (Last 24 hours) at 8/14/2024 1335  Last data filed at 8/14/2024 1200  Gross per 24 hour   Intake 1442.53 ml   Output 2730 ml   Net -1287.47 ml          Physical Exam  Vitals and nursing note reviewed.   Constitutional:       General: She is awake.   HENT:      Head: Normocephalic and atraumatic.   Eyes:      Conjunctiva/sclera: Conjunctivae normal.   Cardiovascular:      Rate and Rhythm: Normal rate and regular rhythm.      Heart sounds: S1 normal and S2 normal.   Pulmonary:      Effort: Pulmonary effort is normal.      Breath sounds: Normal breath sounds.   Abdominal:      General: Bowel sounds are normal.      Palpations: Abdomen is soft.      Tenderness: There is no abdominal tenderness. There is no guarding or rebound.   Musculoskeletal:      Right lower leg: No swelling.      Left lower leg: No swelling.   Skin:     Comments: JEYSON drain to left back with serosanguinous drainage, dressing clean and intact   Neurological:      Mental Status: She is alert.   Psychiatric:         Behavior: Behavior is cooperative.            Vents:     Lines/Drains/Airways       Drain  Duration                  Closed/Suction Drain  08/12/24 1400 Lateral;Left Back Bulb 1 day         Urethral Catheter 08/12/24 1519 1 day              Peripheral Intravenous Line  Duration                  Peripheral IV - Single Lumen 08/11/24 2325 20 G Anterior;Left Wrist 2 days         Peripheral IV - Single Lumen 08/12/24 0401 20 G Anterior;Right Upper Arm 2 days                  Significant Labs:    CBC/Anemia Profile:  Recent Labs   Lab 08/13/24 0337 08/14/24  0519 08/14/24  0926   WBC 19.41* 7.50 6.80   HGB 8.6* 6.9* 7.3*   HCT 27.5* 23.1* 22.8*   * 319 328   MCV 76* 77* 75*   RDW 18.9* 18.7* 18.8*        Chemistries:  Recent Labs   Lab 08/12/24 2113 08/13/24 0337 08/14/24  0519   * 132* 140   K 4.2 4.0 3.4*    102 108   CO2 22* 22* 24   BUN 10 9 12   CREATININE 0.5 0.6 0.6   CALCIUM 7.5* 8.1* 7.6*   ALBUMIN  --  1.5* 1.3*   PROT  --  5.2* 4.3*   BILITOT  --  0.4 0.2   ALKPHOS  --  102 75   ALT  --  7* <5*   AST  --  23 13   MG  --  1.7 1.8   PHOS  --  3.0 3.5       All pertinent labs within the past 24 hours have been reviewed.    Significant Imaging:  I have reviewed all pertinent imaging results/findings within the past 24 hours.    ABG  Recent Labs   Lab 08/12/24  0645   PH 7.381   PO2 32*   PCO2 37.5   HCO3 22.2*   BE -3*     Assessment/Plan:     Renal/  UTI (urinary tract infection)  ESBL E. Coli noted on culture 8/11    --Continue ertapenem; ID following    ID  * Osteomyelitis of vertebra of thoracolumbar region  CT Tsp/Lsp w/ contrast 8/11: 14.0x5.8x15.5cm paravertebral abscess on left side, osteomyelitis/discitis at T8-10, L2-4. Abscess drained by IR 8/12 with significant output (~400cc). JEYSON drain placed.    - frequent neurochecks  - neurosurgery consulted, no additional surgical interventions recommended   - JEYSON drain management per IR      Septic shock  Suspect septic shock in the setting of AIDS with large spinal abscess. Remained hypotensive despite adequate fluid resuscitation.    - continue vancomycin  - f/u blood  cultures & AFB culture  - s/p abscess drainage by IR  - aerobic culture with ESBL - d/c pip-tazo and start meropenum  - titrate norepi for MAP >65    Disseminated mycobacterium avium-intracellulare complex  Vertebral MAC (recently on rifabutin, azithromycin and ethambutol - previously on amikacin)     - ID consulted; awaiting on MAC sensitivity from [facility] to restart MAC treatment    AIDS  Advanced HIV (on dovato). Reported noncompliance noted.    - continue dolutegravir/lamivudine  - continue bactrim for PJP ppx    Palliative Care  Advance care planning  Palliative Care consulted. Considering nursing home placement with long term IV antibiotics vs hospice.      Critical Care Time: 60 minutes  Critical secondary to Patient has a condition that poses threat to life and bodily function: septic shock.      Critical care was time spent personally by me on the following activities: development of treatment plan with patient or surrogate and bedside caregivers, discussions with consultants, evaluation of patient's response to treatment, examination of patient, ordering and performing treatments and interventions, ordering and review of laboratory studies, ordering and review of radiographic studies, pulse oximetry, re-evaluation of patient's condition. This critical care time did not overlap with that of any other provider or involve time for any procedures.     Denise Mathews, NP  Critical Care Medicine  [facility] - Cardiac Medical [facility]

## 2024-08-14 NOTE — SUBJECTIVE & OBJECTIVE
Interval History/Significant Events: Remains on Levophed overnight    Review of Systems   Unable to perform ROS: Other     Objective:     Vital Signs (Most Recent):  Temp: 98.1 °F (36.7 °C) (08/13/24 1500)  Pulse: 103 (08/13/24 1900)  Resp: 20 (08/13/24 1900)  BP: (!) 92/51 (08/13/24 1900)  SpO2: 100 % (08/13/24 1900) Vital Signs (24h Range):  Temp:  [98.1 °F (36.7 °C)-98.7 °F (37.1 °C)] 98.1 °F (36.7 °C)  Pulse:  [] 103  Resp:  [16-24] 20  SpO2:  [96 %-100 %] 100 %  BP: ()/(49-79) 92/51   Weight: 46.1 kg (101 lb 10.1 oz)  Body mass index is 17.45 kg/m².      Intake/Output Summary (Last 24 hours) at 8/13/2024 1931  Last data filed at 8/13/2024 1900  Gross per 24 hour   Intake 2143.22 ml   Output 2105 ml   Net 38.22 ml          Physical Exam  Vitals and nursing note reviewed.   Constitutional:       Appearance: She is well-developed. She is ill-appearing.   HENT:      Head: Normocephalic.      Mouth/Throat:      Mouth: Mucous membranes are dry.   Cardiovascular:      Rate and Rhythm: Regular rhythm. Tachycardia present.      Pulses: Normal pulses.   Pulmonary:      Effort: Pulmonary effort is normal.   Abdominal:      General: Bowel sounds are normal. There is no distension.      Palpations: Abdomen is soft.      Tenderness: There is no abdominal tenderness.   Musculoskeletal:         General: No swelling.   Skin:     General: Skin is warm and dry.      Comments: Drain to back- serosanguinous output   Neurological:      Mental Status: She is alert.            Vents:     Lines/Drains/Airways       Drain  Duration                  Closed/Suction Drain 08/12/24 1400 Lateral;Left Back Bulb 1 day         Urethral Catheter 08/12/24 1519 1 day              Peripheral Intravenous Line  Duration                  Peripheral IV - Single Lumen 08/11/24 2325 20 G Anterior;Left Wrist 1 day         Peripheral IV - Single Lumen 08/12/24 0401 20 G Anterior;Right Upper Arm 1 day                  Significant  Labs:    CBC/Anemia Profile:  Recent Labs   Lab 08/12/24  0708 08/12/24  1035 08/13/24  0337   WBC 24.35* 21.23* 19.41*   HGB 8.2* 9.2* 8.6*   HCT 25.7* 29.1* 27.5*    412 474*   MCV 76* 77* 76*   RDW 18.6* 18.9* 18.9*        Chemistries:  Recent Labs   Lab 08/12/24  0253 08/12/24 2113 08/13/24  0337   * 133* 132*   K 4.6 4.2 4.0    103 102   CO2 21* 22* 22*   BUN 17 10 9   CREATININE 0.6 0.5 0.6   CALCIUM 8.3* 7.5* 8.1*   ALBUMIN 1.8*  --  1.5*   PROT 5.9*  --  5.2*   BILITOT 0.3  --  0.4   ALKPHOS 110  --  102   ALT 5*  --  7*   AST 20  --  23   MG 2.2  --  1.7   PHOS 2.2*  --  3.0       All pertinent labs within the past 24 hours have been reviewed.    Significant Imaging:  I have reviewed all pertinent imaging results/findings within the past 24 hours.

## 2024-08-14 NOTE — PLAN OF CARE
MICU DAILY GOALS     Family/Goals of care/Code Status   Code Status: DNR    24H Vital Sign Range  Temp:  [96.5 °F (35.8 °C)-99.2 °F (37.3 °C)]   Pulse:  []   Resp:  [12-24]   BP: ()/(50-70)   SpO2:  [98 %-100 %]      Shift Events (include procedures and significant events)   Pt weaned off levophed; given 1L LR. Flexi seal removed for solid stools. Prn meds given as ordered.      AWAKE RASS: Goal - RASS Goal: 0-->alert and calm  Actual - RASS (Lee Agitation-Sedation Scale): alert and calm    Restraint necessity: Not necessary   BREATHE SBT: Not intubated    Coordinate A & B, analgesics/sedatives Pain: managed   SAT: Not intubated   Delirium CAM-ICU: Overall CAM-ICU: Negative   Early(intubated/ Progressive (non-intubated) Mobility MOVE Screen (INTUBATED ONLY): Not intubated    Activity: Activity Management: Arm raise - L1   Feeding/Nutrition Diet order: Diet/Nutrition Received: regular,     Thrombus DVT prophylaxis: VTE Required Core Measure: Pharmacological prophylaxis initiated/maintained   HOB Elevation Head of Bed (HOB) Positioning: HOB at 30-45 degrees   Ulcer Prophylaxis GI: yes   Glucose control managed Glycemic Management: blood glucose monitored   Skin Skin assessed during: Daily Assessment    Sacrum intact/not altered? No  Heels intact/not altered? Yes  Surgical wound? Yes    CHECK ONE!   (no altered skin or altered skin) and sub boxes:  [] No Altered Skin Integrity Present    []Prevention Measures Documented    [x] Altered Skin Integrity Present or Discovered   [x] LDA present in EPIC, daily doc completed              [] LDA added if not in EPIC (describe wound).                    When describing wound, do not stage, use descriptive words only.    [] Wound Image Taken (required on admit,                   transfer/discharge and every Tuesday)    Wound Care Consulted? Yes    4 EYES:  Attending Nurse (1st set of eyes): DAKOTA Guzman    Second RN/Staff Member (2nd set of eyes): DAKOTA Richardson    Bowel Function no issues    Indwelling Catheter Necessity      Urethral Catheter 08/12/24 1519-Reason for Continuing Urinary Catheterization: Critically ill in ICU and requiring hourly monitoring of intake/output, Urinary retention          De-escalation Antibiotics Yes        VS and assessment per flow sheet, patient progressing towards goals as tolerated, plan of care reviewed with  patient , all concerns addressed, will continue to monitor.

## 2024-08-14 NOTE — ASSESSMENT & PLAN NOTE
Palliative Care consulted. Considering nursing home placement with long term IV antibiotics vs hospice.

## 2024-08-14 NOTE — ASSESSMENT & PLAN NOTE
Impression: Pt is a 60 y/o female with AIDs, large spinal abscess, MAC with septic shock. Pt on levophed. Pt is AAOx3. Flat effect noted. Pt made DNR today per pt's wishes.     Reason for consult: GOC/ACP. Spoke to MADELEINE Delgado CCS.    Goals of care/ACP:     Spoke to CC team rounding on pt. Pt will need 6 weeks of antibiotics. Pt not a surgical candidate for abscesses. We discussed comfort care after antibiotic therapy.     Rounded on pt with team. MD updated pt on her medical issues.     Stayed and spoke to pt. We discussed pt receiving IV antibiotics to help with infection but that she may continue to decline on therapy. We also discussed transitioning to hospice care after antibiotic therapy.  Pt open to hospice care. She is not sure if her parents will be able to care for her at home. We discussed NH placement. Pt to speak to her family about above information and update them. She would like to speak to them before medical team updates them.    Spoke to NP with CCS about above conversation with pt. She will f/u.     8/13/24  Met with pt who is aware of her medical issues. We discussed her spinal abscess. Pt waing for neuro to see her today to see if any surgical options for abscesses. We discussed her other co-morbidities and performance status. Pt weak and uses walker at home. We discussed possibility neuro will be unable to offer options. We discussed pt being on pressor support and antibiotics for infection. We started discussion on possibility of comfort focus. Pt's goal at this time is to speak to neuro to see if any options and then go from there.     We discussed code status: Pt is agreeable to DNR status. Pt does not want CPR or be put on vent.    Symptom management:     Pain:   Pt reports pain to back and hip are described as throbbing.     Current meds:  Moprhine 2 mg IVP q 6 hrs prn.   Let pt know she needs to ask for pain meds when needed.     Debility:   Pt reports she uses a walker at home to get around  but weakness noted.   Would have PT work with pt when able.     Plan:   Will follow.

## 2024-08-14 NOTE — PROGRESS NOTES
[facility] - Cardiac Medical ICU  Palliative Medicine  Progress Note    Patient Name: Amie Salmeron  MRN: [id number]  Admission Date: 8/12/2024  Hospital Length of Stay: 2 days  Code Status: DNR   Attending Provider: Dre Thacker MD  Consulting Provider: LOAN Hutton  Primary Care Physician: Anuradha, Primary Doctor  Principal Problem:Osteomyelitis of vertebra of thoracolumbar region    Patient information was obtained from patient and primary team.      Assessment/Plan:     Palliative Care  Palliative care encounter  Impression: Pt is a 60 y/o female with AIDs, large spinal abscess, MAC with septic shock. Pt on levophed. Pt is AAOx3. Flat effect noted. Pt made DNR today per pt's wishes.     Reason for consult: GOC/ACP. Spoke to MADELEINE Delgado CCS.    Goals of care/ACP:     Spoke to CC team rounding on pt. Pt will need 6 weeks of antibiotics. Pt not a surgical candidate for abscesses. We discussed comfort care after antibiotic therapy.     Rounded on pt with team. MD updated pt on her medical issues.     Stayed and spoke to pt. We discussed pt receiving IV antibiotics to help with infection but that she may continue to decline on therapy. We also discussed transitioning to hospice care after antibiotic therapy.  Pt open to hospice care. She is not sure if her parents will be able to care for her at home. We discussed NH placement. Pt to speak to her family about above information and update them. She would like to speak to them before medical team updates them.    Spoke to NP with CCS about above conversation with pt. She will f/u.     8/13/24  Met with pt who is aware of her medical issues. We discussed her spinal abscess. Pt waing for neuro to see her today to see if any surgical options for abscesses. We discussed her other co-morbidities and performance status. Pt weak and uses walker at home. We discussed possibility neuro will be unable to offer options. We discussed pt being on pressor support and antibiotics for  infection. We started discussion on possibility of comfort focus. Pt's goal at this time is to speak to neuro to see if any options and then go from there.     We discussed code status: Pt is agreeable to DNR status. Pt does not want CPR or be put on vent.    Symptom management:     Pain:   Pt reports pain to back and hip are described as throbbing.     Current meds:  Moprhine 2 mg IVP q 6 hrs prn.   Let pt know she needs to ask for pain meds when needed.     Debility:   Pt reports she uses a walker at home to get around but weakness noted.   Would have PT work with pt when able.     Plan:   Will follow.                 I will follow-up with patient. Please contact us if you have any additional questions.    Subjective:     Chief Complaint: No chief complaint on file.      HPI:   Pt is a 58 yo F with PMHx of HTN, uncontrolled HIV noncompliant with HAART, spinal MAC, spinal osteo s/p T11-L2 corpectomy with fixation by ortho on 2/27/24 at [facility] who presents to [facility] as a transfer from [facility] on 8/11/24 with generalized malaise, fatigue, and lower left sided back pain x 1 week. Pt denies fevers, chills, bowel/bladder incontinence, weakness, or numbness. In the [facility] ED, patient was afebrile but borderline hypotensive which improved with IV fluids. Noted to have WBC 16.6, K 2.4, albumin 1.9, normal lactic. Blood cultures were obtained. Patient was admitted to  at [facility] and CT TL w/ contrast showed large left T11-L2 paravertebral retroperitoneal abscess increased from the prior study measuring approximately 14.0 x 5.8 x 15.5 cm.  The abscess crosses the midline to the right involving the vertebral bodies and surgical hardware also 3.7 cm right periaortic component of the abscess at T12-L1 level. Potassium was repleted and vanc/zosyn given prior to transfer to to  at [facility] for neurosurgery and IR evaluation.     Critical Care Medicine was consulted for persistent hypotension despite IVF bolus this AM, and she  transferred to the ICU for vasopressor support.      Pt made DNR today. Neuro seeing pt for spinal abscesses.     Hospital Course:  No notes on file    Interval History: Pt is DNR.     Past Medical History:   Diagnosis Date    Allergy     Arthritis     Asthma     Chronic pain     HIV infection     Hypertension     Overactive bladder     Spinal stenosis     Urine incontinence        Past Surgical History:   Procedure Laterality Date    ADENOIDECTOMY      APPENDECTOMY      BACK SURGERY      GERALD    CYST REMOVAL      HYSTERECTOMY      JOINT REPLACEMENT Right     knee, with revision    KNEE SURGERY Left     knee replacement    RHIZOTOMY      TONSILLECTOMY      TOTAL ANKLE ARTHROPLASTY Right        Review of patient's allergies indicates:  No Known Allergies    Medications:  Continuous Infusions:   NORepinephrine bitartrate-D5W  0-0.2 mcg/kg/min Intravenous Continuous 3.5 mL/hr at 08/14/24 0900 0.02 mcg/kg/min at 08/14/24 0900     Scheduled Meds:   balsam peru-castor oiL   Topical (Top) BID    dolutegravir  50 mg Oral Daily    enoxparin  30 mg Subcutaneous Q24H (prophylaxis, 1700)    lactated ringers  500 mL Intravenous Once    lamiVUDine  300 mg Oral Daily    piperacillin-tazobactam (Zosyn) IV (PEDS and ADULTS) (extended infusion is not appropriate)  4.5 g Intravenous Q8H    potassium chloride  10 mEq Intravenous Q1H    sulfamethoxazole-trimethoprim 800-160mg  1 tablet Oral Once per day on Monday Wednesday Friday    vancomycin (VANCOCIN) IV (PEDS and ADULTS)  750 mg Intravenous Q12H     PRN Meds:  Current Facility-Administered Medications:     dextrose 10%, 12.5 g, Intravenous, PRN    dextrose 10%, 25 g, Intravenous, PRN    glucagon (human recombinant), 1 mg, Intramuscular, PRN    glucose, 16 g, Oral, PRN    glucose, 24 g, Oral, PRN    morphine, 2 mg, Intravenous, Q6H PRN    mupirocin, , Nasal, On Call Procedure    naloxone, 0.02 mg, Intravenous, PRN    sodium chloride 0.9%, 10 mL, Intravenous, Q12H PRN    Pharmacy to  dose Vancomycin consult, , , Once **AND** vancomycin - pharmacy to dose, , Intravenous, pharmacy to manage frequency    Family History       Problem Relation (Age of Onset)    Alcohol abuse Brother    Cancer Father    Depression Sister    Hypertension Mother, Brother    Thyroid disease Mother          Tobacco Use    Smoking status: Never    Smokeless tobacco: Never   Substance and Sexual Activity    Alcohol use: Yes     Comment: Socially    Drug use: No    Sexual activity: Yes     Partners: Male     Birth control/protection: None       Review of Systems   HENT:  Negative for trouble swallowing.    Respiratory:  Positive for shortness of breath.    Musculoskeletal:  Positive for back pain and gait problem.   Skin:  Positive for pallor.   Neurological:  Positive for weakness.     Objective:     Vital Signs (Most Recent):  Temp: 98 °F (36.7 °C) (08/14/24 0700)  Pulse: 79 (08/14/24 0900)  Resp: 15 (08/14/24 0900)  BP: 91/61 (08/14/24 0900)  SpO2: 100 % (08/14/24 0900) Vital Signs (24h Range):  Temp:  [96.5 °F (35.8 °C)-99.2 °F (37.3 °C)] 98 °F (36.7 °C)  Pulse:  [] 79  Resp:  [12-24] 15  SpO2:  [98 %-100 %] 100 %  BP: ()/(49-63) 91/61     Weight: 46.1 kg (101 lb 10.1 oz)  Body mass index is 17.45 kg/m².       Physical Exam  Constitutional:       General: She is not in acute distress.     Appearance: She is cachectic. She is ill-appearing.   HENT:      Head: Normocephalic and atraumatic.   Eyes:      General: Lids are normal.      Conjunctiva/sclera: Conjunctivae normal.   Pulmonary:      Effort: Pulmonary effort is normal. No respiratory distress.   Musculoskeletal:      Cervical back: Full passive range of motion without pain and normal range of motion.      Comments: Weakness noted   Skin:     Coloration: Skin is pale.   Neurological:      Mental Status: She is alert.   Psychiatric:         Mood and Affect: Affect is flat.            Review of Symptoms      Symptom Assessment (ESAS 0-10 Scale)  Pain:   0  Dyspnea:  0  Anxiety:  0  Nausea:  0  Depression:  0  Anorexia:  0  Fatigue:  0  Insomnia:  0  Restlessness:  0  Agitation:  0         Living Arrangements:  Lives with family    Psychosocial/Cultural:   See Palliative Psychosocial Note: No  Pt lives with her two elderly parents. Mother uses walker. Pt has a niece Janett who is involved in her care.   **Primary  to Follow**  Palliative Care  Consult: No        Advance Care Planning  Advance Directives:   Living Will: No    Do Not Resuscitate Status: Yes    Medical Power of : No      Decision Making:  Patient answered questions  Goals of Care: The patient endorses that what is most important right now is to focus on improvement in condition but with limits to invasive therapies (DNR)    Accordingly, we have decided that the best plan to meet the patient's goals includes continuing with treatment         Significant Labs: All pertinent labs within the past 24 hours have been reviewed.  CBC:   Recent Labs   Lab 08/14/24  0926   WBC 6.80   HGB 7.3*   HCT 22.8*   MCV 75*        BMP:  Recent Labs   Lab 08/14/24  0519   GLU 88      K 3.4*      CO2 24   BUN 12   CREATININE 0.6   CALCIUM 7.6*   MG 1.8     LFT:  Lab Results   Component Value Date    AST 13 08/14/2024    ALKPHOS 75 08/14/2024    BILITOT 0.2 08/14/2024     Albumin:   Albumin   Date Value Ref Range Status   08/14/2024 1.3 (L) 3.5 - 5.2 g/dL Final     Protein:   Total Protein   Date Value Ref Range Status   08/14/2024 4.3 (L) 6.0 - 8.4 g/dL Final     Lactic acid:   Lab Results   Component Value Date    LACTATE 2.0 08/12/2024    LACTATE 0.7 03/23/2024       Significant Imaging: I have reviewed all pertinent imaging results/findings within the past 24 hours.    20 minutes of ACP completed.    Doris Aldridge, CNS  Palliative Medicine  [facility] - Cardiac Medical ICU

## 2024-08-14 NOTE — HOSPITAL COURSE
Ms Salmeron  was admitted to ICU for hypotension after drainage of a paravertebral retroperitoneal abscess. She required Levophed for blood pressure support and is on broad spectrum antibiotics. 8/13 Neurosurgery does not plan any additional interventions and recommended Palliative Care consultation.     8/15:  Patient much more interactive this morning.  PT/OT consulted.

## 2024-08-14 NOTE — ASSESSMENT & PLAN NOTE
Suspect septic shock in the setting of AIDS with large spinal abscess. Remained hypotensive despite adequate fluid resuscitation.    - continue vancomycin  - f/u blood cultures & AFB culture  - s/p abscess drainage by IR  - aerobic culture with ESBL - d/c pip-tazo and start meropenum  - titrate norepi for MAP >65

## 2024-08-14 NOTE — ASSESSMENT & PLAN NOTE
Vertebral MAC (recently on rifabutin, azithromycin and ethambutol - previously on amikacin)     - ID consulted; awaiting on MAC sensitivity from [facility] to restart MAC treatment

## 2024-08-14 NOTE — PLAN OF CARE
08/14/24 1104   Readmission   Was this a planned readmission? No   Why were you hospitalized in the last 30 days? Hypokalemia   Why were you readmitted? New medical problem   When you left the hospital how did you feel? refused   When you left the hospital where did you go? Home with Family   Did patient/caregiver refused recommended DC plan? No   Tell me about what happened between when you left the hospital and the day you returned. refused   When did you start not feeling well? unknown   Did you try to manage your symptoms your self? No   Did you call anyone? Yes   Who did you call? Other (comments)  (EMS)   Did you try to see or did see a doctor or nurse before you came? Yes   Were you seen? Yes   Did you have  a follow-up appointment on discharge? Yes   Did you go? No   Why?   (not time yet)       Patient transferred from [facility] to [facility] for osteomyelitis of vertebra of thoracolumbar region.    Discharge Plan A and Plan B have been determined by review of patient's clinical status, future medical and therapeutic needs, and coverage/benefits for post-acute care in coordination with multidisciplinary team members.      Christie Peace RN     [phone]

## 2024-08-14 NOTE — PLAN OF CARE
[facility] - Cardiac Medical ICU  Initial Discharge Assessment       Primary Care Provider: Anuradha, Primary Doctor    Admission Diagnosis: Spinal abscess [M46.20]    Admission Date: 8/12/2024  Expected Discharge Date: 8/18/2024    Transition of Care Barriers: None    Payor: HUMANA MANAGED MEDICARE / Plan: HUMANA MEDICARE HMO / Product Type: Capitation /     Extended Emergency Contact Information  Primary Emergency Contact: Janett Crandall  Address: [address] [place] [address] [place]  Mobile Phone: [phone]  Relation: Relative  Secondary Emergency Contact: Elena Crandall  Address: [address]           [place] [address] [facility]  Home Phone: [phone]  Mobile Phone: [phone]  Relation: Mother    Discharge Plan A: Home with family, Home Health  Discharge Plan B: Home with family      [facility] - [place], [place] - [address]  [address]  [place] [address]  Phone: [phone] Fax: [phone]    [facility] - [place], [place] - [address]  [address]  [place] [address]  Phone: [phone] Fax: [phone]    [address], [place] - [address]  [address]  [place] [address]  Phone: [phone] Fax: [phone]      Transferred from:     Past Medical History:   Diagnosis Date    Allergy     Arthritis     Asthma     Chronic pain     HIV infection     Hypertension     Overactive bladder     Spinal stenosis     Urine incontinence          CM met with patient in room for Discharge Planning Assessment.  Patient able to answer questions.  Per patient, she lives with Elena Crandall (mother) [phone] in a 1-story home with 0 step(s) to enter residence.   Per patient, she was independent with ADLS and used rollator and quad cane for ambulation. Per patient, she is not on dialysis and does not take Coumadin. Patient needs  PCP in [place], and pharmacy was verified.  Patient does not have home health, and has been hospitalized in past 30 days at [facility].  Patient will have help from Elena Crandall (mother) [phone] and Janett Raz (niece) [phone] upon discharge.   Discharge Planning Booklet given to patient/family and discussed.  All questions addressed.  CM will follow for needs.    Discharge Plan A and Plan B have been determined by review of patient's clinical status, future medical and therapeutic needs, and coverage/benefits for post-acute care in coordination with multidisciplinary team members.      Initial Assessment (most recent)       Adult Discharge Assessment - 08/13/24 1548          Discharge Assessment    Assessment Type Discharge Planning Assessment     Confirmed/corrected address, phone number and insurance Yes     Confirmed Demographics Correct on Facesheet     Source of Information patient     When was your last doctors appointment? --   unknown    Does patient/caregiver understand observation status Yes     Communicated MARK with patient/caregiver Date not available/Unable to determine     Reason For Admission Osteomyelitis of vertebra of thoracolumbar region     People in Home parent(s)     Facility Arrived From: [facility]     Do you expect to return to your current living situation? Yes     Do you have help at home or someone to help you manage your care at home? Yes     Who are your caregiver(s) and their phone number(s)? Janett Crandall (niece) [phone], Elena Crandall (mother) [phone]     Prior to hospitilization cognitive status: Alert/Oriented     Current cognitive status: Alert/Oriented     Walking or Climbing Stairs Difficulty yes     Walking or Climbing Stairs ambulation difficulty, requires equipment     Mobility Management rollator, quad cane     Dressing/Bathing Difficulty no     Equipment Currently Used at Home cane, quad;rollator     Readmission  within 30 days? No     Patient currently being followed by outpatient case management? Yes     If yes, name of outpatient case management following: [facility] outpatient case management     Do you currently have service(s) that help you manage your care at home? No     Do you take prescription medications? Yes     Do you have prescription coverage? Yes     Coverage HUMANA MANAGED MEDICARE - HUMANA MEDICARE HMO     Do you have any problems affording any of your prescribed medications? No     Is the patient taking medications as prescribed? yes     Who is going to help you get home at discharge? Janett Crandall (niece) [phone], Elena Crandall (mother) [phone]     How do you get to doctors appointments? family or friend will provide     Are you on dialysis? No     Do you take coumadin? No     Discharge Plan A Home with family;Home Health     Discharge Plan B Home with family     DME Needed Upon Discharge  other (see comments)   TBD    Transition of Care Barriers None        Physical Activity    On average, how many days per week do you engage in moderate to strenuous exercise (like a brisk walk)? 0 days     On average, how many minutes do you engage in exercise at this level? 0 min        Financial Resource Strain    How hard is it for you to pay for the very basics like food, housing, medical care, and heating? Not very hard        Housing Stability    In the last 12 months, was there a time when you were not able to pay the mortgage or rent on time? No     At any time in the past 12 months, were you homeless or living in a shelter (including now)? No        Transportation Needs    Has the lack of transportation kept you from medical appointments, meetings, work or from getting things needed for daily living? No        Food Insecurity    Within the past 12 months, you worried that your food would run out before you got the money to buy more. Never true     Within the past 12 months, the food you bought just didn't  last and you didn't have money to get more. Never true        Stress    Do you feel stress - tense, restless, nervous, or anxious, or unable to sleep at night because your mind is troubled all the time - these days? To some extent        Social Isolation    How often do you feel lonely or isolated from those around you?  Patient declined        Alcohol Use    Q1: How often do you have a drink containing alcohol? Never     Q2: How many drinks containing alcohol do you have on a typical day when you are drinking? Patient does not drink     Q3: How often do you have six or more drinks on one occasion? Never        Utilities    In the past 12 months has the electric, gas, oil, or water company threatened to shut off services in your home? No        Health Literacy    How often do you need to have someone help you when you read instructions, pamphlets, or other written material from your doctor or pharmacy? Sometimes        OTHER    Name(s) of People in Home Janett Crandall (niece) [phone], Elena Cordovaian (mother) [phone]                   Christie Peace RN     [phone]

## 2024-08-14 NOTE — ASSESSMENT & PLAN NOTE
CT Tsp/Lsp w/ contrast 8/11: 14.0x5.8x15.5cm paravertebral abscess on left side, osteomyelitis/discitis at T8-10, L2-4. Abscess drained by IR 8/12 with significant output (~400cc). JEYSON drain placed.    - frequent neurochecks  - neurosurgery consulted, no additional surgical interventions recommended   - JEYSON drain management per IR

## 2024-08-14 NOTE — PROGRESS NOTES
Pharmacokinetic Assessment Follow Up: IV Vancomycin    Vancomycin serum concentration assessment(s):    The trough level was drawn correctly and can be used to guide therapy at this time. The measurement is below the desired definitive target range of 15 to 20 mcg/mL.    Vancomycin Regimen Plan:    Change regimen to Vancomycin 750 mg IV every 12 hours with next serum trough concentration measured at 1100 prior to 4th dose on 8/15    Drug levels (last 3 results):  Recent Labs   Lab Result Units 08/13/24  2218   Vancomycin-Trough ug/mL 6.2*       Pharmacy will continue to follow and monitor vancomycin.    Please contact pharmacy at extension [phone] for questions regarding this assessment.    Thank you for the consult,   Mary Waller       Patient brief summary:  Amie Salmeron is a 59 y.o. female initiated on antimicrobial therapy with IV Vancomycin for treatment of bone/joint infection    The patient's current regimen is 750 mg q12h    Drug Allergies:   Review of patient's allergies indicates:  No Known Allergies    Actual Body Weight:   46.1 kg    Renal Function:   Estimated Creatinine Clearance: 71.7 mL/min (based on SCr of 0.6 mg/dL).,     Dialysis Method (if applicable):  N/A    CBC (last 72 hours):  Recent Labs   Lab Result Units 08/11/24  1449 08/12/24  0253 08/12/24  0708 08/12/24  1035 08/13/24  0337   WBC K/uL 16.66* 18.60* 24.35* 21.23* 19.41*   Hemoglobin g/dL 10.1* 9.7* 8.2* 9.2* 8.6*   Hematocrit % 32.4* 32.0* 25.7* 29.1* 27.5*   Platelets K/uL 492* 501* 426 412 474*   Gran % % 77.0* 90.5* 92.9* 91.7* 84.0*   Lymph % % 11.0* 5.5* 2.8* 4.0* 7.0*   Mono % % 7.0 3.5* 3.9* 3.7* 4.0   Eosinophil % % 3.0 0.1 0.0 0.1 0.0   Basophil % % 0.0 0.3 0.3 0.4 1.0   Differential Method  Manual Automated Automated Automated Manual       Metabolic Panel (last 72 hours):  Recent Labs   Lab Result Units 08/11/24  1449 08/11/24  2151 08/12/24  0253 08/12/24  2113 08/13/24  0337   Sodium mmol/L 137  --  135* 133* 132*    Potassium mmol/L 2.4*  --  4.6 4.2 4.0   Chloride mmol/L 103  --  105 103 102   CO2 mmol/L 22*  --  21* 22* 22*   Glucose mg/dL 98  --  96 160* 159*   Glucose, UA   --  Negative  --   --   --    BUN mg/dL 17  --  17 10 9   Creatinine mg/dL 0.7  --  0.6 0.5 0.6   Albumin g/dL 1.9*  --  1.8*  --  1.5*   Total Bilirubin mg/dL 0.4  --  0.3  --  0.4   Alkaline Phosphatase U/L 110  --  110  --  102   AST U/L 17  --  20  --  23   ALT U/L 6*  --  5*  --  7*   Magnesium mg/dL  --   --  2.2  --  1.7   Phosphorus mg/dL  --   --  2.2*  --  3.0       Vancomycin Administrations:  vancomycin given in the last 96 hours                     vancomycin (VANCOCIN) 1,000 mg in D5W 250 mL IVPB (Vial-Mate) (mg) 1,000 mg New Bag 08/12/24 2318    vancomycin (VANCOCIN) 1,000 mg in D5W 250 mL IVPB (Vial-Mate) (mg) 1,000 mg New Bag 08/11/24 2320                    Microbiologic Results:  Microbiology Results (last 7 days)       Procedure Component Value Units Date/Time    AFB Culture & Smear [[id number]]  (Abnormal) Collected: 08/12/24 2018    Order Status: Completed Specimen: Abscess from Abdomen Updated: 08/13/24 2127     AFB Culture & Smear Culture in progress     AFB CULTURE STAIN Positive for acid fast bacilli, 4 orgs/ 10 fields     AFB CULTURE STAIN Results called to and read back by: Gila Stevens RN  08/13/2024  13:48    Narrative:      ADD PER JAMIE ALEXANDER MD CXAFB [id number] 08/12/2024  20:15     AFB Culture & Smear [[id number]] Collected: 08/12/24 1346    Order Status: Completed Specimen: Abdominal from Abdomen Updated: 08/13/24 2127     AFB Culture & Smear Culture in progress    Narrative:      ADD ON TESTS AFB CULTURE AND SMEAR PER DR JAMIE ALEXANDER    08/12/2024  19:39     Blood culture [[id number]] Collected: 08/13/24 1031    Order Status: Completed Specimen: Blood from Peripheral, Forearm, Left Updated: 08/13/24 1715     Blood Culture, Routine No Growth to date    Blood culture [[id number]] Collected: 08/13/24 0352     Order Status: Completed Specimen: Blood from Peripheral, Upper Arm, Left Updated: 08/13/24 1145     Blood Culture, Routine No Growth to date    Culture, Anaerobe [[id number]] Collected: 08/12/24 1346    Order Status: Completed Specimen: Abscess from Abdomen Updated: 08/13/24 0722     Anaerobic Culture Culture in progress    Narrative:      ADD PER JAMIE ALEXANDER MD CXAFB [id number] 08/12/2024  20:15     Aerobic culture [[id number]]  (Abnormal) Collected: 08/12/24 1346    Order Status: Completed Specimen: Abscess from Abdomen Updated: 08/13/24 0713     Aerobic Bacterial Culture GRAM NEGATIVE PETER  Many  Identification and susceptibility pending      Narrative:      Left lateral abd    Gram stain [[id number]] Collected: 08/12/24 1346    Order Status: Completed Specimen: Abscess from Abdomen Updated: 08/12/24 1631     Gram Stain Result Many WBC's      Many Gram positive cocci      Many Gram negative rods      Few Gram positive rods    Fungus culture [[id number]] Collected: 08/12/24 1346    Order Status: Sent Specimen: Abscess from Abdomen Updated: 08/12/24 1440    AFB Culture & Smear [[id number]]     Order Status: No result Specimen: Abscess from Abdomen

## 2024-08-14 NOTE — ASSESSMENT & PLAN NOTE
Amie Salmeron is a 59 year old woman with advanced HIV with poor adherence (CD4 76%6.5%), vertebral MAC (recently on rifabutin, azithromycin and ethambutol - previously on amikacin but per family has been off therapy for about 1 month) who was admitted for dyspnea and hypotension. Imaging demonstrated large thoracic paravertebral/retroperitoneal abscess. She was admitted to MICU for hypotension requiring pressors. Blood cultures with MRSA, abscess with + AFB smear and ESBL E. coli. She is inflammation in her esophagus, could represent CMV esophagitis. Prognosis is poor in the setting of apparent non-adherence to therapy and presumably inability to care for herself at home. She is potentially planning on hospice. Will hold on MAC treatment for now in the setting of intermittent adherence.      Recommendations  - continue vancomycin goal trough 15-20 mcg/ml, dosing per pharmacy  - continue meropenem 2 gram q8 hours  - continue dolutegravir/lamivudine  - continue bactrim for PJP ppx  - will obtain MAC susceptibilities from [facility], continue to hold MAC treatment given her lack of adherence to treatment   - repeat blood cultures q48 hours   - will follow TTE

## 2024-08-14 NOTE — ASSESSMENT & PLAN NOTE
CT Tsp/Lsp w/ contrast 8/11: 14.0x5.8x15.5cm paravertebral abscess on left side, osteomyelitis/discitis at T8-10, L2-4     - frequent neurochecks  - neurosurgery consulted, appreciate input  - MRI w/ wo contrast of thoracic and lumbar spine complete  Multilevel spondylo discitis in the inferior endplate of T8, at the level of T9-T10 and L3-L4.  Peripherally enhancing lesions at L3 and L4 suspicious for intraosseous abscesses.  Enhancement of the left facet joint at L3-4 suspicious for septic facet arthritis.  Very small focus of vertebral infection also suggestion in the anterior inferior aspect of the T4 vertebral body.   No epidural phlegmon or abscess is visualized in the thoracic lumbar spine, allowing for poor visualization of the vertebral canal contents at the level of the so lumbar surgical hardware..

## 2024-08-14 NOTE — PROGRESS NOTES
[facility] - Cardiac Medical [facility]  Critical Care Medicine  Progress Note    Patient Name: Amie Salmeron  MRN: [id number]  Admission Date: 8/12/2024  Hospital Length of Stay: 1 days  Code Status: DNR  Attending Provider: Dre Thacker MD  Primary Care Provider: Anuradha, Primary Doctor   Principal Problem: Osteomyelitis of vertebra of thoracolumbar region    Subjective:     HPI:  Ms. Amie Salmeron is a 58 yo F with PMHx of HTN, uncontrolled HIV noncompliant with HAART, spinal MAC, spinal osteo s/p T11-L2 corpectomy with fixation by ortho on 2/27/24 at [facility] who presents to [facility] [place] a transfer from [place] on 8/11/24 with generalized malaise, fatigue, and lower left sided back pain x 1 week. Pt denies fevers, chills, bowel/bladder incontinence, weakness, or numbness. In the [facility] ED, patient was afebrile but borderline hypotensive which improved with IV fluids. Noted to have WBC 16.6, K 2.4, albumin 1.9, normal lactic. Blood cultures were obtained. Patient was admitted to  at [place] and CT TL w/ contrast showed large left T11-L2 paravertebral retroperitoneal abscess increased from the prior study measuring approximately 14.0 x 5.8 x 15.5 cm.  The abscess crosses the midline to the right involving the vertebral bodies and surgical hardware also 3.7 cm right periaortic component of the abscess at T12-L1 level. Potassium was repleted and vanc/zosyn given prior to transfer to to  at [facility] for neurosurgery and IR evaluation.    Critical Care Medicine was consulted for persistent hypotension despite IVF bolus this AM, and she transferred to the ICU for vasopressor support.      Hospital/ICU Course:  Ms Salmeron  was admitted to ICU for hypotension after drainage of a paravertebral retroperitoneal abscess. She required Levophed for blood pressure support and is on broad spectrum antibiotics. 8/13 Neurosurgery does not plan any additional interventions and recommended Palliative Care consultation.     Interval History/Significant  Events: Remains on Levophed overnight    Review of Systems   Unable to perform ROS: Other     Objective:     Vital Signs (Most Recent):  Temp: 98.1 °F (36.7 °C) (08/13/24 1500)  Pulse: 103 (08/13/24 1900)  Resp: 20 (08/13/24 1900)  BP: (!) 92/51 (08/13/24 1900)  SpO2: 100 % (08/13/24 1900) Vital Signs (24h Range):  Temp:  [98.1 °F (36.7 °C)-98.7 °F (37.1 °C)] 98.1 °F (36.7 °C)  Pulse:  [] 103  Resp:  [16-24] 20  SpO2:  [96 %-100 %] 100 %  BP: ()/(49-79) 92/51   Weight: 46.1 kg (101 lb 10.1 oz)  Body mass index is 17.45 kg/m².      Intake/Output Summary (Last 24 hours) at 8/13/2024 1931  Last data filed at 8/13/2024 1900  Gross per 24 hour   Intake 2143.22 ml   Output 2105 ml   Net 38.22 ml          Physical Exam  Vitals and nursing note reviewed.   Constitutional:       Appearance: She is well-developed. She is ill-appearing.   HENT:      Head: Normocephalic.      Mouth/Throat:      Mouth: Mucous membranes are dry.   Cardiovascular:      Rate and Rhythm: Regular rhythm. Tachycardia present.      Pulses: Normal pulses.   Pulmonary:      Effort: Pulmonary effort is normal.   Abdominal:      General: Bowel sounds are normal. There is no distension.      Palpations: Abdomen is soft.      Tenderness: There is no abdominal tenderness.   Musculoskeletal:         General: No swelling.   Skin:     General: Skin is warm and dry.      Comments: Drain to back- serosanguinous output   Neurological:      Mental Status: She is alert.            Vents:     Lines/Drains/Airways       Drain  Duration                  Closed/Suction Drain 08/12/24 1400 Lateral;Left Back Bulb 1 day         Urethral Catheter 08/12/24 1519 1 day              Peripheral Intravenous Line  Duration                  Peripheral IV - Single Lumen 08/11/24 2325 20 G Anterior;Left Wrist 1 day         Peripheral IV - Single Lumen 08/12/24 0401 20 G Anterior;Right Upper Arm 1 day                  Significant Labs:    CBC/Anemia Profile:  Recent Labs    Lab 08/12/24  0708 08/12/24  1035 08/13/24  0337   WBC 24.35* 21.23* 19.41*   HGB 8.2* 9.2* 8.6*   HCT 25.7* 29.1* 27.5*    412 474*   MCV 76* 77* 76*   RDW 18.6* 18.9* 18.9*        Chemistries:  Recent Labs   Lab 08/12/24  0253 08/12/24  2113 08/13/24  0337   * 133* 132*   K 4.6 4.2 4.0    103 102   CO2 21* 22* 22*   BUN 17 10 9   CREATININE 0.6 0.5 0.6   CALCIUM 8.3* 7.5* 8.1*   ALBUMIN 1.8*  --  1.5*   PROT 5.9*  --  5.2*   BILITOT 0.3  --  0.4   ALKPHOS 110  --  102   ALT 5*  --  7*   AST 20  --  23   MG 2.2  --  1.7   PHOS 2.2*  --  3.0       All pertinent labs within the past 24 hours have been reviewed.    Significant Imaging:  I have reviewed all pertinent imaging results/findings within the past 24 hours.    ABG  Recent Labs   Lab 08/12/24  0645   PH 7.381   PO2 32*   PCO2 37.5   HCO3 22.2*   BE -3*     Assessment/Plan:     ID  * Osteomyelitis of vertebra of thoracolumbar region  CT Tsp/Lsp w/ contrast 8/11: 14.0x5.8x15.5cm paravertebral abscess on left side, osteomyelitis/discitis at T8-10, L2-4     - frequent neurochecks  - neurosurgery consulted, appreciate input  - MRI w/ wo contrast of thoracic and lumbar spine complete  Multilevel spondylo discitis in the inferior endplate of T8, at the level of T9-T10 and L3-L4.  Peripherally enhancing lesions at L3 and L4 suspicious for intraosseous abscesses.  Enhancement of the left facet joint at L3-4 suspicious for septic facet arthritis.  Very small focus of vertebral infection also suggestion in the anterior inferior aspect of the T4 vertebral body.   No epidural phlegmon or abscess is visualized in the thoracic lumbar spine, allowing for poor visualization of the vertebral canal contents at the level of the so lumbar surgical hardware..       Septic shock  Suspect septic shock in the setting of AIDS with large spinal abscess. Remained hypotensive despite adequate fluid resuscitation.    - continue vancomycin and pip-tazo; appreciate ID  input  - f/u cultures  - s/p abscess drainage by IR  - titrate norepi for MAP >65    Disseminated mycobacterium avium-intracellulare complex  Vertebral MAC (recently on rifabutin, azithromycin and ethambutol - previously on amikacin)     - ID consulted; appreciate input    AIDS  Advanced HIV (on dovato). Reported noncompliance noted.    - continue dolutegravir/lamivudine  - continue bactrim for PJP ppx    Palliative Care  Advance care planning  Palliative Care consulted    VTE: Lovenox      Critical Care Time: 45 minutes     Critical care was time spent personally by me on the following activities: development of treatment plan with patient or surrogate and bedside caregivers, discussions with consultants, evaluation of patient's response to treatment, examination of patient, ordering and performing treatments and interventions, ordering and review of laboratory studies, ordering and review of radiographic studies, pulse oximetry, re-evaluation of patient's condition. This critical care time did not overlap with that of any other provider or involve time for any procedures.     Fiona Winterbottom, APRN, CNS  Critical Care Medicine  [facility] - Cardiac Medical [facility]

## 2024-08-14 NOTE — PLAN OF CARE
"MICU DAILY GOALS     Family/Goals of care/Code Status   Code Status: DNR    24H Vital Sign Range  Temp:  [98.1 °F (36.7 °C)-99.2 °F (37.3 °C)]   Pulse:  []   Resp:  [12-24]   BP: ()/(49-65)   SpO2:  [98 %-100 %]      Shift Events (include procedures and significant events)   No acute events throughout shift. Remains on low dose Levophed to maintain MAPs of at least 65. Otherwise hemodynamically stable on RA. Oriented though continues to state she is at "[facility]". Reoriented as necessary. Fecal management system placed for frequent diarrhea. Continue to monitor for improvement/rule out C-diff. Minimal leakage from JEYSON drain. Pain controlled on current regimen. PT/OT consult needed.     AWAKE RASS: Goal - RASS Goal: 0-->alert and calm  Actual - RASS (Lee Agitation-Sedation Scale): drowsy    Restraint necessity:  NA   BREATHE SBT: Not intubated    Coordinate A & B, analgesics/sedatives Pain: managed   SAT: Not intubated   Delirium CAM-ICU: Overall CAM-ICU: Negative   Early(intubated/ Progressive (non-intubated) Mobility MOVE Screen (INTUBATED ONLY): Not intubated    Activity: Activity Management: Rolling - L1, Bridge - L1, Arm raise - L1, Ankle pumps - L1   Feeding/Nutrition Diet order: Diet/Nutrition Received: regular,     Thrombus DVT prophylaxis: VTE Required Core Measure: (SCDs) Sequential compression device initiated/maintained   HOB Elevation Head of Bed (HOB) Positioning: HOB at 20-30 degrees   Ulcer Prophylaxis GI: yes   Glucose control managed Glycemic Management: blood glucose monitored   Skin Skin assessed during: Q Shift Change    Sacrum intact/not altered? No  Heels intact/not altered? Yes  Surgical wound? No    CHECK ONE!   (no altered skin or altered skin) and sub boxes:  [] No Altered Skin Integrity Present    []Prevention Measures Documented    [] Altered Skin Integrity Present or Discovered   [] LDA present in EPIC, daily doc completed              [] LDA added if not in EPIC (describe " wound).                    When describing wound, do not stage, use descriptive words only.    [] Wound Image Taken (required on admit,                   transfer/discharge and every Tuesday)    Wound Care Consulted? Yes    4 EYES:  Attending Nurse (1st set of eyes):     Second RN/Staff Member (2nd set of eyes):    Bowel Function diarrhea    Indwelling Catheter Necessity      Urethral Catheter 08/12/24 1519-Reason for Continuing Urinary Catheterization: Urinary retention, Critically ill in ICU and requiring hourly monitoring of intake/output          De-escalation Antibiotics Yes        VS and assessment per flow sheet, patient progressing towards goals as tolerated, plan of care reviewed with  patient , all concerns addressed, will continue to monitor.

## 2024-08-14 NOTE — SUBJECTIVE & OBJECTIVE
Interval History/Significant Events: Remains on Levo 0.02, no events overnight    Review of Systems   Constitutional:  Negative for fever.   Respiratory:  Negative for shortness of breath.    Cardiovascular:  Negative for chest pain.   Gastrointestinal:  Positive for diarrhea. Negative for abdominal pain and vomiting.   Musculoskeletal:  Positive for back pain.     Objective:     Vital Signs (Most Recent):  Temp: 98 °F (36.7 °C) (08/14/24 0700)  Pulse: 92 (08/14/24 1200)  Resp: 17 (08/14/24 1200)  BP: (!) 88/57 (08/14/24 1200)  SpO2: 100 % (08/14/24 1200) Vital Signs (24h Range):  Temp:  [96.5 °F (35.8 °C)-99.2 °F (37.3 °C)] 98 °F (36.7 °C)  Pulse:  [] 92  Resp:  [12-24] 17  SpO2:  [98 %-100 %] 100 %  BP: ()/(49-63) 88/57   Weight: 46.1 kg (101 lb 10.1 oz)  Body mass index is 17.45 kg/m².      Intake/Output Summary (Last 24 hours) at 8/14/2024 1335  Last data filed at 8/14/2024 1200  Gross per 24 hour   Intake 1442.53 ml   Output 2730 ml   Net -1287.47 ml          Physical Exam  Vitals and nursing note reviewed.   Constitutional:       General: She is awake.   HENT:      Head: Normocephalic and atraumatic.   Eyes:      Conjunctiva/sclera: Conjunctivae normal.   Cardiovascular:      Rate and Rhythm: Normal rate and regular rhythm.      Heart sounds: S1 normal and S2 normal.   Pulmonary:      Effort: Pulmonary effort is normal.      Breath sounds: Normal breath sounds.   Abdominal:      General: Bowel sounds are normal.      Palpations: Abdomen is soft.      Tenderness: There is no abdominal tenderness. There is no guarding or rebound.   Musculoskeletal:      Right lower leg: No swelling.      Left lower leg: No swelling.   Skin:     Comments: JEYSON drain to left back with serosanguinous drainage, dressing clean and intact   Neurological:      Mental Status: She is alert.   Psychiatric:         Behavior: Behavior is cooperative.            Vents:     Lines/Drains/Airways       Drain  Duration                   Closed/Suction Drain 08/12/24 1400 Lateral;Left Back Bulb 1 day         Urethral Catheter 08/12/24 1519 1 day              Peripheral Intravenous Line  Duration                  Peripheral IV - Single Lumen 08/11/24 2325 20 G Anterior;Left Wrist 2 days         Peripheral IV - Single Lumen 08/12/24 0401 20 G Anterior;Right Upper Arm 2 days                  Significant Labs:    CBC/Anemia Profile:  Recent Labs   Lab 08/13/24 0337 08/14/24  0519 08/14/24  0926   WBC 19.41* 7.50 6.80   HGB 8.6* 6.9* 7.3*   HCT 27.5* 23.1* 22.8*   * 319 328   MCV 76* 77* 75*   RDW 18.9* 18.7* 18.8*        Chemistries:  Recent Labs   Lab 08/12/24 2113 08/13/24 0337 08/14/24  0519   * 132* 140   K 4.2 4.0 3.4*    102 108   CO2 22* 22* 24   BUN 10 9 12   CREATININE 0.5 0.6 0.6   CALCIUM 7.5* 8.1* 7.6*   ALBUMIN  --  1.5* 1.3*   PROT  --  5.2* 4.3*   BILITOT  --  0.4 0.2   ALKPHOS  --  102 75   ALT  --  7* <5*   AST  --  23 13   MG  --  1.7 1.8   PHOS  --  3.0 3.5       All pertinent labs within the past 24 hours have been reviewed.    Significant Imaging:  I have reviewed all pertinent imaging results/findings within the past 24 hours.

## 2024-08-15 LAB
ALBUMIN SERPL BCP-MCNC: 1.3 G/DL (ref 3.5–5.2)
ALP SERPL-CCNC: 85 U/L (ref 55–135)
ALT SERPL W/O P-5'-P-CCNC: 6 U/L (ref 10–44)
ANION GAP SERPL CALC-SCNC: 6 MMOL/L (ref 8–16)
ANISOCYTOSIS BLD QL SMEAR: SLIGHT
AST SERPL-CCNC: 15 U/L (ref 10–40)
BASOPHILS # BLD AUTO: 0.04 K/UL (ref 0–0.2)
BASOPHILS NFR BLD: 0.5 % (ref 0–1.9)
BILIRUB SERPL-MCNC: 0.2 MG/DL (ref 0.1–1)
BUN SERPL-MCNC: 12 MG/DL (ref 8–23)
BURR CELLS BLD QL SMEAR: ABNORMAL
CALCIUM SERPL-MCNC: 7.6 MG/DL (ref 8.7–10.5)
CHLORIDE SERPL-SCNC: 103 MMOL/L (ref 95–110)
CO2 SERPL-SCNC: 24 MMOL/L (ref 23–29)
CREAT SERPL-MCNC: 0.6 MG/DL (ref 0.5–1.4)
DIFFERENTIAL METHOD BLD: ABNORMAL
DOHLE BOD BLD QL SMEAR: PRESENT
EOSINOPHIL # BLD AUTO: 0.1 K/UL (ref 0–0.5)
EOSINOPHIL NFR BLD: 0.6 % (ref 0–8)
ERYTHROCYTE [DISTWIDTH] IN BLOOD BY AUTOMATED COUNT: 18.9 % (ref 11.5–14.5)
EST. GFR  (NO RACE VARIABLE): >60 ML/MIN/1.73 M^2
GLUCOSE SERPL-MCNC: 72 MG/DL (ref 70–110)
HCT VFR BLD AUTO: 24.2 % (ref 37–48.5)
HGB BLD-MCNC: 7.7 G/DL (ref 12–16)
IMM GRANULOCYTES # BLD AUTO: 0.1 K/UL (ref 0–0.04)
IMM GRANULOCYTES NFR BLD AUTO: 1.2 % (ref 0–0.5)
LYMPHOCYTES # BLD AUTO: 1.5 K/UL (ref 1–4.8)
LYMPHOCYTES NFR BLD: 17.9 % (ref 18–48)
MAGNESIUM SERPL-MCNC: 1.5 MG/DL (ref 1.6–2.6)
MCH RBC QN AUTO: 23.9 PG (ref 27–31)
MCHC RBC AUTO-ENTMCNC: 31.8 G/DL (ref 32–36)
MCV RBC AUTO: 75 FL (ref 82–98)
MONOCYTES # BLD AUTO: 0.4 K/UL (ref 0.3–1)
MONOCYTES NFR BLD: 5.2 % (ref 4–15)
MYCOBACTERIUM SPEC QL CULT: NORMAL
NEUTROPHILS # BLD AUTO: 6.3 K/UL (ref 1.8–7.7)
NEUTROPHILS NFR BLD: 74.6 % (ref 38–73)
NRBC BLD-RTO: 0 /100 WBC
OVALOCYTES BLD QL SMEAR: ABNORMAL
PHOSPHATE SERPL-MCNC: 2.9 MG/DL (ref 2.7–4.5)
PLATELET # BLD AUTO: 320 K/UL (ref 150–450)
PMV BLD AUTO: 9.1 FL (ref 9.2–12.9)
POIKILOCYTOSIS BLD QL SMEAR: SLIGHT
POLYCHROMASIA BLD QL SMEAR: ABNORMAL
POTASSIUM SERPL-SCNC: 4.3 MMOL/L (ref 3.5–5.1)
PROT SERPL-MCNC: 4.7 G/DL (ref 6–8.4)
RBC # BLD AUTO: 3.22 M/UL (ref 4–5.4)
SCHISTOCYTES BLD QL SMEAR: PRESENT
SODIUM SERPL-SCNC: 133 MMOL/L (ref 136–145)
SPHEROCYTES BLD QL SMEAR: ABNORMAL
TOXIC GRANULES BLD QL SMEAR: PRESENT
VANCOMYCIN TROUGH SERPL-MCNC: 13.5 UG/ML (ref 10–22)
WBC # BLD AUTO: 8.44 K/UL (ref 3.9–12.7)

## 2024-08-15 PROCEDURE — 27000207 HC ISOLATION: Mod: HCNC

## 2024-08-15 PROCEDURE — 83735 ASSAY OF MAGNESIUM: CPT | Mod: HCNC

## 2024-08-15 PROCEDURE — 63600175 PHARM REV CODE 636 W HCPCS: Mod: HCNC | Performed by: NURSE PRACTITIONER

## 2024-08-15 PROCEDURE — 25000003 PHARM REV CODE 250: Mod: HCNC

## 2024-08-15 PROCEDURE — 99233 SBSQ HOSP IP/OBS HIGH 50: CPT | Mod: HCNC,,, | Performed by: STUDENT IN AN ORGANIZED HEALTH CARE EDUCATION/TRAINING PROGRAM

## 2024-08-15 PROCEDURE — 99233 SBSQ HOSP IP/OBS HIGH 50: CPT | Mod: HCNC,,, | Performed by: INTERNAL MEDICINE

## 2024-08-15 PROCEDURE — 25000003 PHARM REV CODE 250: Mod: HCNC | Performed by: INTERNAL MEDICINE

## 2024-08-15 PROCEDURE — 63600175 PHARM REV CODE 636 W HCPCS: Mod: HCNC | Performed by: INTERNAL MEDICINE

## 2024-08-15 PROCEDURE — 80202 ASSAY OF VANCOMYCIN: CPT | Mod: HCNC | Performed by: INTERNAL MEDICINE

## 2024-08-15 PROCEDURE — 80053 COMPREHEN METABOLIC PANEL: CPT | Mod: HCNC

## 2024-08-15 PROCEDURE — 84100 ASSAY OF PHOSPHORUS: CPT | Mod: HCNC

## 2024-08-15 PROCEDURE — 25000003 PHARM REV CODE 250: Mod: HCNC | Performed by: STUDENT IN AN ORGANIZED HEALTH CARE EDUCATION/TRAINING PROGRAM

## 2024-08-15 PROCEDURE — 85025 COMPLETE CBC W/AUTO DIFF WBC: CPT | Mod: HCNC

## 2024-08-15 PROCEDURE — 20600001 HC STEP DOWN PRIVATE ROOM: Mod: HCNC

## 2024-08-15 PROCEDURE — 25500020 PHARM REV CODE 255: Mod: HCNC | Performed by: INTERNAL MEDICINE

## 2024-08-15 PROCEDURE — 63600175 PHARM REV CODE 636 W HCPCS: Mod: HCNC | Performed by: STUDENT IN AN ORGANIZED HEALTH CARE EDUCATION/TRAINING PROGRAM

## 2024-08-15 PROCEDURE — 87040 BLOOD CULTURE FOR BACTERIA: CPT | Mod: HCNC | Performed by: STUDENT IN AN ORGANIZED HEALTH CARE EDUCATION/TRAINING PROGRAM

## 2024-08-15 RX ORDER — CLARITHROMYCIN 500 MG/1
500 TABLET, FILM COATED ORAL EVERY 12 HOURS
Status: DISCONTINUED | OUTPATIENT
Start: 2024-08-16 | End: 2024-08-24 | Stop reason: HOSPADM

## 2024-08-15 RX ORDER — QUETIAPINE FUMARATE 25 MG/1
25 TABLET, FILM COATED ORAL NIGHTLY
Status: DISCONTINUED | OUTPATIENT
Start: 2024-08-15 | End: 2024-08-24 | Stop reason: HOSPADM

## 2024-08-15 RX ORDER — RIFABUTIN 150 MG/1
300 CAPSULE ORAL DAILY
Status: DISCONTINUED | OUTPATIENT
Start: 2024-08-16 | End: 2024-08-24 | Stop reason: HOSPADM

## 2024-08-15 RX ADMIN — ERTAPENEM 1 G: 1 INJECTION INTRAMUSCULAR; INTRAVENOUS at 11:08

## 2024-08-15 RX ADMIN — MEROPENEM 2 G: 2 INJECTION, POWDER, FOR SOLUTION INTRAVENOUS at 02:08

## 2024-08-15 RX ADMIN — IOHEXOL 75 ML: 350 INJECTION, SOLUTION INTRAVENOUS at 02:08

## 2024-08-15 RX ADMIN — VANCOMYCIN HYDROCHLORIDE 750 MG: 750 INJECTION, POWDER, LYOPHILIZED, FOR SOLUTION INTRAVENOUS at 12:08

## 2024-08-15 RX ADMIN — Medication: at 08:08

## 2024-08-15 RX ADMIN — MEROPENEM 2 G: 2 INJECTION, POWDER, FOR SOLUTION INTRAVENOUS at 07:08

## 2024-08-15 RX ADMIN — ENOXAPARIN SODIUM 30 MG: 30 INJECTION SUBCUTANEOUS at 05:08

## 2024-08-15 RX ADMIN — QUETIAPINE FUMARATE 25 MG: 25 TABLET ORAL at 08:08

## 2024-08-15 RX ADMIN — DOLUTEGRAVIR SODIUM 50 MG: 50 TABLET, FILM COATED ORAL at 08:08

## 2024-08-15 RX ADMIN — LAMIVUDINE 300 MG: 150 TABLET, FILM COATED ORAL at 08:08

## 2024-08-15 RX ADMIN — MORPHINE SULFATE 2 MG: 2 INJECTION, SOLUTION INTRAMUSCULAR; INTRAVENOUS at 11:08

## 2024-08-15 NOTE — SUBJECTIVE & OBJECTIVE
Interval History: short term plan is for discharge to LTAC/SNF. She wants to restart MAC treatment. We discussed that we cannot cure her infection if she does not take her HIV treatment.     Review of Systems   Constitutional:  Positive for fatigue.   Musculoskeletal:  Positive for back pain.   Psychiatric/Behavioral:  Positive for confusion.      Objective:     Vital Signs (Most Recent):  Temp: 98.4 °F (36.9 °C) (08/15/24 0700)  Pulse: 97 (08/15/24 1600)  Resp: 18 (08/15/24 1600)  BP: 100/65 (08/15/24 1600)  SpO2: 99 % (08/15/24 1600) Vital Signs (24h Range):  Temp:  [98.4 °F (36.9 °C)-99.4 °F (37.4 °C)] 98.4 °F (36.9 °C)  Pulse:  [] 97  Resp:  [16-24] 18  SpO2:  [97 %-100 %] 99 %  BP: ()/(54-69) 100/65     Weight: 45.8 kg (101 lb)  Body mass index is 17.34 kg/m².    Estimated Creatinine Clearance: 71.2 mL/min (based on SCr of 0.6 mg/dL).     Physical Exam  Vitals and nursing note reviewed.   Constitutional:       Appearance: She is ill-appearing.   HENT:      Head: Normocephalic.      Mouth/Throat:      Mouth: Mucous membranes are moist.      Pharynx: Oropharynx is clear.   Pulmonary:      Effort: Pulmonary effort is normal. No respiratory distress.   Abdominal:      General: There is no distension.      Palpations: Abdomen is soft.      Tenderness: There is no abdominal tenderness.   Musculoskeletal:         General: Swelling (R flank) present.   Skin:     General: Skin is warm and dry.   Neurological:      Mental Status: She is alert.          Significant Labs:   Microbiology Results (last 7 days)       Procedure Component Value Units Date/Time    Blood culture [[id number]] Collected: 08/15/24 0812    Order Status: Completed Specimen: Blood from Peripheral, Hand, Left Updated: 08/15/24 1545     Blood Culture, Routine No Growth to date    Blood culture [[id number]] Collected: 08/15/24 0818    Order Status: Completed Specimen: Blood from Peripheral, Hand, Right Updated: 08/15/24 1545     Blood Culture,  Routine No Growth to date    Blood culture [[id number]] Collected: 08/13/24 1031    Order Status: Completed Specimen: Blood from Peripheral, Forearm, Left Updated: 08/15/24 1212     Blood Culture, Routine No Growth to date      No Growth to date      No Growth to date    Culture, Anaerobe [[id number]]  (Abnormal) Collected: 08/12/24 1346    Order Status: Completed Specimen: Abscess from Abdomen Updated: 08/15/24 0728     Anaerobic Culture FUSOBACTERIUM NUCLEATUM  Many      Narrative:      ADD PER JAMIE ALEXANDER MD [facility]AF[facility] [id number] 08/12/2024  20:15     Blood culture [[id number]] Collected: 08/13/24 0352    Order Status: Completed Specimen: Blood from Peripheral, Upper Arm, Left Updated: 08/15/24 0613     Blood Culture, Routine No Growth to date      No Growth to date      No Growth to date    Aerobic culture [[id number]]  (Abnormal)  (Susceptibility) Collected: 08/12/24 1346    Order Status: Completed Specimen: Abscess from Abdomen Updated: 08/14/24 1101     Aerobic Bacterial Culture ESCHERICHIA COLI ESBL  Many      Narrative:      Left lateral abd    AFB Culture & Smear [[id number]] Collected: 08/14/24 0519    Order Status: No result Specimen: Blood Updated: 08/14/24 0944    AFB culture, blood [[id number]] Collected: 08/14/24 0519    Order Status: Canceled Specimen: Blood     AFB Culture & Smear [[id number]]  (Abnormal) Collected: 08/12/24 2018    Order Status: Completed Specimen: Abscess from Abdomen Updated: 08/13/24 2127     AFB Culture & Smear Culture in progress     AFB CULTURE STAIN Positive for acid fast bacilli, 4 orgs/ 10 fields     AFB CULTURE STAIN Results called to and read back by: Gila Stevens RN  08/13/2024  13:48    Narrative:      ADD PER JAMIE ALEXANDER MD [facility] [id number] 08/12/2024  20:15     AFB Culture & Smear [[id number]] Collected: 08/12/24 1346    Order Status: Completed Specimen: Abdominal from Abdomen Updated: 08/13/24 2127     AFB Culture & Smear Culture in progress     Narrative:      ADD ON TESTS AFB CULTURE AND SMEAR PER DR JAMIE ALEXANDER    08/12/2024  19:39     Gram stain [[id number]] Collected: 08/12/24 1346    Order Status: Completed Specimen: Abscess from Abdomen Updated: 08/12/24 1631     Gram Stain Result Many WBC's      Many Gram positive cocci      Many Gram negative rods      Few Gram positive rods    Fungus culture [[id number]] Collected: 08/12/24 1346    Order Status: Sent Specimen: Abscess from Abdomen Updated: 08/12/24 1440    AFB Culture & Smear [[id number]]     Order Status: No result Specimen: Abscess from Abdomen             Significant Imaging: I have reviewed all pertinent imaging results/findings within the past 24 hours.

## 2024-08-15 NOTE — ASSESSMENT & PLAN NOTE
CT Tsp/Lsp w/ contrast 8/11: 14.0x5.8x15.5cm paravertebral abscess on left side, osteomyelitis/discitis at T8-10, L2-4. Abscess drained by IR 8/12 with significant output (~400cc). JEYSON drain placed.    - frequent neurochecks  - neurosurgery consulted, no additional surgical interventions recommended   - JEYSON drain management per IR  - Drain inadvertently removed 8/15

## 2024-08-15 NOTE — ASSESSMENT & PLAN NOTE
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

## 2024-08-15 NOTE — PROGRESS NOTES
[facility] - Cardiac Medical ICU  Critical Care Medicine  Progress Note    Patient Name: Amie Salmeron  MRN: [id number]  Admission Date: 8/12/2024  Hospital Length of Stay: 3 days  Code Status: DNR  Attending Provider: Dre Thacker MD  Primary Care Provider: Anuradha, Primary Doctor   Principal Problem: Osteomyelitis of vertebra of thoracolumbar region    Subjective:     HPI:  Ms. Amie Salmeron is a 58 yo F with PMHx of HTN, uncontrolled HIV noncompliant with HAART, spinal MAC, spinal osteo s/p T11-L2 corpectomy with fixation by ortho on 2/27/24 at [facility] who presents to [facility] as a transfer from [place] [facility] on 8/11/24 with generalized malaise, fatigue, and lower left sided back pain x 1 week. Pt denies fevers, chills, bowel/bladder incontinence, weakness, or numbness. In the [facility] ED, patient was afebrile but borderline hypotensive which improved with IV fluids. Noted to have WBC 16.6, K 2.4, albumin 1.9, normal lactic. Blood cultures were obtained. Patient was admitted to  at [facility] and CT TL w/ contrast showed large left T11-L2 paravertebral retroperitoneal abscess increased from the prior study measuring approximately 14.0 x 5.8 x 15.5 cm.  The abscess crosses the midline to the right involving the vertebral bodies and surgical hardware also 3.7 cm right periaortic component of the abscess at T12-L1 level. Potassium was repleted and vanc/zosyn given prior to transfer to to  at [facility] for neurosurgery and IR evaluation.    Critical Care Medicine was consulted for persistent hypotension despite IVF bolus this AM, and she transferred to the ICU for vasopressor support.      Hospital/ICU Course:  Ms Salmeron  was admitted to ICU for hypotension after drainage of a paravertebral retroperitoneal abscess. She required Levophed for blood pressure support and is on broad spectrum antibiotics. 8/13 Neurosurgery does not plan any additional interventions and recommended Palliative Care consultation.     8/15:  Patient much more  interactive this morning.  PT/OT consulted.    Interval History/Significant Events: No acute events noted overnight.  Doing well off vasopressor support for more than a day.  Continues to complain of left-sided back pain.  Drain inadvertently removed.    Review of Systems   Respiratory:  Negative for shortness of breath.    Cardiovascular:  Negative for chest pain.   Musculoskeletal:  Positive for back pain.   All other systems reviewed and are negative.    Objective:     Vital Signs (Most Recent):  Temp: 98.4 °F (36.9 °C) (08/15/24 0700)  Pulse: 103 (08/15/24 0900)  Resp: (!) 23 (08/15/24 0900)  BP: 99/67 (08/15/24 0900)  SpO2: 100 % (08/15/24 0900) Vital Signs (24h Range):  Temp:  [98.4 °F (36.9 °C)-99.4 °F (37.4 °C)] 98.4 °F (36.9 °C)  Pulse:  [] 103  Resp:  [16-23] 23  SpO2:  [97 %-100 %] 100 %  BP: (80-99)/(51-70) 99/67   Weight: 45.8 kg (101 lb)  Body mass index is 17.34 kg/m².      Intake/Output Summary (Last 24 hours) at 8/15/2024 1114  Last data filed at 8/15/2024 0900  Gross per 24 hour   Intake 2344.79 ml   Output 1880 ml   Net 464.79 ml          Physical Exam  Vitals and nursing note reviewed.   Constitutional:       General: She is not in acute distress.     Appearance: She is ill-appearing. She is not toxic-appearing or diaphoretic.   HENT:      Head: Normocephalic and atraumatic.      Right Ear: External ear normal.      Left Ear: External ear normal.      Nose: Nose normal.   Cardiovascular:      Rate and Rhythm: Normal rate and regular rhythm.      Pulses: Normal pulses.      Heart sounds: Normal heart sounds. No murmur heard.     No friction rub. No gallop.      Comments: No JVD or peripheral edema  Pulmonary:      Effort: Pulmonary effort is normal. No respiratory distress.      Breath sounds: Normal breath sounds. No wheezing, rhonchi or rales.   Abdominal:      General: Abdomen is flat. Bowel sounds are normal. There is no distension.      Palpations: Abdomen is soft.      Tenderness:  There is no abdominal tenderness. There is no guarding or rebound.   Musculoskeletal:         General: No swelling or tenderness. Normal range of motion.   Skin:     General: Skin is warm and dry.      Coloration: Skin is pale. Skin is not jaundiced.   Neurological:      General: No focal deficit present.      Mental Status: She is alert. Mental status is at baseline.   Psychiatric:         Mood and Affect: Mood normal.         Behavior: Behavior normal.         Thought Content: Thought content normal.         Judgment: Judgment normal.            Vents:     Lines/Drains/Airways       Drain  Duration                  Closed/Suction Drain 08/12/24 1400 Lateral;Left Back Bulb 2 days         Urethral Catheter 08/12/24 1519 2 days              Peripheral Intravenous Line  Duration                  Peripheral IV - Single Lumen 08/11/24 2325 20 G Anterior;Left Wrist 3 days         Peripheral IV - Single Lumen 08/12/24 0401 20 G Anterior;Right Upper Arm 3 days                  Significant Labs:    CBC/Anemia Profile:  Recent Labs   Lab 08/14/24  0519 08/14/24  0926 08/15/24  0535   WBC 7.50 6.80 8.44   HGB 6.9* 7.3* 7.7*   HCT 23.1* 22.8* 24.2*    328 320   MCV 77* 75* 75*   RDW 18.7* 18.8* 18.9*        Chemistries:  Recent Labs   Lab 08/14/24  0519 08/15/24  0535    133*   K 3.4* 4.3    103   CO2 24 24   BUN 12 12   CREATININE 0.6 0.6   CALCIUM 7.6* 7.6*   ALBUMIN 1.3* 1.3*   PROT 4.3* 4.7*   BILITOT 0.2 0.2   ALKPHOS 75 85   ALT <5* 6*   AST 13 15   MG 1.8 1.5*   PHOS 3.5 2.9       All pertinent labs within the past 24 hours have been reviewed.    Significant Imaging:  I have reviewed all pertinent imaging results/findings within the past 24 hours.    ABG  Recent Labs   Lab 08/12/24  0645   PH 7.381   PO2 32*   PCO2 37.5   HCO3 22.2*   BE -3*     Assessment/Plan:     Renal/  UTI (urinary tract infection)  ESBL E. Coli noted on culture 8/11    --Continue ertapenem; ID following    ID  * Osteomyelitis  of vertebra of thoracolumbar region  CT Tsp/Lsp w/ contrast 8/11: 14.0x5.8x15.5cm paravertebral abscess on left side, osteomyelitis/discitis at T8-10, L2-4. Abscess drained by IR 8/12 with significant output (~400cc). JEYSON drain placed.    - frequent neurochecks  - neurosurgery consulted, no additional surgical interventions recommended   - JEYSON drain management per IR  - Drain inadvertently removed 8/15    Septic shock  Resolved    Suspect septic shock in the setting of AIDS with large spinal abscess. Remained hypotensive despite adequate fluid resuscitation.    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 Reported noncompliance noted.    - continue dolutegravir/lamivudine  - continue bactrim for PJP ppx    Palliative Care  Advance care planning  Palliative Care consulted. Considering nursing home placement with long term IV antibiotics vs hospice.            Dre Thacker M.D.  Rapid Response/Critical Care  Department of Pulmonary Medicine  [facility]        N.B.: Portions of this note was dictated using M*Modal Fluency Direct--there may be voice recognition errors occasionally missed on review.

## 2024-08-15 NOTE — PROVIDER TRANSFER
Transfer Note    Brief History: Amie Salmeron is a 59 y.o. female with AIDS (noncompliant with HAART), spinal MAC (noncompliant with therapy) and osteomyelitis (T11-L2) s/p corpectomy with fixation Feb 2024 who was admitted to the  service as a transfer from [facility] for Interventional Radiology services for a paravetebral retroperitoneal abscess.  The abscess was drained and a drain left in place and she was then transferred to the medical ICU due to shock.      Approximately 380 mL of purulent fluid was drained with IR and output has been decreasing.  The drain was dislodged this morning by the patient.  Output yesterday was around 30 mL during the day and 60 mL overnight.  IR and Neurosurgery both reconsulted for recommendations.    Palliative Care has been consulted and they patient is leaning toward hospice.  She was waiting to speak with Neurosurgery to make sure that there are no further potential therapies but they have officially said no intervention and have signed off.  The patient is interested in perhaps going to LTAC/SNF for IV antibiotics with televisits with Palliative Care, and when she is ready for hospice, Palliative Care can arrange for hospice.  She lived with her elderly parents who are no longer able to care for her.     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  She is now growing AFB as well which is likely Mycobacteriuma avium again.  We looked at sensitivities on the [facility] system (Dec 2023) but ID is recommending against restarting therapy for that depending on goals of care.  She is on dolutegravir and lamivudine for her AIDS and Bactrim for OI prophylaxis.      She is now stable for stepdown from the ICU.      Pending Studies: IR and Neurosurgery re-evaluation.    Barriers to Discharge:  LTAC vs. SNF        Dre Thacker M.D.  Rapid Response/Critical Care  Department of  Pulmonary Medicine  [facility]  Spectralink: [id number]        N.B.: Portions of this note was dictated using M*Modal Fluency Direct--there may be voice recognition errors occasionally missed on review.

## 2024-08-15 NOTE — PLAN OF CARE
MICU DAILY GOALS     Family/Goals of care/Code Status   Code Status: DNR    24H Vital Sign Range  Temp:  [96.5 °F (35.8 °C)-99.4 °F (37.4 °C)]   Pulse:  []   Resp:  [12-23]   BP: ()/(51-70)   SpO2:  [97 %-100 %]      Shift Events (include procedures and significant events)   No acute events throughout shift. Disoriented x2 but following commands. Hypotension requiring LR bolus x1. Remains off pressors at this time. Afebrile. Withdrawn. Minimal output from JEYSON drain.     AWAKE RASS: Goal - RASS Goal: 0-->alert and calm  Actual - RASS (Lee Agitation-Sedation Scale): drowsy    Restraint necessity: Not necessary   BREATHE SBT: Not intubated    Coordinate A & B, analgesics/sedatives Pain: managed   SAT: Not intubated   Delirium CAM-ICU: Overall CAM-ICU: Negative   Early(intubated/ Progressive (non-intubated) Mobility MOVE Screen (INTUBATED ONLY): Not intubated    Activity: Activity Management: Arm raise - L1, Ankle pumps - L1, Leg kicks - L2, Rolling - L1   Feeding/Nutrition Diet order: Diet/Nutrition Received: regular,     Thrombus DVT prophylaxis: VTE Required Core Measure: (SCDs) Sequential compression device initiated/maintained   HOB Elevation Head of Bed (HOB) Positioning: HOB at 30 degrees   Ulcer Prophylaxis GI: yes   Glucose control Pt is not diabetic. Normal blood glucose levels  Glycemic Management: blood glucose monitored   Skin Skin assessed during: Q Shift Change    Sacrum intact/not altered? No  Heels intact/not altered? Yes  Surgical wound? No    CHECK ONE!   (no altered skin or altered skin) and sub boxes:  [] No Altered Skin Integrity Present    []Prevention Measures Documented    [x] Altered Skin Integrity Present or Discovered   [x] LDA present in EPIC, daily doc completed              [] LDA added if not in EPIC (describe wound).                    When describing wound, do not stage, use descriptive words only.    [] Wound Image Taken (required on admit,                    transfer/discharge and every Tuesday)    Wound Care Consulted? Yes    4 EYES:  Attending Nurse (1st set of eyes):     Second RN/Staff Member (2nd set of eyes):    Bowel Function no issues    Indwelling Catheter Necessity      Urethral Catheter 08/12/24 1519-Reason for Continuing Urinary Catheterization: Critically ill in ICU and requiring hourly monitoring of intake/output          De-escalation Antibiotics Yes        VS and assessment per flow sheet, patient progressing towards goals as tolerated, plan of care reviewed with  patient , all concerns addressed, will continue to monitor.

## 2024-08-15 NOTE — PROGRESS NOTES
[facility] - Cardiac Medical ICU  Infectious Disease  Progress Note    Patient Name: Amie Salmeron  MRN: [id number]  Admission Date: 8/12/2024  Length of Stay: 3 days  Attending Physician: Dre Thacker MD  Primary Care Provider: Anuradha, Primary Doctor    Isolation Status: Contact  Assessment/Plan:      ID  * Osteomyelitis of vertebra of thoracolumbar region  Amie Salmeron is a 59 year old woman with advanced HIV with poor adherence (CD4 76%6.5%), vertebral MAC (recently on rifabutin, azithromycin and ethambutol - previously on amikacin but per family has been off therapy for about 1 month) who was admitted for dyspnea and hypotension. Imaging demonstrated large thoracic paravertebral/retroperitoneal abscess. She was admitted to MICU for hypotension requiring pressors. Blood cultures with MRSA, abscess with + AFB smear and ESBL E. Coli, fusobacterium nucleatum. TTE negative for vegetation and repeat blood cultures no growth to date. She has inflammation in her esophagus, could represent CMV esophagitis. Prognosis is poor in the setting of apparent non-adherence to therapy and presumably inability to care for herself at home.  We discussed that her condition is unlikely to be curable and that the only chance for improvement is with treatment of her HIV. Difficult to determine if she understands this, however she expresses that she wants to treat her MAC. She is potentially planning on hospice after completing antibiotic course at LTAC/SNF, but until plan finalized will treat for MAC per her wishes.      Recommendations  - continue vancomycin goal trough 15-20 mcg/ml, dosing per pharmacy, anticipate 6 week course  - stop meropenem  - start ertapenem 1 gram q24 hours, anticipate 6 week course  - continue dolutegravir/lamivudine  - continue bactrim for PJP ppx  - start clarithromycin 500 mg BID, ethambutol 15mg/kg daily, rifabutin 300 mg daily, amikacin with dosing per pharmacy   - will need color vision screening   - AFB  blood culture sent         Above discussed with primary team.     Time: 50 minutes   50% of time spent on face-to-face counseling and coordination of care. Counseling included review of test results, diagnosis, and treatment plan with patient and/or family.  I have reviewed hospital notes from MICU service and other specialty providers as well as outside medical records. I have also reviewed CBC, CMP/BMP,  cultures and imaging with my interpretation as documented. Patient is high risk of morbidity, on antibiotics requiring intensive monitoring for toxicity.     Anticipated Disposition: TBD    Thank you for your consult. I will follow-up with patient. Please contact us if you have any additional questions.    Zina Middleton MD  Infectious Disease  [facility] - Cardiac Medical ICU    Subjective:     Principal Problem:Osteomyelitis of vertebra of thoracolumbar region    HPI: 60 yo female with [place] [facility] [place] spinal MAC (reportedly on ALESSIA) s/p T11-L2 corpectomy 2/2024 transferred from [facility] for higher level of care for weakness and L back pain. ID consulted for antibiotic recommendations. CT at OSH notable for large paravertebral abscesses, paravertebral phlegmon with OM T8-L4 and LLL pulm nodule. Blood cx growing MRSA. Pt is currently on vancomycin, zosyn. Of note, pt was recently [place] [facility] [place] 2/2 to her dMAC - she was discharged on ALESSIA therapy. Infectious Disease consulted for further management.  Interval History: short term plan is for discharge to LTAC/SNF. She wants to restart MAC treatment. We discussed that we cannot cure her infection if she does not take her HIV treatment.     Review of Systems   Constitutional:  Positive for fatigue.   Musculoskeletal:  Positive for back pain.   Psychiatric/Behavioral:  Positive for confusion.      Objective:     Vital Signs (Most Recent):  Temp: 98.4 °F (36.9 °C) (08/15/24 0700)  Pulse: 97 (08/15/24 1600)  Resp: 18 (08/15/24  1600)  BP: 100/65 (08/15/24 1600)  SpO2: 99 % (08/15/24 1600) Vital Signs (24h Range):  Temp:  [98.4 °F (36.9 °C)-99.4 °F (37.4 °C)] 98.4 °F (36.9 °C)  Pulse:  [] 97  Resp:  [16-24] 18  SpO2:  [97 %-100 %] 99 %  BP: ()/(54-69) 100/65     Weight: 45.8 kg (101 lb)  Body mass index is 17.34 kg/m².    Estimated Creatinine Clearance: 71.2 mL/min (based on SCr of 0.6 mg/dL).     Physical Exam  Vitals and nursing note reviewed.   Constitutional:       Appearance: She is ill-appearing.   HENT:      Head: Normocephalic.      Mouth/Throat:      Mouth: Mucous membranes are moist.      Pharynx: Oropharynx is clear.   Pulmonary:      Effort: Pulmonary effort is normal. No respiratory distress.   Abdominal:      General: There is no distension.      Palpations: Abdomen is soft.      Tenderness: There is no abdominal tenderness.   Musculoskeletal:         General: Swelling (R flank) present.   Skin:     General: Skin is warm and dry.   Neurological:      Mental Status: She is alert.          Significant Labs:   Microbiology Results (last 7 days)       Procedure Component Value Units Date/Time    Blood culture [[id number]] Collected: 08/15/24 0812    Order Status: Completed Specimen: Blood from Peripheral, Hand, Left Updated: 08/15/24 1545     Blood Culture, Routine No Growth to date    Blood culture [[id number]] Collected: 08/15/24 0818    Order Status: Completed Specimen: Blood from Peripheral, Hand, Right Updated: 08/15/24 1545     Blood Culture, Routine No Growth to date    Blood culture [[id number]] Collected: 08/13/24 1031    Order Status: Completed Specimen: Blood from Peripheral, Forearm, Left Updated: 08/15/24 1212     Blood Culture, Routine No Growth to date      No Growth to date      No Growth to date    Culture, Anaerobe [[id number]]  (Abnormal) Collected: 08/12/24 1346    Order Status: Completed Specimen: Abscess from Abdomen Updated: 08/15/24 0728     Anaerobic Culture FUSOBACTERIUM NUCLEATUM  Many       Narrative:      ADD PER JAMIE ALEXANDER MD CXAFB [id number] 08/12/2024  20:15     Blood culture [[id number]] Collected: 08/13/24 0352    Order Status: Completed Specimen: Blood from Peripheral, Upper Arm, Left Updated: 08/15/24 0613     Blood Culture, Routine No Growth to date      No Growth to date      No Growth to date    Aerobic culture [[id number]]  (Abnormal)  (Susceptibility) Collected: 08/12/24 1346    Order Status: Completed Specimen: Abscess from Abdomen Updated: 08/14/24 1101     Aerobic Bacterial Culture ESCHERICHIA COLI ESBL  Many      Narrative:      Left lateral abd    AFB Culture & Smear [[id number]] Collected: 08/14/24 0519    Order Status: No result Specimen: Blood Updated: 08/14/24 0944    AFB culture, blood [[id number]] Collected: 08/14/24 0519    Order Status: Canceled Specimen: Blood     AFB Culture & Smear [[id number]]  (Abnormal) Collected: 08/12/24 2018    Order Status: Completed Specimen: Abscess from Abdomen Updated: 08/13/24 2127     AFB Culture & Smear Culture in progress     AFB CULTURE STAIN Positive for acid fast bacilli, 4 orgs/ 10 fields     AFB CULTURE STAIN Results called to and read back by: Gila Stevens RN  08/13/2024  13:48    Narrative:      ADD PER JAMIE ALEXANDER MD CXAFB [id number] 08/12/2024  20:15     AFB Culture & Smear [[id number]] Collected: 08/12/24 1346    Order Status: Completed Specimen: Abdominal from Abdomen Updated: 08/13/24 2127     AFB Culture & Smear Culture in progress    Narrative:      ADD ON TESTS AFB CULTURE AND SMEAR PER DR JAMIE ALEXANDER    08/12/2024  19:39     Gram stain [[id number]] Collected: 08/12/24 1346    Order Status: Completed Specimen: Abscess from Abdomen Updated: 08/12/24 1631     Gram Stain Result Many WBC's      Many Gram positive cocci      Many Gram negative rods      Few Gram positive rods    Fungus culture [[id number]] Collected: 08/12/24 1346    Order Status: Sent Specimen: Abscess from Abdomen Updated: 08/12/24 1440     AFB Culture & Smear [[id number]]     Order Status: No result Specimen: Abscess from Abdomen             Significant Imaging: I have reviewed all pertinent imaging results/findings within the past 24 hours.

## 2024-08-15 NOTE — PROVIDER TRANSFER
HM Attending ICU tx accept note    58 yo with advance HIV/AIDS with multiple OI including verterbral MAC, paravertebral abscess, MRSA bacteremia. She was transferred [facility] for IR for the paravertebral abscess and subsequently transferred to the MICU for hypotension requiring vasopressor support. She has been weaned off pressors. She continues on meropenem and vancomycin for the polymicrobial abscess. She dislodged her drain and repeat imaging showed abscess resolution so it was not replaced. Palliative care as been following as given her advanced AIDS she has limited/no therapeutic options for her vertebral MAC.    -continue IV abx per infectious disease  -continue GOC with palliative care  -holding MAC tx per ID at this time  -continue dolutegravir and lamivudine for AIDS and bactrim ppx  -continue to work on discharge plans pending goals of care

## 2024-08-15 NOTE — ASSESSMENT & PLAN NOTE
Amie Salmeron is a 59 year old woman with advanced HIV with poor adherence (CD4 76%6.5%), vertebral MAC (recently on rifabutin, azithromycin and ethambutol - previously on amikacin but per family has been off therapy for about 1 month) who was admitted for dyspnea and hypotension. Imaging demonstrated large thoracic paravertebral/retroperitoneal abscess. She was admitted to MICU for hypotension requiring pressors. Blood cultures with MRSA, abscess with + AFB smear and ESBL E. Coli, fusobacterium nucleatum. TTE negative for vegetation and repeat blood cultures no growth to date. She has inflammation in her esophagus, could represent CMV esophagitis. Prognosis is poor in the setting of apparent non-adherence to therapy and presumably inability to care for herself at home.  We discussed that her condition is unlikely to be curable and that the only chance for improvement is with treatment of her HIV. Difficult to determine if she understands this, however she expresses that she wants to treat her MAC. She is potentially planning on hospice after completing antibiotic course at LTAC/SNF, but until plan finalized will treat for MAC per her wishes.      Recommendations  - continue vancomycin goal trough 15-20 mcg/ml, dosing per pharmacy, anticipate 6 week course  - stop meropenem  - start ertapenem 1 gram q24 hours, anticipate 6 week course  - continue dolutegravir/lamivudine  - continue bactrim for PJP ppx  - start clarithromycin 500 mg BID, ethambutol 15mg/kg daily, rifabutin 300 mg daily, amikacin with dosing per pharmacy   - will need color vision screening   - AFB blood culture sent

## 2024-08-15 NOTE — SUBJECTIVE & OBJECTIVE
Interval History/Significant Events: No acute events noted overnight.  Doing well off vasopressor support for more than a day.  Continues to complain of left-sided back pain.  Drain inadvertently removed.    Review of Systems   Respiratory:  Negative for shortness of breath.    Cardiovascular:  Negative for chest pain.   Musculoskeletal:  Positive for back pain.   All other systems reviewed and are negative.    Objective:     Vital Signs (Most Recent):  Temp: 98.4 °F (36.9 °C) (08/15/24 0700)  Pulse: 103 (08/15/24 0900)  Resp: (!) 23 (08/15/24 0900)  BP: 99/67 (08/15/24 0900)  SpO2: 100 % (08/15/24 0900) Vital Signs (24h Range):  Temp:  [98.4 °F (36.9 °C)-99.4 °F (37.4 °C)] 98.4 °F (36.9 °C)  Pulse:  [] 103  Resp:  [16-23] 23  SpO2:  [97 %-100 %] 100 %  BP: (80-99)/(51-70) 99/67   Weight: 45.8 kg (101 lb)  Body mass index is 17.34 kg/m².      Intake/Output Summary (Last 24 hours) at 8/15/2024 1114  Last data filed at 8/15/2024 0900  Gross per 24 hour   Intake 2344.79 ml   Output 1880 ml   Net 464.79 ml          Physical Exam  Vitals and nursing note reviewed.   Constitutional:       General: She is not in acute distress.     Appearance: She is ill-appearing. She is not toxic-appearing or diaphoretic.   HENT:      Head: Normocephalic and atraumatic.      Right Ear: External ear normal.      Left Ear: External ear normal.      Nose: Nose normal.   Cardiovascular:      Rate and Rhythm: Normal rate and regular rhythm.      Pulses: Normal pulses.      Heart sounds: Normal heart sounds. No murmur heard.     No friction rub. No gallop.      Comments: No JVD or peripheral edema  Pulmonary:      Effort: Pulmonary effort is normal. No respiratory distress.      Breath sounds: Normal breath sounds. No wheezing, rhonchi or rales.   Abdominal:      General: Abdomen is flat. Bowel sounds are normal. There is no distension.      Palpations: Abdomen is soft.      Tenderness: There is no abdominal tenderness. There is no  guarding or rebound.   Musculoskeletal:         General: No swelling or tenderness. Normal range of motion.   Skin:     General: Skin is warm and dry.      Coloration: Skin is pale. Skin is not jaundiced.   Neurological:      General: No focal deficit present.      Mental Status: She is alert. Mental status is at baseline.   Psychiatric:         Mood and Affect: Mood normal.         Behavior: Behavior normal.         Thought Content: Thought content normal.         Judgment: Judgment normal.            Vents:     Lines/Drains/Airways       Drain  Duration                  Closed/Suction Drain 08/12/24 1400 Lateral;Left Back Bulb 2 days         Urethral Catheter 08/12/24 1519 2 days              Peripheral Intravenous Line  Duration                  Peripheral IV - Single Lumen 08/11/24 2325 20 G Anterior;Left Wrist 3 days         Peripheral IV - Single Lumen 08/12/24 0401 20 G Anterior;Right Upper Arm 3 days                  Significant Labs:    CBC/Anemia Profile:  Recent Labs   Lab 08/14/24  0519 08/14/24  0926 08/15/24  0535   WBC 7.50 6.80 8.44   HGB 6.9* 7.3* 7.7*   HCT 23.1* 22.8* 24.2*    328 320   MCV 77* 75* 75*   RDW 18.7* 18.8* 18.9*        Chemistries:  Recent Labs   Lab 08/14/24  0519 08/15/24  0535    133*   K 3.4* 4.3    103   CO2 24 24   BUN 12 12   CREATININE 0.6 0.6   CALCIUM 7.6* 7.6*   ALBUMIN 1.3* 1.3*   PROT 4.3* 4.7*   BILITOT 0.2 0.2   ALKPHOS 75 85   ALT <5* 6*   AST 13 15   MG 1.8 1.5*   PHOS 3.5 2.9       All pertinent labs within the past 24 hours have been reviewed.    Significant Imaging:  I have reviewed all pertinent imaging results/findings within the past 24 hours.

## 2024-08-15 NOTE — PROGRESS NOTES
Pharmacokinetic Assessment Follow Up: IV Vancomycin    Vancomycin serum concentration assessment(s):    The trough level was drawn correctly and can be used to guide therapy at this time. The measurement is below the desired definitive target range of 15 to 20 mcg/mL.    Vancomycin Regimen Plan:    Continue regimen to Vancomycin 750 mg IV every 12 hours with next serum trough concentration measured at 1100 prior to 5th dose on 8/17    Drug levels (last 3 results):  Recent Labs   Lab Result Units 08/13/24  2218 08/15/24  1142   Vancomycin-Trough ug/mL 6.2* 13.5       Pharmacy will continue to follow and monitor vancomycin.    Please contact pharmacy at extension [phone] for questions regarding this assessment.    Thank you for the consult,   Steph Gracia       Patient brief summary:  Amie Salmeron is a 59 y.o. female initiated on antimicrobial therapy with IV Vancomycin for treatment of bone/joint infection    The patient's current regimen is 750 mg q12h    Drug Allergies:   Review of patient's allergies indicates:  No Known Allergies    Actual Body Weight:   46.1 kg    Renal Function:   Estimated Creatinine Clearance: 71.2 mL/min (based on SCr of 0.6 mg/dL).,     Dialysis Method (if applicable):  N/A    CBC (last 72 hours):  Recent Labs   Lab Result Units 08/13/24  0337 08/14/24  0519 08/14/24  0926 08/15/24  0535   WBC K/uL 19.41* 7.50 6.80 8.44   Hemoglobin g/dL 8.6* 6.9* 7.3* 7.7*   Hematocrit % 27.5* 23.1* 22.8* 24.2*   Platelets K/uL 474* 319 328 320   Gran % % 84.0* 75.7* 78.2* 74.6*   Lymph % % 7.0* 15.1* 14.3* 17.9*   Mono % % 4.0 7.3 5.1 5.2   Eosinophil % % 0.0 0.5 0.9 0.6   Basophil % % 1.0 0.3 0.6 0.5   Differential Method  Manual Automated Automated Automated       Metabolic Panel (last 72 hours):  Recent Labs   Lab Result Units 08/12/24  2113 08/13/24  0337 08/14/24  0519 08/15/24  0535   Sodium mmol/L 133* 132* 140 133*   Potassium mmol/L 4.2 4.0 3.4* 4.3   Chloride mmol/L 103 102 108 103   CO2 mmol/L  22* 22* 24 24   Glucose mg/dL 160* 159* 88 72   BUN mg/dL 10 9 12 12   Creatinine mg/dL 0.5 0.6 0.6 0.6   Albumin g/dL  --  1.5* 1.3* 1.3*   Total Bilirubin mg/dL  --  0.4 0.2 0.2   Alkaline Phosphatase U/L  --  102 75 85   AST U/L  --  23 13 15   ALT U/L  --  7* <5* 6*   Magnesium mg/dL  --  1.7 1.8 1.5*   Phosphorus mg/dL  --  3.0 3.5 2.9       Vancomycin Administrations:  vancomycin given in the last 96 hours                     vancomycin (VANCOCIN) 1,000 mg in D5W 250 mL IVPB (Vial-Mate) (mg) 1,000 mg New Bag 08/12/24 2318    vancomycin (VANCOCIN) 1,000 mg in D5W 250 mL IVPB (Vial-Mate) (mg) 1,000 mg New Bag 08/11/24 2320                    Microbiologic Results:  Microbiology Results (last 7 days)       Procedure Component Value Units Date/Time    Blood culture [[id number]] Collected: 08/13/24 1031    Order Status: Completed Specimen: Blood from Peripheral, Forearm, Left Updated: 08/15/24 1212     Blood Culture, Routine No Growth to date      No Growth to date      No Growth to date    Blood culture [[id number]] Collected: 08/15/24 0812    Order Status: Sent Specimen: Blood from Peripheral, Hand, Left Updated: 08/15/24 0830    Blood culture [[id number]] Collected: 08/15/24 0818    Order Status: Sent Specimen: Blood from Peripheral, Hand, Right Updated: 08/15/24 0830    Culture, Anaerobe [[id number]]  (Abnormal) Collected: 08/12/24 1346    Order Status: Completed Specimen: Abscess from Abdomen Updated: 08/15/24 0728     Anaerobic Culture FUSOBACTERIUM NUCLEATUM  Many      Narrative:      ADD PER JAMIE ALEXANDER MD CXAFB [id number] 08/12/2024  20:15     Blood culture [[id number]] Collected: 08/13/24 0352    Order Status: Completed Specimen: Blood from Peripheral, Upper Arm, Left Updated: 08/15/24 0613     Blood Culture, Routine No Growth to date      No Growth to date      No Growth to date    Aerobic culture [[id number]]  (Abnormal)  (Susceptibility) Collected: 08/12/24 1346    Order Status: Completed  Specimen: Abscess from Abdomen Updated: 08/14/24 1101     Aerobic Bacterial Culture ESCHERICHIA COLI ESBL  Many      Narrative:      Left lateral abd    AFB Culture & Smear [[id number]] Collected: 08/14/24 0519    Order Status: No result Specimen: Blood Updated: 08/14/24 0944    AFB culture, blood [[id number]] Collected: 08/14/24 0519    Order Status: Canceled Specimen: Blood     AFB Culture & Smear [[id number]]  (Abnormal) Collected: 08/12/24 2018    Order Status: Completed Specimen: Abscess from Abdomen Updated: 08/13/24 2127     AFB Culture & Smear Culture in progress     AFB CULTURE STAIN Positive for acid fast bacilli, 4 orgs/ 10 fields     AFB CULTURE STAIN Results called to and read back by: Gila Stevens RN  08/13/2024  13:48    Narrative:      ADD PER JAMIE ALEXANDER MD CXAFB [id number] 08/12/2024  20:15     AFB Culture & Smear [[id number]] Collected: 08/12/24 1346    Order Status: Completed Specimen: Abdominal from Abdomen Updated: 08/13/24 2127     AFB Culture & Smear Culture in progress    Narrative:      ADD ON TESTS AFB CULTURE AND SMEAR PER DR JAMIE ALEXANDER    08/12/2024  19:39     Gram stain [[id number]] Collected: 08/12/24 1346    Order Status: Completed Specimen: Abscess from Abdomen Updated: 08/12/24 1631     Gram Stain Result Many WBC's      Many Gram positive cocci      Many Gram negative rods      Few Gram positive rods    Fungus culture [[id number]] Collected: 08/12/24 1346    Order Status: Sent Specimen: Abscess from Abdomen Updated: 08/12/24 1440    AFB Culture & Smear [[id number]]     Order Status: No result Specimen: Abscess from Abdomen

## 2024-08-15 NOTE — ASSESSMENT & PLAN NOTE
Resolved    Suspect septic shock in the setting of AIDS with large spinal abscess. Remained hypotensive despite adequate fluid resuscitation.    - continue vancomycin  - f/u blood cultures & AFB culture  - s/p abscess drainage by IR  - aerobic culture with ESBL - d/c pip-tazo and start meropenum  - titrate norepi for MAP >65

## 2024-08-16 PROBLEM — A31.0 MYCOBACTERIUM AVIUM INFECTION: Status: RESOLVED | Noted: 2024-03-23 | Resolved: 2024-08-16

## 2024-08-16 LAB
ALBUMIN SERPL BCP-MCNC: 1.4 G/DL (ref 3.5–5.2)
ALP SERPL-CCNC: 92 U/L (ref 55–135)
ALT SERPL W/O P-5'-P-CCNC: <5 U/L (ref 10–44)
ANION GAP SERPL CALC-SCNC: 7 MMOL/L (ref 8–16)
ANISOCYTOSIS BLD QL SMEAR: SLIGHT
AST SERPL-CCNC: 15 U/L (ref 10–40)
BACTERIA SPEC ANAEROBE CULT: ABNORMAL
BASOPHILS # BLD AUTO: 0.03 K/UL (ref 0–0.2)
BASOPHILS NFR BLD: 0.5 % (ref 0–1.9)
BILIRUB SERPL-MCNC: 0.2 MG/DL (ref 0.1–1)
BUN SERPL-MCNC: 10 MG/DL (ref 8–23)
BURR CELLS BLD QL SMEAR: ABNORMAL
CALCIUM SERPL-MCNC: 7.4 MG/DL (ref 8.7–10.5)
CHLORIDE SERPL-SCNC: 101 MMOL/L (ref 95–110)
CO2 SERPL-SCNC: 25 MMOL/L (ref 23–29)
CREAT SERPL-MCNC: 0.5 MG/DL (ref 0.5–1.4)
DIFFERENTIAL METHOD BLD: ABNORMAL
EOSINOPHIL # BLD AUTO: 0 K/UL (ref 0–0.5)
EOSINOPHIL NFR BLD: 0.5 % (ref 0–8)
ERYTHROCYTE [DISTWIDTH] IN BLOOD BY AUTOMATED COUNT: 18.7 % (ref 11.5–14.5)
EST. GFR  (NO RACE VARIABLE): >60 ML/MIN/1.73 M^2
GLUCOSE SERPL-MCNC: 81 MG/DL (ref 70–110)
HCT VFR BLD AUTO: 25.2 % (ref 37–48.5)
HGB BLD-MCNC: 7.7 G/DL (ref 12–16)
HYPOCHROMIA BLD QL SMEAR: ABNORMAL
IMM GRANULOCYTES # BLD AUTO: 0.09 K/UL (ref 0–0.04)
IMM GRANULOCYTES NFR BLD AUTO: 1.5 % (ref 0–0.5)
LYMPHOCYTES # BLD AUTO: 1 K/UL (ref 1–4.8)
LYMPHOCYTES NFR BLD: 16.3 % (ref 18–48)
MAGNESIUM SERPL-MCNC: 1.6 MG/DL (ref 1.6–2.6)
MCH RBC QN AUTO: 23.4 PG (ref 27–31)
MCHC RBC AUTO-ENTMCNC: 30.6 G/DL (ref 32–36)
MCV RBC AUTO: 77 FL (ref 82–98)
MONOCYTES # BLD AUTO: 0.5 K/UL (ref 0.3–1)
MONOCYTES NFR BLD: 8.4 % (ref 4–15)
NEUTROPHILS # BLD AUTO: 4.4 K/UL (ref 1.8–7.7)
NEUTROPHILS NFR BLD: 72.8 % (ref 38–73)
NRBC BLD-RTO: 0 /100 WBC
OVALOCYTES BLD QL SMEAR: ABNORMAL
PHOSPHATE SERPL-MCNC: 2.8 MG/DL (ref 2.7–4.5)
PLATELET # BLD AUTO: 330 K/UL (ref 150–450)
PLATELET BLD QL SMEAR: ABNORMAL
PMV BLD AUTO: 10.8 FL (ref 9.2–12.9)
POCT GLUCOSE: 74 MG/DL (ref 70–110)
POCT GLUCOSE: 91 MG/DL (ref 70–110)
POIKILOCYTOSIS BLD QL SMEAR: SLIGHT
POLYCHROMASIA BLD QL SMEAR: ABNORMAL
POTASSIUM SERPL-SCNC: 3.7 MMOL/L (ref 3.5–5.1)
PROT SERPL-MCNC: 5 G/DL (ref 6–8.4)
RBC # BLD AUTO: 3.29 M/UL (ref 4–5.4)
SODIUM SERPL-SCNC: 133 MMOL/L (ref 136–145)
SPHEROCYTES BLD QL SMEAR: ABNORMAL
WBC # BLD AUTO: 6.07 K/UL (ref 3.9–12.7)

## 2024-08-16 PROCEDURE — 25000003 PHARM REV CODE 250: Mod: HCNC | Performed by: INTERNAL MEDICINE

## 2024-08-16 PROCEDURE — 63600175 PHARM REV CODE 636 W HCPCS: Mod: HCNC | Performed by: INTERNAL MEDICINE

## 2024-08-16 PROCEDURE — 25000003 PHARM REV CODE 250: Mod: HCNC | Performed by: STUDENT IN AN ORGANIZED HEALTH CARE EDUCATION/TRAINING PROGRAM

## 2024-08-16 PROCEDURE — 20600001 HC STEP DOWN PRIVATE ROOM: Mod: HCNC

## 2024-08-16 PROCEDURE — 25000003 PHARM REV CODE 250: Mod: HCNC

## 2024-08-16 PROCEDURE — 25000003 PHARM REV CODE 250: Mod: HCNC | Performed by: HOSPITALIST

## 2024-08-16 PROCEDURE — 84100 ASSAY OF PHOSPHORUS: CPT | Mod: HCNC

## 2024-08-16 PROCEDURE — 80053 COMPREHEN METABOLIC PANEL: CPT | Mod: HCNC

## 2024-08-16 PROCEDURE — 99233 SBSQ HOSP IP/OBS HIGH 50: CPT | Mod: HCNC,,, | Performed by: STUDENT IN AN ORGANIZED HEALTH CARE EDUCATION/TRAINING PROGRAM

## 2024-08-16 PROCEDURE — 63600175 PHARM REV CODE 636 W HCPCS: Mod: HCNC | Performed by: NURSE PRACTITIONER

## 2024-08-16 PROCEDURE — 97165 OT EVAL LOW COMPLEX 30 MIN: CPT | Mod: HCNC

## 2024-08-16 PROCEDURE — 83735 ASSAY OF MAGNESIUM: CPT | Mod: HCNC

## 2024-08-16 PROCEDURE — 85025 COMPLETE CBC W/AUTO DIFF WBC: CPT | Mod: HCNC

## 2024-08-16 PROCEDURE — 63600175 PHARM REV CODE 636 W HCPCS: Mod: HCNC | Performed by: STUDENT IN AN ORGANIZED HEALTH CARE EDUCATION/TRAINING PROGRAM

## 2024-08-16 PROCEDURE — 27000207 HC ISOLATION: Mod: HCNC

## 2024-08-16 RX ORDER — ACETAMINOPHEN 325 MG/1
650 TABLET ORAL 3 TIMES DAILY
Status: DISCONTINUED | OUTPATIENT
Start: 2024-08-16 | End: 2024-08-24 | Stop reason: HOSPADM

## 2024-08-16 RX ORDER — LIDOCAINE 50 MG/G
1 PATCH TOPICAL
Status: DISCONTINUED | OUTPATIENT
Start: 2024-08-16 | End: 2024-08-24 | Stop reason: HOSPADM

## 2024-08-16 RX ORDER — OXYCODONE HYDROCHLORIDE 5 MG/1
5 TABLET ORAL EVERY 6 HOURS PRN
Status: DISCONTINUED | OUTPATIENT
Start: 2024-08-16 | End: 2024-08-18

## 2024-08-16 RX ADMIN — ENOXAPARIN SODIUM 30 MG: 30 INJECTION SUBCUTANEOUS at 04:08

## 2024-08-16 RX ADMIN — SODIUM CHLORIDE, POTASSIUM CHLORIDE, SODIUM LACTATE AND CALCIUM CHLORIDE 500 ML: 600; 310; 30; 20 INJECTION, SOLUTION INTRAVENOUS at 11:08

## 2024-08-16 RX ADMIN — ETHAMBUTOL HYDROCHLORIDE 700 MG: 100 TABLET ORAL at 09:08

## 2024-08-16 RX ADMIN — CLARITHROMYCIN 500 MG: 500 TABLET, FILM COATED ORAL at 09:08

## 2024-08-16 RX ADMIN — Medication: at 10:08

## 2024-08-16 RX ADMIN — ERTAPENEM 1 G: 1 INJECTION INTRAMUSCULAR; INTRAVENOUS at 11:08

## 2024-08-16 RX ADMIN — OXYCODONE HYDROCHLORIDE 5 MG: 5 TABLET ORAL at 04:08

## 2024-08-16 RX ADMIN — QUETIAPINE FUMARATE 25 MG: 25 TABLET ORAL at 09:08

## 2024-08-16 RX ADMIN — RIFABUTIN 300 MG: 150 CAPSULE ORAL at 09:08

## 2024-08-16 RX ADMIN — DOLUTEGRAVIR SODIUM 50 MG: 50 TABLET, FILM COATED ORAL at 10:08

## 2024-08-16 RX ADMIN — MORPHINE SULFATE 2 MG: 2 INJECTION, SOLUTION INTRAMUSCULAR; INTRAVENOUS at 03:08

## 2024-08-16 RX ADMIN — Medication: at 09:08

## 2024-08-16 RX ADMIN — VANCOMYCIN HYDROCHLORIDE 750 MG: 750 INJECTION, POWDER, LYOPHILIZED, FOR SOLUTION INTRAVENOUS at 12:08

## 2024-08-16 RX ADMIN — LAMIVUDINE 300 MG: 150 TABLET, FILM COATED ORAL at 09:08

## 2024-08-16 RX ADMIN — SULFAMETHOXAZOLE AND TRIMETHOPRIM 1 TABLET: 800; 160 TABLET ORAL at 09:08

## 2024-08-16 RX ADMIN — LIDOCAINE 5% 1 PATCH: 700 PATCH TOPICAL at 04:08

## 2024-08-16 RX ADMIN — ACETAMINOPHEN 650 MG: 325 TABLET ORAL at 09:08

## 2024-08-16 NOTE — ASSESSMENT & PLAN NOTE
Hx of spinal MAC  [facility] [place] [place] ID note showed continuation of ethambutol, rifampin, and azithromycin    - ID consulted and now recommending resuming treatment based on conversation with patient   - continued ethambutol, rifampin, clarithromycin

## 2024-08-16 NOTE — NURSING
Pt. Transferred via bed from ICU - oriented to RM, bed control, call light, POC, Bed alarm, educ. Not to get out of bed without someone present to assist, to call nurse prior to ambulation, voiced understanding. Water, and coffee provided, and report given to oncoming shift - Rudolph Duff RN

## 2024-08-16 NOTE — ASSESSMENT & PLAN NOTE
Continued dolutegravir and lamivudine, continued pjp prophylactic bactrim   f/u CD4 and HIV RNA collected at OSH  Appreciate ID consult and discussion with Dr. Middleton today

## 2024-08-16 NOTE — PT/OT/SLP EVAL
Occupational Therapy   Evaluation    Name: Amie Salmeron  MRN: [id number]  Admitting Diagnosis: Osteomyelitis of vertebra of thoracolumbar region  Recent Surgery: * No surgery found *      Recommendations:     Discharge Recommendations: Moderate Intensity Therapy  Discharge Equipment Recommendations:  none  Barriers to discharge:  Decreased caregiver support    Assessment:     Amie Salmeron is a 59 y.o. female with a medical diagnosis of Osteomyelitis of vertebra of thoracolumbar region. Performance deficits affecting function: weakness, impaired endurance, impaired self care skills, impaired functional mobility, gait instability, impaired balance. Patient cleared for therapy. Patient sat EOB and therapist attempted to get patient ready for OOB activity with TLSO brace on, but it was deconstructed and unable to attach with velcro fully and correctly. Patient was in too much pain and needed to lay back down prior to therapist able to get TLSO brace back together correctly.     Rehab Prognosis: Good; patient would benefit from acute skilled OT services to address these deficits and reach maximum level of function.       Plan:     Patient to be seen 4 x/week to address the above listed problems via self-care/home management, therapeutic activities, therapeutic exercises  Plan of Care Expires: 09/16/24  Plan of Care Reviewed with: patient    Subjective     Chief Complaint: back pain  Patient/Family Comments/goals: to get better    Occupational Profile:  Living Environment: Patient lives with mom in a 1 SH with a slab entrance. Patient has a WIS with no bench and with grab bar. Patient has a BSC over her toilet. Patient reports she was independent with ADLs. Patient has a rollator and a cane and reports using both for ambulation. Patient does not work. Patient reports mother cannot physically assist and needs assistance herself.   Equipment Used at Home: cane, quad, rollator, bedside commode, grab  bar      Pain/Comfort:  Pain Rating 1: 10/10  Location - Side 1: Bilateral  Location - Orientation 1: generalized  Location 1: back  Pain Addressed 1: Reposition, Distraction, Cessation of Activity  Pain Rating Post-Intervention 1: 10/10    Patients cultural, spiritual, Yarsanism conflicts given the current situation: no    Objective:     Communicated with: NSTED prior to session.  Patient found supine with pulse ox (continuous), telemetry, peripheral IV, JEYSON drain, PureWick upon OT entry to room.    General Precautions: Standard, fall  Orthopedic Precautions: N/A  Braces: N/A  Respiratory Status: Room air    Occupational Performance:    Bed Mobility:    Patient completed Rolling/Turning to Right with contact guard assistance  Patient completed Scooting/Bridging with total assistance and 2 persons via drawsheet to get higher up in bed  Patient completed Supine to Sit with moderate assistance  Patient completed Sit to Supine with moderate assistance    Activities of Daily Living:  Upper Body Dressing: maximal assistance Donning and doffing back gown and TLSO brace (Therapist unable to fully attach TLSO brace as when patient was sitting EOB, pieces were not connected correctly and it was deconstructed). Patient had to lie down later due to pain prior to therapist able to fix it. Therapist later fixed. Will need to assess mobility at a later session.     Cognitive/Visual Perceptual:  Cognitive/Psychosocial Skills:     -       Oriented to: Person, Place, Time, and Situation   -       Follows Commands/attention:Follows multistep  commands  -       Communication: clear/fluent  -       Memory: No Deficits noted  -       Safety awareness/insight to disability: intact   -       Mood/Affect/Coping skills/emotional control: Appropriate to situation  Visual/Perceptual:      -Intact        AMPAC 6 Click ADL:  AMPAC Total Score: 14    Treatment & Education:  Role of OT and POC  ADL retraining  Functional mobility  training  Safety  Discharge planning  Importance EOB/OOB activity    Patient left supine with all lines intact, call button in reach, nurse notified, and all needs met.     GOALS:   Multidisciplinary Problems       Occupational Therapy Goals          Problem: Occupational Therapy    Goal Priority Disciplines Outcome Interventions   Occupational Therapy Goal     OT, PT/OT Progressing    Description: Goals to be met by: 9/13/2024     Patient will increase functional independence with ADLs by performing:    UE Dressing with Stand-by Assistance.  LE Dressing with Stand-by Assistance.  Grooming while standing at sink with Stand-by Assistance.  Toileting from toilet with Stand-by Assistance for hygiene and clothing management.   Supine to sit with Stand-by Assistance.  Stand pivot transfers with Stand-by Assistance.  Toilet transfer to toilet with Stand-by Assistance.                         History:     Past Medical History:   Diagnosis Date    Allergy     Arthritis     Asthma     Chronic pain     HIV infection     Hypertension     Overactive bladder     Spinal stenosis     Urine incontinence          Past Surgical History:   Procedure Laterality Date    ADENOIDECTOMY      APPENDECTOMY      BACK SURGERY      GERALD    CYST REMOVAL      HYSTERECTOMY      JOINT REPLACEMENT Right     knee, with revision    KNEE SURGERY Left     knee replacement    RHIZOTOMY      TONSILLECTOMY      TOTAL ANKLE ARTHROPLASTY Right        Time Tracking:     OT Date of Treatment: 08/16/24  OT Start Time: 1137  OT Stop Time: 1155  OT Total Time (min): 18 min    Billable Minutes:Evaluation 18    8/16/2024

## 2024-08-16 NOTE — ASSESSMENT & PLAN NOTE
Secondary to osteo.   Needs better multimodal pain control. Will schedule tylenol, add prn oxycodone and lidocaine patch.  continue prn morphine. Continue gabapentin and robaxin (outpatient meds). Consider NSAID as well.

## 2024-08-16 NOTE — ASSESSMENT & PLAN NOTE
Imaging showed Large left paravertebral retroperitoneal abscess increased from the prior study measuring approximately 14.0 x 5.8 x 15.5 cm. The abscess crosses the midline to the right involving the vertebral bodies and surgical hardware also. 3.7 cm right periaortic component of the abscess at T12-L1 level. See above comments. Paravertebral phlegmon with findings of discitis/osteomyelitis at T8-9, T9-10, L2-3 and L3-4   ID and NSGY consulted. IR placed drain which pt subsequently removed. Repeat imaging without remaining abscess.  Cx grew MRSA, AFB, fusobacterium, ESBL e coli  - ID consulted and appreciate recommendations  -plan for 6 weeks of vanc and ertapenem, OPAT written 8/16.  If does not go to a facility let ID know so that f/u can be arranged  - NSGY, IR appreciated

## 2024-08-16 NOTE — PROGRESS NOTES
[facility] ([place] [id number])  [facility]  Progress Note    Patient Name: Amie Salmeron  MRN: [id number]  Patient Class: IP- Inpatient   Admission Date: 8/12/2024  Length of Stay: 4 days  Attending Physician: Melony Zavala MD  Primary Care Provider: Anuradha, Primary Doctor        Subjective:     Principal Problem:Osteomyelitis of vertebra of thoracolumbar region        HPI:  Mrs. Salmeron is a 58 yo F with PMHx of HTN, uncontrolled HIV noncompliant with HAART, spinal MAC, spinal osteo s/p T11-L2 corpectomy with fixation by ortho on 2/27/24 at [facility] who presents to [facility] as a transfer from [place] on 8/11/24 with generalized malaise, fatigue, and lower left sided back pain x 1 week. Pt denies fevers, chills, bowel/bladder incontinence, weakness, or numbness. In the [facility] ED, patient was afebrile but borderline hypotensive which improved with IV fluids. Noted to have WBC 16.6, K 2.4, albumin 1.9, normal lactic. Blood cultures were obtained. Patient was admitted to  at [facility] and CT TL w/ contrast showed large left T11-L2 paravertebral retroperitoneal abscess increased from the prior study measuring approximately 14.0 x 5.8 x 15.5 cm.  The abscess crosses the midline to the right involving the vertebral bodies and surgical hardware also.  3.7 cm right periaortic component of the abscess at T12-L1 level. Potassium was repleted and vanc/zosyn given prior to transfer to [facility]. Neurosurgery and IR consult with admission to .    On my evaluation, patient borderline hypotensive and afebrile. Currently complaining of pain but no focal neurologic symptoms. Pain localized to left lower back. OSH lab work pending.     Pt admitted to hospital medicine with NSGY, IR, and ID consulted to assist with management.     Overview/Hospital Course:  58 yo with advance HIV/AIDS with multiple OI including verterbral MAC, paravertebral abscess, MRSA bacteremia. She was transferred [facility] for IR for the paravertebral abscess and  subsequently transferred to the MICU for hypotension requiring vasopressor support. She has been weaned off pressors. She continues on meropenem and vancomycin for the polymicrobial abscess. She dislodged her drain and repeat imaging showed abscess resolution so it was not replaced. Palliative care as been following as given her advanced AIDS she has limited/no therapeutic options for her vertebral MAC.     Interval History: today she has flat affect, slow to answer questions.  Mainly she has pain in her back. No dyspnea, no nausea.  Discussed plan for abx and likely facility which is consistent with what she thought.     Review of Systems   Musculoskeletal:  Positive for back pain.   All other systems reviewed and are negative.    Objective:     Vital Signs (Most Recent):  Temp: 98.3 °F (36.8 °C) (08/16/24 1118)  Pulse: 97 (08/16/24 1444)  Resp: 18 (08/16/24 1118)  BP: (!) 89/63 (08/16/24 1118)  SpO2: 100 % (08/16/24 1118) Vital Signs (24h Range):  Temp:  [97.9 °F (36.6 °C)-98.5 °F (36.9 °C)] 98.3 °F (36.8 °C)  Pulse:  [] 97  Resp:  [15-26] 18  SpO2:  [96 %-100 %] 100 %  BP: ()/(52-74) 89/63     Weight: 45.8 kg (101 lb)  Body mass index is 17.34 kg/m².    Intake/Output Summary (Last 24 hours) at 8/16/2024 1521  Last data filed at 8/16/2024 1511  Gross per 24 hour   Intake 1293.15 ml   Output 4090 ml   Net -2796.85 ml         Physical Exam  Constitutional:       Appearance: She is ill-appearing.   HENT:      Head: Normocephalic.      Mouth/Throat:      Mouth: Mucous membranes are moist.   Cardiovascular:      Rate and Rhythm: Normal rate.      Pulses: Normal pulses.      Heart sounds: Normal heart sounds.   Pulmonary:      Effort: Pulmonary effort is normal.      Breath sounds: Normal breath sounds.   Abdominal:      General: Abdomen is flat. Bowel sounds are normal.   Musculoskeletal:      Right lower leg: No edema.      Left lower leg: No edema.   Skin:     General: Skin is warm and dry.    Neurological:      General: No focal deficit present.      Mental Status: She is alert.   Psychiatric:      Comments: Flat affect             Significant Labs: All pertinent labs within the past 24 hours have been reviewed.    Significant Imaging: I have reviewed all pertinent imaging results/findings within the past 24 hours.    Assessment/Plan:      * Osteomyelitis of vertebra of thoracolumbar region  Imaging showed Large left paravertebral retroperitoneal abscess increased from the prior study measuring approximately 14.0 x 5.8 x 15.5 cm. The abscess crosses the midline to the right involving the vertebral bodies and surgical hardware also. 3.7 cm right periaortic component of the abscess at T12-L1 level. See above comments. Paravertebral phlegmon with findings of discitis/osteomyelitis at T8-9, T9-10, L2-3 and L3-4   ID and NSGY consulted. IR placed drain which pt subsequently removed. Repeat imaging without remaining abscess.  Cx grew MRSA, AFB, fusobacterium, ESBL e coli  - ID consulted and appreciate recommendations  -plan for 6 weeks of vanc and ertapenem, OPAT written 8/16.  If does not go to a facility let ID know so that f/u can be arranged  - NSGY, IR appreciated        UTI (urinary tract infection)    ESBL ECOLI  Being treated with ertapenem for vertebral abscess    DNR (do not resuscitate)        Septic shock  This patient has shock. The type of shock is distributive due to sepsis. The patient has the following evidence of shock: persistent hypotension. The patient was admitted to an intensive care unit and required norepinephrine which has since been weaned. See vertebral osteo for infectious treatment plan    Vertebral abscess    See osteomyelitis    Disseminated mycobacterium avium-intracellulare complex  Hx of spinal MAC  [facility] previous ID note showed continuation of ethambutol, rifampin, and azithromycin    - ID consulted and now recommending resuming treatment based on conversation with patient    - continued ethambutol, rifampin, clarithromycin      Hyponatremia  Hyponatremia is likely due to Dehydration/hypovolemia and tea and toast syndrome. The patient's most recent sodium results are listed below.  Recent Labs     08/14/24  0519 08/15/24  0535 08/16/24  0358    133* 133*     Plan  - Correct the sodium by 4-6mEq in 24 hours.   - Will treat the hyponatremia with encourage oral intake  - Monitor sodium Daily.   - Patient hyponatremia is stable      AIDS  Continued dolutegravir and lamivudine, continued pjp prophylactic bactrim   f/u CD4 and HIV RNA collected at OSH  Appreciate ID consult and discussion with Dr. Middleton today      Chronic pain  Secondary to osteo.   Needs better multimodal pain control. Will schedule tylenol, add prn oxycodone and lidocaine patch.  continue prn morphine. Continue gabapentin and robaxin (outpatient meds). Consider NSAID as well.         VTE Risk Mitigation (From admission, onward)           Ordered     enoxaparin injection 30 mg  Every 24 hours         08/13/24 0929     IP VTE LOW RISK PATIENT  Once         08/12/24 0240     Place sequential compression device  Until discontinued         08/12/24 0240                    Discharge Planning   MARK: 8/19/2024     Code Status: DNR   Is the patient medically ready for discharge?:     Reason for patient still in hospital (select all that apply): Patient trending condition and Laboratory test  Discharge Plan A: New Nursing Home placement - long term care facility   Discharge Delays: None known at this time              Melony Zavala MD  Department of Hospital Medicine   [facility] ([place]-14)

## 2024-08-16 NOTE — HOSPITAL COURSE
60 yo with advance HIV/AIDS with multiple OI including verterbral MAC, paravertebral abscess, MRSA bacteremia. She was transferred [facility] for IR for the paravertebral abscess and subsequently transferred to the MICU for hypotension requiring vasopressor support. IR placed drain on 8/12.  Abscess cultures grew AFB, Fusobacterium necleatum, ESBL E. Coli and blood cultures grew MRSA.  ID was consulted and started meropenum and vancomcyin. She has been weaned off pressors. Palliative care as been following as given her advanced AIDS she has limited/no therapeutic options for her vertebral MAC.  In discussions with ID, patient wants tor resume treatment for MAC so clarithromycin, rifabutin and ethambutol were resumed based on cultures from [facility].  Repeat imaging showed resolution of the abscess so her drain was removed. Her HIV meds were resumed and on discharge [facility] was able to get shai support for patient to have 0$ copay.  She was discharged to SNF to complete her antibiotics for total of 6 weeks of vancomycin and ertapenem ending 9/23.  She will need to follow up in ID clinic. Her antibiotics for MAC (oral) should be continued indefinitely.  She should wear back brace with PT

## 2024-08-16 NOTE — PT/OT/SLP PROGRESS
Physical Therapy      Patient Name:  Amie Salmeron   MRN:  [id number]    Patient not seen today secondary to  (pt not seen due to therapy sitting pt on EOB. Pt TLSO brace was deconstucted and pt was in too much pain and needed to lay down.). Will follow-up at a later date.  8/16/2024  .      SOCIAL/ pt lives with her parents in 1 story slab and used rollator prior to admit. Pt owns rollator, QC, walk in shower with grab bars, BSC over toilet.

## 2024-08-16 NOTE — ASSESSMENT & PLAN NOTE
Amie Salmeron is a 59 year old woman with advanced HIV with poor adherence (CD4 76, 6.5%), vertebral MAC who was admitted for dyspnea and hypotension. Imaging demonstrated large thoracic paravertebral/retroperitoneal abscess. She was admitted to MICU for hypotension requiring pressors. Blood cultures with MRSA, abscess with + AFB smear and ESBL E. Coli, fusobacterium nucleatum. TTE negative for vegetation and repeat blood cultures no growth to date. She has inflammation in her esophagus, however she is asymptomatic. She initially completed MAC course with levofloxacin/azithromycin/ethambutol (not on rifamycin due to Biktarvy) but transitioned to azithro/ethambutol/rifabutin/IV amikacin 2/23/24 until 5/21/24. She underwent T12-L1 corpectomy with T11-L2 fixation (with new hardware placement) on 2/27. She has been frequently admitted to [facility] for back pain and inability to care for herself. Most recently admitted 6/15-7/15 and maintained on azithro/ethambutol/rifabutin. She is unsure if she was taking any medications and from discussion with family she has not been taking anything. Prognosis is poor in the setting of apparent non-adherence to therapy and presumably inability to care for herself at home. We discussed that her condition is not going to be curable and that the only chance for improvement is with treatment of her HIV. Difficult to determine if she understands this, however she expresses that she wants to treat her MAC. She is potentially planning on hospice after completing antibiotic course at LTAC/SNF, but until plan finalized will treat for MAC per her wishes.      Recommendations  - continue vancomycin goal trough 15-20 mcg/ml, dosing per pharmacy, for 6 week course  - continue ertapenem 1 gram q24 hours for 6 week course  - continue dolutegravir/lamivudine  - continue bactrim for PJP ppx  - continue home medications of clarithromycin 500 mg BID, ethambutol 15mg/kg daily, rifabutin 300 mg daily  -  counseled on color vision screening, but she will likely need assistance with this as SNF  - AFB blood culture sent   [facility]    Outpatient Antibiotic Therapy Plan:    Please send referral to [facility].    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 Target 15-20    If discharged on vancomycin IV, order the following additional labs to be drawn on Thursdays:  CMP   Vancomycin trough. Target 15-20    If vancomycin trough is not at target (15-20) prior to discharge, schedule vancomycin trough to be drawn before their fourth outpatient dose.    5) Fax Lab Results to Infectious Diseases Provider: [facility] Fax Number: [phone]    6) Outpatient Infectious Diseases Follow-up    Follow-up appointment will be arranged by the ID clinic and will be found in the patient's appointments tab.    Prior to discharge, please ensure the patient's follow-up has been scheduled.    If there is still no follow-up scheduled prior to discharge, please send an EPIC message to [facility] Clinical Pool or Call Infectious Diseases Dept.

## 2024-08-16 NOTE — PLAN OF CARE
[facility] ([place]-[facility])  Discharge Reassessment    Primary Care Provider: No, Primary Doctor    Expected Discharge Date: 8/19/2024    Reassessment (most recent)       Discharge Reassessment - 08/16/24 1426          Discharge Reassessment    Assessment Type Discharge Planning Reassessment     Did the patient's condition or plan change since previous assessment? No     Discharge Plan discussed with: POA     Name(s) and Number(s) Janett Crandall (Relative)  [phone]     Communicated MARK with patient/caregiver Yes     Discharge Plan A New Nursing Home placement - jail care facility     Discharge Plan B New Nursing Home placement - jail care facility     DME Needed Upon Discharge  none     Transition of Care Barriers None        Post-Acute Status    Post-Acute Authorization Placement     Post-Acute Placement Status Referrals Sent     Coverage [employer] MANAGED MEDICARE- [employer] MEDICARE O     Discharge Delays None known at this time                       Discharge Plan A and Plan B have been determined by review of patient's clinical status, future medical and therapeutic needs, and coverage/benefits for post-acute care in coordination with multidisciplinary team members.    Kimberly Duffy MSW, CSW

## 2024-08-16 NOTE — PLAN OF CARE
Problem: Adult Inpatient Plan of Care  Goal: Plan of Care Review  Outcome: Progressing  Goal: Patient-Specific Goal (Individualized)  Outcome: Progressing  Goal: Absence of Hospital-Acquired Illness or Injury  Outcome: Progressing  Goal: Optimal Comfort and Wellbeing  Outcome: Progressing  Goal: Readiness for Transition of Care  Outcome: Progressing     Problem: Wound  Goal: Optimal Coping  Outcome: Progressing  Goal: Optimal Functional Ability  Outcome: Progressing  Goal: Absence of Infection Signs and Symptoms  Outcome: Progressing  Goal: Improved Oral Intake  Outcome: Progressing  Goal: Optimal Pain Control and Function  Outcome: Progressing  Goal: Skin Health and Integrity  Outcome: Progressing  Goal: Optimal Wound Healing  Outcome: Progressing     Problem: Skin Injury Risk Increased  Goal: Skin Health and Integrity  Outcome: Progressing     Problem: Infection  Goal: Absence of Infection Signs and Symptoms  Outcome: Progressing     Problem: Sepsis/Septic Shock  Goal: Optimal Coping  Outcome: Progressing  Goal: Absence of Bleeding  Outcome: Progressing  Goal: Blood Glucose Level Within Targeted Range  Outcome: Progressing  Goal: Absence of Infection Signs and Symptoms  Outcome: Progressing  Goal: Optimal Nutrition Intake  Outcome: Progressing     Problem: Coping Ineffective  Goal: Effective Coping  Outcome: Progressing

## 2024-08-16 NOTE — ASSESSMENT & PLAN NOTE
Hyponatremia is likely due to Dehydration/hypovolemia and tea and toast syndrome. The patient's most recent sodium results are listed below.  Recent Labs     08/14/24  0519 08/15/24  0535 08/16/24  0358    133* 133*     Plan  - Correct the sodium by 4-6mEq in 24 hours.   - Will treat the hyponatremia with encourage oral intake  - Monitor sodium Daily.   - Patient hyponatremia is stable

## 2024-08-16 NOTE — SUBJECTIVE & OBJECTIVE
Interval History: Discussed with patient that her mycobacterial infection is not curable if she does not undergo surgical intervention and she is not interested in surgery. We discussed that any treatment is palliative. She accepts this. She does not feel that returning home is in her best interests because her family is unable to appropriately care for her.     Review of Systems   Constitutional:  Positive for fatigue. Negative for fever.   Respiratory: Negative.     Musculoskeletal:  Positive for back pain.     Objective:     Vital Signs (Most Recent):  Temp: 98.3 °F (36.8 °C) (08/16/24 1118)  Pulse: 94 (08/16/24 1118)  Resp: 18 (08/16/24 1118)  BP: (!) 89/63 (08/16/24 1118)  SpO2: 100 % (08/16/24 1118) Vital Signs (24h Range):  Temp:  [97.9 °F (36.6 °C)-98.5 °F (36.9 °C)] 98.3 °F (36.8 °C)  Pulse:  [] 94  Resp:  [15-26] 18  SpO2:  [96 %-100 %] 100 %  BP: ()/(52-74) 89/63     Weight: 45.8 kg (101 lb)  Body mass index is 17.34 kg/m².    Estimated Creatinine Clearance: 85.4 mL/min (based on SCr of 0.5 mg/dL).     Physical Exam  Vitals and nursing note reviewed.   Constitutional:       Appearance: She is ill-appearing. She is not toxic-appearing.   HENT:      Head: Normocephalic.      Mouth/Throat:      Mouth: Mucous membranes are moist.      Pharynx: Oropharynx is clear. No oropharyngeal exudate.   Abdominal:      Palpations: Abdomen is soft.      Tenderness: There is no abdominal tenderness.   Musculoskeletal:      Comments: R flank tender to palpation with serosanguinous drain in place   Neurological:      Mental Status: She is alert.   Psychiatric:         Mood and Affect: Mood normal.          Significant Labs:   Microbiology Results (last 7 days)       Procedure Component Value Units Date/Time    Blood culture [[id number]] Collected: 08/13/24 1031    Order Status: Completed Specimen: Blood from Peripheral, Forearm, Left Updated: 08/16/24 1212     Blood Culture, Routine No Growth to date      No  Growth to date      No Growth to date      No Growth to date    Blood culture [[id number]] Collected: 08/15/24 0812    Order Status: Completed Specimen: Blood from Peripheral, Hand, Left Updated: 08/16/24 1012     Blood Culture, Routine No Growth to date      No Growth to date    Blood culture [[id number]] Collected: 08/15/24 0818    Order Status: Completed Specimen: Blood from Peripheral, Hand, Right Updated: 08/16/24 1012     Blood Culture, Routine No Growth to date      No Growth to date    Culture, Anaerobe [[id number]]  (Abnormal) Collected: 08/12/24 1346    Order Status: Completed Specimen: Abscess from Abdomen Updated: 08/16/24 0935     Anaerobic Culture FUSOBACTERIUM NUCLEATUM  Many      Narrative:      ADD PER JAMIE ALEXANDER MD CXAFB [id number] 08/12/2024  20:15     Blood culture [[id number]] Collected: 08/13/24 0352    Order Status: Completed Specimen: Blood from Peripheral, Upper Arm, Left Updated: 08/16/24 0613     Blood Culture, Routine No Growth to date      No Growth to date      No Growth to date      No Growth to date    AFB Culture & Smear [[id number]] Collected: 08/14/24 0519    Order Status: Completed Specimen: Blood Updated: 08/15/24 2127     AFB Culture & Smear Culture in progress    Aerobic culture [[id number]]  (Abnormal)  (Susceptibility) Collected: 08/12/24 1346    Order Status: Completed Specimen: Abscess from Abdomen Updated: 08/14/24 1101     Aerobic Bacterial Culture ESCHERICHIA COLI ESBL  Many      Narrative:      Left lateral abd    AFB culture, blood [[id number]] Collected: 08/14/24 0519    Order Status: Canceled Specimen: Blood     AFB Culture & Smear [[id number]]  (Abnormal) Collected: 08/12/24 2018    Order Status: Completed Specimen: Abscess from Abdomen Updated: 08/13/24 2127     AFB Culture & Smear Culture in progress     AFB CULTURE STAIN Positive for acid fast bacilli, 4 orgs/ 10 fields     AFB CULTURE STAIN Results called to and read back by: Gila Stevens RN   08/13/2024  13:48    Narrative:      ADD PER JAMIE ALEXANDER MD CXAFB [id number] 08/12/2024  20:15     AFB Culture & Smear [[id number]] Collected: 08/12/24 1346    Order Status: Completed Specimen: Abdominal from Abdomen Updated: 08/13/24 2127     AFB Culture & Smear Culture in progress    Narrative:      ADD ON TESTS AFB CULTURE AND SMEAR PER DR JAMIE ALEXANDER    08/12/2024  19:39     Gram stain [[id number]] Collected: 08/12/24 1346    Order Status: Completed Specimen: Abscess from Abdomen Updated: 08/12/24 1631     Gram Stain Result Many WBC's      Many Gram positive cocci      Many Gram negative rods      Few Gram positive rods    Fungus culture [[id number]] Collected: 08/12/24 1346    Order Status: Sent Specimen: Abscess from Abdomen Updated: 08/12/24 1440    AFB Culture & Smear [[id number]]     Order Status: No result Specimen: Abscess from Abdomen             Significant Imaging: I have reviewed all pertinent imaging results/findings within the past 24 hours.

## 2024-08-16 NOTE — ASSESSMENT & PLAN NOTE
This patient has shock. The type of shock is distributive due to sepsis. The patient has the following evidence of shock: persistent hypotension. The patient was admitted to an intensive care unit and required norepinephrine which has since been weaned. See vertebral osteo for infectious treatment plan

## 2024-08-16 NOTE — PLAN OF CARE
SAEID spoke with Janett Crandall (Niece) [phone] in regards to pt discharge planning.   Janett stated that she is POA of pt and pt will be going to nursing home for retirement placement.   Janett say they decided on [facility].   Janett will drop off POA paper work on 8/17/24 so that SAEID can make a copy for pt chart.     SAEID will send nursing home referral through care [facility].     Will follow up.     Kimberly Duffy MSW, CSW

## 2024-08-16 NOTE — NURSING
MICU DAILY GOALS     Family/Goals of care/Code Status   Code Status: DNR    24H Vital Sign Range  Temp:  [98.4 °F (36.9 °C)-98.5 °F (36.9 °C)]   Pulse:  []   Resp:  [16-26]   BP: ()/(52-74)   SpO2:  [96 %-100 %]      Shift Events (include procedures and significant events)   No acute events throughout shift    AWAKE RASS: Goal - RASS Goal: 0-->alert and calm  Actual - RASS (Lee Agitation-Sedation Scale): alert and calm    Restraint necessity: Not necessary   BREATHE SBT: Not intubated    Coordinate A & B, analgesics/sedatives Pain: managed   SAT: Not intubated   Delirium CAM-ICU: Overall CAM-ICU: Negative   Early(intubated/ Progressive (non-intubated) Mobility MOVE Screen (INTUBATED ONLY): Not intubated    Activity: Activity Management: Rolling - L1   Feeding/Nutrition Diet order: Diet/Nutrition Received: regular,     Thrombus DVT prophylaxis: VTE Required Core Measure: Pharmacological prophylaxis initiated/maintained   HOB Elevation Head of Bed (HOB) Positioning: HOB at 30 degrees   Ulcer Prophylaxis GI: yes   Glucose control managed Glycemic Management: blood glucose monitored   Skin Skin assessed during: Daily Assessment    Sacrum intact/not altered? Yes  Heels intact/not altered? Yes  Surgical wound? Yes    CHECK ONE!   (no altered skin or altered skin) and sub boxes:  [] No Altered Skin Integrity Present    []Prevention Measures Documented    [] Altered Skin Integrity Present or Discovered   [] LDA present in EPIC, daily doc completed              [] LDA added if not in EPIC (describe wound).                    When describing wound, do not stage, use descriptive words only.    [] Wound Image Taken (required on admit,                   transfer/discharge and every Tuesday)    Wound Care Consulted? No    4 EYES:  Attending Nurse (1st set of eyes):     Second RN/Staff Member (2nd set of eyes):    Bowel Function no issues    Indwelling Catheter Necessity      Urethral Catheter 08/12/24 1519-Reason  for Continuing Urinary Catheterization: Critically ill in ICU and requiring hourly monitoring of intake/output          De-escalation Antibiotics Yes        VS and assessment per flow sheet, patient progressing towards goals as tolerated, plan of care reviewed with [unfilled], all concerns addressed, will continue to monitor.

## 2024-08-16 NOTE — SUBJECTIVE & OBJECTIVE
Interval History: today she has flat affect, slow to answer questions.  Mainly she has pain in her back. No dyspnea, no nausea.  Discussed plan for abx and likely facility which is consistent with what she thought.     Review of Systems   Musculoskeletal:  Positive for back pain.   All other systems reviewed and are negative.    Objective:     Vital Signs (Most Recent):  Temp: 98.3 °F (36.8 °C) (08/16/24 1118)  Pulse: 97 (08/16/24 1444)  Resp: 18 (08/16/24 1118)  BP: (!) 89/63 (08/16/24 1118)  SpO2: 100 % (08/16/24 1118) Vital Signs (24h Range):  Temp:  [97.9 °F (36.6 °C)-98.5 °F (36.9 °C)] 98.3 °F (36.8 °C)  Pulse:  [] 97  Resp:  [15-26] 18  SpO2:  [96 %-100 %] 100 %  BP: ()/(52-74) 89/63     Weight: 45.8 kg (101 lb)  Body mass index is 17.34 kg/m².    Intake/Output Summary (Last 24 hours) at 8/16/2024 1521  Last data filed at 8/16/2024 1511  Gross per 24 hour   Intake 1293.15 ml   Output 4090 ml   Net -2796.85 ml         Physical Exam  Constitutional:       Appearance: She is ill-appearing.   HENT:      Head: Normocephalic.      Mouth/Throat:      Mouth: Mucous membranes are moist.   Cardiovascular:      Rate and Rhythm: Normal rate.      Pulses: Normal pulses.      Heart sounds: Normal heart sounds.   Pulmonary:      Effort: Pulmonary effort is normal.      Breath sounds: Normal breath sounds.   Abdominal:      General: Abdomen is flat. Bowel sounds are normal.   Musculoskeletal:      Right lower leg: No edema.      Left lower leg: No edema.   Skin:     General: Skin is warm and dry.   Neurological:      General: No focal deficit present.      Mental Status: She is alert.   Psychiatric:      Comments: Flat affect             Significant Labs: All pertinent labs within the past 24 hours have been reviewed.    Significant Imaging: I have reviewed all pertinent imaging results/findings within the past 24 hours.

## 2024-08-16 NOTE — PLAN OF CARE
Problem: Occupational Therapy  Goal: Occupational Therapy Goal  Description: Goals to be met by: 9/13/2024     Patient will increase functional independence with ADLs by performing:    UE Dressing with Stand-by Assistance.  LE Dressing with Stand-by Assistance.  Grooming while standing at sink with Stand-by Assistance.  Toileting from toilet with Stand-by Assistance for hygiene and clothing management.   Supine to sit with Stand-by Assistance.  Stand pivot transfers with Stand-by Assistance.  Toilet transfer to toilet with Stand-by Assistance.    Outcome: Progressing   Patient's goals are set.

## 2024-08-16 NOTE — PROGRESS NOTES
[facility] ([place])  Infectious Disease  Progress Note    Patient Name: Amie Salmeron  MRN: [id number]  Admission Date: 8/12/2024  Length of Stay: 4 days  Attending Physician: Melony Zavala MD  Primary Care Provider: Anuradha, Primary Doctor    Isolation Status: Contact  Assessment/Plan:      ID  * Osteomyelitis of vertebra of thoracolumbar region  Amie Salmeron is a 59 year old woman with advanced HIV with poor adherence (CD4 76, 6.5%), vertebral MAC who was admitted for dyspnea and hypotension. Imaging demonstrated large thoracic paravertebral/retroperitoneal abscess. She was admitted to MICU for hypotension requiring pressors. Blood cultures with MRSA, abscess with + AFB smear and ESBL E. Coli, fusobacterium nucleatum. TTE negative for vegetation and repeat blood cultures no growth to date. She has inflammation in her esophagus, however she is asymptomatic. She initially completed MAC course with levofloxacin/azithromycin/ethambutol (not on rifamycin due to Biktarvy) but transitioned to azithro/ethambutol/rifabutin/IV amikacin 2/23/24 until 5/21/24. She underwent T12-L1 corpectomy with T11-L2 fixation (with new hardware placement) on 2/27. She has been frequently admitted to [facility] for back pain and inability to care for herself. Most recently admitted 6/15-7/15 and maintained on azithro/ethambutol/rifabutin. She is unsure if she was taking any medications and from discussion with family she has not been taking anything. Prognosis is poor in the setting of apparent non-adherence to therapy and presumably inability to care for herself at home. We discussed that her condition is not going to be curable and that the only chance for improvement is with treatment of her HIV. Difficult to determine if she understands this, however she expresses that she wants to treat her MAC. She is potentially planning on hospice after completing antibiotic course at LTAC/SNF, but until plan finalized will treat for MAC  per her wishes.      Recommendations  - continue vancomycin goal trough 15-20 mcg/ml, dosing per pharmacy, for 6 week course  - continue ertapenem 1 gram q24 hours for 6 week course  - continue dolutegravir/lamivudine  - continue bactrim for PJP ppx  - continue home medications of clarithromycin 500 mg BID, ethambutol 15mg/kg daily, rifabutin 300 mg daily  - counseled on color vision screening, but she will likely need assistance with this as SNF  - AFB blood culture sent   - see below for OPAT note    Outpatient Antibiotic Therapy Plan:    Please send referral to [facility].    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 Target 15-20    If discharged on vancomycin IV, order the following additional labs to be drawn on Thursdays:  CMP   Vancomycin trough. Target 15-20    If vancomycin trough is not at target (15-20) prior to discharge, schedule vancomycin trough to be drawn before their fourth outpatient dose.    5) Fax Lab Results to Infectious Diseases Provider: [facility] Fax Number: [phone]    6) Outpatient Infectious Diseases Follow-up    Follow-up appointment will be arranged by the ID clinic and will be found in the patient's appointments tab.    Prior to discharge, please ensure the  patient's follow-up has been scheduled.    If there is still no follow-up scheduled prior to discharge, please send an [employer] message to [facility] Clinical Pool or Call Infectious Diseases Dept.                Above discussed with primary team.     Time: 50 minutes   50% of time spent on face-to-face counseling and coordination of care. Counseling included review of test results, diagnosis, and treatment plan with patient and/or family.  I have reviewed hospital notes from  service and other specialty providers as well as outside medical records. I have also reviewed CBC, CMP/BMP,  cultures and imaging with my interpretation as documented. Patient is high risk of morbidity, on antibiotics requiring intensive monitoring for toxicity.     Anticipated Disposition: TBD    Thank you for your consult. I will sign off. Please contact us if you have any additional questions.    Zina Middleton MD  Infectious Disease  [facility] Flex ([place])    Subjective:     Principal Problem:Osteomyelitis of vertebra of thoracolumbar region    HPI: 60 yo female with [place] [facility] spinal MAC (reportedly on ALESSIA) s/p T11-L2 corpectomy 2/2024 transferred from [facility] for higher level of care for weakness and L back pain. ID consulted for antibiotic recommendations. CT at OSH notable for large paravertebral abscesses, paravertebral phlegmon with OM T8-L4 and LLL pulm nodule. Blood cx growing MRSA. Pt is currently on vancomycin, zosyn. Of note, pt was recently [place] [facility] [place] 2/2 to her dMAC - she was discharged on ALESSIA therapy. Infectious Disease consulted for further management.  Interval History: Discussed with patient that her mycobacterial infection is not curable if she does not undergo surgical intervention and she is not interested in surgery. We discussed that any treatment is palliative. She accepts this. She does not feel that returning home is in her best interests because her  family is unable to appropriately care for her.     Review of Systems   Constitutional:  Positive for fatigue. Negative for fever.   Respiratory: Negative.     Musculoskeletal:  Positive for back pain.     Objective:     Vital Signs (Most Recent):  Temp: 98.3 °F (36.8 °C) (08/16/24 1118)  Pulse: 94 (08/16/24 1118)  Resp: 18 (08/16/24 1118)  BP: (!) 89/63 (08/16/24 1118)  SpO2: 100 % (08/16/24 1118) Vital Signs (24h Range):  Temp:  [97.9 °F (36.6 °C)-98.5 °F (36.9 °C)] 98.3 °F (36.8 °C)  Pulse:  [] 94  Resp:  [15-26] 18  SpO2:  [96 %-100 %] 100 %  BP: ()/(52-74) 89/63     Weight: 45.8 kg (101 lb)  Body mass index is 17.34 kg/m².    Estimated Creatinine Clearance: 85.4 mL/min (based on SCr of 0.5 mg/dL).     Physical Exam  Vitals and nursing note reviewed.   Constitutional:       Appearance: She is ill-appearing. She is not toxic-appearing.   HENT:      Head: Normocephalic.      Mouth/Throat:      Mouth: Mucous membranes are moist.      Pharynx: Oropharynx is clear. No oropharyngeal exudate.   Abdominal:      Palpations: Abdomen is soft.      Tenderness: There is no abdominal tenderness.   Musculoskeletal:      Comments: R flank tender to palpation with serosanguinous drain in place   Neurological:      Mental Status: She is alert.   Psychiatric:         Mood and Affect: Mood normal.          Significant Labs:   Microbiology Results (last 7 days)       Procedure Component Value Units Date/Time    Blood culture [[id number]] Collected: 08/13/24 1031    Order Status: Completed Specimen: Blood from Peripheral, Forearm, Left Updated: 08/16/24 1212     Blood Culture, Routine No Growth to date      No Growth to date      No Growth to date      No Growth to date    Blood culture [[id number]] Collected: 08/15/24 0812    Order Status: Completed Specimen: Blood from Peripheral, Hand, Left Updated: 08/16/24 1012     Blood Culture, Routine No Growth to date      No Growth to date    Blood culture [[id number]]  Collected: 08/15/24 0818    Order Status: Completed Specimen: Blood from Peripheral, Hand, Right Updated: 08/16/24 1012     Blood Culture, Routine No Growth to date      No Growth to date    Culture, Anaerobe [[id number]]  (Abnormal) Collected: 08/12/24 1346    Order Status: Completed Specimen: Abscess from Abdomen Updated: 08/16/24 0935     Anaerobic Culture FUSOBACTERIUM NUCLEATUM  Many      Narrative:      ADD PER JAMIE ALEXANDER MD CXAFB [id number] 08/12/2024  20:15     Blood culture [[id number]] Collected: 08/13/24 0352    Order Status: Completed Specimen: Blood from Peripheral, Upper Arm, Left Updated: 08/16/24 0613     Blood Culture, Routine No Growth to date      No Growth to date      No Growth to date      No Growth to date    AFB Culture & Smear [[id number]] Collected: 08/14/24 0519    Order Status: Completed Specimen: Blood Updated: 08/15/24 2127     AFB Culture & Smear Culture in progress    Aerobic culture [[id number]]  (Abnormal)  (Susceptibility) Collected: 08/12/24 1346    Order Status: Completed Specimen: Abscess from Abdomen Updated: 08/14/24 1101     Aerobic Bacterial Culture ESCHERICHIA COLI ESBL  Many      Narrative:      Left lateral abd    AFB culture, blood [[id number]] Collected: 08/14/24 0519    Order Status: Canceled Specimen: Blood     AFB Culture & Smear [[id number]]  (Abnormal) Collected: 08/12/24 2018    Order Status: Completed Specimen: Abscess from Abdomen Updated: 08/13/24 2127     AFB Culture & Smear Culture in progress     AFB CULTURE STAIN Positive for acid fast bacilli, 4 orgs/ 10 fields     AFB CULTURE STAIN Results called to and read back by: Gila Stevens RN  08/13/2024  13:48    Narrative:      ADD PER JAMIE ALEXANDER MD CXAFB [id number] 08/12/2024  20:15     AFB Culture & Smear [[id number]] Collected: 08/12/24 1346    Order Status: Completed Specimen: Abdominal from Abdomen Updated: 08/13/24 2127     AFB Culture & Smear Culture in progress    Narrative:      ADD  ON TESTS AFB CULTURE AND SMEAR PER DR JAMIE ALEXANDER    08/12/2024  19:39     Gram stain [[id number]] Collected: 08/12/24 1346    Order Status: Completed Specimen: Abscess from Abdomen Updated: 08/12/24 1631     Gram Stain Result Many WBC's      Many Gram positive cocci      Many Gram negative rods      Few Gram positive rods    Fungus culture [[id number]] Collected: 08/12/24 1346    Order Status: Sent Specimen: Abscess from Abdomen Updated: 08/12/24 1440    AFB Culture & Smear [[id number]]     Order Status: No result Specimen: Abscess from Abdomen             Significant Imaging: I have reviewed all pertinent imaging results/findings within the past 24 hours.

## 2024-08-17 LAB
ALBUMIN SERPL BCP-MCNC: 1.5 G/DL (ref 3.5–5.2)
ALP SERPL-CCNC: 94 U/L (ref 55–135)
ALT SERPL W/O P-5'-P-CCNC: <5 U/L (ref 10–44)
ANION GAP SERPL CALC-SCNC: 6 MMOL/L (ref 8–16)
ANISOCYTOSIS BLD QL SMEAR: SLIGHT
AST SERPL-CCNC: 14 U/L (ref 10–40)
BASOPHILS # BLD AUTO: 0.05 K/UL (ref 0–0.2)
BASOPHILS NFR BLD: 0.7 % (ref 0–1.9)
BILIRUB SERPL-MCNC: 0.2 MG/DL (ref 0.1–1)
BUN SERPL-MCNC: 14 MG/DL (ref 8–23)
BURR CELLS BLD QL SMEAR: ABNORMAL
CALCIUM SERPL-MCNC: 7.9 MG/DL (ref 8.7–10.5)
CHLORIDE SERPL-SCNC: 106 MMOL/L (ref 95–110)
CO2 SERPL-SCNC: 23 MMOL/L (ref 23–29)
CREAT SERPL-MCNC: 0.6 MG/DL (ref 0.5–1.4)
DIFFERENTIAL METHOD BLD: ABNORMAL
EOSINOPHIL # BLD AUTO: 0.1 K/UL (ref 0–0.5)
EOSINOPHIL NFR BLD: 0.7 % (ref 0–8)
ERYTHROCYTE [DISTWIDTH] IN BLOOD BY AUTOMATED COUNT: 18.9 % (ref 11.5–14.5)
EST. GFR  (NO RACE VARIABLE): >60 ML/MIN/1.73 M^2
GLUCOSE SERPL-MCNC: 74 MG/DL (ref 70–110)
HCT VFR BLD AUTO: 26.7 % (ref 37–48.5)
HGB BLD-MCNC: 8 G/DL (ref 12–16)
HYPOCHROMIA BLD QL SMEAR: ABNORMAL
IMM GRANULOCYTES # BLD AUTO: 0.1 K/UL (ref 0–0.04)
IMM GRANULOCYTES NFR BLD AUTO: 1.4 % (ref 0–0.5)
LYMPHOCYTES # BLD AUTO: 1.3 K/UL (ref 1–4.8)
LYMPHOCYTES NFR BLD: 18.6 % (ref 18–48)
MAGNESIUM SERPL-MCNC: 1.9 MG/DL (ref 1.6–2.6)
MCH RBC QN AUTO: 23.4 PG (ref 27–31)
MCHC RBC AUTO-ENTMCNC: 30 G/DL (ref 32–36)
MCV RBC AUTO: 78 FL (ref 82–98)
MONOCYTES # BLD AUTO: 0.6 K/UL (ref 0.3–1)
MONOCYTES NFR BLD: 8.8 % (ref 4–15)
NEUTROPHILS # BLD AUTO: 4.9 K/UL (ref 1.8–7.7)
NEUTROPHILS NFR BLD: 69.8 % (ref 38–73)
NRBC BLD-RTO: 0 /100 WBC
OVALOCYTES BLD QL SMEAR: ABNORMAL
PHOSPHATE SERPL-MCNC: 3.5 MG/DL (ref 2.7–4.5)
PLATELET # BLD AUTO: 299 K/UL (ref 150–450)
PLATELET BLD QL SMEAR: ABNORMAL
PMV BLD AUTO: 10.5 FL (ref 9.2–12.9)
POCT GLUCOSE: 79 MG/DL (ref 70–110)
POIKILOCYTOSIS BLD QL SMEAR: SLIGHT
POLYCHROMASIA BLD QL SMEAR: ABNORMAL
POTASSIUM SERPL-SCNC: 4.1 MMOL/L (ref 3.5–5.1)
PROT SERPL-MCNC: 5 G/DL (ref 6–8.4)
RBC # BLD AUTO: 3.42 M/UL (ref 4–5.4)
SODIUM SERPL-SCNC: 135 MMOL/L (ref 136–145)
VANCOMYCIN TROUGH SERPL-MCNC: 13.9 UG/ML (ref 10–22)
WBC # BLD AUTO: 7.05 K/UL (ref 3.9–12.7)

## 2024-08-17 PROCEDURE — 25000003 PHARM REV CODE 250: Mod: HCNC

## 2024-08-17 PROCEDURE — 36415 COLL VENOUS BLD VENIPUNCTURE: CPT | Mod: HCNC | Performed by: HOSPITALIST

## 2024-08-17 PROCEDURE — 63600175 PHARM REV CODE 636 W HCPCS: Mod: HCNC | Performed by: HOSPITALIST

## 2024-08-17 PROCEDURE — 63600175 PHARM REV CODE 636 W HCPCS: Mod: HCNC | Performed by: INTERNAL MEDICINE

## 2024-08-17 PROCEDURE — 36415 COLL VENOUS BLD VENIPUNCTURE: CPT | Mod: HCNC

## 2024-08-17 PROCEDURE — 25000003 PHARM REV CODE 250: Mod: HCNC | Performed by: HOSPITALIST

## 2024-08-17 PROCEDURE — 97162 PT EVAL MOD COMPLEX 30 MIN: CPT | Mod: HCNC

## 2024-08-17 PROCEDURE — 63600175 PHARM REV CODE 636 W HCPCS: Mod: HCNC | Performed by: NURSE PRACTITIONER

## 2024-08-17 PROCEDURE — 84100 ASSAY OF PHOSPHORUS: CPT | Mod: HCNC

## 2024-08-17 PROCEDURE — 97530 THERAPEUTIC ACTIVITIES: CPT | Mod: HCNC

## 2024-08-17 PROCEDURE — 25000003 PHARM REV CODE 250: Mod: HCNC | Performed by: INTERNAL MEDICINE

## 2024-08-17 PROCEDURE — 63600175 PHARM REV CODE 636 W HCPCS: Mod: HCNC | Performed by: STUDENT IN AN ORGANIZED HEALTH CARE EDUCATION/TRAINING PROGRAM

## 2024-08-17 PROCEDURE — 25000003 PHARM REV CODE 250: Mod: HCNC | Performed by: STUDENT IN AN ORGANIZED HEALTH CARE EDUCATION/TRAINING PROGRAM

## 2024-08-17 PROCEDURE — 85025 COMPLETE CBC W/AUTO DIFF WBC: CPT | Mod: HCNC

## 2024-08-17 PROCEDURE — 80202 ASSAY OF VANCOMYCIN: CPT | Mod: HCNC | Performed by: HOSPITALIST

## 2024-08-17 PROCEDURE — 97116 GAIT TRAINING THERAPY: CPT | Mod: HCNC

## 2024-08-17 PROCEDURE — 80053 COMPREHEN METABOLIC PANEL: CPT | Mod: HCNC

## 2024-08-17 PROCEDURE — 27000207 HC ISOLATION: Mod: HCNC

## 2024-08-17 PROCEDURE — 20600001 HC STEP DOWN PRIVATE ROOM: Mod: HCNC

## 2024-08-17 PROCEDURE — 83735 ASSAY OF MAGNESIUM: CPT | Mod: HCNC

## 2024-08-17 RX ORDER — DEXTROSE MONOHYDRATE AND SODIUM CHLORIDE 5; .9 G/100ML; G/100ML
INJECTION, SOLUTION INTRAVENOUS CONTINUOUS
Status: DISCONTINUED | OUTPATIENT
Start: 2024-08-17 | End: 2024-08-18

## 2024-08-17 RX ORDER — MORPHINE SULFATE 4 MG/ML
4 INJECTION, SOLUTION INTRAMUSCULAR; INTRAVENOUS EVERY 4 HOURS PRN
Status: DISCONTINUED | OUTPATIENT
Start: 2024-08-17 | End: 2024-08-18

## 2024-08-17 RX ADMIN — Medication: at 09:08

## 2024-08-17 RX ADMIN — MORPHINE SULFATE 2 MG: 2 INJECTION, SOLUTION INTRAMUSCULAR; INTRAVENOUS at 08:08

## 2024-08-17 RX ADMIN — ACETAMINOPHEN 650 MG: 325 TABLET ORAL at 03:08

## 2024-08-17 RX ADMIN — VANCOMYCIN HYDROCHLORIDE 1000 MG: 1 INJECTION, POWDER, LYOPHILIZED, FOR SOLUTION INTRAVENOUS at 04:08

## 2024-08-17 RX ADMIN — LAMIVUDINE 300 MG: 150 TABLET, FILM COATED ORAL at 10:08

## 2024-08-17 RX ADMIN — QUETIAPINE FUMARATE 25 MG: 25 TABLET ORAL at 09:08

## 2024-08-17 RX ADMIN — DEXTROSE AND SODIUM CHLORIDE: 5; 900 INJECTION, SOLUTION INTRAVENOUS at 10:08

## 2024-08-17 RX ADMIN — ACETAMINOPHEN 650 MG: 325 TABLET ORAL at 08:08

## 2024-08-17 RX ADMIN — ERTAPENEM 1 G: 1 INJECTION INTRAMUSCULAR; INTRAVENOUS at 11:08

## 2024-08-17 RX ADMIN — CLARITHROMYCIN 500 MG: 500 TABLET, FILM COATED ORAL at 09:08

## 2024-08-17 RX ADMIN — VANCOMYCIN HYDROCHLORIDE 750 MG: 750 INJECTION, POWDER, LYOPHILIZED, FOR SOLUTION INTRAVENOUS at 01:08

## 2024-08-17 RX ADMIN — ENOXAPARIN SODIUM 30 MG: 30 INJECTION SUBCUTANEOUS at 04:08

## 2024-08-17 RX ADMIN — ETHAMBUTOL HYDROCHLORIDE 700 MG: 100 TABLET ORAL at 09:08

## 2024-08-17 RX ADMIN — RIFABUTIN 300 MG: 150 CAPSULE ORAL at 09:08

## 2024-08-17 RX ADMIN — ACETAMINOPHEN 650 MG: 325 TABLET ORAL at 09:08

## 2024-08-17 RX ADMIN — LIDOCAINE 5% 1 PATCH: 700 PATCH TOPICAL at 04:08

## 2024-08-17 RX ADMIN — DOLUTEGRAVIR SODIUM 50 MG: 50 TABLET, FILM COATED ORAL at 09:08

## 2024-08-17 RX ADMIN — MORPHINE SULFATE 4 MG: 4 INJECTION INTRAVENOUS at 11:08

## 2024-08-17 NOTE — PLAN OF CARE
Problem: Physical Therapy  Goal: Physical Therapy Goal  Description: Goals to met by 8/31/2024    1. Sit to stand transfer with Supervision  2. Bed to chair transfer with Supervision using LRAD  3. Gait  x 150 feet with Supervision using LRAD   4. Lower extremity exercise program x15 reps per Instruction, with assistance as needed in order to facilitate improved strength, improved postural control, and improvement in functional independence    PT Eval: 8/17/2024

## 2024-08-17 NOTE — CONSULTS
Pharmacokinetic Assessment Follow Up: IV Vancomycin    Vancomycin serum concentration assessment(s):    The trough level was drawn correctly and can be used to guide therapy at this time. The measurement is below the desired definitive target range of 15 to 20 mcg/mL.    Vancomycin Regimen Plan:    Change regimen to Vancomycin 1000 mg IV every 12 hours with next serum trough concentration measured at 0200 prior to 4th dose on 8/19/24    Drug levels (last 3 results):  Recent Labs   Lab Result Units 08/15/24  1142 08/17/24  1411   Vancomycin-Trough ug/mL 13.5 13.9       Pharmacy will continue to follow and monitor vancomycin.    Please contact pharmacy at extension [phone] for questions regarding this assessment.    Thank you for the consult,   Kenyatta Rodriguez, PharmD       Patient brief summary:  Amie Salmeron is a 59 y.o. female initiated on antimicrobial therapy with IV Vancomycin for treatment of bone/joint infection    Drug Allergies:   Review of patient's allergies indicates:  No Known Allergies    Actual Body Weight:   45.8 kg    Renal Function:   Estimated Creatinine Clearance: 71.2 mL/min (based on SCr of 0.6 mg/dL).,     Dialysis Method (if applicable):  N/A    CBC (last 72 hours):  Recent Labs   Lab Result Units 08/15/24  0535 08/16/24  0358 08/17/24  0649   WBC K/uL 8.44 6.07 7.05   Hemoglobin g/dL 7.7* 7.7* 8.0*   Hematocrit % 24.2* 25.2* 26.7*   Platelets K/uL 320 330 299   Gran % % 74.6* 72.8 69.8   Lymph % % 17.9* 16.3* 18.6   Mono % % 5.2 8.4 8.8   Eosinophil % % 0.6 0.5 0.7   Basophil % % 0.5 0.5 0.7   Differential Method  Automated Automated Automated       Metabolic Panel (last 72 hours):  Recent Labs   Lab Result Units 08/15/24  0535 08/16/24  0358 08/17/24  0649   Sodium mmol/L 133* 133* 135*   Potassium mmol/L 4.3 3.7 4.1   Chloride mmol/L 103 101 106   CO2 mmol/L 24 25 23   Glucose mg/dL 72 81 74   BUN mg/dL 12 10 14   Creatinine mg/dL 0.6 0.5 0.6   Albumin g/dL 1.3* 1.4* 1.5*   Total Bilirubin mg/dL  0.2 0.2 0.2   Alkaline Phosphatase U/L 85 92 94   AST U/L 15 15 14   ALT U/L 6* <5* <5*   Magnesium mg/dL 1.5* 1.6 1.9   Phosphorus mg/dL 2.9 2.8 3.5       Vancomycin Administrations:  vancomycin given in the last 96 hours                     vancomycin 750 mg in D5W 250 mL IVPB (Vial-Mate) (mg) 750 mg New Bag 08/17/24 0123      Restarted 08/16/24 1344     750 mg New Bag  1228     750 mg New Bag  0017     750 mg New Bag 08/15/24 1222     750 mg New Bag 08/14/24 2328     750 mg New Bag  1304     750 mg New Bag  0037                    Microbiologic Results:  Microbiology Results (last 7 days)       Procedure Component Value Units Date/Time    Blood culture [[id number]] Collected: 08/13/24 1031    Order Status: Completed Specimen: Blood from Peripheral, Forearm, Left Updated: 08/17/24 1212     Blood Culture, Routine No Growth to date      No Growth to date      No Growth to date      No Growth to date      No Growth to date    Blood culture [[id number]] Collected: 08/15/24 0818    Order Status: Completed Specimen: Blood from Peripheral, Hand, Right Updated: 08/17/24 1012     Blood Culture, Routine No Growth to date      No Growth to date      No Growth to date    Blood culture [[id number]] Collected: 08/15/24 0812    Order Status: Completed Specimen: Blood from Peripheral, Hand, Left Updated: 08/17/24 1012     Blood Culture, Routine No Growth to date      No Growth to date      No Growth to date    Blood culture [[id number]] Collected: 08/13/24 0352    Order Status: Completed Specimen: Blood from Peripheral, Upper Arm, Left Updated: 08/17/24 0612     Blood Culture, Routine No Growth to date      No Growth to date      No Growth to date      No Growth to date      No Growth to date    Culture, Anaerobe [[id number]]  (Abnormal) Collected: 08/12/24 1346    Order Status: Completed Specimen: Abscess from Abdomen Updated: 08/16/24 0935     Anaerobic Culture FUSOBACTERIUM NUCLEATUM  Many      Narrative:      ADD PER JAMIE  MD CATHERINE CXAFB [id number] 08/12/2024  20:15     AFB Culture & Smear [[id number]] Collected: 08/14/24 0519    Order Status: Completed Specimen: Blood Updated: 08/15/24 2127     AFB Culture & Smear Culture in progress    Aerobic culture [[id number]]  (Abnormal)  (Susceptibility) Collected: 08/12/24 1346    Order Status: Completed Specimen: Abscess from Abdomen Updated: 08/14/24 1101     Aerobic Bacterial Culture ESCHERICHIA COLI ESBL  Many      Narrative:      Left lateral abd    AFB culture, blood [[id number]] Collected: 08/14/24 0519    Order Status: Canceled Specimen: Blood     AFB Culture & Smear [[id number]]  (Abnormal) Collected: 08/12/24 2018    Order Status: Completed Specimen: Abscess from Abdomen Updated: 08/13/24 2127     AFB Culture & Smear Culture in progress     AFB CULTURE STAIN Positive for acid fast bacilli, 4 orgs/ 10 fields     AFB CULTURE STAIN Results called to and read back by: Gila Stevens RN  08/13/2024  13:48    Narrative:      ADD PER JAMIE ALEXANDER MD CXAFB [id number] 08/12/2024  20:15     AFB Culture & Smear [[id number]] Collected: 08/12/24 1346    Order Status: Completed Specimen: Abdominal from Abdomen Updated: 08/13/24 2127     AFB Culture & Smear Culture in progress    Narrative:      ADD ON TESTS AFB CULTURE AND SMEAR PER DR JAMIE ALEXANDER    08/12/2024  19:39     Gram stain [[id number]] Collected: 08/12/24 1346    Order Status: Completed Specimen: Abscess from Abdomen Updated: 08/12/24 1631     Gram Stain Result Many WBC's      Many Gram positive cocci      Many Gram negative rods      Few Gram positive rods    Fungus culture [[id number]] Collected: 08/12/24 1346    Order Status: Sent Specimen: Abscess from Abdomen Updated: 08/12/24 1440    AFB Culture & Smear [[id number]]     Order Status: No result Specimen: Abscess from Abdomen

## 2024-08-17 NOTE — ASSESSMENT & PLAN NOTE
Hyponatremia is likely due to Dehydration/hypovolemia and tea and toast syndrome. The patient's most recent sodium results are listed below.  Recent Labs     08/15/24  0535 08/16/24  0358 08/17/24  0649   * 133* 135*       Plan  - Much improved   - Will treat the hyponatremia with encourage oral intake  - Monitor sodium p.r.n.   - Patient hyponatremia is stable

## 2024-08-17 NOTE — PROGRESS NOTES
[facility] ([place] [id number])  [facility]  Progress Note    Patient Name: Amie Salmeron  MRN: [id number]  Patient Class: IP- Inpatient   Admission Date: 8/12/2024  Length of Stay: 5 days  Attending Physician: Ramya Delgado MD  Primary Care Provider: Anuradha, Primary Doctor        Subjective:     Principal Problem:Osteomyelitis of vertebra of thoracolumbar region        HPI:  Mrs. Salmeron is a 60 yo F with PMHx of HTN, uncontrolled HIV noncompliant with HAART, spinal MAC, spinal osteo s/p T11-L2 corpectomy with fixation by ortho on 2/27/24 at [facility] who presents to [facility] as a transfer from [place] on 8/11/24 with generalized malaise, fatigue, and lower left sided back pain x 1 week. Pt denies fevers, chills, bowel/bladder incontinence, weakness, or numbness. In the [facility] ED, patient was afebrile but borderline hypotensive which improved with IV fluids. Noted to have WBC 16.6, K 2.4, albumin 1.9, normal lactic. Blood cultures were obtained. Patient was admitted to  at [place] and CT TL w/ contrast showed large left T11-L2 paravertebral retroperitoneal abscess increased from the prior study measuring approximately 14.0 x 5.8 x 15.5 cm.  The abscess crosses the midline to the right involving the vertebral bodies and surgical hardware also.  3.7 cm right periaortic component of the abscess at T12-L1 level. Potassium was repleted and vanc/zosyn given prior to transfer to [facility]. Neurosurgery and IR consult with admission to .    On my evaluation, patient borderline hypotensive and afebrile. Currently complaining of pain but no focal neurologic symptoms. Pain localized to left lower back. OSH lab work pending.     Pt admitted to hospital medicine with NSGY, IR, and ID consulted to assist with management.     Overview/Hospital Course:  60 yo with advance HIV/AIDS with multiple OI including verterbral MAC, paravertebral abscess, MRSA bacteremia. She was transferred [facility] for IR for the paravertebral abscess and  subsequently transferred to the MICU for hypotension requiring vasopressor support. She has been weaned off pressors. She continues on meropenem and vancomycin for the polymicrobial abscess. She dislodged her drain and repeat imaging showed abscess resolution so it was not replaced. Palliative care as been following as given her advanced AIDS she has limited/no therapeutic options for her vertebral MAC.     Interval History:  Patient has complaints of severe back pain unrelieved with current pain medication regimen    Review of Systems   Musculoskeletal:  Positive for back pain.        Severe back pain     Objective:     Vital Signs (Most Recent):  Temp: 97.7 °F (36.5 °C) (08/17/24 0730)  Pulse: 84 (08/17/24 0730)  Resp: 14 (08/17/24 0730)  BP: 95/63 (08/17/24 0730)  SpO2: 97 % (08/17/24 0730) Vital Signs (24h Range):  Temp:  [97.7 °F (36.5 °C)-98.9 °F (37.2 °C)] 97.7 °F (36.5 °C)  Pulse:  [] 84  Resp:  [13-29] 14  SpO2:  [96 %-100 %] 97 %  BP: ()/(46-70) 95/63     Weight: 45.8 kg (101 lb)  Body mass index is 17.34 kg/m².    Intake/Output Summary (Last 24 hours) at 8/17/2024 1017  Last data filed at 8/17/2024 0828  Gross per 24 hour   Intake 1813.1 ml   Output 1280 ml   Net 533.1 ml         Physical Exam  Constitutional:       Appearance: She is ill-appearing.   HENT:      Head: Normocephalic.      Mouth/Throat:      Mouth: Mucous membranes are moist.   Cardiovascular:      Rate and Rhythm: Normal rate.      Pulses: Normal pulses.      Heart sounds: Normal heart sounds.   Pulmonary:      Effort: Pulmonary effort is normal.      Breath sounds: Normal breath sounds.   Abdominal:      General: Abdomen is flat. Bowel sounds are normal.   Musculoskeletal:      Right lower leg: No edema.      Left lower leg: No edema.   Skin:     General: Skin is warm and dry.   Neurological:      General: No focal deficit present.      Mental Status: She is alert.   Psychiatric:      Comments: Flat affect              Significant Labs: All pertinent labs within the past 24 hours have been reviewed.  BMP:   Recent Labs   Lab 08/17/24  0649   GLU 74   *   K 4.1      CO2 23   BUN 14   CREATININE 0.6   CALCIUM 7.9*   MG 1.9     CBC:   Recent Labs   Lab 08/16/24  0358 08/17/24  0649   WBC 6.07 7.05   HGB 7.7* 8.0*   HCT 25.2* 26.7*    299       Significant Imaging: I have reviewed all pertinent imaging results/findings within the past 24 hours.    Assessment/Plan:      * Osteomyelitis of vertebra of thoracolumbar region  Imaging showed Large left paravertebral retroperitoneal abscess increased from the prior study measuring approximately 14.0 x 5.8 x 15.5 cm. The abscess crosses the midline to the right involving the vertebral bodies and surgical hardware also. 3.7 cm right periaortic component of the abscess at T12-L1 level. See above comments. Paravertebral phlegmon with findings of discitis/osteomyelitis at T8-9, T9-10, L2-3 and L3-4   ID and NSGY consulted. IR placed drain which pt subsequently removed. Repeat imaging without remaining abscess.  Cx grew MRSA, AFB, fusobacterium, ESBL e coli  - ID consulted and appreciate recommendations  -plan for 6 weeks of vanc and ertapenem, OPAT written 8/16. If does not go to a facility let ID know so that f/u can be arranged  - NSGY, IR appreciated        UTI (urinary tract infection)    ESBL ECOLI  Being treated with ertapenem for vertebral abscess      Septic shock  Resolved. This patient had shock. The type of shock is distributive due to sepsis. The patient has the following evidence of shock: persistent hypotension. The patient was admitted to an intensive care unit and required norepinephrine which has since been weaned. See vertebral osteo for infectious treatment plan    Vertebral abscess    See osteomyelitis    Disseminated mycobacterium avium-intracellulare complex  Hx of spinal MAC  [facility] [place] showed continuation of ethambutol, rifampin, and  azithromycin    - ID consulted and recommends continued ethambutol, rifampin, clarithromycin      Hyponatremia  Hyponatremia is likely due to Dehydration/hypovolemia and tea and toast syndrome. The patient's most recent sodium results are listed below.  Recent Labs     08/15/24  0535 08/16/24  0358 08/17/24  0649   * 133* 135*       Plan  - Much improved   - Will treat the hyponatremia with encourage oral intake  - Monitor sodium p.r.n.   - Patient hyponatremia is stable      AIDS  Continued dolutegravir and lamivudine, continued pjp prophylactic bactrim   f/u CD4 and HIV RNA collected at OSH  Appreciate ID consult and assistance with this patient      Chronic pain  Secondary to osteo.   Needs better multimodal pain control. Will schedule tylenol, add prn oxycodone and lidocaine patch.  continue prn morphine. Continue gabapentin and robaxin (outpatient meds). Consider NSAID as well.  Added a severe dose of morphine to the moderate dosing plan for increased comfort        VTE Risk Mitigation (From admission, onward)           Ordered     enoxaparin injection 30 mg  Every 24 hours         08/13/24 0929     IP VTE LOW RISK PATIENT  Once         08/12/24 0240     Place sequential compression device  Until discontinued         08/12/24 0240                    Discharge Planning   MARK: 8/19/2024     Code Status: DNR   Is the patient medically ready for discharge?:     Reason for patient still in hospital (select all that apply): Pending disposition  Discharge Plan A: New Nursing Home placement - group home care facility   Discharge Delays: None known at this time        Ramya Delgado MD, FACP, [facility]  Senior Hospitalist  Department of Hospital Medicine   [facility] ([place])

## 2024-08-17 NOTE — ASSESSMENT & PLAN NOTE
Secondary to osteo.   Needs better multimodal pain control. Will schedule tylenol, add prn oxycodone and lidocaine patch.  continue prn morphine. Continue gabapentin and robaxin (outpatient meds). Consider NSAID as well.  Added a severe dose of morphine to the moderate dosing plan for increased comfort

## 2024-08-17 NOTE — PT/OT/SLP EVAL
Physical Therapy Evaluation and Treatment    Patient Name:  Amie Salmeron   MRN:  [id number]  Admit Date: 8/12/2024  Admitting Diagnosis:  Osteomyelitis of vertebra of thoracolumbar region   Length of Stay: 5 days  Recent Surgery: * No surgery found *      Recommendations:     Discharge Recommendations:  Moderate Intensity Therapy  Discharge Equipment Recommendations: none   Justification for Equipment: N/A  Barriers to discharge: Evolving Clinical Presentation    Assessment:     Amie Salmeron is a 59 y.o. female admitted with a medical diagnosis of Osteomyelitis of vertebra of thoracolumbar region.  She presents with the following impairments/functional limitations: weakness, impaired endurance, gait instability, impaired functional mobility, impaired balance, impaired self care skills, decreased lower extremity function, impaired cognition, decreased safety awareness, pain.     Pt agreeable to therapy, eager to get up and walk. Pt with very flat affect and appears confused at times, difficult to discern if truly confused or just noncompliant. Pt ambulates to bathroom with RW and left up in chair. Pt wanting to get back to bed after she was placed in chair but agreeable to stay up with some encouragement.    Rehab Prognosis: Fair; patient would benefit from acute skilled PT services to address these deficits and reach maximum level of function.      Treatment Tolerated: Fairly Well    Highest level of mobility achieved this visit: ambulates 25ft w/ RW and CGA    Activity with RN/PCT: ambulate with one person assist    Plan:     During this hospitalization, patient to be seen 4 x/week to address the identified rehab impairments via gait training, therapeutic activities, therapeutic exercises, neuromuscular re-education and progress towards the established goals.    Plan of Care Expires:  09/17/24    Subjective     DAKOTA Sheehan notified prior to session. Pt's mother and brother present upon PT entrance into room.    Chief  "Complaint: pain  Patient/Family Comments/goals: "I want to get back to bed"  Pain/Comfort:  Pain Rating 1: 8/10  Location - Orientation 1: lower  Location 1: back  Pain Addressed 1: Reposition, Distraction    Social History:  Residence: lives with parents 1-story house with 0 ESTEFANY.  Support available: family  Equipment Used: cane, quad, rollator, bedside commode, grab bar  Equipment Owned (not using): None  Prior level of function: independent  Work: Unemployed   Drive: no.       Objective:     Additional staff present: none    Patient found HOB elevated with: pulse ox (continuous), telemetry, JEYSON drain     General Precautions: Standard, fall   Orthopedic Precautions:N/A   Braces: TLSO   Body mass index is 17.34 kg/m².  Oxygen Device: Room Air    Vitals: BP (!) 85/61   Pulse 91   Temp 97.6 °F (36.4 °C) (Oral)   Resp 20   Ht 5' 4" (1.626 m)   Wt 45.8 kg (101 lb)   SpO2 97%   BMI 17.34 kg/m²     MAP of 64 post-tx, RN notified    Exams:  Cognition:   Flat affect  Command following: Follows one-step verbal commands  Fluency: clear/fluent  Hearing: Intact  Vision:  Intact  Skin Integrity: Visible skin intact    Physical Exam:   Edema - None noted  ROM - VALORIE LEs WFL  Strength - L hip 4+/5, R hip 4/5, L knee and ankle 5/5, R knee and ankle 3+/5   Sensation - Intact to light touch  Coordination - No deficits noted    Outcome Measures:    AM-PAC 6 CLICK MOBILITY  Turning over in bed (including adjusting bedclothes, sheets and blankets)?: 3  Sitting down on and standing up from a chair with arms (e.g., wheelchair, bedside commode, etc.): 3  Moving from lying on back to sitting on the side of the bed?: 3  Moving to and from a bed to a chair (including a wheelchair)?: 3  Need to walk in hospital room?: 3  Climbing 3-5 steps with a railing?: 3  Basic Mobility Total Score: 18     Functional Mobility:    Bed Mobility:   Supine to Sit: contact guard assistance; from R side of bed  Scooting anteriorly to EOB to have both feet " planted on floor: contact guard assistance    Sitting Balance at Edge of Bed:  Assistance Level Required: Stand-by Assistance  Time: 10 min  Postural deviations noted: no deviations noted  Comments: brace donned and adjusted at EOB    Transfers:   Sit > Stand Transfer: contact guard assistance with rolling walker and grab bars(s)  Stand > Sit Transfer: contact guard assistance with rolling walker and grab bars(s)  x2 trials from EOB and x1 trials from toliet    Standing Balance:  Assistance Level Required: Contact Guard Assistance  Patient used: rolling walker  Time: 5 min x 2 trials  Postural deviations noted: no deviations noted    Gait:   Patient ambulated: 25ft   Patient required: contact guard  Patient used:  rolling walker  Gait Pattern observed: swing-to gait  Gait Deviation(s): decreased step length and decreased irma  Impairments due to: pain, decreased strength, and decreased endurance  Gait belt utilized    Education:  Time provided for education, counseling and discussion of health disposition in regards to patient's current status  All questions answered within PT scope of practice and to patient's satisfaction  PT role in POC to address current functional deficits  Pt educated on proper body mechanics, safety techniques, and energy conservation with PT facilitation and cueing throughout session    Patient left up in chair with all lines intact, call button in reach, and DAKOTA Sheehan notified.    GOALS:   Multidisciplinary Problems       Physical Therapy Goals          Problem: Physical Therapy    Goal Priority Disciplines Outcome Goal Variances Interventions   Physical Therapy Goal     PT, PT/OT Progressing     Description: Goals to met by 8/31/2024    1. Sit to stand transfer with Supervision  2. Bed to chair transfer with Supervision using LRAD  3. Gait  x 150 feet with Supervision using LRAD   4. Lower extremity exercise program x15 reps per Instruction, with assistance as needed in order to  facilitate improved strength, improved postural control, and improvement in functional independence                       History:     Past Medical History:   Diagnosis Date    Allergy     Arthritis     Asthma     Chronic pain     HIV infection     Hypertension     Overactive bladder     Spinal stenosis     Urine incontinence        Past Surgical History:   Procedure Laterality Date    ADENOIDECTOMY      APPENDECTOMY      BACK SURGERY      GERALD    CYST REMOVAL      HYSTERECTOMY      JOINT REPLACEMENT Right     knee, with revision    KNEE SURGERY Left     knee replacement    RHIZOTOMY      TONSILLECTOMY      TOTAL ANKLE ARTHROPLASTY Right        Time Tracking:     PT Received On: 08/17/24  PT Start Time: 1045     PT Stop Time: 1123  PT Total Time (min): 38 min     Billable Minutes: Evaluation 1 procedure, Gait Training 10 min, and Therapeutic Activity 15 min    Fahad Ruiz, PT, DPT  8/17/2024

## 2024-08-17 NOTE — NURSING
Physical therapy goes in to work with patient. She walked to the bathroom and was placed in the chair post therapy. She was asked to remain in the chair for two hours as part of continued therapy or at least until after lunch. Patient immediately stated she did not want to sit up in the chair and returned herself to bed after requests to remain sitting up. While answering her call to be pulled up in bed found her drain in the floor. Dr. Delgado notified of noncompliance. Will discontinue physical therapy for noncompliance.

## 2024-08-17 NOTE — ASSESSMENT & PLAN NOTE
Continued dolutegravir and lamivudine, continued pjp prophylactic bactrim   f/u CD4 and HIV RNA collected at OSH  Appreciate ID consult and assistance with this patient

## 2024-08-17 NOTE — SUBJECTIVE & OBJECTIVE
Interval History:  Patient has complaints of severe back pain unrelieved with current pain medication regimen    Review of Systems   Musculoskeletal:  Positive for back pain.        Severe back pain     Objective:     Vital Signs (Most Recent):  Temp: 97.7 °F (36.5 °C) (08/17/24 0730)  Pulse: 84 (08/17/24 0730)  Resp: 14 (08/17/24 0730)  BP: 95/63 (08/17/24 0730)  SpO2: 97 % (08/17/24 0730) Vital Signs (24h Range):  Temp:  [97.7 °F (36.5 °C)-98.9 °F (37.2 °C)] 97.7 °F (36.5 °C)  Pulse:  [] 84  Resp:  [13-29] 14  SpO2:  [96 %-100 %] 97 %  BP: ()/(46-70) 95/63     Weight: 45.8 kg (101 lb)  Body mass index is 17.34 kg/m².    Intake/Output Summary (Last 24 hours) at 8/17/2024 1017  Last data filed at 8/17/2024 0828  Gross per 24 hour   Intake 1813.1 ml   Output 1280 ml   Net 533.1 ml         Physical Exam  Constitutional:       Appearance: She is ill-appearing.   HENT:      Head: Normocephalic.      Mouth/Throat:      Mouth: Mucous membranes are moist.   Cardiovascular:      Rate and Rhythm: Normal rate.      Pulses: Normal pulses.      Heart sounds: Normal heart sounds.   Pulmonary:      Effort: Pulmonary effort is normal.      Breath sounds: Normal breath sounds.   Abdominal:      General: Abdomen is flat. Bowel sounds are normal.   Musculoskeletal:      Right lower leg: No edema.      Left lower leg: No edema.   Skin:     General: Skin is warm and dry.   Neurological:      General: No focal deficit present.      Mental Status: She is alert.   Psychiatric:      Comments: Flat affect             Significant Labs: All pertinent labs within the past 24 hours have been reviewed.  BMP:   Recent Labs   Lab 08/17/24  0649   GLU 74   *   K 4.1      CO2 23   BUN 14   CREATININE 0.6   CALCIUM 7.9*   MG 1.9     CBC:   Recent Labs   Lab 08/16/24  0358 08/17/24  0649   WBC 6.07 7.05   HGB 7.7* 8.0*   HCT 25.2* 26.7*    299       Significant Imaging: I have reviewed all pertinent imaging  results/findings within the past 24 hours.

## 2024-08-17 NOTE — ASSESSMENT & PLAN NOTE
Hx of spinal MAC  [facility] previous ID note showed continuation of ethambutol, rifampin, and azithromycin    - ID consulted and recommends continued ethambutol, rifampin, clarithromycin

## 2024-08-17 NOTE — PLAN OF CARE
8/16 PM  Hypotensive episode 2320  BP 72/50 - pt. Clammy, pale, lethargic, diminished pulses- other VS stable,  placed in trendelenburg and notified on call Dr ordered bolus  post bolus BP back up to baseline, asymptomatic.,  color back to normal, extremities dry pulse normal, more alert., wanting to eat.          Problem: Adult Inpatient Plan of Care  Goal: Plan of Care Review  Outcome: Progressing  Goal: Patient-Specific Goal (Individualized)  Outcome: Progressing  Goal: Absence of Hospital-Acquired Illness or Injury  Outcome: Progressing  Goal: Optimal Comfort and Wellbeing  Outcome: Progressing  Goal: Readiness for Transition of Care  Outcome: Progressing     Problem: Wound  Goal: Optimal Coping  Outcome: Progressing  Goal: Optimal Functional Ability  Outcome: Progressing  Goal: Absence of Infection Signs and Symptoms  Outcome: Progressing  Goal: Improved Oral Intake  Outcome: Progressing  Goal: Optimal Pain Control and Function  Outcome: Progressing  Goal: Skin Health and Integrity  Outcome: Progressing  Goal: Optimal Wound Healing  Outcome: Progressing     Problem: Skin Injury Risk Increased  Goal: Skin Health and Integrity  Outcome: Progressing     Problem: Infection  Goal: Absence of Infection Signs and Symptoms  Outcome: Progressing     Problem: Sepsis/Septic Shock  Goal: Optimal Coping  Outcome: Progressing  Goal: Absence of Bleeding  Outcome: Progressing  Goal: Blood Glucose Level Within Targeted Range  Outcome: Progressing  Goal: Absence of Infection Signs and Symptoms  Outcome: Progressing  Goal: Optimal Nutrition Intake  Outcome: Progressing     Problem: Coping Ineffective  Goal: Effective Coping  Outcome: Progressing

## 2024-08-18 LAB
ALBUMIN SERPL BCP-MCNC: 1.5 G/DL (ref 3.5–5.2)
ALP SERPL-CCNC: 94 U/L (ref 55–135)
ALT SERPL W/O P-5'-P-CCNC: <5 U/L (ref 10–44)
ANION GAP SERPL CALC-SCNC: 6 MMOL/L (ref 8–16)
ANISOCYTOSIS BLD QL SMEAR: SLIGHT
AST SERPL-CCNC: 15 U/L (ref 10–40)
BACTERIA BLD CULT: NORMAL
BACTERIA BLD CULT: NORMAL
BASOPHILS # BLD AUTO: 0.03 K/UL (ref 0–0.2)
BASOPHILS NFR BLD: 0.6 % (ref 0–1.9)
BILIRUB SERPL-MCNC: 0.1 MG/DL (ref 0.1–1)
BUN SERPL-MCNC: 12 MG/DL (ref 8–23)
BURR CELLS BLD QL SMEAR: ABNORMAL
CALCIUM SERPL-MCNC: 7.7 MG/DL (ref 8.7–10.5)
CHLORIDE SERPL-SCNC: 108 MMOL/L (ref 95–110)
CO2 SERPL-SCNC: 21 MMOL/L (ref 23–29)
CREAT SERPL-MCNC: 0.6 MG/DL (ref 0.5–1.4)
DACRYOCYTES BLD QL SMEAR: ABNORMAL
DIFFERENTIAL METHOD BLD: ABNORMAL
EOSINOPHIL # BLD AUTO: 0.4 K/UL (ref 0–0.5)
EOSINOPHIL NFR BLD: 9.1 % (ref 0–8)
ERYTHROCYTE [DISTWIDTH] IN BLOOD BY AUTOMATED COUNT: 19.1 % (ref 11.5–14.5)
EST. GFR  (NO RACE VARIABLE): >60 ML/MIN/1.73 M^2
GLUCOSE SERPL-MCNC: 79 MG/DL (ref 70–110)
HCT VFR BLD AUTO: 25.3 % (ref 37–48.5)
HGB BLD-MCNC: 7.5 G/DL (ref 12–16)
HYPOCHROMIA BLD QL SMEAR: ABNORMAL
IMM GRANULOCYTES # BLD AUTO: 0.09 K/UL (ref 0–0.04)
IMM GRANULOCYTES NFR BLD AUTO: 1.9 % (ref 0–0.5)
LYMPHOCYTES # BLD AUTO: 1 K/UL (ref 1–4.8)
LYMPHOCYTES NFR BLD: 21.2 % (ref 18–48)
MAGNESIUM SERPL-MCNC: 1.8 MG/DL (ref 1.6–2.6)
MCH RBC QN AUTO: 23.5 PG (ref 27–31)
MCHC RBC AUTO-ENTMCNC: 29.6 G/DL (ref 32–36)
MCV RBC AUTO: 79 FL (ref 82–98)
MONOCYTES # BLD AUTO: 0.5 K/UL (ref 0.3–1)
MONOCYTES NFR BLD: 10.2 % (ref 4–15)
NEUTROPHILS # BLD AUTO: 2.6 K/UL (ref 1.8–7.7)
NEUTROPHILS NFR BLD: 57 % (ref 38–73)
NRBC BLD-RTO: 0 /100 WBC
OVALOCYTES BLD QL SMEAR: ABNORMAL
PHOSPHATE SERPL-MCNC: 3.1 MG/DL (ref 2.7–4.5)
PLATELET # BLD AUTO: 299 K/UL (ref 150–450)
PLATELET BLD QL SMEAR: ABNORMAL
PMV BLD AUTO: 10.6 FL (ref 9.2–12.9)
POIKILOCYTOSIS BLD QL SMEAR: SLIGHT
POLYCHROMASIA BLD QL SMEAR: ABNORMAL
POTASSIUM SERPL-SCNC: 4.3 MMOL/L (ref 3.5–5.1)
PROT SERPL-MCNC: 5 G/DL (ref 6–8.4)
RBC # BLD AUTO: 3.19 M/UL (ref 4–5.4)
SCHISTOCYTES BLD QL SMEAR: ABNORMAL
SODIUM SERPL-SCNC: 135 MMOL/L (ref 136–145)
WBC # BLD AUTO: 4.63 K/UL (ref 3.9–12.7)

## 2024-08-18 PROCEDURE — 83735 ASSAY OF MAGNESIUM: CPT | Mod: HCNC

## 2024-08-18 PROCEDURE — 63600175 PHARM REV CODE 636 W HCPCS: Mod: HCNC | Performed by: HOSPITALIST

## 2024-08-18 PROCEDURE — 20600001 HC STEP DOWN PRIVATE ROOM: Mod: HCNC

## 2024-08-18 PROCEDURE — 36573 INSJ PICC RS&I 5 YR+: CPT | Mod: HCNC

## 2024-08-18 PROCEDURE — 25000003 PHARM REV CODE 250: Mod: HCNC

## 2024-08-18 PROCEDURE — 25000003 PHARM REV CODE 250: Mod: HCNC | Performed by: HOSPITALIST

## 2024-08-18 PROCEDURE — 02HV33Z INSERTION OF INFUSION DEVICE INTO SUPERIOR VENA CAVA, PERCUTANEOUS APPROACH: ICD-10-PCS | Performed by: HOSPITALIST

## 2024-08-18 PROCEDURE — 25000003 PHARM REV CODE 250: Mod: HCNC | Performed by: STUDENT IN AN ORGANIZED HEALTH CARE EDUCATION/TRAINING PROGRAM

## 2024-08-18 PROCEDURE — 76937 US GUIDE VASCULAR ACCESS: CPT | Mod: HCNC

## 2024-08-18 PROCEDURE — C1751 CATH, INF, PER/CENT/MIDLINE: HCPCS | Mod: HCNC

## 2024-08-18 PROCEDURE — A4216 STERILE WATER/SALINE, 10 ML: HCPCS | Mod: HCNC | Performed by: HOSPITALIST

## 2024-08-18 PROCEDURE — 36415 COLL VENOUS BLD VENIPUNCTURE: CPT | Mod: HCNC

## 2024-08-18 PROCEDURE — 84100 ASSAY OF PHOSPHORUS: CPT | Mod: HCNC

## 2024-08-18 PROCEDURE — 80053 COMPREHEN METABOLIC PANEL: CPT | Mod: HCNC

## 2024-08-18 PROCEDURE — 94761 N-INVAS EAR/PLS OXIMETRY MLT: CPT | Mod: HCNC

## 2024-08-18 PROCEDURE — 85025 COMPLETE CBC W/AUTO DIFF WBC: CPT | Mod: HCNC

## 2024-08-18 PROCEDURE — 27000207 HC ISOLATION: Mod: HCNC

## 2024-08-18 RX ORDER — SODIUM CHLORIDE 0.9 % (FLUSH) 0.9 %
10 SYRINGE (ML) INJECTION
Status: DISCONTINUED | OUTPATIENT
Start: 2024-08-18 | End: 2024-08-24 | Stop reason: HOSPADM

## 2024-08-18 RX ORDER — OXYCODONE HYDROCHLORIDE 10 MG/1
10 TABLET ORAL EVERY 6 HOURS PRN
Status: DISCONTINUED | OUTPATIENT
Start: 2024-08-18 | End: 2024-08-20

## 2024-08-18 RX ORDER — SODIUM CHLORIDE 0.9 % (FLUSH) 0.9 %
10 SYRINGE (ML) INJECTION EVERY 6 HOURS
Status: DISCONTINUED | OUTPATIENT
Start: 2024-08-18 | End: 2024-08-24 | Stop reason: HOSPADM

## 2024-08-18 RX ORDER — SODIUM CHLORIDE 0.9 % (FLUSH) 0.9 %
10 SYRINGE (ML) INJECTION EVERY 6 HOURS
Status: DISCONTINUED | OUTPATIENT
Start: 2024-08-18 | End: 2024-08-18

## 2024-08-18 RX ORDER — SODIUM CHLORIDE 0.9 % (FLUSH) 0.9 %
10 SYRINGE (ML) INJECTION
Status: DISCONTINUED | OUTPATIENT
Start: 2024-08-18 | End: 2024-08-18

## 2024-08-18 RX ADMIN — VANCOMYCIN HYDROCHLORIDE 1000 MG: 1 INJECTION, POWDER, LYOPHILIZED, FOR SOLUTION INTRAVENOUS at 11:08

## 2024-08-18 RX ADMIN — ACETAMINOPHEN 650 MG: 325 TABLET ORAL at 09:08

## 2024-08-18 RX ADMIN — OXYCODONE HYDROCHLORIDE 5 MG: 5 TABLET ORAL at 01:08

## 2024-08-18 RX ADMIN — DOLUTEGRAVIR SODIUM 50 MG: 50 TABLET, FILM COATED ORAL at 09:08

## 2024-08-18 RX ADMIN — Medication: at 09:08

## 2024-08-18 RX ADMIN — Medication 10 ML: at 05:08

## 2024-08-18 RX ADMIN — ACETAMINOPHEN 650 MG: 325 TABLET ORAL at 04:08

## 2024-08-18 RX ADMIN — RIFABUTIN 300 MG: 150 CAPSULE ORAL at 09:08

## 2024-08-18 RX ADMIN — ETHAMBUTOL HYDROCHLORIDE 700 MG: 100 TABLET ORAL at 09:08

## 2024-08-18 RX ADMIN — CLARITHROMYCIN 500 MG: 500 TABLET, FILM COATED ORAL at 09:08

## 2024-08-18 RX ADMIN — MORPHINE SULFATE 4 MG: 4 INJECTION INTRAVENOUS at 04:08

## 2024-08-18 RX ADMIN — VANCOMYCIN HYDROCHLORIDE 1000 MG: 1 INJECTION, POWDER, LYOPHILIZED, FOR SOLUTION INTRAVENOUS at 04:08

## 2024-08-18 RX ADMIN — OXYCODONE HYDROCHLORIDE 10 MG: 10 TABLET ORAL at 08:08

## 2024-08-18 RX ADMIN — QUETIAPINE FUMARATE 25 MG: 25 TABLET ORAL at 08:08

## 2024-08-18 RX ADMIN — LAMIVUDINE 300 MG: 150 TABLET, FILM COATED ORAL at 09:08

## 2024-08-18 RX ADMIN — CLARITHROMYCIN 500 MG: 500 TABLET, FILM COATED ORAL at 08:08

## 2024-08-18 RX ADMIN — ACETAMINOPHEN 650 MG: 325 TABLET ORAL at 08:08

## 2024-08-18 NOTE — CONSULTS
D/L PICC placed in right brachial vein, 30 cm in length with 0 cm exposed. Arm circumference 22 cm. Lot# [id number]

## 2024-08-18 NOTE — ASSESSMENT & PLAN NOTE
Secondary to osteo.   Needs better multimodal pain control. Will schedule tylenol, add prn oxycodone and lidocaine patch.  continue prn morphine. Continue gabapentin and robaxin (outpatient meds). Consider NSAID as well.  We will decrease dose of morphine for use with severe pain only.

## 2024-08-18 NOTE — PROCEDURES
"Amie Salmeron is a 59 y.o. female patient.    Temp: 98 °F (36.7 °C) (08/18/24 1605)  Pulse: 99 (08/18/24 1600)  Resp: 18 (08/18/24 1600)  BP: 109/71 (08/18/24 1600)  SpO2: 95 % (08/18/24 1600)  Weight: 45.8 kg (101 lb) (08/14/24 1400)  Height: 5' 4" (162.6 cm) (08/14/24 1400)    PICC  Date/Time: 8/18/2024 4:26 PM  Performed by: Arely Ospina RN  Assisting provider: Francisca Cramer LPN  Consent Done: Yes  Time out: Immediately prior to procedure a time out was called to verify the correct patient, procedure, equipment, support staff and site/side marked as required  Indications: med administration and vascular access  Anesthesia: local infiltration  Local anesthetic: lidocaine 1% without epinephrine  Anesthetic Total (mL): 3  Description of findings: picc  Preparation: skin prepped with ChloraPrep  Skin prep agent dried: skin prep agent completely dried prior to procedure  Sterile barriers: all five maximum sterile barriers used - cap, mask, sterile gown, sterile gloves, and large sterile sheet  Hand hygiene: hand hygiene performed prior to central venous catheter insertion  Location details: right brachial  Catheter type: double lumen  Catheter size: 5 Fr  Catheter Length: 30cm    Ultrasound guidance: yes  Vessel Caliber: medium and patent, compressibility normal  Vascular Doppler: not done  Needle advanced into vessel with real time Ultrasound guidance.  Guidewire confirmed in vessel.  Image recorded and saved.  Sterile sheath used.  no esophageal manometryNumber of attempts: 1  Post-procedure: blood return through all ports, sterile dressing applied and chlorhexidine patch  Technical procedures used: 3CG  Specimens: No  Implants: No  Assessment: placement verified by x-ray  Complications: none          Name Francisca Cramer LPN  8/18/2024    "

## 2024-08-18 NOTE — ASSESSMENT & PLAN NOTE
Hyponatremia is likely due to Dehydration/hypovolemia and tea and toast syndrome. The patient's most recent sodium results are listed below.  Recent Labs     08/16/24  0358 08/17/24  0649 08/18/24  0405   * 135* 135*       Plan  - Much improved   - Will treat the hyponatremia with encourage oral intake  - Monitor sodium p.r.n.   - Patient hyponatremia is stable

## 2024-08-18 NOTE — PROGRESS NOTES
[facility] Stepdown [facility] ([place] [address])  [facility]  Progress Note    Patient Name: Amie Salmeron  MRN: [id number]  Patient Class: IP- Inpatient   Admission Date: 8/12/2024  Length of Stay: 6 days  Attending Physician: Ramya Delgado MD  Primary Care Provider: Anuradha, Primary Doctor        Subjective:     Principal Problem:Osteomyelitis of vertebra of thoracolumbar region        HPI:  Mrs. Salmeorn is a 60 yo F with PMHx of HTN, uncontrolled HIV noncompliant with HAART, spinal MAC, spinal osteo s/p T11-L2 corpectomy with fixation by ortho on 2/27/24 at [facility] who presents to [facility] as a transfer from [place] on 8/11/24 with generalized malaise, fatigue, and lower left sided back pain x 1 week. Pt denies fevers, chills, bowel/bladder incontinence, weakness, or numbness. In the [facility] ED, patient was afebrile but borderline hypotensive which improved with IV fluids. Noted to have WBC 16.6, K 2.4, albumin 1.9, normal lactic. Blood cultures were obtained. Patient was admitted to  at [place] and CT TL w/ contrast showed large left T11-L2 paravertebral retroperitoneal abscess increased from the prior study measuring approximately 14.0 x 5.8 x 15.5 cm.  The abscess crosses the midline to the right involving the vertebral bodies and surgical hardware also.  3.7 cm right periaortic component of the abscess at T12-L1 level. Potassium was repleted and vanc/zosyn given prior to transfer to [facility]. Neurosurgery and IR consult with admission to .    On my evaluation, patient borderline hypotensive and afebrile. Currently complaining of pain but no focal neurologic symptoms. Pain localized to left lower back. OSH lab work pending.     Pt admitted to hospital medicine with NSGY, IR, and ID consulted to assist with management.     Overview/Hospital Course:  60 yo with advance HIV/AIDS with multiple OI including verterbral MAC, paravertebral abscess, MRSA bacteremia. She was transferred OSB for IR for the paravertebral abscess and  subsequently transferred to the MICU for hypotension requiring vasopressor support. She has been weaned off pressors. She continues on meropenem and vancomycin for the polymicrobial abscess. She dislodged her drain and repeat imaging showed abscess resolution so it was not replaced. Palliative care as been following as given her advanced AIDS she has limited/no therapeutic options for her vertebral MAC.  Place PICC line today for IV antibiotics.    Interval History:  Patient is slightly confused today does desire to improve her mobility.  Suspect morphine may have contributed to some disorientation.    Review of Systems   Unable to perform ROS: Mental status change     Objective:     Vital Signs (Most Recent):  Temp: 98.9 °F (37.2 °C) (08/18/24 1104)  Pulse: 96 (08/18/24 1131)  Resp: 15 (08/18/24 1320)  BP: (!) 95/57 (08/18/24 1104)  SpO2: 96 % (08/18/24 1131) Vital Signs (24h Range):  Temp:  [97.6 °F (36.4 °C)-98.9 °F (37.2 °C)] 98.9 °F (37.2 °C)  Pulse:  [79-97] 96  Resp:  [15-24] 15  SpO2:  [95 %-98 %] 96 %  BP: ()/(57-68) 95/57     Weight: 45.8 kg (101 lb)  Body mass index is 17.34 kg/m².    Intake/Output Summary (Last 24 hours) at 8/18/2024 1451  Last data filed at 8/18/2024 1220  Gross per 24 hour   Intake 742 ml   Output 1510 ml   Net -768 ml         Physical Exam  Constitutional:       Appearance: She is ill-appearing.   HENT:      Head: Normocephalic.      Mouth/Throat:      Mouth: Mucous membranes are moist.   Cardiovascular:      Rate and Rhythm: Normal rate.      Pulses: Normal pulses.      Heart sounds: Normal heart sounds.   Pulmonary:      Effort: Pulmonary effort is normal.      Breath sounds: Normal breath sounds.   Abdominal:      General: Abdomen is flat. Bowel sounds are normal.   Musculoskeletal:      Right lower leg: No edema.      Left lower leg: No edema.   Skin:     General: Skin is warm and dry.   Neurological:      General: No focal deficit present.      Mental Status: She is alert.    Psychiatric:      Comments: Flat affect             Significant Labs: All pertinent labs within the past 24 hours have been reviewed.  CBC:   Recent Labs   Lab 08/17/24  0649 08/18/24  0405   WBC 7.05 4.63   HGB 8.0* 7.5*   HCT 26.7* 25.3*    299     CMP:   Recent Labs   Lab 08/17/24  0649 08/18/24  0405   * 135*   K 4.1 4.3    108   CO2 23 21*   GLU 74 79   BUN 14 12   CREATININE 0.6 0.6   CALCIUM 7.9* 7.7*   PROT 5.0* 5.0*   ALBUMIN 1.5* 1.5*   BILITOT 0.2 0.1   ALKPHOS 94 94   AST 14 15   ALT <5* <5*   ANIONGAP 6* 6*       Significant Imaging: I have reviewed all pertinent imaging results/findings within the past 24 hours.    Assessment/Plan:      * Osteomyelitis of vertebra of thoracolumbar region  Imaging showed Large left paravertebral retroperitoneal abscess increased from the prior study measuring approximately 14.0 x 5.8 x 15.5 cm. The abscess crosses the midline to the right involving the vertebral bodies and surgical hardware also. 3.7 cm right periaortic component of the abscess at T12-L1 level. See above comments. Paravertebral phlegmon with findings of discitis/osteomyelitis at T8-9, T9-10, L2-3 and L3-4   ID and NSGY consulted. IR placed drain which pt subsequently removed. Repeat imaging without remaining abscess.  Cx grew MRSA, AFB, fusobacterium, ESBL e coli  - ID consulted and appreciate recommendations  -plan for 6 weeks of vanc and ertapenem, OPAT written 8/16. If does not go to a facility let ID know so that f/u can be arranged  - NSGY, IR appreciated        UTI (urinary tract infection)    ESBL ECOLI  Being treated with ertapenem for vertebral abscess    DNR (do not resuscitate)        Septic shock  This patient has shock. The type of shock is distributive due to sepsis. The patient has the following evidence of shock: persistent hypotension. The patient was admitted to an intensive care unit and required norepinephrine which has since been weaned. See vertebral osteo for  infectious treatment plan    Vertebral abscess    See osteomyelitis    Disseminated mycobacterium avium-intracellulare complex  Hx of spinal MAC  [facility] [place] showed continuation of ethambutol, rifampin, and azithromycin    - ID consulted and recommends continued ethambutol, rifampin, clarithromycin      Hyponatremia  Hyponatremia is likely due to Dehydration/hypovolemia and tea and toast syndrome. The patient's most recent sodium results are listed below.  Recent Labs     08/16/24  0358 08/17/24  0649 08/18/24  0405   * 135* 135*       Plan  - Much improved   - Will treat the hyponatremia with encourage oral intake  - Monitor sodium p.r.n.   - Patient hyponatremia is stable      AIDS  Continued dolutegravir and lamivudine, continued pjp prophylactic bactrim   f/u CD4 and HIV RNA collected at OSH  Appreciate ID consult and assistance with this patient      Chronic pain  Secondary to osteo.   Needs better multimodal pain control. Will schedule tylenol, add prn oxycodone and lidocaine patch.  continue prn morphine. Continue gabapentin and robaxin (outpatient meds). Consider NSAID as well.  We will decrease dose of morphine for use with severe pain only.         VTE Risk Mitigation (From admission, onward)           Ordered     enoxaparin injection 30 mg  Every 24 hours         08/13/24 0929     IP VTE LOW RISK PATIENT  Once         08/12/24 0240     Place sequential compression device  Until discontinued         08/12/24 0240                    Discharge Planning   MARK: 8/19/2024     Code Status: DNR   Is the patient medically ready for discharge?:     Reason for patient still in hospital (select all that apply): Treatment and Pending disposition  Discharge Plan A: New Nursing Home placement - senior living care facility   Discharge Delays: None known at this time        Ramya Delgado MD, FACP, [facility]  Senior Hospitalist  Department of Hospital Medicine   [facility] - Stepdown Flex ([place]-[facility])

## 2024-08-18 NOTE — SUBJECTIVE & OBJECTIVE
Interval History:  Patient is slightly confused today does desire to improve her mobility.  Suspect morphine may have contributed to some disorientation.    Review of Systems   Unable to perform ROS: Mental status change     Objective:     Vital Signs (Most Recent):  Temp: 98.9 °F (37.2 °C) (08/18/24 1104)  Pulse: 96 (08/18/24 1131)  Resp: 15 (08/18/24 1320)  BP: (!) 95/57 (08/18/24 1104)  SpO2: 96 % (08/18/24 1131) Vital Signs (24h Range):  Temp:  [97.6 °F (36.4 °C)-98.9 °F (37.2 °C)] 98.9 °F (37.2 °C)  Pulse:  [79-97] 96  Resp:  [15-24] 15  SpO2:  [95 %-98 %] 96 %  BP: ()/(57-68) 95/57     Weight: 45.8 kg (101 lb)  Body mass index is 17.34 kg/m².    Intake/Output Summary (Last 24 hours) at 8/18/2024 1451  Last data filed at 8/18/2024 1220  Gross per 24 hour   Intake 742 ml   Output 1510 ml   Net -768 ml         Physical Exam  Constitutional:       Appearance: She is ill-appearing.   HENT:      Head: Normocephalic.      Mouth/Throat:      Mouth: Mucous membranes are moist.   Cardiovascular:      Rate and Rhythm: Normal rate.      Pulses: Normal pulses.      Heart sounds: Normal heart sounds.   Pulmonary:      Effort: Pulmonary effort is normal.      Breath sounds: Normal breath sounds.   Abdominal:      General: Abdomen is flat. Bowel sounds are normal.   Musculoskeletal:      Right lower leg: No edema.      Left lower leg: No edema.   Skin:     General: Skin is warm and dry.   Neurological:      General: No focal deficit present.      Mental Status: She is alert.   Psychiatric:      Comments: Flat affect             Significant Labs: All pertinent labs within the past 24 hours have been reviewed.  CBC:   Recent Labs   Lab 08/17/24  0649 08/18/24  0405   WBC 7.05 4.63   HGB 8.0* 7.5*   HCT 26.7* 25.3*    299     CMP:   Recent Labs   Lab 08/17/24  0649 08/18/24  0405   * 135*   K 4.1 4.3    108   CO2 23 21*   GLU 74 79   BUN 14 12   CREATININE 0.6 0.6   CALCIUM 7.9* 7.7*   PROT 5.0* 5.0*    ALBUMIN 1.5* 1.5*   BILITOT 0.2 0.1   ALKPHOS 94 94   AST 14 15   ALT <5* <5*   ANIONGAP 6* 6*       Significant Imaging: I have reviewed all pertinent imaging results/findings within the past 24 hours.

## 2024-08-18 NOTE — ASSESSMENT & PLAN NOTE
Hx of spinal MAC  [facility] previous ID note showed continuation of ethambutol, rifampin, and azithromycin    - ID consulted and recommends continued ethambutol, rifampin, clarithromycin

## 2024-08-18 NOTE — NURSING
Patient was up wondering in [place] with bed alarm alarming. Patient was safely put back in bed and the bed alarm set. Camera put in room but there is a waiting list to monitor patients. IV was removed inadvertently.

## 2024-08-19 LAB
ALBUMIN SERPL BCP-MCNC: 1.6 G/DL (ref 3.5–5.2)
ALP SERPL-CCNC: 99 U/L (ref 55–135)
ALT SERPL W/O P-5'-P-CCNC: 5 U/L (ref 10–44)
ANION GAP SERPL CALC-SCNC: 6 MMOL/L (ref 8–16)
ANISOCYTOSIS BLD QL SMEAR: SLIGHT
AST SERPL-CCNC: 15 U/L (ref 10–40)
BASOPHILS # BLD AUTO: 0.02 K/UL (ref 0–0.2)
BASOPHILS NFR BLD: 0.4 % (ref 0–1.9)
BILIRUB SERPL-MCNC: 0.2 MG/DL (ref 0.1–1)
BUN SERPL-MCNC: 12 MG/DL (ref 8–23)
CALCIUM SERPL-MCNC: 8.1 MG/DL (ref 8.7–10.5)
CHLORIDE SERPL-SCNC: 103 MMOL/L (ref 95–110)
CO2 SERPL-SCNC: 26 MMOL/L (ref 23–29)
CREAT SERPL-MCNC: 0.6 MG/DL (ref 0.5–1.4)
DIFFERENTIAL METHOD BLD: ABNORMAL
EOSINOPHIL # BLD AUTO: 0.1 K/UL (ref 0–0.5)
EOSINOPHIL NFR BLD: 1 % (ref 0–8)
ERYTHROCYTE [DISTWIDTH] IN BLOOD BY AUTOMATED COUNT: 19.3 % (ref 11.5–14.5)
EST. GFR  (NO RACE VARIABLE): >60 ML/MIN/1.73 M^2
GLUCOSE SERPL-MCNC: 68 MG/DL (ref 70–110)
HCT VFR BLD AUTO: 25.9 % (ref 37–48.5)
HGB BLD-MCNC: 7.8 G/DL (ref 12–16)
IMM GRANULOCYTES # BLD AUTO: 0.05 K/UL (ref 0–0.04)
IMM GRANULOCYTES NFR BLD AUTO: 1 % (ref 0–0.5)
LYMPHOCYTES # BLD AUTO: 0.9 K/UL (ref 1–4.8)
LYMPHOCYTES NFR BLD: 17.3 % (ref 18–48)
MAGNESIUM SERPL-MCNC: 1.8 MG/DL (ref 1.6–2.6)
MCH RBC QN AUTO: 23.9 PG (ref 27–31)
MCHC RBC AUTO-ENTMCNC: 30.1 G/DL (ref 32–36)
MCV RBC AUTO: 79 FL (ref 82–98)
MONOCYTES # BLD AUTO: 0.5 K/UL (ref 0.3–1)
MONOCYTES NFR BLD: 9.2 % (ref 4–15)
NEUTROPHILS # BLD AUTO: 3.6 K/UL (ref 1.8–7.7)
NEUTROPHILS NFR BLD: 71.1 % (ref 38–73)
NRBC BLD-RTO: 0 /100 WBC
OVALOCYTES BLD QL SMEAR: ABNORMAL
PHOSPHATE SERPL-MCNC: 3.2 MG/DL (ref 2.7–4.5)
PLATELET # BLD AUTO: 293 K/UL (ref 150–450)
PLATELET BLD QL SMEAR: ABNORMAL
PMV BLD AUTO: 9.2 FL (ref 9.2–12.9)
POIKILOCYTOSIS BLD QL SMEAR: SLIGHT
POTASSIUM SERPL-SCNC: 3.8 MMOL/L (ref 3.5–5.1)
PROT SERPL-MCNC: 5.3 G/DL (ref 6–8.4)
RBC # BLD AUTO: 3.26 M/UL (ref 4–5.4)
SCHISTOCYTES BLD QL SMEAR: ABNORMAL
SCHISTOCYTES BLD QL SMEAR: PRESENT
SODIUM SERPL-SCNC: 135 MMOL/L (ref 136–145)
WBC # BLD AUTO: 5.1 K/UL (ref 3.9–12.7)

## 2024-08-19 PROCEDURE — 97530 THERAPEUTIC ACTIVITIES: CPT | Mod: HCNC

## 2024-08-19 PROCEDURE — 25000003 PHARM REV CODE 250: Mod: HCNC | Performed by: HOSPITALIST

## 2024-08-19 PROCEDURE — 97110 THERAPEUTIC EXERCISES: CPT | Mod: HCNC,CQ

## 2024-08-19 PROCEDURE — 80053 COMPREHEN METABOLIC PANEL: CPT | Mod: HCNC

## 2024-08-19 PROCEDURE — 63600175 PHARM REV CODE 636 W HCPCS: Mod: HCNC | Performed by: HOSPITALIST

## 2024-08-19 PROCEDURE — 27000207 HC ISOLATION: Mod: HCNC

## 2024-08-19 PROCEDURE — 84100 ASSAY OF PHOSPHORUS: CPT | Mod: HCNC

## 2024-08-19 PROCEDURE — 25000003 PHARM REV CODE 250: Mod: HCNC | Performed by: STUDENT IN AN ORGANIZED HEALTH CARE EDUCATION/TRAINING PROGRAM

## 2024-08-19 PROCEDURE — 97112 NEUROMUSCULAR REEDUCATION: CPT | Mod: HCNC

## 2024-08-19 PROCEDURE — 63600175 PHARM REV CODE 636 W HCPCS: Mod: HCNC | Performed by: INTERNAL MEDICINE

## 2024-08-19 PROCEDURE — 97530 THERAPEUTIC ACTIVITIES: CPT | Mod: HCNC,CQ

## 2024-08-19 PROCEDURE — 63600175 PHARM REV CODE 636 W HCPCS: Mod: HCNC | Performed by: STUDENT IN AN ORGANIZED HEALTH CARE EDUCATION/TRAINING PROGRAM

## 2024-08-19 PROCEDURE — 83735 ASSAY OF MAGNESIUM: CPT | Mod: HCNC

## 2024-08-19 PROCEDURE — 97535 SELF CARE MNGMENT TRAINING: CPT | Mod: HCNC

## 2024-08-19 PROCEDURE — 20600001 HC STEP DOWN PRIVATE ROOM: Mod: HCNC

## 2024-08-19 PROCEDURE — 94761 N-INVAS EAR/PLS OXIMETRY MLT: CPT | Mod: HCNC

## 2024-08-19 PROCEDURE — 25000003 PHARM REV CODE 250: Mod: HCNC

## 2024-08-19 PROCEDURE — 97116 GAIT TRAINING THERAPY: CPT | Mod: HCNC,CQ

## 2024-08-19 PROCEDURE — A4216 STERILE WATER/SALINE, 10 ML: HCPCS | Mod: HCNC | Performed by: HOSPITALIST

## 2024-08-19 PROCEDURE — 85025 COMPLETE CBC W/AUTO DIFF WBC: CPT | Mod: HCNC

## 2024-08-19 PROCEDURE — 80202 ASSAY OF VANCOMYCIN: CPT | Mod: HCNC | Performed by: HOSPITALIST

## 2024-08-19 RX ORDER — METHOCARBAMOL 500 MG/1
500 TABLET, FILM COATED ORAL 4 TIMES DAILY
Status: DISCONTINUED | OUTPATIENT
Start: 2024-08-19 | End: 2024-08-19

## 2024-08-19 RX ORDER — METHOCARBAMOL 500 MG/1
500 TABLET, FILM COATED ORAL 4 TIMES DAILY
Status: DISCONTINUED | OUTPATIENT
Start: 2024-08-19 | End: 2024-08-24 | Stop reason: HOSPADM

## 2024-08-19 RX ADMIN — CLARITHROMYCIN 500 MG: 500 TABLET, FILM COATED ORAL at 08:08

## 2024-08-19 RX ADMIN — OXYCODONE HYDROCHLORIDE 10 MG: 10 TABLET ORAL at 11:08

## 2024-08-19 RX ADMIN — DOLUTEGRAVIR SODIUM 50 MG: 50 TABLET, FILM COATED ORAL at 08:08

## 2024-08-19 RX ADMIN — ENOXAPARIN SODIUM 30 MG: 30 INJECTION SUBCUTANEOUS at 05:08

## 2024-08-19 RX ADMIN — METHOCARBAMOL 500 MG: 500 TABLET ORAL at 05:08

## 2024-08-19 RX ADMIN — Medication 10 ML: at 11:08

## 2024-08-19 RX ADMIN — Medication 10 ML: at 12:08

## 2024-08-19 RX ADMIN — ETHAMBUTOL HYDROCHLORIDE 700 MG: 100 TABLET ORAL at 08:08

## 2024-08-19 RX ADMIN — ACETAMINOPHEN 650 MG: 325 TABLET ORAL at 02:08

## 2024-08-19 RX ADMIN — Medication 10 ML: at 06:08

## 2024-08-19 RX ADMIN — Medication 10 ML: at 05:08

## 2024-08-19 RX ADMIN — LIDOCAINE 5% 1 PATCH: 700 PATCH TOPICAL at 05:08

## 2024-08-19 RX ADMIN — SULFAMETHOXAZOLE AND TRIMETHOPRIM 1 TABLET: 800; 160 TABLET ORAL at 10:08

## 2024-08-19 RX ADMIN — OXYCODONE HYDROCHLORIDE 10 MG: 10 TABLET ORAL at 06:08

## 2024-08-19 RX ADMIN — VANCOMYCIN HYDROCHLORIDE 1000 MG: 1 INJECTION, POWDER, LYOPHILIZED, FOR SOLUTION INTRAVENOUS at 10:08

## 2024-08-19 RX ADMIN — QUETIAPINE FUMARATE 25 MG: 25 TABLET ORAL at 08:08

## 2024-08-19 RX ADMIN — ACETAMINOPHEN 650 MG: 325 TABLET ORAL at 08:08

## 2024-08-19 RX ADMIN — LAMIVUDINE 300 MG: 150 TABLET, FILM COATED ORAL at 08:08

## 2024-08-19 RX ADMIN — METHOCARBAMOL 500 MG: 500 TABLET ORAL at 02:08

## 2024-08-19 RX ADMIN — Medication: at 08:08

## 2024-08-19 RX ADMIN — ERTAPENEM 1 G: 1 INJECTION INTRAMUSCULAR; INTRAVENOUS at 10:08

## 2024-08-19 RX ADMIN — RIFABUTIN 300 MG: 150 CAPSULE ORAL at 08:08

## 2024-08-19 RX ADMIN — VANCOMYCIN HYDROCHLORIDE 1000 MG: 1 INJECTION, POWDER, LYOPHILIZED, FOR SOLUTION INTRAVENOUS at 11:08

## 2024-08-19 RX ADMIN — ERTAPENEM 1 G: 1 INJECTION INTRAMUSCULAR; INTRAVENOUS at 12:08

## 2024-08-19 RX ADMIN — METHOCARBAMOL 500 MG: 500 TABLET ORAL at 08:08

## 2024-08-19 NOTE — PROGRESS NOTES
[facility] - Stepdown Flex ([place] [address])  [facility] Medicine  Progress Note    Patient Name: Amie Salmeron  MRN: [id number]  Patient Class: IP- Inpatient   Admission Date: 8/12/2024  Length of Stay: 7 days  Attending Physician: Melony Zavala MD  Primary Care Provider: Anuradha, Primary Doctor        Subjective:     Principal Problem:Osteomyelitis of vertebra of thoracolumbar region        HPI:  Mrs. Salmeron is a 60 yo F with PMHx of HTN, uncontrolled HIV noncompliant with HAART, spinal MAC, spinal osteo s/p T11-L2 corpectomy with fixation by ortho on 2/27/24 at [facility] who presents to [facility] as a transfer from [place] on 8/11/24 with generalized malaise, fatigue, and lower left sided back pain x 1 week. Pt denies fevers, chills, bowel/bladder incontinence, weakness, or numbness. In the [facility] ED, patient was afebrile but borderline hypotensive which improved with IV fluids. Noted to have WBC 16.6, K 2.4, albumin 1.9, normal lactic. Blood cultures were obtained. Patient was admitted to  at [place] and CT TL w/ contrast showed large left T11-L2 paravertebral retroperitoneal abscess increased from the prior study measuring approximately 14.0 x 5.8 x 15.5 cm.  The abscess crosses the midline to the right involving the vertebral bodies and surgical hardware also.  3.7 cm right periaortic component of the abscess at T12-L1 level. Potassium was repleted and vanc/zosyn given prior to transfer to [facility]. Neurosurgery and IR consult with admission to .    On my evaluation, patient borderline hypotensive and afebrile. Currently complaining of pain but no focal neurologic symptoms. Pain localized to left lower back. OSH lab work pending.     Pt admitted to hospital medicine with NSGY, IR, and ID consulted to assist with management.     Overview/Hospital Course:  60 yo with advance HIV/AIDS with multiple OI including verterbral MAC, paravertebral abscess, MRSA bacteremia. She was transferred OSB for IR for the paravertebral abscess and  subsequently transferred to the MICU for hypotension requiring vasopressor support. She has been weaned off pressors. She continues on meropenem and vancomycin for the polymicrobial abscess. She dislodged her drain and repeat imaging showed abscess resolution so it was not replaced. Palliative care as been following as given her advanced AIDS she has limited/no therapeutic options for her vertebral MAC.  Place PICC line today for IV antibiotics.    Interval History: seen today working with PT. She tells me her pain is very bad and nothing is helping. She rated it 9/10.  She did tell PT that prior to working with them that it was a 0/10.  She ate breakfast this am. Discussed plans to facility to complete abx    Review of Systems   All other systems reviewed and are negative.    Objective:     Vital Signs (Most Recent):  Temp: 98.7 °F (37.1 °C) (08/19/24 0735)  Pulse: 87 (08/19/24 0735)  Resp: 17 (08/19/24 0735)  BP: 106/68 (08/19/24 0735)  SpO2: 100 % (08/19/24 0735) Vital Signs (24h Range):  Temp:  [98 °F (36.7 °C)-98.7 °F (37.1 °C)] 98.7 °F (37.1 °C)  Pulse:  [85-99] 87  Resp:  [15-19] 17  SpO2:  [95 %-100 %] 100 %  BP: (101-109)/(65-71) 106/68     Weight: 45.8 kg (101 lb)  Body mass index is 17.34 kg/m².    Intake/Output Summary (Last 24 hours) at 8/19/2024 1112  Last data filed at 8/19/2024 0625  Gross per 24 hour   Intake --   Output 2070 ml   Net -2070 ml         Physical Exam  Constitutional:       Appearance: She is ill-appearing.   HENT:      Head: Normocephalic.      Mouth/Throat:      Mouth: Mucous membranes are moist.   Cardiovascular:      Rate and Rhythm: Normal rate.      Pulses: Normal pulses.      Heart sounds: Normal heart sounds.   Pulmonary:      Effort: Pulmonary effort is normal.      Breath sounds: Normal breath sounds.   Abdominal:      General: Abdomen is flat. Bowel sounds are normal.   Musculoskeletal:      Right lower leg: No edema.      Left lower leg: No edema.      Comments: PICC in  place   Skin:     General: Skin is warm and dry.   Neurological:      General: No focal deficit present.      Mental Status: She is alert.   Psychiatric:      Comments: Flat affect             Significant Labs: All pertinent labs within the past 24 hours have been reviewed.    Significant Imaging: I have reviewed all pertinent imaging results/findings within the past 24 hours.    Assessment/Plan:      * Osteomyelitis of vertebra of thoracolumbar region  Imaging showed Large left paravertebral retroperitoneal abscess increased from the prior study measuring approximately 14.0 x 5.8 x 15.5 cm. The abscess crosses the midline to the right involving the vertebral bodies and surgical hardware also. 3.7 cm right periaortic component of the abscess at T12-L1 level. See above comments. Paravertebral phlegmon with findings of discitis/osteomyelitis at T8-9, T9-10, L2-3 and L3-4   ID and NSGY consulted. IR placed drain which pt subsequently removed. Repeat imaging without remaining abscess.  Cx grew MRSA, AFB, fusobacterium, ESBL e coli  - ID consulted and appreciate recommendations  -plan for 6 weeks of vanc and ertapenem, OPAT written 8/16. If does not go to a facility let ID know so that f/u can be arranged  -still has one drain in place. 20 cc in last 24 hours. Will dw ID when this can be pulled  - NSGY, IR appreciated        UTI (urinary tract infection)    ESBL ECOLI  Being treated with ertapenem for vertebral abscess    DNR (do not resuscitate)        Septic shock  This patient has shock. The type of shock is distributive due to sepsis. The patient has the following evidence of shock: persistent hypotension. The patient was admitted to an intensive care unit and required norepinephrine which has since been weaned. See vertebral osteo for infectious treatment plan    Vertebral abscess    See osteomyelitis    Disseminated mycobacterium avium-intracellulare complex  Hx of spinal MAC  [facility] previous ID note showed  continuation of ethambutol, rifampin, and azithromycin    - ID consulted and recommends continued ethambutol, rifampin, clarithromycin      Hyponatremia  Hyponatremia is likely due to Dehydration/hypovolemia and tea and toast syndrome. The patient's most recent sodium results are listed below.  Recent Labs     08/16/24  0358 08/17/24  0649 08/18/24  0405   * 135* 135*       Plan  - Much improved   - Will treat the hyponatremia with encourage oral intake  - Monitor sodium p.r.n.   - Patient hyponatremia is stable      AIDS  Continued dolutegravir and lamivudine, continued pjp prophylactic bactrim  Last CD4 was 76 on 8/11  Appreciate ID consult and assistance with this patient      Chronic pain  Secondary to osteo.   Needs better multimodal pain control. Will schedule tylenol, add prn oxycodone and lidocaine patch.  continue prn morphine. Continue gabapentin and robaxin (outpatient meds). Consider NSAID as well.  We will decrease dose of morphine for use with severe pain only.    She has been using the oxycodone minimally (just twice yesterday). Needs to ask for oxy and morphine for severe pain not relieved by oxycodone         VTE Risk Mitigation (From admission, onward)           Ordered     enoxaparin injection 30 mg  Every 24 hours         08/13/24 0929     IP VTE LOW RISK PATIENT  Once         08/12/24 0240     Place sequential compression device  Until discontinued         08/12/24 0240                    Discharge Planning   MARK: 8/19/2024     Code Status: DNR   Is the patient medically ready for discharge?:     Reason for patient still in hospital (select all that apply): Pending disposition  Discharge Plan A: New Nursing Home placement - nursing home care facility   Discharge Delays: None known at this time              Melony Zavala MD  Department of Hospital Medicine   [facility] - Stepdown Flex ([place]-[facility])

## 2024-08-19 NOTE — ASSESSMENT & PLAN NOTE
Continued dolutegravir and lamivudine, continued pjp prophylactic bactrim  Last CD4 was 76 on 8/11  Appreciate ID consult and assistance with this patient

## 2024-08-19 NOTE — ASSESSMENT & PLAN NOTE
Secondary to osteo.   Needs better multimodal pain control. Will schedule tylenol, add prn oxycodone and lidocaine patch.  continue prn morphine. Continue gabapentin and robaxin (outpatient meds). Consider NSAID as well.  We will decrease dose of morphine for use with severe pain only.    She has been using the oxycodone minimally (just twice yesterday). Needs to ask for oxy and morphine for severe pain not relieved by oxycodone

## 2024-08-19 NOTE — PLAN OF CARE
Pt engaged well in therapy this date with encouragement.     Problem: Occupational Therapy  Goal: Occupational Therapy Goal  Description: Goals to be met by: 9/13/2024     Patient will increase functional independence with ADLs by performing:    UE Dressing with Stand-by Assistance.  LE Dressing with Stand-by Assistance.  Grooming while standing at sink with Stand-by Assistance.  Toileting from toilet with Stand-by Assistance for hygiene and clothing management.   Supine to sit with Stand-by Assistance.  Stand pivot transfers with Stand-by Assistance.  Toilet transfer to toilet with Stand-by Assistance.    Outcome: Progressing

## 2024-08-19 NOTE — SUBJECTIVE & OBJECTIVE
Interval History: seen today working with PT. She tells me her pain is very bad and nothing is helping. She rated it 9/10.  She did tell PT that prior to working with them that it was a 0/10.  She ate breakfast this am. Discussed plans to facility to complete abx    Review of Systems   All other systems reviewed and are negative.    Objective:     Vital Signs (Most Recent):  Temp: 98.7 °F (37.1 °C) (08/19/24 0735)  Pulse: 87 (08/19/24 0735)  Resp: 17 (08/19/24 0735)  BP: 106/68 (08/19/24 0735)  SpO2: 100 % (08/19/24 0735) Vital Signs (24h Range):  Temp:  [98 °F (36.7 °C)-98.7 °F (37.1 °C)] 98.7 °F (37.1 °C)  Pulse:  [85-99] 87  Resp:  [15-19] 17  SpO2:  [95 %-100 %] 100 %  BP: (101-109)/(65-71) 106/68     Weight: 45.8 kg (101 lb)  Body mass index is 17.34 kg/m².    Intake/Output Summary (Last 24 hours) at 8/19/2024 1112  Last data filed at 8/19/2024 0625  Gross per 24 hour   Intake --   Output 2070 ml   Net -2070 ml         Physical Exam  Constitutional:       Appearance: She is ill-appearing.   HENT:      Head: Normocephalic.      Mouth/Throat:      Mouth: Mucous membranes are moist.   Cardiovascular:      Rate and Rhythm: Normal rate.      Pulses: Normal pulses.      Heart sounds: Normal heart sounds.   Pulmonary:      Effort: Pulmonary effort is normal.      Breath sounds: Normal breath sounds.   Abdominal:      General: Abdomen is flat. Bowel sounds are normal.   Musculoskeletal:      Right lower leg: No edema.      Left lower leg: No edema.      Comments: PICC in place   Skin:     General: Skin is warm and dry.   Neurological:      General: No focal deficit present.      Mental Status: She is alert.   Psychiatric:      Comments: Flat affect             Significant Labs: All pertinent labs within the past 24 hours have been reviewed.    Significant Imaging: I have reviewed all pertinent imaging results/findings within the past 24 hours.

## 2024-08-19 NOTE — PLAN OF CARE
Care plan reviewed.  Problem: Adult Inpatient Plan of Care  Goal: Plan of Care Review  8/19/2024 0006 by Darryn Decker RN  Outcome: Progressing  8/19/2024 0006 by Darryn Decker RN  Outcome: Progressing  Goal: Patient-Specific Goal (Individualized)  8/19/2024 0006 by Darryn Decker RN  Outcome: Progressing  8/19/2024 0006 by Darryn Decker, RN  Outcome: Progressing  Goal: Absence of Hospital-Acquired Illness or Injury  8/19/2024 0006 by Darryn Decker RN  Outcome: Progressing  8/19/2024 0006 by Darryn Decker RN  Outcome: Progressing  Goal: Optimal Comfort and Wellbeing  8/19/2024 0006 by Darryn Decker RN  Outcome: Progressing  8/19/2024 0006 by Darryn Decker RN  Outcome: Progressing  Goal: Readiness for Transition of Care  8/19/2024 0006 by Darryn Decker RN  Outcome: Progressing  8/19/2024 0006 by Darryn Decker RN  Outcome: Progressing     Problem: Wound  Goal: Optimal Coping  8/19/2024 0006 by Darryn Decker RN  Outcome: Progressing  8/19/2024 0006 by Darryn Decker, RN  Outcome: Progressing  Goal: Optimal Functional Ability  8/19/2024 0006 by Darryn Decker RN  Outcome: Progressing  8/19/2024 0006 by Darryn Decker RN  Outcome: Progressing  Goal: Absence of Infection Signs and Symptoms  8/19/2024 0006 by Darryn Decker, RN  Outcome: Progressing  8/19/2024 0006 by Darryn Decker RN  Outcome: Progressing  Goal: Improved Oral Intake  8/19/2024 0006 by Darryn Decker, RN  Outcome: Progressing  8/19/2024 0006 by Darryn Decker, RN  Outcome: Progressing  Goal: Optimal Pain Control and Function  8/19/2024 0006 by Darryn Decker RN  Outcome: Progressing  8/19/2024 0006 by Darryn Decker RN  Outcome: Progressing  Goal: Skin Health and Integrity  8/19/2024 0006 by Darryn Decker RN  Outcome: Progressing  8/19/2024 0006 by Decker, Darryn C, RN  Outcome: Progressing  Goal: Optimal Wound Healing  8/19/2024 0006 by Darryn Decker RN  Outcome: Progressing  8/19/2024 0006 by Darryn Decker, RN  Outcome:  Progressing     Problem: Skin Injury Risk Increased  Goal: Skin Health and Integrity  8/19/2024 0006 by Darryn Decker RN  Outcome: Progressing  8/19/2024 0006 by Darryn Decker RN  Outcome: Progressing     Problem: Infection  Goal: Absence of Infection Signs and Symptoms  8/19/2024 0006 by Darryn Decker RN  Outcome: Progressing  8/19/2024 0006 by Darryn Decker RN  Outcome: Progressing     Problem: Sepsis/Septic Shock  Goal: Optimal Coping  8/19/2024 0006 by Darryn Decker RN  Outcome: Progressing  8/19/2024 0006 by Darryn Decker RN  Outcome: Progressing  Goal: Absence of Bleeding  8/19/2024 0006 by Darryn Decker RN  Outcome: Progressing  8/19/2024 0006 by Darryn Decker RN  Outcome: Progressing  Goal: Blood Glucose Level Within Targeted Range  8/19/2024 0006 by Darryn Decker RN  Outcome: Progressing  8/19/2024 0006 by Darryn Decker RN  Outcome: Progressing  Goal: Absence of Infection Signs and Symptoms  8/19/2024 0006 by Darryn Decker RN  Outcome: Progressing  8/19/2024 0006 by Darryn Decker RN  Outcome: Progressing  Goal: Optimal Nutrition Intake  8/19/2024 0006 by Darryn Decker RN  Outcome: Progressing  8/19/2024 0006 by Darryn Decker RN  Outcome: Progressing     Problem: Coping Ineffective  Goal: Effective Coping  8/19/2024 0006 by Darryn Decker RN  Outcome: Progressing  8/19/2024 0006 by Darryn Decker RN  Outcome: Progressing

## 2024-08-19 NOTE — PT/OT/SLP PROGRESS
Physical Therapy Treatment  Co-treatment with OT due to acuity of condition, level of skilled assist needed for assessment of safety with mobility and potential of not tolerating a second treatment session.     Patient Name:  Amie Salmeron   MRN:  [id number]    Recommendations:     Discharge Recommendations: Moderate Intensity Therapy  Discharge Equipment Recommendations: none  Barriers to discharge: None    Assessment:     Amie Salmeron is a 59 y.o. female admitted with a medical diagnosis of Osteomyelitis of vertebra of thoracolumbar region.  She presents with the following impairments/functional limitations: weakness, impaired endurance, impaired self care skills, impaired functional mobility, pain, decreased safety awareness, decreased lower extremity function, decreased upper extremity function, impaired cardiopulmonary response to activity, orthopedic precautions, impaired skin Pt tolerated treatment session fairly well today. Pt required increased assistance during today's session due to increased pain. Patient remains appropriate for continued skilled services within the acute environment and goals remain appropriate.   .    Rehab Prognosis: Good; patient would benefit from acute skilled PT services to address these deficits and reach maximum level of function.    Recent Surgery: * No surgery found *      Plan:     During this hospitalization, patient to be seen 4 x/week to address the identified rehab impairments via gait training, therapeutic activities, therapeutic exercises, neuromuscular re-education and progress toward the following goals:    Plan of Care Expires:  09/17/24    Subjective     Chief Complaint: Back pain   Patient/Family Comments/goals: Pt agreeable to PT with encouragement   Pain/Comfort:  Pain Rating 1: 0/10  Location - Orientation 1: generalized  Location 1: back  Pain Addressed 1: Reposition, Distraction  Pain Rating Post-Intervention 1: 9/10      Objective:     Communicated with RN  prior to session.  Patient found supine with pulse ox (continuous), JEYSON drain upon PT entry to room.     General Precautions: Standard, fall  Orthopedic Precautions: N/A  Braces: TLSO  Respiratory Status: Room air     Functional Mobility:  Bed Mobility:     Supine to Sit: moderate assistance and of 2 persons  EOB sitting: CGA/SBA     Transfers:     Sit to Stand:  contact guard assistance with rolling walker  Gait: ~ 8 ft Jason with RW   Pt ambulated with decreased irma and step length, antalgic gait, and requires cues for safety   Pt reported mild lightheadedness post ambulation, symptoms improving with seated rest      Pt performed 10 repetitions of seated B LE exercises consisting of: Marching, LAQ, ABD/ADD, heel raises, and toe raises.         AM-PAC 6 CLICK MOBILITY  Turning over in bed (including adjusting bedclothes, sheets and blankets)?: 2  Sitting down on and standing up from a chair with arms (e.g., wheelchair, bedside commode, etc.): 3  Moving from lying on back to sitting on the side of the bed?: 2  Moving to and from a bed to a chair (including a wheelchair)?: 3  Need to walk in hospital room?: 3  Climbing 3-5 steps with a railing?: 2  Basic Mobility Total Score: 15       Treatment & Education:  Therapist provided instruction and educated for safety during transfers and gait training. As well as proper body mechanics, energy conservation, and fall prevention strategies during tasks listed above, and the effects of prolonged immobility and the importance of performing EOB/OOB activity and exercises to promote healing and reduce recovery time.       Patient left up in chair with all lines intact, call button in reach, and RN notified..    GOALS:   Multidisciplinary Problems       Physical Therapy Goals          Problem: Physical Therapy    Goal Priority Disciplines Outcome Goal Variances Interventions   Physical Therapy Goal     PT, PT/OT Progressing     Description: Goals to met by 8/31/2024    1. Sit  to stand transfer with Supervision  2. Bed to chair transfer with Supervision using LRAD  3. Gait  x 150 feet with Supervision using LRAD   4. Lower extremity exercise program x15 reps per Instruction, with assistance as needed in order to facilitate improved strength, improved postural control, and improvement in functional independence                       Time Tracking:     PT Received On: 08/19/24  PT Start Time: 0922     PT Stop Time: 1002  PT Total Time (min): 40 min     Billable Minutes: Gait Training 15, Therapeutic Activity 10, and Therapeutic Exercise 15    Treatment Type: Treatment  PT/PTA: PTA     Number of PTA visits since last PT visit: 1 08/19/2024

## 2024-08-19 NOTE — PLAN OF CARE
Advance Care Planning   [facility] ([place] [id number])  Palliative Care   Psychosocial Assessment    Patient Name: Amie Salmeron  MRN: [id number]  Admission Date: 2024  Hospital Length of Stay: 7 days  Code Status: DNR   Attending Provider: Melony Zavala MD  Palliative Care Provider:   Primary Care Physician: Anuradha, Primary Doctor  Principal Problem:Osteomyelitis of vertebra of thoracolumbar region    Reason for Referral: assistance with clarification of goals of care  Consult Order Date:   Primary CM/SW:    Present during Interview: patient and past medical records.      Primary Language:English   Needed: no      Past Medical Situation:   PMH:   Past Medical History:   Diagnosis Date    Allergy     Arthritis     Asthma     Chronic pain     HIV infection     Hypertension     Overactive bladder     Spinal stenosis     Urine incontinence      Mental Health/Substance Use History: none disclosed   Risk of Abuse, neglect or exploitation: none disclosed   Current or Previous Trauma and/or evidence of PTSD: none disclosed   Non-traditional Health practices: none disclosed     Understanding of diagnosis and prognosis: poor   Experience/Comfort level with health care system: pt frequents the hospital.    Patients Mental Status: alert and oriented with some confusion noted    Socio-Economic Factors/Resources:  Address: [address] [place] [address] [address] [address]  Phone Number: [phone] (home)     Marital Status: Single  Household composition: pt states that she lives with her parents, however, upon chart review, pt's father is .  Children: 0    Patient/Family perceptions about Caregiving Needs; availability and capacity: family is aware that they cannot provide the care that pt needs and have began the process of nursing home placement.    Family Structure, Dynamics/Relationships: pt has good relationship with family.    Patterns/Styles of Communication and Decision-making in the  "Family: pt's niece Janett is POA. She has been providing assistance with care decisions when pt is disoriented.    Activities of Daily Living: pt requires assistance   Support Systems-Family & Community (Home Health, HME etc): d/c to nursing home    Transportation:  no    Work/Education History: retired , on disability     History: no    Financial Resources:Medicare      Advanced Care Planning & Legal Concerns:   Advanced Directives/Living Will: no  [place]/POLST: no   Planning:  no    Medical Power of : yes     Oral/Written Declaration: yes  Witnesses:   Surrogate Decision Maker: heron Mckinney    Emergency Contacts: mom Julieth, niece Janett    Spirituality, Culture & Coping Mechanisms:  F- Wendie and Belief: Buddhism     I - Importance:     C - Community/Culture Values:     A - Address in Care:     Preferences about EOL Environment: (own bed, family nearby, pets, music, etc) TBD    Discharge Planning Needs/Plan of Care:     LCSW met with pt at bedside. Reintroduced palliative medicine. Pt alert and oriented with some confusion noted. Pt with flat blunted affect. C/O back pain; requesting morphine. Pt states that she doesn't know why she is in the hospital, nor how long she has been here, but knows that she got to the hospital via ambulance. Pt unable to relay what doctors have told her over the last few days, but states that she is "dying from an infection." Pt states that she has been living with her parents in [place] (though chart states that her father is ?). Pt relays that her parents are in good health for their age and don't require much assistance from her. Pt reports that she has two brothers who also live in [place], but do not provide much help. Pt informed LCSW that she will be discharging to a nursing home, but isn't sure which one. Per chart review, pt will be discharging to [facility]. Pt confirms DNR code status, however, does not wish to complete a " ADRIANA at this time. No questions or concerns other than back pain at this time. WCTF as needed.     Charleen James, MICKIW-BACS, ACHP-SW  Department of Palliative Medicine

## 2024-08-19 NOTE — ASSESSMENT & PLAN NOTE
Imaging showed Large left paravertebral retroperitoneal abscess increased from the prior study measuring approximately 14.0 x 5.8 x 15.5 cm. The abscess crosses the midline to the right involving the vertebral bodies and surgical hardware also. 3.7 cm right periaortic component of the abscess at T12-L1 level. See above comments. Paravertebral phlegmon with findings of discitis/osteomyelitis at T8-9, T9-10, L2-3 and L3-4   ID and NSGY consulted. IR placed drain which pt subsequently removed. Repeat imaging without remaining abscess.  Cx grew MRSA, AFB, fusobacterium, ESBL e coli  - ID consulted and appreciate recommendations  -plan for 6 weeks of vanc and ertapenem, OPAT written 8/16. If does not go to a facility let ID know so that f/u can be arranged  -still has one drain in place. 20 cc in last 24 hours. Will dw ID when this can be pulled  - NSGY, IR appreciated

## 2024-08-19 NOTE — PLAN OF CARE
SW spoke with JAUN Rowland and she stated that she is in agreement for pt to go to snf.   SW spoke with pt and pt is also in agreement for snf placement.     Janett stated that when pt was in the hospital before she wasn't working with therapy and she was giving up.   Janett stated that since theres some improvement with pt during this hospital stay, they will try snf.     [facility] is willing to accept pt, but they have to do a med cost due to some of her meds being expensive.  Will follow up.    Kimberly Duffy MSW, CSW

## 2024-08-19 NOTE — PLAN OF CARE
[facility] ([place])  Discharge Reassessment    Primary Care Provider: No, Primary Doctor    Expected Discharge Date: 8/21/2024    Reassessment (most recent)       Discharge Reassessment - 08/19/24 1530          Discharge Reassessment    Assessment Type Discharge Planning Reassessment     Did the patient's condition or plan change since previous assessment? No     Discharge Plan discussed with: POA     Name(s) and Number(s) Janett Crandall [phone]     Communicated MARK with patient/caregiver Yes     Discharge Plan A Skilled Nursing Facility     Discharge Plan B Home     DME Needed Upon Discharge  none     Transition of Care Barriers None     Why the patient remains in the hospital Requires continued medical care        Post-Acute Status    Post-Acute Authorization Placement     Post-Acute Placement Status Referrals Sent     Coverage [employer] MANAGED MEDICARE- [employer] MED[employer]ARE HMO     Hospital Resources/Appts/Education Provided Appointments scheduled and added to AVS     Discharge Delays Post-Acute Set-up                       Discharge Plan A and Plan B have been determined by review of patient's clinical status, future medical and therapeutic needs, and coverage/benefits for post-acute care in coordination with multidisciplinary team members.    Kimberly Duffy MSW, CSW

## 2024-08-19 NOTE — PT/OT/SLP PROGRESS
Occupational Therapy  Co -  Treatment with PT    Co-evaluation/treatment performed due to patient's multiple deficits requiring two skilled therapists to appropriately and safely assess patient's strength and endurance while facilitating functional tasks in addition to accommodating for patient's activity tolerance.       Name: Amie Salmeron  MRN: [id number]  Admitting Diagnosis:  Osteomyelitis of vertebra of thoracolumbar region       Recommendations:     Discharge Recommendations: Moderate Intensity Therapy  Discharge Equipment Recommendations:  none  Barriers to discharge:  Decreased caregiver support (increased skilled A needed)    Assessment:     Amie Salmeron is a 59 y.o. female with a medical diagnosis of Osteomyelitis of vertebra of thoracolumbar region.  She presents with performance deficits affecting function are weakness, impaired endurance, impaired self care skills, impaired functional mobility, gait instability, impaired balance.     Pt participated fairly in therapy this date, requiring additional encouragement from visiting MD and PT.  Pt required increased assistance for bed mobility 2* increased pain, and able to complete STS transfer with CGA/RW.  Pt able to complete functional mobility in hospital room with CGA/RW in walking from EOB to upright chair, with cues for safe transition to chair.  Pt participated in BLE TherEx with PT while sitting in upright chair, and declined to participate in BUE this date.  Pt's JEYSON drain became unattached from pt's wound site, RN notified and increased time allotted for RN to replace the site.  Pt required assistance with donning TLSO appropriately as well. Pt on pathway to receive post acute care at a moderate level of intensity.      Rehab Prognosis:  Good; patient would benefit from acute skilled OT services to address these deficits and reach maximum level of function.       Plan:     Patient to be seen 4 x/week to address the above listed problems via  "self-care/home management, therapeutic activities, therapeutic exercises  Plan of Care Expires: 09/16/24  Plan of Care Reviewed with: patient    Subjective     Chief Complaint: "It hurts to sit up"   Patient/Family Comments/goals: Get better, return home   Pain/Comfort:  Pain Rating 1:  not quantified  Location - Side 1: Bilateral  Location - Orientation 1: generalized  Location 1: back  Pain Addressed 1: Reposition, Distraction  Pain Rating Post-Intervention 1: 9/10    Objective:     Communicated with: RN prior to session.  Patient found HOB elevated with pulse ox (continuous), JEYSON drain upon OT entry to room. Av[employer]ys camera present.     General Precautions: Standard, fall    Orthopedic Precautions:N/A  Braces: TLSO  Respiratory Status: Room air     Occupational Performance:     Bed Mobility:    Patient completed Rolling/Turning to Right with moderate assistance, staying on R side while RN adjusted JEYSON drain  Patient completed Scooting/Bridging with minimum assistance  Patient completed Supine to Sit with moderate assistance and 2 persons   Pt able to sit upright at EOB ~10min with good upright balance, CGA to SBA    Functional Mobility/Transfers:  Patient completed Sit <> Stand Transfer with contact guard assistance  with  rolling walker   Pt completed Stand > Sit transfer to upright chair with multisensory cues for safe BLE alignment and RW management for controlled sit  Functional Mobility: Pt completed step transfer from EOB to upright chair with CGA/RW with decreased irma and step length, antalgic gait, and multisensory cues for safety  Pt reported mild light headedness post functional mobility, symptoms improved with seated rest break    Activities of Daily Living:  Grooming: hygiene kit and warm washcloth presented, pt initially accepted but then declined activity    Upper Body Dressing: moderate assistance doffing soiled gown, mod A donning fresh gown anteriorly and posteriorly, total A donning TLSO   Lower " Body Dressing: maximal assistance donning nonslip socks  Toileting: total assistance managing Purewick, replacing soiled linda pads     Therapeutic Exercises: completed while sitting upright in chair  With OT: Pt raised BUE arms one time and declined to participate in further BUE (pt avoided eye contact with OT throughout session)  With PT while OT set up room and moved Purewick cannister:   BLE 10x marching, LAQ, abd/add, heel raises, toe raises      AMPAC 6 Click ADL: 14    Treatment & Education:  Pt educated on role of OT, POC, and goals for therapy.    POC was dicussed with patient/caregiver, who was included in its development and is in agreement with the identified goals and treatment plan.   Patient and family aware of patient's deficits and therapy progression.   Time provided for therapeutic counseling and discussion of health disposition.   Educated on importance of EOB/OOB mobility, maintaining routine, sitting up in chair, and maximizing independence with ADLs during admission   Pt completed ADLs and functional mobility for treatment session as noted above   Pt/caregiver verbalized understanding and expressed no further concerns/questions.  Updated communication board with level of assist required       Patient left up in chair with all lines intact, call button in reach, RN notified, and avasys camera present    GOALS:   Multidisciplinary Problems       Occupational Therapy Goals          Problem: Occupational Therapy    Goal Priority Disciplines Outcome Interventions   Occupational Therapy Goal     OT, PT/OT Progressing    Description: Goals to be met by: 9/13/2024     Patient will increase functional independence with ADLs by performing:    UE Dressing with Stand-by Assistance.  LE Dressing with Stand-by Assistance.  Grooming while standing at sink with Stand-by Assistance.  Toileting from toilet with Stand-by Assistance for hygiene and clothing management.   Supine to sit with Stand-by  Assistance.  Stand pivot transfers with Stand-by Assistance.  Toilet transfer to toilet with Stand-by Assistance.                         Time Tracking:     OT Date of Treatment: 08/19/24  OT Start Time: 0922  OT Stop Time: 1002  OT Total Time (min): 40 min    Billable Minutes:Self Care/Home Management 15  Therapeutic Activity 10  Neuromuscular Re-education 15    OT/ELIZABETH: OT          8/19/2024

## 2024-08-20 LAB
BACTERIA BLD CULT: NORMAL
BACTERIA BLD CULT: NORMAL
POCT GLUCOSE: 100 MG/DL (ref 70–110)
POCT GLUCOSE: 81 MG/DL (ref 70–110)
VANCOMYCIN TROUGH SERPL-MCNC: 19.8 UG/ML (ref 10–22)

## 2024-08-20 PROCEDURE — 63600175 PHARM REV CODE 636 W HCPCS: Mod: HCNC | Performed by: STUDENT IN AN ORGANIZED HEALTH CARE EDUCATION/TRAINING PROGRAM

## 2024-08-20 PROCEDURE — 97530 THERAPEUTIC ACTIVITIES: CPT | Mod: HCNC

## 2024-08-20 PROCEDURE — 20600001 HC STEP DOWN PRIVATE ROOM: Mod: HCNC

## 2024-08-20 PROCEDURE — 25000003 PHARM REV CODE 250: Mod: HCNC | Performed by: HOSPITALIST

## 2024-08-20 PROCEDURE — 27000207 HC ISOLATION: Mod: HCNC

## 2024-08-20 PROCEDURE — 25000003 PHARM REV CODE 250: Mod: HCNC

## 2024-08-20 PROCEDURE — A4216 STERILE WATER/SALINE, 10 ML: HCPCS | Mod: HCNC | Performed by: HOSPITALIST

## 2024-08-20 PROCEDURE — 25000003 PHARM REV CODE 250: Mod: HCNC | Performed by: STUDENT IN AN ORGANIZED HEALTH CARE EDUCATION/TRAINING PROGRAM

## 2024-08-20 PROCEDURE — 63600175 PHARM REV CODE 636 W HCPCS: Mod: HCNC | Performed by: HOSPITALIST

## 2024-08-20 PROCEDURE — 63600175 PHARM REV CODE 636 W HCPCS: Mod: HCNC | Performed by: INTERNAL MEDICINE

## 2024-08-20 RX ORDER — OXYCODONE HYDROCHLORIDE 10 MG/1
10 TABLET ORAL EVERY 4 HOURS PRN
Status: DISCONTINUED | OUTPATIENT
Start: 2024-08-20 | End: 2024-08-24 | Stop reason: HOSPADM

## 2024-08-20 RX ADMIN — RIFABUTIN 300 MG: 150 CAPSULE ORAL at 09:08

## 2024-08-20 RX ADMIN — OXYCODONE HYDROCHLORIDE 10 MG: 10 TABLET ORAL at 02:08

## 2024-08-20 RX ADMIN — ERTAPENEM 1 G: 1 INJECTION INTRAMUSCULAR; INTRAVENOUS at 11:08

## 2024-08-20 RX ADMIN — Medication 10 ML: at 06:08

## 2024-08-20 RX ADMIN — CLARITHROMYCIN 500 MG: 500 TABLET, FILM COATED ORAL at 09:08

## 2024-08-20 RX ADMIN — METHOCARBAMOL 500 MG: 500 TABLET ORAL at 05:08

## 2024-08-20 RX ADMIN — QUETIAPINE FUMARATE 25 MG: 25 TABLET ORAL at 09:08

## 2024-08-20 RX ADMIN — DOLUTEGRAVIR SODIUM 50 MG: 50 TABLET, FILM COATED ORAL at 09:08

## 2024-08-20 RX ADMIN — LAMIVUDINE 300 MG: 150 TABLET, FILM COATED ORAL at 09:08

## 2024-08-20 RX ADMIN — VANCOMYCIN HYDROCHLORIDE 1000 MG: 1 INJECTION, POWDER, LYOPHILIZED, FOR SOLUTION INTRAVENOUS at 11:08

## 2024-08-20 RX ADMIN — Medication 10 ML: at 12:08

## 2024-08-20 RX ADMIN — ENOXAPARIN SODIUM 30 MG: 30 INJECTION SUBCUTANEOUS at 05:08

## 2024-08-20 RX ADMIN — METHOCARBAMOL 500 MG: 500 TABLET ORAL at 11:08

## 2024-08-20 RX ADMIN — Medication: at 09:08

## 2024-08-20 RX ADMIN — ACETAMINOPHEN 650 MG: 325 TABLET ORAL at 02:08

## 2024-08-20 RX ADMIN — Medication 10 ML: at 02:08

## 2024-08-20 RX ADMIN — LIDOCAINE 5% 1 PATCH: 700 PATCH TOPICAL at 05:08

## 2024-08-20 RX ADMIN — ACETAMINOPHEN 650 MG: 325 TABLET ORAL at 09:08

## 2024-08-20 RX ADMIN — METHOCARBAMOL 500 MG: 500 TABLET ORAL at 09:08

## 2024-08-20 RX ADMIN — ETHAMBUTOL HYDROCHLORIDE 700 MG: 100 TABLET ORAL at 09:08

## 2024-08-20 NOTE — ASSESSMENT & PLAN NOTE
Hyponatremia is likely due to Dehydration/hypovolemia and tea and toast syndrome. The patient's most recent sodium results are listed below.  Recent Labs     08/18/24  0405 08/19/24  0558   * 135*     Plan  - Much improved   - Will treat the hyponatremia with encourage oral intake  - Monitor sodium p.r.n.   - Patient hyponatremia is stable

## 2024-08-20 NOTE — PROGRESS NOTES
Pharmacokinetic Assessment Follow Up: IV Vancomycin    Vancomycin serum concentration assessment(s):    The trough level was drawn correctly and can be used to guide therapy at this time. The measurement is within the desired definitive target range of 15 to 20 mcg/mL.  - The trough level was drawn ~11 hours after previous dose and resulted at 19.8.    Vancomycin Regimen Plan:    Continue regimen to Vancomycin 1000 mg IV every 12 hours with next serum trough concentration measured at 1030 after 3 more doses on 8/21.  - Ms. Salmeron's renal function appears stable and at baseline.  - Please draw random level sooner than scheduled trough if renal function changes significantly.    Drug levels (last 3 results):  Recent Labs   Lab Result Units 08/17/24  1411 08/19/24  2242   Vancomycin-Trough ug/mL 13.9 19.8       Pharmacy will continue to follow and monitor vancomycin.    Please contact pharmacy at extension [phone] for questions regarding this assessment.    Thank you for the consult,   Sissy Hernández       Patient brief summary:  Amie Salmeron is a 59 y.o. female initiated on antimicrobial therapy with IV Vancomycin for treatment of bone/joint infection    Actual Body Weight:   45.8 kg    Renal Function:   Estimated Creatinine Clearance: 71.2 mL/min (based on SCr of 0.6 mg/dL).     Dialysis Method (if applicable):  N/A

## 2024-08-20 NOTE — PROGRESS NOTES
Attempted to see pt for wound care f/u; provider at bedside. Pt is being followed by wound care for buttock wound; orders in place for daily wound care.     Ramya Mauro RN, [facility] Wound/Ostomy  8/20/24

## 2024-08-20 NOTE — NURSING
Shift Note    Pt slept well this shift. VSS. Pain managed with PRN's. Dual PICC in place to RUE. Veronica C/D/I and due to be changed 8/25. Skin care completed. T&RQ2 as tolerated. Safety precautions in place. Call light in reach. No further concerns noted at this time.

## 2024-08-20 NOTE — SUBJECTIVE & OBJECTIVE
Interval History: seen today sitting up in bed working with PT.  She rates her pain 10/10 and doesn't feel that anything other than morphine is helpful. Resumed her baclofen yesterday which she doesn't feel helped.  Her pain is in her back.  She understands the plan for facility to complete 6 weeks of abx. No dyspnea, nausea, constipation, abd pain    Review of Systems   All other systems reviewed and are negative.    Objective:     Vital Signs (Most Recent):  Temp: 98.3 °F (36.8 °C) (08/20/24 0109)  Pulse: 85 (08/20/24 0109)  Resp: 20 (08/20/24 0109)  BP: (!) 110/56 (08/20/24 0109)  SpO2: 100 % (08/20/24 0109) Vital Signs (24h Range):  Temp:  [97.9 °F (36.6 °C)-98.8 °F (37.1 °C)] 98.3 °F (36.8 °C)  Pulse:  [85-91] 85  Resp:  [18-20] 20  SpO2:  [99 %-100 %] 100 %  BP: ()/(56-59) 110/56     Weight: 45.8 kg (101 lb)  Body mass index is 17.34 kg/m².    Intake/Output Summary (Last 24 hours) at 8/20/2024 1122  Last data filed at 8/20/2024 1018  Gross per 24 hour   Intake 180 ml   Output 2300 ml   Net -2120 ml         Physical Exam  Vitals reviewed.   Constitutional:       Appearance: She is ill-appearing.      Comments: Sitting up bedside, affectly ever so slightly brighter   HENT:      Head: Normocephalic.      Mouth/Throat:      Mouth: Mucous membranes are moist.   Cardiovascular:      Rate and Rhythm: Normal rate.      Pulses: Normal pulses.      Heart sounds: Normal heart sounds.   Pulmonary:      Effort: Pulmonary effort is normal.      Breath sounds: Normal breath sounds.   Abdominal:      General: Abdomen is flat. Bowel sounds are normal.   Musculoskeletal:      Right lower leg: No edema.      Left lower leg: No edema.      Comments: PICC in place   Skin:     General: Skin is warm and dry.   Neurological:      General: No focal deficit present.      Mental Status: She is alert.   Psychiatric:      Comments: Flat affect             Significant Labs: All pertinent labs within the past 24 hours have been  reviewed.    Significant Imaging: I have reviewed all pertinent imaging results/findings within the past 24 hours.

## 2024-08-20 NOTE — PT/OT/SLP PROGRESS
Physical Therapy      Patient Name:  Amie Salmeron   MRN:  [id number]    Patient not seen today secondary to  (pt. worked with OT in AM and PT unable to return in PM). Will follow-up tomorrow.    Sushil Arce, PT  8/20/2024

## 2024-08-20 NOTE — PT/OT/SLP PROGRESS
Occupational Therapy   Treatment    Name: Amie Salmeron  MRN: [id number]  Admitting Diagnosis:  Osteomyelitis of vertebra of thoracolumbar region       Recommendations:     Discharge Recommendations: Moderate Intensity Therapy  Discharge Equipment Recommendations:  none  Barriers to discharge:  None    Assessment:     Amie Salmeron is a 59 y.o. female with a medical diagnosis of Osteomyelitis of vertebra of thoracolumbar region.  Pt demonstrates impulsive behavior including standing without direction, removing TLSO while seated in wheelchair, attempting to get out of bed while unattended, and asks to return to bed after sitting in chair for 2 minutes against medical advice. Performance deficits affecting function are weakness, impaired endurance, impaired self care skills, impaired functional mobility, gait instability, impaired balance, decreased coordination, pain, impaired cognition, orthopedic precautions, decreased safety awareness.     Rehab Prognosis:  Good; patient would benefit from acute skilled OT services to address these deficits and reach maximum level of function.       Plan:     Patient to be seen 4 x/week to address the above listed problems via self-care/home management, therapeutic activities, therapeutic exercises  Plan of Care Expires: 09/16/24  Plan of Care Reviewed with: patient    Subjective     Chief Complaint: stomach and back pain    Pain/Comfort:  Pain Rating 1:  (no numerical value given)  Location - Side 1: Bilateral  Location 1: abdomen  Pain Rating 2:  (no numerical value given)  Location - Side 2: Bilateral  Location 2: back    Objective:     Communicated with: rn prior to session.  Patient found supine with   upon OT entry to room.    General Precautions: Standard, fall    Orthopedic Precautions:spinal precautions  Braces: TLSO  Respiratory Status: Room air     Occupational Performance:     Bed Mobility:    Patient completed Rolling/Turning to Left with  moderate assistance  Patient  completed Supine to Sit with moderate assistance  Patient completed Sit to Supine with stand by assistance     Functional Mobility/Transfers:  Patient completed Sit <> Stand Transfer with stand by assistance  with  rolling walker   Patient completed Bed <> Chair Transfer using Step Transfer technique with contact guard assistance with hand-held assist  Patient completed Toilet Transfer Step Transfer technique with contact guard assistance with  rolling walker  Functional Mobility: Pt ambulated 25 feet from bed to toilet and from toilet into the hallway for participation in fx mobility with CGA using RW.     Activities of Daily Living:  Upper Body Dressing: total assistance for don/doff TLSO      [facility] 6 Click ADL: 18    Treatment & Education:  From supine, pt completed B UE therex x 15 reps each including     -Bicep curls  -Straight arm raises    Pt was educated to spinal precautions including no bending, twisting, lifting and appropriate technique for log rolling. Pt Was unable to recall any spinal precautions independently.     *Discussed OT POC and progress    Patient left HOB elevated with all lines intact and call button in reach    GOALS:   Multidisciplinary Problems       Occupational Therapy Goals          Problem: Occupational Therapy    Goal Priority Disciplines Outcome Interventions   Occupational Therapy Goal     OT, PT/OT Progressing    Description: Goals to be met by: 9/13/2024     Patient will increase functional independence with ADLs by performing:    UE Dressing with Stand-by Assistance.  LE Dressing with Stand-by Assistance.  Grooming while standing at sink with Stand-by Assistance.  Toileting from toilet with Stand-by Assistance for hygiene and clothing management.   Supine to sit with Stand-by Assistance.  Stand pivot transfers with Stand-by Assistance.  Toilet transfer to toilet with Stand-by Assistance.                         Time Tracking:     OT Date of Treatment: 08/20/24  OT Start Time:  1037  OT Stop Time: 1108  OT Total Time (min): 31 min    Billable Minutes:Therapeutic Activity 31    OT/ELIZABETH: OT          8/20/2024

## 2024-08-20 NOTE — ASSESSMENT & PLAN NOTE
Imaging showed Large left paravertebral retroperitoneal abscess increased from the prior study measuring approximately 14.0 x 5.8 x 15.5 cm. The abscess crosses the midline to the right involving the vertebral bodies and surgical hardware also. 3.7 cm right periaortic component of the abscess at T12-L1 level. See above comments. Paravertebral phlegmon with findings of discitis/osteomyelitis at T8-9, T9-10, L2-3 and L3-4   ID and NSGY consulted. IR placed drain which pt subsequently removed. Repeat imaging without remaining abscess.  Cx grew MRSA, AFB, fusobacterium, ESBL e coli  - ID consulted and appreciate recommendations  -plan for 6 weeks of vanc and ertapenem, OPAT written 8/16. If does not go to a facility let ID know so that f/u can be arranged  -still has one drain in place. 20 cc in last 24 hours. Will dw ID when this can be pulled  - NSGY, IR appreciated  -working on discharge to facilty

## 2024-08-20 NOTE — ASSESSMENT & PLAN NOTE
Secondary to osteo.   Needs better multimodal pain control. Will schedule tylenol, add prn oxycodone and lidocaine patch.  continue prn morphine. Continue gabapentin and robaxin (outpatient meds). Consider NSAID as well.  We will decrease dose of morphine for use with severe pain only.    She has been using the oxycodone minimally (just twice yesterday again). Needs to ask for oxy and morphine for severe pain not relieved by oxycodone

## 2024-08-20 NOTE — PROGRESS NOTES
[facility] - Stepdown Flex ([place] [id number])  [facility] Medicine  Progress Note    Patient Name: Amie Salmeron  MRN: [id number]  Patient Class: IP- Inpatient   Admission Date: 8/12/2024  Length of Stay: 8 days  Attending Physician: Melony Zavala MD  Primary Care Provider: Anuradha, Primary Doctor        Subjective:     Principal Problem:Osteomyelitis of vertebra of thoracolumbar region        HPI:  Mrs. Salmeron is a 58 yo F with PMHx of HTN, uncontrolled HIV noncompliant with HAART, spinal MAC, spinal osteo s/p T11-L2 corpectomy with fixation by ortho on 2/27/24 at [facility] who presents to [facility] as a transfer from [facility] on 8/11/24 with generalized malaise, fatigue, and lower left sided back pain x 1 week. Pt denies fevers, chills, bowel/bladder incontinence, weakness, or numbness. In the [facility] ED, patient was afebrile but borderline hypotensive which improved with IV fluids. Noted to have WBC 16.6, K 2.4, albumin 1.9, normal lactic. Blood cultures were obtained. Patient was admitted to  at [facility] and CT TL w/ contrast showed large left T11-L2 paravertebral retroperitoneal abscess increased from the prior study measuring approximately 14.0 x 5.8 x 15.5 cm.  The abscess crosses the midline to the right involving the vertebral bodies and surgical hardware also.  3.7 cm right periaortic component of the abscess at T12-L1 level. Potassium was repleted and vanc/zosyn given prior to transfer to [facility]. Neurosurgery and IR consult with admission to .    On my evaluation, patient borderline hypotensive and afebrile. Currently complaining of pain but no focal neurologic symptoms. Pain localized to left lower back. OSH lab work pending.     Pt admitted to hospital medicine with NSGY, IR, and ID consulted to assist with management.     Overview/Hospital Course:  58 yo with advance HIV/AIDS with multiple OI including verterbral MAC, paravertebral abscess, MRSA bacteremia. She was transferred [facility] for IR for the paravertebral abscess and  subsequently transferred to the MICU for hypotension requiring vasopressor support. She has been weaned off pressors. She continues on meropenem and vancomycin for the polymicrobial abscess. She dislodged her drain and repeat imaging showed abscess resolution so it was not replaced. Palliative care as been following as given her advanced AIDS she has limited/no therapeutic options for her vertebral MAC.  Place PICC line today for IV antibiotics.    Interval History: seen today sitting up in bed working with PT.  She rates her pain 10/10 and doesn't feel that anything other than morphine is helpful. Resumed her baclofen yesterday which she doesn't feel helped.  Her pain is in her back.  She understands the plan for facility to complete 6 weeks of abx. No dyspnea, nausea, constipation, abd pain    Review of Systems   All other systems reviewed and are negative.    Objective:     Vital Signs (Most Recent):  Temp: 98.3 °F (36.8 °C) (08/20/24 0109)  Pulse: 85 (08/20/24 0109)  Resp: 20 (08/20/24 0109)  BP: (!) 110/56 (08/20/24 0109)  SpO2: 100 % (08/20/24 0109) Vital Signs (24h Range):  Temp:  [97.9 °F (36.6 °C)-98.8 °F (37.1 °C)] 98.3 °F (36.8 °C)  Pulse:  [85-91] 85  Resp:  [18-20] 20  SpO2:  [99 %-100 %] 100 %  BP: ()/(56-59) 110/56     Weight: 45.8 kg (101 lb)  Body mass index is 17.34 kg/m².    Intake/Output Summary (Last 24 hours) at 8/20/2024 1122  Last data filed at 8/20/2024 1018  Gross per 24 hour   Intake 180 ml   Output 2300 ml   Net -2120 ml         Physical Exam  Vitals reviewed.   Constitutional:       Appearance: She is ill-appearing.      Comments: Sitting up bedside, affectly ever so slightly brighter   HENT:      Head: Normocephalic.      Mouth/Throat:      Mouth: Mucous membranes are moist.   Cardiovascular:      Rate and Rhythm: Normal rate.      Pulses: Normal pulses.      Heart sounds: Normal heart sounds.   Pulmonary:      Effort: Pulmonary effort is normal.      Breath sounds: Normal breath  sounds.   Abdominal:      General: Abdomen is flat. Bowel sounds are normal.   Musculoskeletal:      Right lower leg: No edema.      Left lower leg: No edema.      Comments: PICC in place   Skin:     General: Skin is warm and dry.   Neurological:      General: No focal deficit present.      Mental Status: She is alert.   Psychiatric:      Comments: Flat affect             Significant Labs: All pertinent labs within the past 24 hours have been reviewed.    Significant Imaging: I have reviewed all pertinent imaging results/findings within the past 24 hours.    Assessment/Plan:      * Osteomyelitis of vertebra of thoracolumbar region  Imaging showed Large left paravertebral retroperitoneal abscess increased from the prior study measuring approximately 14.0 x 5.8 x 15.5 cm. The abscess crosses the midline to the right involving the vertebral bodies and surgical hardware also. 3.7 cm right periaortic component of the abscess at T12-L1 level. See above comments. Paravertebral phlegmon with findings of discitis/osteomyelitis at T8-9, T9-10, L2-3 and L3-4   ID and NSGY consulted. IR placed drain which pt subsequently removed. Repeat imaging without remaining abscess.  Cx grew MRSA, AFB, fusobacterium, ESBL e coli  - ID consulted and appreciate recommendations  -plan for 6 weeks of vanc and ertapenem, OPAT written 8/16. If does not go to a facility let ID know so that f/u can be arranged  -still has one drain in place. 20 cc in last 24 hours. Will dw ID when this can be pulled  - NSGY, IR appreciated  -working on discharge to facilty        UTI (urinary tract infection)  ESBL ECOLI  Being treated with ertapenem for vertebral abscess    DNR (do not resuscitate)        Palliative care encounter        Septic shock  This patient has shock. The type of shock is distributive due to sepsis. The patient has the following evidence of shock: persistent hypotension. The patient was admitted to an intensive care unit and required  norepinephrine which has since been weaned. See vertebral osteo for infectious treatment plan    Vertebral abscess    See osteomyelitis    Disseminated mycobacterium avium-intracellulare complex  Hx of spinal MAC  [facility] [place] ID note showed continuation of ethambutol, rifampin, and azithromycin    - ID consulted and recommends continued ethambutol, rifampin, clarithromycin      Hyponatremia  Hyponatremia is likely due to Dehydration/hypovolemia and tea and toast syndrome. The patient's most recent sodium results are listed below.  Recent Labs     08/18/24  0405 08/19/24  0558   * 135*     Plan  - Much improved   - Will treat the hyponatremia with encourage oral intake  - Monitor sodium p.r.n.   - Patient hyponatremia is stable      AIDS  Continued dolutegravir and lamivudine, continued pjp prophylactic bactrim  Last CD4 was 76 on 8/11  Appreciate ID consult and assistance with this patient      Chronic pain  Secondary to osteo.   Needs better multimodal pain control. Will schedule tylenol, add prn oxycodone and lidocaine patch.  continue prn morphine. Continue gabapentin and robaxin (outpatient meds). Consider NSAID as well.  We will decrease dose of morphine for use with severe pain only.    She has been using the oxycodone minimally (just twice yesterday again). Needs to ask for oxy and morphine for severe pain not relieved by oxycodone         VTE Risk Mitigation (From admission, onward)           Ordered     enoxaparin injection 30 mg  Every 24 hours         08/13/24 0929     IP VTE LOW RISK PATIENT  Once         08/12/24 0240     Place sequential compression device  Until discontinued         08/12/24 0240                    Discharge Planning   MARK: 8/21/2024     Code Status: DNR   Is the patient medically ready for discharge?:     Reason for patient still in hospital (select all that apply): Pending disposition  Discharge Plan A: Skilled Nursing Facility   Discharge Delays: (!) Post-Acute  Set-up              Melony Zavala MD  Department of Hospital Medicine   [facility]

## 2024-08-21 LAB
POCT GLUCOSE: 92 MG/DL (ref 70–110)
VANCOMYCIN TROUGH SERPL-MCNC: 26.5 UG/ML (ref 10–22)

## 2024-08-21 PROCEDURE — A4216 STERILE WATER/SALINE, 10 ML: HCPCS | Mod: HCNC | Performed by: HOSPITALIST

## 2024-08-21 PROCEDURE — 63600175 PHARM REV CODE 636 W HCPCS: Mod: HCNC | Performed by: STUDENT IN AN ORGANIZED HEALTH CARE EDUCATION/TRAINING PROGRAM

## 2024-08-21 PROCEDURE — 25000003 PHARM REV CODE 250: Mod: HCNC | Performed by: HOSPITALIST

## 2024-08-21 PROCEDURE — 97116 GAIT TRAINING THERAPY: CPT | Mod: HCNC

## 2024-08-21 PROCEDURE — 63600175 PHARM REV CODE 636 W HCPCS: Mod: HCNC | Performed by: HOSPITALIST

## 2024-08-21 PROCEDURE — 97535 SELF CARE MNGMENT TRAINING: CPT | Mod: HCNC

## 2024-08-21 PROCEDURE — 20600001 HC STEP DOWN PRIVATE ROOM: Mod: HCNC

## 2024-08-21 PROCEDURE — 25000003 PHARM REV CODE 250: Mod: HCNC

## 2024-08-21 PROCEDURE — 25500020 PHARM REV CODE 255: Mod: HCNC | Performed by: HOSPITALIST

## 2024-08-21 PROCEDURE — 36415 COLL VENOUS BLD VENIPUNCTURE: CPT | Mod: HCNC | Performed by: HOSPITALIST

## 2024-08-21 PROCEDURE — 80202 ASSAY OF VANCOMYCIN: CPT | Mod: HCNC | Performed by: HOSPITALIST

## 2024-08-21 PROCEDURE — 27000207 HC ISOLATION: Mod: HCNC

## 2024-08-21 PROCEDURE — 97530 THERAPEUTIC ACTIVITIES: CPT | Mod: HCNC

## 2024-08-21 PROCEDURE — 25000003 PHARM REV CODE 250: Mod: HCNC | Performed by: STUDENT IN AN ORGANIZED HEALTH CARE EDUCATION/TRAINING PROGRAM

## 2024-08-21 PROCEDURE — 63600175 PHARM REV CODE 636 W HCPCS: Mod: HCNC | Performed by: INTERNAL MEDICINE

## 2024-08-21 PROCEDURE — 25500020 PHARM REV CODE 255: Mod: HCNC

## 2024-08-21 RX ADMIN — ACETAMINOPHEN 650 MG: 325 TABLET ORAL at 08:08

## 2024-08-21 RX ADMIN — QUETIAPINE FUMARATE 25 MG: 25 TABLET ORAL at 08:08

## 2024-08-21 RX ADMIN — Medication 10 ML: at 06:08

## 2024-08-21 RX ADMIN — METHOCARBAMOL 500 MG: 500 TABLET ORAL at 09:08

## 2024-08-21 RX ADMIN — METHOCARBAMOL 500 MG: 500 TABLET ORAL at 08:08

## 2024-08-21 RX ADMIN — LAMIVUDINE 300 MG: 150 TABLET, FILM COATED ORAL at 09:08

## 2024-08-21 RX ADMIN — SULFAMETHOXAZOLE AND TRIMETHOPRIM 1 TABLET: 800; 160 TABLET ORAL at 10:08

## 2024-08-21 RX ADMIN — Medication 10 ML: at 05:08

## 2024-08-21 RX ADMIN — IOHEXOL 75 ML: 350 INJECTION, SOLUTION INTRAVENOUS at 12:08

## 2024-08-21 RX ADMIN — OXYCODONE HYDROCHLORIDE 10 MG: 10 TABLET ORAL at 02:08

## 2024-08-21 RX ADMIN — DOLUTEGRAVIR SODIUM 50 MG: 50 TABLET, FILM COATED ORAL at 09:08

## 2024-08-21 RX ADMIN — IOHEXOL 15 ML: 350 INJECTION, SOLUTION INTRAVENOUS at 10:08

## 2024-08-21 RX ADMIN — ACETAMINOPHEN 650 MG: 325 TABLET ORAL at 09:08

## 2024-08-21 RX ADMIN — VANCOMYCIN HYDROCHLORIDE 1000 MG: 1 INJECTION, POWDER, LYOPHILIZED, FOR SOLUTION INTRAVENOUS at 11:08

## 2024-08-21 RX ADMIN — Medication: at 08:08

## 2024-08-21 RX ADMIN — ACETAMINOPHEN 650 MG: 325 TABLET ORAL at 02:08

## 2024-08-21 RX ADMIN — Medication: at 10:08

## 2024-08-21 RX ADMIN — ENOXAPARIN SODIUM 30 MG: 30 INJECTION SUBCUTANEOUS at 06:08

## 2024-08-21 RX ADMIN — ERTAPENEM 1 G: 1 INJECTION INTRAMUSCULAR; INTRAVENOUS at 10:08

## 2024-08-21 RX ADMIN — RIFABUTIN 300 MG: 150 CAPSULE ORAL at 09:08

## 2024-08-21 RX ADMIN — Medication 10 ML: at 12:08

## 2024-08-21 RX ADMIN — METHOCARBAMOL 500 MG: 500 TABLET ORAL at 11:08

## 2024-08-21 RX ADMIN — CLARITHROMYCIN 500 MG: 500 TABLET, FILM COATED ORAL at 08:08

## 2024-08-21 RX ADMIN — METHOCARBAMOL 500 MG: 500 TABLET ORAL at 06:08

## 2024-08-21 RX ADMIN — Medication 10 ML: at 11:08

## 2024-08-21 RX ADMIN — CLARITHROMYCIN 500 MG: 500 TABLET, FILM COATED ORAL at 11:08

## 2024-08-21 RX ADMIN — ETHAMBUTOL HYDROCHLORIDE 700 MG: 100 TABLET ORAL at 09:08

## 2024-08-21 RX ADMIN — OXYCODONE HYDROCHLORIDE 10 MG: 10 TABLET ORAL at 10:08

## 2024-08-21 RX ADMIN — LIDOCAINE 5% 1 PATCH: 700 PATCH TOPICAL at 06:08

## 2024-08-21 RX ADMIN — IOHEXOL 15 ML: 350 INJECTION, SOLUTION INTRAVENOUS at 11:08

## 2024-08-21 NOTE — PLAN OF CARE
Pt AAOX3. VSS. PRN pain management in place. Moderate relief obtained. Pt up in chair and ambulating outside of room with PT/OT. JEYSON drain removed. Purwick in place. Plan for [facility] placement, IV and PO abx continued.   Problem: Adult Inpatient Plan of Care  Goal: Plan of Care Review  Outcome: Progressing  Goal: Patient-Specific Goal (Individualized)  Outcome: Progressing  Goal: Absence of Hospital-Acquired Illness or Injury  Outcome: Progressing  Goal: Optimal Comfort and Wellbeing  Outcome: Progressing  Goal: Readiness for Transition of Care  Outcome: Progressing     Problem: Wound  Goal: Optimal Coping  Outcome: Progressing  Goal: Optimal Functional Ability  Outcome: Progressing  Goal: Absence of Infection Signs and Symptoms  Outcome: Progressing  Goal: Improved Oral Intake  Outcome: Progressing  Goal: Optimal Pain Control and Function  Outcome: Progressing  Goal: Skin Health and Integrity  Outcome: Progressing  Goal: Optimal Wound Healing  Outcome: Progressing     Problem: Skin Injury Risk Increased  Goal: Skin Health and Integrity  Outcome: Progressing     Problem: Infection  Goal: Absence of Infection Signs and Symptoms  Outcome: Progressing     Problem: Sepsis/Septic Shock  Goal: Optimal Coping  Outcome: Progressing  Goal: Absence of Bleeding  Outcome: Progressing  Goal: Blood Glucose Level Within Targeted Range  Outcome: Progressing  Goal: Absence of Infection Signs and Symptoms  Outcome: Progressing  Goal: Optimal Nutrition Intake  Outcome: Progressing     Problem: Coping Ineffective  Goal: Effective Coping  Outcome: Progressing

## 2024-08-21 NOTE — PT/OT/SLP PROGRESS
"Occupational Therapy   Treatment    Name: Amie Salmeron  MRN: [id number]  Admitting Diagnosis:  Osteomyelitis of vertebra of thoracolumbar region       Recommendations:     Discharge Recommendations: Moderate Intensity Therapy  Discharge Equipment Recommendations:  none  Barriers to discharge:  None    Assessment:     Amie Salmeron is a 59 y.o. female with a medical diagnosis of Osteomyelitis of vertebra of thoracolumbar region.  She presents with performance deficits affecting function are weakness, impaired balance, impaired endurance, impaired self care skills, impaired functional mobility, gait instability. Pt tolerated tx well, with good effort and motivation throughout.  She was able to stand to assist with gin hygiene and then transfer into chair with SBA and RW. Pt will continue to benefit from skilled OT services to address impairments listed above to maximize independence with ADLs and functional mobility to ensure safe return to PLOF.     Rehab Prognosis:  Good; patient would benefit from acute skilled OT services to address these deficits and reach maximum level of function.       Plan:     Patient to be seen 4 x/week to address the above listed problems via self-care/home management, therapeutic activities, therapeutic exercises  Plan of Care Expires: 09/16/24  Plan of Care Reviewed with: patient    Subjective     Chief Complaint: none  Patient/Family Comments/goals: "you are so accommodating, thank you"  Pain/Comfort:  Pain Rating 1: 0/10  Pain Rating Post-Intervention 1: 0/10    Objective:     Communicated with: RN prior to session.  Patient found HOB elevated with pulse ox (continuous), JEYSON drain, peripheral IV, PureWick upon OT entry to room.    General Precautions: Standard, fall    Orthopedic Precautions:spinal precautions  Braces: TLSO  Respiratory Status: Room air     Occupational Performance:     Bed Mobility:    Patient completed Scooting/Bridging with stand by assistance  Patient completed " Supine to Sit with contact guard assistance     Functional Mobility/Transfers:  Patient completed Sit <> Stand Transfer with stand by assistance  with  rolling walker   Patient completed Bed <> Chair Transfer using Step Transfer technique with stand by assistance with rolling walker  Functional Mobility: Pt ambulated ~5ft with SBA and RW to simulate home distances and maximize functional endurance required for community mobility.   Pt able to stand for ~2mins with CGA with RW and perform anterior gin care     Activities of Daily Living:  Lower Body Dressing: maximal assistance to thread BLE into new brief, pt able to minimally assist with pulling up past hips once standing   Toileting: moderate assistance for gin care in standing. Required (A) with buttocks and brief mgmt. Pt able to wipe anterior gin area and assist with pulling brief up     [facility] 6 Click ADL: 18    Treatment & Education:  Pt educated on   Role of OT and OT POC  Safe transfer techniques and proper body mechanics for fall prevention and improved independence with functional transfers  Importance of OOB activities to increase endurance and tolerance for increased participation in daily ADLs    Utilizing the call bell to request for assistance with all functional mobility to ensure safety during hospital stay.    Pt verbalized understanding and all questions were addressed within the scope of OT.     Patient left up in chair with all lines intact, call button in reach, and RN notified    GOALS:   Multidisciplinary Problems       Occupational Therapy Goals          Problem: Occupational Therapy    Goal Priority Disciplines Outcome Interventions   Occupational Therapy Goal     OT, PT/OT Progressing    Description: Goals to be met by: 9/13/2024     Patient will increase functional independence with ADLs by performing:    UE Dressing with Stand-by Assistance.  LE Dressing with Stand-by Assistance.  Grooming while standing at sink with Stand-by  Assistance.  Toileting from toilet with Stand-by Assistance for hygiene and clothing management.   Supine to sit with Stand-by Assistance.  Stand pivot transfers with Stand-by Assistance.  Toilet transfer to toilet with Stand-by Assistance.                         Time Tracking:     OT Date of Treatment: 08/21/24  OT Start Time: 1102  OT Stop Time: 1125  OT Total Time (min): 23 min    Billable Minutes:Self Care/Home Management 10  Therapeutic Activity 13    OT/ELIZABETH: OT          8/21/2024

## 2024-08-21 NOTE — SUBJECTIVE & OBJECTIVE
Interval History: seen in bed, she walked with OT yesterday which felt good.  She is very hungry but she is waiting for CT abd so NPO for that.  Pain is still 10/10. Reviewed that she is not taking the pain meds as often as available and she needs to ask for them. She agrees to facility for discharge to complete abx    Review of Systems   All other systems reviewed and are negative.    Objective:     Vital Signs (Most Recent):  Temp: 98.3 °F (36.8 °C) (08/21/24 1145)  Pulse: 85 (08/21/24 1145)  Resp: 18 (08/21/24 1145)  BP: (!) 106/58 (08/21/24 1145)  SpO2: 100 % (08/21/24 1145) Vital Signs (24h Range):  Temp:  [97.1 °F (36.2 °C)-98.3 °F (36.8 °C)] 98.3 °F (36.8 °C)  Pulse:  [75-89] 85  Resp:  [18-20] 18  SpO2:  [98 %-100 %] 100 %  BP: ()/(58-71) 106/58     Weight: 45.8 kg (101 lb)  Body mass index is 17.34 kg/m².    Intake/Output Summary (Last 24 hours) at 8/21/2024 1434  Last data filed at 8/21/2024 1417  Gross per 24 hour   Intake --   Output 1300 ml   Net -1300 ml         Physical Exam  Vitals reviewed.   Constitutional:       Appearance: She is ill-appearing.   HENT:      Head: Normocephalic.      Mouth/Throat:      Mouth: Mucous membranes are moist.   Cardiovascular:      Rate and Rhythm: Normal rate.      Pulses: Normal pulses.      Heart sounds: Normal heart sounds.   Pulmonary:      Effort: Pulmonary effort is normal.      Breath sounds: Normal breath sounds.   Abdominal:      General: Abdomen is flat. Bowel sounds are normal.   Musculoskeletal:      Right lower leg: No edema.      Left lower leg: No edema.      Comments: PICC in place, JEYSON drain in back   Skin:     General: Skin is warm and dry.   Neurological:      General: No focal deficit present.      Mental Status: She is alert.   Psychiatric:      Comments: Flat affect             Significant Labs: All pertinent labs within the past 24 hours have been reviewed.    Significant Imaging: I have reviewed all pertinent imaging results/findings within  the past 24 hours.

## 2024-08-21 NOTE — CONSULTS
Consult received.     Follow up imaging reviewed with staff. Abscess has significantly decreased in size on CT 8/21/24. Drain has been removed.       Fawn England MD  PGY-IV  Diagnostic and Interventional Radiology  [facility]

## 2024-08-21 NOTE — PLAN OF CARE
Pt AAOX3, but confused at times and has to redirected. VSS. PRN pain management in place. Moderate relief obtained. Pt up in chair and ambulating outside of room with PT/OT. JEYSON drain removed. Purwick in place. Plan for [facility] placement, IV and PO abx continued.   Problem: Adult Inpatient Plan of Care  Goal: Plan of Care Review  Outcome: Progressing  Goal: Patient-Specific Goal (Individualized)  Outcome: Progressing  Goal: Absence of Hospital-Acquired Illness or Injury  Outcome: Progressing  Goal: Optimal Comfort and Wellbeing  Outcome: Progressing  Goal: Readiness for Transition of Care  Outcome: Progressing     Problem: Wound  Goal: Optimal Coping  Outcome: Progressing  Goal: Optimal Functional Ability  Outcome: Progressing  Goal: Absence of Infection Signs and Symptoms  Outcome: Progressing  Goal: Improved Oral Intake  Outcome: Progressing  Goal: Optimal Pain Control and Function  Outcome: Progressing  Goal: Skin Health and Integrity  Outcome: Progressing  Goal: Optimal Wound Healing  Outcome: Progressing     Problem: Skin Injury Risk Increased  Goal: Skin Health and Integrity  Outcome: Progressing     Problem: Infection  Goal: Absence of Infection Signs and Symptoms  Outcome: Progressing     Problem: Sepsis/Septic Shock  Goal: Optimal Coping  Outcome: Progressing  Goal: Absence of Bleeding  Outcome: Progressing  Goal: Blood Glucose Level Within Targeted Range  Outcome: Progressing  Goal: Absence of Infection Signs and Symptoms  Outcome: Progressing  Goal: Optimal Nutrition Intake  Outcome: Progressing     Problem: Coping Ineffective  Goal: Effective Coping  Outcome: Progressing

## 2024-08-21 NOTE — ASSESSMENT & PLAN NOTE
Patient with Acute on chronic debility due to other reduced mobility. Latest AMPAC and GEMS scores have been reviewed. Evaluation for etiology is complete. Plan includes progressive mobility protocol initated, PT/OT consulted, and fall precautions in place.

## 2024-08-21 NOTE — ASSESSMENT & PLAN NOTE
Imaging showed Large left paravertebral retroperitoneal abscess increased from the prior study measuring approximately 14.0 x 5.8 x 15.5 cm. The abscess crosses the midline to the right involving the vertebral bodies and surgical hardware also. 3.7 cm right periaortic component of the abscess at T12-L1 level. See above comments. Paravertebral phlegmon with findings of discitis/osteomyelitis at T8-9, T9-10, L2-3 and L3-4   ID and NSGY consulted. IR placed drain which pt subsequently removed. Repeat imaging without remaining abscess.  Cx grew MRSA, AFB, fusobacterium, ESBL e coli  - ID consulted and appreciate recommendations  -plan for 6 weeks of vanc and ertapenem, OPAT written 8/16. If does not go to a facility let ID know so that f/u can be arranged  -asking ID to weight in on whether these is another option than ertapenem due to the cost of ertapenem  -still has one drain in place with minimal output in last 24 hours. Will dw ID when this can be pulled  - NSGY, IR appreciated. CT abd pelvis completed, abscess nearly resolved. Asked IR to pull drain.   -working on discharge to facilty

## 2024-08-21 NOTE — PT/OT/SLP PROGRESS
Physical Therapy Treatment    Patient Name:  Amie Salmeron   MRN:  [id number]    Recommendations:     Discharge Recommendations: Moderate Intensity Therapy  Discharge Equipment Recommendations: none  Barriers to discharge: None    Assessment:     Amie Salmeron is a 59 y.o. female admitted with a medical diagnosis of Osteomyelitis of vertebra of thoracolumbar region.  She presents with the following impairments/functional limitations: impaired endurance, weakness, impaired self care skills, impaired functional mobility, gait instability, impaired balance . Upon arrival, pt was elevated HOB on room air. Pt was CGA sup<>sit<>stand w/ RW and TLSO donned. Pt amb 100ft w/ RW CGA, decreased step length, and slow velocity.    Rehab Prognosis: Good; patient would benefit from acute skilled PT services to address these deficits and reach maximum level of function.    Recent Surgery: * No surgery found *      Plan:     During this hospitalization, patient to be seen 4 x/week to address the identified rehab impairments via gait training, therapeutic activities, therapeutic exercises, neuromuscular re-education and progress toward the following goals:    Plan of Care Expires:  09/17/24    Subjective     Chief Complaint: I want a cup of coffee  Patient/Family Comments/goals: None stated  Pain/Comfort:  Pain Rating 1: 0/10      Objective:     Communicated with RN prior to session.  Patient found HOB elevated with PureWick, peripheral IV, JEYSON drain upon PT entry to room.     General Precautions: Standard, fall  Orthopedic Precautions: spinal precautions  Braces: TLSO  Respiratory Status: Room air     Functional Mobility:  Bed Mobility:     Supine to Sit: contact guard assistance  Sit to Supine: contact guard assistance  Transfers:     Sit to Stand:  contact guard assistance with rolling walker  Gait: amb 100ft w/ RW CGA, decreased step length, and slow velocity.  Balance: Fair+      AM-PAC 6 CLICK MOBILITY  Turning over in bed  (including adjusting bedclothes, sheets and blankets)?: 3  Sitting down on and standing up from a chair with arms (e.g., wheelchair, bedside commode, etc.): 3  Moving from lying on back to sitting on the side of the bed?: 3  Moving to and from a bed to a chair (including a wheelchair)?: 3  Need to walk in hospital room?: 3  Climbing 3-5 steps with a railing?: 2  Basic Mobility Total Score: 17       Treatment & Education:  Safety awareness, gait    Patient left up in chair with all lines intact, call button in reach, and RN present..    GOALS:   Multidisciplinary Problems       Physical Therapy Goals          Problem: Physical Therapy    Goal Priority Disciplines Outcome Goal Variances Interventions   Physical Therapy Goal     PT, PT/OT Progressing     Description: Goals to met by 8/31/2024    1. Sit to stand transfer with Supervision  2. Bed to chair transfer with Supervision using LRAD  3. Gait  x 150 feet with Supervision using LRAD   4. Lower extremity exercise program x15 reps per Instruction, with assistance as needed in order to facilitate improved strength, improved postural control, and improvement in functional independence                       Time Tracking:     PT Received On: 08/21/24  PT Start Time: 1435     PT Stop Time: 1450  PT Total Time (min): 15 min     Billable Minutes: Gait Training 15    Treatment Type: Treatment  PT/PTA: PT     Number of PTA visits since last PT visit: 0     08/21/2024

## 2024-08-21 NOTE — PROGRESS NOTES
[facility] Flex ([place] [id number])  [facility]  Progress Note    Patient Name: Amie Salmeron  MRN: [id number]  Patient Class: IP- Inpatient   Admission Date: 8/12/2024  Length of Stay: 9 days  Attending Physician: Melony Zavala MD  Primary Care Provider: Anuradha, Primary Doctor        Subjective:     Principal Problem:Osteomyelitis of vertebra of thoracolumbar region        HPI:  Mrs. Salmeron is a 58 yo F with PMHx of HTN, uncontrolled HIV noncompliant with HAART, spinal MAC, spinal osteo s/p T11-L2 corpectomy with fixation by ortho on 2/27/24 at [facility] who presents to [facility] as a transfer from [place] on 8/11/24 with generalized malaise, fatigue, and lower left sided back pain x 1 week. Pt denies fevers, chills, bowel/bladder incontinence, weakness, or numbness. In the [facility] ED, patient was afebrile but borderline hypotensive which improved with IV fluids. Noted to have WBC 16.6, K 2.4, albumin 1.9, normal lactic. Blood cultures were obtained. Patient was admitted to  at [place] and CT TL w/ contrast showed large left T11-L2 paravertebral retroperitoneal abscess increased from the prior study measuring approximately 14.0 x 5.8 x 15.5 cm.  The abscess crosses the midline to the right involving the vertebral bodies and surgical hardware also.  3.7 cm right periaortic component of the abscess at T12-L1 level. Potassium was repleted and vanc/zosyn given prior to transfer to [facility]. Neurosurgery and IR consult with admission to .    On my evaluation, patient borderline hypotensive and afebrile. Currently complaining of pain but no focal neurologic symptoms. Pain localized to left lower back. OSH lab work pending.     Pt admitted to hospital medicine with NSGY, IR, and ID consulted to assist with management.     Overview/Hospital Course:  58 yo with advance HIV/AIDS with multiple OI including verterbral MAC, paravertebral abscess, MRSA bacteremia. She was transferred [facility] for IR for the paravertebral abscess and  subsequently transferred to the MICU for hypotension requiring vasopressor support. She has been weaned off pressors. She continues on meropenem and vancomycin for the polymicrobial abscess. She dislodged her drain and repeat imaging showed abscess resolution so it was not replaced. Palliative care as been following as given her advanced AIDS she has limited/no therapeutic options for her vertebral MAC.  Place PICC line today for IV antibiotics.    Interval History: seen in bed, she walked with OT yesterday which felt good.  She is very hungry but she is waiting for CT abd so NPO for that.  Pain is still 10/10. Reviewed that she is not taking the pain meds as often as available and she needs to ask for them. She agrees to facility for discharge to complete abx    Review of Systems   All other systems reviewed and are negative.    Objective:     Vital Signs (Most Recent):  Temp: 98.3 °F (36.8 °C) (08/21/24 1145)  Pulse: 85 (08/21/24 1145)  Resp: 18 (08/21/24 1145)  BP: (!) 106/58 (08/21/24 1145)  SpO2: 100 % (08/21/24 1145) Vital Signs (24h Range):  Temp:  [97.1 °F (36.2 °C)-98.3 °F (36.8 °C)] 98.3 °F (36.8 °C)  Pulse:  [75-89] 85  Resp:  [18-20] 18  SpO2:  [98 %-100 %] 100 %  BP: ()/(58-71) 106/58     Weight: 45.8 kg (101 lb)  Body mass index is 17.34 kg/m².    Intake/Output Summary (Last 24 hours) at 8/21/2024 1434  Last data filed at 8/21/2024 1417  Gross per 24 hour   Intake --   Output 1300 ml   Net -1300 ml         Physical Exam  Vitals reviewed.   Constitutional:       Appearance: She is ill-appearing.   HENT:      Head: Normocephalic.      Mouth/Throat:      Mouth: Mucous membranes are moist.   Cardiovascular:      Rate and Rhythm: Normal rate.      Pulses: Normal pulses.      Heart sounds: Normal heart sounds.   Pulmonary:      Effort: Pulmonary effort is normal.      Breath sounds: Normal breath sounds.   Abdominal:      General: Abdomen is flat. Bowel sounds are normal.   Musculoskeletal:      Right  lower leg: No edema.      Left lower leg: No edema.      Comments: PICC in place, JEYSON drain in back   Skin:     General: Skin is warm and dry.   Neurological:      General: No focal deficit present.      Mental Status: She is alert.   Psychiatric:      Comments: Flat affect             Significant Labs: All pertinent labs within the past 24 hours have been reviewed.    Significant Imaging: I have reviewed all pertinent imaging results/findings within the past 24 hours.    Assessment/Plan:      * Osteomyelitis of vertebra of thoracolumbar region  Imaging showed Large left paravertebral retroperitoneal abscess increased from the prior study measuring approximately 14.0 x 5.8 x 15.5 cm. The abscess crosses the midline to the right involving the vertebral bodies and surgical hardware also. 3.7 cm right periaortic component of the abscess at T12-L1 level. See above comments. Paravertebral phlegmon with findings of discitis/osteomyelitis at T8-9, T9-10, L2-3 and L3-4   ID and NSGY consulted. IR placed drain which pt subsequently removed. Repeat imaging without remaining abscess.  Cx grew MRSA, AFB, fusobacterium, ESBL e coli  - ID consulted and appreciate recommendations  -plan for 6 weeks of vanc and ertapenem, OPAT written 8/16. If does not go to a facility let ID know so that f/u can be arranged  -asking ID to weight in on whether these is another option than ertapenem due to the cost of ertapenem  -still has one drain in place with minimal output in last 24 hours. Will dw ID when this can be pulled  - NSGY, IR appreciated. CT abd pelvis completed, abscess nearly resolved. Asked IR to pull drain.   -[facility]        UTI (urinary tract infection)  ESBL ECOLI  Being treated with ertapenem for vertebral abscess    Debility  Patient with Acute on chronic debility due to other reduced mobility. Latest AMPAC and GEMS scores have been reviewed. Evaluation for etiology is complete. Plan includes  progressive mobility protocol initated, PT/OT consulted, and fall precautions in place.    DNR (do not resuscitate)        Palliative care encounter        Septic shock  This patient has shock. The type of shock is distributive due to sepsis. The patient has the following evidence of shock: persistent hypotension. The patient was admitted to an intensive care unit and required norepinephrine which has since been weaned. See vertebral osteo for infectious treatment plan    Vertebral abscess    See osteomyelitis    Disseminated mycobacterium avium-intracellulare complex  Hx of spinal MAC  [facility] previous ID note showed continuation of ethambutol, rifampin, and azithromycin    - ID consulted and recommends continued ethambutol, rifampin, clarithromycin      Hyponatremia  Hyponatremia is likely due to Dehydration/hypovolemia and tea and toast syndrome. The patient's most recent sodium results are listed below.  Recent Labs     08/19/24  0558   *     Plan  - Much improved   - Will treat the hyponatremia with encourage oral intake  - Monitor sodium p.r.n.   - Patient hyponatremia is stable      AIDS  Continued dolutegravir and lamivudine, continued pjp prophylactic bactrim  Last CD4 was 76 on 8/11  Appreciate ID consult and assistance with this patient      Chronic pain  Secondary to osteo.   Needs better multimodal pain control. Will schedule tylenol, add prn oxycodone and lidocaine patch.  continue prn morphine. Continue gabapentin and robaxin (outpatient meds). Consider NSAID as well.  We will decrease dose of morphine for use with severe pain only.    She has been using the oxycodone minimally (just twice yesterday again). Needs to ask for oxy and morphine for severe pain not relieved by oxycodone         VTE Risk Mitigation (From admission, onward)           Ordered     enoxaparin injection 30 mg  Every 24 hours         08/13/24 0929     IP VTE LOW RISK PATIENT  Once         08/12/24 0240     Place sequential  compression device  Until discontinued         08/12/24 0240                    Discharge Planning   MARK: 8/21/2024     Code Status: DNR   Is the patient medically ready for discharge?:     Reason for patient still in hospital (select all that apply): Pending disposition  Discharge Plan A: Skilled Nursing Facility   Discharge Delays: (!) Post-Acute Set-up              Melony Zavala MD  Department of Hospital Medicine   [facility] - Stepdown Flex ([place]-[facility])

## 2024-08-21 NOTE — ASSESSMENT & PLAN NOTE
Hx of spinal MAC  [facility] previous ID note showed continuation of ethambutol, rifampin, and azithromycin    - ID consulted and recommends continued ethambutol, rifampin, clarithromycin

## 2024-08-21 NOTE — ASSESSMENT & PLAN NOTE
Hyponatremia is likely due to Dehydration/hypovolemia and tea and toast syndrome. The patient's most recent sodium results are listed below.  Recent Labs     08/19/24  0558   *     Plan  - Much improved   - Will treat the hyponatremia with encourage oral intake  - Monitor sodium p.r.n.   - Patient hyponatremia is stable

## 2024-08-21 NOTE — PLAN OF CARE
Problem: Physical Therapy  Goal: Physical Therapy Goal  Description: Goals to met by 8/31/2024    1. Sit to stand transfer with Supervision  2. Bed to chair transfer with Supervision using LRAD  3. Gait  x 150 feet with Supervision using LRAD   4. Lower extremity exercise program x15 reps per Instruction, with assistance as needed in order to facilitate improved strength, improved postural control, and improvement in functional independence  Outcome: Progressing

## 2024-08-22 LAB
ANION GAP SERPL CALC-SCNC: 7 MMOL/L (ref 8–16)
BUN SERPL-MCNC: 20 MG/DL (ref 8–23)
CALCIUM SERPL-MCNC: 8.3 MG/DL (ref 8.7–10.5)
CHLORIDE SERPL-SCNC: 103 MMOL/L (ref 95–110)
CO2 SERPL-SCNC: 23 MMOL/L (ref 23–29)
CREAT SERPL-MCNC: 0.7 MG/DL (ref 0.5–1.4)
ERYTHROCYTE [DISTWIDTH] IN BLOOD BY AUTOMATED COUNT: 21.2 % (ref 11.5–14.5)
EST. GFR  (NO RACE VARIABLE): >60 ML/MIN/1.73 M^2
GLUCOSE SERPL-MCNC: 76 MG/DL (ref 70–110)
HCT VFR BLD AUTO: 23.7 % (ref 37–48.5)
HGB BLD-MCNC: 7.4 G/DL (ref 12–16)
MAGNESIUM SERPL-MCNC: 1.9 MG/DL (ref 1.6–2.6)
MCH RBC QN AUTO: 24.5 PG (ref 27–31)
MCHC RBC AUTO-ENTMCNC: 31.2 G/DL (ref 32–36)
MCV RBC AUTO: 79 FL (ref 82–98)
PLATELET # BLD AUTO: 368 K/UL (ref 150–450)
PMV BLD AUTO: 10.6 FL (ref 9.2–12.9)
POTASSIUM SERPL-SCNC: 4.4 MMOL/L (ref 3.5–5.1)
RBC # BLD AUTO: 3.02 M/UL (ref 4–5.4)
SODIUM SERPL-SCNC: 133 MMOL/L (ref 136–145)
VANCOMYCIN SERPL-MCNC: 16.6 UG/ML
WBC # BLD AUTO: 4.69 K/UL (ref 3.9–12.7)

## 2024-08-22 PROCEDURE — 63600175 PHARM REV CODE 636 W HCPCS: Mod: HCNC | Performed by: HOSPITALIST

## 2024-08-22 PROCEDURE — 80048 BASIC METABOLIC PNL TOTAL CA: CPT | Mod: HCNC | Performed by: HOSPITALIST

## 2024-08-22 PROCEDURE — 97530 THERAPEUTIC ACTIVITIES: CPT | Mod: HCNC,CO

## 2024-08-22 PROCEDURE — 20600001 HC STEP DOWN PRIVATE ROOM: Mod: HCNC

## 2024-08-22 PROCEDURE — 25000003 PHARM REV CODE 250: Mod: HCNC | Performed by: STUDENT IN AN ORGANIZED HEALTH CARE EDUCATION/TRAINING PROGRAM

## 2024-08-22 PROCEDURE — 25000003 PHARM REV CODE 250: Mod: HCNC | Performed by: HOSPITALIST

## 2024-08-22 PROCEDURE — 25000003 PHARM REV CODE 250: Mod: HCNC

## 2024-08-22 PROCEDURE — 97535 SELF CARE MNGMENT TRAINING: CPT | Mod: HCNC,CO

## 2024-08-22 PROCEDURE — 80202 ASSAY OF VANCOMYCIN: CPT | Mod: HCNC | Performed by: HOSPITALIST

## 2024-08-22 PROCEDURE — 85027 COMPLETE CBC AUTOMATED: CPT | Mod: HCNC | Performed by: HOSPITALIST

## 2024-08-22 PROCEDURE — 83735 ASSAY OF MAGNESIUM: CPT | Mod: HCNC | Performed by: HOSPITALIST

## 2024-08-22 PROCEDURE — 63600175 PHARM REV CODE 636 W HCPCS: Mod: HCNC | Performed by: INTERNAL MEDICINE

## 2024-08-22 PROCEDURE — 63600175 PHARM REV CODE 636 W HCPCS: Mod: HCNC | Performed by: STUDENT IN AN ORGANIZED HEALTH CARE EDUCATION/TRAINING PROGRAM

## 2024-08-22 PROCEDURE — 97116 GAIT TRAINING THERAPY: CPT | Mod: HCNC

## 2024-08-22 PROCEDURE — 27000207 HC ISOLATION: Mod: HCNC

## 2024-08-22 PROCEDURE — A4216 STERILE WATER/SALINE, 10 ML: HCPCS | Mod: HCNC | Performed by: HOSPITALIST

## 2024-08-22 RX ADMIN — LIDOCAINE 5% 1 PATCH: 700 PATCH TOPICAL at 03:08

## 2024-08-22 RX ADMIN — CLARITHROMYCIN 500 MG: 500 TABLET, FILM COATED ORAL at 08:08

## 2024-08-22 RX ADMIN — METHOCARBAMOL 500 MG: 500 TABLET ORAL at 05:08

## 2024-08-22 RX ADMIN — Medication 10 ML: at 05:08

## 2024-08-22 RX ADMIN — Medication: at 08:08

## 2024-08-22 RX ADMIN — DOLUTEGRAVIR SODIUM 50 MG: 50 TABLET, FILM COATED ORAL at 08:08

## 2024-08-22 RX ADMIN — METHOCARBAMOL 500 MG: 500 TABLET ORAL at 12:08

## 2024-08-22 RX ADMIN — ACETAMINOPHEN 650 MG: 325 TABLET ORAL at 03:08

## 2024-08-22 RX ADMIN — METHOCARBAMOL 500 MG: 500 TABLET ORAL at 08:08

## 2024-08-22 RX ADMIN — ETHAMBUTOL HYDROCHLORIDE 700 MG: 100 TABLET ORAL at 08:08

## 2024-08-22 RX ADMIN — LAMIVUDINE 300 MG: 150 TABLET, FILM COATED ORAL at 09:08

## 2024-08-22 RX ADMIN — ACETAMINOPHEN 650 MG: 325 TABLET ORAL at 08:08

## 2024-08-22 RX ADMIN — QUETIAPINE FUMARATE 25 MG: 25 TABLET ORAL at 08:08

## 2024-08-22 RX ADMIN — Medication 10 ML: at 01:08

## 2024-08-22 RX ADMIN — RIFABUTIN 300 MG: 150 CAPSULE ORAL at 08:08

## 2024-08-22 RX ADMIN — OXYCODONE HYDROCHLORIDE 10 MG: 10 TABLET ORAL at 05:08

## 2024-08-22 RX ADMIN — Medication 10 ML: at 06:08

## 2024-08-22 RX ADMIN — VANCOMYCIN HYDROCHLORIDE 750 MG: 750 INJECTION, POWDER, LYOPHILIZED, FOR SOLUTION INTRAVENOUS at 01:08

## 2024-08-22 RX ADMIN — ERTAPENEM 1 G: 1 INJECTION INTRAMUSCULAR; INTRAVENOUS at 11:08

## 2024-08-22 RX ADMIN — ENOXAPARIN SODIUM 30 MG: 30 INJECTION SUBCUTANEOUS at 05:08

## 2024-08-22 RX ADMIN — OXYCODONE HYDROCHLORIDE 10 MG: 10 TABLET ORAL at 09:08

## 2024-08-22 RX ADMIN — OXYCODONE HYDROCHLORIDE 10 MG: 10 TABLET ORAL at 06:08

## 2024-08-22 RX ADMIN — OXYCODONE HYDROCHLORIDE 10 MG: 10 TABLET ORAL at 12:08

## 2024-08-22 NOTE — SUBJECTIVE & OBJECTIVE
Interval History: today she wants to get up to chair and get around more. She was hoping that I was physical therapy coming to walk with her. She is frustrated at not being allowed to move around.  Discussed with nursing to help her ambulate to chair. Back still hurts and she wants IV morphine. Discussed again the need to take he PO meds to work on adequate control    Review of Systems   All other systems reviewed and are negative.    Objective:     Vital Signs (Most Recent):  Temp: 99 °F (37.2 °C) (08/22/24 0731)  Pulse: 85 (08/22/24 0731)  Resp: 18 (08/22/24 1203)  BP: 125/68 (08/22/24 1056)  SpO2: 99 % (08/22/24 0731) Vital Signs (24h Range):  Temp:  [97.8 °F (36.6 °C)-99 °F (37.2 °C)] 99 °F (37.2 °C)  Pulse:  [85-97] 85  Resp:  [16-18] 18  SpO2:  [97 %-99 %] 99 %  BP: ()/(53-68) 125/68     Weight: 45.8 kg (101 lb)  Body mass index is 17.34 kg/m².    Intake/Output Summary (Last 24 hours) at 8/22/2024 1557  Last data filed at 8/22/2024 0624  Gross per 24 hour   Intake 821.62 ml   Output 200 ml   Net 621.62 ml         Physical Exam  Vitals reviewed.   Constitutional:       Appearance: She is ill-appearing.   HENT:      Head: Normocephalic.      Mouth/Throat:      Mouth: Mucous membranes are moist.   Cardiovascular:      Rate and Rhythm: Normal rate.      Pulses: Normal pulses.      Heart sounds: Normal heart sounds.   Pulmonary:      Effort: Pulmonary effort is normal.      Breath sounds: Normal breath sounds.   Abdominal:      General: Abdomen is flat. Bowel sounds are normal.   Musculoskeletal:      Right lower leg: No edema.      Left lower leg: No edema.      Comments: PICC in place, JEYSON drain in back   Skin:     General: Skin is warm and dry.   Neurological:      General: No focal deficit present.      Mental Status: She is alert.   Psychiatric:      Comments: Flat affect             Significant Labs: All pertinent labs within the past 24 hours have been reviewed.    Significant Imaging: I have reviewed  all pertinent imaging results/findings within the past 24 hours.

## 2024-08-22 NOTE — PLAN OF CARE
Problem: Adult Inpatient Plan of Care  Goal: Plan of Care Review  Outcome: Progressing  Goal: Absence of Hospital-Acquired Illness or Injury  Outcome: Progressing  Goal: Optimal Comfort and Wellbeing  Outcome: Progressing     Problem: Wound  Goal: Optimal Coping  Outcome: Progressing  Goal: Absence of Infection Signs and Symptoms  Outcome: Progressing  Goal: Skin Health and Integrity  Outcome: Progressing     Problem: Skin Injury Risk Increased  Goal: Skin Health and Integrity  Outcome: Progressing     Problem: Infection  Goal: Absence of Infection Signs and Symptoms  Outcome: Progressing     Problem: Sepsis/Septic Shock  Goal: Optimal Nutrition Intake  Outcome: Progressing     Problem: Coping Ineffective  Goal: Effective Coping  Outcome: Progressing     Problem: Fall Injury Risk  Goal: Absence of Fall and Fall-Related Injury  Outcome: Progressing     Problem: Pain Acute  Goal: Optimal Pain Control and Function  Outcome: Progressing   POC reviewed with pt who verbalized understanding. AAOx3. VSS on RA. IV ABX per MAR. Pain managed overnight. Pure wick in place. Sitter at bedside. Up with stand by assist. Resting between care. Remains free of falls and injury. Call light in reach and rounds made for safety.

## 2024-08-22 NOTE — ASSESSMENT & PLAN NOTE
Secondary to osteo.   Needs better multimodal pain control. Will schedule tylenol, add prn oxycodone and lidocaine patch.  continue prn morphine. Continue gabapentin and robaxin (outpatient meds). Consider NSAID as well.  We will decrease dose of morphine for use with severe pain only.    She has been using the oxycodone minimally (just 3 times yesterday again). Needs to ask for oxy and morphine for severe pain not relieved by oxycodone

## 2024-08-22 NOTE — PLAN OF CARE
"SW spoke with pt/POA in regards to pt discharge planning.   Pt agrees to go to LTAC. SW sent referral to [facility] and it was denied.   P2P has been scheduled for 8/23/24 at 10am with Dr. Zavala and Dr. Treadwell.     Pt is having difficulty getting into a snf due to her meds being very expensive and the snf not being able to cover the cost of pt IV antibx.  Pt is able to get her HIV meds filled at [facility] and bring them with her to accepting facility.    SW original dispo plan was for SNF and SW sent out snf referrals through care port.   Pt was willing to go to [facility]. Per [facility] on 8/21/24 pt IV antibx meds were to expensive.  [facility] then thought LTAC more appropriate for pt.     @11am on 8/22/24 Located within [facility] stated that the manager "Ok" the IV antibx and they are going to submit auth.   Pt will proceed with snf placement if auth is approved     Will follow up.    Kimberly Duffy MSW, CSW    "

## 2024-08-22 NOTE — PT/OT/SLP PROGRESS
"Occupational Therapy   Treatment    Name: Amie Salmeron  MRN: [id number]  Admitting Diagnosis:  Osteomyelitis of vertebra of thoracolumbar region       Recommendations:     Discharge Recommendations: Moderate Intensity Therapy  Discharge Equipment Recommendations:  none  Barriers to discharge:  None    Assessment:     Amie Salmeron is a 59 y.o. female with a medical diagnosis of Osteomyelitis of vertebra of thoracolumbar region.  She presents with the fallowing performance deficits affecting function are weakness, impaired endurance, impaired self care skills, impaired functional mobility, gait instability, impaired balance, impaired cognition, decreased coordination, decreased upper extremity function, decreased lower extremity function, orthopedic precautions, pain, decreased safety awareness. Patient agreeable to tx session, patient continues to exhibit intermittent confusion and requires verbal cues to stay focus on task and fallow directions. Patient has difficulty adhering spinal precautions and is not complaint with wearing brace. Patient continues to have limited activity tolerance due to pain and weakness from recent hospitalization. Patient would benefit from Moderate intensity therapy intervention to address over all functional decline with mobility task, endurance, and ADLs in order to return to PLOF.     Rehab Prognosis:  Good; patient would benefit from acute skilled OT services to address these deficits and reach maximum level of function.       Plan:     Patient to be seen 4 x/week to address the above listed problems via self-care/home management, therapeutic activities, therapeutic exercises  Plan of Care Expires: 09/16/24  Plan of Care Reviewed with: patient    Subjective     Chief Complaint: " I am very cold and I need to get up to dig in my trash"  Patient/Family Comments/goals: to return to PLOF  Pain/Comfort:  Pain Rating 1:  (patient did not rate)  Location - Side 1: Bilateral  Location - " Orientation 1: generalized  Location 1: back  Pain Addressed 1: Reposition, Distraction  Pain Rating Post-Intervention 1:  (patient did not rate)    Objective:     Communicated with: Nurse prior to session.  Patient found up in chair with peripheral IV, PureWick, JEYSON drain upon OT entry to room.  A client care conference was completed by the OTR and the PEARSON prior to treatment by the PEARSON to discuss the patient's POC and current status.   General Precautions: Standard, fall, contact    Orthopedic Precautions:spinal precautions  Braces: TLSO  Respiratory Status: Room air     Occupational Performance:     Functional Mobility/Transfers:  Patient completed Sit <> Stand Transfer with minimum assistance  with  rolling walker and both verbal and tactile cues for proper sequencing of task   Functional Mobility: Patient engaged in functional mobility task throughout hospital   with Min A  with RW to maximize functional endurance and standing balance required for home mobility and occupational engagement. Patient required verbal cues to manage walker properly. Patient was able to ambulate 3ft+5ft and needed to have a seated rest break between trials due to fatigue and confusion. Patient noticed to be impulsive and has poor safety awareness placing patient at high risk for falls. Patient also noticed with slight instability and decreased step clearance and has difficulty time maintaining upright posture.  Patient performed seated therapeutic exercises to improve B UE/LE strength postural muscle activation and activity tolerance 1X10 reps, patient performed a few B LE exercises in standing   Biceps curls   Chest press  Shoulder press   Arm raises  Ankle pumps  Leg kicks   Single leg marches   Lateral leg raises      Activities of Daily Living:  Upper Body Dressing: maximal assistance to don/doff gown and TLSO brace       AMPA 6 Click ADL: 17    Treatment & Education:  Discussed OT POC and progress  Educated patient on the  importance to continue to perform exercises in order to reduce stiffness and promote joint mobility and blood flow  Addressed patient's questions and concerns within PEARSON scope of practice      Patient left up in chair with all lines intact, call button in reach, nurse notified, and tele-sitter present    GOALS:   Multidisciplinary Problems       Occupational Therapy Goals          Problem: Occupational Therapy    Goal Priority Disciplines Outcome Interventions   Occupational Therapy Goal     OT, PT/OT Progressing    Description: Goals to be met by: 9/13/2024     Patient will increase functional independence with ADLs by performing:    UE Dressing with Stand-by Assistance.  LE Dressing with Stand-by Assistance.  Grooming while standing at sink with Stand-by Assistance.  Toileting from toilet with Stand-by Assistance for hygiene and clothing management.   Supine to sit with Stand-by Assistance.  Stand pivot transfers with Stand-by Assistance.  Toilet transfer to toilet with Stand-by Assistance.                         Time Tracking:     OT Date of Treatment: 08/22/24  OT Start Time: 1525  OT Stop Time: 1600  OT Total Time (min): 35 min    Billable Minutes:Self Care/Home Management 10  Therapeutic Activity 25    OT/ELIZABETH: ELIZABETH     Number of ELIZABETH visits since last OT visit: 1    8/22/2024

## 2024-08-22 NOTE — PLAN OF CARE
[facility] ([place])  Discharge Reassessment    Primary Care Provider: No, Primary Doctor    Expected Discharge Date: 8/22/2024    Reassessment (most recent)       Discharge Reassessment - 08/22/24 0957          Discharge Reassessment    Assessment Type Discharge Planning Reassessment     Did the patient's condition or plan change since previous assessment? No     Discharge Plan discussed with: POA     Name(s) and Number(s) Janett Crandall [phone]     Communicated MARK with patient/caregiver Yes     Discharge Plan A Long-term acute care facility (LTAC)     Discharge Plan B Home     DME Needed Upon Discharge  none     Transition of Care Barriers None     Why the patient remains in the hospital Requires continued medical care        Post-Acute Status    Post-Acute Authorization Placement     Post-Acute Placement Status Referrals Sent     Coverage HUMANA MANAGED MEDICARE_HUMANA MEDICARE O     Hospital Resources/Appts/Education Provided Appointments scheduled and added to AVS     Discharge Delays Post-Acute Set-up                       Discharge Plan A and Plan B have been determined by review of patient's clinical status, future medical and therapeutic needs, and coverage/benefits for post-acute care in coordination with multidisciplinary team members.    Kimberly Duffy MSW, CSW

## 2024-08-22 NOTE — PT/OT/SLP PROGRESS
"Physical Therapy Treatment    Patient Name:  Amie Salmeron   MRN:  [id number]  Admitting Diagnosis:  Osteomyelitis of vertebra of thoracolumbar region   Recent Surgery: * No surgery found *    Admit Date: 8/12/2024  Length of Stay: 10 days    Recommendations:     Discharge Recommendations:  Moderate Intensity Therapy  Discharge Equipment Recommendations: none   Justification for Equipment: N/A  Barriers to discharge: Evolving Clinical Presentation    Assessment:     Amie Salmeron is a 59 y.o. female admitted with a medical diagnosis of Osteomyelitis of vertebra of thoracolumbar region.  She presents with the following impairments/functional limitations:  weakness, impaired endurance, impaired self care skills, impaired functional mobility, gait instability, impaired balance, pain, decreased safety awareness, decreased lower extremity function, impaired cognition.     Pt agreeable to therapy, eager to get up and walk. Pt ambulates in room today with improved stability, requiring less assistance for safety but still needing direction and assist with walker management when navigating minor obstacles.    Rehab Prognosis: Good; patient would benefit from acute skilled PT services to address these deficits and reach maximum level of function.    Recent Surgery: * No surgery found *      Treatment Tolerated: Well    Highest level of mobility achieved this visit: ambulates 100ft w/ SBA (occasional min A for walker management) and RW    Activity with RN/PCT: ambulate with one person assist    Plan:     During this hospitalization, patient to be seen 4 x/week to address the identified rehab impairments via gait training, therapeutic activities, therapeutic exercises, neuromuscular re-education and progress toward the following goals:    Plan of Care Expires:  09/17/24    Subjective     DAKOTA Sheehan notified prior to session. No family present upon PT entrance into room.    Chief Complaint: pain  Patient/Family Comments/goals: "I'm " "ready to get up"  Pain/Comfort:  Pain Rating 1: 10/10  Location - Orientation 1: lower  Location 1: back      Objective:     Additional staff present: none    Patient found up in chair with: peripheral IV, PureWick   Cognition:   Cooperative and Labile  Flat and somewhat detached at times  Command following: Follows one-step verbal commands  Fluency: clear/fluent  General Precautions: Standard, special contact, fall   Orthopedic Precautions:spinal precautions   Braces: TLSO   Body mass index is 17.34 kg/m².  Oxygen Device: Room Air    Vitals: /68   Pulse 85   Temp 99 °F (37.2 °C) (Oral)   Resp 18   Ht 5' 4" (1.626 m)   Wt 45.8 kg (101 lb)   SpO2 99%   BMI 17.34 kg/m²     Outcome Measures:  AM-PAC 6 CLICK MOBILITY  Turning over in bed (including adjusting bedclothes, sheets and blankets)?: 3  Sitting down on and standing up from a chair with arms (e.g., wheelchair, bedside commode, etc.): 3  Moving from lying on back to sitting on the side of the bed?: 3  Moving to and from a bed to a chair (including a wheelchair)?: 3  Need to walk in hospital room?: 3  Climbing 3-5 steps with a railing?: 2  Basic Mobility Total Score: 17     Functional Mobility:    Bed Mobility:   Pt found/returned to bedside chair    Transfers:   Sit > Stand Transfer: stand by assistance with rolling walker   Stand > Sit Transfer: stand by assistance with rolling walker   x2 trials from bedside chair    Standing Balance:  Assistance Level Required: Stand-by Assistance  Patient used: rolling walker   Time: 15 min  Postural deviations noted: no deviations noted      Gait:  Patient ambulated: 100ft   Patient required: standy by assistance and occasional minimal assist for walker management  Patient used:  rolling walker   Gait Pattern observed: swing-through gait  Gait Deviation(s): decreased step length and decreased irma  Impairments due to: impaired balance and decreased endurance    Education:  Time provided for education, " counseling and discussion of health disposition in regards to patient's current status  All questions answered within PT scope of practice and to patient's satisfaction  PT role in POC to address current functional deficits  Pt educated on proper body mechanics, safety techniques, and energy conservation with PT facilitation and cueing throughout session    Patient left up in chair with all lines intact, call button in reach, and RN Aguila notified.    GOALS:   Multidisciplinary Problems       Physical Therapy Goals          Problem: Physical Therapy    Goal Priority Disciplines Outcome Goal Variances Interventions   Physical Therapy Goal     PT, PT/OT Progressing     Description: Goals to met by 8/31/2024    1. Sit to stand transfer with Supervision  2. Bed to chair transfer with Supervision using LRAD  3. Gait  x 150 feet with Supervision using LRAD   4. Lower extremity exercise program x15 reps per Instruction, with assistance as needed in order to facilitate improved strength, improved postural control, and improvement in functional independence                     Time Tracking:     PT Received On: 08/22/24  PT Start Time: 1322     PT Stop Time: 1342  PT Total Time (min): 20 min     Billable Minutes:   Gait Training 15 min    Treatment Type: Treatment  PT/PTA: PT       Fahad Ruiz, PT, DPT  8/22/2024

## 2024-08-22 NOTE — PLAN OF CARE
[facility] ([place])  Discharge Reassessment    Primary Care Provider: No, Primary Doctor    Expected Discharge Date: 8/22/2024    Reassessment (most recent)       Discharge Reassessment - 08/22/24 1134          Discharge Reassessment    Assessment Type Discharge Planning Reassessment     Did the patient's condition or plan change since previous assessment? No     Discharge Plan discussed with: POA     Name(s) and Number(s) Janett Crandall [phone]     Discharge Plan A Skilled Nursing Facility     Discharge Plan B Long-term acute care facility (LTAC)     DME Needed Upon Discharge  none     Transition of Care Barriers None     Why the patient remains in the hospital Requires continued medical care        Post-Acute Status    Post-Acute Authorization Placement     Post-Acute Placement Status Patient List Provided     Coverage HUMANA MANAGED MEDICARE- HUMANA MEDICARE HMO     Hospital Resources/Appts/Education Provided Appointments scheduled and added to AVS     Discharge Delays Post-Acute Set-up                       Discharge Plan A and Plan B have been determined by review of patient's clinical status, future medical and therapeutic needs, and coverage/benefits for post-acute care in coordination with multidisciplinary team members.      Kimberly Duffy MSW, CSW

## 2024-08-22 NOTE — ASSESSMENT & PLAN NOTE
Hyponatremia is likely due to Dehydration/hypovolemia and tea and toast syndrome. The patient's most recent sodium results are listed below.  Recent Labs     08/22/24  0436   *     Plan  - Much improved   - Will treat the hyponatremia with encourage oral intake  - Monitor sodium p.r.n.   - Patient hyponatremia is stable

## 2024-08-22 NOTE — PROGRESS NOTES
Pharmacokinetic Assessment Follow Up: IV Vancomycin    Vancomycin serum concentration assessment(s):    The trough level was drawn correctly and can be used to guide therapy at this time. The measurement is above the desired definitive target range of 15 to 20 mcg/mL.    Vancomycin Regimen Plan:    Discontinue the scheduled vancomycin regimen and re-dose when the random level is less than 20 mcg/mL, next level to be drawn at 1030 on 8/22.    Drug levels (last 3 results):  Recent Labs   Lab Result Units 08/19/24  2242 08/21/24  2224   Vancomycin-Trough ug/mL 19.8 26.5*       Pharmacy will continue to follow and monitor vancomycin.    Please contact pharmacy at extension [phone] for questions regarding this assessment.    Thank you for the consult,   Mary Waller       Patient brief summary:  Amie Salmeron is a 59 y.o. female initiated on antimicrobial therapy with IV Vancomycin for treatment of bone/joint infection    The patient's current regimen is holding    Drug Allergies:   Review of patient's allergies indicates:  No Known Allergies    Actual Body Weight:   45.8 kg    Renal Function:   Estimated Creatinine Clearance: 71.2 mL/min (based on SCr of 0.6 mg/dL).,     Dialysis Method (if applicable):  N/A    CBC (last 72 hours):  Recent Labs   Lab Result Units 08/19/24  0558   WBC K/uL 5.10   Hemoglobin g/dL 7.8*   Hematocrit % 25.9*   Platelets K/uL 293   Gran % % 71.1   Lymph % % 17.3*   Mono % % 9.2   Eosinophil % % 1.0   Basophil % % 0.4   Differential Method  Automated       Metabolic Panel (last 72 hours):  Recent Labs   Lab Result Units 08/19/24  0558   Sodium mmol/L 135*   Potassium mmol/L 3.8   Chloride mmol/L 103   CO2 mmol/L 26   Glucose mg/dL 68*   BUN mg/dL 12   Creatinine mg/dL 0.6   Albumin g/dL 1.6*   Total Bilirubin mg/dL 0.2   Alkaline Phosphatase U/L 99   AST U/L 15   ALT U/L 5*   Magnesium mg/dL 1.8   Phosphorus mg/dL 3.2       Vancomycin Administrations:  vancomycin given in the last 96 hours                      vancomycin (VANCOCIN) 1,000 mg in D5W 250 mL IVPB (Vial-Mate) (mg) 1,000 mg New Bag 08/21/24 1147     1,000 mg New Bag 08/20/24 2330     1,000 mg New Bag  1144     1,000 mg New Bag 08/19/24 2248     1,000 mg New Bag  1141     1,000 mg New Bag 08/18/24 2342    vancomycin (VANCOCIN) 1,000 mg in D5W 250 mL IVPB (Vial-Mate) (mg) 1,000 mg New Bag 08/18/24 0458                    Microbiologic Results:  Microbiology Results (last 7 days)       Procedure Component Value Units Date/Time    Blood culture [[id number]] Collected: 08/15/24 0812    Order Status: Completed Specimen: Blood from Peripheral, Hand, Left Updated: 08/20/24 1012     Blood Culture, Routine No growth after 5 days.    Blood culture [[id number]] Collected: 08/15/24 0818    Order Status: Completed Specimen: Blood from Peripheral, Hand, Right Updated: 08/20/24 1012     Blood Culture, Routine No growth after 5 days.    Fungus culture [[id number]] Collected: 08/12/24 1346    Order Status: Completed Specimen: Abscess from Abdomen Updated: 08/19/24 1237     Fungus (Mycology) Culture Culture in progress    Narrative:      Left lateral abd    Blood culture [[id number]] Collected: 08/13/24 1031    Order Status: Completed Specimen: Blood from Peripheral, Forearm, Left Updated: 08/18/24 1212     Blood Culture, Routine No growth after 5 days.    Blood culture [[id number]] Collected: 08/13/24 0352    Order Status: Completed Specimen: Blood from Peripheral, Upper Arm, Left Updated: 08/18/24 0612     Blood Culture, Routine No growth after 5 days.    Culture, Anaerobe [[id number]]  (Abnormal) Collected: 08/12/24 1346    Order Status: Completed Specimen: Abscess from Abdomen Updated: 08/16/24 0935     Anaerobic Culture FUSOBACTERIUM NUCLEATUM  Many      Narrative:      ADD PER JAMIE ALEXANDER MD [facility] [id number] 08/12/2024  20:15     AFB Culture & Smear [[id number]] Collected: 08/14/24 0519    Order Status: Completed Specimen: Blood Updated: 08/15/24  2127     AFB Culture & Smear Culture in progress

## 2024-08-22 NOTE — ASSESSMENT & PLAN NOTE
Imaging showed Large left paravertebral retroperitoneal abscess increased from the prior study measuring approximately 14.0 x 5.8 x 15.5 cm. The abscess crosses the midline to the right involving the vertebral bodies and surgical hardware also. 3.7 cm right periaortic component of the abscess at T12-L1 level. See above comments. Paravertebral phlegmon with findings of discitis/osteomyelitis at T8-9, T9-10, L2-3 and L3-4   ID and NSGY consulted. IR placed drain which pt subsequently removed. Repeat imaging without remaining abscess.  Cx grew MRSA, AFB, fusobacterium, ESBL e coli  - ID consulted and appreciate recommendations  -plan for 6 weeks (through 9/23) of vanc and ertapenem, OPAT written 8/16. If does not go to a facility let ID know so that f/u can be arranged  -appreciate discussion with Dr. Larose, ertapenem is the most efficacious option for her.   -still has one drain in place with minimal output in last 24 hours. Will dw ID when this can be pulled  - NSGY, IR appreciated. CT abd pelvis completed, abscess nearly resolved. IR to pulled drain on 8/21  -working on discharge to facilty

## 2024-08-22 NOTE — PROGRESS NOTES
[facility] - Stepdown Flex ([place] [id number])  [facility] Medicine  Progress Note    Patient Name: Amie Salmeron  MRN: [id number]  Patient Class: IP- Inpatient   Admission Date: 8/12/2024  Length of Stay: 10 days  Attending Physician: Melony Zavala MD  Primary Care Provider: Anuradha, Primary Doctor        Subjective:     Principal Problem:Osteomyelitis of vertebra of thoracolumbar region        HPI:  Mrs. Salmeron is a 58 yo F with PMHx of HTN, uncontrolled HIV noncompliant with HAART, spinal MAC, spinal osteo s/p T11-L2 corpectomy with fixation by ortho on 2/27/24 at [facility] who [place] to [facility] [place] a [place] from [facility] on 8/11/24 with generalized malaise, fatigue, and lower left sided back pain x 1 week. Pt denies fevers, chills, bowel/bladder incontinence, weakness, or numbness. In the [facility] ED, patient was afebrile but borderline hypotensive which improved with IV fluids. Noted to have WBC 16.6, K 2.4, albumin 1.9, normal lactic. Blood cultures were obtained. Patient was admitted to  at [facility] and CT TL w/ contrast showed large left T11-L2 paravertebral retroperitoneal abscess increased from the prior study measuring approximately 14.0 x 5.8 x 15.5 cm.  The abscess crosses the midline to the right involving the vertebral bodies and surgical hardware also.  3.7 cm right periaortic component of the abscess at T12-L1 level. Potassium was repleted and vanc/zosyn given prior to transfer to [facility]. Neurosurgery and IR consult with admission to .    On my evaluation, patient borderline hypotensive and afebrile. Currently complaining of pain but no focal neurologic symptoms. Pain localized to left lower back. OSH lab work pending.     Pt admitted to hospital medicine with NSGY, IR, and ID consulted to assist with management.     Overview/Hospital Course:  58 yo with advance HIV/AIDS with multiple OI including verterbral MAC, paravertebral abscess, MRSA bacteremia. She was transferred [facility] for IR for the paravertebral abscess  and subsequently transferred to the MICU for hypotension requiring vasopressor support. She has been weaned off pressors. She continues on meropenem and vancomycin for the polymicrobial abscess. She dislodged her drain and repeat imaging showed abscess resolution so it was not replaced. Palliative care as been following as given her advanced AIDS she has limited/no therapeutic options for her vertebral MAC.  Place PICC line for IV antibiotics. Continues to work with [employer].    Interval History: today she wants to get up to chair and get around more. She was hoping that I was physical therapy coming to walk with her. She is frustrated at not being allowed to move around.  Discussed with nursing to help her ambulate to chair. Back still hurts and she wants IV morphine. Discussed again the need to take he PO meds to work on adequate control    Review of Systems   All other systems reviewed and are negative.    Objective:     Vital Signs (Most Recent):  Temp: 99 °F (37.2 °C) (08/22/24 0731)  Pulse: 85 (08/22/24 0731)  Resp: 18 (08/22/24 1203)  BP: 125/68 (08/22/24 1056)  SpO2: 99 % (08/22/24 0731) Vital Signs (24h Range):  Temp:  [97.8 °F (36.6 °C)-99 °F (37.2 °C)] 99 °F (37.2 °C)  Pulse:  [85-97] 85  Resp:  [16-18] 18  SpO2:  [97 %-99 %] 99 %  BP: ()/(53-68) 125/68     Weight: 45.8 kg (101 lb)  Body mass index is 17.34 kg/m².    Intake/Output Summary (Last 24 hours) at 8/22/2024 1557  Last data filed at 8/22/2024 0624  Gross per 24 hour   Intake 821.62 ml   Output 200 ml   Net 621.62 ml         Physical Exam  Vitals reviewed.   Constitutional:       Appearance: She is ill-appearing.   HENT:      Head: Normocephalic.      Mouth/Throat:      Mouth: Mucous membranes are moist.   Cardiovascular:      Rate and Rhythm: Normal rate.      Pulses: Normal pulses.      Heart sounds: Normal heart sounds.   Pulmonary:      Effort: Pulmonary effort is normal.      Breath sounds: Normal breath sounds.   Abdominal:       General: Abdomen is flat. Bowel sounds are normal.   Musculoskeletal:      Right lower leg: No edema.      Left lower leg: No edema.      Comments: PICC in place, JEYSON drain in back   Skin:     General: Skin is warm and dry.   Neurological:      General: No focal deficit present.      Mental Status: She is alert.   Psychiatric:      Comments: Flat affect             Significant Labs: All pertinent labs within the past 24 hours have been reviewed.    Significant Imaging: I have reviewed all pertinent imaging results/findings within the past 24 hours.    Assessment/Plan:      * Osteomyelitis of vertebra of thoracolumbar region  Imaging showed Large left paravertebral retroperitoneal abscess increased from the prior study measuring approximately 14.0 x 5.8 x 15.5 cm. The abscess crosses the midline to the right involving the vertebral bodies and surgical hardware also. 3.7 cm right periaortic component of the abscess at T12-L1 level. See above comments. Paravertebral phlegmon with findings of discitis/osteomyelitis at T8-9, T9-10, L2-3 and L3-4   ID and NSGY consulted. IR placed drain which pt subsequently removed. Repeat imaging without remaining abscess.  Cx grew MRSA, AFB, fusobacterium, ESBL e coli  - ID consulted and appreciate recommendations  -plan for 6 weeks (through 9/23) of vanc and ertapenem, OPAT written 8/16. If does not go to a facility let ID know so that f/u can be arranged  -appreciate discussion with Dr. Laroes, ertapenem is the most efficacious option for her.   -still has one drain in place with minimal output in last 24 hours. Will dw ID when this can be pulled  - NSGY, IR appreciated. CT abd pelvis completed, abscess nearly resolved. IR to pulled drain on 8/21  -[facility]        UTI (urinary tract infection)  ESBL ECOLI  Being treated with ertapenem for vertebral abscess    Debility  Patient with Acute on chronic debility due to other reduced mobility. Latest [facility] and [facility]  scores have been reviewed. Evaluation for etiology is complete. Plan includes progressive mobility protocol initated, PT/OT consulted, and fall precautions in place.    DNR (do not resuscitate)        Palliative care encounter        Septic shock  This patient has shock. The type of shock is distributive due to sepsis. The patient has the following evidence of shock: persistent hypotension. The patient was admitted to an intensive care unit and required norepinephrine which has since been weaned. See vertebral osteo for infectious treatment plan    Vertebral abscess    See osteomyelitis    Disseminated mycobacterium avium-intracellulare complex  Hx of spinal MAC  [facility] previous ID note showed continuation of ethambutol, rifampin, and azithromycin    - ID consulted and recommends continued ethambutol, rifampin, clarithromycin      Hyponatremia  Hyponatremia is likely due to Dehydration/hypovolemia and tea and toast syndrome. The patient's most recent sodium results are listed below.  Recent Labs     08/22/24  0436   *     Plan  - Much improved   - Will treat the hyponatremia with encourage oral intake  - Monitor sodium p.r.n.   - Patient hyponatremia is stable      AIDS  Continued dolutegravir and lamivudine, continued pjp prophylactic bactrim  Last CD4 was 76 on 8/11  Appreciate ID consult and assistance with this patient      Chronic pain  Secondary to osteo.   Needs better multimodal pain control. Will schedule tylenol, add prn oxycodone and lidocaine patch.  continue prn morphine. Continue gabapentin and robaxin (outpatient meds). Consider NSAID as well.  We will decrease dose of morphine for use with severe pain only.    She has been using the oxycodone minimally (just 3 times yesterday again). Needs to ask for oxy and morphine for severe pain not relieved by oxycodone         VTE Risk Mitigation (From admission, onward)           Ordered     enoxaparin injection 30 mg  Every 24 hours         08/13/24 1950      IP VTE LOW RISK PATIENT  Once         08/12/24 0240     Place sequential compression device  Until discontinued         08/12/24 0240                    Discharge Planning   MARK: 8/23/2024     Code Status: DNR   Is the patient medically ready for discharge?:     Reason for patient still in hospital (select all that apply): Pending disposition  Discharge Plan A: Skilled Nursing Facility   Discharge Delays: (!) Post-Acute Set-up              Melony Zavala MD  Department of Hospital Medicine   [facility] - Stepdown Flex ([place])

## 2024-08-22 NOTE — CONSULTS
[facility] - Stepdown Flex ([place] [address])    Wound Care     Patient Name:  Amie Salmeron  MRN:  [id number]  Date: 8/22/2024  Diagnosis: Osteomyelitis of vertebra of thoracolumbar region     History:  Past Medical History:   Diagnosis Date    Allergy     Arthritis     Asthma     Chronic pain     HIV infection     Hypertension     Overactive bladder     Spinal stenosis     Urine incontinence      Social History     Socioeconomic History    Marital status: Single   Occupational History    Occupation: on disability   Tobacco Use    Smoking status: Never    Smokeless tobacco: Never   Substance and Sexual Activity    Alcohol use: Yes     Comment: Socially    Drug use: No    Sexual activity: Yes     Partners: Male     Birth control/protection: None     Social Determinants of Health     Financial Resource Strain: Low Risk  (8/13/2024)    Overall Financial Resource Strain (CARDIA)     Difficulty of Paying Living Expenses: Not very hard   Food Insecurity: No Food Insecurity (8/13/2024)    Hunger Vital Sign     Worried About Running Out of Food in the Last Year: Never true     Ran Out of Food in the Last Year: Never true   Transportation Needs: No Transportation Needs (8/13/2024)    TRANSPORTATION NEEDS     Transportation : No   Physical Activity: Inactive (8/13/2024)    Exercise Vital Sign     Days of Exercise per Week: 0 days     Minutes of Exercise per Session: 0 min   Stress: Stress Concern Present (8/13/2024)    [employer] - Occupational Stress Questionnaire     Feeling of Stress : To some extent   Housing Stability: Low Risk  (8/13/2024)    Housing Stability Vital Sign     Unable to Pay for Housing in the Last Year: No     Homeless in the Last Year: No     Precautions:  Allergies as of 08/11/2024    (No Known Allergies)       WOC Assessment Details / Treatment:    Patient seen for wound care: Follow-up   Chart reviewed for this encounter.   Labs:   WBC (K/uL)   Date Value   08/22/2024 4.69  "  08/19/2024 5.10     Glucose (mg/dL)   Date Value   08/22/2024 76   08/19/2024 68 (L)     Albumin (g/dL)   Date Value   08/19/2024 1.6 (L)   08/18/2024 1.5 (L)       Felipe Score: 18    Chart review reveals pt is incontinent of bowel and bladder.   Wound is POA  Immerse bed ordered on 8/13, not in place.     Narrative:  Pt seen for WC follow-up and agreed to assessment  Chart reviewed for this encounter.   See Flow Sheet for additional documentation and media.    Pt laying in kya bed, has room phone between legs, WC nurse attempted to remove and pt stated "I like it there." WC explained this can cause an injury, pt stated "I know, it's fine." Pt able to turn without assistance, PureWick in use. Foam border removed revealing partial thickness skin loss with a shallow open ulcer that has a pink wound bed, and no slough is observed, the white appearance in the picture is residual cream. There is a small area on right lower buttock    Skin Integrity JAMISON, Nettie Ortega consulted.    RECOMMENDATIONS:  Bedside nurse assess for acute changes (purulence, increased redness/swelling, increased drainage, malodor, increased pain, pallor, necrosis) please contact physician on any acute changes.    Due to incontinence, foam border is not recommended.     Ensure bed settings are correct:     "Immerse"  Generally each green bar = 50lbs, this pt weight 101 lbs, therefore 2 green bars is appropriate for patient immersion.     Please use nursing judgement to ensure pt is immersed.     Continue implementing Q2H turn protocol    Apply Triad correctly:      Always cleanse wound before applying Triad.  To remove Triad:   Use pH-balanced wound cleanser to soften Triad  Gently wipe without scrubbing  For complete removal, repeat as needed.     Gently spread Triad evenly over the area of application to the thickness of a dime.     Discussed POC with patient and primary nurse.   See EMR for orders & patient education.     Discussed " nutrition and the role of protein in wound healing with the patient. Instructed patient to optimize protein for wound healing.     Bedside nursing to continue care & monitoring.  Bedside nursing to maintain pressure injury prevention interventions    Thank you for the consult. Wound Care will continue to follow.       08/22/24 1030   WOCN Assessment   WOCN Total Time (mins) 30   Visit Date 08/22/24   Visit Time 1030   Consult Type Follow Up   WOCN Speciality Wound   Wound moisture;At risk for pressure Injury   Intervention chart review;assessed;changed;applied;orders   Teaching complication        Altered Skin Integrity 03/23/24 0528 Right Sacral spine Ulceration   Date First Assessed/Time First Assessed: 03/23/24 0528   Altered Skin Integrity Present on Admission - Did Patient arrive to the hospital with altered skin?: yes  Side: Right  Location: Sacral spine  Is this injury device related?: No  Primary Wound T...   Wound Image    Dressing Appearance Clean;Dry;Intact   Drainage Amount None   Drainage Characteristics/Odor No odor   Appearance Pink;Moist;Granulating   Periwound Area Intact;Pink   Wound Length (cm) 1 cm   Wound Width (cm) 0.5 cm   Wound Depth (cm) 0.2 cm   Wound Volume (cm^3) 0.1 cm^3   Wound Surface Area (cm^2) 0.5 cm^2   Care Cleansed with:;Sterile normal saline   Dressing Removed;Applied   Off Loading Other (see comments)  (immerse ordered)   Dressing Change Due 08/22/24

## 2024-08-22 NOTE — ASSESSMENT & PLAN NOTE
Hx of spinal MAC  [facility] previous ID note showed continuation of ethambutol, rifampin, and azithromycin    - ID consulted and recommends continued ethambutol, rifampin, clarithromycin

## 2024-08-22 NOTE — PROGRESS NOTES
Pharmacokinetic Assessment Follow Up: IV Vancomycin    Vancomycin serum concentration assessment(s)/Regimen Plan:    The random level, 16.6, 23 hours post dose and ~12 hours post elevated trough.   Level is within the desired definitive target range of 15 to 20 mcg/mL.  Will pulse dose Vancomycin 750 mg x1  Scr 0.7  Continue to pulse dose  Re-dose when the random level is less than 20 mcg/mL  Next level to be drawn at 8/23 @0100 (about 11 hours post previous dose)     Drug levels (last 3 results):  Recent Labs   Lab Result Units 08/19/24  2242 08/21/24  2224 08/22/24  1058   Vancomycin, Random ug/mL  --   --  16.6   Vancomycin-Trough ug/mL 19.8 26.5*  --        Pharmacy will continue to follow and monitor vancomycin.    Please contact pharmacy at extension [phone] for questions regarding this assessment.    Thank you for the consult,   Mabel Henao       Patient brief summary:  Amie Salmeron is a 59 y.o. female initiated on antimicrobial therapy with IV Vancomycin for treatment of bone/joint infection    The patient's current regimen is pulse dose    Drug Allergies:   Review of patient's allergies indicates:  No Known Allergies    Actual Body Weight:   45.8 kg    Renal Function:   Estimated Creatinine Clearance: 61 mL/min (based on SCr of 0.7 mg/dL).,     CBC (last 72 hours):  Recent Labs   Lab Result Units 08/22/24  0436   WBC K/uL 4.69   Hemoglobin g/dL 7.4*   Hematocrit % 23.7*   Platelets K/uL 368       Metabolic Panel (last 72 hours):  Recent Labs   Lab Result Units 08/22/24  0436   Sodium mmol/L 133*   Potassium mmol/L 4.4   Chloride mmol/L 103   CO2 mmol/L 23   Glucose mg/dL 76   BUN mg/dL 20   Creatinine mg/dL 0.7   Magnesium mg/dL 1.9       Vancomycin Administrations:  vancomycin given in the last 96 hours                     vancomycin (VANCOCIN) 1,000 mg in D5W 250 mL IVPB (Vial-Mate) (mg) 1,000 mg New Bag 08/21/24 1147     1,000 mg New Bag 08/20/24 2330     1,000 mg New Bag  1144     1,000 mg New Bag  08/19/24 2248     1,000 mg New Bag  1141     1,000 mg New Bag 08/18/24 2342                    Microbiologic Results:  Microbiology Results (last 7 days)       Procedure Component Value Units Date/Time    Blood culture [[id number]] Collected: 08/15/24 0812    Order Status: Completed Specimen: Blood from Peripheral, Hand, Left Updated: 08/20/24 1012     Blood Culture, Routine No growth after 5 days.    Blood culture [[id number]] Collected: 08/15/24 0818    Order Status: Completed Specimen: Blood from Peripheral, Hand, Right Updated: 08/20/24 1012     Blood Culture, Routine No growth after 5 days.    Fungus culture [[id number]] Collected: 08/12/24 1346    Order Status: Completed Specimen: Abscess from Abdomen Updated: 08/19/24 1237     Fungus (Mycology) Culture Culture in progress    Narrative:      Left lateral abd    Blood culture [[id number]] Collected: 08/13/24 1031    Order Status: Completed Specimen: Blood from Peripheral, Forearm, Left Updated: 08/18/24 1212     Blood Culture, Routine No growth after 5 days.    Blood culture [[id number]] Collected: 08/13/24 0352    Order Status: Completed Specimen: Blood from Peripheral, Upper Arm, Left Updated: 08/18/24 0612     Blood Culture, Routine No growth after 5 days.    Culture, Anaerobe [[id number]]  (Abnormal) Collected: 08/12/24 1346    Order Status: Completed Specimen: Abscess from Abdomen Updated: 08/16/24 0935     Anaerobic Culture FUSOBACTERIUM NUCLEATUM  Many      Narrative:      ADD PER JAMIE ALEXANDER MD CXAFB [id number] 08/12/2024  20:15     AFB Culture & Smear [[id number]] Collected: 08/14/24 0519    Order Status: Completed Specimen: Blood Updated: 08/15/24 2127     AFB Culture & Smear Culture in progress

## 2024-08-23 VITALS
TEMPERATURE: 99 F | DIASTOLIC BLOOD PRESSURE: 53 MMHG | OXYGEN SATURATION: 97 % | HEIGHT: 64 IN | WEIGHT: 101 LBS | SYSTOLIC BLOOD PRESSURE: 120 MMHG | RESPIRATION RATE: 20 BRPM | HEART RATE: 86 BPM | BODY MASS INDEX: 17.24 KG/M2

## 2024-08-23 LAB — VANCOMYCIN SERPL-MCNC: 14.1 UG/ML

## 2024-08-23 PROCEDURE — 25000003 PHARM REV CODE 250: Mod: HCNC

## 2024-08-23 PROCEDURE — 63600175 PHARM REV CODE 636 W HCPCS: Mod: HCNC | Performed by: STUDENT IN AN ORGANIZED HEALTH CARE EDUCATION/TRAINING PROGRAM

## 2024-08-23 PROCEDURE — 25000003 PHARM REV CODE 250: Mod: HCNC | Performed by: HOSPITALIST

## 2024-08-23 PROCEDURE — 25000003 PHARM REV CODE 250: Mod: HCNC | Performed by: STUDENT IN AN ORGANIZED HEALTH CARE EDUCATION/TRAINING PROGRAM

## 2024-08-23 PROCEDURE — 97530 THERAPEUTIC ACTIVITIES: CPT | Mod: HCNC,CO

## 2024-08-23 PROCEDURE — 94761 N-INVAS EAR/PLS OXIMETRY MLT: CPT | Mod: HCNC

## 2024-08-23 PROCEDURE — 97535 SELF CARE MNGMENT TRAINING: CPT | Mod: HCNC,CO

## 2024-08-23 PROCEDURE — 63600175 PHARM REV CODE 636 W HCPCS: Mod: HCNC | Performed by: INTERNAL MEDICINE

## 2024-08-23 PROCEDURE — 63600175 PHARM REV CODE 636 W HCPCS: Mod: HCNC | Performed by: HOSPITALIST

## 2024-08-23 PROCEDURE — 80202 ASSAY OF VANCOMYCIN: CPT | Mod: HCNC | Performed by: HOSPITALIST

## 2024-08-23 PROCEDURE — A4216 STERILE WATER/SALINE, 10 ML: HCPCS | Mod: HCNC | Performed by: HOSPITALIST

## 2024-08-23 PROCEDURE — 27000207 HC ISOLATION: Mod: HCNC

## 2024-08-23 PROCEDURE — 20600001 HC STEP DOWN PRIVATE ROOM: Mod: HCNC

## 2024-08-23 RX ORDER — CLARITHROMYCIN 500 MG/1
500 TABLET, FILM COATED ORAL EVERY 12 HOURS
Start: 2024-08-23

## 2024-08-23 RX ORDER — DOLUTEGRAVIR SODIUM AND LAMIVUDINE 50; 300 MG/1; MG/1
1 TABLET, FILM COATED ORAL DAILY
Qty: 30 TABLET | Refills: 1 | Status: ACTIVE | OUTPATIENT
Start: 2024-08-23 | End: 2024-10-22

## 2024-08-23 RX ORDER — METHOCARBAMOL 500 MG/1
500 TABLET, FILM COATED ORAL 4 TIMES DAILY
Qty: 40 TABLET | Refills: 0 | Status: ON HOLD | OUTPATIENT
Start: 2024-08-23 | End: 2024-08-28 | Stop reason: HOSPADM

## 2024-08-23 RX ORDER — DOLUTEGRAVIR SODIUM AND LAMIVUDINE 50; 300 MG/1; MG/1
1 TABLET, FILM COATED ORAL DAILY
Qty: 30 TABLET | Refills: 1 | Status: SHIPPED | OUTPATIENT
Start: 2024-08-23 | End: 2024-08-23

## 2024-08-23 RX ORDER — ACETAMINOPHEN 325 MG/1
650 TABLET ORAL 3 TIMES DAILY
Start: 2024-08-23

## 2024-08-23 RX ORDER — LAMIVUDINE 300 MG/1
300 TABLET, FILM COATED ORAL DAILY
Qty: 30 TABLET | Refills: 1 | OUTPATIENT
Start: 2024-08-23 | End: 2024-08-23 | Stop reason: HOSPADM

## 2024-08-23 RX ORDER — QUETIAPINE FUMARATE 25 MG/1
25 TABLET, FILM COATED ORAL NIGHTLY
Status: ON HOLD
Start: 2024-08-23 | End: 2024-08-28

## 2024-08-23 RX ORDER — OXYCODONE HYDROCHLORIDE 10 MG/1
10 TABLET ORAL EVERY 4 HOURS PRN
Qty: 20 TABLET | Refills: 0 | Status: ON HOLD | OUTPATIENT
Start: 2024-08-23 | End: 2024-08-28

## 2024-08-23 RX ADMIN — Medication 10 ML: at 06:08

## 2024-08-23 RX ADMIN — DOLUTEGRAVIR SODIUM 50 MG: 50 TABLET, FILM COATED ORAL at 09:08

## 2024-08-23 RX ADMIN — OXYCODONE HYDROCHLORIDE 10 MG: 10 TABLET ORAL at 08:08

## 2024-08-23 RX ADMIN — CLARITHROMYCIN 500 MG: 500 TABLET, FILM COATED ORAL at 09:08

## 2024-08-23 RX ADMIN — VANCOMYCIN HYDROCHLORIDE 750 MG: 750 INJECTION, POWDER, LYOPHILIZED, FOR SOLUTION INTRAVENOUS at 04:08

## 2024-08-23 RX ADMIN — QUETIAPINE FUMARATE 25 MG: 25 TABLET ORAL at 08:08

## 2024-08-23 RX ADMIN — SULFAMETHOXAZOLE AND TRIMETHOPRIM 1 TABLET: 800; 160 TABLET ORAL at 09:08

## 2024-08-23 RX ADMIN — Medication 10 ML: at 05:08

## 2024-08-23 RX ADMIN — CLARITHROMYCIN 500 MG: 500 TABLET, FILM COATED ORAL at 08:08

## 2024-08-23 RX ADMIN — ETHAMBUTOL HYDROCHLORIDE 700 MG: 100 TABLET ORAL at 09:08

## 2024-08-23 RX ADMIN — RIFABUTIN 300 MG: 150 CAPSULE ORAL at 09:08

## 2024-08-23 RX ADMIN — METHOCARBAMOL 500 MG: 500 TABLET ORAL at 05:08

## 2024-08-23 RX ADMIN — ACETAMINOPHEN 650 MG: 325 TABLET ORAL at 04:08

## 2024-08-23 RX ADMIN — ENOXAPARIN SODIUM 30 MG: 30 INJECTION SUBCUTANEOUS at 05:08

## 2024-08-23 RX ADMIN — Medication 10 ML: at 12:08

## 2024-08-23 RX ADMIN — METHOCARBAMOL 500 MG: 500 TABLET ORAL at 08:08

## 2024-08-23 RX ADMIN — LAMIVUDINE 300 MG: 150 TABLET, FILM COATED ORAL at 09:08

## 2024-08-23 RX ADMIN — METHOCARBAMOL 500 MG: 500 TABLET ORAL at 09:08

## 2024-08-23 RX ADMIN — ACETAMINOPHEN 650 MG: 325 TABLET ORAL at 08:08

## 2024-08-23 RX ADMIN — ACETAMINOPHEN 650 MG: 325 TABLET ORAL at 09:08

## 2024-08-23 RX ADMIN — ERTAPENEM 1 G: 1 INJECTION INTRAMUSCULAR; INTRAVENOUS at 10:08

## 2024-08-23 NOTE — PLAN OF CARE
Pt to be discharged at 2100    Problem: Adult Inpatient Plan of Care  Goal: Plan of Care Review  Outcome: Met  Goal: Patient-Specific Goal (Individualized)  Outcome: Met  Goal: Absence of Hospital-Acquired Illness or Injury  Outcome: Met  Goal: Optimal Comfort and Wellbeing  Outcome: Met  Goal: Readiness for Transition of Care  Outcome: Met     Problem: Wound  Goal: Optimal Coping  Outcome: Met  Goal: Optimal Functional Ability  Outcome: Met  Goal: Absence of Infection Signs and Symptoms  Outcome: Met  Goal: Improved Oral Intake  Outcome: Met  Goal: Optimal Pain Control and Function  Outcome: Met  Goal: Skin Health and Integrity  Outcome: Met  Goal: Optimal Wound Healing  Outcome: Met     Problem: Skin Injury Risk Increased  Goal: Skin Health and Integrity  Outcome: Met     Problem: Infection  Goal: Absence of Infection Signs and Symptoms  Outcome: Met     Problem: Sepsis/Septic Shock  Goal: Optimal Coping  Outcome: Met  Goal: Absence of Bleeding  Outcome: Met  Goal: Blood Glucose Level Within Targeted Range  Outcome: Met  Goal: Absence of Infection Signs and Symptoms  Outcome: Met  Goal: Optimal Nutrition Intake  Outcome: Met     Problem: Coping Ineffective  Goal: Effective Coping  Outcome: Met     Problem: Fall Injury Risk  Goal: Absence of Fall and Fall-Related Injury  Outcome: Met     Problem: Pain Acute  Goal: Optimal Pain Control and Function  Outcome: Met

## 2024-08-23 NOTE — PLAN OF CARE
[facility]    FACILITY TRANSFER ORDERS      Patient Name: Amie Salmeron  YOB: 1965    PCP: No, Primary Doctor   PCP Address: None  PCP Phone Number: None  PCP Fax: None    Encounter Date: 08/23/2024    Admit to: Skilled Nursing    Vital Signs:  Routine    Diagnoses:   Active Hospital Problems    Diagnosis  POA    *Osteomyelitis of vertebra of thoracolumbar region [M46.25]  Yes     Priority: 1 - High    Palliative care encounter [Z51.5]  Not Applicable    Advance care planning [Z71.89]  Not Applicable    DNR (do not resuscitate) [Z66]  No    Debility [R53.81]  Yes    UTI (urinary tract infection) [N39.0]  Yes    Septic shock [A41.9, R65.21]  Yes    Vertebral abscess [M46.20]  Yes    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]  Yes    Hyponatremia [E87.1]  Yes    AIDS [B20]  Yes    Chronic pain [G89.29]  Yes     Chronic     manged by Dr dowling in [place]        Resolved Hospital Problems    Diagnosis Date Resolved POA    Insomnia [G47.00] 08/14/2024 Yes    Mycobacterium avium infection [A31.0] 08/16/2024 Yes       Allergies:Review of patient's allergies indicates:  No Known Allergies    Diet: regular diet    Activities: Activity as tolerated wear back brace with PT/OT    Goals of Care Treatment Preferences:  Code Status: DNR          What is most important right now is to focus on improvement in condition but with limits to invasive therapies.  Accordingly, we have decided that the best plan to meet the patient's goals includes continuing with treatment.      Nursing: vitals per protocol    Labs: CBC, CMP, CRP, and vancomycin trough   all drawn weekly on Mondays, additionally vancomycin trough and CMP drawn Thursday      CONSULTS:    Physical Therapy to evaluate and treat. , Occupational Therapy to evaluate and treat., and  to evaluate for community resources/long-range planning.    MISCELLANEOUS CARE:  Outpatient Antibiotic Therapy Plan:     Please send  referral to [facility].     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; disseminated MAC      2) Discharge Antibiotics:     Intravenous antibiotics:  Vancomycin  mg q 12 hours   Ertapenem IV 1 gram q 24 hours      Oral antibiotics:  clarithromycin 500 mg BID  Ethambutol 700 mg daily  Rifabutin 300 mg daily  Will need to discuss PO suppression for hardware infection after IV course     3) Therapy Duration:  IV antibiotics - 6 weeks, PO antibiotics indefinitely/TBD by outpatient ID provider      Estimated end date of IV antibiotics (ertapenenem and vancomycin): 9/23/24     4) Outpatient Weekly Labs:     Order the following labs to be drawn on Mondays:   CBC  CMP   CRP  Vancomycin trough. Target 15-20     If discharged on vancomycin IV, order the following additional labs to be drawn on Thursdays:  CMP   Vancomycin trough. Target 15-20     If vancomycin trough is not at target (15-20) prior to discharge, schedule vancomycin trough to be drawn before their fourth outpatient dose.     5) Fax Lab Results to Infectious Diseases Provider: [facility] Fax Number: [phone]     6) Outpatient Infectious Diseases Follow-up     Follow-up appointment will be arranged by the ID clinic and will be found in the patient's appointments tab.     Prior to discharge, please ensure the patient's follow-up has been scheduled.    If there is still no follow-up scheduled prior to discharge, please send an [facility] message to [facility] [place] or Call Infectious Diseases Dept.      WOUND CARE ORDERS  To sacral spin skin tear and shallow ulcer;  apply Triad BID and PRN             Medications: Review discharge medications with patient and family and provide education.         Medication List        START taking these medications      0.9% NaCl PgBk 100 mL with ertapenem 1 gram SolR 1 g  Inject 1 g into  the vein once daily.     acetaminophen 325 MG tablet  Commonly known as: TYLENOL  Take 2 tablets (650 mg total) by mouth 3 (three) times daily.     clarithromycin 500 MG tablet  Commonly known as: BIAXIN  Take 1 tablet (500 mg total) by mouth every 12 (twelve) hours.     D5W SolP 250 mL with vancomycin 750 mg SolR 750 mg  Inject 750 mg into the vein every 12 (twelve) hours.     DOVATO  mg Tab  Generic drug: dolutegravir-lamivudine  Take 1 tablet by mouth once daily.     methocarbamoL 500 MG Tab  Commonly known as: ROBAXIN  Take 1 tablet (500 mg total) by mouth 4 (four) times daily. for 10 days     oxyCODONE 10 mg Tab immediate release tablet  Commonly known as: ROXICODONE  Take 1 tablet (10 mg total) by mouth every 4 (four) hours as needed for Pain.            CHANGE how you take these medications      QUEtiapine 25 MG Tab  Commonly known as: SEROQUEL  Take 1 tablet (25 mg total) by mouth every evening.  What changed:   medication strength  how much to take            CONTINUE taking these medications      albuterol 90 mcg/actuation inhaler  Commonly known as: PROVENTIL/VENTOLIN HFA  Inhale 2 puffs into the lungs every 6 (six) hours as needed. Rescue     ethambutoL 400 MG Tab  Commonly known as: MYAMBUTOL  Take 2 tablets (800 mg total) by mouth once daily.     LIDOcaine 5 %  Commonly known as: LIDODERM  Place 1 patch onto the skin once daily.     rifabutin 150 mg Cap  Commonly known as: MYCOBUTIN  Take 300 mg by mouth once daily.     sulfamethoxazole-trimethoprim 800-160mg 800-160 mg Tab  Commonly known as: BACTRIM DS  Take 1 tablet by mouth 3 (three) times a week.            STOP taking these medications      bisacodyL 5 mg EC tablet  Commonly known as: DULCOLAX     BOUDREAUXS BUTT PASTE 16 % Oint ointment  Generic drug: zinc oxide     cyclobenzaprine 5 MG tablet  Commonly known as: FLEXERIL     dolutegravir 50 mg Tab  Commonly known as: TIVICAY     gabapentin 400 MG capsule  Commonly known as: NEURONTIN      hydrOXYzine pamoate 25 MG Cap  Commonly known as: VISTARIL     Lactobacillus rhamnosus GG 10 billion cell capsule  Commonly known as: CULTURELLE     lamiVUDine 300 mg tablet  Commonly known as: EPIVIR     mirtazapine 15 MG disintegrating tablet  Commonly known as: REMERON SOL-TAB     naloxone 4 mg/actuation Spry  Commonly known as: NARCAN     polyethylene glycol 17 gram Pwpk  Commonly known as: GLYCOLAX     senna 8.6 mg tablet  Commonly known as: SENOKOT                Immunizations Administered as of 8/23/2024       No immunizations on file.                 Some patients may experience side effects after vaccination.  These may include fever, headache, muscle or joint aches.  Most symptoms resolve with 24-48 hours and do not require urgent medical evaluation unless they persist for more than 72 hours or symptoms are concerning for an unrelated medical condition.          _________________________________  Melony Zavala MD  08/23/2024

## 2024-08-23 NOTE — PROGRESS NOTES
Pharmacokinetic Assessment Follow Up: IV Vancomycin    Vancomycin serum concentration assessment(s):    The trough level was drawn correctly and can be used to guide therapy at this time. The measurement is below the desired definitive target range of 15 to 20 mcg/mL.    Vancomycin Regimen Plan:    Continue regimen to Vancomycin 750 mg IV every 12 hours with next serum trough concentration measured at 1500 prior to 4th dose on 08/24/24    Drug levels (last 3 results):  Recent Labs   Lab Result Units 08/21/24  2224 08/22/24  1058 08/23/24  0157   Vancomycin, Random ug/mL  --  16.6 14.1   Vancomycin-Trough ug/mL 26.5*  --   --        Pharmacy will continue to follow and monitor vancomycin.    Please contact pharmacy at extension [phone] for questions regarding this assessment.    Thank you for the consult,   Kenyatta Prasad       Patient brief summary:  Amie Salmeron is a 59 y.o. female initiated on antimicrobial therapy with IV Vancomycin for treatment of bone/joint infection        Drug Allergies:   Review of patient's allergies indicates:  No Known Allergies    Actual Body Weight:   45.8 kg    Renal Function:   Estimated Creatinine Clearance: 61 mL/min (based on SCr of 0.7 mg/dL).,     Dialysis Method (if applicable):  N/A    CBC (last 72 hours):  Recent Labs   Lab Result Units 08/22/24  0436   WBC K/uL 4.69   Hemoglobin g/dL 7.4*   Hematocrit % 23.7*   Platelets K/uL 368       Metabolic Panel (last 72 hours):  Recent Labs   Lab Result Units 08/22/24  0436   Sodium mmol/L 133*   Potassium mmol/L 4.4   Chloride mmol/L 103   CO2 mmol/L 23   Glucose mg/dL 76   BUN mg/dL 20   Creatinine mg/dL 0.7   Magnesium mg/dL 1.9       Vancomycin Administrations:  vancomycin given in the last 96 hours                     vancomycin 750 mg in D5W 250 mL IVPB (Vial-Mate) (mg) 750 mg New Bag 08/22/24 1327    vancomycin (VANCOCIN) 1,000 mg in D5W 250 mL IVPB (Vial-Mate) (mg) 1,000 mg New Bag 08/21/24 1147     1,000 mg New Bag 08/20/24  2330     1,000 mg New Bag  1144     1,000 mg New Bag 08/19/24 2248     1,000 mg New Bag  1141                    Microbiologic Results:  Microbiology Results (last 7 days)       Procedure Component Value Units Date/Time    Blood culture [[id number]] Collected: 08/15/24 0812    Order Status: Completed Specimen: Blood from Peripheral, Hand, Left Updated: 08/20/24 1012     Blood Culture, Routine No growth after 5 days.    Blood culture [[id number]] Collected: 08/15/24 0818    Order Status: Completed Specimen: Blood from Peripheral, Hand, Right Updated: 08/20/24 1012     Blood Culture, Routine No growth after 5 days.    Fungus culture [[id number]] Collected: 08/12/24 1346    Order Status: Completed Specimen: Abscess from Abdomen Updated: 08/19/24 1237     Fungus (Mycology) Culture Culture in progress    Narrative:      Left lateral abd    Blood culture [[id number]] Collected: 08/13/24 1031    Order Status: Completed Specimen: Blood from Peripheral, Forearm, Left Updated: 08/18/24 1212     Blood Culture, Routine No growth after 5 days.    Blood culture [[id number]] Collected: 08/13/24 0352    Order Status: Completed Specimen: Blood from Peripheral, Upper Arm, Left Updated: 08/18/24 0612     Blood Culture, Routine No growth after 5 days.    Culture, Anaerobe [[id number]]  (Abnormal) Collected: 08/12/24 1346    Order Status: Completed Specimen: Abscess from Abdomen Updated: 08/16/24 0935     Anaerobic Culture FUSOBACTERIUM NUCLEATUM  Many      Narrative:      ADD PER JAMIE ALEXANDER MD CXAFB [id number] 08/12/2024  20:15

## 2024-08-23 NOTE — PT/OT/SLP PROGRESS
Occupational Therapy   Treatment    Name: Amie Salmeron  MRN: [id number]  Admitting Diagnosis:  Osteomyelitis of vertebra of thoracolumbar region       Recommendations:     Discharge Recommendations: Moderate Intensity Therapy  Discharge Equipment Recommendations:  none  Barriers to discharge:  None    Assessment:     Amie Salmeron is a 59 y.o. female with a medical diagnosis of Osteomyelitis of vertebra of thoracolumbar region.  She presents with the fallowing performance deficits affecting function are weakness, impaired endurance, impaired self care skills, impaired functional mobility, gait instability, impaired balance, pain, decreased safety awareness, orthopedic precautions, impaired cognition. Patient agreeable to tx session, patient is demonstrating progress with functional transfers and bed mobility, however; patient continues to have limited activity tolerance due to pain, weakness, and intermittent confusion. Patient would benefit from Moderate intensity therapy intervention to address over all functional decline with mobility task, endurance, and ADLs in order to return to PLOF.     Rehab Prognosis:  Good; patient would benefit from acute skilled OT services to address these deficits and reach maximum level of function.       Plan:     Patient to be seen 4 x/week to address the above listed problems via self-care/home management, therapeutic activities, therapeutic exercises  Plan of Care Expires: 09/16/24  Plan of Care Reviewed with: patient    Subjective     Chief Complaint: non verbalized   Patient/Family Comments/goals: to return to PLOF  Pain/Comfort:  Pain Rating 1: 0/10    Objective:     Communicated with: Nurse prior to session.  Patient found HOB and sitter present elevated with PureWick upon OT entry to room.    General Precautions: Standard, fall, special contact    Orthopedic Precautions:spinal precautions  Braces: TLSO  Respiratory Status: Room air     Occupational Performance:     Bed  Mobility:    Patient completed Rolling/Turning to Right with stand by assistance  Patient completed Scooting anteriorly to EOB with stand by assistance  Patient completed Supine to Sit with stand by assistance  Patient completed Sit to Supine with contact guard assistance     Functional Mobility/Transfers:  Patient completed Sit <> Stand Transfer with contact guard assistance  with  rolling walker   Patient completed Bed <> Chair Transfer using Stand Pivot technique with contact guard assistance with rolling walker  Patient completed Chair <> BSC Step Transfer technique with contact guard assistance with rolling walker  Functional Mobility: Patient was able to take few steps to chair and BSC with Min to CGA with RW and verbal cues for proper sequencing of task     Activities of Daily Living:  Upper Body Dressing: maximal assistance to todd/doff gown and to todd TLSO brace  Lower Body Dressing: maximal assistance to todd/doff socks   Toileting: total assistance for pericare hygiene task and required CGA to SBA for static standing balance  holding on to RW      [facility] 6 Click ADL: 17    Treatment & Education:  Discussed OT POC and progress  Educated patient on the importance to continue to perform exercises in order to reduce stiffness and promote joint mobility and blood flow  Addressed patient's questions and concerns within [facility] scope of practice      Patient left HOB elevated with all lines intact, call button in reach, bed alarm on, nurse notified, sitter present, and tele-sitter    GOALS:   Multidisciplinary Problems       Occupational Therapy Goals          Problem: Occupational Therapy    Goal Priority Disciplines Outcome Interventions   Occupational Therapy Goal     OT, PT/OT Progressing    Description: Goals to be met by: 9/13/2024     Patient will increase functional independence with ADLs by performing:    UE Dressing with Stand-by Assistance.  LE Dressing with Stand-by Assistance.  Grooming while standing  at sink with Stand-by Assistance.  Toileting from toilet with Stand-by Assistance for hygiene and clothing management.   Supine to sit with Stand-by Assistance.  Stand pivot transfers with Stand-by Assistance.  Toilet transfer to toilet with Stand-by Assistance.                         Time Tracking:     OT Date of Treatment: 08/23/24  OT Start Time: 1300  OT Stop Time: 1348  OT Total Time (min): 48 min    Billable Minutes:Self Care/Home Management 28  Therapeutic Activity 20    OT/ELIZABETH: ELIZABETH     Number of ELIZABETH visits since last OT visit: 1    8/23/2024

## 2024-08-24 ENCOUNTER — HOSPITAL ENCOUNTER (INPATIENT)
Facility: HOSPITAL | Age: 59
LOS: 3 days | Discharge: NURSING, REHAB OR LONG-TERM CARE | DRG: 480 | End: 2024-08-28
Attending: EMERGENCY MEDICINE | Admitting: INTERNAL MEDICINE
Payer: MEDICARE

## 2024-08-24 DIAGNOSIS — S72.001A CLOSED FRACTURE OF NECK OF RIGHT FEMUR, INITIAL ENCOUNTER: ICD-10-CM

## 2024-08-24 DIAGNOSIS — S72.001A CLOSED DISPLACED FRACTURE OF RIGHT FEMORAL NECK: Primary | ICD-10-CM

## 2024-08-24 DIAGNOSIS — I95.9 HYPOTENSION: ICD-10-CM

## 2024-08-24 DIAGNOSIS — Z01.818 PREOPERATIVE CLEARANCE: ICD-10-CM

## 2024-08-24 DIAGNOSIS — M25.551 RIGHT HIP PAIN: ICD-10-CM

## 2024-08-24 DIAGNOSIS — B20 AIDS: ICD-10-CM

## 2024-08-24 DIAGNOSIS — M46.25 OSTEOMYELITIS OF VERTEBRA OF THORACOLUMBAR REGION: ICD-10-CM

## 2024-08-24 DIAGNOSIS — I95.89 CHRONIC HYPOTENSION: ICD-10-CM

## 2024-08-24 PROCEDURE — 94761 N-INVAS EAR/PLS OXIMETRY MLT: CPT | Mod: HCNC

## 2024-08-24 PROCEDURE — 99285 EMERGENCY DEPT VISIT HI MDM: CPT | Mod: 25,HCNC

## 2024-08-24 PROCEDURE — 51702 INSERT TEMP BLADDER CATH: CPT | Mod: HCNC

## 2024-08-24 PROCEDURE — 93005 ELECTROCARDIOGRAM TRACING: CPT | Mod: HCNC

## 2024-08-24 PROCEDURE — 96374 THER/PROPH/DIAG INJ IV PUSH: CPT | Mod: HCNC

## 2024-08-24 PROCEDURE — 83605 ASSAY OF LACTIC ACID: CPT | Mod: HCNC

## 2024-08-24 PROCEDURE — 96361 HYDRATE IV INFUSION ADD-ON: CPT | Mod: HCNC

## 2024-08-24 PROCEDURE — A4216 STERILE WATER/SALINE, 10 ML: HCPCS | Mod: HCNC | Performed by: HOSPITALIST

## 2024-08-24 PROCEDURE — 25000003 PHARM REV CODE 250: Mod: HCNC | Performed by: EMERGENCY MEDICINE

## 2024-08-24 PROCEDURE — 93010 ELECTROCARDIOGRAM REPORT: CPT | Mod: HCNC,,, | Performed by: INTERNAL MEDICINE

## 2024-08-24 PROCEDURE — 25000003 PHARM REV CODE 250: Mod: HCNC | Performed by: HOSPITALIST

## 2024-08-24 PROCEDURE — 80047 BASIC METABLC PNL IONIZED CA: CPT | Mod: HCNC

## 2024-08-24 RX ORDER — OXYCODONE HYDROCHLORIDE 5 MG/1
5 TABLET ORAL
Status: COMPLETED | OUTPATIENT
Start: 2024-08-24 | End: 2024-08-24

## 2024-08-24 RX ORDER — ONDANSETRON HYDROCHLORIDE 2 MG/ML
4 INJECTION, SOLUTION INTRAVENOUS
Status: COMPLETED | OUTPATIENT
Start: 2024-08-24 | End: 2024-08-25

## 2024-08-24 RX ADMIN — OXYCODONE HYDROCHLORIDE 5 MG: 5 TABLET ORAL at 11:08

## 2024-08-24 RX ADMIN — Medication 10 ML: at 12:08

## 2024-08-24 NOTE — PLAN OF CARE
[facility] - Stepdown Flex ([place]-[facility])  Discharge Final Note    Primary Care Provider: Anuradha, Primary Doctor    Expected Discharge Date: 8/23/2024    Patient discharged to [facility] for SNF via  van transportation.     Patient's bedside nurse and family notified of the above.    Discharge Plan A and Plan B have been determined by review of patient's clinical status, future medical and therapeutic needs, and coverage/benefits for post-acute care in coordination with multidisciplinary team members.        Final Discharge Note (most recent)       Final Note - 08/24/24 0946          Final Note    Assessment Type Final Discharge Note     Anticipated Discharge Disposition Skilled Nursing Facility     What phone number can be called within the next 1-3 days to see how you are doing after discharge? [phone]     Hospital Resources/Appts/Education Provided Appointments scheduled and added to AVS        Post-Acute Status    Post-Acute Authorization Placement     Post-Acute Placement Status Set-up Complete/Auth obtained     Coverage [employer] MANAGED MEDICARE-[employer] MEDICARE HMO     Discharge Delays None known at this time                     Important Message from Medicare  Important Message from Medicare regarding Discharge Appeal Rights: Explained to patient/caregiver, Signed/date by patient/caregiver     Date IMM was signed: 08/23/24  Time IMM was signed: 1104        Future Appointments   Date Time Provider Department Center   11/13/2024 10:00 AM Marilyn Collins, DO [facility] post-[facility]charge follow-up appointment and information added to AVS.     Kimberly Duffy MSW, CSW

## 2024-08-24 NOTE — DISCHARGE SUMMARY
Adrian Sidhu - Stepdown Flex ([place] [id number])  [facility] Medicine  Discharge Summary      Patient Name: Amie Salmeron  MRN: [id number]  VIKTORIA: [id number]  Patient Class: IP- Inpatient  Admission Date: 8/12/2024  Hospital Length of Stay: 12 days  Discharge Date and Time: 8/24/2024 12:48 AM  Attending Physician: Melony Zavala MD  Discharging Provider: Melony Zavala MD  Primary Care Provider: Anuradha, Primary Doctor  Hospital Medicine Team: [facility] HOSP MED O Melony Zavala MD  Primary Care Team: [facility] HOSP MED O    HPI:   Mrs. Salmeron is a 58 yo F with PMHx of HTN, uncontrolled HIV noncompliant with HAART, spinal MAC, spinal osteo s/p T11-L2 corpectomy with fixation by ortho on 2/27/24 at [facility] who presents to [facility] as a transfer from [place] on 8/11/24 with generalized malaise, fatigue, and lower left sided back pain x 1 week. Pt denies fevers, chills, bowel/bladder incontinence, weakness, or numbness. In the [facility] ED, patient was afebrile but borderline hypotensive which improved with IV fluids. Noted to have WBC 16.6, K 2.4, albumin 1.9, normal lactic. Blood cultures were obtained. Patient was admitted to  at [facility] and CT TL w/ contrast showed large left T11-L2 paravertebral retroperitoneal abscess increased from the prior study measuring approximately 14.0 x 5.8 x 15.5 cm.  The abscess crosses the midline to the right involving the vertebral bodies and surgical hardware also.  3.7 cm right periaortic component of the abscess at T12-L1 level. Potassium was repleted and vanc/zosyn given prior to transfer to [facility]. Neurosurgery and IR consult with admission to .    On my evaluation, patient borderline hypotensive and afebrile. Currently complaining of pain but no focal neurologic symptoms. Pain localized to left lower back. OSH lab work pending.     Pt admitted to hospital medicine with NSGY, IR, and ID consulted to assist with management.     * No surgery found *      Hospital Course:   58 yo with advance HIV/AIDS with  multiple OI including verterbral MAC, paravertebral abscess, MRSA bacteremia. She was transferred [facility] for IR for the paravertebral abscess and subsequently transferred to the MICU for hypotension requiring vasopressor support. IR placed drain on 8/12.  Abscess cultures grew AFB, Fusobacterium necleatum, ESBL E. Coli and blood cultures grew MRSA.  ID was consulted and started meropenum and vancomcyin. She has been weaned off pressors. Palliative care as been following as given her advanced AIDS she has limited/no therapeutic options for her vertebral MAC.  In discussions with ID, patient wants tor resume treatment for MAC so clarithromycin, rifabutin and ethambutol were resumed based on cultures from [facility].  Repeat imaging showed resolution of the abscess so her drain was removed. Her HIV meds were resumed and on discharge [facility] was able to get shai support for patient to have 0$ copay.  She was discharged to SNF to complete her antibiotics for total of 6 weeks of vancomycin and ertapenem ending 9/23.  She will need to follow up in ID clinic. Her antibiotics for MAC (oral) should be continued indefinitely.  She should wear back brace with PT     On day of discharge, she was in good spirits. Laying in bed, happy to be able to get to rehab. Lumbar drain removed. Lungs CTAB, Heart RRR, abd soft, NT, NABS    Goals of Care Treatment Preferences:  Code Status: DNR          What is most important right now is to focus on improvement in condition but with limits to invasive therapies.  Accordingly, we have decided that the best plan to meet the patient's goals includes continuing with treatment.      SDOH Screening:  The patient was screened for utility difficulties, food insecurity, transport difficulties, housing insecurity, and interpersonal safety and there were no concerns identified this admission.     Consults:   Consults (From admission, onward)          Status Ordering Provider      Inpatient consult to Interventional Radiology  Once        Provider:  (Not yet assigned)    Completed CRESENCIO TRAYLOR     Inpatient consult to PICC team ([facility])  Once        Provider:  (Not yet assigned)    Completed JOHNNY MONIQUE     Inpatient consult to PICC team ([facility])  Once        Provider:  (Not yet assigned)    Completed JOHNNY MONIQUE     Inpatient consult to Palliative Care  Once        Provider:  (Not yet assigned)    Completed SANDY REYES     Inpatient consult to Critical Care Medicine  Once        Provider:  Sandy Reyes, NP    Completed HECTOR ALBERT     Inpatient consult to Infectious Diseases  Once        Provider:  (Not yet assigned)    Completed LORRI VARGAS     Inpatient consult to Interventional Radiology  Once        Provider:  (Not yet assigned)    Completed LORRI VARGAS     Inpatient consult to Neurosurgery  Once        Provider:  (Not yet assigned)    Completed LORRI VARGAS            No new Assessment & Plan notes have been filed under this hospital service since the last note was generated.  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    Significant Diagnostic Studies: Microbiology: Blood Culture   Lab Results   Component Value Date    LABBLOO No growth after 5 days. 08/15/2024    and Wound Culture: positive for Fusobacterium, ESBL E coli, AFB,     Pending Diagnostic Studies:       None           Medications:  Reconciled Home Medications:      Medication List        START taking these medications      0.9% NaCl PgBk 100 mL with ertapenem 1 gram SolR 1 g  Inject 1 g into the vein once daily.     acetaminophen 325 MG tablet  Commonly known as: TYLENOL  Take 2 tablets (650 mg total) by mouth 3 (three) times daily.     clarithromycin 500 MG tablet  Commonly known as: BIAXIN  Take 1 tablet (500 mg total) by mouth every 12 (twelve) hours.     D5W SolP 250 mL with vancomycin 750 mg SolR 750 mg  Inject 750 mg into the vein every 12 (twelve) hours.     DOVATO  mg Tab  Generic drug: dolutegravir-lamivudine  Take 1 tablet by mouth once daily.     methocarbamoL 500 MG Tab  Commonly known as: ROBAXIN  Take 1 tablet (500 mg total) by mouth 4 (four) times daily. for 10 days     oxyCODONE 10 mg Tab immediate release tablet  Commonly known as: ROXICODONE  Take 1 tablet (10 mg total) by mouth every 4 (four) hours as needed for Pain.            CHANGE how you take these medications      QUEtiapine 25 MG Tab  Commonly known as: SEROQUEL  Take 1 tablet (25 mg total) by mouth every evening.  What changed:   medication strength  how much to take            CONTINUE taking these medications      albuterol 90 mcg/actuation inhaler  Commonly known as: PROVENTIL/VENTOLIN HFA  Inhale 2 puffs into the lungs every 6 (six) hours as needed. Rescue     ethambutoL 400 MG Tab  Commonly known as: MYAMBUTOL  Take 2 tablets (800 mg total) by mouth once daily.     LIDOcaine 5 %  Commonly known as:  LIDODERM  Place 1 patch onto the skin once daily.     rifabutin 150 mg Cap  Commonly known as: MYCOBUTIN  Take 300 mg by mouth once daily.     sulfamethoxazole-trimethoprim 800-160mg 800-160 mg Tab  Commonly known as: BACTRIM DS  Take 1 tablet by mouth 3 (three) times a week.            STOP taking these medications      bisacodyL 5 mg EC tablet  Commonly known as: DULCOLAX     BOUDREAUXS BUTT PASTE 16 % Oint ointment  Generic drug: zinc oxide     cyclobenzaprine 5 MG tablet  Commonly known as: FLEXERIL     dolutegravir 50 mg Tab  Commonly known as: TIVICAY     gabapentin 400 MG capsule  Commonly known as: NEURONTIN     hydrOXYzine pamoate 25 MG Cap  Commonly known as: VISTARIL     Lactobacillus rhamnosus GG 10 billion cell capsule  Commonly known as: CULTURELLE     lamiVUDine 300 mg tablet  Commonly known as: EPIVIR     mirtazapine 15 MG disintegrating tablet  Commonly known as: REMERON SOL-TAB     naloxone 4 mg/actuation Spry  Commonly known as: NARCAN     polyethylene glycol 17 gram Pwpk  Commonly known as: GLYCOLAX     senna 8.6 mg tablet  Commonly known as: SENOKOT              Indwelling Lines/Drains at time of discharge:   Lines/Drains/Airways       Peripherally Inserted Central Catheter Line  Duration             PICC Double Lumen 08/18/24 1626 right brachial 5 days              Drain  Duration             Female External Urinary Catheter w/ Suction 08/16/24 0748 8 days                    Time spent on the discharge of patient: 60 minutes         Melony Zavala MD  Department of Hospital Medicine  [facility] ([place])

## 2024-08-25 ENCOUNTER — ANESTHESIA EVENT (OUTPATIENT)
Dept: SURGERY | Facility: HOSPITAL | Age: 59
DRG: 480 | End: 2024-08-25
Payer: MEDICARE

## 2024-08-25 ENCOUNTER — ANESTHESIA (OUTPATIENT)
Dept: SURGERY | Facility: HOSPITAL | Age: 59
DRG: 480 | End: 2024-08-25
Payer: MEDICARE

## 2024-08-25 PROBLEM — Z16.12 INFECTION DUE TO ESBL-PRODUCING ESCHERICHIA COLI: Status: ACTIVE | Noted: 2024-08-25

## 2024-08-25 PROBLEM — R50.9 FUO (FEVER OF UNKNOWN ORIGIN): Status: RESOLVED | Noted: 2022-11-15 | Resolved: 2024-08-25

## 2024-08-25 PROBLEM — E87.1 HYPONATREMIA: Status: RESOLVED | Noted: 2022-12-05 | Resolved: 2024-08-25

## 2024-08-25 PROBLEM — R65.21 SEPTIC SHOCK: Status: RESOLVED | Noted: 2024-08-12 | Resolved: 2024-08-25

## 2024-08-25 PROBLEM — S72.001D CLOSED FRACTURE OF NECK OF RIGHT FEMUR WITH ROUTINE HEALING: Status: ACTIVE | Noted: 2024-08-25

## 2024-08-25 PROBLEM — A49.8 INFECTION DUE TO ESBL-PRODUCING ESCHERICHIA COLI: Status: ACTIVE | Noted: 2024-08-25

## 2024-08-25 PROBLEM — S72.001A CLOSED FRACTURE OF RIGHT HIP: Status: ACTIVE | Noted: 2024-08-25

## 2024-08-25 PROBLEM — R05.9 COUGH WITH FEVER: Status: RESOLVED | Noted: 2022-11-15 | Resolved: 2024-08-25

## 2024-08-25 PROBLEM — A49.8 FUSOBACTERIUM INFECTION: Status: ACTIVE | Noted: 2024-08-25

## 2024-08-25 PROBLEM — R63.4 WEIGHT LOSS: Status: RESOLVED | Noted: 2022-10-14 | Resolved: 2024-08-25

## 2024-08-25 PROBLEM — R50.9 COUGH WITH FEVER: Status: RESOLVED | Noted: 2022-11-15 | Resolved: 2024-08-25

## 2024-08-25 PROBLEM — A41.02 MRSA (METHICILLIN RESISTANT STAPHYLOCOCCUS AUREUS) SEPTICEMIA: Status: ACTIVE | Noted: 2024-08-25

## 2024-08-25 PROBLEM — B37.0 THRUSH: Status: RESOLVED | Noted: 2022-10-14 | Resolved: 2024-08-25

## 2024-08-25 PROBLEM — M79.10 MYALGIA: Status: RESOLVED | Noted: 2024-08-11 | Resolved: 2024-08-25

## 2024-08-25 PROBLEM — A41.9 SEPTIC SHOCK: Status: RESOLVED | Noted: 2024-08-12 | Resolved: 2024-08-25

## 2024-08-25 PROBLEM — E87.6 HYPOKALEMIA: Status: RESOLVED | Noted: 2024-08-11 | Resolved: 2024-08-25

## 2024-08-25 PROBLEM — B37.81 CANDIDA ESOPHAGITIS: Status: RESOLVED | Noted: 2022-11-15 | Resolved: 2024-08-25

## 2024-08-25 PROBLEM — A49.02 MRSA INFECTION: Status: ACTIVE | Noted: 2024-08-25

## 2024-08-25 PROBLEM — D64.9 ANEMIA DUE TO INFECTION: Status: ACTIVE | Noted: 2024-08-25

## 2024-08-25 PROBLEM — R19.7 DIARRHEA: Status: RESOLVED | Noted: 2022-10-14 | Resolved: 2024-08-25

## 2024-08-25 PROBLEM — B99.9 ANEMIA DUE TO INFECTION: Status: ACTIVE | Noted: 2024-08-25

## 2024-08-25 PROBLEM — N39.0 UTI (URINARY TRACT INFECTION): Status: RESOLVED | Noted: 2024-08-13 | Resolved: 2024-08-25

## 2024-08-25 LAB
ABO + RH BLD: NORMAL
ALBUMIN SERPL BCP-MCNC: 2.1 G/DL (ref 3.5–5.2)
ALLENS TEST: ABNORMAL
ALLENS TEST: NORMAL
ALP SERPL-CCNC: 119 U/L (ref 55–135)
ALT SERPL W/O P-5'-P-CCNC: 6 U/L (ref 10–44)
ANION GAP SERPL CALC-SCNC: 10 MMOL/L (ref 8–16)
ANISOCYTOSIS BLD QL SMEAR: SLIGHT
AST SERPL-CCNC: 24 U/L (ref 10–40)
BASOPHILS # BLD AUTO: 0.01 K/UL (ref 0–0.2)
BASOPHILS # BLD AUTO: 0.02 K/UL (ref 0–0.2)
BASOPHILS NFR BLD: 0.1 % (ref 0–1.9)
BASOPHILS NFR BLD: 0.4 % (ref 0–1.9)
BILIRUB SERPL-MCNC: 0.2 MG/DL (ref 0.1–1)
BILIRUB UR QL STRIP: NEGATIVE
BILIRUB UR QL STRIP: NEGATIVE
BLD GP AB SCN CELLS X3 SERPL QL: NORMAL
BUN SERPL-MCNC: 13 MG/DL (ref 8–23)
BURR CELLS BLD QL SMEAR: ABNORMAL
CALCIUM SERPL-MCNC: 8.6 MG/DL (ref 8.7–10.5)
CHLORIDE SERPL-SCNC: 104 MMOL/L (ref 95–110)
CLARITY UR REFRACT.AUTO: CLEAR
CLARITY UR REFRACT.AUTO: CLEAR
CO2 SERPL-SCNC: 20 MMOL/L (ref 23–29)
COLOR UR AUTO: COLORLESS
COLOR UR AUTO: COLORLESS
CREAT SERPL-MCNC: 0.6 MG/DL (ref 0.5–1.4)
DACRYOCYTES BLD QL SMEAR: ABNORMAL
DIFFERENTIAL METHOD BLD: ABNORMAL
DIFFERENTIAL METHOD BLD: ABNORMAL
EOSINOPHIL # BLD AUTO: 0 K/UL (ref 0–0.5)
EOSINOPHIL # BLD AUTO: 1 K/UL (ref 0–0.5)
EOSINOPHIL NFR BLD: 0.2 % (ref 0–8)
EOSINOPHIL NFR BLD: 13.8 % (ref 0–8)
ERYTHROCYTE [DISTWIDTH] IN BLOOD BY AUTOMATED COUNT: 23.1 % (ref 11.5–14.5)
ERYTHROCYTE [DISTWIDTH] IN BLOOD BY AUTOMATED COUNT: 23.1 % (ref 11.5–14.5)
EST. GFR  (NO RACE VARIABLE): >60 ML/MIN/1.73 M^2
ESTIMATED AVG GLUCOSE: 94 MG/DL (ref 68–131)
GLUCOSE SERPL-MCNC: 83 MG/DL (ref 70–110)
GLUCOSE UR QL STRIP: NEGATIVE
GLUCOSE UR QL STRIP: NEGATIVE
HBA1C MFR BLD: 4.9 % (ref 4–5.6)
HCO3 UR-SCNC: 28 MMOL/L (ref 24–28)
HCT VFR BLD AUTO: 23.8 % (ref 37–48.5)
HCT VFR BLD AUTO: 28 % (ref 37–48.5)
HCT VFR BLD CALC: 27 %PCV (ref 36–54)
HGB BLD-MCNC: 7.2 G/DL (ref 12–16)
HGB BLD-MCNC: 8.6 G/DL (ref 12–16)
HGB UR QL STRIP: NEGATIVE
HGB UR QL STRIP: NEGATIVE
HYPOCHROMIA BLD QL SMEAR: ABNORMAL
IMM GRANULOCYTES # BLD AUTO: 0.04 K/UL (ref 0–0.04)
IMM GRANULOCYTES # BLD AUTO: 0.04 K/UL (ref 0–0.04)
IMM GRANULOCYTES NFR BLD AUTO: 0.6 % (ref 0–0.5)
IMM GRANULOCYTES NFR BLD AUTO: 0.8 % (ref 0–0.5)
KETONES UR QL STRIP: NEGATIVE
KETONES UR QL STRIP: NEGATIVE
LACTATE SERPL-SCNC: 1.1 MMOL/L (ref 0.5–2.2)
LDH SERPL L TO P-CCNC: 0.97 MMOL/L (ref 0.5–2.2)
LEUKOCYTE ESTERASE UR QL STRIP: NEGATIVE
LEUKOCYTE ESTERASE UR QL STRIP: NEGATIVE
LYMPHOCYTES # BLD AUTO: 0.6 K/UL (ref 1–4.8)
LYMPHOCYTES # BLD AUTO: 0.9 K/UL (ref 1–4.8)
LYMPHOCYTES NFR BLD: 17.9 % (ref 18–48)
LYMPHOCYTES NFR BLD: 7.9 % (ref 18–48)
MCH RBC QN AUTO: 24.9 PG (ref 27–31)
MCH RBC QN AUTO: 25.7 PG (ref 27–31)
MCHC RBC AUTO-ENTMCNC: 30.3 G/DL (ref 32–36)
MCHC RBC AUTO-ENTMCNC: 30.7 G/DL (ref 32–36)
MCV RBC AUTO: 82 FL (ref 82–98)
MCV RBC AUTO: 84 FL (ref 82–98)
MONOCYTES # BLD AUTO: 0.3 K/UL (ref 0.3–1)
MONOCYTES # BLD AUTO: 0.3 K/UL (ref 0.3–1)
MONOCYTES NFR BLD: 4.8 % (ref 4–15)
MONOCYTES NFR BLD: 6.5 % (ref 4–15)
NEUTROPHILS # BLD AUTO: 3.7 K/UL (ref 1.8–7.7)
NEUTROPHILS # BLD AUTO: 5.2 K/UL (ref 1.8–7.7)
NEUTROPHILS NFR BLD: 72.8 % (ref 38–73)
NEUTROPHILS NFR BLD: 74.2 % (ref 38–73)
NITRITE UR QL STRIP: NEGATIVE
NITRITE UR QL STRIP: NEGATIVE
NRBC BLD-RTO: 0 /100 WBC
NRBC BLD-RTO: 0 /100 WBC
OHS QRS DURATION: 76 MS
OHS QTC CALCULATION: 409 MS
OVALOCYTES BLD QL SMEAR: ABNORMAL
PCO2 BLDA: 50.4 MMHG (ref 35–45)
PH SMN: 7.35 [PH] (ref 7.35–7.45)
PH UR STRIP: 7 [PH] (ref 5–8)
PH UR STRIP: 7 [PH] (ref 5–8)
PLATELET # BLD AUTO: 314 K/UL (ref 150–450)
PLATELET # BLD AUTO: 349 K/UL (ref 150–450)
PLATELET BLD QL SMEAR: ABNORMAL
PMV BLD AUTO: 10 FL (ref 9.2–12.9)
PMV BLD AUTO: 9.8 FL (ref 9.2–12.9)
PO2 BLDA: 25 MMHG (ref 40–60)
POC BE: 2 MMOL/L
POC IONIZED CALCIUM: 1.24 MMOL/L (ref 1.06–1.42)
POC SATURATED O2: 43 % (ref 95–100)
POC TCO2: 30 MMOL/L (ref 24–29)
POIKILOCYTOSIS BLD QL SMEAR: SLIGHT
POLYCHROMASIA BLD QL SMEAR: ABNORMAL
POTASSIUM BLD-SCNC: 4.1 MMOL/L (ref 3.5–5.1)
POTASSIUM SERPL-SCNC: 4.5 MMOL/L (ref 3.5–5.1)
PROT SERPL-MCNC: 6.7 G/DL (ref 6–8.4)
PROT UR QL STRIP: NEGATIVE
PROT UR QL STRIP: NEGATIVE
RBC # BLD AUTO: 2.89 M/UL (ref 4–5.4)
RBC # BLD AUTO: 3.34 M/UL (ref 4–5.4)
SAMPLE: ABNORMAL
SAMPLE: NORMAL
SCHISTOCYTES BLD QL SMEAR: ABNORMAL
SCHISTOCYTES BLD QL SMEAR: PRESENT
SITE: ABNORMAL
SITE: NORMAL
SODIUM BLD-SCNC: 135 MMOL/L (ref 136–145)
SODIUM SERPL-SCNC: 134 MMOL/L (ref 136–145)
SP GR UR STRIP: 1.02 (ref 1–1.03)
SP GR UR STRIP: 1.02 (ref 1–1.03)
SPECIMEN OUTDATE: NORMAL
URN SPEC COLLECT METH UR: ABNORMAL
URN SPEC COLLECT METH UR: ABNORMAL
WBC # BLD AUTO: 4.96 K/UL (ref 3.9–12.7)
WBC # BLD AUTO: 7.09 K/UL (ref 3.9–12.7)

## 2024-08-25 PROCEDURE — 25000003 PHARM REV CODE 250: Mod: HCNC

## 2024-08-25 PROCEDURE — 27235 TREAT THIGH FRACTURE: CPT | Mod: HCNC,RT,, | Performed by: ORTHOPAEDIC SURGERY

## 2024-08-25 PROCEDURE — 63600175 PHARM REV CODE 636 W HCPCS: Mod: HCNC

## 2024-08-25 PROCEDURE — 25000003 PHARM REV CODE 250: Mod: HCNC | Performed by: INTERNAL MEDICINE

## 2024-08-25 PROCEDURE — 83605 ASSAY OF LACTIC ACID: CPT | Mod: HCNC

## 2024-08-25 PROCEDURE — 27000207 HC ISOLATION: Mod: HCNC

## 2024-08-25 PROCEDURE — 80053 COMPREHEN METABOLIC PANEL: CPT | Mod: HCNC | Performed by: EMERGENCY MEDICINE

## 2024-08-25 PROCEDURE — 81003 URINALYSIS AUTO W/O SCOPE: CPT | Mod: HCNC | Performed by: INTERNAL MEDICINE

## 2024-08-25 PROCEDURE — D9220A PRA ANESTHESIA: Mod: HCNC,ANES,, | Performed by: ANESTHESIOLOGY

## 2024-08-25 PROCEDURE — 64448 NJX AA&/STRD FEM NRV NFS IMG: CPT | Mod: HCNC | Performed by: ANESTHESIOLOGY

## 2024-08-25 PROCEDURE — 83605 ASSAY OF LACTIC ACID: CPT | Mod: HCNC | Performed by: EMERGENCY MEDICINE

## 2024-08-25 PROCEDURE — 86900 BLOOD TYPING SEROLOGIC ABO: CPT | Mod: HCNC

## 2024-08-25 PROCEDURE — 83036 HEMOGLOBIN GLYCOSYLATED A1C: CPT | Mod: HCNC | Performed by: EMERGENCY MEDICINE

## 2024-08-25 PROCEDURE — 25000003 PHARM REV CODE 250: Mod: HCNC | Performed by: STUDENT IN AN ORGANIZED HEALTH CARE EDUCATION/TRAINING PROGRAM

## 2024-08-25 PROCEDURE — 85025 COMPLETE CBC W/AUTO DIFF WBC: CPT | Mod: HCNC | Performed by: EMERGENCY MEDICINE

## 2024-08-25 PROCEDURE — 63600175 PHARM REV CODE 636 W HCPCS: Mod: HCNC | Performed by: STUDENT IN AN ORGANIZED HEALTH CARE EDUCATION/TRAINING PROGRAM

## 2024-08-25 PROCEDURE — 0QH634Z INSERTION OF INTERNAL FIXATION DEVICE INTO RIGHT UPPER FEMUR, PERCUTANEOUS APPROACH: ICD-10-PCS | Performed by: ORTHOPAEDIC SURGERY

## 2024-08-25 PROCEDURE — 71000016 HC POSTOP RECOV ADDL HR: Mod: HCNC | Performed by: ORTHOPAEDIC SURGERY

## 2024-08-25 PROCEDURE — 36000710: Mod: HCNC | Performed by: ORTHOPAEDIC SURGERY

## 2024-08-25 PROCEDURE — 63600175 PHARM REV CODE 636 W HCPCS: Mod: HCNC | Performed by: EMERGENCY MEDICINE

## 2024-08-25 PROCEDURE — 21400001 HC TELEMETRY ROOM: Mod: HCNC

## 2024-08-25 PROCEDURE — C1769 GUIDE WIRE: HCPCS | Mod: HCNC | Performed by: ORTHOPAEDIC SURGERY

## 2024-08-25 PROCEDURE — 71000015 HC POSTOP RECOV 1ST HR: Mod: HCNC | Performed by: ORTHOPAEDIC SURGERY

## 2024-08-25 PROCEDURE — 36620 INSERTION CATHETER ARTERY: CPT | Mod: 59,HCNC,, | Performed by: ANESTHESIOLOGY

## 2024-08-25 PROCEDURE — 37000008 HC ANESTHESIA 1ST 15 MINUTES: Mod: HCNC | Performed by: ORTHOPAEDIC SURGERY

## 2024-08-25 PROCEDURE — 64999 UNLISTED PX NERVOUS SYSTEM: CPT | Mod: 59,HCNC,RT, | Performed by: ANESTHESIOLOGY

## 2024-08-25 PROCEDURE — C1713 ANCHOR/SCREW BN/BN,TIS/BN: HCPCS | Mod: HCNC | Performed by: ORTHOPAEDIC SURGERY

## 2024-08-25 PROCEDURE — 99900035 HC TECH TIME PER 15 MIN (STAT): Mod: HCNC

## 2024-08-25 PROCEDURE — 36000711: Mod: HCNC | Performed by: ORTHOPAEDIC SURGERY

## 2024-08-25 PROCEDURE — 82803 BLOOD GASES ANY COMBINATION: CPT | Mod: HCNC

## 2024-08-25 PROCEDURE — 25000003 PHARM REV CODE 250: Mod: HCNC | Performed by: EMERGENCY MEDICINE

## 2024-08-25 PROCEDURE — 85025 COMPLETE CBC W/AUTO DIFF WBC: CPT | Mod: 91,HCNC | Performed by: INTERNAL MEDICINE

## 2024-08-25 PROCEDURE — 71000033 HC RECOVERY, INTIAL HOUR: Mod: HCNC | Performed by: ORTHOPAEDIC SURGERY

## 2024-08-25 PROCEDURE — 87040 BLOOD CULTURE FOR BACTERIA: CPT | Mod: 59,HCNC | Performed by: EMERGENCY MEDICINE

## 2024-08-25 PROCEDURE — 86901 BLOOD TYPING SEROLOGIC RH(D): CPT | Mod: HCNC

## 2024-08-25 PROCEDURE — 37000009 HC ANESTHESIA EA ADD 15 MINS: Mod: HCNC | Performed by: ORTHOPAEDIC SURGERY

## 2024-08-25 PROCEDURE — 63600175 PHARM REV CODE 636 W HCPCS: Mod: HCNC | Performed by: INTERNAL MEDICINE

## 2024-08-25 PROCEDURE — 63600175 PHARM REV CODE 636 W HCPCS: Mod: HCNC | Performed by: ANESTHESIOLOGY

## 2024-08-25 PROCEDURE — 27201423 OPTIME MED/SURG SUP & DEVICES STERILE SUPPLY: Mod: HCNC | Performed by: ORTHOPAEDIC SURGERY

## 2024-08-25 PROCEDURE — D9220A PRA ANESTHESIA: Mod: HCNC,CRNA,, | Performed by: STUDENT IN AN ORGANIZED HEALTH CARE EDUCATION/TRAINING PROGRAM

## 2024-08-25 RX ORDER — MUPIROCIN 20 MG/G
1 OINTMENT TOPICAL
Status: CANCELLED | OUTPATIENT
Start: 2024-08-25

## 2024-08-25 RX ORDER — MEPERIDINE HYDROCHLORIDE 50 MG/ML
12.5 INJECTION INTRAMUSCULAR; INTRAVENOUS; SUBCUTANEOUS EVERY 10 MIN PRN
OUTPATIENT
Start: 2024-08-25 | End: 2024-08-25

## 2024-08-25 RX ORDER — SODIUM CHLORIDE 9 MG/ML
INJECTION, SOLUTION INTRAVENOUS CONTINUOUS
Status: DISCONTINUED | OUTPATIENT
Start: 2024-08-25 | End: 2024-08-26

## 2024-08-25 RX ORDER — HALOPERIDOL 5 MG/ML
0.5 INJECTION INTRAMUSCULAR EVERY 10 MIN PRN
OUTPATIENT
Start: 2024-08-25

## 2024-08-25 RX ORDER — BISACODYL 10 MG/1
10 SUPPOSITORY RECTAL DAILY PRN
Status: DISCONTINUED | OUTPATIENT
Start: 2024-08-25 | End: 2024-08-28 | Stop reason: HOSPADM

## 2024-08-25 RX ORDER — LIDOCAINE HYDROCHLORIDE 20 MG/ML
INJECTION INTRAVENOUS
Status: DISCONTINUED | OUTPATIENT
Start: 2024-08-25 | End: 2024-08-25

## 2024-08-25 RX ORDER — PROPOFOL 10 MG/ML
VIAL (ML) INTRAVENOUS
Status: DISCONTINUED | OUTPATIENT
Start: 2024-08-25 | End: 2024-08-25

## 2024-08-25 RX ORDER — CLARITHROMYCIN 500 MG/1
500 TABLET, FILM COATED ORAL EVERY 12 HOURS
Status: DISCONTINUED | OUTPATIENT
Start: 2024-08-25 | End: 2024-08-28 | Stop reason: HOSPADM

## 2024-08-25 RX ORDER — LAMIVUDINE 150 MG/1
300 TABLET, FILM COATED ORAL DAILY
Status: DISCONTINUED | OUTPATIENT
Start: 2024-08-25 | End: 2024-08-28 | Stop reason: HOSPADM

## 2024-08-25 RX ORDER — HYDROMORPHONE HYDROCHLORIDE 1 MG/ML
0.2 INJECTION, SOLUTION INTRAMUSCULAR; INTRAVENOUS; SUBCUTANEOUS EVERY 5 MIN PRN
OUTPATIENT
Start: 2024-08-25

## 2024-08-25 RX ORDER — MIDAZOLAM HYDROCHLORIDE 1 MG/ML
INJECTION INTRAMUSCULAR; INTRAVENOUS
Status: DISCONTINUED | OUTPATIENT
Start: 2024-08-25 | End: 2024-08-25

## 2024-08-25 RX ORDER — MIDAZOLAM HYDROCHLORIDE 1 MG/ML
0.5 INJECTION, SOLUTION INTRAMUSCULAR; INTRAVENOUS
Status: CANCELLED | OUTPATIENT
Start: 2024-08-25 | End: 2024-08-26

## 2024-08-25 RX ORDER — SODIUM CHLORIDE 0.9 % (FLUSH) 0.9 %
10 SYRINGE (ML) INJECTION EVERY 6 HOURS
Status: DISCONTINUED | OUTPATIENT
Start: 2024-08-25 | End: 2024-08-28 | Stop reason: HOSPADM

## 2024-08-25 RX ORDER — QUETIAPINE FUMARATE 25 MG/1
25 TABLET, FILM COATED ORAL NIGHTLY
Status: DISCONTINUED | OUTPATIENT
Start: 2024-08-25 | End: 2024-08-27

## 2024-08-25 RX ORDER — LIDOCAINE HYDROCHLORIDE 10 MG/ML
1 INJECTION, SOLUTION EPIDURAL; INFILTRATION; INTRACAUDAL; PERINEURAL
Status: CANCELLED | OUTPATIENT
Start: 2024-08-25

## 2024-08-25 RX ORDER — ACETAMINOPHEN 500 MG
1000 TABLET ORAL EVERY 6 HOURS
Status: DISCONTINUED | OUTPATIENT
Start: 2024-08-25 | End: 2024-08-26

## 2024-08-25 RX ORDER — FENTANYL CITRATE 50 UG/ML
25 INJECTION, SOLUTION INTRAMUSCULAR; INTRAVENOUS EVERY 5 MIN PRN
OUTPATIENT
Start: 2024-08-25

## 2024-08-25 RX ORDER — ROPIVACAINE HYDROCHLORIDE 5 MG/ML
INJECTION, SOLUTION EPIDURAL; INFILTRATION; PERINEURAL
Status: DISCONTINUED | OUTPATIENT
Start: 2024-08-25 | End: 2024-08-25

## 2024-08-25 RX ORDER — TALC
6 POWDER (GRAM) TOPICAL NIGHTLY PRN
Status: DISCONTINUED | OUTPATIENT
Start: 2024-08-25 | End: 2024-08-28 | Stop reason: HOSPADM

## 2024-08-25 RX ORDER — OXYCODONE HYDROCHLORIDE 5 MG/1
10 TABLET ORAL EVERY 4 HOURS PRN
Status: DISCONTINUED | OUTPATIENT
Start: 2024-08-25 | End: 2024-08-25

## 2024-08-25 RX ORDER — ACETAMINOPHEN 500 MG
1000 TABLET ORAL EVERY 8 HOURS
Status: DISCONTINUED | OUTPATIENT
Start: 2024-08-25 | End: 2024-08-25

## 2024-08-25 RX ORDER — FENTANYL CITRATE 50 UG/ML
INJECTION, SOLUTION INTRAMUSCULAR; INTRAVENOUS
Status: DISCONTINUED | OUTPATIENT
Start: 2024-08-25 | End: 2024-08-25

## 2024-08-25 RX ORDER — MUPIROCIN 20 MG/G
OINTMENT TOPICAL
Status: DISCONTINUED | OUTPATIENT
Start: 2024-08-25 | End: 2024-08-28 | Stop reason: HOSPADM

## 2024-08-25 RX ORDER — TRANEXAMIC ACID 100 MG/ML
INJECTION, SOLUTION INTRAVENOUS
Status: DISCONTINUED | OUTPATIENT
Start: 2024-08-25 | End: 2024-08-25

## 2024-08-25 RX ORDER — DEXAMETHASONE SODIUM PHOSPHATE 4 MG/ML
INJECTION, SOLUTION INTRA-ARTICULAR; INTRALESIONAL; INTRAMUSCULAR; INTRAVENOUS; SOFT TISSUE
Status: DISCONTINUED | OUTPATIENT
Start: 2024-08-25 | End: 2024-08-25

## 2024-08-25 RX ORDER — PHENYLEPHRINE HYDROCHLORIDE 10 MG/ML
INJECTION INTRAVENOUS
Status: DISCONTINUED | OUTPATIENT
Start: 2024-08-25 | End: 2024-08-25

## 2024-08-25 RX ORDER — FENTANYL CITRATE 50 UG/ML
25 INJECTION, SOLUTION INTRAMUSCULAR; INTRAVENOUS
Status: CANCELLED | OUTPATIENT
Start: 2024-08-25 | End: 2024-08-26

## 2024-08-25 RX ORDER — ROCURONIUM BROMIDE 10 MG/ML
INJECTION, SOLUTION INTRAVENOUS
Status: DISCONTINUED | OUTPATIENT
Start: 2024-08-25 | End: 2024-08-25

## 2024-08-25 RX ORDER — ONDANSETRON HYDROCHLORIDE 2 MG/ML
INJECTION, SOLUTION INTRAVENOUS
Status: DISCONTINUED | OUTPATIENT
Start: 2024-08-25 | End: 2024-08-25

## 2024-08-25 RX ORDER — FENTANYL CITRATE 50 UG/ML
25 INJECTION, SOLUTION INTRAMUSCULAR; INTRAVENOUS EVERY 5 MIN PRN
Status: DISCONTINUED | OUTPATIENT
Start: 2024-08-25 | End: 2024-08-26

## 2024-08-25 RX ORDER — FENTANYL CITRATE 50 UG/ML
50 INJECTION, SOLUTION INTRAMUSCULAR; INTRAVENOUS ONCE
Status: DISCONTINUED | OUTPATIENT
Start: 2024-08-25 | End: 2024-08-25

## 2024-08-25 RX ORDER — AMOXICILLIN 250 MG
1 CAPSULE ORAL 2 TIMES DAILY
Status: DISCONTINUED | OUTPATIENT
Start: 2024-08-25 | End: 2024-08-28 | Stop reason: HOSPADM

## 2024-08-25 RX ORDER — METHOCARBAMOL 500 MG/1
500 TABLET, FILM COATED ORAL EVERY 6 HOURS
Status: DISCONTINUED | OUTPATIENT
Start: 2024-08-25 | End: 2024-08-26

## 2024-08-25 RX ORDER — MUPIROCIN 20 MG/G
1 OINTMENT TOPICAL 2 TIMES DAILY
Status: DISCONTINUED | OUTPATIENT
Start: 2024-08-25 | End: 2024-08-28 | Stop reason: HOSPADM

## 2024-08-25 RX ORDER — CALCIUM CARBONATE 200(500)MG
1000 TABLET,CHEWABLE ORAL DAILY
Status: DISCONTINUED | OUTPATIENT
Start: 2024-08-25 | End: 2024-08-28 | Stop reason: HOSPADM

## 2024-08-25 RX ORDER — GLUCAGON 1 MG
1 KIT INJECTION
OUTPATIENT
Start: 2024-08-25

## 2024-08-25 RX ORDER — ONDANSETRON HYDROCHLORIDE 2 MG/ML
4 INJECTION, SOLUTION INTRAVENOUS EVERY 12 HOURS PRN
Status: DISCONTINUED | OUTPATIENT
Start: 2024-08-25 | End: 2024-08-28 | Stop reason: HOSPADM

## 2024-08-25 RX ORDER — OXYCODONE HYDROCHLORIDE 5 MG/1
5 TABLET ORAL ONCE AS NEEDED
OUTPATIENT
Start: 2024-08-25 | End: 2036-01-22

## 2024-08-25 RX ORDER — SODIUM CHLORIDE 0.9 % (FLUSH) 0.9 %
10 SYRINGE (ML) INJECTION
Status: DISCONTINUED | OUTPATIENT
Start: 2024-08-25 | End: 2024-08-26

## 2024-08-25 RX ORDER — SODIUM CHLORIDE 0.9 % (FLUSH) 0.9 %
10 SYRINGE (ML) INJECTION
Status: DISCONTINUED | OUTPATIENT
Start: 2024-08-25 | End: 2024-08-28 | Stop reason: HOSPADM

## 2024-08-25 RX ORDER — ETHAMBUTOL HYDROCHLORIDE 400 MG/1
800 TABLET, FILM COATED ORAL DAILY
Status: DISCONTINUED | OUTPATIENT
Start: 2024-08-25 | End: 2024-08-28 | Stop reason: HOSPADM

## 2024-08-25 RX ORDER — RIFABUTIN 150 MG/1
300 CAPSULE ORAL DAILY
Status: DISCONTINUED | OUTPATIENT
Start: 2024-08-25 | End: 2024-08-28 | Stop reason: HOSPADM

## 2024-08-25 RX ORDER — ROPIVACAINE HYDROCHLORIDE 2 MG/ML
INJECTION, SOLUTION EPIDURAL; INFILTRATION; PERINEURAL CONTINUOUS
Status: DISCONTINUED | OUTPATIENT
Start: 2024-08-25 | End: 2024-08-27

## 2024-08-25 RX ORDER — METHOCARBAMOL 500 MG/1
500 TABLET, FILM COATED ORAL EVERY 6 HOURS PRN
Status: DISCONTINUED | OUTPATIENT
Start: 2024-08-25 | End: 2024-08-25

## 2024-08-25 RX ORDER — CEFAZOLIN 2 G/1
INJECTION, POWDER, FOR SOLUTION INTRAMUSCULAR; INTRAVENOUS
Status: DISCONTINUED | OUTPATIENT
Start: 2024-08-25 | End: 2024-08-25

## 2024-08-25 RX ORDER — CHOLECALCIFEROL (VITAMIN D3) 25 MCG
1000 TABLET ORAL DAILY
Status: DISCONTINUED | OUTPATIENT
Start: 2024-08-25 | End: 2024-08-28 | Stop reason: HOSPADM

## 2024-08-25 RX ORDER — ONDANSETRON HYDROCHLORIDE 2 MG/ML
4 INJECTION, SOLUTION INTRAVENOUS DAILY PRN
OUTPATIENT
Start: 2024-08-25

## 2024-08-25 RX ORDER — PREGABALIN 75 MG/1
75 CAPSULE ORAL NIGHTLY
Status: DISCONTINUED | OUTPATIENT
Start: 2024-08-25 | End: 2024-08-27

## 2024-08-25 RX ADMIN — CEFAZOLIN 2 G: 2 INJECTION, POWDER, FOR SOLUTION INTRAMUSCULAR; INTRAVENOUS at 01:08

## 2024-08-25 RX ADMIN — ERTAPENEM 1 G: 1 INJECTION INTRAMUSCULAR; INTRAVENOUS at 05:08

## 2024-08-25 RX ADMIN — ACETAMINOPHEN 1000 MG: 500 TABLET ORAL at 05:08

## 2024-08-25 RX ADMIN — SENNOSIDES AND DOCUSATE SODIUM 1 TABLET: 50; 8.6 TABLET ORAL at 08:08

## 2024-08-25 RX ADMIN — MUPIROCIN: 20 OINTMENT TOPICAL at 08:08

## 2024-08-25 RX ADMIN — PHENYLEPHRINE HYDROCHLORIDE 100 MCG: 10 INJECTION INTRAVENOUS at 12:08

## 2024-08-25 RX ADMIN — MIDAZOLAM HYDROCHLORIDE 1 MG: 1 INJECTION, SOLUTION INTRAMUSCULAR; INTRAVENOUS at 12:08

## 2024-08-25 RX ADMIN — SUGAMMADEX 200 MG: 100 INJECTION, SOLUTION INTRAVENOUS at 02:08

## 2024-08-25 RX ADMIN — ETHAMBUTOL HYDROCHLORIDE 800 MG: 400 TABLET ORAL at 05:08

## 2024-08-25 RX ADMIN — LAMIVUDINE 300 MG: 150 TABLET, FILM COATED ORAL at 05:08

## 2024-08-25 RX ADMIN — DOLUTEGRAVIR SODIUM 50 MG: 50 TABLET, FILM COATED ORAL at 05:08

## 2024-08-25 RX ADMIN — APIXABAN 2.5 MG: 2.5 TABLET, FILM COATED ORAL at 08:08

## 2024-08-25 RX ADMIN — METHOCARBAMOL 500 MG: 500 TABLET ORAL at 05:08

## 2024-08-25 RX ADMIN — VANCOMYCIN HYDROCHLORIDE 750 MG: 750 INJECTION, POWDER, LYOPHILIZED, FOR SOLUTION INTRAVENOUS at 09:08

## 2024-08-25 RX ADMIN — CEFAZOLIN 2 G: 2 INJECTION, POWDER, FOR SOLUTION INTRAMUSCULAR; INTRAVENOUS at 08:08

## 2024-08-25 RX ADMIN — QUETIAPINE FUMARATE 25 MG: 25 TABLET ORAL at 08:08

## 2024-08-25 RX ADMIN — SODIUM CHLORIDE: 9 INJECTION, SOLUTION INTRAVENOUS at 02:08

## 2024-08-25 RX ADMIN — PREGABALIN 75 MG: 75 CAPSULE ORAL at 08:08

## 2024-08-25 RX ADMIN — ONDANSETRON 4 MG: 2 INJECTION INTRAMUSCULAR; INTRAVENOUS at 12:08

## 2024-08-25 RX ADMIN — ROCURONIUM BROMIDE 30 MG: 10 INJECTION, SOLUTION INTRAVENOUS at 12:08

## 2024-08-25 RX ADMIN — TRANEXAMIC ACID 1000 MG: 100 INJECTION INTRAVENOUS at 01:08

## 2024-08-25 RX ADMIN — QUETIAPINE FUMARATE 25 MG: 25 TABLET ORAL at 02:08

## 2024-08-25 RX ADMIN — ROPIVACAINE HYDROCHLORIDE 15 ML: 5 INJECTION EPIDURAL; INFILTRATION; PERINEURAL at 03:08

## 2024-08-25 RX ADMIN — PROPOFOL 50 MG: 10 INJECTION, EMULSION INTRAVENOUS at 12:08

## 2024-08-25 RX ADMIN — PHENYLEPHRINE HYDROCHLORIDE 200 MCG: 10 INJECTION INTRAVENOUS at 12:08

## 2024-08-25 RX ADMIN — CLARITHROMYCIN 500 MG: 500 TABLET, FILM COATED ORAL at 09:08

## 2024-08-25 RX ADMIN — PHENYLEPHRINE HYDROCHLORIDE 0.4 MCG/KG/MIN: 10 INJECTION INTRAVENOUS at 12:08

## 2024-08-25 RX ADMIN — MUPIROCIN 1 G: 20 OINTMENT TOPICAL at 09:08

## 2024-08-25 RX ADMIN — MUPIROCIN: 20 OINTMENT TOPICAL at 11:08

## 2024-08-25 RX ADMIN — ACETAMINOPHEN 1000 MG: 500 TABLET ORAL at 06:08

## 2024-08-25 RX ADMIN — Medication: at 03:08

## 2024-08-25 RX ADMIN — ROCURONIUM BROMIDE 20 MG: 10 INJECTION, SOLUTION INTRAVENOUS at 12:08

## 2024-08-25 RX ADMIN — SODIUM CHLORIDE: 9 INJECTION, SOLUTION INTRAVENOUS at 05:08

## 2024-08-25 RX ADMIN — SODIUM CHLORIDE 1000 ML: 9 INJECTION, SOLUTION INTRAVENOUS at 12:08

## 2024-08-25 RX ADMIN — PREGABALIN 75 MG: 75 CAPSULE ORAL at 02:08

## 2024-08-25 RX ADMIN — LIDOCAINE HYDROCHLORIDE 80 MG: 20 INJECTION INTRAVENOUS at 12:08

## 2024-08-25 RX ADMIN — DEXAMETHASONE SODIUM PHOSPHATE 4 MG: 4 INJECTION, SOLUTION INTRAMUSCULAR; INTRAVENOUS at 12:08

## 2024-08-25 RX ADMIN — SODIUM CHLORIDE: 0.9 INJECTION, SOLUTION INTRAVENOUS at 12:08

## 2024-08-25 RX ADMIN — VANCOMYCIN HYDROCHLORIDE 750 MG: 750 INJECTION, POWDER, LYOPHILIZED, FOR SOLUTION INTRAVENOUS at 11:08

## 2024-08-25 RX ADMIN — FENTANYL CITRATE 25 MCG: 50 INJECTION, SOLUTION INTRAMUSCULAR; INTRAVENOUS at 01:08

## 2024-08-25 NOTE — PLAN OF CARE
Pt x4, Pt's BP is low but the MD is fine with this as long as the MAP is 65 or above. PT is in pain but understands that narcotics are not to be given due to the hypotension. Pt accepted this, pt currently resting, pt care ongoing.  Problem: Hip Fracture Medical Management  Goal: Optimal Coping with Change in Health Status  Outcome: Progressing  Goal: Absence of Bleeding  Outcome: Progressing  Goal: Effective Bowel Elimination  Outcome: Progressing  Goal: Baseline Cognitive Function Maintained  Outcome: Progressing  Goal: Absence of Embolism  Outcome: Progressing  Goal: Fracture Stability  Outcome: Progressing

## 2024-08-25 NOTE — NURSING
Nurses Note -- 4 Eyes      8/25/2024   4:52 AM      Skin assessed during: Admit      [] No Altered Skin Integrity Present    []Prevention Measures Documented      [x] Yes- Altered Skin Integrity Present or Discovered   [] LDA Added if Not in Epic (Describe Wound)   [] New Altered Skin Integrity was Present on Admit and Documented in LDA   [] Wound Image Taken    Wound Care Consulted? Yes    Attending Nurse:  Bill Jensen RN     Second RN/Staff Member:   Neha Villa RN    Redness to sacral area

## 2024-08-25 NOTE — SUBJECTIVE & OBJECTIVE
Past Medical History:   Diagnosis Date    Allergy     Arthritis     Asthma     Chronic pain     HIV infection     Hypertension     Overactive bladder     Spinal stenosis     Urine incontinence        Past Surgical History:   Procedure Laterality Date    ADENOIDECTOMY      APPENDECTOMY      BACK SURGERY      GERALD    CYST REMOVAL      HYSTERECTOMY      JOINT REPLACEMENT Right     knee, with revision    KNEE SURGERY Left     knee replacement    RHIZOTOMY      TONSILLECTOMY      TOTAL ANKLE ARTHROPLASTY Right        Review of patient's allergies indicates:  No Known Allergies    Current Facility-Administered Medications   Medication    0.9%  NaCl infusion    acetaminophen tablet 1,000 mg    bisacodyL suppository 10 mg    clarithromycin tablet 500 mg    dolutegravir Tab 50 mg    And    lamiVUDine tablet 300 mg    ethambutoL tablet 800 mg    melatonin tablet 6 mg    ondansetron injection 4 mg    pregabalin capsule 75 mg    QUEtiapine tablet 25 mg    rifabutin capsule 300 mg    sodium chloride 0.9% flush 10 mL    sodium chloride 0.9% flush 10 mL    And    sodium chloride 0.9% flush 10 mL    vancomycin 750 mg in D5W 250 mL IVPB (Vial-Mate)     Family History       Problem Relation (Age of Onset)    Alcohol abuse Brother    Cancer Father    Depression Sister    Hypertension Mother, Brother    Thyroid disease Mother          Tobacco Use    Smoking status: Never    Smokeless tobacco: Never   Substance and Sexual Activity    Alcohol use: Yes     Comment: Socially    Drug use: No    Sexual activity: Yes     Partners: Male     Birth control/protection: None     ROS    Constitutional: negative for fevers  Eyes: negative visual changes  ENT: negative for hearing loss  Respiratory: negative for dyspnea  Cardiovascular: negative for chest pain  Gastrointestinal: negative for abdominal pain  Genitourinary: negative for dysuria  Neurological: negative for headaches  Behavioral/Psych: negative for hallucinations  Endocrine: negative for  "temperature intolerance      Objective:     Vital Signs (Most Recent):  Temp: 97.6 °F (36.4 °C) (08/25/24 0446)  Pulse: 76 (08/25/24 0507)  Resp: 18 (08/25/24 0446)  BP: (!) 91/50 (08/25/24 0446)  SpO2: 99 % (08/25/24 0446) Vital Signs (24h Range):  Temp:  [97.6 °F (36.4 °C)-98.6 °F (37 °C)] 97.6 °F (36.4 °C)  Pulse:  [72-83] 76  Resp:  [12-20] 18  SpO2:  [99 %-100 %] 99 %  BP: ()/(45-61) 91/50     Weight: 45.8 kg (100 lb 15.5 oz)  Height: 5' 4" (162.6 cm)  Body mass index is 17.33 kg/m².      Intake/Output Summary (Last 24 hours) at 8/25/2024 0559  Last data filed at 8/25/2024 0114  Gross per 24 hour   Intake 999 ml   Output --   Net 999 ml        Ortho/SPM Exam     Gen:  No acute distress, well-developed, well nourished.  CV:  Peripherally well-perfused. 2+ radial pulses, symmetric.  Respiratory:  Normal respiratory effort. No accessory muscle use.   Head/Neck:  Normocephalic.  Atraumatic. Sclera anicteric. TM. Neck supple.  Neuro: CN 2-12 grossly intact. No FND. Awake. Alert. Oriented to person, place, time, and situation.   Abdomen: Soft, NTND.      MSK:  Left Upper Extremity  Inspection  - Skin intact throughout, no open wounds  - No swelling  - No ecchymosis, erythema, or signs of cellulitis  Palpation  - NonTTP throughout, no palpable abnormality   Range of motion  - AROM and PROM of the shoulder, elbow, wrist, and hand intact  Stability  - No evidence of joint dislocation or abnormal laxity   Neurovascular  - AIN/PIN/Radial/Median/Ulnar Nerves assessed in isolation without deficit  - Able to give thumbs up, make "OK" sign, cross IF/LF, abduct/adduct fingers, make fist  - SILT throughout  - Compartments soft  - Radial artery palpated  - Capillary Refill <3s  - Muscle tone normal    Right Upper Extremity  Inspection  - Skin intact throughout, no open wounds  - No swelling  - No ecchymosis, erythema, or signs of cellulitis  Palpation  - NonTTP throughout, no palpable abnormality   Range of motion  - AROM " "and PROM of the shoulder, elbow, wrist, and hand intact  Stability  - No evidence of joint dislocation or abnormal laxity   Neurovascular  - AIN/PIN/Radial/Median/Ulnar Nerves assessed in isolation without deficit  - Able to give thumbs up, make "OK" sign, cross IF/LF, abduct/adduct fingers, make fist  - SILT throughout  - Compartments soft  - Radial artery palpated  - Capillary Refill <3s  - Muscle tone normal      Left Lower Extremity  Inspection  - Skin intact throughout, no open wounds  - No swelling  - No ecchymosis, erythema, or signs of cellulitis  Palpation  - NonTTP throughout, no palpable abnormality   Range of motion  - AROM and PROM of the hip, knee, ankle, and foot intact  Stability  - No evidence of joint dislocation or abnormal laxity  Neurovascular  - TA/EHL/Gastroc/FHL assessed in isolation without deficit  - SILT throughout  - Compartments soft  - DP palpated   - Capillary Refill <3s  - Negative Log roll  - Negative Stinchfield  - Muscle tone normal    Right Lower Extremity  Inspection  - Skin intact throughout, no scars present  - No swelling  - No ecchymosis, erythema, or signs of cellulitis  - TTP at hip/proximal femur  - No tenderness to palpation of middle or distal femur  - No tenderness to palpation of proximal, middle, or distal aspects of tibia or fibula  - No tenderness to palpation of foot  - Pain with any ROM at hip  - Full painless ROM of knee and ankle  - Compartments soft  - SILT Sa/Dutta/DP/SP/T  - Motor intact EHL/FHL/TA/Gastroc  - 2+ DP  - Brisk capillary refill       Spine/pelvis/axial body:  No tenderness to palpation of cervical, thoracic, or lumbar spine, incision from prior   Pain with compression of pelvis  No chest wall or abdominal tenderness  No decubitus ulcers  Muscle tone normal      Significant Labs: CBC:   Recent Labs   Lab 08/25/24  0007 08/25/24  0010   WBC 4.96  --    HGB 8.6*  --    HCT 28.0* 27*     --      CMP:   Recent Labs   Lab 08/25/24  0007   * " "  K 4.5      CO2 20*   GLU 83   BUN 13   CREATININE 0.6   CALCIUM 8.6*   PROT 6.7   ALBUMIN 2.1*   BILITOT 0.2   ALKPHOS 119   AST 24   ALT 6*   ANIONGAP 10     Coagulation: No results for input(s): "LABPROT", "INR", "APTT" in the last 48 hours.  All pertinent labs within the past 24 hours have been reviewed.    Significant Imaging: CT: I have reviewed all pertinent results/findings and my personal findings are:  Ct of the pelvis shows a right sided valgus, impacted garden one femoral neck fracture.   X-Ray: I have reviewed all pertinent results/findings and my personal findings are:  XR of the right hip and pelvis shows a right sided valgus, impacted femoral neck fracture.   I have reviewed and interpreted all pertinent imaging results/findings.  "

## 2024-08-25 NOTE — NURSING
Pt arrived to unit via bed, alert and calm. No c/o of pain or n/v. VSS. NADN. Dressing c/d/I. PNC in place. Neurovascular assessment WNL.

## 2024-08-25 NOTE — PROGRESS NOTES
Report given to DAKOTA Diaz. Pt connected to monitor and telemetry. Patient currently resting with eyes closed. Respirations noted.

## 2024-08-25 NOTE — HPI
"59 year old female coming from [facility] by EMS with past medical history of HTN, uncontrolled HIV noncompliant with HAART, spinal MAC, spinal osteo s/p T11-L2 corpectomy with fixation by ortho on 2/27/24 at [facility]  who presenting with hypotension and report of a mechanical fall. Patient tells me she has severe right hip pain. She denies hitting her head or having loss of consciousness. She denies being on an anticoagulation. She reports her blood pressure is "always very low. Pt was here for paravertebral abscess, MRSA bacteremia. She was transferred [facility] for IR for the paravertebral abscess and subsequently transferred to the MICU for hypotension requiring vasopressor support. IR placed drain on 8/12. Abscess cultures grew AFB, Fusobacterium necleatum, ESBL E. Coli and blood cultures grew MRSA .She was discharged 8/24/24 to SNF to complete her antibiotics for total of 6 weeks of vancomycin and ertapenem ending 9/23.   "

## 2024-08-25 NOTE — ANESTHESIA PROCEDURE NOTES
SIFI catheter right    Patient location during procedure: post-op   Block not for primary anesthetic.  Reason for block: at surgeon's request and post-op pain management   Post-op Pain Location: right hip pain   Start time: 8/25/2024 3:00 PM  Timeout: 8/25/2024 2:57 PM   End time: 8/25/2024 3:14 PM    Staffing  Authorizing Provider: Nimo Sanders MD  Performing Provider: Nimo Sanders MD    Staffing  Performed by: Nimo Sanders MD  Authorized by: Nimo Sanders MD    Preanesthetic Checklist  Completed: patient identified, IV checked, site marked, risks and benefits discussed, surgical consent, monitors and equipment checked, pre-op evaluation and timeout performed  Peripheral Block  Patient position: supine  Prep: ChloraPrep  Patient monitoring: heart rate, cardiac monitor, continuous pulse ox, continuous capnometry and frequent blood pressure checks  Block type: fascia iliaca  Laterality: right  Injection technique: continuous  Needle  Needle type: Tuohy   Needle gauge: 17 G  Needle length: 3.5 in  Needle localization: ultrasound guidance  Catheter type: spring wound  Catheter size: 19 G  Test dose: lidocaine 1.5% with Epi 1-to-200,000   -ultrasound image captured on disc.  Assessment  Injection assessment: negative aspiration and negative parasthesia  Paresthesia pain: none  Heart rate change: no  Slow fractionated injection: yes  Pain Tolerance: comfortable throughout block  Medications:    Medications: ropivacaine (NAROPIN) injection 0.5% - Perineural   15 mL - 8/25/2024 3:10:00 PM    Additional Notes  30 cc of 0.25% ropivacaine with 1/300k epi used as local

## 2024-08-25 NOTE — SUBJECTIVE & OBJECTIVE
Past Medical History:   Diagnosis Date    Allergy     Arthritis     Asthma     Chronic pain     HIV infection     Hypertension     Overactive bladder     Spinal stenosis     Urine incontinence        Past Surgical History:   Procedure Laterality Date    ADENOIDECTOMY      APPENDECTOMY      BACK SURGERY      GERALD    CYST REMOVAL      HYSTERECTOMY      JOINT REPLACEMENT Right     knee, with revision    KNEE SURGERY Left     knee replacement    RHIZOTOMY      TONSILLECTOMY      TOTAL ANKLE ARTHROPLASTY Right        Review of patient's allergies indicates:  No Known Allergies    Current Facility-Administered Medications on File Prior to Encounter   Medication    [DISCONTINUED] acetaminophen tablet 650 mg    [DISCONTINUED] clarithromycin tablet 500 mg    [DISCONTINUED] dextrose 10% bolus 125 mL 125 mL    [DISCONTINUED] dextrose 10% bolus 250 mL 250 mL    [DISCONTINUED] dolutegravir Tab 50 mg    [DISCONTINUED] enoxaparin injection 30 mg    [DISCONTINUED] ertapenem (INVANZ) 1 g in 0.9% NaCl 100 mL IVPB (MB+)    [DISCONTINUED] ethambutoL tablet 700 mg    [DISCONTINUED] glucagon (human recombinant) injection 1 mg    [DISCONTINUED] glucose chewable tablet 16 g    [DISCONTINUED] glucose chewable tablet 24 g    [DISCONTINUED] iohexol (Omnipaque 350) oral solution 15 mL    [DISCONTINUED] lamiVUDine tablet 300 mg    [DISCONTINUED] LIDOcaine 5 % patch 1 patch    [DISCONTINUED] methocarbamoL tablet 500 mg    [DISCONTINUED] mupirocin 2 % ointment    [DISCONTINUED] naloxone 0.4 mg/mL injection 0.02 mg    [DISCONTINUED] oxyCODONE immediate release tablet Tab 10 mg    [DISCONTINUED] QUEtiapine tablet 25 mg    [DISCONTINUED] rifabutin capsule 300 mg    [DISCONTINUED] sodium chloride 0.9% flush 10 mL    [DISCONTINUED] sodium chloride 0.9% flush 10 mL    [DISCONTINUED] sodium chloride 0.9% flush 10 mL    [DISCONTINUED] sulfamethoxazole-trimethoprim 800-160mg per tablet 1 tablet    [DISCONTINUED] vancomycin - pharmacy to dose     [DISCONTINUED] vancomycin 750 mg in D5W 250 mL IVPB (Vial-Mate)     Current Outpatient Medications on File Prior to Encounter   Medication Sig    0.9% NaCl PgBk 100 mL with ertapenem 1 gram SolR 1 g Inject 1 g into the vein once daily.    acetaminophen (TYLENOL) 325 MG tablet Take 2 tablets (650 mg total) by mouth 3 (three) times daily.    albuterol (PROVENTIL/VENTOLIN HFA) 90 mcg/actuation inhaler Inhale 2 puffs into the lungs every 6 (six) hours as needed. Rescue    clarithromycin (BIAXIN) 500 MG tablet Take 1 tablet (500 mg total) by mouth every 12 (twelve) hours.    D5W SolP 250 mL with vancomycin 750 mg SolR 750 mg Inject 750 mg into the vein every 12 (twelve) hours.    dolutegravir-lamivudine (DOVATO)  mg Tab Take 1 tablet by mouth once daily.    ethambutoL (MYAMBUTOL) 400 MG Tab Take 2 tablets (800 mg total) by mouth once daily.    LIDOcaine (LIDODERM) 5 % Place 1 patch onto the skin once daily.    methocarbamoL (ROBAXIN) 500 MG Tab Take 1 tablet (500 mg total) by mouth 4 (four) times daily. for 10 days    oxyCODONE (ROXICODONE) 10 mg Tab immediate release tablet Take 1 tablet (10 mg total) by mouth every 4 (four) hours as needed for Pain.    QUEtiapine (SEROQUEL) 25 MG Tab Take 1 tablet (25 mg total) by mouth every evening.    rifabutin (MYCOBUTIN) 150 mg Cap Take 300 mg by mouth once daily.    sulfamethoxazole-trimethoprim 800-160mg (BACTRIM DS) 800-160 mg Tab Take 1 tablet by mouth 3 (three) times a week.     Family History       Problem Relation (Age of Onset)    Alcohol abuse Brother    Cancer Father    Depression Sister    Hypertension Mother, Brother    Thyroid disease Mother          Tobacco Use    Smoking status: Never    Smokeless tobacco: Never   Substance and Sexual Activity    Alcohol use: Yes     Comment: Socially    Drug use: No    Sexual activity: Yes     Partners: Male     Birth control/protection: None     Review of Systems   Constitutional:  Negative for appetite change.    Respiratory:  Negative for cough and shortness of breath.    Cardiovascular:  Negative for chest pain and leg swelling.   Gastrointestinal:  Negative for abdominal distention, abdominal pain, constipation and diarrhea.   Genitourinary:  Negative for difficulty urinating and dysuria.   Musculoskeletal:  Positive for arthralgias.   Neurological:  Negative for dizziness and headaches.     Objective:     Vital Signs (Most Recent):  Temp: 98.6 °F (37 °C) (08/25/24 0020)  Pulse: 73 (08/25/24 0120)  Resp: 16 (08/25/24 0120)  BP: 94/61 (08/25/24 0120)  SpO2: 100 % (08/25/24 0120) Vital Signs (24h Range):  Temp:  [98.2 °F (36.8 °C)-98.6 °F (37 °C)] 98.6 °F (37 °C)  Pulse:  [72-77] 73  Resp:  [12-18] 16  SpO2:  [99 %-100 %] 100 %  BP: ()/(45-61) 94/61     Weight: 45.8 kg (100 lb 15.5 oz)  Body mass index is 17.33 kg/m².     Physical Exam  Constitutional:       Appearance: Normal appearance.   HENT:      Head: Normocephalic and atraumatic.      Mouth/Throat:      Mouth: Mucous membranes are moist.   Cardiovascular:      Rate and Rhythm: Normal rate and regular rhythm.      Pulses: Normal pulses.      Heart sounds: Normal heart sounds.   Pulmonary:      Effort: Pulmonary effort is normal.      Breath sounds: Normal breath sounds. No rales.   Abdominal:      General: Abdomen is flat. Bowel sounds are normal. There is no distension.      Palpations: Abdomen is soft.      Tenderness: There is no abdominal tenderness.   Musculoskeletal:         General: Tenderness present. Normal range of motion.      Cervical back: Normal range of motion and neck supple.   Skin:     General: Skin is warm and dry.   Neurological:      General: No focal deficit present.      Mental Status: She is alert and oriented to person, place, and time.   Psychiatric:         Mood and Affect: Mood normal.                Significant Labs: All pertinent labs within the past 24 hours have been reviewed.    Significant Imaging: I have reviewed all  pertinent imaging results/findings within the past 24 hours.

## 2024-08-25 NOTE — CARE UPDATE
Patient admitted early this am from [facility] after a trip and fall and found to have a closed right femoral neck femur fracture. Orthopedics consulted and recommending operative repair of fracture. Patient has a very complex history and [place] [facility] [place] hospitalization from spinal osteomyelitis and paravertebral abscess. Patient with known disseminated MAC infection and advanced AIDS. Recent cultures from her spine growing AFB and likely will be MAC but final identification is pending as well as Fusobacterium, MRSA and ESBL E. Coli from her spinal infection. Infectious Disease consulted on last admit and stated options for treatment of her various infection very limited and palliative care involved with patient on last admit but wanted to proceed with continued treatment of her infections. Patient was discharged to [facility] SNF for continued care and as per last ID recommendations. 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; Disseminated MAC      2) Discharge Antibiotics:     Intravenous antibiotics:  Vancomycin  mg q 12 hours   Ertapenem IV 1 gram q 24 hours      Oral antibiotics:  clarithromycin 500 mg BID  Ethambutol 700 mg daily  Rifabutin 300 mg daily  Will need to discuss PO suppression for hardware infection after IV course     3) Therapy Duration:  IV antibiotics - 6 weeks, PO antibiotics indefinitely/TBD by outpatient ID provider      Estimated end date of IV antibiotics (Ertapenenem and Vancomycin): 9/23/24     4) Outpatient Weekly Labs:     Order the following labs to be drawn on Mondays:   CBC  CMP   CRP  Vancomycin trough. Target 15-20    I reordered her Ertapenem as had not been ordered by admitting team this am and they did order her IV Vancomycin. Patient to continue oral Clarithromycin, Ethambutol and Rifabutin for  disseminated MAC infection.     Overnight and this am patient has been hypotensive with SBP . In reviewing her chart she runs hypotensive. I reviewed BP readings from last hospital stay and SBP ranged between 87-95 routinely so she tends to run low systolic blood pressures. She did require on last stay a brief stay in MICU fro hypotension so will need to closely monitor her BP throughout perioperative period as patient dos not have much reserve. Lactic acid normal at 1.1. Hgb 8.6 on admit which is actual a good blood count for this patient as she has chronic anemia and her HGB baseline in past several weeks has been 7.3-8 and was 7.4 on 8/22 so actually improved from previous hospital stay. No indication to suspect sepsis or blood loss as cause of her hypotension. Her BMI is low at 17 and she is very frail and malnourished and I suspect that is more likely cause of her chronic hypotension. Okay to proceed to surgery this am with close BP monitoring throughout surgery and post-op and likely will require pressor support during surgery due to chronically low BP.       LUIS CARLOS CRUZ MD  Attending Staff Physician   Department of Hospital Medicine, [facility] on [facility]  Pager: [phone]  Spectralink: [phone]

## 2024-08-25 NOTE — ANESTHESIA PREPROCEDURE EVALUATION
08/25/2024  Amie Salmeron is a 59 y.o., female.      Pre-op Assessment    I have reviewed the Patient Summary Reports.       I have reviewed the Medications.     Review of Systems  Anesthesia Hx:  No problems with previous Anesthesia   Neg history of prior surgery.          Denies Family Hx of Anesthesia complications.    Denies Personal Hx of Anesthesia complications.                    Hematology/Oncology:  Hematology Normal   Oncology Normal                                   EENT/Dental:  EENT/Dental Normal           Cardiovascular:  Exercise tolerance: good    Denies Hypertension.       Denies Angina.         LOW BLOOD PRESSURE CHRONICALLY; FLUID RESPONSIVE PER ORTHO RESIDENT Functional Capacity good / => 4 METS            Carotoid Artery Disease               Pulmonary:  Pulmonary Normal    Denies Asthma.                    Renal/:  Renal/ Normal                 Hepatic/GI:     GERD, well controlled             Neurological:    Denies Pain                                 Endocrine:  Endocrine Normal            Psych:  Psychiatric Normal          Phobia and Claustrophobia.        Physical Exam  General: Well nourished and Cooperative    Airway:  Mallampati: III / II  Mouth Opening: Normal  TM Distance: Normal  Tongue: Normal  Neck ROM: Normal ROM    Dental:  Intact, Periodontal disease    Chest/Lungs:  Clear to auscultation, Normal Respiratory Rate    Heart:  Rate: Normal    Anesthesia Plan  Type of Anesthesia, risks & benefits discussed:    Anesthesia Type: Gen ETT  Intra-op Monitoring Plan: Standard ASA Monitors and Art Line  Post Op Pain Control Plan: multimodal analgesia and IV/PO Opioids PRN  Induction:  IV  Airway Plan: , Post-Induction  Informed Consent: Informed consent signed with the Patient and all parties understand the risks and agree with anesthesia plan.  All questions answered.   ASA  Score: 3  Day of Surgery Review of History & Physical: H&P Update referred to the surgeon/provider.    Ready For Surgery From Anesthesia Perspective.   .

## 2024-08-25 NOTE — ED NOTES
Attempted to contact patient's mother to notify her of the patient's status per patient request, no answer.

## 2024-08-25 NOTE — ADDENDUM NOTE
Addendum  created 08/25/24 1515 by Nimo Sanders MD    Child order released for a procedure order, Clinical Note Signed, Intraprocedure Blocks edited, LDA created via procedure documentation, SmartForm saved

## 2024-08-25 NOTE — CONSULTS
"[facility] - Surgery  Orthopedics  Consult Note    Patient Name: Amie Salmeron  MRN: [id number]  Admission Date: 8/24/2024  Hospital Length of Stay: 0 days  Attending Provider: Briseida Quintana MD  Primary Care Provider: Anuradha, Primary Doctor    Inpatient consult to Orthopedic Surgery  Consult performed by: Tony Mary MD  Consult ordered by: Joanna Hernandes MD        Subjective:     Principal Problem:Closed displaced fracture of right femoral neck    Chief Complaint:   Chief Complaint   Patient presents with    Fall     Arrives via EMS from [facility] reporting that she "tripped over her feet" and fell. Pt reporting R. Hip pain. -head injury, -blood thinners, -LOC. Hypotensive with EMS.    Hypotension        HPI: Amie Salmeron is a 59 y.o. female with PMH significant for HIV noncompliant on HAART w last CD4 on 8/11 of 75 cells, viral load 46713, recent T11-L1 corpectomy performed at [facility] on 2/27/24, and recent admission for paravertebral abscess and MRSA bactermia with IR aspiration and drain placement on 8/12. She was recently discharged to SNF on 8/24 to complete her IV abx with end date scheduled for 9/23 who is presenting with right hip pain after a mechanical fall this morning around 0200. Patient denies any head trauma or LOC. The patient denies prior hx of falls. Patient denies numbness and tingling. Denies any other musculoskeletal pain or injuries. Uses a walker. Doesn't take any anticoagulation at baseline. Last ate 2 days ago.  They deny IV drug use.   They deny tobacco use.   They deny alcohol use.   They endorse immunosuppressant medications.  They deny chemotherapy.  They deny radiation therapy.     Past Medical History:   Diagnosis Date    Allergy     Arthritis     Asthma     Chronic pain     HIV infection     Hypertension     Overactive bladder     Spinal stenosis     Urine incontinence        Past Surgical History:   Procedure Laterality Date    ADENOIDECTOMY      APPENDECTOMY      BACK " SURGERY      GERALD    CYST REMOVAL      HYSTERECTOMY      JOINT REPLACEMENT Right     knee, with revision    KNEE SURGERY Left     knee replacement    RHIZOTOMY      TONSILLECTOMY      TOTAL ANKLE ARTHROPLASTY Right        Review of patient's allergies indicates:  No Known Allergies    Current Facility-Administered Medications   Medication    0.9%  NaCl infusion    acetaminophen tablet 1,000 mg    bisacodyL suppository 10 mg    clarithromycin tablet 500 mg    dolutegravir Tab 50 mg    And    lamiVUDine tablet 300 mg    ethambutoL tablet 800 mg    melatonin tablet 6 mg    ondansetron injection 4 mg    pregabalin capsule 75 mg    QUEtiapine tablet 25 mg    rifabutin capsule 300 mg    sodium chloride 0.9% flush 10 mL    sodium chloride 0.9% flush 10 mL    And    sodium chloride 0.9% flush 10 mL    vancomycin 750 mg in D5W 250 mL IVPB (Vial-Mate)     Family History       Problem Relation (Age of Onset)    Alcohol abuse Brother    Cancer Father    Depression Sister    Hypertension Mother, Brother    Thyroid disease Mother          Tobacco Use    Smoking status: Never    Smokeless tobacco: Never   Substance and Sexual Activity    Alcohol use: Yes     Comment: Socially    Drug use: No    Sexual activity: Yes     Partners: Male     Birth control/protection: None     ROS    Constitutional: negative for fevers  Eyes: negative visual changes  ENT: negative for hearing loss  Respiratory: negative for dyspnea  Cardiovascular: negative for chest pain  Gastrointestinal: negative for abdominal pain  Genitourinary: negative for dysuria  Neurological: negative for headaches  Behavioral/Psych: negative for hallucinations  Endocrine: negative for temperature intolerance      Objective:     Vital Signs (Most Recent):  Temp: 97.6 °F (36.4 °C) (08/25/24 0446)  Pulse: 76 (08/25/24 0507)  Resp: 18 (08/25/24 0446)  BP: (!) 91/50 (08/25/24 0446)  SpO2: 99 % (08/25/24 0446) Vital Signs (24h Range):  Temp:  [97.6 °F (36.4 °C)-98.6 °F (37 °C)]  "97.6 °F (36.4 °C)  Pulse:  [72-83] 76  Resp:  [12-20] 18  SpO2:  [99 %-100 %] 99 %  BP: ()/(45-61) 91/50     Weight: 45.8 kg (100 lb 15.5 oz)  Height: 5' 4" (162.6 cm)  Body mass index is 17.33 kg/m².      Intake/Output Summary (Last 24 hours) at 8/25/2024 0559  Last data filed at 8/25/2024 0114  Gross per 24 hour   Intake 999 ml   Output --   Net 999 ml        Ortho/SPM Exam     Gen:  No acute distress, well-developed, well nourished.  CV:  Peripherally well-perfused. 2+ radial pulses, symmetric.  Respiratory:  Normal respiratory effort. No accessory muscle use.   Head/Neck:  Normocephalic.  Atraumatic. Sclera anicteric. TM. Neck supple.  Neuro: CN 2-12 grossly intact. No FND. Awake. Alert. Oriented to person, place, time, and situation.   Abdomen: Soft, NTND.      MSK:  Left Upper Extremity  Inspection  - Skin intact throughout, no open wounds  - No swelling  - No ecchymosis, erythema, or signs of cellulitis  Palpation  - NonTTP throughout, no palpable abnormality   Range of motion  - AROM and PROM of the shoulder, elbow, wrist, and hand intact  Stability  - No evidence of joint dislocation or abnormal laxity   Neurovascular  - AIN/PIN/Radial/Median/Ulnar Nerves assessed in isolation without deficit  - Able to give thumbs up, make "OK" sign, cross IF/LF, abduct/adduct fingers, make fist  - SILT throughout  - Compartments soft  - Radial artery palpated  - Capillary Refill <3s  - Muscle tone normal    Right Upper Extremity  Inspection  - Skin intact throughout, no open wounds  - No swelling  - No ecchymosis, erythema, or signs of cellulitis  Palpation  - NonTTP throughout, no palpable abnormality   Range of motion  - AROM and PROM of the shoulder, elbow, wrist, and hand intact  Stability  - No evidence of joint dislocation or abnormal laxity   Neurovascular  - AIN/PIN/Radial/Median/Ulnar Nerves assessed in isolation without deficit  - Able to give thumbs up, make "OK" sign, cross IF/LF, abduct/adduct fingers, " "make fist  - SILT throughout  - Compartments soft  - Radial artery palpated  - Capillary Refill <3s  - Muscle tone normal      Left Lower Extremity  Inspection  - Skin intact throughout, no open wounds  - No swelling  - No ecchymosis, erythema, or signs of cellulitis  Palpation  - NonTTP throughout, no palpable abnormality   Range of motion  - AROM and PROM of the hip, knee, ankle, and foot intact  Stability  - No evidence of joint dislocation or abnormal laxity  Neurovascular  - TA/EHL/Gastroc/FHL assessed in isolation without deficit  - SILT throughout  - Compartments soft  - DP palpated   - Capillary Refill <3s  - Negative Log roll  - Negative Stinchfield  - Muscle tone normal    Right Lower Extremity  Inspection  - Skin intact throughout, no scars present  - No swelling  - No ecchymosis, erythema, or signs of cellulitis  - TTP at hip/proximal femur  - No tenderness to palpation of middle or distal femur  - No tenderness to palpation of proximal, middle, or distal aspects of tibia or fibula  - No tenderness to palpation of foot  - Pain with any ROM at hip  - Full painless ROM of knee and ankle  - Compartments soft  - SILT Sa/Dutta/DP/SP/T  - Motor intact EHL/FHL/TA/Gastroc  - 2+ DP  - Brisk capillary refill       Spine/pelvis/axial body:  No tenderness to palpation of cervical, thoracic, or lumbar spine, incision from prior   Pain with compression of pelvis  No chest wall or abdominal tenderness  No decubitus ulcers  Muscle tone normal      Significant Labs: CBC:   Recent Labs   Lab 08/25/24  0007 08/25/24  0010   WBC 4.96  --    HGB 8.6*  --    HCT 28.0* 27*     --      CMP:   Recent Labs   Lab 08/25/24  0007   *   K 4.5      CO2 20*   GLU 83   BUN 13   CREATININE 0.6   CALCIUM 8.6*   PROT 6.7   ALBUMIN 2.1*   BILITOT 0.2   ALKPHOS 119   AST 24   ALT 6*   ANIONGAP 10     Coagulation: No results for input(s): "LABPROT", "INR", "APTT" in the last 48 hours.  All pertinent labs within the past 24 " hours have been reviewed.    Significant Imaging: CT: I have reviewed all pertinent results/findings and my personal findings are:  Ct of the pelvis shows a right sided valgus, impacted garden one femoral neck fracture.   X-Ray: I have reviewed all pertinent results/findings and my personal findings are:  XR of the right hip and pelvis shows a right sided valgus, impacted femoral neck fracture.   I have reviewed and interpreted all pertinent imaging results/findings.  Assessment/Plan:     * Closed displaced fracture of right femoral neck  Amie Salmeron is a 59 y.o. female with a right sided valgus, impacted femoral neck fracture, closed, NVI. They take no anticoagulation at home. They required a walker ambulatory assistive devices prior to this injury.     -Admitted to medicine hip fracture service for pre-operative clearance and medical evaluation  -To OR today for operative fixation of right sided femoral fracture  -Pt marked, booked, and consented for surgery  -DVT PPx: Hold anticoagulation  -Abx: Preop abx ordered  -Labs: WBC, 4.96, Hgb 8.6, HCT 28.0, , electrolytes WNL, albumin 2.1, Ha1c, 4.9, urinalysis clear of signs of infection. Other lab results pending.  -Bed rest, sanchez, NPO   -Iv: ordered for contralateral arm    Patient was explained in detail the severity of the injury that was suffered. Patient was explained the risks/benefits/and alternatives to operative management in detail including infection, bleeding, pain, nerve and vascular damage, heterotopic ossification, leg length discrepancies, rotational deformities and they express full understanding.  We discussed the 30% national first year mortality rate with hip fractures, the need for early mobilization, and the expected rehab course.  The patient expresses full understanding of the condition and expresses that they want to proceed with surgery. The patient is admitted to the medicine hip fracture service for optimization of medical  comorbidities. Will plan for OR today. No guarantees were made, informed consent was obtained. All questions were answered to patient's and family's satisfaction.             BREEZY MENA MD  Orthopedics  [facility] - Surgery

## 2024-08-25 NOTE — NURSING TRANSFER
Nursing Transfer Note      8/25/2024   3:54 PM    Nurse giving handoff:ZENIA Edwards RN   Nurse receiving handoff:CHRIS Alvarez RN     Reason patient is being transferred: post procedure     Transfer To: [id number]    Transfer via bed    Transfer with cardiac monitoring    Transported by PCT     Additional Lines: Alvarez Catheter    Medicines sent: yes     Any special needs or follow-up needed: ropivacaine infusion     Chart send with patient: Yes    Notified: mother     Patient reassessed at: 1530 on 8/25

## 2024-08-25 NOTE — ANESTHESIA PROCEDURE NOTES
Intubation    Date/Time: 8/25/2024 12:31 PM    Performed by: Mary Jo Chambers CRNA  Authorized by: Genaro Lou MD    Intubation:     Induction:  Intravenous    Intubated:  Postinduction    Mask Ventilation:  Easy mask    Attempts:  1    Attempted By:  CRNA    Method of Intubation:  Video laryngoscopy    Blade:  Mohamud 3    Laryngeal View Grade: Grade I - full view of cords      Difficult Airway Encountered?: No      Complications:  None    Airway Device:  Oral endotracheal tube    Airway Device Size:  6.0    Style/Cuff Inflation:  Cuffed    Tube secured:  22    Secured at:  The lips    Placement Verified By:  Capnometry    Complicating Factors:  Anterior larynx    Findings Post-Intubation:  BS equal bilateral and atraumatic/condition of teeth unchanged

## 2024-08-25 NOTE — ED PROVIDER NOTES
"Encounter Date: 8/24/2024       History     Chief Complaint   Patient presents with    Fall     Arrives via EMS from [facility] reporting that she "tripped over her feet" and fell. Pt reporting R. Hip pain. -head injury, -blood thinners, -LOC. Hypotensive with EMS.    Hypotension     59 year old female coming from [facility] by EMS with past medical history of AIDS, hypertension, spinal stenosis who presenting with hypotension and report of a mechanical fall.  Patient tells me she has severe right hip pain.  She denies hitting her head or having loss of consciousness.  She denies being on an anticoagulation.  She reports her blood pressure is "always very low. "    The history is provided by the patient.     Review of patient's allergies indicates:  No Known Allergies  Past Medical History:   Diagnosis Date    Allergy     Arthritis     Asthma     Candida esophagitis 11/15/2022    Chronic pain     HIV infection     Hypertension     Overactive bladder     Septic shock 08/12/2024    Spinal stenosis     Urine incontinence      Past Surgical History:   Procedure Laterality Date    ADENOIDECTOMY      APPENDECTOMY      BACK SURGERY      GERALD    CYST REMOVAL      HYSTERECTOMY      JOINT REPLACEMENT Right     knee, with revision    KNEE SURGERY Left     knee replacement    PERCUTANEOUS PINNING OF HIP Right 8/25/2024    Procedure: PINNING, HIP, PERCUTANEOUS, RIGHT;  Surgeon: Laureano Vyas MD;  Location: [facility] OR [address] [place] [address];  Service: Orthopedics;  Laterality: Right;    RHIZOTOMY      TONSILLECTOMY      TOTAL ANKLE ARTHROPLASTY Right      Family History   Problem Relation Name Age of Onset    Thyroid disease Mother      Hypertension Mother      Cancer Father          oral CA    Depression Sister      Hypertension Brother x3     Alcohol abuse Brother x3      Social History     Tobacco Use    Smoking status: Never    Smokeless tobacco: Never   Substance Use Topics    Alcohol use: Yes     Comment: Socially    Drug " use: No         Physical Exam     Initial Vitals [08/24/24 2244]   BP Pulse Resp Temp SpO2   (!) 105/45 75 16 98.2 °F (36.8 °C) 99 %      MAP       --         Physical Exam    Nursing note and vitals reviewed.  Constitutional: She appears well-developed and well-nourished. She is not diaphoretic. No distress.   HENT:   Head: Normocephalic and atraumatic.   Eyes: Conjunctivae and EOM are normal.   Neck: Neck supple.   Normal range of motion.  Cardiovascular:  Normal rate and regular rhythm.           Pulmonary/Chest: No respiratory distress.   Abdominal: She exhibits no distension.   Musculoskeletal:         General: No edema. Normal range of motion.      Cervical back: Normal range of motion and neck supple.     Neurological: She is alert and oriented to person, place, and time. GCS score is 15. GCS eye subscore is 4. GCS verbal subscore is 5. GCS motor subscore is 6.   Skin: Skin is warm and dry.   Psychiatric: She has a normal mood and affect. Her behavior is normal. Judgment and thought content normal.         ED Course   Procedures  Labs Reviewed   CBC W/ AUTO DIFFERENTIAL - Abnormal       Result Value    WBC 4.96      RBC 3.34 (*)     Hemoglobin 8.6 (*)     Hematocrit 28.0 (*)     MCV 84      MCH 25.7 (*)     MCHC 30.7 (*)     RDW 23.1 (*)     Platelets 349      MPV 9.8      Immature Granulocytes 0.8 (*)     Gran # (ANC) 3.7      Immature Grans (Abs) 0.04      Lymph # 0.9 (*)     Mono # 0.3      Eos # 0.0      Baso # 0.02      nRBC 0      Gran % 74.2 (*)     Lymph % 17.9 (*)     Mono % 6.5      Eosinophil % 0.2      Basophil % 0.4      Differential Method Automated     COMPREHENSIVE METABOLIC PANEL - Abnormal    Sodium 134 (*)     Potassium 4.5      Chloride 104      CO2 20 (*)     Glucose 83      BUN 13      Creatinine 0.6      Calcium 8.6 (*)     Total Protein 6.7      Albumin 2.1 (*)     Total Bilirubin 0.2      Alkaline Phosphatase 119      AST 24      ALT 6 (*)     eGFR >60.0      Anion Gap 10     ISTAT  PROCEDURE - Abnormal    POC PH 7.352      POC PCO2 50.4 (*)     POC PO2 25 (*)     POC HCO3 28.0      POC BE 2      POC SATURATED O2 43      POC Sodium 135 (*)     POC Potassium 4.1      POC TCO2 30 (*)     POC Ionized Calcium 1.24      POC Hematocrit 27 (*)     Sample VENOUS      Site Other      Allens Test N/A     CULTURE, BLOOD   CULTURE, BLOOD   LACTIC ACID, PLASMA    Lactate (Lactic Acid) 1.1     HEMOGLOBIN A1C   HEMOGLOBIN A1C    Hemoglobin A1C 4.9      Estimated Avg Glucose 94      Narrative:     Add on [facility] to #[id number] per Briseida Quintana MD., on  08/25/2024    04:01    ISTAT LACTATE    POC Lactate 0.97      Sample VENOUS      Site Other      Allens Test N/A       EKG Readings: (Independently Interpreted)   Initial Reading: No STEMI. Rhythm: Normal Sinus Rhythm. Heart Rate: 76. Ectopy: No Ectopy. Conduction: Normal.     ECG Results              EKG 12-lead (Final result)        Collection Time Result Time QRS Duration OHS QTC Calculation    08/24/24 23:06:00 08/25/24 10:40:54 76 409                     Final result by Interface, Lab In [facility] (08/25/24 10:41:01)                   Narrative:    Test Reason : I95.9,    Vent. Rate : 076 BPM     Atrial Rate : 076 BPM     P-R Int : 112 ms          QRS Dur : 076 ms      QT Int : 364 ms       P-R-T Axes : 058 075 060 degrees     QTc Int : 409 ms    Normal sinus rhythm  Normal ECG  When compared with ECG of 11-AUG-2024 14:43,  Nonspecific T wave abnormality no longer evident in Inferior leads  Nonspecific T wave abnormality no longer evident in Lateral leads  Confirmed by Keven BRADSHAW MD ([id number]) on 8/25/2024 10:40:50 AM    Referred By: [email or web address]   SELF           Confirmed By:Keven BRADSHAW MD                                  Imaging Results              US Lower Extremity Veins Bilateral (Final result)  Result time 08/25/24 10:20:36      Final result by Waldemar Tony MD (08/25/24 10:20:36)                   Impression:      No evidence of deep venous thrombosis  in either lower extremity.    Electronically signed by resident: Jeanna Lara  Date:    08/25/2024  Time:    10:03    Electronically signed by: Waldemar Tony MD  Date:    08/25/2024  Time:    10:20               Narrative:    EXAMINATION:  US LOWER EXTREMITY VEINS BILATERAL    CLINICAL HISTORY:  suspected dvt;    TECHNIQUE:  Duplex and color flow Doppler and dynamic compression was performed of the bilateral lower extremity veins was performed.    COMPARISON:  Ultrasound 12/07/2022    FINDINGS:  Right thigh veins: The common femoral, femoral, popliteal, upper greater saphenous, and deep femoral veins are patent and free of thrombus. The veins are normally compressible and have normal phasic flow and augmentation response.    Right calf veins: The visualized calf veins are patent.    Left thigh veins: The common femoral, femoral, popliteal, upper greater saphenous, and deep femoral veins are patent and free of thrombus. The veins are normally compressible and have normal phasic flow and augmentation response.    Left calf veins: The visualized calf veins are patent.                                       X-Ray Chest 1 View Pre-OP (Final result)  Result time 08/25/24 07:01:20      Final result by Abdulaziz Gutierrez Jr., MD (08/25/24 07:01:20)                   Impression:      No significant change allowing for positioning.  Probable skin fold on left.      Electronically signed by: Abdulaziz Gutierrez MD  Date:    08/25/2024  Time:    07:01               Narrative:    EXAMINATION:  XR CHEST 1 VIEW PRE-OP    CLINICAL HISTORY:  preoperative;    TECHNIQUE:  Single frontal view of the chest was performed.    COMPARISON:  August 25, 2024 at 01:11    FINDINGS:  Right-sided PICC catheter, monitoring leads and postoperative change in the spine noted.  Heart size pulmonary vessels are similar.  Some volume loss on the left.  No confluent consolidation.  Presumed skin folds on the left.  Patient is rotated to the right.                                        X-Ray Femur 2 View Right (Final result)  Result time 08/25/24 06:58:35      Final result by Abdulaziz Gutierrez Jr., MD (08/25/24 06:58:35)                   Impression:      Impacted right femoral neck fracture.      Electronically signed by: Abdulaziz Gutierrez MD  Date:    08/25/2024  Time:    06:58               Narrative:    EXAMINATION:  XR FEMUR 2 VIEW RIGHT    CLINICAL HISTORY:  Fracture;    TECHNIQUE:  AP and lateral views of the right femur were performed.    COMPARISON:  CT pelvis August 25, 2024 at 02:45    FINDINGS:  Slightly impacted right femoral neck fracture noted.  More distal femur is intact.  Right knee replacement noted.  Some bony remodeling of the distal femur anteriorly at the superior aspect of the femoral replacement component.                                       CT Pelvis Without Contrast (Edited Result - FINAL)  Result time 08/25/24 09:06:14      Addendum (preliminary) 1 of 1 by Felice Reid MD (08/25/24 09:06:14)      Felice Reid MD, first observed the critical findings on 08/25/2024 at approximately 04:15, and sent the results to Briseida Quintana MD, via [email or web address] Secure Chat message, on 08/25/2024 at 04:23.  Patient name and medical record number were specified/linked in the message. The messaging system provided confirmation that the message was seen by the recipient.      Electronically signed by: Felice Reid  Date:    08/25/2024  Time:    09:06                 Final result by Felice Reid MD (08/25/24 04:24:18)                   Impression:      Pelvis CT:    Impacted fracture of the right femoral head neck junction with mild foreshortening.  No displaced fracture fragments identified.  Right femoroacetabular joint maintains appropriate alignment.    Gas within the bladder lumen.  Correlate clinically for any recent bladder instrumentation/catheterization.  In the absence of any soft recent procedure, a gas-forming UTI might be a  consideration.    The 3-D volume-rendered reconstructed images redemonstrate, confirm, and help further characterize the findings seen on the axial images.    Electronically signed by resident: Fawn England  Date:    08/25/2024  Time:    03:36    Electronically signed by: Felice Reid  Date:    08/25/2024  Time:    04:24               Narrative:    EXAMINATION:  CT PELVIS WITHOUT CONTRAST; CT 3D RENDERING ON AN INDEPENDENT WORKSTATION    CLINICAL HISTORY:  subcapital femoral neck fracture;; subcapital femoral neck fracture;fx;    TECHNIQUE:  Axial 0.625-mm images of the pelvis obtained without intravenous contrast.  Data submitted for coronal and sagittal reformats.    3-D volume-rendered reconstructed images were acquired by post processing on an independent workstation with concurrent supervision and archived for permanent record.    COMPARISON:  Radiograph 08/25/2024    FINDINGS:  Impacted fracture of the right femoral head neck junction with mild foreshortening.  No displaced fracture fragments identified.  No fracture identified elsewhere.    Joints maintain appropriate alignment. Bony mineralization mildly diminished.  Partially visualized degenerative changes of the lumbar spine.    Soft tissues: Moderate volume of stool through the colon.  Small bowel containing umbilical hernia versus midline ostomy.  Gas within the urinary bladder lumen.    The 3-D volume-rendered reconstructed images redemonstrate, confirm, and help further characterize the findings seen on the axial images.                                       CT 3D Rendering on an Independent Workstation (Edited Result - FINAL)  Result time 08/25/24 09:06:14      Addendum (preliminary) 1 of 1 by Felice Reid MD (08/25/24 09:06:14)      Felice Reid MD, first observed the critical findings on 08/25/2024 at approximately 04:15, and sent the results to Briseida Quintana MD, via [employer] Secure Chat message, on 08/25/2024 at 04:23.  Patient name  and medical record number were specified/linked in the message. The messaging system provided confirmation that the message was seen by the recipient.      Electronically signed by: Felice Reid  Date:    08/25/2024  Time:    09:06                 Final result by Felice Reid MD (08/25/24 04:24:18)                   Impression:      Pelvis CT:    Impacted fracture of the right femoral head neck junction with mild foreshortening.  No displaced fracture fragments identified.  Right femoroacetabular joint maintains appropriate alignment.    Gas within the bladder lumen.  Correlate clinically for any recent bladder instrumentation/catheterization.  In the absence of any soft recent procedure, a gas-forming UTI might be a consideration.    The 3-D volume-rendered reconstructed images redemonstrate, confirm, and help further characterize the findings seen on the axial images.    Electronically signed by resident: Fawn England  Date:    08/25/2024  Time:    03:36    Electronically signed by: Felice Reid  Date:    08/25/2024  Time:    04:24               Narrative:    EXAMINATION:  CT PELVIS WITHOUT CONTRAST; CT 3D RENDERING ON AN INDEPENDENT WORKSTATION    CLINICAL HISTORY:  subcapital femoral neck fracture;; subcapital femoral neck fracture;fx;    TECHNIQUE:  Axial 0.625-mm images of the pelvis obtained without intravenous contrast.  Data submitted for coronal and sagittal reformats.    3-D volume-rendered reconstructed images were acquired by post processing on an independent workstation with concurrent supervision and archived for permanent record.    COMPARISON:  Radiograph 08/25/2024    FINDINGS:  Impacted fracture of the right femoral head neck junction with mild foreshortening.  No displaced fracture fragments identified.  No fracture identified elsewhere.    Joints maintain appropriate alignment. Bony mineralization mildly diminished.  Partially visualized degenerative changes of the lumbar spine.    Soft  tissues: Moderate volume of stool through the colon.  Small bowel containing umbilical hernia versus midline ostomy.  Gas within the urinary bladder lumen.    The 3-D volume-rendered reconstructed images redemonstrate, confirm, and help further characterize the findings seen on the axial images.                                       X-Ray Hip 2 or 3 views Right with Pelvis when performed (Final result)  Result time 08/25/24 01:35:23      Final result by Nathan Hernandez DO (08/25/24 01:35:23)                   Impression:      Subcapital right femoral neck fracture.      Electronically signed by: Nathan Hernandez  Date:    08/25/2024  Time:    01:35               Narrative:    EXAMINATION:  XR HIP WITH PELVIS WHEN PERFORMED 2 OR 3 VIEWS RIGHT    CLINICAL HISTORY:  Pain in right hip    TECHNIQUE:  AP view of the pelvis and frog leg lateral view of the right hip were performed.    COMPARISON:  None    FINDINGS:  There is a minimally displaced right subcapital femoral neck fracture.  There is osteopenia.  No additional fractures are seen.  There is a large volume of stool.                                       X-Ray Chest AP Portable (Final result)  Result time 08/25/24 01:34:38      Final result by Nathan Hernandez DO (08/25/24 01:34:38)                   Impression:      No significant detrimental change from prior.      Electronically signed by: Nathan Hernandez  Date:    08/25/2024  Time:    01:34               Narrative:    EXAMINATION:  XR CHEST AP PORTABLE    CLINICAL HISTORY:  trauma;    TECHNIQUE:  Single frontal view of the chest was performed.    COMPARISON:  08/18/2024.    FINDINGS:  There is a bandlike opacity in the left mid lung, suspicious for atelectasis.  There is coarse chronic interstitial prominence in the bilateral lungs, stable from prior.  There is a right-sided PICC with the tip in the mid SVC.  Partially visualized thoracolumbar spinal hardware is noted.  Osseous structures demonstrate  degenerative changes and osteopenia.  The pleural spaces are clear the cardiac silhouette is unremarkable.  There are calcifications of the aortic arch.                                       Medications   oxyCODONE immediate release tablet 5 mg (5 mg Oral Given 8/24/24 2349)   ondansetron injection 4 mg (4 mg Intravenous Given 8/25/24 0014)   sodium chloride 0.9% bolus 1,000 mL 1,000 mL (0 mLs Intravenous Stopped 8/25/24 0114)   ceFAZolin 2 g in D5W 50 mL IVPB (MB+) (0 g Intravenous Stopped 8/26/24 0620)   lactated ringers bolus 500 mL (0 mLs Intravenous Stopped 8/26/24 2025)   lactated ringers bolus 500 mL (0 mLs Intravenous Stopped 8/26/24 2200)   lactated ringers bolus 500 mL (0 mLs Intravenous Stopped 8/27/24 0125)   furosemide injection 20 mg (20 mg Intravenous Given 8/27/24 1304)     Medical Decision Making  DDX: contusion, fracture, dislocation, sprain  1:49 AM  Discussed with ortho as patient has Subcapital right femoral neck fracture seen on xray  Discussed with CM And HM and patient admitted to hip fracture service    Amount and/or Complexity of Data Reviewed  Labs: ordered.  Radiology: ordered.    Risk  Prescription drug management.  Decision regarding hospitalization.                                      Clinical Impression:  Final diagnoses:  [M25.551] Right hip pain  [I95.9] Hypotension  [S72.001A] Closed fracture of neck of right femur, initial encounter          ED Disposition Condition    Admit                 Magalys Muro MD  08/30/24 [id number]

## 2024-08-25 NOTE — HPI
Amie Salmeron is a 59 y.o. female with PMH significant for HIV noncompliant on HAART w last CD4 on 8/11 of 75 cells, viral load 82714, recent T11-L1 corpectomy performed at [facility] on 2/27/24, and recent admission for paravertebral abscess and MRSA bactermia with IR aspiration and drain placement on 8/12. She was recently discharged to SNF on 8/24 to complete her IV abx with end date scheduled for 9/23 who is presenting with right hip pain after a mechanical fall this morning around 0200. Patient denies any head trauma or LOC. The patient denies prior hx of falls. Patient denies numbness and tingling. Denies any other musculoskeletal pain or injuries. Uses a walker. Doesn't take any anticoagulation at baseline. Last ate 2 days ago.  They deny IV drug use.   They deny tobacco use.   They deny alcohol use.   They endorse immunosuppressant medications.  They deny chemotherapy.  They deny radiation therapy.

## 2024-08-25 NOTE — ED TRIAGE NOTES
"Amie Salmeron, a 59 y.o. female presents to the ED from [facility] after a trip and fall, states that she tripped over her feet and landed on her right hip. No shortening or rotation noted.  -Head injury, -blood thinners, -LOC. Hypotensive with EMS. PICC line in R upper arm in place upon arrival.     Triage note:  Chief Complaint   Patient presents with    Fall     Arrives via EMS from [facility] reporting that she "tripped over her feet" and fell. Pt reporting R. Hip pain. -head injury, -blood thinners, -LOC. Hypotensive with EMS.    Hypotension     Review of patient's allergies indicates:  No Known Allergies  Past Medical History:   Diagnosis Date    Allergy     Arthritis     Asthma     Chronic pain     HIV infection     Hypertension     Overactive bladder     Spinal stenosis     Urine incontinence      "

## 2024-08-25 NOTE — TRANSFER OF CARE
"Anesthesia Transfer of Care Note    Patient: Amie Salmeron    Procedure(s) Performed: Procedure(s) (LRB):  PINNING, HIP, PERCUTANEOUS, RIGHT (Right)    Patient location: PACU    Anesthesia Type: general    Transport from OR: Transported from OR on 6-10 L/min O2 by face mask with adequate spontaneous ventilation    Post pain: adequate analgesia    Post assessment: no apparent anesthetic complications and tolerated procedure well    Post vital signs: stable    Level of consciousness: responds to stimulation    Nausea/Vomiting: no nausea/vomiting    Complications: none    Transfer of care protocol was followed      Last vitals: Visit Vitals  BP (!) 87/54 (BP Location: Left arm, Patient Position: Lying)   Pulse 71   Temp 37.3 °C (99.1 °F) (Temporal)   Resp 18   Ht 5' 1" (1.549 m)   Wt 45.8 kg (100 lb 15.5 oz)   SpO2 100%   Breastfeeding No   BMI 19.08 kg/m²     "

## 2024-08-25 NOTE — ASSESSMENT & PLAN NOTE
S/p drainage on last admission   Currently on vanco and ertapenem for MRAS and ESBL   ID consult am

## 2024-08-25 NOTE — ANESTHESIA POSTPROCEDURE EVALUATION
Anesthesia Post Evaluation    Patient: Amie Salmeron    Procedure(s) Performed: Procedure(s) (LRB):  PINNING, HIP, PERCUTANEOUS, RIGHT (Right)    Final Anesthesia Type: general      Patient location during evaluation: PACU  Patient participation: Yes- Able to Participate  Level of consciousness: awake and alert  Post-procedure vital signs: reviewed and stable  Pain management: adequate  Airway patency: patent    PONV status at discharge: No PONV  Anesthetic complications: no      Cardiovascular status: blood pressure returned to baseline  Respiratory status: unassisted  Hydration status: euvolemic  Follow-up not needed.          Vitals Value Taken Time   BP 97/54 08/25/24 1446   Temp 36.6 °C (97.8 °F) 08/25/24 1430   Pulse 70 08/25/24 1455   Resp 12 08/25/24 1455   SpO2 99 % 08/25/24 1455   Vitals shown include unfiled device data.      No case tracking events are documented in the log.      Pain/Johan Score: Pain Rating Prior to Med Admin: 0 (8/25/2024  1:00 PM)  Pain Rating Post Med Admin: 10 (8/25/2024 12:45 AM)  Johan Score: 7 (8/25/2024  2:30 PM)

## 2024-08-25 NOTE — ASSESSMENT & PLAN NOTE
Amie Salmeron is a 59 y.o. female with a right sided valgus, impacted femoral neck fracture, closed, NVI. They take no anticoagulation at home. They required a walker ambulatory assistive devices prior to this injury.     -Admitted to medicine hip fracture service for pre-operative clearance and medical evaluation  -To OR today for operative fixation of right sided femoral fracture  -Pt marked, booked, and consented for surgery  -DVT PPx: Hold anticoagulation  -Abx: Preop abx ordered  -Labs: WBC, 4.96, Hgb 8.6, HCT 28.0, , electrolytes WNL, albumin 2.1, Ha1c, 4.9, urinalysis clear of signs of infection. Other lab results pending.  -Bed rest, sanchez, NPO   -Iv: ordered for contralateral arm    Patient was explained in detail the severity of the injury that was suffered. Patient was explained the risks/benefits/and alternatives to operative management in detail including infection, bleeding, pain, nerve and vascular damage, heterotopic ossification, leg length discrepancies, rotational deformities and they express full understanding.  We discussed the 30% national first year mortality rate with hip fractures, the need for early mobilization, and the expected rehab course.  The patient expresses full understanding of the condition and expresses that they want to proceed with surgery. The patient is admitted to the medicine hip fracture service for optimization of medical comorbidities. Will plan for OR today. No guarantees were made, informed consent was obtained. All questions were answered to patient's and family's satisfaction.

## 2024-08-25 NOTE — OP NOTE
OP NOTE    DOS:  08/25/2024    Preop Dx: Valgus impacted right femoral neck fracture    Postop Dx: Valgus impacted right femoral neck fracture    Procedure: 1.  Percutaneous screw fixation of right valgus impacted femoral neck fracture    Surgeon: Laureano Vyas M.D.    Asst:  JESUS Clement M.D.    Anesthesia: GETA    EBL:  Minimal    Implants:   Implant Name Type Inv. Item Serial No.  Lot No. LRB No. Used Action   Depuy Synthes 6.5 mm Cannulated Screw Partial Thread 70 mm [id number]    SYNTHES  Right 1 Implanted and Explanted   Depuy Synthes 6.5 mm Cannulated Screw Partial Thread 75 mm [id number]      Right 2 Implanted   Depuy Synthes 6.5 mm Cannulated Screw Full Threaded  70 mm [id number]    SYNTHES  Right 1 Implanted and Explanted   Depuy Synthes 6.5 mm Cannulated Screw Full Threaded 75 mm [id number]    SYNTHES  Right 1 Implanted          Specimens: None    Findings: Right-sided valgus impacted femoral neck fracture    Dispo:  To PACU extubated/stable       Indications for Procedure:      Patient is a 59-year-old female with history of poorly controlled HIV with last CD4 count of 74 and viral load greater than 100,000 on 08/11/2024, with also recent history of paravertebral abscess for which he is on PICC line therapy for disseminated Mycobacterium avium complex as well as prior corpectomy performed at [facility] in February for osteomyelitis of the spine associated with MRSA bacteremia who fell from standing height at skilled nursing facility resulting in a right-sided valgus impacted femoral neck fracture.  The risks, benefits and alternatives to surgery discussed with the patient and family prior to going to the operating room. Informed consent was obtained.    Procedure in Detail:    Patient was identified in preoperative holding area the site was marked.  Patient was wheeled into the operating room and general endotracheal anesthesia was induced on the patient's hospital  bed.  Preoperative antibiotics were administered. Patient was then placed onto the [place] fracture table with all bony prominences well padded both lower extremities in traction boots.  The right hip hip was prepped and draped in sterile fashion.  A time-out was undertaken to confirm patient, side, site, surgery, surgeon and the administration of preoperative antibiotics.  All agreed we proceeded.    Incision was made just distal to the vastus ridge.  Three guidewires for 7.3 mm cannulated screws were placed in an inverted triangle pattern taking great care to keep the inferior one above the termination of the lesser trochanter. These were measured and then the outer cortex drilled.  The screws were placed 1st anterior superior, then posterior superior, then inferior with partially threaded screws used for the superior screws for compression, while a fully-threaded screw was used inferiorly in the hope that it will act as rebar through the anatomically strongest portion of the femoral neck, the inferior cortex.    Approach withdrawal method was used to ensure the screws remained within the femoral head and did not penetrate into the joint.  Screw position was confirmed using AP and lateral views as well as live fluoroscopy from multiple angles using the arc of the C-arm to confirm that the screws remained within cortex and did not breach the cortex of the neck or femoral head.    The wound was copiously give normal saline solution and the deep fascia closed with 0 Vicryl suture, the subcutaneous tissue with 3-0 Vicryl suture and the skin with 4-0 Monocryl suture in running fashion. An Aquacel dressing was applied.    All instrument and sponge counts were reported correct in the case.  There were no complications.  The patient was returned to a supine position on the hospital bed, extubated, awakened and taken to the recovery room in stable condition. Regional analgesia was administered in the form of super inguinal  fascia iliac catheter postoperatively.    Plan for the patient:    Immediate physical and occupational therapy with toe-touch weight-bearing to right lower extremity.  Plan to continue current antibiotic regimen recommended by ID for prior infection found through aspiration of paravertebral abscess by IR on previous admission.  Nutrition supplementation, calcium, vitamin-D, boost daily.  Admitted to Medicine for complicated comorbidities.  Plan for Eliquis, chemical DVT prophylaxis x4 weeks.  X-ray obtained in PACU of right hip.    Plan for follow up in clinic at 2 weeks, 2 months, 6 months, 1 year.  --  Tony Mary MD

## 2024-08-25 NOTE — PROGRESS NOTES
Tommy ALLEN with anesthesia at bedside to perform right sifi catheter placement. Time out completed. VSS. WCTM.

## 2024-08-25 NOTE — ASSESSMENT & PLAN NOTE
Fracture  Surgical Risk Assessment:    Active Cardiac Issues:  Active decompensated heart failure? No   Unstable angina?  No   Significant uncontrolled arrhythmias? No   Severe valvular heart disease-Aortic or Mitral Stenosis? No   Recent MI or coronary revascularization < 30 days? No     Cardiac Risk Factors:  Intermediate/high risk surgery? No   History of CAD/ischemic heart disease? No   History of cerebrovascular disease? No   History of compensated heart failure? No   Type 2 diabetes requiring insulin? No   Serum Creatinine > 2? No   Total cardiac risk factors 0     Functional mets yes    < 4 METs -unable to walk > 2 blocks on level ground without stopping due to symptoms  - eating, dressing, toileting, walking indoors, light housework. POOR   > 4 METs -climbing > 1 flight of stairs without stopping  -walking up hill > 1-2 blocks  -scrubbing floors  -moving furniture  - golf, bowling, dancing or tennis  -running short distance MODERATE to EXCELLENT       Hip Fracture Pathway initiated  Orthopedics consulted.  Plan to go to the OR on sunday.    For high risk of post-op pulmonary complications, scheduled duonebs and incentive spirometry to start after surgery   For high risk of post-op delirium, minimize CNS-active meds (opiatess, benzos, anticholinergics) and sleep hygiene with windowshades open during day, no daytime naps, frequent re-orientation, private room if feasible  For probable senile osteoporosis, check Vitamin D level.  If/for Vit D deficiency and probable senile osteopenia/osteoporosis, start Vit D and Ca, follow up in Ortho clinic per new protocol  DVT prophylaxis for 4 weeks post-op  PT/OT to start on POD 1  Alvarez to be removed on POD 1  Pain control:  Pregabalin 75mg PO qHS and scheduled Tylenol 1g TID.  Oxy PO prn with Morphine IV prn for pain not controlled with PO medications    Perioperative Risk Assessment:  RCRI Score 0,  risk with 3.9% risk of cardiopulmonary complications

## 2024-08-26 PROBLEM — D62 ACUTE BLOOD LOSS ANEMIA: Status: ACTIVE | Noted: 2024-08-26

## 2024-08-26 PROBLEM — R63.6 UNDERWEIGHT: Status: ACTIVE | Noted: 2024-08-26

## 2024-08-26 PROBLEM — Z51.5 PALLIATIVE CARE ENCOUNTER: Status: RESOLVED | Noted: 2024-08-13 | Resolved: 2024-08-26

## 2024-08-26 LAB
ALBUMIN SERPL BCP-MCNC: 1.7 G/DL (ref 3.5–5.2)
ALP SERPL-CCNC: 99 U/L (ref 55–135)
ALT SERPL W/O P-5'-P-CCNC: 5 U/L (ref 10–44)
ANION GAP SERPL CALC-SCNC: 6 MMOL/L (ref 8–16)
ANION GAP SERPL CALC-SCNC: 6 MMOL/L (ref 8–16)
ANISOCYTOSIS BLD QL SMEAR: SLIGHT
ANISOCYTOSIS BLD QL SMEAR: SLIGHT
AST SERPL-CCNC: 13 U/L (ref 10–40)
BASOPHILS # BLD AUTO: 0.01 K/UL (ref 0–0.2)
BASOPHILS # BLD AUTO: 0.03 K/UL (ref 0–0.2)
BASOPHILS NFR BLD: 0.2 % (ref 0–1.9)
BASOPHILS NFR BLD: 0.5 % (ref 0–1.9)
BILIRUB SERPL-MCNC: 0.2 MG/DL (ref 0.1–1)
BUN SERPL-MCNC: 13 MG/DL (ref 8–23)
BUN SERPL-MCNC: 18 MG/DL (ref 8–23)
BURR CELLS BLD QL SMEAR: ABNORMAL
BURR CELLS BLD QL SMEAR: ABNORMAL
CALCIUM SERPL-MCNC: 7.8 MG/DL (ref 8.7–10.5)
CALCIUM SERPL-MCNC: 7.9 MG/DL (ref 8.7–10.5)
CHLORIDE SERPL-SCNC: 109 MMOL/L (ref 95–110)
CHLORIDE SERPL-SCNC: 112 MMOL/L (ref 95–110)
CO2 SERPL-SCNC: 21 MMOL/L (ref 23–29)
CO2 SERPL-SCNC: 21 MMOL/L (ref 23–29)
CREAT SERPL-MCNC: 0.7 MG/DL (ref 0.5–1.4)
CREAT SERPL-MCNC: 0.7 MG/DL (ref 0.5–1.4)
DACRYOCYTES BLD QL SMEAR: ABNORMAL
DACRYOCYTES BLD QL SMEAR: ABNORMAL
DIFFERENTIAL METHOD BLD: ABNORMAL
DIFFERENTIAL METHOD BLD: ABNORMAL
EOSINOPHIL # BLD AUTO: 0 K/UL (ref 0–0.5)
EOSINOPHIL # BLD AUTO: 0.5 K/UL (ref 0–0.5)
EOSINOPHIL NFR BLD: 0.4 % (ref 0–8)
EOSINOPHIL NFR BLD: 9.4 % (ref 0–8)
ERYTHROCYTE [DISTWIDTH] IN BLOOD BY AUTOMATED COUNT: 21.4 % (ref 11.5–14.5)
ERYTHROCYTE [DISTWIDTH] IN BLOOD BY AUTOMATED COUNT: 23 % (ref 11.5–14.5)
EST. GFR  (NO RACE VARIABLE): >60 ML/MIN/1.73 M^2
EST. GFR  (NO RACE VARIABLE): >60 ML/MIN/1.73 M^2
FUNGUS SPEC CULT: NORMAL
FUNGUS SPEC CULT: NORMAL
GLUCOSE SERPL-MCNC: 106 MG/DL (ref 70–110)
GLUCOSE SERPL-MCNC: 83 MG/DL (ref 70–110)
HCT VFR BLD AUTO: 23.2 % (ref 37–48.5)
HCT VFR BLD AUTO: 30.3 % (ref 37–48.5)
HGB BLD-MCNC: 6.8 G/DL (ref 12–16)
HGB BLD-MCNC: 9.6 G/DL (ref 12–16)
HYPOCHROMIA BLD QL SMEAR: ABNORMAL
HYPOCHROMIA BLD QL SMEAR: ABNORMAL
IMM GRANULOCYTES # BLD AUTO: 0.02 K/UL (ref 0–0.04)
IMM GRANULOCYTES # BLD AUTO: 0.03 K/UL (ref 0–0.04)
IMM GRANULOCYTES NFR BLD AUTO: 0.4 % (ref 0–0.5)
IMM GRANULOCYTES NFR BLD AUTO: 0.5 % (ref 0–0.5)
LACTATE SERPL-SCNC: 0.9 MMOL/L (ref 0.5–2.2)
LYMPHOCYTES # BLD AUTO: 0.8 K/UL (ref 1–4.8)
LYMPHOCYTES # BLD AUTO: 0.9 K/UL (ref 1–4.8)
LYMPHOCYTES NFR BLD: 15 % (ref 18–48)
LYMPHOCYTES NFR BLD: 16.4 % (ref 18–48)
MAGNESIUM SERPL-MCNC: 1.8 MG/DL (ref 1.6–2.6)
MCH RBC QN AUTO: 24.5 PG (ref 27–31)
MCH RBC QN AUTO: 26.7 PG (ref 27–31)
MCHC RBC AUTO-ENTMCNC: 29.3 G/DL (ref 32–36)
MCHC RBC AUTO-ENTMCNC: 31.7 G/DL (ref 32–36)
MCV RBC AUTO: 84 FL (ref 82–98)
MCV RBC AUTO: 84 FL (ref 82–98)
MONOCYTES # BLD AUTO: 0.5 K/UL (ref 0.3–1)
MONOCYTES # BLD AUTO: 0.6 K/UL (ref 0.3–1)
MONOCYTES NFR BLD: 11 % (ref 4–15)
MONOCYTES NFR BLD: 8.2 % (ref 4–15)
NEUTROPHILS # BLD AUTO: 3.3 K/UL (ref 1.8–7.7)
NEUTROPHILS # BLD AUTO: 4.1 K/UL (ref 1.8–7.7)
NEUTROPHILS NFR BLD: 64 % (ref 38–73)
NEUTROPHILS NFR BLD: 74 % (ref 38–73)
NRBC BLD-RTO: 0 /100 WBC
NRBC BLD-RTO: 0 /100 WBC
OVALOCYTES BLD QL SMEAR: ABNORMAL
OVALOCYTES BLD QL SMEAR: ABNORMAL
PHOSPHATE SERPL-MCNC: 3 MG/DL (ref 2.7–4.5)
PLATELET # BLD AUTO: 303 K/UL (ref 150–450)
PLATELET # BLD AUTO: 335 K/UL (ref 150–450)
PLATELET BLD QL SMEAR: ABNORMAL
PLATELET BLD QL SMEAR: ABNORMAL
PMV BLD AUTO: 10.2 FL (ref 9.2–12.9)
PMV BLD AUTO: 9.7 FL (ref 9.2–12.9)
POIKILOCYTOSIS BLD QL SMEAR: SLIGHT
POIKILOCYTOSIS BLD QL SMEAR: SLIGHT
POLYCHROMASIA BLD QL SMEAR: ABNORMAL
POLYCHROMASIA BLD QL SMEAR: ABNORMAL
POTASSIUM SERPL-SCNC: 4.4 MMOL/L (ref 3.5–5.1)
POTASSIUM SERPL-SCNC: 4.9 MMOL/L (ref 3.5–5.1)
PROT SERPL-MCNC: 5.3 G/DL (ref 6–8.4)
RBC # BLD AUTO: 2.77 M/UL (ref 4–5.4)
RBC # BLD AUTO: 3.59 M/UL (ref 4–5.4)
SCHISTOCYTES BLD QL SMEAR: ABNORMAL
SCHISTOCYTES BLD QL SMEAR: ABNORMAL
SODIUM SERPL-SCNC: 136 MMOL/L (ref 136–145)
SODIUM SERPL-SCNC: 139 MMOL/L (ref 136–145)
VANCOMYCIN TROUGH SERPL-MCNC: 20.5 UG/ML (ref 10–22)
WBC # BLD AUTO: 5.08 K/UL (ref 3.9–12.7)
WBC # BLD AUTO: 5.48 K/UL (ref 3.9–12.7)

## 2024-08-26 PROCEDURE — 97112 NEUROMUSCULAR REEDUCATION: CPT | Mod: HCNC

## 2024-08-26 PROCEDURE — 36415 COLL VENOUS BLD VENIPUNCTURE: CPT | Mod: HCNC | Performed by: INTERNAL MEDICINE

## 2024-08-26 PROCEDURE — 97530 THERAPEUTIC ACTIVITIES: CPT | Mod: HCNC

## 2024-08-26 PROCEDURE — 83735 ASSAY OF MAGNESIUM: CPT | Mod: HCNC | Performed by: INTERNAL MEDICINE

## 2024-08-26 PROCEDURE — A4216 STERILE WATER/SALINE, 10 ML: HCPCS | Mod: HCNC

## 2024-08-26 PROCEDURE — 25000003 PHARM REV CODE 250: Mod: HCNC

## 2024-08-26 PROCEDURE — 99231 SBSQ HOSP IP/OBS SF/LOW 25: CPT | Mod: HCNC,,, | Performed by: ANESTHESIOLOGY

## 2024-08-26 PROCEDURE — 30233N1 TRANSFUSION OF NONAUTOLOGOUS RED BLOOD CELLS INTO PERIPHERAL VEIN, PERCUTANEOUS APPROACH: ICD-10-PCS | Performed by: ORTHOPAEDIC SURGERY

## 2024-08-26 PROCEDURE — 80048 BASIC METABOLIC PNL TOTAL CA: CPT | Mod: HCNC | Performed by: INTERNAL MEDICINE

## 2024-08-26 PROCEDURE — 85025 COMPLETE CBC W/AUTO DIFF WBC: CPT | Mod: 91,HCNC | Performed by: PHYSICIAN ASSISTANT

## 2024-08-26 PROCEDURE — 97165 OT EVAL LOW COMPLEX 30 MIN: CPT | Mod: HCNC

## 2024-08-26 PROCEDURE — P9021 RED BLOOD CELLS UNIT: HCPCS | Mod: HCNC

## 2024-08-26 PROCEDURE — 25000003 PHARM REV CODE 250: Mod: HCNC | Performed by: STUDENT IN AN ORGANIZED HEALTH CARE EDUCATION/TRAINING PROGRAM

## 2024-08-26 PROCEDURE — 63600175 PHARM REV CODE 636 W HCPCS: Mod: HCNC | Performed by: PHYSICIAN ASSISTANT

## 2024-08-26 PROCEDURE — 94761 N-INVAS EAR/PLS OXIMETRY MLT: CPT | Mod: HCNC

## 2024-08-26 PROCEDURE — 94799 UNLISTED PULMONARY SVC/PX: CPT | Mod: HCNC

## 2024-08-26 PROCEDURE — 97162 PT EVAL MOD COMPLEX 30 MIN: CPT | Mod: HCNC

## 2024-08-26 PROCEDURE — 25000003 PHARM REV CODE 250: Mod: HCNC | Performed by: INTERNAL MEDICINE

## 2024-08-26 PROCEDURE — 63600175 PHARM REV CODE 636 W HCPCS: Mod: HCNC | Performed by: EMERGENCY MEDICINE

## 2024-08-26 PROCEDURE — 84100 ASSAY OF PHOSPHORUS: CPT | Mod: HCNC | Performed by: INTERNAL MEDICINE

## 2024-08-26 PROCEDURE — 63600175 PHARM REV CODE 636 W HCPCS: Mod: HCNC | Performed by: INTERNAL MEDICINE

## 2024-08-26 PROCEDURE — 83605 ASSAY OF LACTIC ACID: CPT | Mod: HCNC | Performed by: PHYSICIAN ASSISTANT

## 2024-08-26 PROCEDURE — 25000003 PHARM REV CODE 250: Mod: HCNC | Performed by: EMERGENCY MEDICINE

## 2024-08-26 PROCEDURE — 86920 COMPATIBILITY TEST SPIN: CPT | Mod: HCNC

## 2024-08-26 PROCEDURE — 85025 COMPLETE CBC W/AUTO DIFF WBC: CPT | Mod: HCNC | Performed by: INTERNAL MEDICINE

## 2024-08-26 PROCEDURE — 99900035 HC TECH TIME PER 15 MIN (STAT): Mod: HCNC

## 2024-08-26 PROCEDURE — 27000207 HC ISOLATION: Mod: HCNC

## 2024-08-26 PROCEDURE — 80053 COMPREHEN METABOLIC PANEL: CPT | Mod: HCNC | Performed by: PHYSICIAN ASSISTANT

## 2024-08-26 PROCEDURE — 80202 ASSAY OF VANCOMYCIN: CPT | Mod: HCNC | Performed by: INTERNAL MEDICINE

## 2024-08-26 PROCEDURE — 36430 TRANSFUSION BLD/BLD COMPNT: CPT | Mod: HCNC

## 2024-08-26 PROCEDURE — 21400001 HC TELEMETRY ROOM: Mod: HCNC

## 2024-08-26 RX ORDER — ACETAMINOPHEN 500 MG
1000 TABLET ORAL EVERY 6 HOURS
Status: DISCONTINUED | OUTPATIENT
Start: 2024-08-26 | End: 2024-08-27

## 2024-08-26 RX ORDER — METHOCARBAMOL 750 MG/1
750 TABLET, FILM COATED ORAL EVERY 6 HOURS
Status: DISCONTINUED | OUTPATIENT
Start: 2024-08-26 | End: 2024-08-28 | Stop reason: HOSPADM

## 2024-08-26 RX ORDER — OXYCODONE HYDROCHLORIDE 5 MG/1
5 TABLET ORAL
Status: DISCONTINUED | OUTPATIENT
Start: 2024-08-26 | End: 2024-08-26

## 2024-08-26 RX ORDER — OXYCODONE HYDROCHLORIDE 5 MG/1
5 TABLET ORAL EVERY 4 HOURS PRN
Status: DISCONTINUED | OUTPATIENT
Start: 2024-08-26 | End: 2024-08-28 | Stop reason: HOSPADM

## 2024-08-26 RX ORDER — KETOROLAC TROMETHAMINE 15 MG/ML
15 INJECTION, SOLUTION INTRAMUSCULAR; INTRAVENOUS ONCE
Status: DISCONTINUED | OUTPATIENT
Start: 2024-08-26 | End: 2024-08-26

## 2024-08-26 RX ORDER — CELECOXIB 200 MG/1
200 CAPSULE ORAL DAILY
Status: DISCONTINUED | OUTPATIENT
Start: 2024-08-26 | End: 2024-08-28 | Stop reason: HOSPADM

## 2024-08-26 RX ADMIN — SODIUM CHLORIDE, SODIUM LACTATE, POTASSIUM CHLORIDE, AND CALCIUM CHLORIDE 500 ML: .6; .31; .03; .02 INJECTION, SOLUTION INTRAVENOUS at 07:08

## 2024-08-26 RX ADMIN — Medication 10 ML: at 05:08

## 2024-08-26 RX ADMIN — VANCOMYCIN HYDROCHLORIDE 750 MG: 750 INJECTION, POWDER, LYOPHILIZED, FOR SOLUTION INTRAVENOUS at 08:08

## 2024-08-26 RX ADMIN — SENNOSIDES AND DOCUSATE SODIUM 1 TABLET: 50; 8.6 TABLET ORAL at 08:08

## 2024-08-26 RX ADMIN — Medication 10 ML: at 11:08

## 2024-08-26 RX ADMIN — CLARITHROMYCIN 500 MG: 500 TABLET, FILM COATED ORAL at 08:08

## 2024-08-26 RX ADMIN — OXYCODONE HYDROCHLORIDE 5 MG: 5 TABLET ORAL at 09:08

## 2024-08-26 RX ADMIN — METHOCARBAMOL 500 MG: 500 TABLET ORAL at 12:08

## 2024-08-26 RX ADMIN — PREGABALIN 75 MG: 75 CAPSULE ORAL at 08:08

## 2024-08-26 RX ADMIN — CHOLECALCIFEROL TAB 25 MCG (1000 UNIT) 1000 UNITS: 25 TAB at 09:08

## 2024-08-26 RX ADMIN — ERTAPENEM 1 G: 1 INJECTION INTRAMUSCULAR; INTRAVENOUS at 05:08

## 2024-08-26 RX ADMIN — Medication 6 MG: at 08:08

## 2024-08-26 RX ADMIN — OXYCODONE HYDROCHLORIDE 5 MG: 5 TABLET ORAL at 02:08

## 2024-08-26 RX ADMIN — LAMIVUDINE 300 MG: 150 TABLET, FILM COATED ORAL at 09:08

## 2024-08-26 RX ADMIN — APIXABAN 2.5 MG: 2.5 TABLET, FILM COATED ORAL at 08:08

## 2024-08-26 RX ADMIN — DOLUTEGRAVIR SODIUM 50 MG: 50 TABLET, FILM COATED ORAL at 09:08

## 2024-08-26 RX ADMIN — ETHAMBUTOL HYDROCHLORIDE 800 MG: 400 TABLET ORAL at 09:08

## 2024-08-26 RX ADMIN — RIFABUTIN 300 MG: 150 CAPSULE ORAL at 09:08

## 2024-08-26 RX ADMIN — CEFAZOLIN 2 G: 2 INJECTION, POWDER, FOR SOLUTION INTRAMUSCULAR; INTRAVENOUS at 05:08

## 2024-08-26 RX ADMIN — METHOCARBAMOL 750 MG: 750 TABLET ORAL at 11:08

## 2024-08-26 RX ADMIN — ACETAMINOPHEN 1000 MG: 500 TABLET ORAL at 05:08

## 2024-08-26 RX ADMIN — VANCOMYCIN HYDROCHLORIDE 750 MG: 750 INJECTION, POWDER, LYOPHILIZED, FOR SOLUTION INTRAVENOUS at 10:08

## 2024-08-26 RX ADMIN — MUPIROCIN 1 G: 20 OINTMENT TOPICAL at 09:08

## 2024-08-26 RX ADMIN — ACETAMINOPHEN 1000 MG: 500 TABLET ORAL at 11:08

## 2024-08-26 RX ADMIN — SENNOSIDES AND DOCUSATE SODIUM 1 TABLET: 50; 8.6 TABLET ORAL at 09:08

## 2024-08-26 RX ADMIN — OXYCODONE HYDROCHLORIDE 5 MG: 5 TABLET ORAL at 08:08

## 2024-08-26 RX ADMIN — Medication 6 MG: at 02:08

## 2024-08-26 RX ADMIN — Medication 10 ML: at 09:08

## 2024-08-26 RX ADMIN — ACETAMINOPHEN 1000 MG: 500 TABLET ORAL at 12:08

## 2024-08-26 RX ADMIN — METHOCARBAMOL 750 MG: 750 TABLET ORAL at 06:08

## 2024-08-26 RX ADMIN — QUETIAPINE FUMARATE 25 MG: 25 TABLET ORAL at 08:08

## 2024-08-26 RX ADMIN — CLARITHROMYCIN 500 MG: 500 TABLET, FILM COATED ORAL at 09:08

## 2024-08-26 RX ADMIN — SODIUM CHLORIDE, SODIUM LACTATE, POTASSIUM CHLORIDE, AND CALCIUM CHLORIDE 500 ML: .6; .31; .03; .02 INJECTION, SOLUTION INTRAVENOUS at 09:08

## 2024-08-26 RX ADMIN — CALCIUM CARBONATE (ANTACID) CHEW TAB 500 MG 1000 MG: 500 CHEW TAB at 09:08

## 2024-08-26 RX ADMIN — MUPIROCIN 1 G: 20 OINTMENT TOPICAL at 08:08

## 2024-08-26 RX ADMIN — APIXABAN 2.5 MG: 2.5 TABLET, FILM COATED ORAL at 09:08

## 2024-08-26 RX ADMIN — ACETAMINOPHEN 1000 MG: 500 TABLET ORAL at 06:08

## 2024-08-26 RX ADMIN — METHOCARBAMOL 750 MG: 750 TABLET ORAL at 05:08

## 2024-08-26 RX ADMIN — CELECOXIB 200 MG: 200 CAPSULE ORAL at 11:08

## 2024-08-26 NOTE — ASSESSMENT & PLAN NOTE
Advance care planning   Patient seen by palliative care on recent admit due to limited options for treatment of her disseminated MAC an chronic spinal infection and advanced AIDS. Patient wished to continue treatment of her AIDS and infections and PICC line placed and placed on IV antibiotics and discharged to [facility] for continued care with PT/OT for her debility and IV and oral antibiotics for her infections. Patient reports she is DNR as per her request. Discussed with patient who confirmed above information for 10 minutes.

## 2024-08-26 NOTE — ASSESSMENT & PLAN NOTE
Patient admitted from [facility] after a trip and fall and found to have a closed right femoral neck femur fracture. Orthopedics consulted and recommending operative repair of fracture.   - Patient taken to the OR on 8/25/2024 and underwent right hip pinning by Dr. Laureano Vyas.   - Post-op patient toe touchdown weight bearing to the right lower extremity as per Orthopedics recommendation.   - Patient placed on Apixiban 2.5 mg po BID and NARINDER/SCD's for DVT prophylaxis post-op and will need for total of 30 days.   - Perineural pain catheter placed by Anesthesia Pain Service with continuous infusion of Ropivacaine to help with pain control post-op and Anesthesia Pain Service managing while patient in the hospital.  - Patient placed on multimodal pain management post-op with Tylenol 1000 mg po every 6 hours, Lyrica 75 mg po nightly, Robaxin 750 mg po 4 times daily and Celebrex 200 mg po daily post-op and will continue with Oxycodone IR 5 mg po every 4 hours prn for breakthrough pain.   - PT/OT consulted post-op and to work with patient today.  - Surgical bandage to remain in place to right hip until Orthopedic clinic follow-up.

## 2024-08-26 NOTE — ADDENDUM NOTE
Addendum  created 08/26/24 1332 by Edith Hylton MD    Charge Capture section accepted, Cosign clinical note with attestation

## 2024-08-26 NOTE — PT/OT/SLP EVAL
"Occupational Therapy   Evaluation    Name: Amie Salmeron  MRN: [id number]  Admitting Diagnosis: Closed fracture of neck of right femur with routine healing  Recent Surgery: Procedure(s) (LRB):  PINNING, HIP, PERCUTANEOUS, RIGHT (Right) 1 Day Post-Op    Recommendations:     Discharge Recommendations: Moderate Intensity Therapy  Discharge Equipment Recommendations:  walker, rolling  Barriers to discharge:  None    Assessment:     Amie Salmeron is a 59 y.o. female with a medical diagnosis of Closed fracture of neck of right femur with routine healing.  She presents with the following performance deficits affecting function: weakness, impaired endurance, impaired self care skills, impaired functional mobility, orthopedic precautions, gait instability, pain, impaired balance, decreased safety awareness, decreased lower extremity function.  Pt was willing to participate, tolerated session fairly overall. Pt was able to perform sit>stand and chair t/f c/ significant assistance, had difficulty adhering to WB precautions. Pt able to perform bed mobility without assistance, sat eob well without support. Pt demonstrated decreased safety awareness throughout session, required cueing for safe t/f's.     Rehab Prognosis: Good; patient would benefit from acute skilled OT services to address these deficits and reach maximum level of function.       Plan:     Patient to be seen daily (4x/wk after hip pathway) to address the above listed problems via self-care/home management, therapeutic activities, therapeutic exercises  Plan of Care Expires: 09/26/24  Plan of Care Reviewed with: patient    Subjective     Chief Complaint: RLE pain  Patient/Family Comments/goals: "Can you see if I can get some pain medicine after this?"    Occupational Profile:  Living Environment: [facility] c/ 1 [address]EF[address]; WIS, standard toilet; lives c/ mother, brother and niece  Previous level of function: indep  Roles and Routines: daughter, doesn't work or drive  Equipment " Used at Home: bedside commode, rollator, grab bar  Assistance upon Discharge: per pt report, heron works 2 days per wl and can assist the other days if needed    Pain/Comfort:  Pain Rating 1: 10/10  Location - Side 1: Right  Location 1: leg  Pain Addressed 1: Reposition, Distraction, Nurse notified  Pain Rating Post-Intervention 1: 10/10    Patients cultural, spiritual, Orthodoxy conflicts given the current situation: no    Objective:     Communicated with: RN prior to session.  Patient found up in chair with perineural catheter, peripheral IV, FCD, sanchez catheter upon OT entry to room.    General Precautions: Standard, fall, special contact  Orthopedic Precautions: RLE toe touch weight bearing  Braces: N/A  Respiratory Status: Room air    Occupational Performance:    Bed Mobility:    Patient completed Sit to Supine with contact guard assistance    Functional Mobility/Transfers:  Patient completed Sit <> Stand Transfer with moderate assistance  with  rolling walker   Chair t/f: stand pivot, Max A c/ HHA; mod vc's for sequencing  Functional Mobility: pt unable to take steps c/ RW, mod vc's to adhere to WB precautions  On attempt to ambulate c/ RW from chair to bed, mario alberto knees buckled, assisted back to chair    Activities of Daily Living:  Pt declined to perform ADLs this session    Cognitive/Visual Perceptual:  Cognitive/Psychosocial Skills:     -       Oriented to: Person, Place, Time, and Situation   -       Follows Commands/attention:Follows one-step commands and Follows two-step commands  -       Safety awareness/insight to disability: impaired     Physical Exam:  Balance:    -       static sitting balance: SBA ~3 min eob; static standing balance: Mod-Max A  Upper Extremity Range of Motion:     -       Right Upper Extremity: WFL  -       Left Upper Extremity: WFL  Upper Extremity Strength:    -       Right Upper Extremity: WFL  -       Left Upper Extremity: WFL   Strength:    -       Right Upper Extremity:  WFL  -       Left Upper Extremity: WFL  Fine Motor Coordination:    -       Intact  Left hand thumb/finger opposition skills, Right hand thumb/finger opposition skills, Left hand, manipulation of objects, and Right hand, manipulation of objects    AMPAC 6 Click ADL:  AMPAC Total Score: 17    Treatment & Education:  Pt edu on role of OT, POC, safety when performing self care tasks , benefit of performing OOB activity, and safety when performing functional transfers and mobility.  - White board updated  - Self care tasks completed-- as noted above    - pt educated on TTWB  - pt educated on safe t/f's c/ RW to various surfaces to prep for ADLs  - pt educated on using call button to mobilize with staff only    Patient left supine with all lines intact, call button in reach, bed alarm on, and RN notified    GOALS:   Multidisciplinary Problems       Occupational Therapy Goals          Problem: Occupational Therapy    Goal Priority Disciplines Outcome Interventions   Occupational Therapy Goal     OT, PT/OT Progressing    Description: Goals to be met by: 9/2/2024     Patient will increase functional independence with ADLs by performing:    UE Dressing with Supervision.  LE Dressing with Supervision.  Grooming while standing at sink with Supervision.  Toileting from toilet with Supervision for hygiene and clothing management.   Toilet transfer to toilet with Supervision.    DME Justifications    Bedside Commode- Patient has a mobility limitation that significantly impairs their ability to participate in one or more mobility related activities of daily living, including toileting. This deficit can be resolved by using a bedside commode. Patient demonstrates mobility limitations that will cause them to be confined to one room at home without bathroom access for up to 30 days. Using a bedside commode will greatly improve the patient's ability to participate in MRADLs.     Rolling Walker- Patient demonstrates a mobility  limitation that significantly impairs their ability to participate in one or more mobility related activities of daily living. Patient's mobility limitation cannot be sufficiently resolved with the use of a cane, but can be sufficiently resolved with the use of a rolling walker.The use of a rolling walker will considerably improve their ability to participate in MRADLs. Patient will use the walker on a regular basis at home.                           History:     Past Medical History:   Diagnosis Date    Allergy     Arthritis     Asthma     Candida esophagitis 11/15/2022    Chronic pain     HIV infection     Hypertension     Overactive bladder     Septic shock 08/12/2024    Spinal stenosis     Urine incontinence          Past Surgical History:   Procedure Laterality Date    ADENOIDECTOMY      APPENDECTOMY      BACK SURGERY      GERALD    CYST REMOVAL      HYSTERECTOMY      JOINT REPLACEMENT Right     knee, with revision    KNEE SURGERY Left     knee replacement    PERCUTANEOUS PINNING OF HIP Right 8/25/2024    Procedure: PINNING, HIP, PERCUTANEOUS, RIGHT;  Surgeon: Laureano Vyas MD;  Location: [address];  Service: Orthopedics;  Laterality: Right;    RHIZOTOMY      TONSILLECTOMY      TOTAL ANKLE ARTHROPLASTY Right        Time Tracking:     OT Date of Treatment: 08/26/24  OT Start Time: 1048  OT Stop Time: 1120  OT Total Time (min): 32 min    Billable Minutes:Evaluation 8  Therapeutic Activity 16  Neuromuscular Re-education 8    8/26/2024

## 2024-08-26 NOTE — PT/OT/SLP EVAL
Physical Therapy Evaluation and Treatment    Patient Name:  Amie Salmeron   MRN:  [id number]  Admit Date: 8/24/2024  Admitting Diagnosis:  Closed fracture of neck of right femur with routine healing   Length of Stay: 1 days  Recent Surgery: Procedure(s) (LRB):  PINNING, HIP, PERCUTANEOUS, RIGHT (Right) 1 Day Post-Op    Recommendations:     Discharge Recommendations:  Moderate Intensity Therapy  Discharge Equipment Recommendations: walker, rolling   Justification for Equipment: The mobility limitation cannot be sufficiently resolved by the use of a cane. Patient's functional mobility deficit can be sufficiently resolved with the use of a Rolling Walker. Patient's mobility limitation significantly impairs their ability to participate in one of more activities of daily living. The use of a Rolling Walker will significantly improve the patient's ability to participate in MRADLS and the patient will use it on regular basis in the home.   Barriers to discharge: Evolving Clinical Presentation    Assessment:     Amie Salmeron is a 59 y.o. female admitted with a medical diagnosis of Closed fracture of neck of right femur with routine healing.  She presents with the following impairments/functional limitations: weakness, impaired endurance, impaired self care skills, impaired functional mobility, gait instability, impaired balance, pain, decreased safety awareness, decreased lower extremity function, impaired cognition, orthopedic precautions, impaired sensation.     Pt agreeable to therapy, difficulty following weightbearing instructions, needed manual assistance on foot to stand without breaking precautions. Pt with poor safety awareness, makes attempts to get out of chair after being explicitly told not to and verbalizing understanding. May need telesitter or chair alarm to be left up in chair.    Rehab Prognosis: Fair; patient would benefit from acute skilled PT services to address these deficits and reach maximum level of  "function.      Treatment Tolerated: Fairly Poorly    Highest level of mobility achieved this visit: max A squat pivot to bedside chair     Activity with RN/PCT: transfer with two person assist    Plan:     During this hospitalization, patient to be seen 4 x/week to address the identified rehab impairments via gait training, therapeutic activities, therapeutic exercises, neuromuscular re-education and progress towards the established goals.    Plan of Care Expires:  09/26/24    Subjective     RN Shania notified prior to session. No family present upon PT entrance into room.    Chief Complaint: pain  Patient/Family Comments/goals: "I was looking at a stuffed animal when I fell"  Pain/Comfort:  Pain Rating 1: 9/10  Location - Side 1: Right  Location - Orientation 1: generalized  Location 1: hip  Pain Addressed 1: Reposition, Distraction, Pre-medicate for activity    Social History:  Residence: lives with their parents 1-story house with 0 ESTEFANY.  Support available: family  Equipment Used: bedside commode, rollator, grab bar, cane, quad  Equipment Owned (not using): None  Prior level of function: independent  Work: Unemployed   Drive: no.       Objective:     Additional staff present: [employer]    Patient found HOB elevated with: perineural catheter, peripheral IV, FCD, sanchez catheter     General Precautions: Standard, fall, contact   Orthopedic Precautions:RLE toe touch weight bearing   Braces: N/A   Body mass index is 19.08 kg/m².  Oxygen Device: Room Air    Vitals: BP (!) 91/55 (BP Location: Left leg, Patient Position: Lying)   Pulse 75   Temp 98.4 °F (36.9 °C) (Oral)   Resp 20   Ht 5' 1" (1.549 m)   Wt 45.8 kg (100 lb 15.5 oz)   SpO2 98%   Breastfeeding No   BMI 19.08 kg/m²     Exams:  Cognition:   Cooperative, Flat affect, and Tangential  Command following: Follows one-step verbal commands  Fluency: clear/fluent  Hearing: Intact  Vision:  Intact  Skin Integrity: Visible skin intact    Physical " Exam:   Edema - None noted  ROM - VALORIE LEs WFL  Strength - LLE WFL, R hip 2/5, R knee  2+/5, R ankle 3+/5  Sensation - Intact to light touch  Coordination - No deficits noted    Outcome Measures:    AM-PAC 6 CLICK MOBILITY  Turning over in bed (including adjusting bedclothes, sheets and blankets)?: 3  Sitting down on and standing up from a chair with arms (e.g., wheelchair, bedside commode, etc.): 2  Moving from lying on back to sitting on the side of the bed?: 3  Moving to and from a bed to a chair (including a wheelchair)?: 2  Need to walk in hospital room?: 1  Climbing 3-5 steps with a railing?: 1  Basic Mobility Total Score: 12     Functional Mobility:    Bed Mobility:   Supine to Sit: minimum assistance; from L side of bed  Scooting anteriorly to EOB to have both feet planted on floor: stand by assistance    Sitting Balance at Edge of Bed:  Assistance Level Required: Stand-by Assistance  Time: 15 min  Postural deviations noted: slouched posture    Transfers:   Sit > Stand Transfer: moderate assistance with rolling walker  Stand > Sit Transfer: moderate assistance with rolling walker  x2 trials from EOB  1 hand under R foot to ensure WB  precautions maintained  Unable to maintain without direct assistance  Bed > Chair Transfer: Squat Pivot technique with maximal assistance with no assistive device  Chair on patient's L    Standing Balance:  Assistance Level Required: Moderate Assistance  Patient used: rolling walker  Time: 1 min x 2 trials  Postural deviations noted: slouched posture  Encouraged: weight shift to L to offload RLE  Comments: needs assistance to maintain WB      Gait: Deferred due to poor ability to maintain TTWB    Therapeutic Exercises:   Static and dynamic sitting balance at EOB  Postural correction, upright posture, and sitting tolerance  Static and dynamic standing balance at EOB  standing tolerance, upright stance, and weight shifting    Education:  Time provided for education, counseling and  discussion of health disposition in regards to patient's current status  All questions answered within PT scope of practice and to patient's satisfaction  PT role in POC to address current functional deficits  Pt educated on proper body mechanics, safety techniques, and energy conservation with PT facilitation and cueing throughout session  Call nursing/pct to transfer to chair/use bathroom. Pt stated understanding.    Patient left up in chair with all lines intact, call button in reach, and RN Shania notified.    GOALS:   Multidisciplinary Problems       Physical Therapy Goals          Problem: Physical Therapy    Goal Priority Disciplines Outcome Goal Variances Interventions   Physical Therapy Goal     PT, PT/OT Progressing     Description: Goals to met by 9/9/2024    1. Supine to sit with Stand-by Assistance  2. Sit to supine with Stand-by Assistance  3.. Rolling to Left and Right with Stand-by Assistance.  4. Sit to stand transfer with Contact Guard Assistance while maintaining WB precautions  5. Bed to chair transfer with Contact Guard Assistance using LRAD while maintaining WB precautions  6. Gait  x 20 feet with Minimal Assistance using Rolling Walker while maintaining WB precautions   7. Ascend/Descend 6 inch curb step with Moderate Assistance using Rolling Walker while maintaining WB precautions.  8. Stand for 5 minutes with Stand-by Assistance using Rolling Walker while maintaining WB precautions  9. Lower extremity exercise program x15 reps per Instruction, with assistance as needed in order to facilitate improved strength, improved postural control, and improvement in functional independence    Rolling Walker- Patient demonstrates a mobility limitation that significantly impairs their ability to participate in one or more mobility related activities of daily living. Patient's mobility limitation cannot be sufficiently resolved with the use of a cane, but can be sufficiently resolved with the use of a  rolling walker.The use of a rolling walker will considerably improve their ability to participate in MRADLs. Patient will use the walker on a regular basis at home.                         History:     Past Medical History:   Diagnosis Date    Allergy     Arthritis     Asthma     Candida esophagitis 11/15/2022    Chronic pain     HIV infection     Hypertension     Overactive bladder     Septic shock 08/12/2024    Spinal stenosis     Urine incontinence        Past Surgical History:   Procedure Laterality Date    ADENOIDECTOMY      APPENDECTOMY      BACK SURGERY      GERALD    CYST REMOVAL      HYSTERECTOMY      JOINT REPLACEMENT Right     knee, with revision    KNEE SURGERY Left     knee replacement    PERCUTANEOUS PINNING OF HIP Right 8/25/2024    Procedure: PINNING, HIP, PERCUTANEOUS, RIGHT;  Surgeon: Laureano Vyas MD;  Location: [facility] OR [address] [place] [address];  Service: Orthopedics;  Laterality: Right;    RHIZOTOMY      TONSILLECTOMY      TOTAL ANKLE ARTHROPLASTY Right        Time Tracking:     PT Received On: 08/26/24  PT Start Time: 0841     PT Stop Time: 0914  PT Total Time (min): 33 min     Billable Minutes: Evaluation 1 procedure, Therapeutic Activity 15 min, and Neuromuscular Re-education 10 min    Fahad Ruiz, PT, DPT  8/26/2024

## 2024-08-26 NOTE — PLAN OF CARE
Late Entry   08/26/24 1453   Post-Acute Status   Post-Acute Authorization Placement   Post-Acute Placement Status Referrals Sent   Discharge Plan   Discharge Plan A Skilled Nursing Facility     SW faxed referral to [facility] (Pt admitted from) via [facility] for review. SW will follow up as needed. SAEID spoke to admissions staff Josie, to inform of pt's return and requested that her medications not be discarded, staff confirmed.      Amanda Pelayo, ABBY  Case Management   [facility]   Ext. [phone]

## 2024-08-26 NOTE — PLAN OF CARE
Problem: Physical Therapy  Goal: Physical Therapy Goal  Description: Goals to met by 9/9/2024    1. Supine to sit with Stand-by Assistance  2. Sit to supine with Stand-by Assistance  3.. Rolling to Left and Right with Stand-by Assistance.  4. Sit to stand transfer with Contact Guard Assistance while maintaining WB precautions  5. Bed to chair transfer with Contact Guard Assistance using LRAD while maintaining WB precautions  6. Gait  x 20 feet with Minimal Assistance using Rolling Walker while maintaining WB precautions   7. Ascend/Descend 6 inch curb step with Moderate Assistance using Rolling Walker while maintaining WB precautions.  8. Stand for 5 minutes with Stand-by Assistance using Rolling Walker while maintaining WB precautions  9. Lower extremity exercise program x15 reps per Instruction, with assistance as needed in order to facilitate improved strength, improved postural control, and improvement in functional independence    Rolling Walker- Patient demonstrates a mobility limitation that significantly impairs their ability to participate in one or more mobility related activities of daily living. Patient's mobility limitation cannot be sufficiently resolved with the use of a cane, but can be sufficiently resolved with the use of a rolling walker.The use of a rolling walker will considerably improve their ability to participate in MRADLs. Patient will use the walker on a regular basis at home.    PT Eval: 8/26/2024

## 2024-08-26 NOTE — ASSESSMENT & PLAN NOTE
Patient with Body mass index is 19.08 kg/m² and underweight and related to her chronic infections and advanced AIDS. Encourage po intake and ordered Boost supplements with meals to supplement her diet.        No

## 2024-08-26 NOTE — PLAN OF CARE
Problem: Occupational Therapy  Goal: Occupational Therapy Goal  Description: Goals to be met by: 9/2/2024     Patient will increase functional independence with ADLs by performing:    UE Dressing with Supervision.  LE Dressing with Supervision.  Grooming while standing at sink with Supervision.  Toileting from toilet with Supervision for hygiene and clothing management.   Toilet transfer to toilet with Supervision.    DME Justifications    Bedside Commode- Patient has a mobility limitation that significantly impairs their ability to participate in one or more mobility related activities of daily living, including toileting. This deficit can be resolved by using a bedside commode. Patient demonstrates mobility limitations that will cause them to be confined to one room at home without bathroom access for up to 30 days. Using a bedside commode will greatly improve the patient's ability to participate in MRADLs.     Rolling Walker- Patient demonstrates a mobility limitation that significantly impairs their ability to participate in one or more mobility related activities of daily living. Patient's mobility limitation cannot be sufficiently resolved with the use of a cane, but can be sufficiently resolved with the use of a rolling walker.The use of a rolling walker will considerably improve their ability to participate in MRADLs. Patient will use the walker on a regular basis at home.      Outcome: Progressing

## 2024-08-26 NOTE — ASSESSMENT & PLAN NOTE
Patient with chronic debility due to severe malnutrition, age-related physical debility, AIDS and disseminated infcetion. Plan includes progressive mobility protocol initated, PT/OT consulted, and fall precautions in place.

## 2024-08-26 NOTE — ASSESSMENT & PLAN NOTE
Acute blood loss anemia  Anemia is likely due to combination of chronic disease due to chronic infections and acute blood loss from surgery that was expected. Baseline Hgb 7.4-8.5. Most recent hemoglobin and hematocrit are listed below.  Recent Labs     08/25/24  0007 08/25/24  0010 08/25/24  2139 08/26/24  0340   HGB 8.6*  --  7.2* 6.8*   HCT 28.0* 27* 23.8* 23.2*     Plan  - Monitor serial CBC: Daily  - Transfuse PRBC if patient becomes hemodynamically unstable, symptomatic or H/H drops below 7/21.  - Patient has not received any PRBC transfusions to date but plan to transfuse 1 unit of PRBCs on 8/26 for Hgb 6.8.  - Patient's anemia is currently worsening.

## 2024-08-26 NOTE — PROGRESS NOTES
"Pt found by OT and tech on knees leaning with arms resting on bed. She was previously in the chair. Assisted back to chair. Rounded with Unit Director who asked pt if she fell. Pt states "No I am fine." OT remained with pt to mobilize and will put patient back to bed. Bed alarm set.  "

## 2024-08-26 NOTE — ASSESSMENT & PLAN NOTE
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 Last CD4 count and viral load reviewed and noted below:    Results for orders placed or performed during the hospital encounter of 08/11/24   CD4 T-[place] Cells   Result Value Ref Range    CD4 % [place] T Cell 6.5 (L) 28 - 57 %    Absolute CD4 76 (L) 300 - 1400 cells/ul     Log HIV Copies/mL   Date Value Ref Range Status   08/11/2024 4.05 (A) <1.30 Log Copies/mL Final     Patient followed by Infectious Disease as outpatient. Continue home Dolutegravir 50 mg po daily + home Lamivudine 300 mg po daily to treat.

## 2024-08-26 NOTE — NURSING
Nurses Note -- 4 Eyes      8/26/2024   1:41 AM      Skin assessed during: Daily Assessment      [] No Altered Skin Integrity Present    []Prevention Measures Documented      [x] Yes- Altered Skin Integrity Present or Discovered   [] LDA Added if Not in Epic (Describe Wound)   [x] New Altered Skin Integrity was Present on Admit and Documented in LDA   [] Wound Image Taken    Wound Care Consulted? Yes    Attending Nurse:  Bill DUNCAN    Second RN/Staff Member:   Kim DUNCAN

## 2024-08-26 NOTE — SUBJECTIVE & OBJECTIVE
"Principal Problem:Closed fracture of neck of right femur with routine healing    Principal Orthopedic Problem: s/p right hip perc screws 8/25    Interval History: POD1. Hb 6.8 this morning - transfused 1U prbc. Pain well controlled with SIFI. Up with OT today. Plan for dc to SNF once medically stable.     Review of patient's allergies indicates:  No Known Allergies    Current Facility-Administered Medications   Medication    acetaminophen tablet 1,000 mg    apixaban tablet 2.5 mg    bisacodyL suppository 10 mg    calcium carbonate 200 mg calcium (500 mg) chewable tablet 1,000 mg    celecoxib capsule 200 mg    clarithromycin tablet 500 mg    dolutegravir Tab 50 mg    And    lamiVUDine tablet 300 mg    ertapenem (INVANZ) 1 g in 0.9% NaCl 100 mL IVPB (MB+)    ethambutoL tablet 800 mg    melatonin tablet 6 mg    methocarbamoL tablet 750 mg    mupirocin 2 % ointment 1 g    mupirocin 2 % ointment    ondansetron injection 4 mg    oxyCODONE immediate release tablet 5 mg    pregabalin capsule 75 mg    QUEtiapine tablet 25 mg    rifabutin capsule 300 mg    ropivacaine 0.2% Perineural Pump infusion 500 ML    senna-docusate 8.6-50 mg per tablet 1 tablet    sodium chloride 0.9% flush 10 mL    sodium chloride 0.9% flush 10 mL    And    sodium chloride 0.9% flush 10 mL    vancomycin - pharmacy to dose    vancomycin 750 mg in D5W 250 mL IVPB (Vial-Mate)    vitamin D 1000 units tablet 1,000 Units     Objective:     Vital Signs (Most Recent):  Temp: 98.4 °F (36.9 °C) (08/26/24 1530)  Pulse: 75 (08/26/24 1530)  Resp: 20 (08/26/24 1530)  BP: (!) 91/55 (08/26/24 1530)  SpO2: 98 % (08/26/24 1530) Vital Signs (24h Range):  Temp:  [96.7 °F (35.9 °C)-98.4 °F (36.9 °C)] 98.4 °F (36.9 °C)  Pulse:  [63-80] 75  Resp:  [16-20] 20  SpO2:  [95 %-100 %] 98 %  BP: ()/(55-60) 91/55     Weight: 45.8 kg (100 lb 15.5 oz)  Height: 5' 1" (154.9 cm)  Body mass index is 19.08 kg/m².      Intake/Output Summary (Last 24 hours) at 8/26/2024 1604  Last " data filed at 8/26/2024 1530  Gross per 24 hour   Intake 1353.75 ml   Output 750 ml   Net 603.75 ml        Ortho/SPM Exam  AAOx4  NAD  Reg rate  No increased WOB     Musculoskeletal -   Dressing C/D/I  Thigh/Calves Soft, non tender  SILT SP/DP/TN  Fires quads/TA/EHL/GSC  WWP extremities  SCDs in place and functioning       Significant Labs: All pertinent labs within the past 24 hours have been reviewed.    Significant Imaging: I have reviewed and interpreted all pertinent imaging results/findings.

## 2024-08-26 NOTE — PLAN OF CARE
[facility] - Surgery  Initial Discharge Assessment       Primary Care Provider: No, Primary Doctor    Admission Diagnosis: Preoperative clearance [Z01.818]  Right hip pain [M25.551]  AIDS [B20]  Hypotension [I95.9]    Admission Date: 8/24/2024  Expected Discharge Date: 8/28/2024         Payor: HUMA[employer] MANAGED MEDICARE / Plan: HU[employer]NA MEDICARE HMO / Product Type: Capitation /     Extended Emergency Contact Information  Primary Emergency Contact: Yari Crandallna  Address: [address] [place] [address] [place]  Mobile Phone: [phone]  Relation: Relative  Secondary Emergency Contact: RazElena  Address: [address] [place] [address] [facility]  Home Phone: [phone]  Mobile Phone: [phone]  Relation: Mother    Discharge Plan A: Skilled Nursing Facility         [facility] - [place], [place] - [address]  [address]  [place] [address]  Phone: [phone] Fax: [phone]    [facility] - [place], [place] - [address] [facility] [address] [facility]  [address] [address]  Phone: [phone] Fax: [phone]    [address], [place] - [address]  [address]  [place] [address]  Phone: [phone] Fax: [phone]    [id number] @ [facility], [place] - [address]  [address]  [facility] [address]  Phone: [phone] Fax: [phone]    [facility]  [address]  [facility] [address]  Phone: [phone] Fax: [phone]      Initial Assessment (most recent)       Adult Discharge Assessment - 08/26/24 1141          Discharge Assessment    Assessment Type Discharge Planning Assessment     Confirmed/corrected address, phone number and insurance Yes     Confirmed Demographics Correct on Facesheet     Source of Information patient     Does  patient/caregiver understand observation status Yes     Communicated MARK with patient/caregiver Yes     Reason For Admission Fall     People in Home sibling(s);other relative(s);parent(s)     Facility Arrived From: [facility]     Do you expect to return to your current living situation? Yes     Do you have help at home or someone to help you manage your care at home? Yes     Who are your caregiver(s) and their phone number(s)? Janett Crandall ([facility]) [phone]     Prior to hospitilization cognitive status: Alert/Oriented     Current cognitive status: Alert/Oriented     Walking or Climbing Stairs Difficulty no     Equipment Currently Used at Home none     Readmission within 30 days? No     Patient currently being followed by outpatient case management? No     Do you currently have service(s) that help you manage your care at home? No     Do you take prescription medications? Yes     Do you have prescription coverage? Yes     Do you have any problems affording any of your prescribed medications? No     Is the patient taking medications as prescribed? yes     Who is going to help you get home at discharge? Return to Skilled Nursing facility     How do you get to doctors appointments? family or friend will provide     Are you on dialysis? No     Do you take coumadin? No     Discharge Plan A Skilled Nursing Facility        Physical Activity    On average, how many days per week do you engage in moderate to strenuous exercise (like a brisk walk)? 0 days     On average, how many minutes do you engage in exercise at this level? 0 min        Financial Resource Strain    How hard is it for you to pay for the very basics like food, housing, medical care, and heating? Not hard at all        Housing Stability    In the last 12 months, was there a time when you were not able to pay the mortgage or rent on time? No     At any time in the past 12 months, were you homeless or living in a shelter (including now)? No         Transportation Needs    Has the lack of transportation kept you from medical appointments, meetings, work or from getting things needed for daily living? No        Food Insecurity    Within the past 12 months, you worried that your food would run out before you got the money to buy more. Never true     Within the past 12 months, the food you bought just didn't last and you didn't have money to get more. Never true        Stress    Do you feel stress - tense, restless, nervous, or anxious, or unable to sleep at night because your mind is troubled all the time - these days? Not at all        Social Isolation    How often do you feel lonely or isolated from those around you?  Never        Alcohol Use    Q1: How often do you have a drink containing alcohol? Never     Q2: How many drinks containing alcohol do you have on a typical day when you are drinking? Patient does not drink     Q3: How often do you have six or more drinks on one occasion? Never        Utilities    In the past 12 months has the electric, gas, oil, or water company threatened to shut off services in your home? No        Health Literacy    How often do you need to have someone help you when you read instructions, pamphlets, or other written material from your doctor or pharmacy? Never                   Spoke with patient at bedside to complete d/c planning assessment. Patient lives with her Mother, Brother and niece. Home is single story with one step to enter. Independent with ADL'S. Home DME includes Wheelchair, Walker, Cane, Bedside commode and shower chair. Patient plans to return to [facility] for continued care. SW sent referral for facility. Will continue to follow for needs. Discharge Plan A and Plan B have been determined by review of patient's clinical status, future medical and therapeutic needs, and coverage/benefits for post-acute care in coordination with multidisciplinary team members.      Marily ROACH  Case  Management  [facility]  [phone]

## 2024-08-26 NOTE — ASSESSMENT & PLAN NOTE
Patient with known disseminated MAC and very few treatment options as pr ID to treat ij patient with known AIDS on last admit. Patient to continue oral antibiotics to treat as per ID:  Clarithromycin 500 mg po BID  Ethambutol 700 mg po daily  Rifabutin 300 mg po daily  Medications reordered on this admit.

## 2024-08-26 NOTE — ANESTHESIA POST-OP PAIN MANAGEMENT
Acute Pain Service Progress Note    Amie Salmeron is a 59 y.o., female, [id number].    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       Subjective:     General appearance of alert, oriented, no complaints   Pain with rest: 3    Faces   Pain with movement: 7    Faces   Side Effects    1. Pruritis No    2. Nausea No    3. Motor Blockade No, 0=Ability to raise lower extremities off bed    4. Sedation No, 1=awake and alert    Objective:       Catheter site clean, dry, intact        Vitals   Vitals:    08/26/24 0651   BP: (!) 114/56   Pulse: 63   Resp: 16   Temp: 36.4 °C (97.5 °F)        Labs    Admission on 08/24/2024   Component Date Value Ref Range Status    WBC 08/25/2024 4.96  3.90 - 12.70 K/uL Final    RBC 08/25/2024 3.34 (L)  4.00 - 5.40 M/uL Final    Hemoglobin 08/25/2024 8.6 (L)  12.0 - 16.0 g/dL Final    Hematocrit 08/25/2024 28.0 (L)  37.0 - 48.5 % Final    MCV 08/25/2024 84  82 - 98 fL Final    MCH 08/25/2024 25.7 (L)  27.0 - 31.0 pg Final    MCHC 08/25/2024 30.7 (L)  32.0 - 36.0 g/dL Final    RDW 08/25/2024 23.1 (H)  11.5 - 14.5 % Final    Platelets 08/25/2024 349  150 - 450 K/uL  Final    MPV 08/25/2024 9.8  9.2 - 12.9 fL Final    Immature Granulocytes 08/25/2024 0.8 (H)  0.0 - 0.5 % Final    Gran # (ANC) 08/25/2024 3.7  1.8 - 7.7 K/uL Final    Immature Grans (Abs) 08/25/2024 0.04  0.00 - 0.04 K/uL Final    Lymph # 08/25/2024 0.9 (L)  1.0 - 4.8 K/uL Final    Mono # 08/25/2024 0.3  0.3 - 1.0 K/uL Final    Eos # 08/25/2024 0.0  0.0 - 0.5 K/uL Final    Baso # 08/25/2024 0.02  0.00 - 0.20 K/uL Final    nRBC 08/25/2024 0  0 /100 WBC Final    Gran % 08/25/2024 74.2 (H)  38.0 - 73.0 % Final    Lymph % 08/25/2024 17.9 (L)  18.0 - 48.0 % Final    Mono % 08/25/2024 6.5  4.0 - 15.0 % Final    Eosinophil % 08/25/2024 0.2  0.0 - 8.0 % Final    Basophil % 08/25/2024 0.4  0.0 - 1.9 % Final    Differential Method 08/25/2024 Automated   Final    Sodium 08/25/2024 134 (L)  136 - 145 mmol/L Final    Potassium 08/25/2024 4.5  3.5 - 5.1 mmol/L Final    Chloride 08/25/2024 104  95 - 110 mmol/L Final    CO2 08/25/2024 20 (L)  23 - 29 mmol/L Final    Glucose 08/25/2024 83  70 - 110 mg/dL Final    BUN 08/25/2024 13  8 - 23 mg/dL Final    Creatinine 08/25/2024 0.6  0.5 - 1.4 mg/dL Final    Calcium 08/25/2024 8.6 (L)  8.7 - 10.5 mg/dL Final    Total Protein 08/25/2024 6.7  6.0 - 8.4 g/dL Final    Albumin 08/25/2024 2.1 (L)  3.5 - 5.2 g/dL Final    Total Bilirubin 08/25/2024 0.2  0.1 - 1.0 mg/dL Final    Alkaline Phosphatase 08/25/2024 119  55 - 135 U/L Final    AST 08/25/2024 24  10 - 40 U/L Final    ALT 08/25/2024 6 (L)  10 - 44 U/L Final    eGFR 08/25/2024 >60.0  >60 mL/min/1.73 m^2 Final    Anion Gap 08/25/2024 10  8 - 16 mmol/L Final    Lactate (Lactic Acid) 08/25/2024 1.1  0.5 - 2.2 mmol/L Final    Blood Culture, Routine 08/25/2024 No Growth to date   Preliminary    Blood Culture, Routine 08/25/2024 No Growth to date   Preliminary    Blood Culture, Routine 08/25/2024 No Growth to date   Preliminary    Blood Culture, Routine 08/25/2024 No Growth to date   Preliminary    Specimen UA 08/25/2024 Urine, Catheterized    Final    Color, UA 08/25/2024 Colorless (A)  Yellow, Straw, Ester Final    Appearance, UA 08/25/2024 Clear  Clear Final    pH, UA 08/25/2024 7.0  5.0 - 8.0 Final    Specific Gravity, UA 08/25/2024 1.020  1.005 - 1.030 Final    Protein, UA 08/25/2024 Negative  Negative Final    Glucose, UA 08/25/2024 Negative  Negative Final    Ketones, UA 08/25/2024 Negative  Negative Final    Bilirubin (UA) 08/25/2024 Negative  Negative Final    Occult Blood UA 08/25/2024 Negative  Negative Final    Nitrite, UA 08/25/2024 Negative  Negative Final    Leukocytes, UA 08/25/2024 Negative  Negative Final    QRS Duration 08/24/2024 76  ms Final    OHS QTC Calculation 08/24/2024 409  ms Final    POC PH 08/25/2024 7.352  7.35 - 7.45 Final    POC PCO2 08/25/2024 50.4 (H)  35 - 45 mmHg Final    POC PO2 08/25/2024 25 (LL)  40 - 60 mmHg Final    POC HCO3 08/25/2024 28.0  24 - 28 mmol/L Final    POC BE 08/25/2024 2  -2 to 2 mmol/L Final    POC SATURATED O2 08/25/2024 43  95 - 100 % Final    POC Sodium 08/25/2024 135 (L)  136 - 145 mmol/L Final    POC Potassium 08/25/2024 4.1  3.5 - 5.1 mmol/L Final    POC TCO2 08/25/2024 30 (H)  24 - 29 mmol/L Final    POC Ionized Calcium 08/25/2024 1.24  1.06 - 1.42 mmol/L Final    POC Hematocrit 08/25/2024 27 (L)  36 - 54 %PCV Final    Sample 08/25/2024 VENOUS   Final    Site 08/25/2024 Other   Final    Allens Test 08/25/2024 N/A   Final    POC Lactate 08/25/2024 0.97  0.5 - 2.2 mmol/L Final    Sample 08/25/2024 VENOUS   Final    Site 08/25/2024 Other   Final    Allens Test 08/25/2024 N/A   Final    Group & Rh 08/25/2024 O POS   Final    Indirect Tracy 08/25/2024 NEG   Final    Specimen Outdate 08/25/2024 08/28/2024 23:59   Final    UNIT NUMBER 08/25/2024 [id number]   Preliminary    Product Code 08/25/2024 [id number]   Preliminary    DISPENSE STATUS 08/25/2024 CROSSMATCHED   Preliminary    CODING SYSTEM 08/25/2024 [id number]   Preliminary    Unit Blood Type Code 08/25/2024 5100   Preliminary    Unit Blood Type  08/25/2024 O POS   Preliminary    Unit Expiration 08/25/2024 [id number]   Preliminary    CROSSMATCH INTERPRETATION 08/25/2024 Compatible   Preliminary    UNIT NUMBER 08/25/2024 [id number]   Preliminary    Product Code 08/25/2024 [id number]   Preliminary    DISPENSE STATUS 08/25/2024 CROSSMATCHED   Preliminary    CODING SYSTEM 08/25/2024 [id number]   Preliminary    Unit Blood Type Code 08/25/2024 5100   Preliminary    Unit Blood Type 08/25/2024 O POS   Preliminary    Unit Expiration 08/25/2024 [id number]   Preliminary    CROSSMATCH INTERPRETATION 08/25/2024 Compatible   Preliminary    Specimen UA 08/25/2024 Urine, Catheterized   Final    Color, UA 08/25/2024 Colorless (A)  Yellow, Straw, Ester Final    Appearance, UA 08/25/2024 Clear  Clear Final    pH, UA 08/25/2024 7.0  5.0 - 8.0 Final    Specific Gravity, UA 08/25/2024 1.020  1.005 - 1.030 Final    Protein, UA 08/25/2024 Negative  Negative Final    Glucose, UA 08/25/2024 Negative  Negative Final    Ketones, UA 08/25/2024 Negative  Negative Final    Bilirubin (UA) 08/25/2024 Negative  Negative Final    Occult Blood UA 08/25/2024 Negative  Negative Final    Nitrite, UA 08/25/2024 Negative  Negative Final    Leukocytes, UA 08/25/2024 Negative  Negative Final    Hemoglobin A1C 08/25/2024 4.9  4.0 - 5.6 % Final    Estimated Avg Glucose 08/25/2024 94  68 - 131 mg/dL Final    WBC 08/25/2024 7.09  3.90 - 12.70 K/uL Final    RBC 08/25/2024 2.89 (L)  4.00 - 5.40 M/uL Final    Hemoglobin 08/25/2024 7.2 (L)  12.0 - 16.0 g/dL Final    Hematocrit 08/25/2024 23.8 (L)  37.0 - 48.5 % Final    MCV 08/25/2024 82  82 - 98 fL Final    MCH 08/25/2024 24.9 (L)  27.0 - 31.0 pg Final    MCHC 08/25/2024 30.3 (L)  32.0 - 36.0 g/dL Final    RDW 08/25/2024 23.1 (H)  11.5 - 14.5 % Final    Platelets 08/25/2024 314  150 - 450 K/uL Final    MPV 08/25/2024 10.0  9.2 - 12.9 fL Final    Immature Granulocytes 08/25/2024 0.6 (H)  0.0 - 0.5 % Final    Gran # (ANC) 08/25/2024 5.2  1.8 - 7.7 K/uL  Final    Immature Grans (Abs) 08/25/2024 0.04  0.00 - 0.04 K/uL Final    Lymph # 08/25/2024 0.6 (L)  1.0 - 4.8 K/uL Final    Mono # 08/25/2024 0.3  0.3 - 1.0 K/uL Final    Eos # 08/25/2024 1.0 (H)  0.0 - 0.5 K/uL Final    Baso # 08/25/2024 0.01  0.00 - 0.20 K/uL Final    nRBC 08/25/2024 0  0 /100 WBC Final    Gran % 08/25/2024 72.8  38.0 - 73.0 % Final    Lymph % 08/25/2024 7.9 (L)  18.0 - 48.0 % Final    Mono % 08/25/2024 4.8  4.0 - 15.0 % Final    Eosinophil % 08/25/2024 13.8 (H)  0.0 - 8.0 % Final    Basophil % 08/25/2024 0.1  0.0 - 1.9 % Final    Platelet Estimate 08/25/2024 Appears normal   Final    Aniso 08/25/2024 Slight   Final    Poik 08/25/2024 Slight   Final    Poly 08/25/2024 Occasional   Final    Hypo 08/25/2024 Occasional   Final    Ovalocytes 08/25/2024 Occasional   Final    Tear Drop Cells 08/25/2024 Occasional   Final    Dandy Cells 08/25/2024 Occasional   Final    Schistocytes 08/25/2024 Present   Final    Fragmented Cells 08/25/2024 Occasional   Final    Differential Method 08/25/2024 Automated   Final    WBC 08/26/2024 5.08  3.90 - 12.70 K/uL Final    RBC 08/26/2024 2.77 (L)  4.00 - 5.40 M/uL Final    Hemoglobin 08/26/2024 6.8 (L)  12.0 - 16.0 g/dL Final    Hematocrit 08/26/2024 23.2 (L)  37.0 - 48.5 % Final    MCV 08/26/2024 84  82 - 98 fL Final    MCH 08/26/2024 24.5 (L)  27.0 - 31.0 pg Final    MCHC 08/26/2024 29.3 (L)  32.0 - 36.0 g/dL Final    RDW 08/26/2024 23.0 (H)  11.5 - 14.5 % Final    Platelets 08/26/2024 303  150 - 450 K/uL Final    MPV 08/26/2024 10.2  9.2 - 12.9 fL Final    Immature Granulocytes 08/26/2024 0.4  0.0 - 0.5 % Final    Gran # (ANC) 08/26/2024 3.3  1.8 - 7.7 K/uL Final    Immature Grans (Abs) 08/26/2024 0.02  0.00 - 0.04 K/uL Final    Lymph # 08/26/2024 0.8 (L)  1.0 - 4.8 K/uL Final    Mono # 08/26/2024 0.6  0.3 - 1.0 K/uL Final    Eos # 08/26/2024 0.5  0.0 - 0.5 K/uL Final    Baso # 08/26/2024 0.01  0.00 - 0.20 K/uL Final    nRBC 08/26/2024 0  0 /100 WBC Final    Gran %  08/26/2024 64.0  38.0 - 73.0 % Final    Lymph % 08/26/2024 15.0 (L)  18.0 - 48.0 % Final    Mono % 08/26/2024 11.0  4.0 - 15.0 % Final    Eosinophil % 08/26/2024 9.4 (H)  0.0 - 8.0 % Final    Basophil % 08/26/2024 0.2  0.0 - 1.9 % Final    Platelet Estimate 08/26/2024 Appears normal   Final    Aniso 08/26/2024 Slight   Final    Poik 08/26/2024 Slight   Final    Poly 08/26/2024 Occasional   Final    Hypo 08/26/2024 Occasional   Final    Ovalocytes 08/26/2024 Occasional   Final    Tear Drop Cells 08/26/2024 Occasional   Final    Olney Cells 08/26/2024 Occasional   Final    Fragmented Cells 08/26/2024 Occasional   Final    Differential Method 08/26/2024 Automated   Final    Sodium 08/26/2024 136  136 - 145 mmol/L Final    Potassium 08/26/2024 4.4  3.5 - 5.1 mmol/L Final    Chloride 08/26/2024 109  95 - 110 mmol/L Final    CO2 08/26/2024 21 (L)  23 - 29 mmol/L Final    Glucose 08/26/2024 106  70 - 110 mg/dL Final    BUN 08/26/2024 13  8 - 23 mg/dL Final    Creatinine 08/26/2024 0.7  0.5 - 1.4 mg/dL Final    Calcium 08/26/2024 7.9 (L)  8.7 - 10.5 mg/dL Final    Anion Gap 08/26/2024 6 (L)  8 - 16 mmol/L Final    eGFR 08/26/2024 >60.0  >60 mL/min/1.73 m^2 Final    Magnesium 08/26/2024 1.8  1.6 - 2.6 mg/dL Final    Phosphorus 08/26/2024 3.0  2.7 - 4.5 mg/dL Final        Meds   Current Facility-Administered Medications   Medication Dose Route Frequency Provider Last Rate Last Admin    acetaminophen tablet 1,000 mg  1,000 mg Oral Q6H Joanna Hernandes MD   1,000 mg at 08/26/24 0652    apixaban tablet 2.5 mg  2.5 mg Oral BID Tony Mary MD   2.5 mg at 08/25/24 2041    bisacodyL suppository 10 mg  10 mg Rectal Daily PRN Joanna Hernandes MD        calcium carbonate 200 mg calcium (500 mg) chewable tablet 1,000 mg  1,000 mg Oral Daily Tony Mary MD        clarithromycin tablet 500 mg  500 mg Oral Q12H Joanna Hernandes MD   500 mg at 08/25/24 2134    dolutegravir Tab 50 mg  50 mg Oral Daily Joanna Hernandes,  MD   50 mg at 08/25/24 1722    And    lamiVUDine tablet 300 mg  300 mg Oral Daily Joanna Hernandes MD   300 mg at 08/25/24 1722    ertapenem (INVANZ) 1 g in 0.9% NaCl 100 mL IVPB (MB+)  1 g Intravenous Q24H Briseida Quintana MD   Stopped at 08/25/24 1816    ethambutoL tablet 800 mg  800 mg Oral Daily Joanna Hernandes MD   800 mg at 08/25/24 1721    fentaNYL 50 mcg/mL injection 25 mcg  25 mcg Intravenous Q5 Min PRN Joanna Hernandes MD        ketorolac injection 15 mg  15 mg Intravenous Once Negra Still PA-C        melatonin tablet 6 mg  6 mg Oral Nightly PRN Joanna Hernandes MD   6 mg at 08/26/24 0221    methocarbamoL tablet 750 mg  750 mg Oral Q6H Marcial Hatch MD   750 mg at 08/26/24 0655    mupirocin 2 % ointment 1 g  1 g Nasal BID Joanna Hernandes MD   1 g at 08/25/24 2100    mupirocin 2 % ointment   Nasal On Call Procedure Tony Mary MD   Given at 08/25/24 2041    ondansetron injection 4 mg  4 mg Intravenous Q12H PRN Joanna Hernandes MD        oxyCODONE immediate release tablet 5 mg  5 mg Oral Q3H PRN Marcial Hatch MD        pregabalin capsule 75 mg  75 mg Oral QHS Joanna Hernandes MD   75 mg at 08/25/24 2041    QUEtiapine tablet 25 mg  25 mg Oral QHS Joanna Hernandes MD   25 mg at 08/25/24 2041    rifabutin capsule 300 mg  300 mg Oral Daily Joanna Hernandes MD        ropivacaine 0.2% Perineural Pump infusion 500 ML   Perineural Continuous Fahad Okeefe DO New Bag at 08/25/24 1530    senna-docusate 8.6-50 mg per tablet 1 tablet  1 tablet Oral BID Joanna Hernandes MD   1 tablet at 08/25/24 2041    sodium chloride 0.9% flush 10 mL  10 mL Intravenous PRN Joanna Hernandes MD        sodium chloride 0.9% flush 10 mL  10 mL Intravenous PRN Tony Mary MD        sodium chloride 0.9% flush 10 mL  10 mL Intravenous Q6H Negra Still PA-C        And    sodium chloride 0.9% flush 10 mL  10 mL Intravenous PRN Negra Still PA-C        vancomycin - pharmacy to  dose   Intravenous pharmacy to manage frequency Briseida Quintana MD        vancomycin 750 mg in D5W 250 mL IVPB (Vial-Mate)  15 mg/kg Intravenous Q12H Magalys Muro MD   Stopped at 08/25/24 2310    vitamin D 1000 units tablet 1,000 Units  1,000 Units Oral Daily Tony Mary MD             Assessment:        Plan:    1) Continue SIFI PNC at current infusion rate: IB 15cc q3h  2) Continue multimodals including Tylenol, Celecoxib, Robaxin, Pregabalin  3) PRN oxy 5mg q4h

## 2024-08-26 NOTE — PLAN OF CARE
Problem: Hip Fracture Medical Management  Goal: Optimal Coping with Change in Health Status  Outcome: Progressing     Problem: Hip Fracture Medical Management  Goal: Absence of Embolism  Outcome: Progressing     Problem: Hip Fracture Medical Management  Goal: Fracture Stability  Outcome: Progressing     Problem: Hip Fracture Medical Management  Goal: Optimal Functional Performance  Outcome: Progressing     Problem: Hip Fracture Medical Management  Goal: Pain Control and Function  Outcome: Progressing

## 2024-08-26 NOTE — ASSESSMENT & PLAN NOTE
Osteomyelitis of vertebra of thoracolumbar region   MRSA infection   Fusobacterium infection   Infection due to ESBL-producing Escherichia coli   Patient with known osteomyelitis T12-L4 MAC osteomyelitis of spine s/p T11-L2 corpectomy at [facility] on 2/27/2024 and recently admitted to [facility] from 8/12/2024 - 8/24/2024 for MRSA bacteremia and paravertebral abscess and persistent spinal osteomyelitis due to MAC infection. IR placed drain to spinal abscess and drained abscess and recent culture from spinal abscess growing AFB and likely will be MAC but final identification is pending as well as Fusobacterium, MRSA and ESBL E. Coli from her spinal abscess. Patient accidentally removed drain but repeat imaging of her spine prior to discharge showed resolution of abscess so no new drain placed. Infectious Disease consulted on last admit and stated options for treatment of her various infection very limited and palliative care involved with patient on last admit but wanted to proceed with continued treatment of her infections. Patient was discharged to [facility] for continued care and as per last ID recommendations to continue her IV and oral antibiotics. 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; Disseminated MAC      2) Discharge Antibiotics:     Intravenous antibiotics:  Vancomycin  mg q 12 hours   Ertapenem IV 1 gram q 24 hours      Oral antibiotics:  Clarithromycin 500 mg BID  Ethambutol 700 mg daily  Rifabutin 300 mg daily  Will need to discuss PO suppression for hardware infection after IV course     3) Therapy Duration:  IV antibiotics - 6 weeks, PO antibiotics indefinitely/TBD by outpatient ID provider      Estimated end date of IV antibiotics (Ertapenenem and Vancomycin): 9/23/24     4) Outpatient Weekly Labs:     Order the following labs to be drawn on Mondays:   CBC  CMP    CRP  Vancomycin trough. Target 15-20  Patient has PICC line in place and okay to use. Patient continued on above antibiotic regimen in hospital as per ID recs to treat her infections.

## 2024-08-26 NOTE — ANESTHESIA PROCEDURE NOTES
Arterial    Diagnosis: Hypotension  Doctor requesting consult: Dasha    Patient location during procedure: done in OR  Timeout: 8/25/2024 12:36 PM  Procedure end time: 8/25/2024 12:41 PM    Staffing  Authorizing Provider: Genaro Lou MD  Performing Provider: Genaro Lou MD    Staffing  Performed by: Genaro Lou MD  Authorized by: Genaro Lou MD    Anesthesiologist was present at the time of the procedure.    Preanesthetic Checklist  Completed: patient identified, IV checked, site marked, risks and benefits discussed, surgical consent, monitors and equipment checked, pre-op evaluation, timeout performed and anesthesia consent givenArterial  Skin Prep: chlorhexidine gluconate  Local Infiltration: none  Orientation: right  Location: radial    Catheter Size: 20 G  Catheter placement by Ultrasound guidance. Heme positive aspiration all ports.   Vessel Caliber: small, patent, compressibility normal  Vascular Doppler:  not done  Needle advanced into vessel with real time Ultrasound guidance.Insertion Attempts: 1  Assessment  Dressing: secured with tape and tegaderm

## 2024-08-26 NOTE — ASSESSMENT & PLAN NOTE
Hyponatremia is likely due to Dehydration/hypovolemia. Sodium 134 on admit and given IVF's about 2 liters of normal saline and sodium improved and back to normal range on 8/26 at 136. The patient's most recent sodium results are listed below.  Recent Labs     08/25/24  0007 08/26/24  0340   * 136     Plan  - Correct the sodium by 4-6mEq in 24 hours.   - Monitor sodium Daily.   - Patient hyponatremia is improving.

## 2024-08-26 NOTE — ADDENDUM NOTE
Addendum  created 08/25/24 2042 by Genaro Lou MD    Child order released for a procedure order, Clinical Note Signed, Intraprocedure Blocks edited, LDA created via procedure documentation, SmartForm saved

## 2024-08-26 NOTE — HOSPITAL COURSE
Patient admitted from [facility] after a trip and fall and found to have a closed right femoral neck femur fracture. Orthopedics consulted and recommending operative repair of fracture. Patient has a very complex history and was just discharged from [facility] on 8/24/2024 after prolonged hospitalization from spinal osteomyelitis and paravertebral abscess. Patient with known disseminated MAC infection and advanced AIDS with very limited treatment options. Recent cultures from her spine growing AFB and likely will be MAC but final identification is pending as well as Fusobacterium, MRSA and ESBL E. Coli from her spinal infection. Patient also on recent hospital stay with MRSA bacteremia. Infectious Disease consulted on last admit and stated options for treatment of her various infection very limited and palliative care involved with patient on last admit but wanted to proceed with continued treatment of her infections. Patient was discharged to [facility] for continued care and as per last ID recommendations. 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; Disseminated MAC      2) Discharge Antibiotics:     Intravenous antibiotics:  Vancomycin  mg q 12 hours   Ertapenem IV 1 gram q 24 hours      Oral antibiotics:  Clarithromycin 500 mg BID  Ethambutol 700 mg daily  Rifabutin 300 mg daily  Will need to discuss PO suppression for hardware infection after IV course     3) Therapy Duration:  IV antibiotics - 6 weeks, PO antibiotics indefinitely/TBD by outpatient ID provider      Estimated end date of IV antibiotics (Ertapenenem and Vancomycin): 9/23/24     4) Outpatient Weekly Labs:     Order the following labs to be drawn on Mondays:   CBC  CMP   CRP  Vancomycin trough. Target 15-20    Patient continued on IV Ertapenem and Vancomycin as per last ID recs on this admit. Patient to  continue oral Clarithromycin, Ethambutol and Rifabutin for disseminated MAC infection. Patient also with issues with chronic hypotension and required several fluid boluses on this admit as SBP as low as 75 but after several liters of IVF's -110 and BP stabilized for surgery. Patient taken to the OR after optimization on 8/25/2024. Patient underwent right hip pinning by Dr. Laureano Vyas. Post-op patient toe touchdown weight bearing to the right lower extremity as per Orthopedics recommendation. Patient placed on Apixiban 2.5 mg po BID and NARINDER/SCD's for DVT prophylaxis post-op and will need for total of 30 days. Perineural pain catheter placed by Anesthesia Pain Service with continuous infusion of Ropivacaine to help with pain control post-op and Anesthesia Pain Service managing while patient in the hospital. Patient placed on multimodal pain management post-op with Tylenol 1000 mg po every 6 hours, Lyrica 75 mg po nightly, Robaxin 750 mg po 4 times daily, and Celebrex 200 mg po daily post-op and will continue with Oxycodone IR 5 mg po every 4 hours prn for breakthrough pain. PT/OT consulted post-op to work with patient.and recommending moderate intensity therapy. Plan for patient to return back to her [facility] on discharge. Patient having issues with low BP post-op and given further IVF's on evening of 8/26. Rapid response team called and patient given a total of 1 liter of lactated ringers. SBP back to low 90's which is her baseline. Patient to be started on Midodrine 2.5 mg po TID to help with her chronic hypotension on 8/27. Hgb improved to 8.9 on 8/27 from 6.8 on 8/26 after transfused 1 unit of PRBCs on 8/26. Hgb stable at 10.2 on 8/28. SBP in 85-90 range so Midodrine increased to 5 mg po TID. Patient asymptomatic with her chronic hypotension. Pain well controlled to right hip. Patient progressing with PT/OT. Patient medically stable for discharge so patient discharged back to [facility] on 8/28/2024 in good condition. Patient discharged back with PICC line in place to continue her IV Ertapenem and Vancomycin to treat her polymicrobial spinal abscess. Patient discharged back on oral Ethambutol, Clarithromycin and Rifabutin to treat her known MAC infection. Patient to continue PT/OT at [facility] on discharge. Surgical bandage to remain in place to right hip on discharge until Orthopedic clinic follow-up.

## 2024-08-26 NOTE — PROGRESS NOTES
Pt seen for wound consult- follow up. Pt was recently seen on 8/22 by inpatient wound care. Pt was eating lunch and stated barrier cream had been applied. Education provided to pt on use of  Triad wound cream and application of twice a day and as needed; pt verbalized understanding.       Ramya Mauro RN, [facility] Chris Sidhu Wound/Ostomy  8/26/24 08/26/24 1305   WOCN Assessment   WOCN Total Time (mins) 30   Visit Date 08/26/24   Visit Time 1305   Consult Type Follow Up   WOCN Speciality Wound   Number of Wounds 1   Intervention chart review;coordination of care   Teaching on-going

## 2024-08-26 NOTE — PROGRESS NOTES
"[facility] Medicine  Progress Note    Patient Name: Amie Salmeron  MRN: [id number]  Patient Class: IP- Inpatient   Admission Date: 8/24/2024  Length of Stay: 1 days  Attending Physician: Briseida Quintana MD  Primary Care Provider: Anuradha, Primary Doctor        Subjective:     Principal Problem:Closed fracture of neck of right femur with routine healing        HPI:  59 year old female coming from [facility] by EMS with past medical history of HTN, uncontrolled HIV noncompliant with HAART, spinal MAC, spinal osteo s/p T11-L2 corpectomy with fixation by ortho on 2/27/24 at [facility]  who presenting with hypotension and report of a mechanical fall. Patient tells me she has severe right hip pain. She denies hitting her head or having loss of consciousness. She denies being on an anticoagulation. She reports her blood pressure is "always very low. Pt was here for paravertebral abscess, MRSA bacteremia. She was transferred [facility] for IR for the paravertebral abscess and subsequently transferred to the MICU for hypotension requiring vasopressor support. IR placed drain on 8/12. Abscess cultures grew AFB, Fusobacterium necleatum, ESBL E. Coli and blood cultures grew MRSA .She was discharged 8/24/24 to SNF to complete her antibiotics for total of 6 weeks of vancomycin and ertapenem ending 9/23.     Overview/Hospital Course:  Patient admitted from [facility] after a trip and fall and found to have a closed right femoral neck femur fracture. Orthopedics consulted and recommending operative repair of fracture. Patient has a very complex history and was just discharged from [facility] on 8/24/2024 after prolonged hospitalization from spinal osteomyelitis and paravertebral abscess. Patient with known disseminated MAC infection and advanced AIDS with very limited treatment options. Recent cultures from her spine growing AFB and likely will be MAC but final identification is pending as well as " Fusobacterium, MRSA and ESBL E. Coli from her spinal infection. Patient also on recent hospital stay with MRSA bacteremia. Infectious Disease consulted on last admit and stated options for treatment of her various infection very limited and palliative care involved with patient on last admit but wanted to proceed with continued treatment of her infections. Patient was discharged to [facility] for continued care and as per last ID recommendations. 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; Disseminated MAC      2) Discharge Antibiotics:     Intravenous antibiotics:  Vancomycin  mg q 12 hours   Ertapenem IV 1 gram q 24 hours      Oral antibiotics:  Clarithromycin 500 mg BID  Ethambutol 700 mg daily  Rifabutin 300 mg daily  Will need to discuss PO suppression for hardware infection after IV course     3) Therapy Duration:  IV antibiotics - 6 weeks, PO antibiotics indefinitely/TBD by outpatient ID provider      Estimated end date of IV antibiotics (Ertapenenem and Vancomycin): 9/23/24     4) Outpatient Weekly Labs:     Order the following labs to be drawn on Mondays:   CBC  CMP   CRP  Vancomycin trough. Target 15-20    Patient continued on IV Ertapenem and Vancomycin as per last ID recs on this admit. Patient to continue oral Clarithromycin, Ethambutol and Rifabutin for disseminated MAC infection. Patient also with issues with chronic hypotension and required several fluid boluses on this admit as SBP as low as 75 but after several liters of IVF's -110 and BP stabilized for surgery. Patient taken to the OR after optimization on 8/25/2024. Patient underwent right hip pinning by Dr. Laureano Vyas. Post-op patient toe touchdown weight bearing to the right lower extremity as per Orthopedics recommendation. Patient placed on Apixiban 2.5 mg po BID and NARINDER/SCD's for DVT prophylaxis  post-op and will need for total of 30 days. Perineural pain catheter placed by Anesthesia Pain Service with continuous infusion of Ropivacaine to help with pain control post-op and Anesthesia Pain Service managing while patient in the hospital. Patient placed on multimodal pain management post-op with Tylenol 1000 mg po every 6 hours, Lyrica 75 mg po nightly, Robaxin 750 mg po 4 times daily, and Celebrex 200 mg po daily post-op and will continue with Oxycodone IR 5 mg po every 4 hours prn for breakthrough pain. PT/OT consulted post-op.    Interval History: Patient admitted from [facility] after a trip and fall and found to have a closed right femoral neck femur fracture. Orthopedics consulted and recommending operative repair of fracture. Patient has a very complex history and was just [place] [facility] [place] after prolonged hospitalization from spinal osteomyelitis and paravertebral abscess. Patient with known disseminated MAC infection and advanced AIDS with very limited treatment options. Recent cultures from her spine growing AFB and likely will be MAC but final identification is pending as well as Fusobacterium, MRSA and ESBL E. Coli from her spinal infection. Patient also on recent hospital stay with MRSA bacteremia. Infectious Disease consulted on last admit and stated options for treatment of her various infection very limited and palliative care involved with patient on last admit but wanted to proceed with continued treatment of her infections. Patient was discharged to [facility] SNF for continued care and as per last ID recommendations. 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; Disseminated MAC      2) Discharge Antibiotics:     Intravenous antibiotics:  Vancomycin  mg q 12 hours   Ertapenem IV 1 gram q 24 hours      Oral antibiotics:  Clarithromycin  500 mg BID  Ethambutol 700 mg daily  Rifabutin 300 mg daily  Will need to discuss PO suppression for hardware infection after IV course     3) Therapy Duration:  IV antibiotics - 6 weeks, PO antibiotics indefinitely/TBD by outpatient ID provider      Estimated end date of IV antibiotics (Ertapenenem and Vancomycin): 9/23/24     4) Outpatient Weekly Labs:     Order the following labs to be drawn on Mondays:   CBC  CMP   CRP  Vancomycin trough. Target 15-20    Patient continued on IV Ertapenem and Vancomycin as per last ID recs on this admit. Patient to continue oral Clarithromycin, Ethambutol and Rifabutin for disseminated MAC infection. Patient also with issues with chronic hypotension and required several fluid boluses on this admit as SBP as low as 75 but after several liters of IVF's -110 and BP stabilized for surgery. Patient taken to the OR after optimization on 8/25/2024. Patient underwent right hip pinning by Dr. Laureano Vyas. Post-op patient toe touchdown weight bearing to the right lower extremity as per Orthopedics recommendation. Patient placed on Apixiban 2.5 mg po BID and NARINDER/SCD's for DVT prophylaxis post-op and will need for total of 30 days. Perineural pain catheter placed by Anesthesia Pain Service with continuous infusion of Ropivacaine to help with pain control post-op and Anesthesia Pain Service managing while patient in the hospital. Patient placed on multimodal pain management post-op with Tylenol 1000 mg po every 6 hours, Lyrica 75 mg po nightly, Robaxin 750 mg po 4 times daily, and Celebrex 200 mg po daily post-op and will continue with Oxycodone IR 5 mg po every 4 hours prn for breakthrough pain. PT/OT consulted post-op and to work with patient today. Patient reporting 8/10 pain to right hip this am. Labs reviewed. Hgb down to 6.8 today from 8.6 yesterday. Patient with no clinical signs of bleeding. Plan to transfuse 1 unit of PRBCs today. Sodium improved to 136 today from 134  yesterday after given IVF's yesterday for hypotension so suspect she had hypovolemic hyponatremia. Creatinine stable at 0.7. SBP in  range today which is her baseline level.     Review of Systems   Constitutional:  Negative for fever.   Respiratory:  Negative for cough and shortness of breath.    Cardiovascular:  Negative for chest pain.   Gastrointestinal:  Negative for abdominal pain, nausea and vomiting.   Musculoskeletal:  Positive for arthralgias (Right hip).   Neurological:  Negative for dizziness.   Psychiatric/Behavioral:  Negative for agitation and confusion.      Objective:     Vital Signs (Most Recent):  Temp: 98.4 °F (36.9 °C) (08/26/24 1530)  Pulse: 75 (08/26/24 1530)  Resp: 20 (08/26/24 1530)  BP: 91/55 (08/26/24 1530)  SpO2: 98 % (08/26/24 1530) on room air Vital Signs (24h Range):  Temp:  [96.7 °F (35.9 °C)-98.4 °F (36.9 °C)] 98.4 °F (36.9 °C)  Pulse:  [63-80] 75  Resp:  [16-20] 20  SpO2:  [95 %-100 %] 98 %  BP: ()/(55-60) 91/55     Weight: 45.8 kg (100 lb 15.5 oz)  Body mass index is 19.08 kg/m².    Intake/Output Summary (Last 24 hours) at 8/26/2024 1556  Last data filed at 8/26/2024 1530  Gross per 24 hour   Intake 1353.75 ml   Output 750 ml   Net 603.75 ml         Physical Exam  Vitals and nursing note reviewed.   Constitutional:       General: She is awake. She is not in acute distress.     Appearance: She is underweight. She is ill-appearing.      Comments: ill appearing and frail female who appears older than stated age. Patient sitting up in bedside chair in no distress. Patient in some pain on exam.   HENT:      Head:      Comments: Patient with facial hair to chin area  Eyes:      Conjunctiva/sclera: Conjunctivae normal.   Cardiovascular:      Rate and Rhythm: Normal rate and regular rhythm.      Heart sounds: Normal heart sounds. No murmur heard.  Pulmonary:      Effort: Pulmonary effort is normal. No respiratory distress.      Breath sounds: Normal breath sounds. No wheezing.    Abdominal:      General: Abdomen is flat. Bowel sounds are normal. There is no distension.      Palpations: Abdomen is soft.      Tenderness: There is no abdominal tenderness.   Musculoskeletal:      Right lower leg: No edema.      Left lower leg: No edema.   Skin:     General: Skin is warm.      Findings: No erythema.      Comments: Surgical bandage in place to right hip. Bandage is clean and dry and intact.    Neurological:      Mental Status: She is alert and oriented to person, place, and time.   Psychiatric:         Mood and Affect: Mood normal.         Behavior: Behavior normal. Behavior is cooperative.         Thought Content: Thought content normal.         Judgment: Judgment normal.             Significant Labs: CBC:   Recent Labs   Lab 08/25/24  0007 08/25/24  0010 08/25/24  2139 08/26/24  0340   WBC 4.96  --  7.09 5.08   HGB 8.6*  --  7.2* 6.8*   HCT 28.0* 27* 23.8* 23.2*     --  314 303     CMP:   Recent Labs   Lab 08/25/24  0007 08/26/24  0340   * 136   K 4.5 4.4    109   CO2 20* 21*   GLU 83 106   BUN 13 13   CREATININE 0.6 0.7   CALCIUM 8.6* 7.9*   PROT 6.7  --    ALBUMIN 2.1*  --    BILITOT 0.2  --    ALKPHOS 119  --    AST 24  --    ALT 6*  --    ANIONGAP 10 6*     Magnesium:   Recent Labs   Lab 08/26/24  0340   MG 1.8       Significant Imaging: I have reviewed all pertinent imaging results/findings within the past 24 hours.    Assessment/Plan:      * Closed fracture of neck of right femur with routine healing s/p hip pinning on 8/25/2024  Patient admitted from [facility] after a trip and fall and found to have a closed right femoral neck femur fracture. Orthopedics consulted and recommending operative repair of fracture.   - Patient taken to the OR on 8/25/2024 and underwent right hip pinning by Dr. Laureano Vyas.   - Post-op patient toe touchdown weight bearing to the right lower extremity as per Orthopedics recommendation.   - Patient placed on Apixiban 2.5 mg  po BID and NARINDER/SCD's for DVT prophylaxis post-op and will need for total of 30 days.   - Perineural pain catheter placed by Anesthesia Pain Service with continuous infusion of Ropivacaine to help with pain control post-op and Anesthesia Pain Service managing while patient in the hospital.  - Patient placed on multimodal pain management post-op with Tylenol 1000 mg po every 6 hours, Lyrica 75 mg po nightly, Robaxin 750 mg po 4 times daily and Celebrex 200 mg po daily post-op and will continue with Oxycodone IR 5 mg po every 4 hours prn for breakthrough pain.   - PT/OT consulted post-op and to work with patient today.  - Surgical bandage to remain in place to right hip until Orthopedic clinic follow-up.       Disseminated mycobacterium avium-intracellulare complex  Patient with known disseminated MAC and very few treatment options as pr ID to treat ij patient with known AIDS on last admit. Patient to continue oral antibiotics to treat as per ID:  Clarithromycin 500 mg po BID  Ethambutol 700 mg po daily  Rifabutin 300 mg po daily  Medications reordered on this admit.     Vertebral abscess  Osteomyelitis of vertebra of thoracolumbar region   MRSA infection   Fusobacterium infection   Infection due to ESBL-producing Escherichia coli   Patient with known osteomyelitis T12-L4 MAC osteomyelitis of spine s/p T11-L2 corpectomy at [facility] on 2/27/2024 and recently admitted [place] [facility] [place]12[place]24 - 8/24/2024 for MRSA bacteremia and paravertebral abscess and persistent spinal osteomyelitis due to MAC infection. IR placed drain to spinal abscess and drained abscess and recent culture from spinal abscess growing AFB and likely will be MAC but final identification is pending as well as Fusobacterium, MRSA and ESBL E. Coli from her spinal abscess. Patient accidentally removed drain but repeat imaging of her spine prior to discharge showed resolution of abscess so no new drain placed. Infectious Disease consulted on last admit and stated  options for treatment of her various infection very limited and palliative care involved with patient on last admit but wanted to proceed with continued treatment of her infections. Patient was discharged to [facility] for continued care and as per last ID recommendations to continue her IV and oral antibiotics. 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; Disseminated MAC      2) Discharge Antibiotics:     Intravenous antibiotics:  Vancomycin  mg q 12 hours   Ertapenem IV 1 gram q 24 hours      Oral antibiotics:  Clarithromycin 500 mg BID  Ethambutol 700 mg daily  Rifabutin 300 mg daily  Will need to discuss PO suppression for hardware infection after IV course     3) Therapy Duration:  IV antibiotics - 6 weeks, PO antibiotics indefinitely/TBD by outpatient ID provider      Estimated end date of IV antibiotics (Ertapenenem and Vancomycin): 9/23/24     4) Outpatient Weekly Labs:     Order the following labs to be drawn on Mondays:   CBC  CMP   CRP  Vancomycin trough. Target 15-20  Patient has PICC line in place and okay to use. Patient continued on above antibiotic regimen in hospital as per ID recs to treat her infections.     AIDS (acquired immunodeficiency syndrome), CD4 <=200  Patient with known AIDS with AIDS defining illness of disseminated MAC. Last CD4 count and viral load reviewed and noted below:    Results for orders placed or performed during the hospital encounter of 08/11/24   CD4 T-Watson Cells   Result Value Ref Range    CD4 % Watson T Cell 6.5 (L) 28 - 57 %    Absolute CD4 76 (L) 300 - 1400 cells/ul     Log HIV Copies/mL   Date Value Ref Range Status   08/11/2024 4.05 (A) <1.30 Log Copies/mL Final     Patient followed by Infectious Disease as outpatient. Continue home Dolutegravir 50 mg po daily + home Lamivudine 300 mg po daily to treat.     Debility  Patient with  chronic debility due to severe malnutrition, age-related physical debility, AIDS and disseminated infcetion. Plan includes progressive mobility protocol initated, PT/OT consulted, and fall precautions in place.    DNR (do not resuscitate)  Advance care planning   Patient seen by palliative care on recent admit due to limited options for treatment of her disseminated MAC an chronic spinal infection and advanced AIDS. Patient wished to continue treatment of her AIDS and infections and PICC line placed and placed on IV antibiotics and discharged to [facility] for continued care with PT/OT for her debility and IV and oral antibiotics for her infections. Patient reports she is DNR as per her request. Discussed with patient who confirmed above information for 10 minutes.     Anemia due to infection  Acute blood loss anemia  Anemia is likely due to combination of chronic disease due to chronic infections and acute blood loss from surgery that was expected. Baseline Hgb 7.4-8.5. Most recent hemoglobin and hematocrit are listed below.  Recent Labs     08/25/24  0007 08/25/24  0010 08/25/24  2139 08/26/24  0340   HGB 8.6*  --  7.2* 6.8*   HCT 28.0* 27* 23.8* 23.2*     Plan  - Monitor serial CBC: Daily  - Transfuse PRBC if patient becomes hemodynamically unstable, symptomatic or H/H drops below 7/21.  - Patient has not received any PRBC transfusions to date but plan to transfuse 1 unit of PRBCs on 8/26 for Hgb 6.8.  - Patient's anemia is currently worsening.     Hyponatremia  Hyponatremia is likely due to Dehydration/hypovolemia. Sodium 134 on admit and given IVF's about 2 liters of normal saline and sodium improved and back to normal range on 8/26 at 136. The patient's most recent sodium results are listed below.  Recent Labs     08/25/24  0007 08/26/24  0340   * 136     Plan  - Correct the sodium by 4-6mEq in 24 hours.   - Monitor sodium Daily.   - Patient hyponatremia is improving.    Underweight  Patient  with Body mass index is 19.08 kg/m² and underweight and related to her chronic infections and advanced AIDS. Encourage po intake and ordered Boost supplements with meals to supplement her diet.           VTE Risk Mitigation (From admission, onward)           Ordered     apixaban tablet 2.5 mg  2 times daily         08/25/24 1435     IP VTE HIGH RISK PATIENT  Once         08/25/24 1101     Place sequential compression device  Until discontinued         08/25/24 1101     Place sequential compression device  Until discontinued         08/25/24 0210                    Discharge Planning   MARK: 8/28/2024     Code Status: DNR   Is the patient medically ready for discharge?:     Reason for patient still in hospital (select all that apply): Patient new problem and Patient trending condition  Discharge Plan A: Skilled Nursing Facility (Return back to [facility])         Briseida Quintana MD  Department of Hospital Medicine   [facility] - Surgery

## 2024-08-26 NOTE — PLAN OF CARE
Pt x4, VSS. PT IV went bad and after consultiung MD wen we got the okay to use the PICC. At first port 1 was not flushing but after pulling back I was able to get blood return and was able to flush without issue. Port 2 had no issues. Informed MD about it and she told me not to use the first port.  PT currently resting, pt care ongoing.  Problem: Hip Fracture Medical Management  Goal: Optimal Coping with Change in Health Status  Outcome: Progressing  Goal: Absence of Bleeding  Outcome: Progressing  Goal: Effective Bowel Elimination  Outcome: Progressing  Goal: Baseline Cognitive Function Maintained  Outcome: Progressing

## 2024-08-26 NOTE — SUBJECTIVE & OBJECTIVE
Interval History: Patient admitted from [facility] after a trip and fall and found to have a closed right femoral neck femur fracture. Orthopedics consulted and recommending operative repair of fracture. Patient has a very complex history and was just discharged from [facility] on 8/24/2024 after prolonged hospitalization from spinal osteomyelitis and paravertebral abscess. Patient with known disseminated MAC infection and advanced AIDS with very limited treatment options. Recent cultures from her spine growing AFB and likely will be MAC but final identification is pending as well as Fusobacterium, MRSA and ESBL E. Coli from her spinal infection. Patient also on recent hospital stay with MRSA bacteremia. Infectious Disease consulted on last admit and stated options for treatment of her various infection very limited and palliative care involved with patient on last admit but wanted to proceed with continued treatment of her infections. Patient was discharged to [facility] for continued care and as per last ID recommendations. 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; Disseminated MAC      2) Discharge Antibiotics:     Intravenous antibiotics:  Vancomycin  mg q 12 hours   Ertapenem IV 1 gram q 24 hours      Oral antibiotics:  Clarithromycin 500 mg BID  Ethambutol 700 mg daily  Rifabutin 300 mg daily  Will need to discuss PO suppression for hardware infection after IV course     3) Therapy Duration:  IV antibiotics - 6 weeks, PO antibiotics indefinitely/TBD by outpatient ID provider      Estimated end date of IV antibiotics (Ertapenenem and Vancomycin): 9/23/24     4) Outpatient Weekly Labs:     Order the following labs to be drawn on Mondays:   CBC  CMP   CRP  Vancomycin trough. Target 15-20    Patient continued on IV Ertapenem and Vancomycin as per last ID recs on this admit.  Patient to continue oral Clarithromycin, Ethambutol and Rifabutin for disseminated MAC infection. Patient also with issues with chronic hypotension and required several fluid boluses on this admit as SBP as low as 75 but after several liters of IVF's -110 and BP stabilized for surgery. Patient taken to the OR after optimization on 8/25/2024. Patient underwent right hip pinning by Dr. Laureano Vyas. Post-op patient toe touchdown weight bearing to the right lower extremity as per Orthopedics recommendation. Patient placed on Apixiban 2.5 mg po BID and NARINDER/SCD's for DVT prophylaxis post-op and will need for total of 30 days. Perineural pain catheter placed by Anesthesia Pain Service with continuous infusion of Ropivacaine to help with pain control post-op and Anesthesia Pain Service managing while patient in the hospital. Patient placed on multimodal pain management post-op with Tylenol 1000 mg po every 6 hours, Lyrica 75 mg po nightly, Robaxin 750 mg po 4 times daily, and Celebrex 200 mg po daily post-op and will continue with Oxycodone IR 5 mg po every 4 hours prn for breakthrough pain. PT/OT consulted post-op and to work with patient today. Patient reporting 8/10 pain to right hip this am. Labs reviewed. Hgb down to 6.8 today from 8.6 yesterday. Patient with no clinical signs of bleeding. Plan to transfuse 1 unit of PRBCs today. Sodium improved to 136 today from 134 yesterday after given IVF's yesterday for hypotension so suspect she had hypovolemic hyponatremia. Creatinine stable at 0.7. SBP in  range today which is her baseline level.     Review of Systems   Constitutional:  Negative for fever.   Respiratory:  Negative for cough and shortness of breath.    Cardiovascular:  Negative for chest pain.   Gastrointestinal:  Negative for abdominal pain, nausea and vomiting.   Musculoskeletal:  Positive for arthralgias (Right hip).   Neurological:  Negative for dizziness.   Psychiatric/Behavioral:   Negative for agitation and confusion.      Objective:     Vital Signs (Most Recent):  Temp: 98.4 °F (36.9 °C) (08/26/24 1530)  Pulse: 75 (08/26/24 1530)  Resp: 20 (08/26/24 1530)  BP: 91/55 (08/26/24 1530)  SpO2: 98 % (08/26/24 1530) on room air Vital Signs (24h Range):  Temp:  [96.7 °F (35.9 °C)-98.4 °F (36.9 °C)] 98.4 °F (36.9 °C)  Pulse:  [63-80] 75  Resp:  [16-20] 20  SpO2:  [95 %-100 %] 98 %  BP: ()/(55-60) 91/55     Weight: 45.8 kg (100 lb 15.5 oz)  Body mass index is 19.08 kg/m².    Intake/Output Summary (Last 24 hours) at 8/26/2024 1556  Last data filed at 8/26/2024 1530  Gross per 24 hour   Intake 1353.75 ml   Output 750 ml   Net 603.75 ml         Physical Exam  Vitals and nursing note reviewed.   Constitutional:       General: She is awake. She is not in acute distress.     Appearance: She is underweight. She is ill-appearing.      Comments: ill appearing and frail female who appears older than stated age. Patient sitting up in bedside chair in no distress. Patient in some pain on exam.   HENT:      Head:      Comments: Patient with facial hair to chin area  Eyes:      Conjunctiva/sclera: Conjunctivae normal.   Cardiovascular:      Rate and Rhythm: Normal rate and regular rhythm.      Heart sounds: Normal heart sounds. No murmur heard.  Pulmonary:      Effort: Pulmonary effort is normal. No respiratory distress.      Breath sounds: Normal breath sounds. No wheezing.   Abdominal:      General: Abdomen is flat. Bowel sounds are normal. There is no distension.      Palpations: Abdomen is soft.      Tenderness: There is no abdominal tenderness.   Musculoskeletal:      Right lower leg: No edema.      Left lower leg: No edema.   Skin:     General: Skin is warm.      Findings: No erythema.      Comments: Surgical bandage in place to right hip. Bandage is clean and dry and intact.    Neurological:      Mental Status: She is alert and oriented to person, place, and time.   Psychiatric:         Mood and  Affect: Mood normal.         Behavior: Behavior normal. Behavior is cooperative.         Thought Content: Thought content normal.         Judgment: Judgment normal.             Significant Labs: CBC:   Recent Labs   Lab 08/25/24  0007 08/25/24  0010 08/25/24  2139 08/26/24  0340   WBC 4.96  --  7.09 5.08   HGB 8.6*  --  7.2* 6.8*   HCT 28.0* 27* 23.8* 23.2*     --  314 303     CMP:   Recent Labs   Lab 08/25/24  0007 08/26/24  0340   * 136   K 4.5 4.4    109   CO2 20* 21*   GLU 83 106   BUN 13 13   CREATININE 0.6 0.7   CALCIUM 8.6* 7.9*   PROT 6.7  --    ALBUMIN 2.1*  --    BILITOT 0.2  --    ALKPHOS 119  --    AST 24  --    ALT 6*  --    ANIONGAP 10 6*     Magnesium:   Recent Labs   Lab 08/26/24  0340   MG 1.8       Significant Imaging: I have reviewed all pertinent imaging results/findings within the past 24 hours.

## 2024-08-27 PROBLEM — I95.89 CHRONIC HYPOTENSION: Status: ACTIVE | Noted: 2024-08-27

## 2024-08-27 LAB
ANION GAP SERPL CALC-SCNC: 5 MMOL/L (ref 8–16)
ANISOCYTOSIS BLD QL SMEAR: SLIGHT
BASOPHILS # BLD AUTO: 0.03 K/UL (ref 0–0.2)
BASOPHILS NFR BLD: 0.4 % (ref 0–1.9)
BUN SERPL-MCNC: 18 MG/DL (ref 8–23)
CALCIUM SERPL-MCNC: 7.7 MG/DL (ref 8.7–10.5)
CHLORIDE SERPL-SCNC: 108 MMOL/L (ref 95–110)
CO2 SERPL-SCNC: 21 MMOL/L (ref 23–29)
CREAT SERPL-MCNC: 0.7 MG/DL (ref 0.5–1.4)
DIFFERENTIAL METHOD BLD: ABNORMAL
EOSINOPHIL # BLD AUTO: 0 K/UL (ref 0–0.5)
EOSINOPHIL NFR BLD: 0.5 % (ref 0–8)
ERYTHROCYTE [DISTWIDTH] IN BLOOD BY AUTOMATED COUNT: 21.3 % (ref 11.5–14.5)
EST. GFR  (NO RACE VARIABLE): >60 ML/MIN/1.73 M^2
GLUCOSE SERPL-MCNC: 68 MG/DL (ref 70–110)
HCT VFR BLD AUTO: 28.9 % (ref 37–48.5)
HGB BLD-MCNC: 8.9 G/DL (ref 12–16)
HYPOCHROMIA BLD QL SMEAR: ABNORMAL
IMM GRANULOCYTES # BLD AUTO: 0.04 K/UL (ref 0–0.04)
IMM GRANULOCYTES NFR BLD AUTO: 0.5 % (ref 0–0.5)
LYMPHOCYTES # BLD AUTO: 1.6 K/UL (ref 1–4.8)
LYMPHOCYTES NFR BLD: 21.3 % (ref 18–48)
MAGNESIUM SERPL-MCNC: 1.8 MG/DL (ref 1.6–2.6)
MCH RBC QN AUTO: 26.3 PG (ref 27–31)
MCHC RBC AUTO-ENTMCNC: 30.8 G/DL (ref 32–36)
MCV RBC AUTO: 86 FL (ref 82–98)
MONOCYTES # BLD AUTO: 0.5 K/UL (ref 0.3–1)
MONOCYTES NFR BLD: 6.7 % (ref 4–15)
NEUTROPHILS # BLD AUTO: 5.4 K/UL (ref 1.8–7.7)
NEUTROPHILS NFR BLD: 70.6 % (ref 38–73)
NRBC BLD-RTO: 0 /100 WBC
OVALOCYTES BLD QL SMEAR: ABNORMAL
PHOSPHATE SERPL-MCNC: 2.8 MG/DL (ref 2.7–4.5)
PLATELET # BLD AUTO: 332 K/UL (ref 150–450)
PMV BLD AUTO: 10.1 FL (ref 9.2–12.9)
POIKILOCYTOSIS BLD QL SMEAR: SLIGHT
POLYCHROMASIA BLD QL SMEAR: ABNORMAL
POTASSIUM SERPL-SCNC: 5.1 MMOL/L (ref 3.5–5.1)
RBC # BLD AUTO: 3.38 M/UL (ref 4–5.4)
SCHISTOCYTES BLD QL SMEAR: ABNORMAL
SODIUM SERPL-SCNC: 134 MMOL/L (ref 136–145)
SPHEROCYTES BLD QL SMEAR: ABNORMAL
WBC # BLD AUTO: 7.59 K/UL (ref 3.9–12.7)

## 2024-08-27 PROCEDURE — A4216 STERILE WATER/SALINE, 10 ML: HCPCS | Mod: HCNC

## 2024-08-27 PROCEDURE — 25000003 PHARM REV CODE 250: Mod: HCNC | Performed by: STUDENT IN AN ORGANIZED HEALTH CARE EDUCATION/TRAINING PROGRAM

## 2024-08-27 PROCEDURE — 97535 SELF CARE MNGMENT TRAINING: CPT | Mod: HCNC

## 2024-08-27 PROCEDURE — 94761 N-INVAS EAR/PLS OXIMETRY MLT: CPT | Mod: HCNC

## 2024-08-27 PROCEDURE — 25000003 PHARM REV CODE 250: Mod: HCNC

## 2024-08-27 PROCEDURE — 97112 NEUROMUSCULAR REEDUCATION: CPT | Mod: HCNC

## 2024-08-27 PROCEDURE — 25000003 PHARM REV CODE 250: Mod: HCNC | Performed by: INTERNAL MEDICINE

## 2024-08-27 PROCEDURE — 63600175 PHARM REV CODE 636 W HCPCS: Mod: HCNC | Performed by: PHYSICIAN ASSISTANT

## 2024-08-27 PROCEDURE — 80048 BASIC METABOLIC PNL TOTAL CA: CPT | Mod: HCNC | Performed by: INTERNAL MEDICINE

## 2024-08-27 PROCEDURE — 83735 ASSAY OF MAGNESIUM: CPT | Mod: HCNC | Performed by: INTERNAL MEDICINE

## 2024-08-27 PROCEDURE — 21400001 HC TELEMETRY ROOM: Mod: HCNC

## 2024-08-27 PROCEDURE — 85025 COMPLETE CBC W/AUTO DIFF WBC: CPT | Mod: HCNC | Performed by: INTERNAL MEDICINE

## 2024-08-27 PROCEDURE — 99231 SBSQ HOSP IP/OBS SF/LOW 25: CPT | Mod: HCNC,,, | Performed by: ANESTHESIOLOGY

## 2024-08-27 PROCEDURE — 97530 THERAPEUTIC ACTIVITIES: CPT | Mod: HCNC

## 2024-08-27 PROCEDURE — 84100 ASSAY OF PHOSPHORUS: CPT | Mod: HCNC | Performed by: INTERNAL MEDICINE

## 2024-08-27 PROCEDURE — 94799 UNLISTED PULMONARY SVC/PX: CPT | Mod: HCNC

## 2024-08-27 PROCEDURE — 27000207 HC ISOLATION: Mod: HCNC

## 2024-08-27 PROCEDURE — 99900035 HC TECH TIME PER 15 MIN (STAT): Mod: HCNC

## 2024-08-27 PROCEDURE — 63600175 PHARM REV CODE 636 W HCPCS: Mod: HCNC | Performed by: INTERNAL MEDICINE

## 2024-08-27 PROCEDURE — 36415 COLL VENOUS BLD VENIPUNCTURE: CPT | Mod: HCNC | Performed by: INTERNAL MEDICINE

## 2024-08-27 RX ORDER — FUROSEMIDE 10 MG/ML
20 INJECTION INTRAMUSCULAR; INTRAVENOUS ONCE
Status: COMPLETED | OUTPATIENT
Start: 2024-08-27 | End: 2024-08-27

## 2024-08-27 RX ORDER — MIDODRINE HYDROCHLORIDE 2.5 MG/1
2.5 TABLET ORAL EVERY 8 HOURS
Status: DISCONTINUED | OUTPATIENT
Start: 2024-08-27 | End: 2024-08-28

## 2024-08-27 RX ORDER — ACETAMINOPHEN 500 MG
1000 TABLET ORAL EVERY 8 HOURS
Status: DISCONTINUED | OUTPATIENT
Start: 2024-08-27 | End: 2024-08-28 | Stop reason: HOSPADM

## 2024-08-27 RX ORDER — QUETIAPINE FUMARATE 25 MG/1
50 TABLET, FILM COATED ORAL NIGHTLY
Status: DISCONTINUED | OUTPATIENT
Start: 2024-08-27 | End: 2024-08-28 | Stop reason: HOSPADM

## 2024-08-27 RX ADMIN — QUETIAPINE FUMARATE 50 MG: 25 TABLET ORAL at 09:08

## 2024-08-27 RX ADMIN — METHOCARBAMOL 750 MG: 750 TABLET ORAL at 06:08

## 2024-08-27 RX ADMIN — MUPIROCIN 1 G: 20 OINTMENT TOPICAL at 09:08

## 2024-08-27 RX ADMIN — RIFABUTIN 300 MG: 150 CAPSULE ORAL at 10:08

## 2024-08-27 RX ADMIN — SENNOSIDES AND DOCUSATE SODIUM 1 TABLET: 50; 8.6 TABLET ORAL at 10:08

## 2024-08-27 RX ADMIN — METHOCARBAMOL 750 MG: 750 TABLET ORAL at 05:08

## 2024-08-27 RX ADMIN — DOLUTEGRAVIR SODIUM 50 MG: 50 TABLET, FILM COATED ORAL at 10:08

## 2024-08-27 RX ADMIN — Medication 10 ML: at 12:08

## 2024-08-27 RX ADMIN — CLARITHROMYCIN 500 MG: 500 TABLET, FILM COATED ORAL at 10:08

## 2024-08-27 RX ADMIN — SODIUM CHLORIDE, SODIUM LACTATE, POTASSIUM CHLORIDE, AND CALCIUM CHLORIDE 500 ML: .6; .31; .03; .02 INJECTION, SOLUTION INTRAVENOUS at 12:08

## 2024-08-27 RX ADMIN — LAMIVUDINE 300 MG: 150 TABLET, FILM COATED ORAL at 10:08

## 2024-08-27 RX ADMIN — ERTAPENEM 1 G: 1 INJECTION INTRAMUSCULAR; INTRAVENOUS at 05:08

## 2024-08-27 RX ADMIN — CALCIUM CARBONATE (ANTACID) CHEW TAB 500 MG 1000 MG: 500 CHEW TAB at 10:08

## 2024-08-27 RX ADMIN — CLARITHROMYCIN 500 MG: 500 TABLET, FILM COATED ORAL at 09:08

## 2024-08-27 RX ADMIN — APIXABAN 2.5 MG: 2.5 TABLET, FILM COATED ORAL at 09:08

## 2024-08-27 RX ADMIN — ACETAMINOPHEN 1000 MG: 500 TABLET ORAL at 03:08

## 2024-08-27 RX ADMIN — Medication 6 MG: at 09:08

## 2024-08-27 RX ADMIN — METHOCARBAMOL 750 MG: 750 TABLET ORAL at 12:08

## 2024-08-27 RX ADMIN — ACETAMINOPHEN 1000 MG: 500 TABLET ORAL at 06:08

## 2024-08-27 RX ADMIN — ACETAMINOPHEN 1000 MG: 500 TABLET ORAL at 09:08

## 2024-08-27 RX ADMIN — ACETAMINOPHEN 1000 MG: 500 TABLET ORAL at 12:08

## 2024-08-27 RX ADMIN — FUROSEMIDE 20 MG: 10 INJECTION, SOLUTION INTRAVENOUS at 01:08

## 2024-08-27 RX ADMIN — MIDODRINE HYDROCHLORIDE 2.5 MG: 2.5 TABLET ORAL at 03:08

## 2024-08-27 RX ADMIN — ETHAMBUTOL HYDROCHLORIDE 800 MG: 400 TABLET ORAL at 10:08

## 2024-08-27 RX ADMIN — MIDODRINE HYDROCHLORIDE 2.5 MG: 2.5 TABLET ORAL at 09:08

## 2024-08-27 RX ADMIN — APIXABAN 2.5 MG: 2.5 TABLET, FILM COATED ORAL at 10:08

## 2024-08-27 RX ADMIN — Medication 10 ML: at 06:08

## 2024-08-27 RX ADMIN — CHOLECALCIFEROL TAB 25 MCG (1000 UNIT) 1000 UNITS: 25 TAB at 10:08

## 2024-08-27 RX ADMIN — METHOCARBAMOL 750 MG: 750 TABLET ORAL at 01:08

## 2024-08-27 RX ADMIN — CELECOXIB 200 MG: 200 CAPSULE ORAL at 10:08

## 2024-08-27 RX ADMIN — VANCOMYCIN HYDROCHLORIDE 750 MG: 750 INJECTION, POWDER, LYOPHILIZED, FOR SOLUTION INTRAVENOUS at 10:08

## 2024-08-27 NOTE — ADDENDUM NOTE
Addendum  created 08/27/24 1308 by Edith Hylton MD    Charge Capture section accepted, Cosign clinical note with attestation

## 2024-08-27 NOTE — TREATMENT PLAN
Notified by nurse that patient hypotensive to BP 80s/50s. Evaluated patient at bedside with rapid response team present. Patient awake and alert without any complaints. Non septic appearing. Suspect hypotensive 2/2 hypovolemia as urine appears very concentrated and has had minimal UOP today. Given 1L IVFs with improvement in BP to SBP 100s.     Later became hypotensive again to MAP 65. Asymptomatic. Repeat labs largely unremarkable. Hgb improved from earlier today. Lactic WNL. Will give an addional 500cc bolus. Repeat CXR ordered given minimal output today. Currently satting 97% on RA without any complaints of SOB. If hypotension persists despite IVFs, may need ICU eval.    Monet Monroy PA-C  [facility]

## 2024-08-27 NOTE — PROGRESS NOTES
Pharmacokinetic Assessment Follow Up: IV Vancomycin    Vancomycin serum concentration assessment(s):    The trough level was drawn correctly and can be used to guide therapy at this time. The measurement is within the desired definitive target range of 15 to 20 mcg/mL.    Vancomycin Regimen Plan:    Change regimen to Vancomycin 750 mg IV every 24 hours with next serum trough concentration measured at 1900 prior to 3rd dose on 8/28/24    Drug levels (last 3 results):  Recent Labs   Lab Result Units 08/26/24 1957   Vancomycin-Trough ug/mL 20.5       Pharmacy will continue to follow and monitor vancomycin.    Please contact pharmacy at extension [phone] for questions regarding this assessment.    Thank you for the consult,   Shashank Yoon       Patient brief summary:  Amie Salmeron is a 59 y.o. female initiated on antimicrobial therapy with IV Vancomycin for treatment of bone/joint infection    The patient's current regimen is 750 mg Q 12 H    Drug Allergies:   Review of patient's allergies indicates:  No Known Allergies    Actual Body Weight:   45.8 kg    Renal Function:   Estimated Creatinine Clearance: 61 mL/min (based on SCr of 0.7 mg/dL).,     Dialysis Method (if applicable):  N/A    CBC (last 72 hours):  Recent Labs   Lab Result Units 08/25/24  0007 08/25/24  2139 08/26/24  0340 08/26/24 1957   WBC K/uL 4.96 7.09 5.08 5.48   Hemoglobin g/dL 8.6* 7.2* 6.8* 9.6*   Hemoglobin A1C % 4.9  --   --   --    Hematocrit % 28.0* 23.8* 23.2* 30.3*   Platelets K/uL 349 314 303 335   Gran % % 74.2* 72.8 64.0  --    Lymph % % 17.9* 7.9* 15.0*  --    Mono % % 6.5 4.8 11.0  --    Eosinophil % % 0.2 13.8* 9.4*  --    Basophil % % 0.4 0.1 0.2  --    Differential Method  Automated Automated Automated  --        Metabolic Panel (last 72 hours):  Recent Labs   Lab Result Units 08/25/24  0007 08/25/24  0407 08/26/24  0340 08/26/24 1957   Sodium mmol/L 134*  --  136 139   Potassium mmol/L 4.5  --  4.4 4.9   Chloride mmol/L 104  --   109 112*   CO2 mmol/L 20*  --  21* 21*   Glucose mg/dL 83  --  106 83   Glucose, UA   --  Negative  Negative  --   --    BUN mg/dL 13  --  13 18   Creatinine mg/dL 0.6  --  0.7 0.7   Albumin g/dL 2.1*  --   --  1.7*   Total Bilirubin mg/dL 0.2  --   --  0.2   Alkaline Phosphatase U/L 119  --   --  99   AST U/L 24  --   --  13   ALT U/L 6*  --   --  5*   Magnesium mg/dL  --   --  1.8  --    Phosphorus mg/dL  --   --  3.0  --        Vancomycin Administrations:  vancomycin given in the last 96 hours                     vancomycin 750 mg in D5W 250 mL IVPB (Vial-Mate) (mg) 750 mg New Bag 08/26/24 2050     750 mg New Bag  1002     750 mg New Bag 08/25/24 2140     750 mg New Bag  1113    vancomycin 750 mg in D5W 250 mL IVPB (Vial-Mate) (mg) 750 mg New Bag 08/23/24 1605     750 mg New Bag  0450                    Microbiologic Results:  Microbiology Results (last 7 days)       Procedure Component Value Units Date/Time    Blood culture #2 **CANNOT BE ORDERED STAT** [[id number]] Collected: 08/25/24 0007    Order Status: Completed Specimen: Blood from Peripheral, Wrist, Left Updated: 08/26/24 0613     Blood Culture, Routine No Growth to date      No Growth to date    Blood culture #1 **CANNOT BE ORDERED STAT** [[id number]] Collected: 08/25/24 0007    Order Status: Completed Specimen: Blood from Peripheral, Wrist, Left Updated: 08/26/24 0613     Blood Culture, Routine No Growth to date      No Growth to date    Blood culture x two cultures. Draw prior to antibiotics. [[id number]] Collected: 08/25/24 0007    Order Status: Sent Specimen: Blood from Peripheral, Wrist, Left     Blood culture x two cultures. Draw prior to antibiotics. [[id number]] Collected: 08/25/24 0007    Order Status: Sent Specimen: Blood from Peripheral, Wrist, Left

## 2024-08-27 NOTE — ASSESSMENT & PLAN NOTE
Patient admitted from [facility] after a trip and fall and found to have a closed right femoral neck femur fracture. Orthopedics consulted and recommending operative repair of fracture.   - Patient taken to the OR on 8/25/2024 and underwent right hip pinning by Dr. Laureano Vyas.   - Post-op patient toe touchdown weight bearing to the right lower extremity as per Orthopedics recommendation post-op.   - Patient placed on Apixiban 2.5 mg po BID and NARINDER/SCD's for DVT prophylaxis post-op and will need for total of 30 days.   - Perineural pain catheter placed by Anesthesia Pain Service with continuous infusion of Ropivacaine to help with pain control post-op and Anesthesia Pain Service managing while patient in the hospital. Appreciate Anesthesia assistance on pain management.   - Pain controlled. Patient placed on multimodal pain management post-op with Tylenol 1000 mg po every 8 hours, Lyrica 75 mg po nightly, Celebrex 200 mg po daily + Robaxin 750 mg po 4 times daily and Celebrex 200 mg po daily post-op and will continue with Oxycodone IR 5 mg po every 4 hours prn for breakthrough pain.   - PT/OT consulted post-op and working with patient and recommending moderate intensity therapy. Plan for patient to return back to [facility] on discharge when medically ready where patient came from to continue her oral and IV antibiotics and PT/OT.    - Surgical bandage to remain in place to right hip until Orthopedic clinic follow-up.

## 2024-08-27 NOTE — ASSESSMENT & PLAN NOTE
Advance care planning   Patient seen by palliative care on recent admit due to limited options for treatment of her disseminated MAC an chronic spinal infection and advanced AIDS. Patient wished to continue treatment of her AIDS and infections and PICC line placed and placed on IV antibiotics and discharged to [facility] for continued care with PT/OT for her debility and IV and oral antibiotics for her infections. Patient reports she is DNR as per her request. Discussed with patient who confirmed above information for 10 minutes.

## 2024-08-27 NOTE — CONSULTS
[facility] - Surgery  Wound Care    Patient Name:  Amie Salmeron   MRN:  [id number]  Date: 8/27/2024  Diagnosis: Closed fracture of neck of right femur with routine healing    History:     Past Medical History:   Diagnosis Date    Allergy     Arthritis     Asthma     Candida esophagitis 11/15/2022    Chronic pain     HIV infection     Hypertension     Overactive bladder     Septic shock 08/12/2024    Spinal stenosis     Urine incontinence        Social History     Socioeconomic History    Marital status: Single   Occupational History    Occupation: on disability   Tobacco Use    Smoking status: Never    Smokeless tobacco: Never   Substance and Sexual Activity    Alcohol use: Yes     Comment: Socially    Drug use: No    Sexual activity: Yes     Partners: Male     Birth control/protection: None     Social Determinants of Health     Financial Resource Strain: Low Risk  (8/26/2024)    Overall Financial Resource Strain (CARDIA)     Difficulty of Paying Living Expenses: Not hard at all   Food Insecurity: No Food Insecurity (8/26/2024)    Hunger Vital Sign     Worried About Running Out of Food in the Last Year: Never true     Ran Out of Food in the Last Year: Never true   Transportation Needs: No Transportation Needs (8/26/2024)    TRANSPORTATION NEEDS     Transportation : No   Physical Activity: Inactive (8/26/2024)    Exercise Vital Sign     Days of Exercise per Week: 0 days     Minutes of Exercise per Session: 0 min   Stress: No Stress Concern Present (8/26/2024)    [employer] [place] [employer] - Occupational Stress Questionnaire     Feeling of Stress : Not at all   Recent Concern: Stress - Stress Concern Present (8/13/2024)    [employer] [place] [place] [employer] - Occupational Stress Questionnaire     Feeling of Stress : To some extent   Housing Stability: Low Risk  (8/26/2024)    Housing Stability Vital Sign     Unable to Pay for Housing in the Last Year: No     Homeless in the Last Year: No        Precautions:     Allergies as of 08/24/2024    (No Known Allergies)       [facility] Assessment Details/Treatment     Patient seen for inpatient wound care consult. Patient sitting up in bed and agreeable to inpatient wound care at this time. Upon assessment, sacrum noted to have blanchable erythema indicative of moisture association. No open wounds or active drainage noted.        Reviewed chart for this encounter.  See flowsheet for findings.      Recommendations: Cleanse sacrum with sterile normal saline and pat dry. Apply triad BID and PRN for moisture barrier. Waffle mattress overlay ordered for pressure redistribution. No other issues or concerns at this time. Will follow up 9/3/2024 or sooner if needed.        Discussed POC with patient and primary nurse.  See EMR for orders and patient education.    Bedside nursing to continue care and monitoring.  Bedside to maintain pressure injury prevention interventions.      08/27/24 0815   WOCN Assessment   WOCN Total Time (mins) 30   Visit Date 08/27/24   Visit Time 0815   Consult Type New   WOCN Speciality Wound   Intervention assessed;changed;applied;chart review;coordination of care;orders   Teaching on-going        Altered Skin Integrity 03/23/24 0525 Right anterior #1 Moisture associated dermatitis Intact skin with non-blanchable redness of localized area   Date First Assessed/Time First Assessed: 03/23/24 0525   Altered Skin Integrity Present on Admission - Did Patient arrive to the hospital with altered skin?: yes  Side: Right  Orientation: anterior  Wound Number: #1  Is this injury device related?: No...   Wound Image    Dressing Appearance Open to air   Drainage Amount None   Drainage Characteristics/Odor No odor   Appearance Pink;Red   Care Cleansed with:;Sterile normal saline;Skin Barrier  (Triad)       Orders placed.   Manoj REYNAN, RN  08/27/2024

## 2024-08-27 NOTE — ASSESSMENT & PLAN NOTE
Osteomyelitis of vertebra of thoracolumbar region   MRSA infection   Fusobacterium infection   Infection due to ESBL-producing Escherichia coli   Patient with known osteomyelitis T12-L4 MAC osteomyelitis of spine s/p T11-L2 corpectomy at [facility] on 2/27/2024 and recently admitted to [facility] from 8/12/2024 - 8/24/2024 for MRSA bacteremia and paravertebral abscess and persistent spinal osteomyelitis due to MAC infection. IR placed drain to spinal abscess and drained abscess and recent culture from spinal abscess growing AFB and likely will be MAC but final identification is pending as well as Fusobacterium, MRSA and ESBL E. Coli from her spinal abscess. Patient accidentally removed drain but repeat imaging of her spine prior to discharge showed resolution of abscess so no new drain placed. Infectious Disease consulted on last admit and stated options for treatment of her various infection very limited and palliative care involved with patient on last admit but wanted to proceed with continued treatment of her infections. Patient was discharged to [facility] for continued care and as per last ID recommendations to continue her IV and oral antibiotics. 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; Disseminated MAC      2) Discharge Antibiotics:     Intravenous antibiotics:  Vancomycin  mg q 12 hours   Ertapenem IV 1 gram q 24 hours      Oral antibiotics:  Clarithromycin 500 mg BID  Ethambutol 700 mg daily  Rifabutin 300 mg daily  Will need to discuss PO suppression for hardware infection after IV course     3) Therapy Duration:  IV antibiotics - 6 weeks, PO antibiotics indefinitely/TBD by outpatient ID provider      Estimated end date of IV antibiotics (Ertapenenem and Vancomycin): 9/23/24     4) Outpatient Weekly Labs:     Order the following labs to be drawn on Mondays:   CBC  CMP    CRP  Vancomycin trough. Target 15-20  Patient has PICC line in place and okay to use. Patient continued on above antibiotic regimen in hospital as per ID recs to treat her infections.

## 2024-08-27 NOTE — PT/OT/SLP PROGRESS
Physical Therapy Treatment/co treat with OT    Patient Name:  Amie Salmeron   MRN:  [id number]    Recommendations:     Discharge Recommendations: Moderate Intensity Therapy  Discharge Equipment Recommendations: walker, rolling  Barriers to discharge: Inaccessible home and Decreased caregiver support 1 ESTEFANY and requires assist for mobility    Assessment:     Amie Salmeron is a 59 y.o. female admitted with a medical diagnosis of Closed fracture of neck of right femur with routine healing.  She presents with the following impairments/functional limitations: weakness, decreased safety awareness, impaired functional mobility, gait instability, decreased lower extremity function . Pt is unsafe with functional mobility at this time due to pt requires total assist for bed mobility and total assist for sitting balance due to weakness and noted to have a fever (101 degrees) -nurse notified. Pt is motivated to progress with functional mobility.     Rehab Prognosis: Fair; patient would benefit from acute skilled PT services to address these deficits and reach maximum level of function.    Recent Surgery: Procedure(s) (LRB):  PINNING, HIP, PERCUTANEOUS, RIGHT (Right) 2 Days Post-Op    Plan:     During this hospitalization, patient to be seen 4 x/week to address the identified rehab impairments via gait training, therapeutic activities, therapeutic exercises, neuromuscular re-education and progress toward the following goals:    Plan of Care Expires:  09/26/24    Subjective   Pt appears fatigued and not speaking during treatment    Pain/Comfort:  Pain Rating 1: 0/10 (pt did not c/o pain during treatment)  Pain Rating Post-Intervention 1: 0/10      Objective:     Communicated with nurse prior to session.  Patient found HOB elevated with peripheral IV, perineural catheter, sanchez catheter, FCD upon PT entry to room.     General Precautions: Standard, contact, fall  Orthopedic Precautions: RLE toe touch weight bearing  Braces:  N/A  Respiratory Status: Nasal cannula, flow 2 L/min     Functional Mobility:  Bed Mobility:     Supine to Sit: total assistance of 2  Sit to Supine: total assistance of 2    AM-PAC 6 CLICK MOBILITY  Turning over in bed (including adjusting bedclothes, sheets and blankets)?: 2  Sitting down on and standing up from a chair with arms (e.g., wheelchair, bedside commode, etc.): 1  Moving from lying on back to sitting on the side of the bed?: 2  Moving to and from a bed to a chair (including a wheelchair)?: 1  Need to walk in hospital room?: 1  Climbing 3-5 steps with a railing?: 1  Basic Mobility Total Score: 8     Treatment & Education:  Pt required total assist to scoot to the EOB in sitting. Pt sat on the EOB ~ 8 min with total assist due to posterior and L sided LOB. Pt noted to be warm upon sitting and pt's temperature taken and noted to be 101.1 deg, nurse notified and present to assess.     Patient left HOB elevated with all lines intact, call button in reach, bed alarm on, nurse notified, and nurse present..    GOALS:   Multidisciplinary Problems       Physical Therapy Goals          Problem: Physical Therapy    Goal Priority Disciplines Outcome Goal Variances Interventions   Physical Therapy Goal     PT, PT/OT Progressing     Description: Goals to met by 9/9/2024    1. Supine to sit with Stand-by Assistance  2. Sit to supine with Stand-by Assistance  3.. Rolling to Left and Right with Stand-by Assistance.  4. Sit to stand transfer with Contact Guard Assistance while maintaining WB precautions  5. Bed to chair transfer with Contact Guard Assistance using LRAD while maintaining WB precautions  6. Gait  x 20 feet with Minimal Assistance using Rolling Walker while maintaining WB precautions   7. Ascend/Descend 6 inch curb step with Moderate Assistance using Rolling Walker while maintaining WB precautions.  8. Stand for 5 minutes with Stand-by Assistance using Rolling Walker while maintaining WB precautions  9.  Lower extremity exercise program x15 reps per Instruction, with assistance as needed in order to facilitate improved strength, improved postural control, and improvement in functional independence    Rolling Walker- Patient demonstrates a mobility limitation that significantly impairs their ability to participate in one or more mobility related activities of daily living. Patient's mobility limitation cannot be sufficiently resolved with the use of a cane, but can be sufficiently resolved with the use of a rolling walker.The use of a rolling walker will considerably improve their ability to participate in MRADLs. Patient will use the walker on a regular basis at home.                         Time Tracking:     PT Received On: 08/27/24  PT Start Time: 1029     PT Stop Time: 1044  PT Total Time (min): 15 min     Billable Minutes: Therapeutic Activity 15    Treatment Type: Treatment  PT/PTA: PT     Number of PTA visits since last PT visit: 0     08/27/2024

## 2024-08-27 NOTE — CARE UPDATE
RAPID RESPONSE NURSE PROACTIVE ROUNDING NOTE       Time of Visit:     Admit Date: 2024  LOS: 1  Code Status: DNR   Date of Visit: 2024  : 1965  Age: 59 y.o.  Sex: female  Race: White  [facility]:   MRN: [id number]  Was the patient discharged from an ICU this admission? No   Was the patient discharged from a PACU within last 24 hours? No   Did the patient receive conscious sedation/general anesthesia in last 24 hours? No  Was the patient in the ED within the past 24 hours? No  Was the patient on NIPPV within the past 24 hours? No   Attending Physician: Briseida Quintana MD  Primary Service: [facility]   Time spent at the bedside: 15 -30 min    SITUATION    Notified by JAMISON.  Reason for alert: hypotension  Called to evaluate the patient for Circulatory.    BACKGROUND     Why is the patient in the hospital?: Closed fracture of neck of right femur with routine healing    Patient has a past medical history of Allergy, Arthritis, Asthma, Candida esophagitis, Chronic pain, HIV infection, Hypertension, Overactive bladder, Septic shock, Spinal stenosis, and Urine incontinence.    Last Vitals:  Temp: 97.7 °F (36.5 °C) (1950)  Pulse: 75 (1951)  Resp: 16 (1950)  BP: 82/51 (1951)  SpO2: 98 % ( 1530)    24 Hours Vitals Range:  Temp:  [96.7 °F (35.9 °C)-98.4 °F (36.9 °C)]   Pulse:  [63-79]   Resp:  [16-20]   BP: ()/(51-62)   SpO2:  [95 %-100 %]     Labs:  Recent Labs     24  0007 24  0010 24  2139 24  0340   WBC 4.96  --  7.09 5.08   HGB 8.6*  --  7.2* 6.8*   HCT 28.0* 27* 23.8* 23.2*     --  314 303       Recent Labs     24  0007 24  0340   * 136   K 4.5 4.4    109   CO2 20* 21*   BUN 13 13   CREATININE 0.6 0.7   GLU 83 106   PHOS  --  3.0   MG  --  1.8        Recent Labs     24  0010   PH 7.352   PCO2 50.4*   PO2 25*   HCO3 28.0   POCSATURATED 43   BE 2        ASSESSMENT      Physical Exam  Constitutional:   "     Appearance: She is underweight. She is ill-appearing.   Cardiovascular:      Rate and Rhythm: Normal rate.   Pulmonary:      Effort: Pulmonary effort is normal.   Abdominal:      General: Abdomen is flat.      Palpations: Abdomen is soft.   Genitourinary:     Comments: Alvarez catheter in place with concentrated isaias urine  Skin:     General: Skin is warm and dry.      Coloration: Skin is pale.   Neurological:      Mental Status: She is alert.     HR 75  /62 (77) on L leg  BP 82/51 (61) on L arm    Patient sitting up in bed, awake and alert. Denies any current dizziness, lightheadedness. States "my blood pressure is always low." Asking for hot chocolate. Skin appears dry, urine is concentrated, suspect there is a component of dehydration    Per report from bedside RN, there were concerns for [facility] PICC not working. I was able to flush both ports easily and got blood return from both as well.    INTERVENTIONS    The patient was seen for Cardiac problem. Staff concerns included hypotension. The following interventions were performed: CBC, CMP, continuous cardiac monitoring continued, and lactic acid, 500cc LR bolus.    BRENNEN Alas with HM to bedside to evaluate patient     STAT labs drawn and sent by RRN    500cc LR bolus initiated to [facility] PICC    RECOMMENDATIONS    Repeat BP post fluid bolus  Consider additional IV fluids d/t concern for dehydration/fluid volume down  Follow up with labs    PROVIDER ESCALATION    Yes/No  Yes    Orders received and case discussed with  BRENNEN Ellis.    Disposition: Remain in room 508.    FOLLOW-UP    Bedside RNNeela  updated on plan of care. Instructed to call the Rapid Response NurseBONY RN at [phone] for additional questions or concerns.            "

## 2024-08-27 NOTE — ASSESSMENT & PLAN NOTE
Patient with chronically low SBP in  range. Patient asymptomatic. Likely related to her chronic infections, chronic debility and low body weight as BMI only 18.91. Plan to start Midodrine 2.5 mg po TID to treat on 8/27 and monitor response and titrate as needed.

## 2024-08-27 NOTE — CARE UPDATE
RAPID RESPONSE NURSE FOLLOW-UP NOTE       Followed up with patient for proactive rounding.  No acute issues at this time. Patient BP soft again following 1L IV fluids, additional 500cc ordered by provider. Pt remains asymptomatic. Reviewed plan of care with charge RNRosa .   Team will continue to follow.  Please call Rapid Response BONY DUNCAN, [place] any questions or concerns at [phone].

## 2024-08-27 NOTE — ADDENDUM NOTE
Addendum  created 08/27/24 1225 by Carmel Mcgarry MD    Clinical Note Signed, Intraprocedure Event edited

## 2024-08-27 NOTE — PROGRESS NOTES
"[facility] Medicine  Progress Note    Patient Name: Amie Salmeron  MRN: [id number]  Patient Class: IP- Inpatient   Admission Date: 8/24/2024  Length of Stay: 2 days  Attending Physician: Briseida Quintana MD  Primary Care Provider: Anuradha, Primary Doctor        Subjective:     Principal Problem:Closed fracture of neck of right femur with routine healing        HPI:  59 year old female coming from [facility] by EMS with past medical history of HTN, uncontrolled HIV noncompliant with HAART, spinal MAC, spinal osteo s/p T11-L2 corpectomy with fixation by ortho on 2/27/24 at [facility]  who presenting with hypotension and report of a mechanical fall. Patient tells me she has severe right hip pain. She denies hitting her head or having loss of consciousness. She denies being on an anticoagulation. She reports her blood pressure is "always very low. Pt was here for paravertebral abscess, MRSA bacteremia. She was transferred [facility] for IR for the paravertebral abscess and subsequently transferred to the MICU for hypotension requiring vasopressor support. IR placed drain on 8/12. Abscess cultures grew AFB, Fusobacterium necleatum, ESBL E. Coli and blood cultures grew MRSA .She was discharged 8/24/24 to SNF to complete her antibiotics for total of 6 weeks of vancomycin and ertapenem ending 9/23.     Overview/Hospital Course:  Patient admitted from [facility] after a trip and fall and found to have a closed right femoral neck femur fracture. Orthopedics consulted and recommending operative repair of fracture. Patient has a very complex history and was just discharged from [facility] on 8/24/2024 after prolonged hospitalization from spinal osteomyelitis and paravertebral abscess. Patient with known disseminated MAC infection and advanced AIDS with very limited treatment options. Recent cultures from her spine growing AFB and likely will be MAC but final identification is pending as well as " Fusobacterium, MRSA and ESBL E. Coli from her spinal infection. Patient also on recent hospital stay with MRSA bacteremia. Infectious Disease consulted on last admit and stated options for treatment of her various infection very limited and palliative care involved with patient on last admit but wanted to proceed with continued treatment of her infections. Patient was discharged to [facility] for continued care and as per last ID recommendations. 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; Disseminated MAC      2) Discharge Antibiotics:     Intravenous antibiotics:  Vancomycin  mg q 12 hours   Ertapenem IV 1 gram q 24 hours      Oral antibiotics:  Clarithromycin 500 mg BID  Ethambutol 700 mg daily  Rifabutin 300 mg daily  Will need to discuss PO suppression for hardware infection after IV course     3) Therapy Duration:  IV antibiotics - 6 weeks, PO antibiotics indefinitely/TBD by outpatient ID provider      Estimated end date of IV antibiotics (Ertapenenem and Vancomycin): 9/23/24     4) Outpatient Weekly Labs:     Order the following labs to be drawn on Mondays:   CBC  CMP   CRP  Vancomycin trough. Target 15-20    Patient continued on IV Ertapenem and Vancomycin as per last ID recs on this admit. Patient to continue oral Clarithromycin, Ethambutol and Rifabutin for disseminated MAC infection. Patient also with issues with chronic hypotension and required several fluid boluses on this admit as SBP as low as 75 but after several liters of IVF's -110 and BP stabilized for surgery. Patient taken to the OR after optimization on 8/25/2024. Patient underwent right hip pinning by Dr. Laureano Vyas. Post-op patient toe touchdown weight bearing to the right lower extremity as per Orthopedics recommendation. Patient placed on Apixiban 2.5 mg po BID and NARINDER/SCD's for DVT prophylaxis  post-op and will need for total of 30 days. Perineural pain catheter placed by Anesthesia Pain Service with continuous infusion of Ropivacaine to help with pain control post-op and Anesthesia Pain Service managing while patient in the hospital. Patient placed on multimodal pain management post-op with Tylenol 1000 mg po every 6 hours, Lyrica 75 mg po nightly, Robaxin 750 mg po 4 times daily, and Celebrex 200 mg po daily post-op and will continue with Oxycodone IR 5 mg po every 4 hours prn for breakthrough pain. PT/OT consulted post-op to work with patient.and recommending moderate intensity therapy. Plan for patient to return back to her [facility] on discharge. Patient having issues with low BP post-op and given further IVF's on evening of 8/26. Rapid response team called and patient given a total of 1 liter of lactated ringers. SBP back to low 90's which is her baseline. Patient to be started on Midodrine 2.5 mg po TID to help with her chronic hypotension on 8/27. Hgb improved to 8.9 on 8/27 from 6.8 on 8/26 after transfused 1 unit of PRBCs on 8/26.     Interval History:  PT/OT consulted post-op to work with patient.and recommending moderate intensity therapy. Plan for patient to return back to her [facility] on discharge when medically ready. Patient having issues with low BP post-op and given further IVF's on evening last night for BP 82/51. Rapid response team called and patient given a total of 1 liter of lactated ringers. SBP back to low 90's which is her baseline after fluids given and SBP up to 130 this am. Patient noted to have decreased oxygen sats this am to 85-87% on room air and placed on 2 liters of oxygen this am and oxygen sats went to 94%. CXR done this am and reviewed and showed pulmonary edema. Patient given IV Lasix 20 mg x 1 dose this am. Patient denies any SOB or difficulty breathing. This afternoon patient now off oxygen with oxygen sats of 92% after IV lasix given  this am. Patient to be started on Midodrine 2.5 mg po TID to help with her chronic hypotension this am. Hgb improved to 8.9 today from 6.8 on yesterday after transfused 1 unit of PRBCs yesterday. Patient asking about getting up to edge of bed to exercise and I explained not safe for her to be on edge of bed and no one is available to mobilize patient and watch her when she is at edge of bed as a safety issue as she is a fall risk. Patient stated she understood and I got charge nurse, Marcos to help me adjust her in bed as she wanted to be higher in bed and she was happier after she was adjusted in bed. I also showed patient call button as she was yelling out for her nurse and I explained more effective for her to use call button for help and not yell out as disturbing other patients and she stated she understood. Creatinine stable at 0.7 today. 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     Review of Systems   Constitutional:  Negative for fever.   Respiratory:  Negative for cough and shortness of breath.    Cardiovascular:  Negative for chest pain.   Gastrointestinal:  Negative for abdominal pain, nausea and vomiting.   Musculoskeletal:  Positive for arthralgias (Right hip).   Neurological:  Negative for dizziness.   Psychiatric/Behavioral:  Negative for agitation and confusion.      Objective:     Vital Signs (Most Recent):  Temp: 98.5 °F (36.9 °C) (08/27/24 1147)  Pulse: 101 (08/27/24 1147)  Resp: 18 (08/27/24 1147)  BP: 119/59 (08/27/24 1147)  SpO2: 92 % (08/27/24 1147) on room air Vital Signs (24h Range):  Temp:  [97.5 °F (36.4 °C)-102.7 °F (39.3 °C)] 98.5 °F (36.9 °C)  Pulse:  [] 101  Resp:  [16-20] 18  SpO2:  [86 %-98 %] 92 %  BP: ()/(50-91) 119/59     Weight: 45.4 kg (100 lb 1.4 oz)  Body mass index is 18.91 kg/m².    Intake/Output Summary (Last 24  hours) at 8/27/2024 1409  Last data filed at 8/26/2024 2025  Gross per 24 hour   Intake 1133.25 ml   Output 100 ml   Net 1033.25 ml         Physical Exam  Vitals and nursing note reviewed.   Constitutional:       General: She is awake. She is not in acute distress.     Appearance: She is underweight. She is ill-appearing.      Comments: ill appearing and frail female who appears older than stated age. Patient sitting up in bedside chair in no distress. Patient in some pain on exam.   HENT:      Head:      Comments: Patient with facial hair to chin area  Eyes:      Conjunctiva/sclera: Conjunctivae normal.   Cardiovascular:      Rate and Rhythm: Normal rate and regular rhythm.      Heart sounds: Normal heart sounds. No murmur heard.  Pulmonary:      Effort: Pulmonary effort is normal. No respiratory distress.      Breath sounds: Normal breath sounds. No wheezing.   Abdominal:      General: Abdomen is flat. Bowel sounds are normal. There is no distension.      Palpations: Abdomen is soft.      Tenderness: There is no abdominal tenderness.   Musculoskeletal:      Right lower leg: No edema.      Left lower leg: No edema.   Skin:     General: Skin is warm.      Findings: No erythema.      Comments: Surgical bandage in place to right hip. Bandage is clean and dry and intact.    Neurological:      Mental Status: She is alert and oriented to person, place, and time.   Psychiatric:         Mood and Affect: Mood normal.         Behavior: Behavior normal. Behavior is cooperative.         Thought Content: Thought content normal.         Judgment: Judgment normal.             Significant Labs: CBC:   Recent Labs   Lab 08/26/24  0340 08/26/24 1957 08/27/24  0359   WBC 5.08 5.48 7.59   HGB 6.8* 9.6* 8.9*   HCT 23.2* 30.3* 28.9*    335 332     CMP:   Recent Labs   Lab 08/26/24  0340 08/26/24 1957 08/27/24  0359    139 134*   K 4.4 4.9 5.1    112* 108   CO2 21* 21* 21*    83 68*   BUN 13 18 18   CREATININE  0.7 0.7 0.7   CALCIUM 7.9* 7.8* 7.7*   PROT  --  5.3*  --    ALBUMIN  --  1.7*  --    BILITOT  --  0.2  --    ALKPHOS  --  99  --    AST  --  13  --    ALT  --  5*  --    ANIONGAP 6* 6* 5*       Significant Imaging: I have reviewed all pertinent imaging results/findings within the past 24 hours.    Assessment/Plan:      * Closed fracture of neck of right femur with routine healing s/p hip pinning on 8/25/2024  Patient admitted from [facility] after a trip and fall and found to have a closed right femoral neck femur fracture. Orthopedics consulted and recommending operative repair of fracture.   - Patient taken to the OR on 8/25/2024 and underwent right hip pinning by Dr. Laureano Vyas.   - Post-op patient toe touchdown weight bearing to the right lower extremity as per Orthopedics recommendation post-op.   - Patient placed on Apixiban 2.5 mg po BID and NARINDER/SCD's for DVT prophylaxis post-op and will need for total of 30 days.   - Perineural pain catheter placed by Anesthesia Pain Service with continuous infusion of Ropivacaine to help with pain control post-op and Anesthesia Pain Service managing while patient in the hospital. Appreciate Anesthesia assistance on pain management.   - Pain controlled. Patient placed on multimodal pain management post-op with Tylenol 1000 mg po every 8 hours, Lyrica 75 mg po nightly, Celebrex 200 mg po daily + Robaxin 750 mg po 4 times daily and Celebrex 200 mg po daily post-op and will continue with Oxycodone IR 5 mg po every 4 hours prn for breakthrough pain.   - PT/OT consulted post-op and working with patient and recommending moderate intensity therapy. Plan for patient to return back to [facility] on discharge when medically ready where patient came from to continue her oral and IV antibiotics and PT/OT.    - Surgical bandage to remain in place to right hip until Orthopedic clinic follow-up.     Disseminated mycobacterium avium-intracellulare  complex  Patient with known disseminated MAC and very few treatment options as pr ID to treat in patient with known AIDS on last admit. Patient to continue oral antibiotics to treat as per ID:  Clarithromycin 500 mg po BID  Ethambutol 700 mg po daily  Rifabutin 300 mg po daily  Medications reordered on this admit.     Chronic hypotension  Patient with chronically low SBP in  range. Patient asymptomatic. Likely related to her chronic infections, chronic debility and low body weight as BMI only 18.91. Plan to start Midodrine 2.5 mg po TID to treat on 8/27 and monitor response and titrate as needed.       Vertebral abscess  Osteomyelitis of vertebra of thoracolumbar region   MRSA infection   Fusobacterium infection   Infection due to ESBL-producing Escherichia coli   Patient with known osteomyelitis T12-L4 MAC osteomyelitis of spine s/p T11-L2 corpectomy at [facility] on 2/27/2024 and recently admitted to [facility] from 8/12/2024 - 8/24/2024 for MRSA bacteremia and paravertebral abscess and persistent spinal osteomyelitis due to MAC infection. IR placed drain to spinal abscess and drained abscess and recent culture from spinal abscess growing AFB and likely will be MAC but final identification is pending as well as Fusobacterium, MRSA and ESBL E. Coli from her spinal abscess. Patient accidentally removed drain but repeat imaging of her spine prior to discharge showed resolution of abscess so no new drain placed. Infectious Disease consulted on last admit and stated options for treatment of her various infection very limited and palliative care involved with patient on last admit but wanted to proceed with continued treatment of her infections. Patient was discharged to [facility] SNF for continued care and as per last ID recommendations to continue her IV and oral antibiotics. Per ID recommendations:    1) Infection: MRSA bloodstream infection, ESBL E. Coli + MRSA + fusobacterium nucleatum hardware associated  vertebral infection; Disseminated MAC      2) Discharge Antibiotics:     Intravenous antibiotics:  Vancomycin  mg q 12 hours   Ertapenem IV 1 gram q 24 hours      Oral antibiotics:  Clarithromycin 500 mg BID  Ethambutol 700 mg daily  Rifabutin 300 mg daily  Will need to discuss PO suppression for hardware infection after IV course     3) Therapy Duration:  IV antibiotics - 6 weeks, PO antibiotics indefinitely/TBD by outpatient ID provider      Estimated end date of IV antibiotics (Ertapenenem and Vancomycin): 9/23/24     4) Outpatient Weekly Labs:     Order the following labs to be drawn on Mondays:   CBC  CMP   CRP  Vancomycin trough. Target 15-20  Patient has PICC line in place and okay to use. Patient continued on above antibiotic regimen in hospital as per ID recs to treat her infections.     AIDS (acquired immunodeficiency syndrome), CD4 <=200  Patient with known AIDS with AIDS defining illness of disseminated MAC. Last CD4 count and viral load reviewed and noted below:    Results for orders placed or performed during the hospital encounter of 08/11/24   CD4 T-[place] Cells   Result Value Ref Range    CD4 % [place] T Cell 6.5 (L) 28 - 57 %    Absolute CD4 76 (L) 300 - 1400 cells/ul     Log HIV Copies/mL   Date Value Ref Range Status   08/11/2024 4.05 (A) <1.30 Log Copies/mL Final     Patient followed by Infectious Disease as outpatient. Continue home Dolutegravir 50 mg po daily + home Lamivudine 300 mg po daily to treat.     Debility  Patient with chronic debility due to severe malnutrition, age-related physical debility, AIDS and disseminated infcetion. Plan includes progressive mobility protocol initated, PT/OT consulted, and fall precautions in place.    DNR (do not resuscitate)  Advance care planning   Patient seen by palliative care on recent admit due to limited options for treatment of her disseminated MAC an chronic spinal infection and advanced AIDS. Patient wished to continue treatment of her AIDS  and infections and PICC line placed and placed on IV antibiotics and discharged to [facility] for continued care with PT/OT for her debility and IV and oral antibiotics for her infections. Patient reports she is DNR as per her request. Discussed with patient who confirmed above information for 10 minutes.     Anemia due to infection  Acute blood loss anemia  Hgb improved. Hgb improved to 8.9 on 8/27 from 6.8 on 8/26 after transfused 1 unit of PRBCs on 8/26.  Anemia is likely due to combination of chronic disease due to chronic infections and acute blood loss from surgery that was expected. Baseline Hgb 7.4-8.5. Most recent hemoglobin and hematocrit are listed below.  Recent Labs     08/26/24  0340 08/26/24 1957 08/27/24  0359   HGB 6.8* 9.6* 8.9*   HCT 23.2* 30.3* 28.9*       Plan  - Monitor serial CBC: Daily  - Transfuse PRBC if patient becomes hemodynamically unstable, symptomatic or H/H drops below 7/21.  - Patient has not received any PRBC transfusions to date but plan to transfuse 1 unit of PRBCs on 8/26 for Hgb 6.8.  - Patient's anemia is currently improving.     Hyponatremia  Sodium 134 on 8/27. Stable. Patient asymptomatic and continue to monitor.   Hyponatremia is likely due to Dehydration/hypovolemia. Sodium 134 on admit and given IVF's about 2 liters of normal saline and sodium improved and back to normal range on 8/26 at 136. The patient's most recent sodium results are listed below.  Recent Labs     08/26/24  0340 08/26/24 1957 08/27/24  0359    139 134*       Plan  - Correct the sodium by 4-6mEq in 24 hours.   - Monitor sodium Daily.   - Patient hyponatremia is improving.    Underweight  Patient with Body mass index is 19.08 kg/m² and underweight and related to her chronic infections and advanced AIDS. Encourage po intake and ordered Boost supplements with meals to supplement her diet.           VTE Risk Mitigation (From admission, onward)           Ordered     apixaban tablet 2.5 mg   2 times daily         08/25/24 1435     IP VTE HIGH RISK PATIENT  Once         08/25/24 1101     Place sequential compression device  Until discontinued         08/25/24 1101     Place sequential compression device  Until discontinued         08/25/24 0210                    Discharge Planning   MARK: 8/28/2024     Code Status: DNR   Is the patient medically ready for discharge?:     Reason for patient still in hospital (select all that apply): Patient new problem and Patient trending condition  Discharge Plan A: Skilled Nursing Facility (Return to [facility]) for PT/OT and IV antibiotics.        Briseida Quintana MD  Department of [facility]   [facility] - Surgery

## 2024-08-27 NOTE — ANESTHESIA POST-OP PAIN MANAGEMENT
Acute Pain Service Progress Note    Amie Salmeron is a 59 y.o., female, [id number].    Surgery:  PINNING, HIP, PERCUTANEOUS, RIGHT (Right: Hip)      Post Op Day #: 2     Catheter type: perineural  SIFI     Infusion type: Ropivacaine 0.2%  IB 15cc q3h    Problem List:    Active Hospital Problems    Diagnosis  POA    *Closed fracture of neck of right femur with routine healing s/p hip pinning on 8/25/2024 [S72.001D]  Not Applicable    Acute blood loss anemia [D62]  No    Underweight [R63.6]  Yes    MRSA infection [A49.02]  Yes    Fusobacterium infection [A49.8]  Yes    Infection due to ESBL-producing Escherichia coli [A49.8, Z16.12]  Yes    Anemia due to infection [D64.9, B99.9]  Yes    Debility [R53.81]  Yes    DNR (do not resuscitate) [Z66]  Yes    Advance care planning [Z71.89]  Not Applicable    Vertebral abscess [M46.20]  Yes    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]  Yes    Osteomyelitis of vertebra of thoracolumbar region [M46.25]  Yes    Hyponatremia [E87.1]  Yes    AIDS (acquired immunodeficiency syndrome), CD4 <=200 [B20]  Yes      Resolved Hospital Problems   No resolved problems to display.       Subjective:                General appearance of alert, oriented, no complaints              Pain with rest: 3    Faces              Pain with movement: 7    Faces              Side Effects                          1. Pruritis No                          2. Nausea No                          3. Motor Blockade No, 0=Ability to raise lower extremities off bed                          4. Sedation No, 1=awake and alert     Objective:                    Catheter site clean, dry, intact      Vitals   Vitals:    08/27/24 0332   BP: (!) 99/56   Pulse: 72   Resp: 16   Temp: 36.6 °C (97.8 °F)        Labs    No results displayed because visit has over 200 results.           Meds   Current Facility-Administered Medications   Medication Dose Route Frequency Provider Last Rate Last Admin    acetaminophen  tablet 1,000 mg  1,000 mg Oral Q8H Carmel Mcgarry MD        apixaban tablet 2.5 mg  2.5 mg Oral BID Tony Mary MD   2.5 mg at 08/26/24 2045    bisacodyL suppository 10 mg  10 mg Rectal Daily PRN Joanna Hernandes MD        calcium carbonate 200 mg calcium (500 mg) chewable tablet 1,000 mg  1,000 mg Oral Daily Tony Mary MD   1,000 mg at 08/26/24 0929    celecoxib capsule 200 mg  200 mg Oral Daily Camrel Mcgarry MD   200 mg at 08/26/24 1146    clarithromycin tablet 500 mg  500 mg Oral Q12H Joanna Hernandes MD   500 mg at 08/26/24 2045    dolutegravir Tab 50 mg  50 mg Oral Daily Joanna Hernandes MD   50 mg at 08/26/24 0929    And    lamiVUDine tablet 300 mg  300 mg Oral Daily Joanna Hernandes MD   300 mg at 08/26/24 0929    ertapenem (INVANZ) 1 g in 0.9% NaCl 100 mL IVPB (MB+)  1 g Intravenous Q24H Briseida Quintana MD   Stopped at 08/26/24 1759    ethambutoL tablet 800 mg  800 mg Oral Daily Joanna Hernandes MD   800 mg at 08/26/24 0929    melatonin tablet 6 mg  6 mg Oral Nightly PRN Joanna Hernandes MD   6 mg at 08/26/24 2045    methocarbamoL tablet 750 mg  750 mg Oral Q6H Marcial Hatch MD   750 mg at 08/27/24 0634    mupirocin 2 % ointment 1 g  1 g Nasal BID Joanna Hernandes MD   1 g at 08/26/24 2046    mupirocin 2 % ointment   Nasal On Call Procedure Tony Mary MD   Given at 08/25/24 2041    ondansetron injection 4 mg  4 mg Intravenous Q12H PRN Joanna Hernandes MD        oxyCODONE immediate release tablet 5 mg  5 mg Oral Q4H PRN Carmel Mcgarry MD   5 mg at 08/26/24 2046    pregabalin capsule 75 mg  75 mg Oral QHS Joanna Hernandes MD   75 mg at 08/26/24 2045    QUEtiapine tablet 25 mg  25 mg Oral QHS Joanna Hernandes MD   25 mg at 08/26/24 2045    rifabutin capsule 300 mg  300 mg Oral Daily Joanna Hernandes MD   300 mg at 08/26/24 0929    ropivacaine 0.2% Perineural Pump infusion 500 ML   Perineural Continuous Fahad Okeefe,    New Bag at 08/25/24 1539     senna-docusate 8.6-50 mg per tablet 1 tablet  1 tablet Oral BID Joanna Hernandes MD   1 tablet at 08/26/24 2045    sodium chloride 0.9% flush 10 mL  10 mL Intravenous PRN Joanna Hernandes MD        sodium chloride 0.9% flush 10 mL  10 mL Intravenous Q6H Negra Still PA-C   10 mL at 08/27/24 0634    And    sodium chloride 0.9% flush 10 mL  10 mL Intravenous PRN Negra Still PA-C        vancomycin - pharmacy to dose   Intravenous pharmacy to manage frequency Briseida Quintana MD        vancomycin 750 mg in D5W 250 mL IVPB (Vial-Mate)  15 mg/kg Intravenous Q24H Briseida Quintana MD        vitamin D 1000 units tablet 1,000 Units  1,000 Units Oral Daily Tony Mary MD   1,000 Units at 08/26/24 0929         Assessment:     Pain control adequate    Plan:    1) Pt with fever this morning. Bolus given and PNC pulled in setting of potential infection.  2) Continue multimodals including Tylenol, Celecoxib, Robaxin, Pregabalin  3) PRN oxy 5mg q4h

## 2024-08-27 NOTE — SUBJECTIVE & OBJECTIVE
Interval History:  PT/OT consulted post-op to work with patient.and recommending moderate intensity therapy. Plan for patient to return back to her [facility] on discharge when medically ready. Patient having issues with low BP post-op and given further IVF's on evening last night for BP 82/51. Rapid response team called and patient given a total of 1 liter of lactated ringers. SBP back to low 90's which is her baseline after fluids given and SBP up to 130 this am. Patient noted to have decreased oxygen sats this am to 85-87% on room air and placed on 2 liters of oxygen this am and oxygen sats went to 94%. CXR done this am and reviewed and showed pulmonary edema. Patient given IV Lasix 20 mg x 1 dose this am. Patient denies any SOB or difficulty breathing. This afternoon patient now off oxygen with oxygen sats of 92% after IV lasix given this am. Patient to be started on Midodrine 2.5 mg po TID to help with her chronic hypotension this am. Hgb improved to 8.9 today from 6.8 on yesterday after transfused 1 unit of PRBCs yesterday. Patient asking about getting up to edge of bed to exercise and I explained not safe for her to be on edge of bed and no one is available to mobilize patient and watch her when she is at edge of bed as a safety issue as she is a fall risk. Patient stated she understood and I got charge nurse, Marcos to help me adjust her in bed as she wanted to be higher in bed and she was happier after she was adjusted in bed. I also showed patient call button as she was yelling out for her nurse and I explained more effective for her to use call button for help and not yell out as disturbing other patients and she stated she understood. Creatinine stable at 0.7 today. 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     Review of Systems    Constitutional:  Negative for fever.   Respiratory:  Negative for cough and shortness of breath.    Cardiovascular:  Negative for chest pain.   Gastrointestinal:  Negative for abdominal pain, nausea and vomiting.   Musculoskeletal:  Positive for arthralgias (Right hip).   Neurological:  Negative for dizziness.   Psychiatric/Behavioral:  Negative for agitation and confusion.      Objective:     Vital Signs (Most Recent):  Temp: 98.5 °F (36.9 °C) (08/27/24 1147)  Pulse: 101 (08/27/24 1147)  Resp: 18 (08/27/24 1147)  BP: 119/59 (08/27/24 1147)  SpO2: 92 % (08/27/24 1147) on room air Vital Signs (24h Range):  Temp:  [97.5 °F (36.4 °C)-102.7 °F (39.3 °C)] 98.5 °F (36.9 °C)  Pulse:  [] 101  Resp:  [16-20] 18  SpO2:  [86 %-98 %] 92 %  BP: ()/(50-91) 119/59     Weight: 45.4 kg (100 lb 1.4 oz)  Body mass index is 18.91 kg/m².    Intake/Output Summary (Last 24 hours) at 8/27/2024 1409  Last data filed at 8/26/2024 2025  Gross per 24 hour   Intake 1133.25 ml   Output 100 ml   Net 1033.25 ml         Physical Exam  Vitals and nursing note reviewed.   Constitutional:       General: She is awake. She is not in acute distress.     Appearance: She is underweight. She is ill-appearing.      Comments: ill appearing and frail female who appears older than stated age. Patient sitting up in bedside chair in no distress. Patient in some pain on exam.   HENT:      Head:      Comments: Patient with facial hair to chin area  Eyes:      Conjunctiva/sclera: Conjunctivae normal.   Cardiovascular:      Rate and Rhythm: Normal rate and regular rhythm.      Heart sounds: Normal heart sounds. No murmur heard.  Pulmonary:      Effort: Pulmonary effort is normal. No respiratory distress.      Breath sounds: Normal breath sounds. No wheezing.   Abdominal:      General: Abdomen is flat. Bowel sounds are normal. There is no distension.      Palpations: Abdomen is soft.      Tenderness: There is no abdominal tenderness.   Musculoskeletal:       Right lower leg: No edema.      Left lower leg: No edema.   Skin:     General: Skin is warm.      Findings: No erythema.      Comments: Surgical bandage in place to right hip. Bandage is clean and dry and intact.    Neurological:      Mental Status: She is alert and oriented to person, place, and time.   Psychiatric:         Mood and Affect: Mood normal.         Behavior: Behavior normal. Behavior is cooperative.         Thought Content: Thought content normal.         Judgment: Judgment normal.             Significant Labs: CBC:   Recent Labs   Lab 08/26/24 0340 08/26/24 1957 08/27/24 0359   WBC 5.08 5.48 7.59   HGB 6.8* 9.6* 8.9*   HCT 23.2* 30.3* 28.9*    335 332     CMP:   Recent Labs   Lab 08/26/24 0340 08/26/24 1957 08/27/24 0359    139 134*   K 4.4 4.9 5.1    112* 108   CO2 21* 21* 21*    83 68*   BUN 13 18 18   CREATININE 0.7 0.7 0.7   CALCIUM 7.9* 7.8* 7.7*   PROT  --  5.3*  --    ALBUMIN  --  1.7*  --    BILITOT  --  0.2  --    ALKPHOS  --  99  --    AST  --  13  --    ALT  --  5*  --    ANIONGAP 6* 6* 5*       Significant Imaging: I have reviewed all pertinent imaging results/findings within the past 24 hours.

## 2024-08-27 NOTE — ASSESSMENT & PLAN NOTE
Acute blood loss anemia  Hgb improved. Hgb improved to 8.9 on 8/27 from 6.8 on 8/26 after transfused 1 unit of PRBCs on 8/26.  Anemia is likely due to combination of chronic disease due to chronic infections and acute blood loss from surgery that was expected. Baseline Hgb 7.4-8.5. Most recent hemoglobin and hematocrit are listed below.  Recent Labs     08/26/24  0340 08/26/24  1957 08/27/24  0359   HGB 6.8* 9.6* 8.9*   HCT 23.2* 30.3* 28.9*       Plan  - Monitor serial CBC: Daily  - Transfuse PRBC if patient becomes hemodynamically unstable, symptomatic or H/H drops below 7/21.  - Patient has not received any PRBC transfusions to date but plan to transfuse 1 unit of PRBCs on 8/26 for Hgb 6.8.  - Patient's anemia is currently improving.

## 2024-08-27 NOTE — PROGRESS NOTES
Nurses Note -- 4 Eyes      8/26/2024   7:06 PM      Skin assessed during: Daily Assessment      [] No Altered Skin Integrity Present    []Prevention Measures Documented      [] Yes- Altered Skin Integrity Present or Discovered   [] LDA Added if Not in Epic (Describe Wound)   [x] New Altered Skin Integrity was Present on Admit and Documented in LDA   [] Wound Image Taken    Wound Care Consulted? Yes    Attending Nurse:  Shania Coyne RN/Staff Member:  Diana    Wound care consult placed on admit on 8/25/24.  Wound care orders placed this afternoon.

## 2024-08-27 NOTE — ASSESSMENT & PLAN NOTE
Patient with known disseminated MAC and very few treatment options as pr ID to treat in patient with known AIDS on last admit. Patient to continue oral antibiotics to treat as per ID:  Clarithromycin 500 mg po BID  Ethambutol 700 mg po daily  Rifabutin 300 mg po daily  Medications reordered on this admit.

## 2024-08-27 NOTE — ASSESSMENT & PLAN NOTE
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 Last CD4 count and viral load reviewed and noted below:    Results for orders placed or performed during the hospital encounter of 08/11/24   CD4 T-[place] Cells   Result Value Ref Range    CD4 % [place] T Cell 6.5 (L) 28 - 57 %    Absolute CD4 76 (L) 300 - 1400 cells/ul     Log HIV Copies/mL   Date Value Ref Range Status   08/11/2024 4.05 (A) <1.30 Log Copies/mL Final     Patient followed by Infectious Disease as outpatient. Continue home Dolutegravir 50 mg po daily + home Lamivudine 300 mg po daily to treat.

## 2024-08-27 NOTE — PT/OT/SLP PROGRESS
Occupational Therapy   Treatment    Name: Amie Salmeron  MRN: [id number]  Admitting Diagnosis:  Closed fracture of neck of right femur with routine healing  2 Days Post-Op    Recommendations:     Discharge Recommendations: Moderate Intensity Therapy  Discharge Equipment Recommendations:  walker, rolling  Barriers to discharge:  None    Assessment:   CO-TX with PT for pt safety and max participation with both disciplines.    Amie Salmeron is a 59 y.o. female with a medical diagnosis of Closed fracture of neck of right femur with routine healing.  She presents with fever of 101.4, fatigue and lethargy. Performance deficits affecting function are weakness, pain, decreased safety awareness, decreased lower extremity function, impaired functional mobility, decreased upper extremity function. Pt also presented with little to no verbal communications aside from moans and groans. Additionally, Pt unable to remain seated EOB under own strength.    Rehab Prognosis:  Fair; patient would benefit from acute skilled OT services to address these deficits and reach maximum level of function.       Plan:     Patient to be seen daily (4x/wk after hip pathway) to address the above listed problems via self-care/home management, therapeutic activities, therapeutic exercises  Plan of Care Expires: 09/26/24  Plan of Care Reviewed with: patient    Subjective     Chief Complaint: Fever and fatigue  Patient/Family Comments/goals: return home  Pain/Comfort:  Pain Rating 1: 0/10    Objective:     Communicated with: Nsg prior to session.  Patient found supine with peripheral IV, perineural catheter, sanchez catheter, FCD upon OT entry to room.    General Precautions: Standard, contact, fall    Orthopedic Precautions:RLE toe touch weight bearing  Braces: N/A  Respiratory Status: Nasal cannula, flow 2 L/min     Occupational Performance:     Bed Mobility:    Patient completed Supine to Sit with total assistance and 2 persons  Patient completed Sit to  Supine with total assistance and 2 persons     Functional Mobility/Transfers:  Functional Mobility: NT 2/2 to pt safety and fatigue/fever    Activities of Daily Living:  Grooming: maximal assistance applying cold towel and to wash face      AMPAC 6 Click ADL: 17    Treatment & Education:  Pt educated on role and purpose of therapy  Pt educated on goal setting  Pt educated on benefits of OOB activity  Pt educated on self advocacy     Patient left supine with all lines intact, call button in reach, and nsg notified    GOALS:   Multidisciplinary Problems       Occupational Therapy Goals          Problem: Occupational Therapy    Goal Priority Disciplines Outcome Interventions   Occupational Therapy Goal     OT, PT/OT Progressing    Description: Goals to be met by: 9/2/2024     Patient will increase functional independence with ADLs by performing:    UE Dressing with Supervision.  LE Dressing with Supervision.  Grooming while standing at sink with Supervision.  Toileting from toilet with Supervision for hygiene and clothing management.   Toilet transfer to toilet with Supervision.    DME Justifications    Bedside Commode- Patient has a mobility limitation that significantly impairs their ability to participate in one or more mobility related activities of daily living, including toileting. This deficit can be resolved by using a bedside commode. Patient demonstrates mobility limitations that will cause them to be confined to one room at home without bathroom access for up to 30 days. Using a bedside commode will greatly improve the patient's ability to participate in MRADLs.     Rolling Walker- Patient demonstrates a mobility limitation that significantly impairs their ability to participate in one or more mobility related activities of daily living. Patient's mobility limitation cannot be sufficiently resolved with the use of a cane, but can be sufficiently resolved with the use of a rolling walker.The use of a rolling  walker will considerably improve their ability to participate in MRADLs. Patient will use the walker on a regular basis at home.                           Time Tracking:     OT Date of Treatment: 08/27/24  OT Start Time: 1031  OT Stop Time: 1042  OT Total Time (min): 11 min    Billable Minutes:Self Care/Home Management 11    OT/ELIZABETH: OT          8/27/2024

## 2024-08-28 VITALS
WEIGHT: 100.06 LBS | BODY MASS INDEX: 18.89 KG/M2 | TEMPERATURE: 97 F | HEART RATE: 89 BPM | OXYGEN SATURATION: 97 % | SYSTOLIC BLOOD PRESSURE: 90 MMHG | HEIGHT: 61 IN | DIASTOLIC BLOOD PRESSURE: 56 MMHG | RESPIRATION RATE: 18 BRPM

## 2024-08-28 LAB
ANION GAP SERPL CALC-SCNC: 8 MMOL/L (ref 8–16)
ANISOCYTOSIS BLD QL SMEAR: SLIGHT
BASOPHILS # BLD AUTO: 0.04 K/UL (ref 0–0.2)
BASOPHILS NFR BLD: 0.3 % (ref 0–1.9)
BUN SERPL-MCNC: 15 MG/DL (ref 8–23)
CALCIUM SERPL-MCNC: 8.1 MG/DL (ref 8.7–10.5)
CHLORIDE SERPL-SCNC: 108 MMOL/L (ref 95–110)
CO2 SERPL-SCNC: 21 MMOL/L (ref 23–29)
CREAT SERPL-MCNC: 0.6 MG/DL (ref 0.5–1.4)
DIFFERENTIAL METHOD BLD: ABNORMAL
EOSINOPHIL # BLD AUTO: 0 K/UL (ref 0–0.5)
EOSINOPHIL NFR BLD: 0.2 % (ref 0–8)
ERYTHROCYTE [DISTWIDTH] IN BLOOD BY AUTOMATED COUNT: 21.9 % (ref 11.5–14.5)
EST. GFR  (NO RACE VARIABLE): >60 ML/MIN/1.73 M^2
GIANT PLATELETS BLD QL SMEAR: PRESENT
GLUCOSE SERPL-MCNC: 72 MG/DL (ref 70–110)
HCT VFR BLD AUTO: 31.2 % (ref 37–48.5)
HGB BLD-MCNC: 10.2 G/DL (ref 12–16)
HYPOCHROMIA BLD QL SMEAR: ABNORMAL
IMM GRANULOCYTES # BLD AUTO: 0.06 K/UL (ref 0–0.04)
IMM GRANULOCYTES NFR BLD AUTO: 0.5 % (ref 0–0.5)
LYMPHOCYTES # BLD AUTO: 1.2 K/UL (ref 1–4.8)
LYMPHOCYTES NFR BLD: 9.9 % (ref 18–48)
MCH RBC QN AUTO: 27.1 PG (ref 27–31)
MCHC RBC AUTO-ENTMCNC: 32.7 G/DL (ref 32–36)
MCV RBC AUTO: 83 FL (ref 82–98)
MONOCYTES # BLD AUTO: 0.7 K/UL (ref 0.3–1)
MONOCYTES NFR BLD: 5.5 % (ref 4–15)
NEUTROPHILS # BLD AUTO: 10.1 K/UL (ref 1.8–7.7)
NEUTROPHILS NFR BLD: 83.6 % (ref 38–73)
NRBC BLD-RTO: 0 /100 WBC
OVALOCYTES BLD QL SMEAR: ABNORMAL
PLATELET # BLD AUTO: 331 K/UL (ref 150–450)
PLATELET BLD QL SMEAR: ABNORMAL
PMV BLD AUTO: 10.6 FL (ref 9.2–12.9)
POIKILOCYTOSIS BLD QL SMEAR: SLIGHT
POTASSIUM SERPL-SCNC: 4.5 MMOL/L (ref 3.5–5.1)
RBC # BLD AUTO: 3.77 M/UL (ref 4–5.4)
SCHISTOCYTES BLD QL SMEAR: ABNORMAL
SCHISTOCYTES BLD QL SMEAR: PRESENT
SODIUM SERPL-SCNC: 137 MMOL/L (ref 136–145)
SPHEROCYTES BLD QL SMEAR: ABNORMAL
WBC # BLD AUTO: 12.02 K/UL (ref 3.9–12.7)

## 2024-08-28 PROCEDURE — 85025 COMPLETE CBC W/AUTO DIFF WBC: CPT | Mod: HCNC | Performed by: INTERNAL MEDICINE

## 2024-08-28 PROCEDURE — 25000003 PHARM REV CODE 250: Mod: HCNC | Performed by: INTERNAL MEDICINE

## 2024-08-28 PROCEDURE — 97530 THERAPEUTIC ACTIVITIES: CPT | Mod: HCNC

## 2024-08-28 PROCEDURE — 97535 SELF CARE MNGMENT TRAINING: CPT | Mod: HCNC

## 2024-08-28 PROCEDURE — 25000003 PHARM REV CODE 250: Mod: HCNC

## 2024-08-28 PROCEDURE — 25000003 PHARM REV CODE 250: Mod: HCNC | Performed by: STUDENT IN AN ORGANIZED HEALTH CARE EDUCATION/TRAINING PROGRAM

## 2024-08-28 PROCEDURE — 36415 COLL VENOUS BLD VENIPUNCTURE: CPT | Mod: HCNC | Performed by: INTERNAL MEDICINE

## 2024-08-28 PROCEDURE — 80048 BASIC METABOLIC PNL TOTAL CA: CPT | Mod: HCNC | Performed by: INTERNAL MEDICINE

## 2024-08-28 PROCEDURE — A4216 STERILE WATER/SALINE, 10 ML: HCPCS | Mod: HCNC

## 2024-08-28 RX ORDER — LIDOCAINE 50 MG/G
1 PATCH TOPICAL DAILY
Start: 2024-08-28

## 2024-08-28 RX ORDER — CALCIUM CARBONATE 200(500)MG
1000 TABLET,CHEWABLE ORAL DAILY
Start: 2024-08-29

## 2024-08-28 RX ORDER — AMOXICILLIN 250 MG
1 CAPSULE ORAL 2 TIMES DAILY
COMMUNITY
Start: 2024-08-28

## 2024-08-28 RX ORDER — CELECOXIB 200 MG/1
200 CAPSULE ORAL DAILY
Start: 2024-08-29 | End: 2024-09-28

## 2024-08-28 RX ORDER — METHOCARBAMOL 750 MG/1
750 TABLET, FILM COATED ORAL 4 TIMES DAILY
Start: 2024-08-28

## 2024-08-28 RX ORDER — MIDODRINE HYDROCHLORIDE 5 MG/1
5 TABLET ORAL EVERY 8 HOURS
Status: DISCONTINUED | OUTPATIENT
Start: 2024-08-28 | End: 2024-08-28 | Stop reason: HOSPADM

## 2024-08-28 RX ORDER — CHOLECALCIFEROL (VITAMIN D3) 25 MCG
1000 TABLET ORAL DAILY
COMMUNITY
Start: 2024-08-29

## 2024-08-28 RX ORDER — QUETIAPINE FUMARATE 25 MG/1
50 TABLET, FILM COATED ORAL NIGHTLY
Start: 2024-08-28

## 2024-08-28 RX ORDER — OXYCODONE HYDROCHLORIDE 10 MG/1
10 TABLET ORAL EVERY 4 HOURS PRN
Qty: 20 TABLET | Refills: 0 | Status: SHIPPED | OUTPATIENT
Start: 2024-08-28

## 2024-08-28 RX ORDER — MIDODRINE HYDROCHLORIDE 5 MG/1
5 TABLET ORAL
Start: 2024-08-28

## 2024-08-28 RX ADMIN — CHOLECALCIFEROL TAB 25 MCG (1000 UNIT) 1000 UNITS: 25 TAB at 09:08

## 2024-08-28 RX ADMIN — Medication 10 ML: at 06:08

## 2024-08-28 RX ADMIN — RIFABUTIN 300 MG: 150 CAPSULE ORAL at 09:08

## 2024-08-28 RX ADMIN — ETHAMBUTOL HYDROCHLORIDE 800 MG: 400 TABLET ORAL at 09:08

## 2024-08-28 RX ADMIN — DOLUTEGRAVIR SODIUM 50 MG: 50 TABLET, FILM COATED ORAL at 09:08

## 2024-08-28 RX ADMIN — MUPIROCIN 1 G: 20 OINTMENT TOPICAL at 09:08

## 2024-08-28 RX ADMIN — ACETAMINOPHEN 1000 MG: 500 TABLET ORAL at 02:08

## 2024-08-28 RX ADMIN — METHOCARBAMOL 750 MG: 750 TABLET ORAL at 12:08

## 2024-08-28 RX ADMIN — ACETAMINOPHEN 1000 MG: 500 TABLET ORAL at 06:08

## 2024-08-28 RX ADMIN — Medication 10 ML: at 12:08

## 2024-08-28 RX ADMIN — MIDODRINE HYDROCHLORIDE 5 MG: 5 TABLET ORAL at 02:08

## 2024-08-28 RX ADMIN — SENNOSIDES AND DOCUSATE SODIUM 1 TABLET: 50; 8.6 TABLET ORAL at 09:08

## 2024-08-28 RX ADMIN — LAMIVUDINE 300 MG: 150 TABLET, FILM COATED ORAL at 09:08

## 2024-08-28 RX ADMIN — CALCIUM CARBONATE (ANTACID) CHEW TAB 500 MG 1000 MG: 500 CHEW TAB at 09:08

## 2024-08-28 RX ADMIN — MIDODRINE HYDROCHLORIDE 2.5 MG: 2.5 TABLET ORAL at 06:08

## 2024-08-28 RX ADMIN — CELECOXIB 200 MG: 200 CAPSULE ORAL at 09:08

## 2024-08-28 RX ADMIN — METHOCARBAMOL 750 MG: 750 TABLET ORAL at 06:08

## 2024-08-28 RX ADMIN — APIXABAN 2.5 MG: 2.5 TABLET, FILM COATED ORAL at 09:08

## 2024-08-28 RX ADMIN — CLARITHROMYCIN 500 MG: 500 TABLET, FILM COATED ORAL at 09:08

## 2024-08-28 NOTE — PT/OT/SLP PROGRESS
"Occupational Therapy   Treatment    Name: Amie Salmeron  MRN: [id number]  Admitting Diagnosis:  Closed fracture of neck of right femur with routine healing  3 Days Post-Op    Recommendations:     Discharge Recommendations: Moderate Intensity Therapy  Discharge Equipment Recommendations:  walker, rolling  Barriers to discharge:  None    Assessment:     Amie Salmeron is a 59 y.o. female with a medical diagnosis of Closed fracture of neck of right femur with routine healing.  She presents with being soiled in bed. Performance deficits affecting function are weakness, pain, impaired functional mobility, impaired balance, decreased safety awareness, impaired self care skills. Pt had fair participation this session.     Rehab Prognosis:  Good; patient would benefit from acute skilled OT services to address these deficits and reach maximum level of function.       Plan:     Patient to be seen 4 x/week to address the above listed problems via self-care/home management, therapeutic activities, therapeutic exercises  Plan of Care Expires: 09/26/24  Plan of Care Reviewed with: patient    Subjective     Chief Complaint: "I need to use the bedpan"  Patient/Family Comments/goals: return home  Pain/Comfort:  Pain Rating 1: 0/10    Objective:     Communicated with: Nsg prior to session.  Patient found HOB elevated with perineural catheter, FCD upon OT entry to room.    General Precautions: Standard, contact, fall    Orthopedic Precautions:RLE toe touch weight bearing  Braces: N/A  Respiratory Status: Room air     Occupational Performance:     Bed Mobility:    Patient completed Supine to Sit with minimum assistance     Functional Mobility/Transfers:  Patient completed Sit <> Stand Transfer with maximal assistance  with  hand-held assist   Patient completed Bed <> Chair Transfer using Stand Pivot technique with total assistance with hand-held assist  Pt placed on bedpan in chair c/ Total A  Functional Mobility: NT As pt unable to " adhere to TTWB precautions    Activities of Daily Living:  Upper Body Dressing: moderate assistance change gowns  Toileting: max A for gin-care for both instances.  Bathing: Max A for backside after sitting in soiled linen      [facility] 6 Click ADL: 17    Treatment & Education:  Pt educated on role and purpose of therapy  Pt educated on goal setting  Pt educated on benefits of OOB activity  Pt educated on self advocacy     Patient left up in chair with all lines intact, call button in reach, and nsg notified    GOALS:   Multidisciplinary Problems       Occupational Therapy Goals          Problem: Occupational Therapy    Goal Priority Disciplines Outcome Interventions   Occupational Therapy Goal     OT, PT/OT Progressing    Description: Goals to be met by: 9/2/2024     Patient will increase functional independence with ADLs by performing:    UE Dressing with Supervision.  LE Dressing with Supervision.  Grooming while standing at sink with Supervision.  Toileting from toilet with Supervision for hygiene and clothing management.   Toilet transfer to toilet with Supervision.    DME Justifications    Bedside Commode- Patient has a mobility limitation that significantly impairs their ability to participate in one or more mobility related activities of daily living, including toileting. This deficit can be resolved by using a bedside commode. Patient demonstrates mobility limitations that will cause them to be confined to one room at home without bathroom access for up to 30 days. Using a bedside commode will greatly improve the patient's ability to participate in MRADLs.     Rolling Walker- Patient demonstrates a mobility limitation that significantly impairs their ability to participate in one or more mobility related activities of daily living. Patient's mobility limitation cannot be sufficiently resolved with the use of a cane, but can be sufficiently resolved with the use of a rolling walker.The use of a rolling walker  will considerably improve their ability to participate in MRADLs. Patient will use the walker on a regular basis at home.                           Time Tracking:     OT Date of Treatment: 08/28/24  OT Start Time: 0930  OT Stop Time: 0956  OT Total Time (min): 26 min    Billable Minutes:Self Care/Home Management 26    OT/ELIZABETH: OT          8/28/2024

## 2024-08-28 NOTE — NURSING
Nurses Note -- 4 Eyes      8/27/2024   6:43 PM      Skin assessed during: Q Shift Change      [x] No Altered Skin Integrity Present    [x]Prevention Measures Documented      [] Yes- Altered Skin Integrity Present or Discovered   [] LDA Added if Not in Epic (Describe Wound)   [] New Altered Skin Integrity was Present on Admit and Documented in LDA   [] Wound Image Taken    Wound Care Consulted? No    Attending Nurse: Mila Coyne RN/Staff Member:  Eugenia DUNCAN

## 2024-08-28 NOTE — ASSESSMENT & PLAN NOTE
Acute blood loss anemia  Hgb improved on day of discharge to 10.2 on 8/28.   Hgb improved. Hgb improved to 8.9 on 8/27 from 6.8 on 8/26 after transfused 1 unit of PRBCs on 8/26.  Anemia is likely due to combination of chronic disease due to chronic infections and acute blood loss from surgery that was expected. Baseline Hgb 7.4-8.5. Most recent hemoglobin and hematocrit are listed below.  Recent Labs     08/26/24  1957 08/27/24  0359 08/28/24  0613   HGB 9.6* 8.9* 10.2*   HCT 30.3* 28.9* 31.2*       Plan  - Transfuse PRBC if patient becomes hemodynamically unstable, symptomatic or H/H drops below 7/21.  - Patient has received 1 units of PRBCs on 8/26 .

## 2024-08-28 NOTE — PROGRESS NOTES
Nurses Note -- 4 Eyes      8/28/2024   10:12 AM      Skin assessed during: Q Shift Change      [] No Altered Skin Integrity Present    []Prevention Measures Documented      [x] Yes- Altered Skin Integrity Present or Discovered   [] LDA Added if Not in Epic (Describe Wound)   [] New Altered Skin Integrity was Present on Admit and Documented in LDA   [] Wound Image Taken    Wound Care Consulted? No    Attending Nurse:  Silva Coyne RN/Staff Member:   Katiuska JOYNER

## 2024-08-28 NOTE — ASSESSMENT & PLAN NOTE
Patient with known disseminated MAC and very few treatment options as pr ID to treat in patient with known AIDS on last admit. Patient to continue oral antibiotics to treat as per ID:  Clarithromycin 500 mg po BID  Ethambutol 700 mg po daily  Rifabutin 300 mg po daily  Medications reordered on this admit and to continue on discharge to [facility].

## 2024-08-28 NOTE — ASSESSMENT & PLAN NOTE
Patient admitted from [facility] after a trip and fall and found to have a closed right femoral neck femur fracture. Orthopedics consulted and recommending operative repair of fracture.   - Patient taken to the OR on 8/25/2024 and underwent right hip pinning by Dr. Laureano Vyas.   - Post-op patient toe touchdown weight bearing to the right lower extremity as per Orthopedics recommendation post-op and continue on discharge for 6 week post-op.   - Patient placed on Apixiban 2.5 mg po BID for DVT prophylaxis post-op and to continue for total of 30 days post-op on discharge.   - Perineural pain catheter removed by Anesthesia prior to discharge.  - Pain controlled. Patient placed on multimodal pain management post-op with Tylenol 1000 mg po every 8 hours, Celebrex 200 mg po daily + Robaxin 750 mg po 4 times daily and Celebrex 200 mg po daily post-op and will continue with Oxycodone IR 10 mg po every 4 hours prn for breakthrough pain and to continue on discharge.   - PT/OT consulted post-op and working with patient and recommending moderate intensity therapy. Plan for patient to return back to [facility] on discharge when medically ready where patient came from to continue her oral and IV antibiotics and PT/OT. Patient medically ready and discharged back to [facility] on 8/28 for continued PT/OT and antibiotic therapy.   - Surgical bandage to remain in place to right hip until Orthopedic clinic follow-up.

## 2024-08-28 NOTE — ASSESSMENT & PLAN NOTE
Patient with Body mass index is 19.08 kg/m² and underweight and related to her chronic infections and advanced AIDS. Encourage po intake and ordered Boost supplements with meals to supplement her diet and continue on discharge.

## 2024-08-28 NOTE — PLAN OF CARE
Problem: Physical Therapy  Goal: Physical Therapy Goal  Description: Goals to met by 9/9/2024    1. Supine to sit with Stand-by Assistance  2. Sit to supine with Stand-by Assistance  3.. Rolling to Left and Right with Stand-by Assistance.  4. Sit to stand transfer with Contact Guard Assistance while maintaining WB precautions  5. Bed to chair transfer with Contact Guard Assistance using LRAD while maintaining WB precautions  6. Gait  x 20 feet with Minimal Assistance using Rolling Walker while maintaining WB precautions   7. Ascend/Descend 6 inch curb step with Moderate Assistance using Rolling Walker while maintaining WB precautions.  8. Stand for 5 minutes with Stand-by Assistance using Rolling Walker while maintaining WB precautions  9. Lower extremity exercise program x15 reps per Instruction, with assistance as needed in order to facilitate improved strength, improved postural control, and improvement in functional independence    Rolling Walker- Patient demonstrates a mobility limitation that significantly impairs their ability to participate in one or more mobility related activities of daily living. Patient's mobility limitation cannot be sufficiently resolved with the use of a cane, but can be sufficiently resolved with the use of a rolling walker.The use of a rolling walker will considerably improve their ability to participate in MRADLs. Patient will use the walker on a regular basis at home.    Outcome: Progressing   Pt's goals remain appropriate and pt will continue to benefit from skilled PT services to work towards improved functional mobility including: bed mobility, transfers, and gait. Denise Ramirez PT   8/28/2024

## 2024-08-28 NOTE — PLAN OF CARE
SAEID called [facility] to follow up on admit, staff reports authorization remains pending. SW to follow up shortly.

## 2024-08-28 NOTE — PLAN OF CARE
[facility] - Surgery  Discharge Reassessment    Primary Care Provider: No, Primary Doctor    Expected Discharge Date: 8/28/2024    Reassessment (most recent)       Discharge Reassessment - 08/28/24 0830          Discharge Reassessment    Assessment Type Discharge Planning Reassessment     Did the patient's condition or plan change since previous assessment? Yes     Discharge Plan discussed with: Patient     Communicated MARK with patient/caregiver Yes     Discharge Plan A Skilled Nursing Facility                     Patient to d/c to [facility] once medically stable. MARK 8/28/24. Will continue to follow.    Marily Hutchins RNCM  Case Management  [facility]  [phone]     10

## 2024-08-28 NOTE — ASSESSMENT & PLAN NOTE
Amie Salmeron is a 59 y.o. female with a right sided valgus, impacted femoral neck fracture, closed, NVI. They take no anticoagulation at home. They required a walker ambulatory assistive devices prior to this injury.   Now s/p right femur IMN on 8/25/24.     VS:  hypotensive, remains asymptomatic and consistent with baseline vitals   Labs:  Hb 6.8 - transfused 1U prbc today   Diet: Ok for diet   Nerve block:  SIFI  Pain control: multimodal regimen  PT/OT:  WBAT RLE   DVT PPx:  Eliquis 2.5 bid   Abx:  Currently being treated for MAC 2/2 HIV   Antonio:  Peewee Alvarez POD1   F/u: 2 weeks trauma clinic     Dispo: Dc back to [facility] once medically stable

## 2024-08-28 NOTE — PLAN OF CARE
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]  Yes     Priority: 2     Chronic hypotension [I95.89]  Yes     Priority: 3     Vertebral abscess [M46.20]  Yes     Priority: 3     AIDS (acquired immunodeficiency syndrome), CD4 <=200 [B20]  Yes     Priority: 4     Debility [R53.81]  Yes     Priority: 5     DNR (do not resuscitate) [Z66]  Yes     Priority: 6     Anemia due to infection [D64.9, B99.9]  Yes     Priority: 7     Hyponatremia [E87.1]  Yes     Priority: 8     Underweight [R63.6]  Yes     Priority: 9     Acute blood loss anemia [D62]  No    MRSA infection [A49.02]  Yes    Fusobacterium infection [A49.8]  Yes    Infection due to ESBL-producing Escherichia coli [A49.8, Z16.12]  Yes    Advance care planning [Z71.89]  Not Applicable    Osteomyelitis of vertebra of thoracolumbar region [M46.25]  Yes      Resolved Hospital Problems   No resolved problems to display.       Allergies:Review of patient's allergies indicates:  No Known Allergies    Vitals: Every shift (Skilled Nursing patients)        Code Status: DNR     Diet: Regular diet      Supplement: House supplement one can by mouth with meals      Activities:   - Up in a chair each morning as tolerated   - Ambulate with assistance to bathroom   - May use walker, cane, or self-propelled wheelchair   - Weight bearing: Toe touch weight bearing to right lower extremity; Range of motion as tolerated to right lower extremity     LABS:    Weekly Labs:     Order the following labs to be drawn on Mondays:   CBC  CMP   CRP  Vancomycin trough. Target 15-20     If discharged on Vancomycin IV, order the following additional labs to be drawn on Thursdays:  CMP   Vancomycin trough. Target 15-20     If vancomycin trough is not at target (15-20) prior to discharge, schedule vancomycin trough to be drawn before their fourth outpatient dose.     5) Fax Lab Results to Infectious Diseases Provider: Kane     [facility] Fax Number: [phone]      Nursing: Out of bed BID, Up with assistance      Nursing Precautions:            - Fall precautions per nursing home protocol      CONSULTS:       Physical Therapy to evaluate and treat 5 times a week     Occupational Therapy to evaluate and treat 5 times a week      MISCELLANEOUS CARE:  Routine Skin for Bedridden Patients: Instruct patient/caregiver to apply moisture barrier cream to all skin folds and wet areas in perineal area daily and after baths and all bowel movements.  Home Infusion Therapy:   SN to perform Infusion Therapy/Central Line Care.  Review Central Line Care & Central Line Flush with patient.      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; Disseminated MAC      2) Discharge Antibiotics:     Intravenous antibiotics:  Vancomycin  mg q 12 hours   Ertapenem IV 1 gram q 24 hours      Oral antibiotics:  Clarithromycin 500 mg BID  Ethambutol 700 mg daily  Rifabutin 300 mg daily  Will need to discuss PO suppression for hardware infection after IV course     3) Therapy Duration:  IV antibiotics - 6 weeks, PO antibiotics indefinitely/TBD by outpatient ID provider      Estimated end date of IV antibiotics (Ertapenenem and Vancomycin): 9/23/24       Scrub the Hub: Prior to accessing the line, always perform a 30 second alcohol scrub  Each lumen of the central line is to be flushed at least daily with 10 mL Normal  Saline and 3 mL Heparin flush (10 units/mL)  Skilled Nurse (SN) may draw blood from IV access  Blood Draw Procedure:   - Aspirate at least 5 mL of blood   - Discard   - Obtain specimen   - Change injection cap   - Flush with 20 mL Normal Saline followed by a                 3-5 mL Heparin flush (10 units/mL)  Central :   - Sterile dressing changes are done weekly and as needed.   - Use chlor-hexadine scrub to cleanse site, apply Biopatch to insertion site,       apply securement device dressing   - Injection caps are changed weekly and after EVERY lab draw.   - If sterile gauze is under dressing to control oozing,                 dressing change must be performed every 24 hours until gauze is not needed.      WOUND CARE ORDERS  Keep surgical dressing in place that was applied post-op and leave on right hip and do not remove until Orthopedic clinic follow-up. Assess surgical dressing with each treatment. Call MD if any drainage reaches border to border of dressing horizontally, signs or symptoms of infection, temp >101 F, induration, swelling or redness.              Medications:        Medication List        START taking these medications      apixaban 2.5 mg Tab  Commonly known as: ELIQUIS  Take 1 tablet (2.5 mg total) by mouth 2 (two) times daily. DVT prophylaxis after hip fracture surgery with end date 9/27/2024.     calcium carbonate 200 mg calcium (500 mg) chewable tablet  Commonly known as: TUMS  Take 2 tablets (1,000 mg total) by mouth once daily.       celecoxib 200 MG capsule  Commonly known as: CeleBREX  Take 1 capsule (200 mg total) by mouth once daily.       midodrine 5 MG Tab  Commonly known as: PROAMATINE  Take 1 tablet (5 mg total) by mouth 3 (three) times daily with meals. For chronic hypotension.     senna-docusate 8.6-50 mg 8.6-50 mg per tablet  Commonly known as: PERICOLACE  Take 1 tablet by mouth 2 (two) times daily.     vitamin D 1000 units Tab  Commonly known as: VITAMIN  D3  Take 1 tablet (1,000 Units total) by mouth once daily.              CHANGE how you take these medications      LIDOcaine 5 %  Commonly known as: LIDODERM  Place 1 patch onto the skin once daily. Apply patch to low back area and leave on 12 hours and then discard.       methocarbamoL 750 MG Tab  Commonly known as: ROBAXIN  Take 1 tablet (750 mg total) by mouth 4 (four) times daily.       QUEtiapine 25 MG Tab  Commonly known as: SEROQUEL  Take 2 tablets (50 mg total) by mouth every evening.            CONTINUE taking these medications      0.9% NaCl PgBk 100 mL with ertapenem 1 gram SolR 1 g  Inject 1 g into the vein once daily until 9/23/2024.     acetaminophen 325 MG tablet  Commonly known as: TYLENOL  Take 2 tablets (650 mg total) by mouth 3 (three) times daily.     albuterol 90 mcg/actuation inhaler  Commonly known as: PROVENTIL/VENTOLIN HFA  Inhale 2 puffs into the lungs every 6 (six) hours as needed. Rescue     clarithromycin 500 MG tablet  Commonly known as: BIAXIN  Take 1 tablet (500 mg total) by mouth every 12 (twelve) hours.     D5W SolP 250 mL with vancomycin 750 mg SolR 750 mg  Inject 750 mg into the vein every 12 (twelve) hours until 9/23/2024.     DOVATO  mg Tab  Generic drug: dolutegravir-lamivudine  Take 1 tablet by mouth once daily.     ethambutoL 400 MG Tab  Commonly known as: MYAMBUTOL  Take 2 tablets (800 mg total) by mouth once daily.     oxyCODONE 10 mg Tab immediate release tablet  Commonly known as: ROXICODONE  Take 1 tablet (10 mg total) by mouth every 4 (four) hours as needed for Pain.     rifabutin 150 mg Cap  Commonly known as: MYCOBUTIN  Take 300 mg by mouth once daily.     sulfamethoxazole-trimethoprim 800-160mg 800-160 mg Tab  Commonly known as: BACTRIM DS  Take 1 tablet by mouth 3 (three) times a week. Pneumocystis pneumonia prophylaxis due to CD4 count < 200.                 Follow-up:   Future Appointments   Date Time Provider Department Center   11/13/2024 10:00 AM Dennis  DO HERM[facility]       _________________________________  Briseida Quintana MD  08/28/2024

## 2024-08-28 NOTE — ASSESSMENT & PLAN NOTE
Patient with chronically low SBP in  range. Patient asymptomatic. Likely related to her chronic infections, chronic debility and low body weight as BMI only 18.91. Plan to start Midodrine 2.5 mg po TID to treat on 8/27 and monitor response and titrate as needed. Midodrine increased to 5 mg po TID on 8/28 and to continue on discharge to [facility].

## 2024-08-28 NOTE — PROGRESS NOTES
[facility] - [facility]  Orthopedics  Progress Note    Patient Name: Amie Salmeron  MRN: [id number]  Admission Date: 8/24/2024  Hospital Length of Stay: 3 days  Attending Provider: Briseida Quintana MD  Primary Care Provider: Anuradha Primary Doctor  Follow-up For: Procedure(s) (LRB):  PINNING, HIP, PERCUTANEOUS, RIGHT (Right)    Post-Operative Day: 3 Days Post-Op  Subjective:     Principal Problem:Closed fracture of neck of right femur with routine healing    Principal Orthopedic Problem: s/p right hip perc screws 8/25    Interval History: POD1. Hb 6.8 this morning - transfused 1U prbc. Pain well controlled with SIFI. Up with OT today. Plan for dc to SNF once medically stable.     Review of patient's allergies indicates:  No Known Allergies    Current Facility-Administered Medications   Medication    acetaminophen tablet 1,000 mg    apixaban tablet 2.5 mg    bisacodyL suppository 10 mg    calcium carbonate 200 mg calcium (500 mg) chewable tablet 1,000 mg    celecoxib capsule 200 mg    clarithromycin tablet 500 mg    dolutegravir Tab 50 mg    And    lamiVUDine tablet 300 mg    ertapenem (INVANZ) 1 g in 0.9% NaCl 100 mL IVPB (MB+)    ethambutoL tablet 800 mg    melatonin tablet 6 mg    methocarbamoL tablet 750 mg    mupirocin 2 % ointment 1 g    mupirocin 2 % ointment    ondansetron injection 4 mg    oxyCODONE immediate release tablet 5 mg    pregabalin capsule 75 mg    QUEtiapine tablet 25 mg    rifabutin capsule 300 mg    ropivacaine 0.2% Perineural Pump infusion 500 ML    senna-docusate 8.6-50 mg per tablet 1 tablet    sodium chloride 0.9% flush 10 mL    sodium chloride 0.9% flush 10 mL    And    sodium chloride 0.9% flush 10 mL    vancomycin - pharmacy to dose    vancomycin 750 mg in D5W 250 mL IVPB (Vial-Mate)    vitamin D 1000 units tablet 1,000 Units     Objective:     Vital Signs (Most Recent):  Temp: 98.4 °F (36.9 °C) (08/26/24 1530)  Pulse: 75 (08/26/24 1530)  Resp: 20 (08/26/24 1530)  BP: (!) 91/55 (08/26/24  "1530)  SpO2: 98 % (08/26/24 1530) Vital Signs (24h Range):  Temp:  [96.7 °F (35.9 °C)-98.4 °F (36.9 °C)] 98.4 °F (36.9 °C)  Pulse:  [63-80] 75  Resp:  [16-20] 20  SpO2:  [95 %-100 %] 98 %  BP: ()/(55-60) 91/55     Weight: 45.8 kg (100 lb 15.5 oz)  Height: 5' 1" (154.9 cm)  Body mass index is 19.08 kg/m².      Intake/Output Summary (Last 24 hours) at 8/26/2024 1604  Last data filed at 8/26/2024 1530  Gross per 24 hour   Intake 1353.75 ml   Output 750 ml   Net 603.75 ml        Ortho/SPM Exam  AAOx4  NAD  Reg rate  No increased WOB     Musculoskeletal -   Dressing C/D/I  Thigh/Calves Soft, non tender  SILT SP/DP/TN  Fires quads/TA/EHL/GSC  WWP extremities  SCDs in place and functioning       Significant Labs: All pertinent labs within the past 24 hours have been reviewed.    Significant Imaging: I have reviewed and interpreted all pertinent imaging results/findings.  Assessment/Plan:     * Closed fracture of neck of right femur with routine healing s/p hip pinning on 8/25/2024  Amie Salmeron is a 59 y.o. female with a right sided valgus, impacted femoral neck fracture, closed, NVI. They take no anticoagulation at home. They required a walker ambulatory assistive devices prior to this injury.   Now s/p right femur IMN on 8/25/24.     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

## 2024-08-28 NOTE — ASSESSMENT & PLAN NOTE
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 Last CD4 count and viral load reviewed and noted below:    Results for orders placed or performed during the hospital encounter of 08/11/24   CD4 T-[place] Cells   Result Value Ref Range    CD4 % [place] T Cell 6.5 (L) 28 - 57 %    Absolute CD4 76 (L) 300 - 1400 cells/ul     Log HIV Copies/mL   Date Value Ref Range Status   08/11/2024 4.05 (A) <1.30 Log Copies/mL Final     Patient followed by Infectious Disease as outpatient. Continue home Dolutegravir 50 mg po daily + home Lamivudine 300 mg po daily in combination pill to treat on discharge.

## 2024-08-28 NOTE — ASSESSMENT & PLAN NOTE
Osteomyelitis of vertebra of thoracolumbar region   MRSA infection   Fusobacterium infection   Infection due to ESBL-producing Escherichia coli   Patient with known osteomyelitis T12-L4 MAC osteomyelitis of spine s/p T11-L2 corpectomy at [facility] on 2/27/2024 and [place] [facility] [place] 8/24/2024 for MRSA bacteremia and paravertebral abscess and persistent spinal osteomyelitis due to MAC infection. IR placed drain to spinal abscess and drained abscess and recent culture from spinal abscess growing AFB and likely will be MAC but final identification is pending as well as Fusobacterium, MRSA and ESBL E. Coli from her spinal abscess. Patient accidentally removed drain but repeat imaging of her spine prior to discharge showed resolution of abscess so no new drain placed. Infectious Disease consulted on last admit and stated options for treatment of her various infection very limited and palliative care involved with patient on last admit but wanted to proceed with continued treatment of her infections. Patient was discharged to [facility] for continued care and as per last ID recommendations to continue her IV and oral antibiotics. 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; Disseminated MAC      2) Discharge Antibiotics:     Intravenous antibiotics:  Vancomycin  mg q 12 hours   Ertapenem IV 1 gram q 24 hours      Oral antibiotics:  Clarithromycin 500 mg BID  Ethambutol 700 mg daily  Rifabutin 300 mg daily  Will need to discuss PO suppression for hardware infection after IV course     3) Therapy Duration:  IV antibiotics - 6 weeks, PO antibiotics indefinitely/TBD by outpatient ID provider      Estimated end date of IV antibiotics (Ertapenenem and Vancomycin): 9/23/24     4) Outpatient Weekly Labs:     Order the following labs to be drawn on Mondays:   CBC  CMP    CRP  Vancomycin trough. Target 15-20  Patient has PICC line in place and okay to use. Patient continued on above antibiotic regimen in hospital as per ID recs to treat her infections and to continue on discharge to [facility].

## 2024-08-28 NOTE — PLAN OF CARE
Problem: Hip Fracture Medical Management  Goal: Optimal Coping with Change in Health Status  Outcome: Progressing  Goal: Absence of Bleeding  Outcome: Progressing  Goal: Effective Bowel Elimination  Outcome: Progressing  Goal: Baseline Cognitive Function Maintained  Outcome: Progressing  Goal: Absence of Embolism  Outcome: Progressing  Goal: Fracture Stability  Outcome: Progressing  Goal: Optimal Functional Performance  Outcome: Progressing  Goal: Pain Control and Function  Outcome: Progressing  Goal: Effective Urinary Elimination  Outcome: Progressing

## 2024-08-28 NOTE — PLAN OF CARE
Problem: Hip Fracture Medical Management  Goal: Optimal Coping with Change in Health Status  Outcome: Progressing  Goal: Effective Bowel Elimination  Outcome: Progressing  Goal: Pain Control and Function  Outcome: Progressing     Problem: Surgery Nonspecified  Goal: Optimal Pain Control and Function  Outcome: Progressing     Problem: Anesthesia/Analgesia, Neuraxial  Goal: Baseline Motor Function  Outcome: Progressing    Pt had soft BP throughout night. Remain SR on telemetry monitor. PRN medication effective. Explained plan of care. No injury during shift, Side rails up x 3, call light by bedside. Sitter at bedside.

## 2024-08-28 NOTE — PLAN OF CARE
Problem: Hip Fracture Medical Management  Goal: Optimal Coping with Change in Health Status  Outcome: Met  Goal: Absence of Bleeding  Outcome: Met  Goal: Effective Bowel Elimination  Outcome: Met  Goal: Baseline Cognitive Function Maintained  Outcome: Met  Goal: Absence of Embolism  Outcome: Met  Goal: Fracture Stability  Outcome: Met  Goal: Optimal Functional Performance  Outcome: Met  Goal: Pain Control and Function  Outcome: Met  Goal: Effective Urinary Elimination  Outcome: Met     Problem: Surgery Nonspecified  Goal: Absence of Bleeding  Outcome: Met  Goal: Effective Bowel Elimination  Outcome: Met  Goal: Fluid and Electrolyte Balance  Outcome: Met  Goal: Blood Glucose Level Within Targeted Range  Outcome: Met  Goal: Absence of Infection Signs and Symptoms  Outcome: Met  Goal: Anesthesia/Sedation Recovery  Outcome: Met  Goal: Optimal Pain Control and Function  Outcome: Met  Goal: Nausea and Vomiting Relief  Outcome: Met  Goal: Effective Urinary Elimination  Outcome: Met  Goal: Effective Oxygenation and Ventilation  Outcome: Met     Problem: Anesthesia/Analgesia, Neuraxial  Goal: Safe, Effective Infusion Delivery  Outcome: Met  Goal: Absence of Infection Signs and Symptoms  Outcome: Met  Goal: Nausea and Vomiting Relief  Outcome: Met  Goal: Effective Pain Control  Outcome: Met  Goal: Effective Oxygenation and Ventilation  Outcome: Met  Goal: Baseline Motor Function  Outcome: Met  Goal: Effective Urinary Elimination  Outcome: Met     Problem: Fall Injury Risk  Goal: Absence of Fall and Fall-Related Injury  Outcome: Met     Problem: Adult Inpatient Plan of Care  Goal: Plan of Care Review  Outcome: Met  Goal: Patient-Specific Goal (Individualized)  Outcome: Met  Goal: Absence of Hospital-Acquired Illness or Injury  Outcome: Met  Goal: Optimal Comfort and Wellbeing  Outcome: Met  Goal: Readiness for Transition of Care  Outcome: Met     Problem: Infection  Goal: Absence of Infection Signs and Symptoms  Outcome:  Met     Problem: Sepsis/Septic Shock  Goal: Optimal Coping  Outcome: Met  Goal: Absence of Bleeding  Outcome: Met  Goal: Blood Glucose Level Within Targeted Range  Outcome: Met  Goal: Absence of Infection Signs and Symptoms  Outcome: Met  Goal: Optimal Nutrition Intake  Outcome: Met     Problem: Wound  Goal: Optimal Coping  Outcome: Met  Goal: Optimal Functional Ability  Outcome: Met  Goal: Absence of Infection Signs and Symptoms  Outcome: Met  Goal: Improved Oral Intake  Outcome: Met  Goal: Optimal Pain Control and Function  Outcome: Met  Goal: Skin Health and Integrity  Outcome: Met  Goal: Optimal Wound Healing  Outcome: Met     Problem: Skin Injury Risk Increased  Goal: Skin Health and Integrity  Outcome: Met   Patient discharging [facility] care, report called and discharge instructions reviewed in detail with Katiuska, allowed time for questions, all questions answered, IV removed, gauze and tape applied, bleeding controlled, , transportation at  for , Patient left POSS NAD.

## 2024-08-28 NOTE — ASSESSMENT & PLAN NOTE
Resolved on discharge. Patient with sodium 137 on 8/28.   Sodium 134 on 8/27. Stable. Patient asymptomatic and continue to monitor.   Hyponatremia is likely due to Dehydration/hypovolemia. Sodium 134 on admit and given IVF's about 2 liters of normal saline and sodium improved and back to normal range on 8/26 at 136. The patient's most recent sodium results are listed below.  Recent Labs     08/26/24  1957 08/27/24  0359 08/28/24  0613    134* 137       Plan  - Correct the sodium by 4-6mEq in 24 hours.   - Patient hyponatremia is resolved.

## 2024-08-28 NOTE — PT/OT/SLP PROGRESS
"Physical Therapy Treatment/co treat with OT    Patient Name:  Amie Salmeron   MRN:  [id number]    Recommendations:     Discharge Recommendations: Moderate Intensity Therapy  Discharge Equipment Recommendations: walker, rolling  Barriers to discharge: Inaccessible home and Decreased caregiver support ESTEFANY and requires assist for mobility    Assessment:     Aime Salmeron is a 59 y.o. female admitted with a medical diagnosis of Closed fracture of neck of right femur with routine healing.  She presents with the following impairments/functional limitations: weakness, pain, impaired functional mobility, impaired balance, decreased lower extremity function . Pt is unsafe with functional mobility at this time due to pt requires minimal assist for bed mobility, max/total assist for transfers, and CGA for sitting balance due to weakness, difficulty maintaining TTWB R LE. Pt is motivated to progress with functional mobility.     Rehab Prognosis: Fair; patient would benefit from acute skilled PT services to address these deficits and reach maximum level of function.    Recent Surgery: Procedure(s) (LRB):  PINNING, HIP, PERCUTANEOUS, RIGHT (Right) 3 Days Post-Op    Plan:     During this hospitalization, patient to be seen 4 x/week to address the identified rehab impairments via gait training, therapeutic activities, therapeutic exercises, neuromuscular re-education and progress toward the following goals:    Plan of Care Expires:  09/26/24    Subjective   "I need to be cleaned, it is wet up my back"  Pain/Comfort:  Pain Rating 1: 0/10  Pain Rating Post-Intervention 1: 0/10      Objective:     Communicated with nurse prior to session.  Patient found HOB elevated with perineural catheter, FCD upon PT entry to room.     General Precautions: Standard, contact, fall  Orthopedic Precautions: RLE toe touch weight bearing  Braces: N/A  Respiratory Status: Room air     Functional Mobility:  Bed Mobility:     Supine to Sit: minimum " assistance  Transfers:     Sit to Stand:  maximal assistance with rolling walker  Bed to Chair: total assistance with  rolling walker  using  Stand Pivot  Gait: PT attempted to get pt to step with RW with TTWB R LE with total assist. Pt unable to maintain TTWB to step.     AM-PAC 6 CLICK MOBILITY  Turning over in bed (including adjusting bedclothes, sheets and blankets)?: 3  Sitting down on and standing up from a chair with arms (e.g., wheelchair, bedside commode, etc.): 2  Moving from lying on back to sitting on the side of the bed?: 3  Moving to and from a bed to a chair (including a wheelchair)?: 2  Need to walk in hospital room?: 1  Climbing 3-5 steps with a railing?: 1  Basic Mobility Total Score: 12     Treatment & Education:  Pt stood with HHA with max assist with manual cues tro maintain TTWB R LE for ~ 20 sec then 30 sec to be cleaned and linen changed. Pt soiled with urine upon PT arrival and required changing then pt placed on a bedpan and had a small BM and required assist to be cleaned. Nurse notified.    Patient left up in chair with all lines intact, call button in reach, and nurse notified..    GOALS:   Multidisciplinary Problems       Physical Therapy Goals          Problem: Physical Therapy    Goal Priority Disciplines Outcome Goal Variances Interventions   Physical Therapy Goal     PT, PT/OT Progressing     Description: Goals to met by 9/9/2024    1. Supine to sit with Stand-by Assistance  2. Sit to supine with Stand-by Assistance  3.. Rolling to Left and Right with Stand-by Assistance.  4. Sit to stand transfer with Contact Guard Assistance while maintaining WB precautions  5. Bed to chair transfer with Contact Guard Assistance using LRAD while maintaining WB precautions  6. Gait  x 20 feet with Minimal Assistance using Rolling Walker while maintaining WB precautions   7. Ascend/Descend 6 inch curb step with Moderate Assistance using Rolling Walker while maintaining WB precautions.  8. Stand for  5 minutes with Stand-by Assistance using Rolling Walker while maintaining WB precautions  9. Lower extremity exercise program x15 reps per Instruction, with assistance as needed in order to facilitate improved strength, improved postural control, and improvement in functional independence    Rolling Walker- Patient demonstrates a mobility limitation that significantly impairs their ability to participate in one or more mobility related activities of daily living. Patient's mobility limitation cannot be sufficiently resolved with the use of a cane, but can be sufficiently resolved with the use of a rolling walker.The use of a rolling walker will considerably improve their ability to participate in MRADLs. Patient will use the walker on a regular basis at home.                         Time Tracking:     PT Received On: 08/28/24  PT Start Time: 0930     PT Stop Time: 0956  PT Total Time (min): 26 min     Billable Minutes: Therapeutic Activity 26    Treatment Type: Treatment  PT/PTA: PT     Number of PTA visits since last PT visit: 0     08/28/2024

## 2024-08-28 NOTE — DISCHARGE SUMMARY
"[facility] Medicine  Discharge Summary      Patient Name: Amie Salmeron  MRN: [id number]  VIKTORIA: [id number]  Patient Class: IP- Inpatient  Admission Date: 8/24/2024  Hospital Length of Stay: 3 days  Discharge Date and Time: 8/28/2024  3:53 PM  Attending Physician: Briseida Quintana MD   Discharging Provider: Briseida Quintana MD  Primary Care Provider: Anuradha Primary Doctor  [facility] Medicine Team: [facility] Briseida Quintana MD  Primary Care Team: [facility]    HPI:   59 year old female coming from [facility] by EMS with past medical history of HTN, uncontrolled HIV noncompliant with HAART, spinal MAC, spinal osteo s/p T11-L2 corpectomy with fixation by ortho on 2/27/24 at [facility]  who presenting with hypotension and report of a mechanical fall. Patient tells me she has severe right hip pain. She denies hitting her head or having loss of consciousness. She denies being on an anticoagulation. She reports her blood pressure is "always very low. Pt was here for paravertebral abscess, MRSA bacteremia. She was transferred [facility] for IR for the paravertebral abscess and subsequently transferred to the MICU for hypotension requiring vasopressor support. IR placed drain on 8/12. Abscess cultures grew AFB, Fusobacterium necleatum, ESBL E. Coli and blood cultures grew MRSA .She was discharged 8/24/24 to SNF to complete her antibiotics for total of 6 weeks of vancomycin and ertapenem ending 9/23.       8/25/2024  Procedure(s) (LRB):  PINNING, HIP, PERCUTANEOUS, RIGHT (Right)    Surgeon(s):  Eloise Clinton MD Crowley, Alexander, MD Montgomery, Scott C., MD      Hospital Course:   Patient admitted from [facility] after a trip and fall and found to have a closed right femoral neck femur fracture. Orthopedics consulted and recommending operative repair of fracture. Patient has a very complex history and was just discharged from [facility] on 8/24/2024 after prolonged " hospitalization from spinal osteomyelitis and paravertebral abscess. Patient with known disseminated MAC infection and advanced AIDS with very limited treatment options. Recent cultures from her spine growing AFB and likely will be MAC but final identification is pending as well as Fusobacterium, MRSA and ESBL E. Coli from her spinal infection. Patient also on recent hospital stay with MRSA bacteremia. Infectious Disease consulted on last admit and stated options for treatment of her various infection very limited and palliative care involved with patient on last admit but wanted to proceed with continued treatment of her infections. Patient was discharged to [facility] for continued care and as per last ID recommendations. 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; Disseminated MAC      2) Discharge Antibiotics:     Intravenous antibiotics:  Vancomycin  mg q 12 hours   Ertapenem IV 1 gram q 24 hours      Oral antibiotics:  Clarithromycin 500 mg BID  Ethambutol 700 mg daily  Rifabutin 300 mg daily  Will need to discuss PO suppression for hardware infection after IV course     3) Therapy Duration:  IV antibiotics - 6 weeks, PO antibiotics indefinitely/TBD by outpatient ID provider      Estimated end date of IV antibiotics (Ertapenenem and Vancomycin): 9/23/24     4) Outpatient Weekly Labs:     Order the following labs to be drawn on Mondays:   CBC  CMP   CRP  Vancomycin trough. Target 15-20    Patient continued on IV Ertapenem and Vancomycin as per last ID recs on this admit. Patient to continue oral Clarithromycin, Ethambutol and Rifabutin for disseminated MAC infection. Patient also with issues with chronic hypotension and required several fluid boluses on this admit as SBP as low as 75 but after several liters of IVF's -110 and BP stabilized for surgery. Patient taken to  the OR after optimization on 8/25/2024. Patient underwent right hip pinning by Dr. Laureano Vyas. Post-op patient toe touchdown weight bearing to the right lower extremity as per Orthopedics recommendation. Patient placed on Apixiban 2.5 mg po BID and NARINDER/SCD's for DVT prophylaxis post-op and will need for total of 30 days. Perineural pain catheter placed by Anesthesia Pain Service with continuous infusion of Ropivacaine to help with pain control post-op and Anesthesia Pain Service managing while patient in the hospital. Patient placed on multimodal pain management post-op with Tylenol 1000 mg po every 6 hours, Lyrica 75 mg po nightly, Robaxin 750 mg po 4 times daily, and Celebrex 200 mg po daily post-op and will continue with Oxycodone IR 5 mg po every 4 hours prn for breakthrough pain. PT/OT consulted post-op to work with patient.and recommending moderate intensity therapy. Plan for patient to return back to her [facility] on discharge. Patient having issues with low BP post-op and given further IVF's on evening of 8/26. Rapid response team called and patient given a total of 1 liter of lactated ringers. SBP back to low 90's which is her baseline. Patient to be started on Midodrine 2.5 mg po TID to help with her chronic hypotension on 8/27. Hgb improved to 8.9 on 8/27 from 6.8 on 8/26 after transfused 1 unit of PRBCs on 8/26. Hgb stable at 10.2 on 8/28. SBP in 85-90 range so Midodrine increased to 5 mg po TID. Patient asymptomatic with her chronic hypotension. Pain well controlled to right hip. Patient progressing with PT/OT. Patient medically stable for discharge so patient discharged back to [facility] on 8/28/2024 in good condition. Patient discharged back with PICC line in place to continue her IV Ertapenem and Vancomycin to treat her polymicrobial spinal abscess. Patient discharged back on oral Ethambutol, Clarithromycin and Rifabutin to treat her known MAC infection. Patient to  continue PT/OT at [facility] on discharge. Surgical bandage to remain in place to right hip on discharge until Orthopedic clinic follow-up.      Goals of Care Treatment Preferences:  Code Status: DNR          What is most important right now is to focus on improvement in condition but with limits to invasive therapies.  Accordingly, we have decided that the best plan to meet the patient's goals includes continuing with treatment.      SDOH Screening:  The patient was screened for utility difficulties, food insecurity, transport difficulties, housing insecurity, and interpersonal safety and there were no concerns identified this admission.     Consults:   Consults (From admission, onward)          Status Ordering Provider     Inpatient consult to Social Work/Case Management  Once        Provider:  (Not yet assigned)    Acknowledged ELISEO HARRIS     Inpatient consult to Social Work/Case Management  Once        Provider:  (Not yet assigned)    Acknowledged ELISEO HARRIS     Inpatient consult to Social Work/Case Management  Once        Provider:  (Not yet assigned)    Acknowledged ELISEO HARRIS     Inpatient consult to Orthopedic Surgery  Once        Provider:  (Not yet assigned)    Completed ELISEO HARRIS            Neuro  Vertebral abscess  Osteomyelitis of vertebra of thoracolumbar region   MRSA infection   Fusobacterium infection   Infection due to ESBL-producing Escherichia coli   Patient with known osteomyelitis T12-L4 MAC osteomyelitis of spine s/p T11-L2 corpectomy at [facility] on 2/27/2024 and recently admitted to [facility] from 8/12/2024 - 8/24/2024 for MRSA bacteremia and paravertebral abscess and persistent spinal osteomyelitis due to MAC infection. IR placed drain to spinal abscess and drained abscess and recent culture from spinal abscess growing AFB and likely will be MAC but final identification is pending as well as Fusobacterium, MRSA and ESBL E. Coli from her spinal abscess. Patient  accidentally removed drain but repeat imaging of her spine prior to discharge showed resolution of abscess so no new drain placed. Infectious Disease consulted on last admit and stated options for treatment of her various infection very limited and palliative care involved with patient on last admit but wanted to proceed with continued treatment of her infections. Patient was discharged to [facility] for continued care and as per last ID recommendations to continue her IV and oral antibiotics. 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; Disseminated MAC      2) Discharge Antibiotics:     Intravenous antibiotics:  Vancomycin  mg q 12 hours   Ertapenem IV 1 gram q 24 hours      Oral antibiotics:  Clarithromycin 500 mg BID  Ethambutol 700 mg daily  Rifabutin 300 mg daily  Will need to discuss PO suppression for hardware infection after IV course     3) Therapy Duration:  IV antibiotics - 6 weeks, PO antibiotics indefinitely/TBD by outpatient ID provider      Estimated end date of IV antibiotics (Ertapenenem and Vancomycin): 9/23/24     4) Outpatient Weekly Labs:     Order the following labs to be drawn on Mondays:   CBC  CMP   CRP  Vancomycin trough. Target 15-20  Patient has PICC line in place and okay to use. Patient continued on above antibiotic regimen in hospital as per ID recs to treat her infections and to continue on discharge to [facility].     Cardiac/Vascular  Chronic hypotension  Patient with chronically low SBP in  range. Patient asymptomatic. Likely related to her chronic infections, chronic debility and low body weight as BMI only 18.91. Plan to start Midodrine 2.5 mg po TID to treat on 8/27 and monitor response and titrate as needed. Midodrine increased to 5 mg po TID on 8/28 and to continue on discharge to [facility].       ID  AIDS (acquired  immunodeficiency syndrome), CD4 <=200  Patient with known AIDS with AIDS defining illness of disseminated MAC. Last CD4 count and viral load reviewed and noted below:    Results for orders placed or performed during the hospital encounter of 08/11/24   CD4 T-Goodwell Cells   Result Value Ref Range    CD4 % Goodwell T Cell 6.5 (L) 28 - 57 %    Absolute CD4 76 (L) 300 - 1400 cells/ul     Log HIV Copies/mL   Date Value Ref Range Status   08/11/2024 4.05 (A) <1.30 Log Copies/mL Final     Patient followed by Infectious Disease as outpatient. Continue home Dolutegravir 50 mg po daily + home Lamivudine 300 mg po daily in combination pill to treat on discharge.     Disseminated mycobacterium avium-intracellulare complex  Patient with known disseminated MAC and very few treatment options as pr ID to treat in patient with known AIDS on last admit. Patient to continue oral antibiotics to treat as per ID:  Clarithromycin 500 mg po BID  Ethambutol 700 mg po daily  Rifabutin 300 mg po daily  Medications reordered on this admit and to continue on discharge to [facility].     Oncology  Anemia due to infection  Acute blood loss anemia  Hgb improved on day of discharge to 10.2 on 8/28.   Hgb improved. Hgb improved to 8.9 on 8/27 from 6.8 on 8/26 after transfused 1 unit of PRBCs on 8/26.  Anemia is likely due to combination of chronic disease due to chronic infections and acute blood loss from surgery that was expected. Baseline Hgb 7.4-8.5. Most recent hemoglobin and hematocrit are listed below.  Recent Labs     08/26/24  1957 08/27/24  0359 08/28/24  0613   HGB 9.6* 8.9* 10.2*   HCT 30.3* 28.9* 31.2*       Plan  - Transfuse PRBC if patient becomes hemodynamically unstable, symptomatic or H/H drops below 7/21.  - Patient has received 1 units of PRBCs on 8/26 .      Endocrine  Underweight  Patient with Body mass index is 19.08 kg/m² and underweight and related to her chronic infections and advanced AIDS. Encourage po intake and  ordered Boost supplements with meals to supplement her diet and continue on discharge.         Hyponatremia-resolved as of 8/29/2024  Resolved on discharge. Patient with sodium 137 on 8/28.   Sodium 134 on 8/27. Stable. Patient asymptomatic and continue to monitor.   Hyponatremia is likely due to Dehydration/hypovolemia. Sodium 134 on admit and given IVF's about 2 liters of normal saline and sodium improved and back to normal range on 8/26 at 136. The patient's most recent sodium results are listed below.  Recent Labs     08/26/24  1957 08/27/24  0359 08/28/24  0613    134* 137       Plan  - Correct the sodium by 4-6mEq in 24 hours.   - Patient hyponatremia is resolved.    Orthopedic  * Closed fracture of neck of right femur with routine healing s/p hip pinning on 8/25/2024  [facility] and found to have a closed right femoral neck femur fracture. Orthopedics consulted and recommending operative repair of fracture.   - Patient taken to the OR on 8/25/2024 and underwent right hip pinning by Dr. Laureano Vyas.   - Post-op patient toe touchdown weight bearing to the right lower extremity as per Orthopedics recommendation post-op and continue on discharge for 6 week post-op.   - Patient placed on Apixiban 2.5 mg po BID for DVT prophylaxis post-op and to continue for total of 30 days post-op on discharge.   - Perineural pain catheter removed by Anesthesia prior to discharge.  - Pain controlled. Patient placed on multimodal pain management post-op with Tylenol 1000 mg po every 8 hours, Celebrex 200 mg po daily + Robaxin 750 mg po 4 times daily and Celebrex 200 mg po daily post-op and will continue with Oxycodone IR 10 mg po every 4 hours prn for breakthrough pain and to continue on discharge.   - PT/OT consulted post-op and working with patient and recommending moderate intensity therapy. Plan for patient to return back to [facility] on  discharge when medically ready where patient came from to continue her oral and IV antibiotics and PT/OT. Patient medically ready and discharged back to [facility] on 8/28 for continued PT/OT and antibiotic therapy.   - Surgical bandage to remain in place to right hip until Orthopedic clinic follow-up.     Palliative Care  DNR (do not resuscitate)  Advance care planning   Patient seen by palliative care on recent admit due to limited options for treatment of her disseminated MAC an chronic spinal infection and advanced AIDS. Patient wished to continue treatment of her AIDS and infections and PICC line placed and placed on IV antibiotics and discharged to [facility] for continued care with PT/OT for her debility and IV and oral antibiotics for her infections. Patient reports she is DNR as per her request. Discussed with patient who confirmed above information for 10 minutes.     Other  Debility  Patient with chronic debility due to severe malnutrition, age-related physical debility, AIDS and disseminated infcetion. Plan includes progressive mobility protocol initated, PT/OT consulted, and fall precautions in place. Patient discharged to [facility] on discharge to continue PT/OT and recovery.       Final Active Diagnoses:    Diagnosis Date Noted POA    PRINCIPAL PROBLEM:  Closed fracture of neck of right femur with routine healing s/p hip pinning on 8/25/2024 [S72.001D] 08/25/2024 Not Applicable    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] 03/25/2024 Yes    Chronic hypotension [I95.89] 08/27/2024 Yes    Vertebral abscess [M46.20] 08/12/2024 Yes    AIDS (acquired immunodeficiency syndrome), CD4 <=200 [B20] 10/13/2022 Yes    Debility [R53.81] 08/13/2024 Yes    DNR (do not resuscitate) [Z66] 08/13/2024 Yes    Anemia due to infection [D64.9, B99.9] 08/25/2024 Yes    Hyponatremia [E87.1] 12/05/2022 Yes    Underweight [R63.6] 08/26/2024 Yes    Acute blood loss  anemia [D62] 08/26/2024 No    MRSA infection [A49.02] 08/25/2024 Yes    Fusobacterium infection [A49.8] 08/25/2024 Yes    Infection due to ESBL-producing Escherichia coli [A49.8, Z16.12] 08/25/2024 Yes    Advance care planning [Z71.89] 08/13/2024 Not Applicable    Osteomyelitis of vertebra of thoracolumbar region [M46.25] 03/23/2024 Yes      Problems Resolved During this Admission:       Discharged Condition: good    Disposition: Skilled Nursing Facility ([facility])    Follow Up:    Future Appointments   Date Time Provider Department Center   11/13/2024 10:00 AM Marilyn Collins DO [facility] [place]       Patient Instructions:      Diet Adult Regular     Leave dressing on - Keep it clean, dry, and intact until clinic visit     Notify your health care provider if you experience any of the following:  temperature >100.4     Notify your health care provider if you experience any of the following:  persistent nausea and vomiting or diarrhea     Notify your health care provider if you experience any of the following:  severe uncontrolled pain     Notify your health care provider if you experience any of the following:  redness, tenderness, or signs of infection (pain, swelling, redness, odor or green/yellow discharge around incision site)     Notify your health care provider if you experience any of the following:  difficulty breathing or increased cough     Notify your health care provider if you experience any of the following:  severe persistent headache     Notify your health care provider if you experience any of the following:  worsening rash     Notify your health care provider if you experience any of the following:  persistent dizziness, light-headedness, or visual disturbances     Notify your health care provider if you experience any of the following:  increased confusion or weakness     Activity as tolerated     Weight bearing restrictions (specify):   Order Comments: Toe touch weight  bearing to right lower extremity; Range of motion as tolerated to right lower extremity       Significant Diagnostic Studies: Labs: CMP   Recent Labs   Lab 08/26/24 1957 08/27/24 0359 08/28/24 0613    134* 137   K 4.9 5.1 4.5   * 108 108   CO2 21* 21* 21*   GLU 83 68* 72   BUN 18 18 15   CREATININE 0.7 0.7 0.6   CALCIUM 7.8* 7.7* 8.1*   PROT 5.3*  --   --    ALBUMIN 1.7*  --   --    BILITOT 0.2  --   --    ALKPHOS 99  --   --    AST 13  --   --    ALT 5*  --   --    ANIONGAP 6* 5* 8    and CBC   Recent Labs   Lab 08/26/24 1957 08/27/24 0359 08/28/24 0613   WBC 5.48 7.59 12.02   HGB 9.6* 8.9* 10.2*   HCT 30.3* 28.9* 31.2*    332 331       Pending Diagnostic Studies:       None           Medications:  Reconciled Home Medications:      Medication List        START taking these medications      apixaban 2.5 mg Tab  Commonly known as: ELIQUIS  Take 1 tablet (2.5 mg total) by mouth 2 (two) times daily. DVT prophylaxis after hip fracture surgery with end date 9/27/2024.     calcium carbonate 200 mg calcium (500 mg) chewable tablet  Commonly known as: TUMS  Take 2 tablets (1,000 mg total) by mouth once daily.  Start taking on: August 29, 2024     celecoxib 200 MG capsule  Commonly known as: CeleBREX  Take 1 capsule (200 mg total) by mouth once daily.  Start taking on: August 29, 2024     midodrine 5 MG Tab  Commonly known as: PROAMATINE  Take 1 tablet (5 mg total) by mouth 3 (three) times daily with meals. For chronic hypotension.     senna-docusate 8.6-50 mg 8.6-50 mg per tablet  Commonly known as: PERICOLACE  Take 1 tablet by mouth 2 (two) times daily.     vitamin D 1000 units Tab  Commonly known as: VITAMIN D3  Take 1 tablet (1,000 Units total) by mouth once daily.  Start taking on: August 29, 2024            CHANGE how you take these medications      LIDOcaine 5 %  Commonly known as: LIDODERM  Place 1 patch onto the skin once daily. Apply patch to low back area and leave on 12 hours and then  discard.  What changed: additional instructions     methocarbamoL 750 MG Tab  Commonly known as: ROBAXIN  Take 1 tablet (750 mg total) by mouth 4 (four) times daily.  What changed:   medication strength  how much to take     QUEtiapine 25 MG Tab  Commonly known as: SEROQUEL  Take 2 tablets (50 mg total) by mouth every evening.  What changed: how much to take            CONTINUE taking these medications      0.9% NaCl PgBk 100 mL with ertapenem 1 gram SolR 1 g  Inject 1 g into the vein once daily.     acetaminophen 325 MG tablet  Commonly known as: TYLENOL  Take 2 tablets (650 mg total) by mouth 3 (three) times daily.     albuterol 90 mcg/actuation inhaler  Commonly known as: PROVENTIL/VENTOLIN HFA  Inhale 2 puffs into the lungs every 6 (six) hours as needed. Rescue     clarithromycin 500 MG tablet  Commonly known as: BIAXIN  Take 1 tablet (500 mg total) by mouth every 12 (twelve) hours.     D5W SolP 250 mL with vancomycin 750 mg SolR 750 mg  Inject 750 mg into the vein every 12 (twelve) hours.     DOVATO  mg Tab  Generic drug: dolutegravir-lamivudine  Take 1 tablet by mouth once daily.     ethambutoL 400 MG Tab  Commonly known as: MYAMBUTOL  Take 2 tablets (800 mg total) by mouth once daily.     oxyCODONE 10 mg Tab immediate release tablet  Commonly known as: ROXICODONE  Take 1 tablet (10 mg total) by mouth every 4 (four) hours as needed for Pain.     rifabutin 150 mg Cap  Commonly known as: MYCOBUTIN  Take 300 mg by mouth once daily.     sulfamethoxazole-trimethoprim 800-160mg 800-160 mg Tab  Commonly known as: BACTRIM DS  Take 1 tablet by mouth 3 (three) times a week.              Indwelling Lines/Drains at time of discharge:   Lines/Drains/Airways       Peripherally Inserted Central Catheter Line  Duration             PICC Double Lumen 08/18/24 1626 right brachial 10 days               33 minutes of time spent on discharge, including examining the patient, providing discharge instructions, arranging  follow-up and documentation.            Briseida Quintana MD  Department of Hospital Medicine  [facility] - Surgery

## 2024-08-28 NOTE — ASSESSMENT & PLAN NOTE
Patient with chronic debility due to severe malnutrition, age-related physical debility, AIDS and disseminated infcetion. Plan includes progressive mobility protocol initated, PT/OT consulted, and fall precautions in place. Patient discharged to [facility] on discharge to continue PT/OT and recovery.

## 2024-08-29 ENCOUNTER — TELEPHONE (OUTPATIENT)
Dept: ORTHOPEDICS | Facility: CLINIC | Age: 59
End: 2024-08-29
Payer: MEDICARE

## 2024-08-29 PROBLEM — E87.1 HYPONATREMIA: Status: RESOLVED | Noted: 2022-12-05 | Resolved: 2024-08-29

## 2024-08-29 LAB
BLD PROD TYP BPU: NORMAL
BLD PROD TYP BPU: NORMAL
BLOOD UNIT EXPIRATION DATE: NORMAL
BLOOD UNIT EXPIRATION DATE: NORMAL
BLOOD UNIT TYPE CODE: 5100
BLOOD UNIT TYPE CODE: 5100
BLOOD UNIT TYPE: NORMAL
BLOOD UNIT TYPE: NORMAL
CODING SYSTEM: NORMAL
CODING SYSTEM: NORMAL
CROSSMATCH INTERPRETATION: NORMAL
CROSSMATCH INTERPRETATION: NORMAL
DISPENSE STATUS: NORMAL
DISPENSE STATUS: NORMAL
TRANS ERYTHROCYTES VOL PATIENT: NORMAL ML
TRANS ERYTHROCYTES VOL PATIENT: NORMAL ML

## 2024-08-29 NOTE — TELEPHONE ENCOUNTER
Spoke with pt power of  Gely regarding schedule appointment with SRINIVASA Lock NP on 9/10/24 @ [phone]-mailed. Patient  power of  Gely states verbal understanding and has no further questions.

## 2024-08-29 NOTE — PLAN OF CARE
[facility]  Discharge Final Note    Primary Care Provider: No, Primary Doctor    Expected Discharge Date: 8/28/2024    Final Discharge Note (most recent)       Final Note - 08/29/24 1122          Final Note    Assessment Type Final Discharge Note     Anticipated Discharge Disposition Skilled Nursing Facility     What phone number can be called within the next 1-3 days to see how you are doing after discharge? --   [phone]       Post-Acute Status    Post-Acute Authorization Placement     Post-Acute Placement Status Set-up Complete/Auth obtained                     Important Message from Medicare           Future Appointments   Date Time Provider Department Center   9/10/2024  2:30 PM Corby Lock, NP [facility]   11/13/2024 10:00 AM Marilyn Collins, DO [facility]     Patient discharged to [facility]F on 8/28/24.    Marily Hutchins RNCM  Case Management  [facility]  [phone]

## 2024-08-29 NOTE — TELEPHONE ENCOUNTER
----- Message from Eloise Clinton MD sent at 8/28/2024  4:35 PM CDT -----  Regarding: Hip fx follow up  Weekend hip fx perc screws on 8/25 (Vyas)   Needs 2 week trauma clinic fu   Thanks

## 2024-08-30 LAB
BACTERIA BLD CULT: NORMAL
BACTERIA BLD CULT: NORMAL

## 2024-09-05 NOTE — PHYSICIAN QUERY
Based on your medical judgment and in order to clinically support the documented diagnosis, please document the clinical indicators (signs, symptoms, & treatment) that were utilized to support Severe Malnutrition    The condition is not confirmed and/or it has been ruled out

## 2024-09-10 ENCOUNTER — TELEPHONE (OUTPATIENT)
Dept: ORTHOPEDICS | Facility: CLINIC | Age: 59
End: 2024-09-10
Payer: MEDICARE

## 2024-09-10 NOTE — TELEPHONE ENCOUNTER
Called and spoke with pt's relative Ms Rowland in regards to pt's appt with [facility]. Ms Rowland says pt's currently in a nursing rehab and is not sure when she will be discharge. Ms Rowland included she will contact the office to reschedule pt's appt.

## 2024-09-19 ENCOUNTER — PATIENT MESSAGE (OUTPATIENT)
Dept: PRIMARY CARE CLINIC | Facility: CLINIC | Age: 59
End: 2024-09-19
Payer: MEDICARE

## 2024-11-14 ENCOUNTER — TELEPHONE (OUTPATIENT)
Dept: FAMILY MEDICINE | Facility: CLINIC | Age: 59
End: 2024-11-14
Payer: MEDICARE

## 2024-11-14 NOTE — TELEPHONE ENCOUNTER
----- Message from Minerva sent at 11/14/2024  2:51 PM CST -----  Type:  Sooner Apoointment Request    Caller is requesting a sooner appointment.  Caller declined first available appointment listed below.  Caller will not accept being placed on the waitlist and is requesting a message be sent to doctor.  Name of Caller: Pt  When is the first available appointment?  Symptoms: ECA  Would the patient rather a call back or a response via [facility]? Call  Best Call Back Number:  [phone]  Additional Information:

## 2024-12-03 ENCOUNTER — PATIENT MESSAGE (OUTPATIENT)
Dept: ADMINISTRATIVE | Facility: CLINIC | Age: 59
End: 2024-12-03
Payer: MEDICARE

## 2024-12-03 ENCOUNTER — PATIENT OUTREACH (OUTPATIENT)
Dept: ADMINISTRATIVE | Facility: CLINIC | Age: 59
End: 2024-12-03
Payer: MEDICARE

## 2024-12-03 NOTE — PROGRESS NOTES
C3 nurse attempted to contact Amie Salmeron  for a TCC post hospital discharge follow up call. No answer. No voicemail available. The patient has a scheduled HOSFU appointment with Dr. Manoj Dupont on 12/16/2024 @ 320 pm.     Message sent via patient's portal regarding follow up call.

## 2024-12-04 NOTE — PROGRESS NOTES
C3 nurse attempted to contact Amie Salmeron  for a TCC post hospital discharge follow up call. No answer. No voicemail available. The patient has a scheduled HOSFU appointment with Dr. Manoj Dupont on 12/16/2024 @ 320 pm.

## 2024-12-08 PROBLEM — N23 RENAL COLIC: Status: ACTIVE | Noted: 2024-12-08

## 2024-12-08 PROBLEM — N10 ACUTE PYELONEPHRITIS: Status: ACTIVE | Noted: 2024-12-08

## 2024-12-09 PROBLEM — N20.1 RIGHT URETERAL STONE: Status: ACTIVE | Noted: 2024-12-09

## 2024-12-09 PROBLEM — M46.20 OSTEOMYELITIS OF SPINE: Status: ACTIVE | Noted: 2024-11-21

## 2024-12-11 PROBLEM — N10 ACUTE PYELONEPHRITIS: Status: RESOLVED | Noted: 2024-12-08 | Resolved: 2024-12-11

## 2024-12-11 PROBLEM — N20.1 RIGHT URETERAL STONE: Status: RESOLVED | Noted: 2024-12-09 | Resolved: 2024-12-11

## 2024-12-11 PROBLEM — N23 RENAL COLIC: Status: RESOLVED | Noted: 2024-12-08 | Resolved: 2024-12-11

## 2024-12-13 ENCOUNTER — PATIENT OUTREACH (OUTPATIENT)
Dept: ADMINISTRATIVE | Facility: CLINIC | Age: 59
End: 2024-12-13
Payer: MEDICARE

## 2024-12-13 NOTE — PROGRESS NOTES
C3 nurse attempted to contact Amie Salmeron for a TCC post hospital discharge follow up call. No answer. No voicemail available. The patient has a scheduled HOSFU appointment with Manoj Dupont on 12/16/24 @ 8311.

## 2024-12-16 ENCOUNTER — OFFICE VISIT (OUTPATIENT)
Dept: PRIMARY CARE CLINIC | Facility: CLINIC | Age: 59
End: 2024-12-16
Payer: MEDICARE

## 2024-12-16 VITALS
RESPIRATION RATE: 17 BRPM | BODY MASS INDEX: 22.89 KG/M2 | HEART RATE: 87 BPM | DIASTOLIC BLOOD PRESSURE: 62 MMHG | SYSTOLIC BLOOD PRESSURE: 110 MMHG | WEIGHT: 121.25 LBS | HEIGHT: 61 IN

## 2024-12-16 DIAGNOSIS — R63.6 LOW BODY WEIGHT DUE TO INADEQUATE CALORIC INTAKE: ICD-10-CM

## 2024-12-16 DIAGNOSIS — Z76.89 ENCOUNTER TO ESTABLISH CARE: Primary | ICD-10-CM

## 2024-12-16 DIAGNOSIS — Z98.1 H/O SPINAL FUSION: ICD-10-CM

## 2024-12-16 DIAGNOSIS — A31.2 DISSEMINATED MYCOBACTERIUM AVIUM-INTRACELLULARE COMPLEX: ICD-10-CM

## 2024-12-16 DIAGNOSIS — G89.4 CHRONIC PAIN SYNDROME: Chronic | ICD-10-CM

## 2024-12-16 DIAGNOSIS — F32.A DEPRESSION, UNSPECIFIED DEPRESSION TYPE: Chronic | ICD-10-CM

## 2024-12-16 DIAGNOSIS — R53.81 DEBILITY: ICD-10-CM

## 2024-12-16 DIAGNOSIS — B20 AIDS (ACQUIRED IMMUNODEFICIENCY SYNDROME), CD4 <=200: ICD-10-CM

## 2024-12-16 DIAGNOSIS — N20.0 NEPHROLITHIASIS: ICD-10-CM

## 2024-12-16 DIAGNOSIS — M54.50 LUMBAR BACK PAIN: ICD-10-CM

## 2024-12-16 PROCEDURE — 1159F MED LIST DOCD IN RCRD: CPT | Mod: HCNC,CPTII,S$GLB, | Performed by: STUDENT IN AN ORGANIZED HEALTH CARE EDUCATION/TRAINING PROGRAM

## 2024-12-16 PROCEDURE — 3074F SYST BP LT 130 MM HG: CPT | Mod: HCNC,CPTII,S$GLB, | Performed by: STUDENT IN AN ORGANIZED HEALTH CARE EDUCATION/TRAINING PROGRAM

## 2024-12-16 PROCEDURE — 3044F HG A1C LEVEL LT 7.0%: CPT | Mod: HCNC,CPTII,S$GLB, | Performed by: STUDENT IN AN ORGANIZED HEALTH CARE EDUCATION/TRAINING PROGRAM

## 2024-12-16 PROCEDURE — 99999 PR PBB SHADOW E&M-EST. PATIENT-LVL IV: CPT | Mod: PBBFAC,HCNC,, | Performed by: STUDENT IN AN ORGANIZED HEALTH CARE EDUCATION/TRAINING PROGRAM

## 2024-12-16 PROCEDURE — 1160F RVW MEDS BY RX/DR IN RCRD: CPT | Mod: HCNC,CPTII,S$GLB, | Performed by: STUDENT IN AN ORGANIZED HEALTH CARE EDUCATION/TRAINING PROGRAM

## 2024-12-16 PROCEDURE — 3008F BODY MASS INDEX DOCD: CPT | Mod: HCNC,CPTII,S$GLB, | Performed by: STUDENT IN AN ORGANIZED HEALTH CARE EDUCATION/TRAINING PROGRAM

## 2024-12-16 PROCEDURE — 99214 OFFICE O/P EST MOD 30 MIN: CPT | Mod: HCNC,S$GLB,, | Performed by: STUDENT IN AN ORGANIZED HEALTH CARE EDUCATION/TRAINING PROGRAM

## 2024-12-16 PROCEDURE — 1111F DSCHRG MED/CURRENT MED MERGE: CPT | Mod: HCNC,CPTII,S$GLB, | Performed by: STUDENT IN AN ORGANIZED HEALTH CARE EDUCATION/TRAINING PROGRAM

## 2024-12-16 PROCEDURE — 3078F DIAST BP <80 MM HG: CPT | Mod: HCNC,CPTII,S$GLB, | Performed by: STUDENT IN AN ORGANIZED HEALTH CARE EDUCATION/TRAINING PROGRAM

## 2024-12-16 RX ORDER — MIRTAZAPINE 15 MG/1
15 TABLET, ORALLY DISINTEGRATING ORAL NIGHTLY
Qty: 30 TABLET | Refills: 5 | Status: SHIPPED | OUTPATIENT
Start: 2024-12-16

## 2024-12-16 RX ORDER — HYDROCODONE BITARTRATE AND ACETAMINOPHEN 7.5; 325 MG/1; MG/1
1 TABLET ORAL EVERY 12 HOURS PRN
Qty: 60 TABLET | Refills: 0 | Status: SHIPPED | OUTPATIENT
Start: 2025-01-16 | End: 2024-12-17

## 2024-12-16 RX ORDER — HYDROCODONE BITARTRATE AND ACETAMINOPHEN 7.5; 325 MG/1; MG/1
1 TABLET ORAL EVERY 12 HOURS PRN
Qty: 60 TABLET | Refills: 0 | Status: SHIPPED | OUTPATIENT
Start: 2024-12-16 | End: 2024-12-17

## 2024-12-16 NOTE — PATIENT INSTRUCTIONS
Assessment & Plan    IMPRESSION:  - Assessed chronic pain management needs related to spinal surgery and ongoing MAC infection  - Reviewed recent lab results, noting mild anemia and low A1c (82) indicating potential nutritional deficiency  - Considered continuation of hydrocodone for pain management while awaiting pain management specialist evaluation  - Evaluated current medication regimen, including HIV medications and antibiotics for MAC infection  - Noted significant weight loss (100+ lbs) over the past year, likely due to pain and infection    SPINAL INFECTION (OSTEOMYELITIS OF VERTEBRA):  - Explained that spinal infection can take over a year to fully resolve.    MALNUTRITION AND WEIGHT MANAGEMENT:  - Discussed importance of increasing caloric intake to address low blood sugar and support weight gain.  - Amie to increase caloric intake, potentially using nutritional supplement drinks like Ensure or Boost.  - Recommend trying to eat more, particularly meat, as tolerated.  - Restarted mirtazapine 15 mg disintegrating tablet at bedtime to potentially aid with weight gain.    PAIN MANAGEMENT:  - Started hydrocodone 7.5 mg, 2 tablets daily (1 in morning, 1 at bedtime) for pain management.  - Provided 2 separate prescriptions: 1 for immediate use and 1 for 1 month from now.  - Continued gabapentin 3 times daily.  - Referred to Dr. Garcia or [facility] for pain management evaluation.    FOLLOW-UP:  - Follow up in 2 months.  - Contact office after infectious disease appointment next week to provide update.

## 2024-12-16 NOTE — PROGRESS NOTES
Assessment:       1. Encounter to establish care    2. Chronic pain syndrome    3. Disseminated mycobacterium avium-intracellulare complex    4. AIDS (acquired immunodeficiency syndrome), CD4 <=200    5. Depression, unspecified depression type    6. Debility    7. Low body weight due to inadequate caloric intake           Plan:     Assessment & Plan    IMPRESSION:  - Assessed chronic pain management needs related to spinal surgery and ongoing MAC infection  - Reviewed recent lab results, noting mild anemia and low A1c (82) indicating potential nutritional deficiency  - Considered continuation of hydrocodone for pain management while awaiting pain management specialist evaluation  - Evaluated current medication regimen, including HIV medications and antibiotics for MAC infection  - Noted significant weight loss (100+ lbs) over the past year, likely due to pain and infection    SPINAL INFECTION (OSTEOMYELITIS OF VERTEBRA):  - Explained that spinal infection can take over a year to fully resolve.    MALNUTRITION AND WEIGHT MANAGEMENT:  - Discussed importance of increasing caloric intake to address low blood sugar and support weight gain.  - Amie to increase caloric intake, potentially using nutritional supplement drinks like Ensure or Boost.  - Recommend trying to eat more, particularly meat, as tolerated.  - Restarted mirtazapine 15 mg disintegrating tablet at bedtime to potentially aid with weight gain.    PAIN MANAGEMENT:  - Started hydrocodone 7.5 mg, 2 tablets daily (1 in morning, 1 at bedtime) for pain management.  - Provided 2 separate prescriptions: 1 for immediate use and 1 for 1 month from now.  - Continued gabapentin 3 times daily.  - Referred to Dr. Garcia or [facility] for pain management evaluation.    FOLLOW-UP:  - Follow up in 2 months.  - Contact office after infectious disease appointment next week to provide update.             Encounter to establish care    Chronic pain  syndrome  -     HYDROcodone-acetaminophen (NORCO) 7.5-325 mg per tablet; Take 1 tablet by mouth every 12 (twelve) hours as needed for Pain.  Dispense: 60 tablet; Refill: 0  -     HYDROcodone-acetaminophen (NORCO) 7.5-325 mg per tablet; Take 1 tablet by mouth every 12 (twelve) hours as needed for Pain.  Dispense: 60 tablet; Refill: 0  -     Ambulatory referral/consult to Pain Clinic; Future; Expected date: 12/23/2024    Disseminated mycobacterium avium-intracellulare complex    AIDS (acquired immunodeficiency syndrome), CD4 <=200    Depression, unspecified depression type  -     mirtazapine (REMERON SOL-TAB) 15 MG disintegrating tablet; Take 1 tablet (15 mg total) by mouth nightly.  Dispense: 30 tablet; Refill: 5    Debility    Low body weight due to inadequate caloric intake  -     mirtazapine (REMERON SOL-TAB) 15 MG disintegrating tablet; Take 1 tablet (15 mg total) by mouth nightly.  Dispense: 30 tablet; Refill: 5                This note was generated with the assistance of ambient listening technology. Verbal consent was obtained by the patient and accompanying visitor(s) for the recording of patient appointment to facilitate this note. I attest to having reviewed and edited the generated note for accuracy, though some syntax or spelling errors may persist. Please contact the author of this note for any clarification.      Subjective:           Patient ID: Amie Salmeron   Age:  59 y.o.  Sex: female     Chief Complaint:   Establish Care      History of Present Illness:    Amie Salmeron is a 59 y.o. female who presents today with a chief complaint of Establish Care  .    History of Present Illness    CHIEF COMPLAINT:  Amie presents today for follow-up regarding back pain and kidney stones.    BACK PAIN:  She has a MAC infection in her back resulting in significant vertebral damage, requiring stabilization with krishna and cage placement from T11 to L2. She experiences severe pain at night affecting her mobility,  requiring use of a walker and making it difficult to get out of bed, even to use a bedside commode. She has lost 100 lbs since the onset of these issues.    KIDNEY STONES:  She has multiple kidney stones with severe associated pain. Three large stones have been surgically removed, and she has undergone cystoscopy with stent insertion.    MEDICAL HISTORY:  She is HIV positive, currently managed with Dovato. She has an ongoing MAC infection treated with Ethambutol.    SURGICAL HISTORY:  Past surgeries include hip fracture repair (2022), bilateral knee surgeries, right ankle surgery, tonsillectomy, hysterectomy, cyst removal, appendectomy, and cholecystectomy.    MEDICATIONS:  Current medications include Hydrocodone 7.5 mg twice daily for pain, Gabapentin 3 times daily (typically taken all at once in morning), Dovato for HIV management, Ethambutol for MAC infection, Bactrim 3 times weekly, and Mirtazapine.    FAMILY HISTORY:  Father  from throat cancer. Mother has thyroid disease and hypertension.    SOCIAL HISTORY:  She lives with her parents and brother. She denies smoking, alcohol, or recreational drug use.      ROS:  General: +weight loss  Musculoskeletal: +back pain           Review of Systems   Constitutional:  Positive for unexpected weight change. Negative for activity change, fatigue and fever.   HENT:  Negative for congestion, nosebleeds, sinus pressure and sneezing.    Respiratory:  Negative for cough, shortness of breath and wheezing.    Cardiovascular:  Negative for chest pain, palpitations and leg swelling.   Gastrointestinal:  Negative for abdominal distention, constipation, diarrhea and nausea.   Genitourinary:  Negative for difficulty urinating and dysuria.   Musculoskeletal:  Positive for arthralgias, back pain and joint swelling. Negative for gait problem.   Skin:  Negative for pallor and rash.   Neurological:  Positive for weakness. Negative for numbness and headaches.  "  Psychiatric/Behavioral:  Negative for agitation. The patient is not nervous/anxious.            Objective:        Vitals:    12/16/24 1553 12/16/24 1638   BP: 110/62    BP Location: Right arm    Patient Position: Sitting    Pulse:  87   Resp: 17    Weight: 55 kg (121 lb 4.1 oz)    Height: 5' 1" (1.549 m)        Body mass index is 22.91 kg/m².      Physical Exam  Vitals reviewed.   Constitutional:       General: She is not in acute distress.     Appearance: She is not ill-appearing.   HENT:      Head: Normocephalic and atraumatic.      Right Ear: External ear normal.      Left Ear: External ear normal.      Nose: No rhinorrhea.      Mouth/Throat:      Mouth: Mucous membranes are moist.   Eyes:      Extraocular Movements: Extraocular movements intact.      Conjunctiva/sclera: Conjunctivae normal.   Cardiovascular:      Rate and Rhythm: Normal rate and regular rhythm.      Pulses: Normal pulses.      Heart sounds: No murmur heard.  Pulmonary:      Effort: Pulmonary effort is normal. No respiratory distress.   Abdominal:      General: There is no distension.   Musculoskeletal:      Right lower leg: No edema.      Left lower leg: No edema.   Lymphadenopathy:      Cervical: No cervical adenopathy.   Skin:     Capillary Refill: Capillary refill takes more than 3 seconds.      Coloration: Skin is jaundiced.      Findings: No bruising, erythema or lesion.   Neurological:      General: No focal deficit present.      Mental Status: She is alert and oriented to person, place, and time.      Motor: No weakness.      Gait: Gait normal.   Psychiatric:         Mood and Affect: Mood normal.         Physical Exam    Vitals: Pulse: 87.  Cardiovascular: Capillary refill over 3 seconds.               Past Medical History:   Diagnosis Date    Allergy     Arthritis     Asthma     Candida esophagitis 11/15/2022    Chronic pain     HIV infection     Hypertension     Overactive bladder     Septic shock 08/12/2024    Spinal stenosis     " Urine incontinence        Lab Results   Component Value Date     12/11/2024    K 4.5 12/11/2024     12/11/2024    CO2 27 12/11/2024    BUN 17 12/11/2024    CREATININE 0.7 12/11/2024    GLUCOSE 120 (H) 02/20/2023    ANIONGAP 10 12/11/2024     Lab Results   Component Value Date    HGBA1C 4.5 (L) 10/24/2024    HGBA1C 4.9 08/25/2024     Lab Results   Component Value Date     (H) 08/11/2024    BNP 98 06/15/2024    BNP 47 12/15/2022       Lab Results   Component Value Date    WBC 5.05 12/11/2024    HGB 11.4 (L) 12/11/2024    HCT 36.9 (L) 12/11/2024    HCT 27 (L) 08/25/2024     12/11/2024    GRAN 1.9 12/11/2024    GRAN 38.2 12/11/2024     Lab Results   Component Value Date    CHOL 154 10/24/2024    CHOL 234 (H) 04/15/2015    HDL 48 10/24/2024    HDL 45 04/15/2015    LDLCALC 78 10/24/2024    LDLCALC 156.0 04/15/2015    TRIG 142 10/24/2024    TRIG 165 (H) 04/15/2015        Outpatient Encounter Medications as of 12/16/2024   Medication Sig Dispense Refill    acetaminophen (TYLENOL) 325 MG tablet Take 2 tablets (650 mg total) by mouth 3 (three) times daily.      albuterol (PROVENTIL HFA) 90 mcg/actuation inhaler Inhale 2 puffs into the lungs every 6 (six) hours as needed for Wheezing. Rescue 18 g 0    azithromycin (Z-KAREN) 250 MG tablet Take 2 tablets by mouth on day 1; Take 1 tablet by mouth on days 2-5 6 tablet 0    cefdinir (OMNICEF) 300 MG capsule Take 1 capsule (300 mg total) by mouth 2 (two) times daily. for 10 days 20 capsule 0    dolutegravir-lamivudine (DOVATO)  mg Tab Take 1 tablet by mouth once daily. 30 tablet 1    ethambutoL (MYAMBUTOL) 400 MG Tab Take 2 tablets (800 mg total) by mouth once daily.      gabapentin (NEURONTIN) 400 MG capsule Take 400 mg by mouth 3 (three) times daily.      hydrOXYzine pamoate (VISTARIL) 25 MG Cap Take 25 mg by mouth every 8 (eight) hours as needed (anxiety).      LIDOcaine (LIDODERM) 5 % Place 1 patch onto the skin once daily. Apply patch to low back  area and leave on 12 hours and then discard.      methocarbamoL (ROBAXIN) 500 MG Tab Take 500 mg by mouth 4 (four) times daily as needed (muscle spasms).      mupirocin (BACTROBAN) 2 % ointment Apply topically 3 (three) times daily. 15 g 0    rifabutin (MYCOBUTIN) 150 mg Cap Take 300 mg by mouth once daily.      senna-docusate 8.6-50 mg (PERICOLACE) 8.6-50 mg per tablet Take 1 tablet by mouth 2 (two) times daily.      sulfamethoxazole-trimethoprim 800-160mg (BACTRIM DS) 800-160 mg Tab Take 1 tablet by mouth 3 (three) times a week.      vitamin D (VITAMIN D3) 1000 units Tab Take 1 tablet (1,000 Units total) by mouth once daily.      [DISCONTINUED] dextromethorphan-guaiFENesin  mg/5 ml (ROBITUSSIN-DM)  mg/5 mL liquid Take 10 mLs by mouth 4 (four) times daily as needed. 354 mL 0    [DISCONTINUED] midodrine (PROAMATINE) 5 MG Tab Take 1 tablet (5 mg total) by mouth 3 (three) times daily with meals. For chronic hypotension.      [DISCONTINUED] mirtazapine (REMERON SOL-TAB) 15 MG disintegrating tablet Take 15 mg by mouth nightly.      [DISCONTINUED] predniSONE (DELTASONE) 20 MG tablet Take 3 tablets (60 mg total) by mouth once daily. for 5 days 15 tablet 0    [DISCONTINUED] sulfamethoxazole-trimethoprim 800-160mg (BACTRIM DS) 800-160 mg Tab Take 1 tablet by mouth 2 (two) times daily. After taking twice daily for 7 days resume taking three times weekly for 7 days 14 tablet 0    cyclobenzaprine (FLEXERIL) 5 MG tablet Take 5 mg by mouth 3 (three) times daily as needed. (Patient not taking: Reported on 12/16/2024)      HYDROcodone-acetaminophen (NORCO) 7.5-325 mg per tablet Take 1 tablet by mouth every 12 (twelve) hours as needed for Pain. 60 tablet 0    [START ON 1/16/2025] HYDROcodone-acetaminophen (NORCO) 7.5-325 mg per tablet Take 1 tablet by mouth every 12 (twelve) hours as needed for Pain. 60 tablet 0    mirtazapine (REMERON SOL-TAB) 15 MG disintegrating tablet Take 1 tablet (15 mg total) by mouth nightly. 30  tablet 5    [] sulfamethoxazole-trimethoprim 800-160mg (BACTRIM DS) 800-160 mg Tab Take 1 tablet by mouth 2 (two) times daily. for 7 days 14 tablet 0    [DISCONTINUED] albuterol (PROVENTIL/VENTOLIN HFA) 90 mcg/actuation inhaler Inhale 2 puffs into the lungs every 6 (six) hours as needed. Rescue 8 g 0    [DISCONTINUED] albuterol (PROVENTIL/VENTOLIN HFA) 90 mcg/actuation inhaler Inhale 2 puffs into the lungs every 6 (six) hours as needed. Rescue 18 g 0    [DISCONTINUED] apixaban (ELIQUIS) 2.5 mg Tab Take 1 tablet (2.5 mg total) by mouth 2 (two) times daily. DVT prophylaxis after hip fracture surgery with end date 2024. (Patient not taking: Reported on 2024)      [DISCONTINUED] calcium carbonate (TUMS) 200 mg calcium (500 mg) chewable tablet Take 2 tablets (1,000 mg total) by mouth once daily. (Patient not taking: Reported on 2024)      [DISCONTINUED] clarithromycin (BIAXIN) 500 MG tablet Take 1 tablet (500 mg total) by mouth every 12 (twelve) hours.      [DISCONTINUED] HYDROcodone-acetaminophen (NORCO) 7.5-325 mg per tablet Take 1 tablet by mouth every 6 (six) hours as needed for Pain. 20 tablet 0    [DISCONTINUED] HYDROcodone-acetaminophen (NORCO) 7.5-325 mg per tablet Take 1 tablet by mouth every 6 (six) hours as needed for Pain. 20 tablet 0    [DISCONTINUED] levoFLOXacin (LEVAQUIN) 500 MG tablet Take 1 tablet (500 mg total) by mouth once daily. 10 tablet 0    [DISCONTINUED] methocarbamoL (ROBAXIN) 750 MG Tab Take 1 tablet (750 mg total) by mouth 4 (four) times daily.      [DISCONTINUED] naproxen (NAPROSYN) 500 MG tablet Take 1 tablet (500 mg total) by mouth 2 (two) times daily with meals. (Patient not taking: Reported on 2024) 30 tablet 0    [DISCONTINUED] oxyCODONE (ROXICODONE) 5 MG immediate release tablet Take 5 mg by mouth every 8 (eight) hours as needed.      [DISCONTINUED] QUEtiapine (SEROQUEL) 25 MG Tab Take 2 tablets (50 mg total) by mouth every evening.      [DISCONTINUED]  QUEtiapine (SEROQUEL) 50 MG tablet Take 50 mg by mouth nightly. (Patient not taking: Reported on 2024)      [] tamsulosin 24 hr capsule 0.4 mg       [DISCONTINUED] acetaminophen tablet 650 mg       [DISCONTINUED] clarithromycin 250 mg/5 mL suspension 500 mg       [DISCONTINUED] cyclobenzaprine tablet 5 mg       [DISCONTINUED] dextrose 10% bolus 125 mL 125 mL       [DISCONTINUED] dextrose 10% bolus 250 mL 250 mL       [DISCONTINUED] dolutegravir Tab 50 mg       [DISCONTINUED] dolutegravir-lamivudine  mg Tab 1 tablet       [DISCONTINUED] ethambutoL tablet 800 mg       [DISCONTINUED] gabapentin capsule 400 mg       [DISCONTINUED] HYDROcodone-acetaminophen 7.5-325 mg per tablet 1 tablet       [DISCONTINUED] hydrOXYzine pamoate capsule 25 mg       [DISCONTINUED] lactated ringers infusion       [DISCONTINUED] lamiVUDine tablet 300 mg       [DISCONTINUED] LIDOcaine 5 % patch 1 patch       [DISCONTINUED] melatonin tablet 6 mg       [DISCONTINUED] meropenem injection 2 g       [DISCONTINUED] midodrine tablet 5 mg       [DISCONTINUED] mirtazapine tablet 15 mg       [DISCONTINUED] morphine injection 4 mg       [DISCONTINUED] mupirocin 2 % ointment       [DISCONTINUED] oxyCODONE immediate release tablet 5 mg       [DISCONTINUED] QUEtiapine tablet 50 mg       [DISCONTINUED] rifabutin capsule 300 mg       [DISCONTINUED] senna-docusate 8.6-50 mg per tablet 1 tablet       [DISCONTINUED] sodium chloride 0.9% flush 10 mL       [DISCONTINUED] sulfamethoxazole-trimethoprim 800-160mg per tablet 1 tablet       [DISCONTINUED] vitamin D 1000 units tablet 1,000 Units        No facility-administered encounter medications on file as of 2024.

## 2024-12-17 ENCOUNTER — OUTPATIENT CASE MANAGEMENT (OUTPATIENT)
Dept: ADMINISTRATIVE | Facility: OTHER | Age: 59
End: 2024-12-17
Payer: MEDICARE

## 2024-12-17 RX ORDER — HYDROCODONE BITARTRATE AND ACETAMINOPHEN 7.5; 325 MG/1; MG/1
1 TABLET ORAL EVERY 12 HOURS PRN
Qty: 60 TABLET | Refills: 0 | Status: SHIPPED | OUTPATIENT
Start: 2024-12-17

## 2024-12-17 RX ORDER — HYDROCODONE BITARTRATE AND ACETAMINOPHEN 7.5; 325 MG/1; MG/1
1 TABLET ORAL EVERY 12 HOURS PRN
Qty: 60 TABLET | Refills: 0 | Status: SHIPPED | OUTPATIENT
Start: 2025-01-17

## 2024-12-17 NOTE — PROGRESS NOTES
Outpatient Care Management  Initial Patient Assessment    Patient: Amie Salmeron  MRN: [id number]  Date of Service: 12/17/2024  Completed by: Haylee Marques RN  Referral Date: 12/10/2024  Date of Eligibility: 12/11/2024  Program:   High Risk  Status: Ongoing  Effective Dates: 12/17/2024 - present  Responsible Staff: Haylee Marques RN        Reason for Visit   Patient presents with    OPCM Enrollment Call     12/17/24    Nursing Assessment     12/17/24       Brief Summary:  Amie Salmeron was referred by Chris Mcclure MD for Acute pyelonephritis and HIV infection. Patient qualifies for program based on identified as high risk.   Active problem list, medical, surgical and social history reviewed. Active comorbidities include urinary incontinence and HIV. Areas of need identified by patient include medical transportation and ACP.   Next steps: Refer to [facility] LCSW for assist with transportation and ACP    Disability Status  Is the patient alert and oriented (person, place, time, and situation)?: Alert and oriented x 4  Hearing Difficulty or Deaf: no  Visual Difficulty or Blind: yes  Visual and Hearing Needs Conclusion: prescription glasses  Vision Management: Other  Difficulty Concentrating, Remembering or Making Decisions: no  Communication Difficulty: no  Eating/Swallowing Difficulty: no  Walking or Climbing Stairs Difficulty: yes  Walking or Climbing Stairs: ambulation difficulty, requires equipment  Mobility Management: walker  Dressing/Bathing Difficulty: no  Toileting : Independent  Continence : Incontience - Bladder  Difficulty Managing Errands Independently: yes  Errands Management: family assists  Equipment Currently Used at Home: blood pressure machine; walker, standard; grab bar; scale; shower chair  ADL Conclusion Statement: Independent with ADLs  Change in Functional Status Since Onset of Current Illness/Injury: no        Spiritual Beliefs  Spiritual, Cultural Beliefs, Shinto Practices, Values that  Affect Care: no      Social History     Socioeconomic History    Marital status: Single   Occupational History    Occupation: on disability   Tobacco Use    Smoking status: Never    Smokeless tobacco: Never   Substance and Sexual Activity    Alcohol use: Not Currently     Comment: not currently    Drug use: No    Sexual activity: Not Currently     Partners: Male     Birth control/protection: None     Social Drivers of Health     Financial Resource Strain: Patient Declined (12/11/2024)    Overall Financial Resource Strain (CARDIA)     Difficulty of Paying Living Expenses: Patient declined   Food Insecurity: Patient Declined (12/11/2024)    Hunger Vital Sign     Worried About Running Out of Food in the Last Year: Patient declined     Ran Out of Food in the Last Year: Patient declined   Transportation Needs: Patient Declined (12/11/2024)    TRANSPORTATION NEEDS     Transportation : Patient declined   Physical Activity: Inactive (8/26/2024)    Exercise Vital Sign     Days of Exercise per Week: 0 days     Minutes of Exercise per Session: 0 min   Stress: Patient Declined (12/11/2024)    [employer] - Occupational Stress Questionnaire     Feeling of Stress : Patient declined   Housing Stability: Patient Declined (12/11/2024)    Housing Stability Vital Sign     Unable to Pay for Housing in the Last Year: Patient declined     Homeless in the Last Year: Patient declined       Roles and Relationships  Primary Source of Support/Comfort: parent  Name of Support/Comfort Primary Source: Father      Advance Directives (For Healthcare)  Advance Directive  (If Adv Dir status is received, view document under Adv Dir in header or Chart Review Media tab): Patient does not have Advance Directive, requests information.  Patient Requests Assistance: Place consult order to /        Patient Reported Insurance  Verified current insurance plan:: [employer]vantage  [employer] benefits  discussed:: OTC Prescription Discounts; Mail Order Pharmacy            12/17/2024     4:34 PM 12/16/2024     3:57 PM 8/3/2023     1:07 PM 5/2/2023     1:59 PM 2/1/2023     2:10 PM 11/22/2022     1:29 PM 7/7/2022     1:26 PM   Depression Patient Health Questionnaire   Over the last two weeks how often have you been bothered by little interest or pleasure in doing things Not at all Not at all Not at all Not at all Not at all Not at all Nearly every day   Over the last two weeks how often have you been bothered by feeling down, depressed or hopeless Not at all Not at all Not at all Not at all Not at all Not at all Nearly every day   PHQ-2 Total Score 0 0 0 0 0 0 6   Over the last two weeks how often have you been bothered by trouble falling or staying asleep, or sleeping too much       Nearly every day   Over the last two weeks how often have you been bothered by feeling tired or having little energy       Nearly every day   Over the last two weeks how often have you been bothered by a poor appetite or overeating       Nearly every day   Over the last two weeks how often have you been bothered by feeling bad about yourself - or that you are a failure or have let yourself or your family down       Nearly every day   Over the last two weeks how often have you been bothered by trouble concentrating on things, such as reading the newspaper or watching television       Nearly every day   Over the last two weeks how often have you been bothered by moving or speaking so slowly that other people could have noticed. Or the opposite - being so fidgety or restless that you have been moving around a lot more than usual.       Nearly every day   Over the last two weeks how often have you been bothered by thoughts that you would be better off dead, or of hurting yourself       More than half the days   If you checked off any problems, how difficult have these problems made it for you to do your work, take care of things at home or  get along with other people?       Very difficult   PHQ-9 Score       26   PHQ-9 Interpretation       Severe       Learning Assessment       12/17/2024 1635 [facility] (12/17/2024 - Present)   Created by Haylee Marques, RN -  (Nurse) Status: Complete                 PRIMARY LEARNER     Primary Learner Name:  Amie Salmeron  - 12/17/2024 1635    Relationship:  Patient  - 12/17/2024 1635    Does the primary learner have any barriers to learning?:  No Barriers  - 12/17/2024 1635    What is the preferred language of the primary learner?:  English  - 12/17/2024 1635    Is an  required?:  No  - 12/17/2024 1635    How does the primary learner prefer to learn new concepts?:  Listening, Reading  - 12/17/2024 1635    How often do you need to have someone help you read instructions, pamphlets, or written material from your doctor or pharmacy?:  Never  - 12/17/2024 1635        CO-LEARNER #1     No question answered        CO-LEARNER #2     No question answered        SPECIAL TOPICS     Are there any special topics the patient/guardian would like to review?:  Transportation and ACP  - 12/17/2024 1635        ANSWERED BY:     -:  Patient  - 12/17/2024 1635        Comments         Edit History       Haylee Marques, RN -  (Nurse)   12/17/2024 1635

## 2024-12-17 NOTE — LETTER
Amie Salmeron  [address] [place] [id number]    Dear Amie Salmeron,     Welcome to [facility] Outpatient Care Management Program. We are here to assist patients with multiple long-term (chronic) conditions who often need more personalized healthcare.    It was a pleasure talking with you today. My name is Haylee Marques RN. I look forward to working with you as your Care Manager. I will be contacting you by telephone routinely to help coordinate care and resolve issues.    My goal is to help you function at the healthiest and highest level possible. You can contact me directly at [phone].    As an [facility] patient with [employer], some of the services we provide, at no cost to you, include:     Development of an individualized care plan with a Registered Nurse   Connection with a   Assistance from a Community Health Worker  Connection with available resources and services    Coordinate communication among your care team members   Provide coaching and education  Help you understand your doctor's treatment plan  Help you obtain information about your insurance coverage.    All services provided by [facility]s Outpatient Care Managers and other care team members are coordinated with and communicated to your primary care team.      As part of your enrollment, you will be receiving education materials and more information about these services in your [facility] account, by phone, or through the mail. If you do not wish to participate or receive information, you can Opt Out by contacting our office at [phone].      Sincerely,        Haylee Marques RN  [facility]   Outpatient Care Management

## 2024-12-18 ENCOUNTER — TELEPHONE (OUTPATIENT)
Dept: PRIMARY CARE CLINIC | Facility: CLINIC | Age: 59
End: 2024-12-18
Payer: MEDICARE

## 2024-12-18 NOTE — TELEPHONE ENCOUNTER
----- Message from Jeanna sent at 12/18/2024 10:27 AM CST -----  Patient is checking on her letter about being able to drive . Please call patient back to schedule

## 2024-12-18 NOTE — TELEPHONE ENCOUNTER
----- Message from Nia sent at 12/17/2024  2:08 PM CST -----  Patient states she need a paper from Dr. Dupont say something that will give her permission to drive and she would like for someone to give her call asap. [phone]

## 2024-12-30 ENCOUNTER — TELEPHONE (OUTPATIENT)
Dept: PRIMARY CARE CLINIC | Facility: CLINIC | Age: 59
End: 2024-12-30
Payer: MEDICARE

## 2024-12-30 PROBLEM — Z78.9 PROBLEM WITH VASCULAR ACCESS: Status: ACTIVE | Noted: 2024-12-30

## 2024-12-30 NOTE — TELEPHONE ENCOUNTER
----- Message from Isela sent at 12/30/2024  8:15 AM CST -----  Contact: Patient, [phone]  Calling because she was recently in the hospital and had a midline port inserted for infusions, who is supposed to take it out. Please call her. Thanks.

## 2024-12-31 ENCOUNTER — PATIENT OUTREACH (OUTPATIENT)
Dept: ADMINISTRATIVE | Facility: CLINIC | Age: 59
End: 2024-12-31
Payer: MEDICARE

## 2024-12-31 NOTE — PROGRESS NOTES
2nd attempt made to reach patient for TCC call. No answer after several rings, no voicemail options.

## 2024-12-31 NOTE — PROGRESS NOTES
C3 nurse attempted to contact Amie Salmeron for a TCC post hospital discharge follow up call. No answer. No voicemail available. The patient has a scheduled [facility] appointment with ANITHA Blair on 01/07/25 @ 1500.

## 2025-01-02 NOTE — PROGRESS NOTES
3rd Attempt made to reach patient for TCC call. Patient is unable to conduct call at this time and requested a callback later this afternoon.

## 2025-01-06 ENCOUNTER — OUTPATIENT CASE MANAGEMENT (OUTPATIENT)
Dept: ADMINISTRATIVE | Facility: OTHER | Age: 60
End: 2025-01-06
Payer: MEDICARE

## 2025-01-07 ENCOUNTER — OUTPATIENT CASE MANAGEMENT (OUTPATIENT)
Dept: ADMINISTRATIVE | Facility: OTHER | Age: 60
End: 2025-01-07
Payer: MEDICARE

## 2025-01-07 ENCOUNTER — PATIENT MESSAGE (OUTPATIENT)
Dept: ADMINISTRATIVE | Facility: OTHER | Age: 60
End: 2025-01-07
Payer: MEDICARE

## 2025-01-07 ENCOUNTER — OFFICE VISIT (OUTPATIENT)
Dept: PRIMARY CARE CLINIC | Facility: CLINIC | Age: 60
End: 2025-01-07
Payer: MEDICARE

## 2025-01-07 ENCOUNTER — TELEPHONE (OUTPATIENT)
Dept: PRIMARY CARE CLINIC | Facility: CLINIC | Age: 60
End: 2025-01-07
Payer: MEDICARE

## 2025-01-07 VITALS
BODY MASS INDEX: 24.7 KG/M2 | TEMPERATURE: 94 F | SYSTOLIC BLOOD PRESSURE: 107 MMHG | DIASTOLIC BLOOD PRESSURE: 74 MMHG | WEIGHT: 130.75 LBS | OXYGEN SATURATION: 98 % | HEART RATE: 91 BPM

## 2025-01-07 DIAGNOSIS — Z12.31 ENCOUNTER FOR SCREENING MAMMOGRAM FOR BREAST CANCER: ICD-10-CM

## 2025-01-07 DIAGNOSIS — Z12.11 SCREEN FOR COLON CANCER: ICD-10-CM

## 2025-01-07 DIAGNOSIS — M54.50 LUMBAR BACK PAIN: Primary | ICD-10-CM

## 2025-01-07 DIAGNOSIS — G89.4 CHRONIC PAIN SYNDROME: ICD-10-CM

## 2025-01-07 PROCEDURE — 1111F DSCHRG MED/CURRENT MED MERGE: CPT | Mod: HCNC,CPTII,S$GLB,

## 2025-01-07 PROCEDURE — 3078F DIAST BP <80 MM HG: CPT | Mod: HCNC,CPTII,S$GLB,

## 2025-01-07 PROCEDURE — 3008F BODY MASS INDEX DOCD: CPT | Mod: HCNC,CPTII,S$GLB,

## 2025-01-07 PROCEDURE — 3074F SYST BP LT 130 MM HG: CPT | Mod: HCNC,CPTII,S$GLB,

## 2025-01-07 PROCEDURE — 1159F MED LIST DOCD IN RCRD: CPT | Mod: HCNC,CPTII,S$GLB,

## 2025-01-07 PROCEDURE — 99999 PR PBB SHADOW E&M-EST. PATIENT-LVL IV: CPT | Mod: PBBFAC,HCNC,,

## 2025-01-07 PROCEDURE — 99213 OFFICE O/P EST LOW 20 MIN: CPT | Mod: HCNC,S$GLB,,

## 2025-01-07 PROCEDURE — 1160F RVW MEDS BY RX/DR IN RCRD: CPT | Mod: HCNC,CPTII,S$GLB,

## 2025-01-07 NOTE — TELEPHONE ENCOUNTER
----- Message from  Haylee sent at 1/7/2025 12:47 PM CST -----  Regarding: Home Health  Contact: Haylee Marques RN  Hello    This is Haylee with Outpatient Case Management. The patient had home health orders placed when d/c'd from the hospital on 12/27/2024. I contacted the  agency and they stated the orders were not received. Can someone from your office please fax the orders to [facility] at [phone], I'm not able to do this myself at this time because I'm currently working remote. Thank You for your help.     MIGUEL Jiang, RN  [facility]  [email or web address]  TEL:  [phone]

## 2025-01-07 NOTE — PROGRESS NOTES
Clinic Note  1/7/2025      Subjective:       Patient ID:  Amie is a 60 y.o. female being seen for an established visit.    Chief Complaint: Forms that need to be filled out (To start driving again)    Pt presents today for DMV forms to be filled out to reinstate her driving privileges. license.     Pt reports that her rights were taken by her niece who tricked her into giving her POA when she was sick a year ago. Pt states that her niece told them DMV that she was too sick and her privileges were taken away. Pt is requesting a form be filled regarding having her rights reinstated. Advised pt that POA is a legal matter that should be followed by a  and I would not be able to release her to drive today. Pt verbalized understanding and states she has an upcoming appt with the DMV and her .     Back pain - 10/10 chronic pain, following specialist, next appt next month per patient     Pt denies any other concerns today      Review of Systems   Constitutional:  Negative for appetite change, fatigue, fever and unexpected weight change.   HENT:  Negative for hearing loss and sore throat.    Eyes:  Negative for pain and visual disturbance.   Respiratory:  Negative for cough, shortness of breath and wheezing.    Cardiovascular:  Negative for chest pain, palpitations and leg swelling.   Gastrointestinal:  Negative for abdominal pain, blood in stool, constipation, diarrhea, nausea and vomiting.   Genitourinary:  Negative for dysuria.   Musculoskeletal:  Positive for back pain. Negative for arthralgias.   Neurological:  Negative for dizziness and headaches.   Psychiatric/Behavioral:  Negative for suicidal ideas.         Medication List with Changes/Refills   Current Medications    ACETAMINOPHEN (TYLENOL) 325 MG TABLET    Take 2 tablets (650 mg total) by mouth 3 (three) times daily.    ALBUTEROL (PROVENTIL HFA) 90 MCG/ACTUATION INHALER    Inhale 2 puffs into the lungs every 6 (six) hours as needed for Wheezing.  Rescue    DOLUTEGRAVIR-LAMIVUDINE (DOVATO)  MG TAB    Take 1 tablet by mouth once daily.    ETHAMBUTOL (MYAMBUTOL) 400 MG TAB    Take 2 tablets (800 mg total) by mouth once daily.    GABAPENTIN (NEURONTIN) 400 MG CAPSULE    Take 400 mg by mouth 3 (three) times daily.    HYDROCODONE-ACETAMINOPHEN (NORCO) 7.5-325 MG PER TABLET    Take 1 tablet by mouth every 12 (twelve) hours as needed for Pain.    HYDROCODONE-ACETAMINOPHEN (NORCO) 7.5-325 MG PER TABLET    Take 1 tablet by mouth every 12 (twelve) hours as needed for Pain.    HYDROXYZINE PAMOATE (VISTARIL) 25 MG CAP    Take 25 mg by mouth every 8 (eight) hours as needed (anxiety).    METHOCARBAMOL (ROBAXIN) 500 MG TAB    Take 500 mg by mouth 4 (four) times daily as needed (muscle spasms).    MIRTAZAPINE (REMERON SOL-TAB) 15 MG DISINTEGRATING TABLET    Take 1 tablet (15 mg total) by mouth nightly.    MUPIROCIN (BACTROBAN) 2 % OINTMENT    Apply topically 3 (three) times daily.    RIFABUTIN (MYCOBUTIN) 150 MG CAP    Take 300 mg by mouth once daily.    SULFAMETHOXAZOLE-TRIMETHOPRIM 800-160MG (BACTRIM DS) 800-160 MG TAB    Take 1 tablet by mouth 3 (three) times a week.    VITAMIN D (VITAMIN D3) 1000 UNITS TAB    Take 1 tablet (1,000 Units total) by mouth once daily.       Patient Active Problem List   Diagnosis    Chronic pain    Depression    Hypercholesteremia    AIDS (acquired immunodeficiency syndrome), CD4 <=200    Weakness    Osteomyelitis of vertebra of thoracolumbar region    Disseminated mycobacterium avium-intracellulare complex    Vertebral abscess    Advance care planning    DNR (do not resuscitate)    Debility    Acute UTI    Closed fracture of neck of right femur with routine healing s/p hip pinning on 8/25/2024    MRSA infection    Fusobacterium infection    Infection due to ESBL-producing Escherichia coli    Anemia due to infection    Acute blood loss anemia    Underweight    Chronic hypotension    Right ureteral stone    Osteomyelitis of spine     "Problem with vascular access           Objective:      /74 (BP Location: Left arm, Patient Position: Sitting)   Pulse 91   Temp (!) 94.2 °F (34.6 °C)   Wt 59.3 kg (130 lb 11.7 oz)   SpO2 98%   BMI 24.70 kg/m²   Estimated body mass index is 24.7 kg/m² as calculated from the following:    Height as of 12/24/24: 5' 1" (1.549 m).    Weight as of this encounter: 59.3 kg (130 lb 11.7 oz).  Physical Exam  Vitals and nursing note reviewed.   Constitutional:       General: She is not in acute distress.  HENT:      Head: Atraumatic.   Cardiovascular:      Rate and Rhythm: Normal rate and regular rhythm.      Heart sounds: No murmur heard.  Pulmonary:      Effort: Pulmonary effort is normal. No respiratory distress.      Breath sounds: Normal breath sounds. No wheezing, rhonchi or rales.   Musculoskeletal:         General: Normal range of motion.      Right lower leg: No edema.      Left lower leg: No edema.   Neurological:      Mental Status: She is alert and oriented to person, place, and time.      Motor: Weakness present.      Gait: Gait abnormal (slow gait secondary to pain).   Psychiatric:         Mood and Affect: Mood normal.         Thought Content: Thought content normal.             Assessment and Plan:         Problem List Items Addressed This Visit          Neuro    Chronic pain (Chronic)    Overview     manged by Dr dowling in [place]          Other Visit Diagnoses       Lumbar back pain    -  Primary    Screen for colon cancer        Relevant Orders    Cologuard Screening (Multitarget Stool DNA)    Encounter for screening mammogram for breast cancer        Relevant Orders    Mammo Digital Screening Bilat w/ Dany            Reviewed risks, benefits, and appropriate medication use prescribed  Discussed LS changes as appropriate   Reviewed cancer screening guidelines   Advised to keep speciality and follow up appointments as scheduled   Reviewed ER precautions   Verbalized understanding and is agreeable to " plan      Follow Up:   No follow-ups on file.    Other Orders Placed This Visit:  Orders Placed This Encounter   Procedures    Cologuard Screening (Multitarget Stool DNA)    Mammo Digital Screening Theresa pedroza/ Dany Chavez, FNP-BC

## 2025-01-08 ENCOUNTER — TELEPHONE (OUTPATIENT)
Dept: PRIMARY CARE CLINIC | Facility: CLINIC | Age: 60
End: 2025-01-08
Payer: MEDICARE

## 2025-01-08 ENCOUNTER — TELEPHONE (OUTPATIENT)
Dept: ADMINISTRATIVE | Facility: CLINIC | Age: 60
End: 2025-01-08
Payer: MEDICARE

## 2025-01-08 NOTE — TELEPHONE ENCOUNTER
----- Message from Lindsey sent at 1/8/2025 10:27 AM CST -----  Contact: [phone]@[email or web address]  Good morning patient would like a call back to discuss if the doc can fill some paperwork for her to start back driving. Patient says the DMV gave her the paperwork and she did not need to see a . Please call patient to advise [phone]

## 2025-01-08 NOTE — PROGRESS NOTES
C3 nurse returned patient's call after patient called, selected option 5 needing assistance to schedule a HOSFU appointment.  No answer.  Voicemail left with callback information.

## 2025-01-08 NOTE — PROGRESS NOTES
"C3 nurse spoke to patient.  She states she is currently in the "car getting quotes for car insurance and I don't remember what I called you for.  I will have to call you back".    "

## 2025-01-12 PROBLEM — T14.90XA TRAUMA: Status: ACTIVE | Noted: 2025-01-12

## 2025-01-12 PROBLEM — M54.50 ACUTE MIDLINE LOW BACK PAIN WITHOUT SCIATICA: Status: ACTIVE | Noted: 2025-01-12

## 2025-01-14 ENCOUNTER — OFFICE VISIT (OUTPATIENT)
Dept: PRIMARY CARE CLINIC | Facility: CLINIC | Age: 60
End: 2025-01-14
Payer: MEDICARE

## 2025-01-14 ENCOUNTER — PATIENT MESSAGE (OUTPATIENT)
Dept: PRIMARY CARE CLINIC | Facility: CLINIC | Age: 60
End: 2025-01-14

## 2025-01-14 VITALS
HEIGHT: 61 IN | RESPIRATION RATE: 17 BRPM | OXYGEN SATURATION: 100 % | HEART RATE: 84 BPM | BODY MASS INDEX: 25.29 KG/M2 | SYSTOLIC BLOOD PRESSURE: 110 MMHG | DIASTOLIC BLOOD PRESSURE: 62 MMHG | WEIGHT: 133.94 LBS

## 2025-01-14 DIAGNOSIS — M54.50 LUMBAR BACK PAIN: Primary | ICD-10-CM

## 2025-01-14 DIAGNOSIS — G89.4 CHRONIC PAIN SYNDROME: ICD-10-CM

## 2025-01-14 PROBLEM — Z21 HIV (HUMAN IMMUNODEFICIENCY VIRUS INFECTION): Status: ACTIVE | Noted: 2025-01-14

## 2025-01-14 PROCEDURE — 1160F RVW MEDS BY RX/DR IN RCRD: CPT | Mod: CPTII,S$GLB,,

## 2025-01-14 PROCEDURE — 3078F DIAST BP <80 MM HG: CPT | Mod: CPTII,S$GLB,,

## 2025-01-14 PROCEDURE — 1111F DSCHRG MED/CURRENT MED MERGE: CPT | Mod: CPTII,S$GLB,,

## 2025-01-14 PROCEDURE — 3074F SYST BP LT 130 MM HG: CPT | Mod: CPTII,S$GLB,,

## 2025-01-14 PROCEDURE — 1159F MED LIST DOCD IN RCRD: CPT | Mod: CPTII,S$GLB,,

## 2025-01-14 PROCEDURE — 99214 OFFICE O/P EST MOD 30 MIN: CPT | Mod: S$GLB,,,

## 2025-01-14 PROCEDURE — 99999 PR PBB SHADOW E&M-EST. PATIENT-LVL V: CPT | Mod: PBBFAC,HCNC,,

## 2025-01-14 PROCEDURE — 3008F BODY MASS INDEX DOCD: CPT | Mod: CPTII,S$GLB,,

## 2025-01-14 NOTE — PROGRESS NOTES
Clinic Note  1/14/2025      Subjective:       Patient ID:  Amie is a 60 y.o. female being seen for an established visit.    Chief Complaint: Medical Form     Pt presents to clinic regarding paperwork to be filled out to reinstate drivers' license . Patient had a visit on 1/7/25 where she tried to get paperwork filled however stated that her nurse had POA and needed her license reinstated. Today patient states she went to a  with her niece Gely and the  states there was never a POA filed. Spoke to Gely over the phone ([phone]) confirming that there was no POA documented and niece Gely states that is correct.     However, paperwork received in office is out of date with an issue date of 2/23/24. See paperwork attached.     Patient will have to get clearance from PCP and get an updated form from the DMV.         Review of Systems   Constitutional:  Negative for appetite change, fatigue, fever and unexpected weight change.   HENT:  Negative for hearing loss and sore throat.    Eyes:  Negative for pain and visual disturbance.   Respiratory:  Negative for cough, shortness of breath and wheezing.    Cardiovascular:  Negative for chest pain, palpitations and leg swelling.   Gastrointestinal:  Negative for abdominal pain, blood in stool, constipation, diarrhea, nausea and vomiting.   Genitourinary:  Negative for dysuria.   Musculoskeletal:  Positive for arthralgias and back pain.   Neurological:  Negative for dizziness and headaches.   Psychiatric/Behavioral:  Negative for suicidal ideas.         Medication List with Changes/Refills   Current Medications    ACETAMINOPHEN (TYLENOL) 325 MG TABLET    Take 2 tablets (650 mg total) by mouth 3 (three) times daily.    ACETAMINOPHEN-CODEINE 300-30MG (TYLENOL #3) 300-30 MG TAB    Take 1 tablet by mouth every 6 (six) hours as needed (pain).    ALBUTEROL (PROVENTIL HFA) 90 MCG/ACTUATION INHALER    Inhale 2 puffs into the lungs every 6 (six) hours as needed for  Wheezing. Rescue    DOLUTEGRAVIR-LAMIVUDINE (DOVATO)  MG TAB    Take 1 tablet by mouth once daily.    ETHAMBUTOL (MYAMBUTOL) 400 MG TAB    Take 2 tablets (800 mg total) by mouth once daily.    GABAPENTIN (NEURONTIN) 400 MG CAPSULE    Take 400 mg by mouth 3 (three) times daily.    HYDROCODONE-ACETAMINOPHEN (NORCO) 7.5-325 MG PER TABLET    Take 1 tablet by mouth every 12 (twelve) hours as needed for Pain.    HYDROCODONE-ACETAMINOPHEN (NORCO) 7.5-325 MG PER TABLET    Take 1 tablet by mouth every 12 (twelve) hours as needed for Pain.    HYDROXYZINE PAMOATE (VISTARIL) 25 MG CAP    Take 25 mg by mouth every 8 (eight) hours as needed (anxiety).    METHOCARBAMOL (ROBAXIN) 500 MG TAB    Take 500 mg by mouth 4 (four) times daily as needed (muscle spasms).    MIRTAZAPINE (REMERON SOL-TAB) 15 MG DISINTEGRATING TABLET    Take 1 tablet (15 mg total) by mouth nightly.    MUPIROCIN (BACTROBAN) 2 % OINTMENT    Apply topically 3 (three) times daily.    NAPROXEN (NAPROSYN) 500 MG TABLET    Take 1 tablet (500 mg total) by mouth 2 (two) times daily. for 5 days    RIFABUTIN (MYCOBUTIN) 150 MG CAP    Take 300 mg by mouth once daily.    SULFAMETHOXAZOLE-TRIMETHOPRIM 800-160MG (BACTRIM DS) 800-160 MG TAB    Take 1 tablet by mouth 3 (three) times a week.    TIZANIDINE (ZANAFLEX) 2 MG TABLET    Take 1 tablet (2 mg total) by mouth every 8 (eight) hours as needed (muscle spasm).    VITAMIN D (VITAMIN D3) 1000 UNITS TAB    Take 1 tablet (1,000 Units total) by mouth once daily.       Patient Active Problem List   Diagnosis    Chronic pain    Depression    Hypercholesteremia    AIDS (acquired immunodeficiency syndrome), CD4 <=200    Weakness    Osteomyelitis of vertebra of thoracolumbar region    Disseminated mycobacterium avium-intracellulare complex    Vertebral abscess    Advance care planning    DNR (do not resuscitate)    Debility    Acute UTI    Closed fracture of neck of right femur with routine healing s/p hip pinning on 8/25/2024     "MRSA infection    Fusobacterium infection    Infection due to ESBL-producing Escherichia coli    Anemia due to infection    Acute blood loss anemia    Underweight    Chronic hypotension    Right ureteral stone    Osteomyelitis of spine    Problem with vascular access    Trauma    Acute midline low back pain without sciatica    HIV (human immunodeficiency virus infection)           Objective:      /62 (BP Location: Left arm, Patient Position: Sitting)   Pulse 84   Resp 17   Ht 5' 1" (1.549 m)   Wt 60.7 kg (133 lb 14.9 oz)   SpO2 100%   BMI 25.31 kg/m²   Estimated body mass index is 25.31 kg/m² as calculated from the following:    Height as of this encounter: 5' 1" (1.549 m).    Weight as of this encounter: 60.7 kg (133 lb 14.9 oz).  Physical Exam  Vitals reviewed.   Constitutional:       General: She is not in acute distress.     Appearance: Normal appearance. She is not ill-appearing.   HENT:      Head: Normocephalic and atraumatic.   Eyes:      General:         Right eye: No discharge.         Left eye: No discharge.      Conjunctiva/sclera: Conjunctivae normal.   Pulmonary:      Effort: Pulmonary effort is normal.   Musculoskeletal:         General: No deformity.   Skin:     Coloration: Skin is not jaundiced or pale.   Neurological:      General: No focal deficit present.      Mental Status: She is alert and oriented to person, place, and time.   Psychiatric:         Mood and Affect: Mood normal.         Behavior: Behavior normal.             Assessment and Plan:         Problem List Items Addressed This Visit          Neuro    Chronic pain (Chronic)    Overview     manged by Dr dowling in [place]          Other Visit Diagnoses       Lumbar back pain    -  Primary          Unable to fill out form today due to issue date being 2/23/204. Advised pt to return to [employer] for updated form and see PCP for paperwork to be filled out         Reviewed risks, benefits, and appropriate medication use " prescribed  Discussed LS changes as appropriate   Reviewed cancer screening guidelines   Advised to keep speciality and follow up appointments as scheduled   Reviewed ER precautions   Verbalized understanding and is agreeable to plan      Follow Up:   Follow up if symptoms worsen or fail to improve, for with PCP for DMV form .    Other Orders Placed This Visit:  No orders of the defined types were placed in this encounter.          Loida Chavez, ROBERTP-BC

## 2025-01-15 ENCOUNTER — PATIENT MESSAGE (OUTPATIENT)
Dept: PRIMARY CARE CLINIC | Facility: CLINIC | Age: 60
End: 2025-01-15
Payer: MEDICARE

## 2025-01-15 DIAGNOSIS — T81.49XA INCISIONAL INFECTION: ICD-10-CM

## 2025-01-15 RX ORDER — DOLUTEGRAVIR SODIUM AND LAMIVUDINE 50; 300 MG/1; MG/1
TABLET, FILM COATED ORAL
Qty: 90 TABLET | Refills: 10 | OUTPATIENT
Start: 2025-01-15

## 2025-01-15 RX ORDER — CLARITHROMYCIN 500 MG/1
TABLET, FILM COATED ORAL
Qty: 90 TABLET | Refills: 10 | OUTPATIENT
Start: 2025-01-15

## 2025-01-15 RX ORDER — RIFABUTIN 150 MG/1
CAPSULE ORAL
Qty: 180 CAPSULE | Refills: 10 | OUTPATIENT
Start: 2025-01-15

## 2025-01-15 RX ORDER — CELECOXIB 400 MG/1
CAPSULE ORAL
Qty: 180 CAPSULE | Refills: 10 | OUTPATIENT
Start: 2025-01-15

## 2025-01-15 RX ORDER — MUPIROCIN 20 MG/G
OINTMENT TOPICAL
Qty: 66 G | Refills: 10 | OUTPATIENT
Start: 2025-01-15

## 2025-01-15 RX ORDER — SULFAMETHOXAZOLE AND TRIMETHOPRIM 800; 160 MG/1; MG/1
TABLET ORAL
Qty: 51 TABLET | Refills: 10 | OUTPATIENT
Start: 2025-01-15

## 2025-01-15 RX ORDER — CYCLOBENZAPRINE HCL 5 MG
TABLET ORAL
Qty: 270 TABLET | Refills: 10 | OUTPATIENT
Start: 2025-01-15

## 2025-01-15 NOTE — TELEPHONE ENCOUNTER
No care due was identified.  [facility] Embedded Care Due Messages. Reference number: [id number].   1/15/2025 2:27:48 PM CST

## 2025-01-15 NOTE — TELEPHONE ENCOUNTER
No care due was identified.  [facility] Embedded Care Due Messages. Reference number: [id number].   1/15/2025 2:36:46 PM CST

## 2025-01-17 ENCOUNTER — TELEPHONE (OUTPATIENT)
Dept: PRIMARY CARE CLINIC | Facility: CLINIC | Age: 60
End: 2025-01-17
Payer: MEDICARE

## 2025-01-17 NOTE — TELEPHONE ENCOUNTER
----- Message from Heather sent at 1/17/2025  4:15 PM CST -----  Contact: Pt [phone] .[email or web address]     Patient is calling for Medical Advice regarding: Pt would like np to give her a call back, please.    Patient wants a call back or thru [facility]: call back    Comments:    Please advise patient replies from provider may take up to 48 hours.        Please Call and advise    Thank you    Please do NOT rep[ly to sender as this is from the call center and they answer incoming calls only.

## 2025-01-24 ENCOUNTER — OUTPATIENT CASE MANAGEMENT (OUTPATIENT)
Dept: ADMINISTRATIVE | Facility: OTHER | Age: 60
End: 2025-01-24

## 2025-01-27 ENCOUNTER — OFFICE VISIT (OUTPATIENT)
Dept: PRIMARY CARE CLINIC | Facility: CLINIC | Age: 60
End: 2025-01-27
Payer: MEDICARE

## 2025-01-27 VITALS
HEART RATE: 65 BPM | SYSTOLIC BLOOD PRESSURE: 120 MMHG | OXYGEN SATURATION: 98 % | WEIGHT: 126.13 LBS | HEIGHT: 61 IN | DIASTOLIC BLOOD PRESSURE: 78 MMHG | RESPIRATION RATE: 18 BRPM | BODY MASS INDEX: 23.81 KG/M2

## 2025-01-27 DIAGNOSIS — G89.4 CHRONIC PAIN SYNDROME: ICD-10-CM

## 2025-01-27 DIAGNOSIS — R53.81 DEBILITY: ICD-10-CM

## 2025-01-27 DIAGNOSIS — B20 AIDS (ACQUIRED IMMUNODEFICIENCY SYNDROME), CD4 <=200: ICD-10-CM

## 2025-01-27 DIAGNOSIS — Z98.1 H/O SPINAL FUSION: ICD-10-CM

## 2025-01-27 DIAGNOSIS — N20.0 NEPHROLITHIASIS: ICD-10-CM

## 2025-01-27 DIAGNOSIS — M54.50 LUMBAR BACK PAIN: Primary | ICD-10-CM

## 2025-01-27 DIAGNOSIS — Z96.641 HX OF TOTAL HIP ARTHROPLASTY, RIGHT: ICD-10-CM

## 2025-01-27 PROCEDURE — 99999 PR PBB SHADOW E&M-EST. PATIENT-LVL V: CPT | Mod: PBBFAC,,, | Performed by: STUDENT IN AN ORGANIZED HEALTH CARE EDUCATION/TRAINING PROGRAM

## 2025-01-27 PROCEDURE — 1159F MED LIST DOCD IN RCRD: CPT | Mod: CPTII,S$GLB,, | Performed by: STUDENT IN AN ORGANIZED HEALTH CARE EDUCATION/TRAINING PROGRAM

## 2025-01-27 PROCEDURE — 3074F SYST BP LT 130 MM HG: CPT | Mod: CPTII,S$GLB,, | Performed by: STUDENT IN AN ORGANIZED HEALTH CARE EDUCATION/TRAINING PROGRAM

## 2025-01-27 PROCEDURE — 3008F BODY MASS INDEX DOCD: CPT | Mod: CPTII,S$GLB,, | Performed by: STUDENT IN AN ORGANIZED HEALTH CARE EDUCATION/TRAINING PROGRAM

## 2025-01-27 PROCEDURE — 1160F RVW MEDS BY RX/DR IN RCRD: CPT | Mod: CPTII,S$GLB,, | Performed by: STUDENT IN AN ORGANIZED HEALTH CARE EDUCATION/TRAINING PROGRAM

## 2025-01-27 PROCEDURE — 3078F DIAST BP <80 MM HG: CPT | Mod: CPTII,S$GLB,, | Performed by: STUDENT IN AN ORGANIZED HEALTH CARE EDUCATION/TRAINING PROGRAM

## 2025-01-27 PROCEDURE — 99214 OFFICE O/P EST MOD 30 MIN: CPT | Mod: S$GLB,,, | Performed by: STUDENT IN AN ORGANIZED HEALTH CARE EDUCATION/TRAINING PROGRAM

## 2025-01-27 RX ORDER — HYDROCODONE BITARTRATE AND ACETAMINOPHEN 7.5; 325 MG/1; MG/1
1 TABLET ORAL EVERY 12 HOURS PRN
Qty: 70 TABLET | Refills: 0 | Status: SHIPPED | OUTPATIENT
Start: 2025-02-01

## 2025-01-27 NOTE — PROGRESS NOTES
Assessment:       1. Lumbar back pain    2. Chronic pain syndrome    3. H/O spinal fusion    4. Nephrolithiasis    5. Hx of total hip arthroplasty, right    6. Debility    7. AIDS (acquired immunodeficiency syndrome), CD4 <=200           Plan:     Assessment & Plan    IMPRESSION:  - Evaluated back pain following recent fall, considering history of spinal fusion and MAC infection  - Reviewed ER x-ray findings: no evidence of hardware loosening or failure, dextro asymmetry L2-3, multilevel disc height loss  - Assessed current pain management regimen, noting hydrocodone as primary effective treatment  - Considered orthopedic consultation for hip issues, given history of hip fracture  - Evaluated need for continuing infectious disease management at [place] due to lack of local providers    HIV:  - Advised the patient to refill one of their HIV medications.  - Continue ongoing HIV medication regimen as prescribed by the [place] doctor.  - Schedule follow-up visit on February 1st at 10:30 AM for infectious disease consultation.    BACK PAIN:  - Evaluated the patient's severe back pain and difficulty moving without a walker.  - Reviewed x-ray of lumbar spine showing spinal fusion, no evidence of hardware loosening or failure, dextro asymmetry L2-3, unchanged vertebral heights, and multilevel disc height loss.  - Assessed patient's history of back problems, including MAC infection and spinal hardware placement.  - Continued hydrocodone prescription for pain management, to be taken 4 times daily.  - Planned to refill pain medication and scheduled follow-up with pain management specialist.  - Evaluated x-ray of lumbar spine, which showed no evidence of new fractures or changes in vertebral heights.  - Noted patient's report of falling out of bed and experiencing pain.  - Prescribed hydrocodone for pain management and advised use of a walker for mobility.  - Reviewed x-ray showing presence of spinal fusion hardware.  -  Assessed patient's history of spinal fusion surgery.  - Noted patient's report of recent fall from bed.  - Inquired about circumstances of the fall and patient's ability to get up.  - Noted patient's history of hip fracture during previous hospital stay.  - Noted patient's use of a walker for mobility due to pain.    URINARY TRACT INFECTION:  - Scheduled follow-up visit on January 31st for urology consultation.  - Noted patient's report of ongoing urinary tract infection with hematuria.  - Noted patient's report of hematuria.  - Confirmed upcoming appointment with urologist for further evaluation.  - Reviewed x-ray showing presence of a right-sided double J-stent.  - Confirmed presence of a drain from the right kidney to the bladder.  - Advised patient to discuss the drain with the urologist during the upcoming appointment.    PAIN MANAGEMENT:  - Continued hydrocodone prescription for pain management, up to 2 times daily as needed.  - Refilled hydrocodone, providing 70 pills to last until pain management appointment with Dr. Garcia on March 6th.  - Scheduled follow-up visit on March 6th for pain management consultation with Dr. Garcia.  - Instructed to cancel pain management appointment with Dr. Mcdermott on February 12th.  - Discussed potential constipation as a side effect of hydrocodone, explaining rationale for pericolace prescription.  - Reviewed patient's pain medication usage history, noting an average of 1.25 pills per day over the past 5 months.             Lumbar back pain  -     HYDROcodone-acetaminophen (NORCO) 7.5-325 mg per tablet; Take 1 tablet by mouth every 12 (twelve) hours as needed for Pain.  Dispense: 70 tablet; Refill: 0  -     Ambulatory referral/consult to Orthopedics; Future; Expected date: 02/03/2025    Chronic pain syndrome    H/O spinal fusion  -     HYDROcodone-acetaminophen (NORCO) 7.5-325 mg per tablet; Take 1 tablet by mouth every 12 (twelve) hours as needed for Pain.  Dispense: 70  tablet; Refill: 0  -     Ambulatory referral/consult to Orthopedics; Future; Expected date: 02/03/2025    Nephrolithiasis    Hx of total hip arthroplasty, right  -     Ambulatory referral/consult to Orthopedics; Future; Expected date: 02/03/2025    Debility    AIDS (acquired immunodeficiency syndrome), CD4 <=200                This note was generated with the assistance of ambient listening technology. Verbal consent was obtained by the patient and accompanying visitor(s) for the recording of patient appointment to facilitate this note. I attest to having reviewed and edited the generated note for accuracy, though some syntax or spelling errors may persist. Please contact the author of this note for any clarification.      Subjective:           Patient ID: Amie Salmeron   Age:  60 y.o.  Sex: female     Chief Complaint:   Follow-up and Back Pain      History of Present Illness:    Amie Salmeron is a 60 y.o. female who presents today with a chief complaint of Follow-up and Back Pain  .      Having back pain, states   Taking hydrocodone/acetaminophen 7.5/325mg up to 4 times daily.     States that will see Dr. Harley on 3/6/25.   Also has an appt with Dr. Pierce Mcdermott on 2/12/25.          History of Present Illness    CHIEF COMPLAINT:  Amie presents today for medication refill and back pain.    RECENT INJURY:  She fell out of bed while sleeping on Saturday night and required assistance from her brother to get up. Due to persistent pain, she went to the emergency room Sunday morning for evaluation.    BACK PAIN:  She presents with severe back pain requiring walker use for mobility assistance, particularly during pain flares. She has a history of MAC infection that resulted in back surgery with hardware placement. During that hospitalization, she also sustained a hip fracture.    GENITOURINARY:  She reports hematuria and suspects a urinary tract infection. She has a right kidney stent in place pending urology  "evaluation.    CURRENT MEDICATIONS:  She takes hydrocodone for pain management, reporting it is the only medication that provides relief. She is also on HIV medications, with one medication requiring refill.      ROS:  Genitourinary: +hematuria  Musculoskeletal: +back pain           Review of Systems   Constitutional:  Negative for activity change, fatigue, fever and unexpected weight change.   HENT:  Negative for congestion, nosebleeds, sinus pressure and sneezing.    Respiratory:  Negative for cough, shortness of breath and wheezing.    Cardiovascular:  Negative for chest pain, palpitations and leg swelling.   Gastrointestinal:  Negative for abdominal distention, constipation, diarrhea and nausea.   Genitourinary:  Positive for frequency and hematuria. Negative for difficulty urinating and dysuria.   Musculoskeletal:  Positive for arthralgias, back pain, gait problem and myalgias.   Skin:  Negative for pallor and rash.   Neurological:  Negative for weakness, numbness and headaches.   Psychiatric/Behavioral:  Negative for agitation. The patient is nervous/anxious.            Objective:        Vitals:    01/27/25 1400   BP: 120/78   BP Location: Right arm   Patient Position: Sitting   Pulse: 65   Resp: 18   SpO2: 98%   Weight: 57.2 kg (126 lb 1.7 oz)   Height: 5' 1" (1.549 m)       Body mass index is 23.83 kg/m².      Physical Exam  Vitals reviewed.   Constitutional:       General: She is not in acute distress.     Appearance: Normal appearance. She is not ill-appearing.   HENT:      Head: Normocephalic and atraumatic.      Right Ear: External ear normal.      Left Ear: External ear normal.   Eyes:      General:         Right eye: No discharge.         Left eye: No discharge.      Conjunctiva/sclera: Conjunctivae normal.   Cardiovascular:      Rate and Rhythm: Normal rate and regular rhythm.      Pulses: Normal pulses.      Heart sounds: No murmur heard.  Pulmonary:      Effort: Pulmonary effort is normal.      " Breath sounds: No wheezing.   Abdominal:      General: Abdomen is flat.   Musculoskeletal:         General: Tenderness present. No deformity.   Skin:     Capillary Refill: Capillary refill takes less than 2 seconds.      Coloration: Skin is not jaundiced or pale.   Neurological:      General: No focal deficit present.      Mental Status: She is alert and oriented to person, place, and time.   Psychiatric:      Comments: Focused on pain and recent injury.    Memory not always intact.          Physical Exam    Back: Tenderness in sacrum.  Musculoskeletal: Joint looks pretty good. Little cam deformity.               Past Medical History:   Diagnosis Date    Allergy     Arthritis     Asthma     Candida esophagitis 11/15/2022    Chronic pain     HIV infection     Hypertension     Overactive bladder     Septic shock 08/12/2024    Spinal stenosis     Urine incontinence        Lab Results   Component Value Date     12/27/2024    K 4.0 12/27/2024     12/27/2024    CO2 24 12/27/2024    BUN 17 12/27/2024    CREATININE 0.7 12/27/2024    GLUCOSE 120 (H) 02/20/2023    ANIONGAP 10 12/27/2024     Lab Results   Component Value Date    HGBA1C 4.5 (L) 10/24/2024    HGBA1C 4.9 08/25/2024     Lab Results   Component Value Date     (H) 08/11/2024    BNP 98 06/15/2024    BNP 47 12/15/2022       Lab Results   Component Value Date    WBC 9.13 12/27/2024    HGB 12.1 12/27/2024    HCT 38.8 12/27/2024    HCT 27 (L) 08/25/2024     12/27/2024    GRAN 5.0 12/27/2024    GRAN 55.2 12/27/2024     Lab Results   Component Value Date    CHOL 154 10/24/2024    CHOL 234 (H) 04/15/2015    HDL 48 10/24/2024    HDL 45 04/15/2015    LDLCALC 78 10/24/2024    LDLCALC 156.0 04/15/2015    TRIG 142 10/24/2024    TRIG 165 (H) 04/15/2015        Outpatient Encounter Medications as of 1/27/2025   Medication Sig Dispense Refill    acetaminophen (TYLENOL) 325 MG tablet Take 2 tablets (650 mg total) by mouth 3 (three) times daily.       albuterol (PROVENTIL HFA) 90 mcg/actuation inhaler Inhale 2 puffs into the lungs every 6 (six) hours as needed for Wheezing. Rescue 18 g 0    dolutegravir-lamivudine (DOVATO)  mg Tab Take 1 tablet by mouth once daily.      ethambutoL (MYAMBUTOL) 400 MG Tab Take 2 tablets (800 mg total) by mouth once daily.      gabapentin (NEURONTIN) 400 MG capsule Take 400 mg by mouth 3 (three) times daily.      ibuprofen (ADVIL,MOTRIN) 800 MG tablet Take 1 tablet (800 mg total) by mouth every 6 (six) hours as needed for Pain. 20 tablet 0    methocarbamoL (ROBAXIN) 500 MG Tab Take 500 mg by mouth 4 (four) times daily as needed (muscle spasms).      mirtazapine (REMERON SOL-TAB) 15 MG disintegrating tablet Take 1 tablet (15 mg total) by mouth nightly. 30 tablet 5    mupirocin (BACTROBAN) 2 % ointment Apply topically 3 (three) times daily. 15 g 0    rifabutin (MYCOBUTIN) 150 mg Cap Take 300 mg by mouth once daily.      vitamin D (VITAMIN D3) 1000 units Tab Take 1 tablet (1,000 Units total) by mouth once daily.      [DISCONTINUED] HYDROcodone-acetaminophen (NORCO) 7.5-325 mg per tablet Take 1 tablet by mouth every 12 (twelve) hours as needed for Pain. 20 tablet 0    [START ON 2/1/2025] HYDROcodone-acetaminophen (NORCO) 7.5-325 mg per tablet Take 1 tablet by mouth every 12 (twelve) hours as needed for Pain. 70 tablet 0    hydrOXYzine pamoate (VISTARIL) 25 MG Cap Take 25 mg by mouth every 8 (eight) hours as needed (anxiety). (Patient not taking: Reported on 1/27/2025)      senna-docusate 8.6-50 mg (PERICOLACE) 8.6-50 mg per tablet Take 2 tablets by mouth once daily. (Patient not taking: Reported on 1/27/2025) 60 tablet 1    sulfamethoxazole-trimethoprim 800-160mg (BACTRIM DS) 800-160 mg Tab Take 1 tablet by mouth 3 (three) times a week. (Patient not taking: Reported on 1/27/2025)      [DISCONTINUED] HYDROcodone-acetaminophen (NORCO) 7.5-325 mg per tablet Take 1 tablet by mouth every 12 (twelve) hours as needed for Pain. (Patient  not taking: Reported on 1/7/2025) 60 tablet 0     No facility-administered encounter medications on file as of 1/27/2025.

## 2025-01-28 ENCOUNTER — OFFICE VISIT (OUTPATIENT)
Dept: UROLOGY | Facility: CLINIC | Age: 60
End: 2025-01-28
Payer: MEDICARE

## 2025-01-28 VITALS
WEIGHT: 126.63 LBS | BODY MASS INDEX: 23.91 KG/M2 | DIASTOLIC BLOOD PRESSURE: 67 MMHG | SYSTOLIC BLOOD PRESSURE: 104 MMHG | HEART RATE: 73 BPM | HEIGHT: 61 IN

## 2025-01-28 DIAGNOSIS — Z87.440 HISTORY OF UTI: Primary | ICD-10-CM

## 2025-01-28 DIAGNOSIS — N20.1 LEFT URETERAL CALCULUS: ICD-10-CM

## 2025-01-28 DIAGNOSIS — R31.0 HEMATURIA, GROSS: ICD-10-CM

## 2025-01-28 DIAGNOSIS — Z96.0 URETERAL STENT PRESENT: ICD-10-CM

## 2025-01-28 PROCEDURE — 1160F RVW MEDS BY RX/DR IN RCRD: CPT | Mod: CPTII,S$GLB,, | Performed by: NURSE PRACTITIONER

## 2025-01-28 PROCEDURE — 99214 OFFICE O/P EST MOD 30 MIN: CPT | Mod: S$GLB,,, | Performed by: NURSE PRACTITIONER

## 2025-01-28 PROCEDURE — 87086 URINE CULTURE/COLONY COUNT: CPT | Performed by: NURSE PRACTITIONER

## 2025-01-28 PROCEDURE — 99999 PR PBB SHADOW E&M-EST. PATIENT-LVL V: CPT | Mod: PBBFAC,,, | Performed by: NURSE PRACTITIONER

## 2025-01-28 PROCEDURE — 3078F DIAST BP <80 MM HG: CPT | Mod: CPTII,S$GLB,, | Performed by: NURSE PRACTITIONER

## 2025-01-28 PROCEDURE — 3008F BODY MASS INDEX DOCD: CPT | Mod: CPTII,S$GLB,, | Performed by: NURSE PRACTITIONER

## 2025-01-28 PROCEDURE — 3074F SYST BP LT 130 MM HG: CPT | Mod: CPTII,S$GLB,, | Performed by: NURSE PRACTITIONER

## 2025-01-28 PROCEDURE — 1159F MED LIST DOCD IN RCRD: CPT | Mod: CPTII,S$GLB,, | Performed by: NURSE PRACTITIONER

## 2025-01-28 RX ORDER — CIPROFLOXACIN 2 MG/ML
400 INJECTION, SOLUTION INTRAVENOUS
OUTPATIENT
Start: 2025-01-28

## 2025-01-28 NOTE — H&P
"Subjective:      Amie Salmeron is a 60 y.o. female who returns today regarding right ureter stone/stent .     58 yo F admitted at [facility] with fevers, hematuria, dysuria. CT RSS shows 4 mm right proximal ureteral stone without hydronephrosis, plus multiple small bilateral renal stones. Febrile inpatient to 100.4. WBC WNL. UA not overly concerning for infection, was not sent for culture.      She underwent cystoscopy with right ureteral stent placement with Dr. Duong 12/9. Subsequently discharged with IV antibiotics.  She presented to ED multiple times with various complaints.  She presents today to schedule definitive stone treatment.  She denies fever.  Reports little to no urine output over the past day.  Denies significant/worsening flank pain.  Denies dysuria.  Does report gross hematuria.     The following portions of the patient's history were reviewed and updated as appropriate: allergies, current medications, past family history, past medical history, past social history, past surgical history and problem list.     Review of Systems  A comprehensive multipoint review of systems was negative except as otherwise stated in the HPI.     Objective:   Vitals: /67 (BP Location: Left arm, Patient Position: Sitting)   Pulse 73   Ht 5' 1" (1.549 m)   Wt 57.5 kg (126 lb 10.5 oz)   BMI 23.93 kg/m²         Physical Exam   General: alert and oriented, no acute distress  Head: normocephalic, atraumatic  Neck: supple, no lymphadenopathy, normal ROM, no masses  Respiratory: Symmetric expansion, non-labored breathing  Cardiovascular: regular rate and rhythm, nomal pulses, no peripheral edema  Skin: normal coloration and turgor, no rashes, no suspicious skin lesions noted  Neuro: alert and oriented x3, no gross deficits  Psych: normal judgment and insight, normal mood/affect, and non-anxious     Lab Review   Urinalysis demonstrates no ua         Lab Results   Component Value Date     WBC 9.13 12/27/2024     HGB " 12.1 12/27/2024     HCT 38.8 12/27/2024     HCT 27 (L) 08/25/2024     MCV 84 12/27/2024      12/27/2024            Lab Results   Component Value Date     CREATININE 0.7 12/27/2024     BUN 17 12/27/2024         Imaging   CT Renal Stone Study ABD Pelvis WO     Narrative  EXAMINATION:  CT RENAL STONE STUDY ABD PELVIS WO     CLINICAL HISTORY:  gross hematuria;     TECHNIQUE:  Low dose axial CT images obtained throughout the region of the abdomen and pelvis without the use of intravenous contrast.  Axial, sagittal and coronal reconstructions were performed.     COMPARISON:  08/21/2024     FINDINGS:  Decreased size left pleural effusion.  Some persistent pleural thickening at the left lung base with interval removal of the prior percutaneous drainage catheter.  Unchanged nodule in the lateral left lung base measuring up to 1.2 cm.     Noncontrast evaluation of the liver, spleen, pancreas and adrenal glands appears within normal limits.  Prior cholecystectomy.     The kidneys are similar in size and configuration of the prior.  Multiple nonobstructing renal calculi bilaterally, 6 on the right and 4 on the left.  5 mm stone in the proximal right ureter.  No significant associated hydronephrosis left ureter decompressed.  Urinary bladder decompressed.     Prior hysterectomy.     Persistent ill-defined soft tissue in the upper right retroperitoneum.  This is partially obscured by streak artifact from surgical hardware.     Limited assessment of the gastrointestinal tract without the use of oral contrast. The stomach, small bowel and colon demonstrate no evidence of obstruction or focal inflammation.  Postsurgical change of the small bowel.     No free air or free fluid identified within the abdomen or pelvis.     Aorta tapers normally throughout its visualized course.     Postsurgical change in the spine with interbody strut graft extending from T11 inferior endplate through superior endplate L2 with instrumented  posterior spinal fusion including T11-L2 pedicle screws and posterior fusion rods.  There is some new lucency involving the T11 inferior endplate adjacent to the struck graft suggesting some mechanical loosening.  Scattered degenerative change elsewhere.  Remote postsurgical change proximal right femur.  No new displaced fracture.     Impression  5 mm stone in the proximal right ureter with minimal any associated hydronephrosis.     Additional bilateral nonobstructing renal calculi.     Postsurgical change in the spine with new endplate erosive changes of the T11 vertebral body adjacent to the interbody strut graft suggesting mechanical loosening at this site.     Persistent ill-defined soft tissue in the upper retroperitoneum.     Additional unchanged incidental findings as further discussed above.     This report was flagged in Epic as abnormal.        Electronically signed by:Isra Jasso MD  Date:                                        12/08/2024  Time:                                                    15:22           Assessment and Plan:   1. History of UTI  4. Hematuria, gross  - Urine Culture High Risk; Future  - US Bladder; Future  --reports little to no urine output in the past 2 days.  Denies dysuria, suprapubic pressure/discomfort.  Bladder scan not available in clinic will send for ultrasound to assess PVR     2. Ureteral stent present  3. Left ureteral calculus  The patient is scheduled for ureteroscopy. The risks and benefits of ureteroscopy were discussed with the patient in detail.  Consent was obtained.  The risks include but are not limited to burning with urination, bleeding, infection, pain, incomplete fragmentation of the stone, need for further procedures, injury to the kidney, ureter, bladder and need for open surgery.  The patient was informed that they may require a ureteral stent and that stents can cause irritative voiding symptoms.  They also understand that ureteral stents are  temporary and must be removed or exchanged in a timely fashion as they can calcify and make more stones and become difficult to remove. Alternative treatments were also discussed with the patient in detail to include ESWL, percutaneous treatment of the stone, open surgery or observation. Patient understands these risks and has agreed to proceed with surgery.   - Place in Outpatient; Standing  - Case Request Operating Room: CYSTOURETEROSCOPY, WITH HOLMIUM LASER LITHOTRIPSY OF URETERAL CALCULUS AND STENT INSERTION  - Vital Signs ; Standing  - Diet NPO; Standing  - Place sequential compression device; Standing  - Diet NPO        --proceed with definitive stone treatment with Dr. Duong at [facility] on 02/11         This note is dictated on M*Modal word recognition program.  There are word recognition mistakes that are occasionally missed on review.

## 2025-01-28 NOTE — PROGRESS NOTES
"Subjective:      Amie Salmeron is a 60 y.o. female who returns today regarding right ureter stone/stent .    60 yo F admitted at [facility] with fevers, hematuria, dysuria. CT RSS shows 4 mm right proximal ureteral stone without hydronephrosis, plus multiple small bilateral renal stones. Febrile inpatient to 100.4. WBC WNL. UA not overly concerning for infection, was not sent for culture.     She underwent cystoscopy with right ureteral stent placement with Dr. Duong 12/9. Subsequently discharged with IV antibiotics.  She presented to ED multiple times with various complaints.  She presents today to schedule definitive stone treatment.  She denies fever.  Reports little to no urine output over the past day.  Denies significant/worsening flank pain.  Denies dysuria.  Does report gross hematuria.    The following portions of the patient's history were reviewed and updated as appropriate: allergies, current medications, past family history, past medical history, past social history, past surgical history and problem list.    Review of Systems  A comprehensive multipoint review of systems was negative except as otherwise stated in the HPI.     Objective:   Vitals: /67 (BP Location: Left arm, Patient Position: Sitting)   Pulse 73   Ht 5' 1" (1.549 m)   Wt 57.5 kg (126 lb 10.5 oz)   BMI 23.93 kg/m²       Physical Exam   General: alert and oriented, no acute distress  Head: normocephalic, atraumatic  Neck: supple, no lymphadenopathy, normal ROM, no masses  Respiratory: Symmetric expansion, non-labored breathing  Cardiovascular: regular rate and rhythm, nomal pulses, no peripheral edema  Skin: normal coloration and turgor, no rashes, no suspicious skin lesions noted  Neuro: alert and oriented x3, no gross deficits  Psych: normal judgment and insight, normal mood/affect, and non-anxious    Lab Review   Urinalysis demonstrates no ua   Lab Results   Component Value Date    WBC 9.13 12/27/2024    HGB 12.1 12/27/2024 "    HCT 38.8 12/27/2024    HCT 27 (L) 08/25/2024    MCV 84 12/27/2024     12/27/2024     Lab Results   Component Value Date    CREATININE 0.7 12/27/2024    BUN 17 12/27/2024       Imaging   CT Renal Stone Study ABD Pelvis WO    Narrative  EXAMINATION:  CT RENAL STONE STUDY ABD PELVIS WO    CLINICAL HISTORY:  gross hematuria;    TECHNIQUE:  Low dose axial CT images obtained throughout the region of the abdomen and pelvis without the use of intravenous contrast.  Axial, sagittal and coronal reconstructions were performed.    COMPARISON:  08/21/2024    FINDINGS:  Decreased size left pleural effusion.  Some persistent pleural thickening at the left lung base with interval removal of the prior percutaneous drainage catheter.  Unchanged nodule in the lateral left lung base measuring up to 1.2 cm.    Noncontrast evaluation of the liver, spleen, pancreas and adrenal glands appears within normal limits.  Prior cholecystectomy.    The kidneys are similar in size and configuration of the prior.  Multiple nonobstructing renal calculi bilaterally, 6 on the right and 4 on the left.  5 mm stone in the proximal right ureter.  No significant associated hydronephrosis left ureter decompressed.  Urinary bladder decompressed.    Prior hysterectomy.    Persistent ill-defined soft tissue in the upper right retroperitoneum.  This is partially obscured by streak artifact from surgical hardware.    Limited assessment of the gastrointestinal tract without the use of oral contrast. The stomach, small bowel and colon demonstrate no evidence of obstruction or focal inflammation.  Postsurgical change of the small bowel.    No free air or free fluid identified within the abdomen or pelvis.    Aorta tapers normally throughout its visualized course.    Postsurgical change in the spine with interbody strut graft extending from T11 inferior endplate through superior endplate L2 with instrumented posterior spinal fusion including T11-L2 pedicle  screws and posterior fusion rods.  There is some new lucency involving the T11 inferior endplate adjacent to the struck graft suggesting some mechanical loosening.  Scattered degenerative change elsewhere.  Remote postsurgical change proximal right femur.  No new displaced fracture.    Impression  5 mm stone in the proximal right ureter with minimal any associated hydronephrosis.    Additional bilateral nonobstructing renal calculi.    Postsurgical change in the spine with new endplate erosive changes of the T11 vertebral body adjacent to the interbody strut graft suggesting mechanical loosening at this site.    Persistent ill-defined soft tissue in the upper retroperitoneum.    Additional unchanged incidental findings as further discussed above.    This report was flagged in Epic as abnormal.      Electronically signed by: Isra Jasso MD  Date:    12/08/2024  Time:    15:22        Assessment and Plan:   1. History of UTI  4. Hematuria, gross  - Urine Culture High Risk; Future  - US Bladder; Future  --reports little to no urine output in the past 2 days.  Denies dysuria, suprapubic pressure/discomfort.  Bladder scan not available in clinic will send for ultrasound to assess PVR    2. Ureteral stent present  3. Left ureteral calculus  The patient is scheduled for ureteroscopy. The risks and benefits of ureteroscopy were discussed with the patient in detail.  Consent was obtained.  The risks include but are not limited to burning with urination, bleeding, infection, pain, incomplete fragmentation of the stone, need for further procedures, injury to the kidney, ureter, bladder and need for open surgery.  The patient was informed that they may require a ureteral stent and that stents can cause irritative voiding symptoms.  They also understand that ureteral stents are temporary and must be removed or exchanged in a timely fashion as they can calcify and make more stones and become difficult to remove. Alternative  treatments were also discussed with the patient in detail to include ESWL, percutaneous treatment of the stone, open surgery or observation. Patient understands these risks and has agreed to proceed with surgery.   - Place in Outpatient; Standing  - Case Request Operating Room: CYSTOURETEROSCOPY, WITH HOLMIUM LASER LITHOTRIPSY OF URETERAL CALCULUS AND STENT INSERTION  - Vital Signs ; Standing  - Diet NPO; Standing  - Place sequential compression device; Standing  - Diet NPO      --proceed with definitive stone treatment with Dr. Duong at [facility] on 02/11       This note is dictated on M*Modal word recognition program.  There are word recognition mistakes that are occasionally missed on review.

## 2025-01-29 ENCOUNTER — PATIENT MESSAGE (OUTPATIENT)
Dept: UROLOGY | Facility: CLINIC | Age: 60
End: 2025-01-29
Payer: COMMERCIAL

## 2025-01-29 LAB
BACTERIA UR CULT: NORMAL
BACTERIA UR CULT: NORMAL

## 2025-02-04 ENCOUNTER — TELEPHONE (OUTPATIENT)
Dept: UROLOGY | Facility: CLINIC | Age: 60
End: 2025-02-04
Payer: COMMERCIAL

## 2025-02-10 ENCOUNTER — TELEPHONE (OUTPATIENT)
Dept: UROLOGY | Facility: CLINIC | Age: 60
End: 2025-02-10
Payer: MEDICARE

## 2025-02-10 NOTE — TELEPHONE ENCOUNTER
Spoke with patient provided patient with arrival time.  Answered all of patient questions.  Patient verbalized understanding.    MENG Vaz

## 2025-02-10 NOTE — TELEPHONE ENCOUNTER
----- Message from Sandy sent at 2/10/2025 11:17 AM CST -----  Regarding: Arrival time needed  Contact: [phone]  Hi,    Who called: The pt       Reason:Arrival time needed, pt is not sure when the procedure date is. Pls call [phone].      Provider's name: Dr. Duong      Additional Information: Thank you.

## 2025-02-11 PROBLEM — T88.4XXA HARD TO INTUBATE: Status: ACTIVE | Noted: 2025-02-11

## 2025-02-14 ENCOUNTER — OFFICE VISIT (OUTPATIENT)
Dept: PRIMARY CARE CLINIC | Facility: CLINIC | Age: 60
End: 2025-02-14
Payer: MEDICARE

## 2025-02-14 VITALS
HEIGHT: 61 IN | RESPIRATION RATE: 16 BRPM | TEMPERATURE: 98 F | DIASTOLIC BLOOD PRESSURE: 58 MMHG | BODY MASS INDEX: 26.68 KG/M2 | HEART RATE: 85 BPM | OXYGEN SATURATION: 98 % | WEIGHT: 141.31 LBS | SYSTOLIC BLOOD PRESSURE: 90 MMHG

## 2025-02-14 DIAGNOSIS — B20 AIDS (ACQUIRED IMMUNODEFICIENCY SYNDROME), CD4 <=200: ICD-10-CM

## 2025-02-14 DIAGNOSIS — M54.50 LUMBAR BACK PAIN: ICD-10-CM

## 2025-02-14 DIAGNOSIS — G89.4 CHRONIC PAIN SYNDROME: Primary | ICD-10-CM

## 2025-02-14 DIAGNOSIS — N20.0 NEPHROLITHIASIS: ICD-10-CM

## 2025-02-14 PROCEDURE — 99999 PR PBB SHADOW E&M-EST. PATIENT-LVL V: CPT | Mod: PBBFAC,,, | Performed by: STUDENT IN AN ORGANIZED HEALTH CARE EDUCATION/TRAINING PROGRAM

## 2025-02-14 RX ORDER — CLARITHROMYCIN 500 MG/1
500 TABLET, FILM COATED ORAL DAILY
COMMUNITY
Start: 2025-02-03

## 2025-02-14 NOTE — PROGRESS NOTES
Assessment:       1. Chronic pain syndrome    2. Lumbar back pain    3. Nephrolithiasis    4. AIDS (acquired immunodeficiency syndrome), CD4 <=200           Plan:     Assessment & Plan    IMPRESSION:  - Reviewed patient's recent lithotripsy procedure and urethral stent placement  - Assessed patient's ability to drive safely based on physical capabilities and medical history  - Considered patient's chronic lower back pain and consistently low blood pressure  - Noted patient's long-term HIV positive status without associated depression  - Evaluated current use of Remeron, potentially prescribed for sleep quality rather than depression    PLAN SUMMARY:  - Continue Remeron 1 tablet nightly  - Continue Flomax at current dose  - Follow up with Dr. Garcia on March 6th  - Contact urologist's office to discuss concerns about urethral stent self-removal  - Contact office if issues arise before next appointment    - Explained the typical location and purpose of urethral stent strings.  - Discussed the general process of stent removal, including the length of the stent and potential discomfort.  - Amie to contact urologist's office to discuss concerns about self-removal of urethral stent.  - Continued Flomax at current dose.  - Continued Remeron 1 tablet nightly.  - Follow up with Dr. Garcia on March 6th.  - Contact the office if any issues arise before the next appointment.             Chronic pain syndrome    Lumbar back pain    Nephrolithiasis    AIDS (acquired immunodeficiency syndrome), CD4 <=200                This note was generated with the assistance of ambient listening technology. Verbal consent was obtained by the patient and accompanying visitor(s) for the recording of patient appointment to facilitate this note. I attest to having reviewed and edited the generated note for accuracy, though some syntax or spelling errors may persist. Please contact the author of this note for any  clarification.      Subjective:           Patient ID: Amie Salmeron   Age:  60 y.o.  Sex: female     Chief Complaint:   Hospital Follow Up (Nephrolithiasis )      History of Present Illness:    Amie Salmeron is a 60 y.o. female who presents today with a chief complaint of Hospital Follow Up (Nephrolithiasis )  .    Patient states she has recinded a power of  with a , though the POA was never really done anyway per pt and her niece, Janett Crandall.                      History of Present Illness    CHIEF COMPLAINT:  Amie presents today for follow up.    SURGICAL HISTORY:  She underwent lithotripsy with urethral stent placement on February 11th.    MEDICAL HISTORY:  She has been HIV positive for approximately 30 years. She has chronic lower back pain and consistently low blood pressure.    CURRENT MEDICATIONS:  She takes Flomax and Remeron (one tablet nightly).    SOCIAL HISTORY:  She denies alcohol use and is currently not driving.    REVIEW OF SYSTEMS:  She denies chest pain, shortness of breath, syncope, dizziness, lightheadedness, hearing impairment, and seizures.      ROS:  Cardiovascular: -chest pain  Respiratory: -shortness of breath  Musculoskeletal: +back pain  Neurological: -dizziness           Review of Systems   Constitutional:  Negative for activity change, fatigue, fever and unexpected weight change.   HENT:  Negative for congestion, nosebleeds, sinus pressure and sneezing.    Respiratory:  Negative for cough, shortness of breath and wheezing.    Cardiovascular:  Negative for chest pain, palpitations and leg swelling.   Gastrointestinal:  Negative for abdominal distention, constipation, diarrhea and nausea.   Genitourinary:  Negative for difficulty urinating, dysuria, frequency and hematuria.   Musculoskeletal:  Positive for arthralgias, back pain, gait problem and myalgias.   Skin:  Negative for pallor and rash.   Neurological:  Negative for weakness, numbness and headaches.  "  Psychiatric/Behavioral:  Negative for agitation and sleep disturbance. The patient is not nervous/anxious.            Objective:        Vitals:    02/14/25 0928   BP: (!) 90/58   BP Location: Right arm   Patient Position: Sitting   Pulse: 85   Resp: 16   Temp: 98.4 °F (36.9 °C)   TempSrc: Oral   SpO2: 98%   Weight: 64.1 kg (141 lb 5 oz)   Height: 5' 1" (1.549 m)       Body mass index is 26.7 kg/m².      Physical Exam  Vitals reviewed.   Constitutional:       General: She is not in acute distress.     Appearance: Normal appearance.   HENT:      Head: Normocephalic and atraumatic.      Right Ear: External ear normal.      Left Ear: External ear normal.   Eyes:      General:         Right eye: No discharge.         Left eye: No discharge.      Conjunctiva/sclera: Conjunctivae normal.   Cardiovascular:      Rate and Rhythm: Normal rate and regular rhythm.      Pulses: Normal pulses.   Pulmonary:      Effort: Pulmonary effort is normal.      Breath sounds: No wheezing.   Abdominal:      General: Abdomen is flat.   Musculoskeletal:         General: Tenderness present. No deformity.   Skin:     Capillary Refill: Capillary refill takes less than 2 seconds.      Coloration: Skin is not jaundiced or pale.   Neurological:      General: No focal deficit present.      Mental Status: She is alert and oriented to person, place, and time.   Psychiatric:      Comments: Does not complain of pain today.  Is able to give good history of recent care and explain confusing situation about her POA.          Physical Exam    Vitals: Blood pressure: 90. Pulse rate: 85.               Past Medical History:   Diagnosis Date    Allergy     Arthritis     Asthma     Candida esophagitis 11/15/2022    Chronic pain     HIV infection     Hypertension     Overactive bladder     Septic shock 08/12/2024    Spinal stenosis     Urine incontinence        Lab Results   Component Value Date     12/27/2024    K 4.0 12/27/2024     12/27/2024    " CO2 24 12/27/2024    BUN 17 12/27/2024    CREATININE 0.7 12/27/2024    GLUCOSE 83 11/26/2024    ANIONGAP 10 12/27/2024     Lab Results   Component Value Date    HGBA1C 4.5 (L) 10/24/2024    HGBA1C 4.9 08/25/2024     Lab Results   Component Value Date     (H) 08/11/2024    BNP 98 06/15/2024    BNP 47 12/15/2022       Lab Results   Component Value Date    WBC 9.13 12/27/2024    HGB 12.1 12/27/2024    HCT 38.8 12/27/2024    HCT 27 (L) 08/25/2024     12/27/2024    GRAN 5.0 12/27/2024    GRAN 55.2 12/27/2024     Lab Results   Component Value Date    CHOL 154 10/24/2024    CHOL 234 (H) 04/15/2015    HDL 48 10/24/2024    HDL 45 04/15/2015    LDLCALC 78 10/24/2024    LDLCALC 156.0 04/15/2015    TRIG 142 10/24/2024    TRIG 165 (H) 04/15/2015        Outpatient Encounter Medications as of 2/14/2025   Medication Sig Dispense Refill    acetaminophen (TYLENOL) 325 MG tablet Take 2 tablets (650 mg total) by mouth 3 (three) times daily.      albuterol (PROVENTIL HFA) 90 mcg/actuation inhaler Inhale 2 puffs into the lungs every 6 (six) hours as needed for Wheezing. Rescue 18 g 0    almlzgxzt-xlnwedax-xvopdno ala (BIKTARVY) -25 mg (25 kg or greater) Take 1 tablet by mouth once daily.      clarithromycin (BIAXIN) 500 MG tablet Take 500 mg by mouth once daily.      dolutegravir-lamivudine (DOVATO)  mg Tab Take 1 tablet by mouth once daily.      ethambutoL (MYAMBUTOL) 400 MG Tab Take 2 tablets (800 mg total) by mouth once daily.      gabapentin (NEURONTIN) 400 MG capsule Take 400 mg by mouth 3 (three) times daily.      HYDROcodone-acetaminophen (NORCO) 7.5-325 mg per tablet Take 1 tablet by mouth every 12 (twelve) hours as needed for Pain. 70 tablet 0    hydrOXYzine pamoate (VISTARIL) 25 MG Cap Take 25 mg by mouth every 8 (eight) hours as needed (anxiety).      ibuprofen (ADVIL,MOTRIN) 800 MG tablet Take 1 tablet (800 mg total) by mouth every 6 (six) hours as needed for Pain. 20 tablet 0    methocarbamoL  (ROBAXIN) 500 MG Tab Take 500 mg by mouth 4 (four) times daily as needed (muscle spasms).      mirtazapine (REMERON SOL-TAB) 15 MG disintegrating tablet Take 1 tablet (15 mg total) by mouth nightly. 30 tablet 5    mupirocin (BACTROBAN) 2 % ointment Apply topically 3 (three) times daily. 15 g 0    oxybutynin (DITROPAN) 5 MG Tab Take 1 tablet (5 mg total) by mouth 3 (three) times daily as needed (bladder spasms (pain in bladder, urge to urinate)). 90 tablet 11    phenazopyridine (PYRIDIUM) 200 MG tablet Take 1 tablet (200 mg total) by mouth 3 (three) times daily as needed for Pain. 9 tablet 0    rifabutin (MYCOBUTIN) 150 mg Cap Take 300 mg by mouth once daily.      senna-docusate 8.6-50 mg (PERICOLACE) 8.6-50 mg per tablet Take 2 tablets by mouth once daily. 60 tablet 1    sulfamethoxazole-trimethoprim 800-160mg (BACTRIM DS) 800-160 mg Tab Take 1 tablet by mouth 3 (three) times a week.      tamsulosin (FLOMAX) 0.4 mg Cap Take 1 capsule (0.4 mg total) by mouth once daily. 30 capsule 0    vitamin D (VITAMIN D3) 1000 units Tab Take 1 tablet (1,000 Units total) by mouth once daily.      [DISCONTINUED] ibuprofen (ADVIL,MOTRIN) 600 MG tablet Take 1 tablet (600 mg total) by mouth every 8 (eight) hours as needed. 30 tablet 0     No facility-administered encounter medications on file as of 2/14/2025.

## 2025-02-18 ENCOUNTER — TELEPHONE (OUTPATIENT)
Dept: UROLOGY | Facility: CLINIC | Age: 60
End: 2025-02-18
Payer: MEDICARE

## 2025-02-18 NOTE — TELEPHONE ENCOUNTER
Spoke with patient.  Patient states she was told to take out stent on 2/14/2025.  Patient states she was not able to locate strings and called family member for assistance.  Patient states family member did not want to help and called on 2/18/2025 asking if staff can take it out.  Advised patient she would not be able to come today but I can schedule her tomorrow (2/19).  Patient accepted appt.    MENG Vaz

## 2025-02-18 NOTE — TELEPHONE ENCOUNTER
"----- Message from Lino sent at 2/18/2025  3:49 PM CST -----  Regarding: call back  Consult/Advisory:  Name Of Caller: Self Contact Preference?:[phone]  What is the nature of the call?: Calling to speak w/ someone in regards to her stent requesting call back  Additional Notes:"Thank you for all that you do for our patients"  "

## 2025-02-25 ENCOUNTER — OFFICE VISIT (OUTPATIENT)
Dept: PRIMARY CARE CLINIC | Facility: CLINIC | Age: 60
End: 2025-02-25
Payer: MEDICARE

## 2025-02-25 ENCOUNTER — RESULTS FOLLOW-UP (OUTPATIENT)
Dept: PRIMARY CARE CLINIC | Facility: CLINIC | Age: 60
End: 2025-02-25

## 2025-02-25 VITALS
RESPIRATION RATE: 16 BRPM | SYSTOLIC BLOOD PRESSURE: 100 MMHG | DIASTOLIC BLOOD PRESSURE: 70 MMHG | HEIGHT: 61 IN | OXYGEN SATURATION: 97 % | TEMPERATURE: 98 F | HEART RATE: 85 BPM | BODY MASS INDEX: 26.22 KG/M2 | WEIGHT: 138.88 LBS

## 2025-02-25 DIAGNOSIS — J06.9 UPPER RESPIRATORY TRACT INFECTION, UNSPECIFIED TYPE: Primary | ICD-10-CM

## 2025-02-25 DIAGNOSIS — M54.50 LUMBAR BACK PAIN: ICD-10-CM

## 2025-02-25 DIAGNOSIS — M86.68 OTHER CHRONIC OSTEOMYELITIS, OTHER SITE: ICD-10-CM

## 2025-02-25 DIAGNOSIS — Z98.1 H/O SPINAL FUSION: ICD-10-CM

## 2025-02-25 DIAGNOSIS — A31.2 DISSEMINATED MYCOBACTERIUM AVIUM-INTRACELLULARE COMPLEX: ICD-10-CM

## 2025-02-25 LAB
CTP QC/QA: YES
POC MOLECULAR INFLUENZA A AGN: NEGATIVE
POC MOLECULAR INFLUENZA B AGN: NEGATIVE

## 2025-02-25 PROCEDURE — 99999 PR PBB SHADOW E&M-EST. PATIENT-LVL V: CPT | Mod: PBBFAC,HCNC,, | Performed by: STUDENT IN AN ORGANIZED HEALTH CARE EDUCATION/TRAINING PROGRAM

## 2025-02-25 RX ORDER — HYDROCODONE BITARTRATE AND ACETAMINOPHEN 7.5; 325 MG/1; MG/1
1 TABLET ORAL EVERY 12 HOURS PRN
Qty: 10 TABLET | Refills: 0 | Status: SHIPPED | OUTPATIENT
Start: 2025-02-25

## 2025-02-25 RX ORDER — LEVOFLOXACIN 500 MG/1
500 TABLET, FILM COATED ORAL DAILY
Qty: 7 TABLET | Refills: 0 | Status: SHIPPED | OUTPATIENT
Start: 2025-02-25 | End: 2025-03-04

## 2025-02-25 NOTE — PATIENT INSTRUCTIONS
IMPRESSION:  - Assessed respiratory symptoms, considering possible flu given prevalence in clinic  - Evaluated chest XR from December 24th, noting fluid or consolidation in lung fissure  - Considered antibiotic options, including ceftriaxone, given patient already on 2 antibiotics  - Considered diuretic use to address peripheral edema, weighing risks of dehydration  - Reviewed pain management approach, noting elevated recent hydrocodone usage  - Will provide limited bridging hydrocodone prescription until pain management follow-up    PLAN SUMMARY:  - Initiated flu swab to determine need for Tamiflu treatment  - Continue albuterol inhaler as needed for bronchodilation and wheezing  - Refilled hydrocodone, 10 tablets, with instructions to reduce usage to twice daily  - Continue current treatment regimen of clarithromycin and rifabutin for MAC infection  - Follow-up appointment with Dr. Garcia for pain management on March 6th    J06.9 UPPER RESPIRATORY TRACT INFECTION, UNSPECIFIED TYPE:  - having productive cough for 3-4 days, no flu or GI affects.  Got back pain 2/2 cough.    - Initiated flu swab to determine if Tamiflu treatment is warranted.  -due to HIV and chronic MAC status, taking daily antibiotics already, with abnormal lung counds, consider starting Levaquin if flu swab is negative.   - Continued albuterol inhaler as needed for bronchodilation and wheezing relief.    M54.50 LUMBAR BACK PAIN:  - Discussed the likelihood of pain management specialist not favoring chronic opioid use at elevated doses.  - Refilled hydrocodone, 10 tablets, with instructions to reduce usage from 3 times daily to twice daily.  Will not be prescribing this chronically, needs to make an acceptable plan of care with pain provider for her chronic pain issues.   - Scheduled follow-up visit with Dr. Garcia for pain management on March 6th.  - Advised patient to discuss ongoing pain management and medication plan at this upcoming  appointment.     ** DIAGNOSES NOT IN VISIT DX OR FOR REVIEW **  M86.68 OTHER CHRONIC OSTEOMYELITIS, OTHER SITE:  - Noted patient's history of Mycobacterium avium complex (MAC) infection, which can cause chronic osteomyelitis.  - Continued current treatment regimen of clarithromycin and rifabutin for MAC infection.  - Instructed patient to report any changes in symptoms or medication side effects.

## 2025-02-25 NOTE — PROGRESS NOTES
Assessment:       1. Upper respiratory tract infection, unspecified type    2. Lumbar back pain    3. H/O spinal fusion    4. Disseminated mycobacterium avium-intracellulare complex    5. Lumbar back pain    6. Other chronic osteomyelitis, other site           Plan:     Assessment & Plan    A31.2 Disseminated mycobacterium avium-intracellulare complex  J06.9 Upper respiratory tract infection, unspecified type  M54.50 Lumbar back pain  Z98.1 H/O spinal fusion  M86.68 Other chronic osteomyelitis, other site          IMPRESSION:  - Assessed respiratory symptoms, considering possible flu given prevalence in clinic  - Evaluated chest XR from December 24th, noting fluid or consolidation in lung fissure  - Considered antibiotic options, including ceftriaxone, given patient already on 2 antibiotics  - Considered diuretic use to address peripheral edema, weighing risks of dehydration  - Reviewed pain management approach, noting elevated recent hydrocodone usage  - Will provide limited bridging hydrocodone prescription until pain management follow-up    PLAN SUMMARY:  - Initiated flu swab to determine need for Tamiflu treatment  - Continue albuterol inhaler as needed for bronchodilation and wheezing  - Refilled hydrocodone, 10 tablets, with instructions to reduce usage to twice daily  - Continue current treatment regimen of clarithromycin and rifabutin for MAC infection  - Follow-up appointment with Dr. Garcia for pain management on March 6th    J06.9 UPPER RESPIRATORY TRACT INFECTION, UNSPECIFIED TYPE:  - having productive cough for 3-4 days, no flu or GI affects.  Got back pain 2/2 cough.    - Initiated flu swab to determine if Tamiflu treatment is warranted.  -due to HIV and chronic MAC status, taking daily antibiotics already, with abnormal lung counds, consider starting Levaquin if flu swab is negative.   - Continued albuterol inhaler as needed for bronchodilation and wheezing relief.  - giving Levaquin 500mg daily  for 7 days due to HIV status w/ chronic MAC and current s/s of cough, SOB, wheezing.     M54.50 LUMBAR BACK PAIN:  - Discussed the likelihood of pain management specialist not favoring chronic opioid use at elevated doses.  - Refilled hydrocodone, 10 tablets, with instructions to reduce usage from 3 times daily to twice daily.  Will not be prescribing this chronically, needs to make an acceptable plan of care with pain provider for her chronic pain issues.   - Scheduled follow-up visit with Dr. Garcia for pain management on March 6th.  - Advised patient to discuss ongoing pain management and medication plan at this upcoming appointment.     M86.68 OTHER CHRONIC OSTEOMYELITIS, OTHER SITE:  - Noted patient's history of Mycobacterium avium complex (MAC) infection, which can cause chronic osteomyelitis.  - Continued current treatment regimen of clarithromycin and rifabutin for MAC infection.  - Instructed patient to report any changes in symptoms or medication side effects.             Upper respiratory tract infection, unspecified type  -     POCT Influenza A/B Molecular  -     X-Ray Chest PA And Lateral; Future; Expected date: 02/25/2025  -     levoFLOXacin (LEVAQUIN) 500 MG tablet; Take 1 tablet (500 mg total) by mouth once daily. for 7 days  Dispense: 7 tablet; Refill: 0    Lumbar back pain  Comments:  currently taking Hydrocodone 7.5mg TID.  Is not long term plan, advised needs to see pain managments at this time.  Orders:  -     HYDROcodone-acetaminophen (NORCO) 7.5-325 mg per tablet; Take 1 tablet by mouth every 12 (twelve) hours as needed for Pain.  Dispense: 10 tablet; Refill: 0    H/O spinal fusion  -     HYDROcodone-acetaminophen (NORCO) 7.5-325 mg per tablet; Take 1 tablet by mouth every 12 (twelve) hours as needed for Pain.  Dispense: 10 tablet; Refill: 0    Disseminated mycobacterium avium-intracellulare complex    Lumbar back pain  -     HYDROcodone-acetaminophen (NORCO) 7.5-325 mg per tablet; Take 1  tablet by mouth every 12 (twelve) hours as needed for Pain.  Dispense: 10 tablet; Refill: 0    Other chronic osteomyelitis, other site                This note was generated with the assistance of ambient listening technology. Verbal consent was obtained by the patient and accompanying visitor(s) for the recording of patient appointment to facilitate this note. I attest to having reviewed and edited the generated note for accuracy, though some syntax or spelling errors may persist. Please contact the author of this note for any clarification.      Subjective:           Patient ID: Amie Salmeron   Age:  60 y.o.  Sex: female     Chief Complaint:   Chest Congestion and Cough (Slightly productive)      History of Present Illness:    Amie Salmeron is a 60 y.o. female who presents today with a chief complaint of Chest Congestion and Cough (Slightly productive)  .    Since Friday has had some chest congestion and coughing up mucous.    Just starting to be productive, bringing up thin fluids.    Is not having regular body aches.  But cough is making back hurt.  Pulled the back and is having burning in the back now.    Does feel some wheezing.   Has been using her Albuterol inhaler, helps some but is temporary.    No fevers or chills at this time.  Is not having urinary symptoms at this time, which is actually unusual.    Did flu shot last year, but not this year.        History of Present Illness    CHIEF COMPLAINT:  Amie presents today for congestion, cough, and respiratory symptoms.    RESPIRATORY SYMPTOMS:  She reports respiratory symptoms that started last Friday and remain unchanged. She is producing thin mucus with occasional wheezing. She denies fever, chills, and sore throat. She uses albuterol inhaler which provides temporary relief.    BACK PAIN:  She has developed back pain from coughing. The pain is located in the lower back, distributed as a band across the lumbar region, with a burning sensation when  "standing. She has significant difficulty with prolonged standing.    CURRENT MEDICATIONS:  She continues clorithromycin and rifabutin/mycobutin regularly. She is taking hydrocodone for pain management - one in the morning to get out of bed, one mid-day, and one at night due to her cough. She reports having approximately five hydrocodone pills remaining.    IMMUNIZATIONS:  She received her last flu vaccine in February of the previous year.      ROS:  General: -fever, -chills  ENT: +nasal congestion  Respiratory: +cough, +wheezing  Musculoskeletal: +back pain           Review of Systems   Constitutional:  Negative for activity change, fatigue, fever and unexpected weight change.   HENT:  Positive for congestion. Negative for nosebleeds, sinus pressure and sneezing.    Respiratory:  Positive for cough and chest tightness. Negative for shortness of breath and wheezing.    Cardiovascular:  Negative for chest pain, palpitations and leg swelling.   Gastrointestinal:  Negative for abdominal distention, constipation, diarrhea and nausea.   Genitourinary:  Negative for difficulty urinating and dysuria.   Musculoskeletal:  Positive for arthralgias, back pain, gait problem and myalgias.   Skin:  Negative for pallor and rash.   Neurological:  Positive for weakness. Negative for numbness and headaches.   Psychiatric/Behavioral:  Negative for agitation. The patient is not nervous/anxious.            Objective:        Vitals:    02/25/25 0830   BP: 100/70   BP Location: Right arm   Patient Position: Sitting   Pulse: 85   Resp: 16   Temp: 98.2 °F (36.8 °C)   TempSrc: Oral   SpO2: 97%   Weight: 63 kg (138 lb 14.2 oz)   Height: 5' 1" (1.549 m)       Body mass index is 26.24 kg/m².      Physical Exam  Vitals reviewed.   Constitutional:       General: She is not in acute distress.     Appearance: She is ill-appearing.   HENT:      Head: Normocephalic and atraumatic.      Right Ear: External ear normal.      Left Ear: External ear " normal.      Nose: No rhinorrhea.      Mouth/Throat:      Mouth: Mucous membranes are moist.   Eyes:      Extraocular Movements: Extraocular movements intact.      Conjunctiva/sclera: Conjunctivae normal.   Cardiovascular:      Rate and Rhythm: Normal rate and regular rhythm.      Pulses: Normal pulses.   Pulmonary:      Effort: No respiratory distress.      Breath sounds: Wheezing present.   Abdominal:      General: Abdomen is flat. There is no distension.   Musculoskeletal:      Right lower leg: Edema (2+ to upper shin) present.      Left lower leg: Edema (2+ to upper shin) present.   Skin:     Coloration: Skin is not jaundiced.      Findings: No bruising or lesion.   Neurological:      General: No focal deficit present.      Mental Status: She is alert and oriented to person, place, and time.      Motor: No weakness.      Gait: Gait normal.   Psychiatric:         Mood and Affect: Mood normal.         Physical Exam    Ears: Cerumen impaction in both ears.  Lungs: Tightness in the tops of lungs. More sounds in the bottoms of lungs.  Extremities: 2+ pitting edema on both ankles. Edema extends past mid shin.               Past Medical History[1]    Lab Results   Component Value Date     12/27/2024    K 4.0 12/27/2024     12/27/2024    CO2 24 12/27/2024    BUN 17 12/27/2024    CREATININE 0.7 12/27/2024    GLUCOSE 83 11/26/2024    ANIONGAP 10 12/27/2024     Lab Results   Component Value Date    HGBA1C 4.5 (L) 10/24/2024    HGBA1C 4.9 08/25/2024     Lab Results   Component Value Date     (H) 08/11/2024    BNP 98 06/15/2024    BNP 47 12/15/2022       Lab Results   Component Value Date    WBC 9.13 12/27/2024    HGB 12.1 12/27/2024    HCT 38.8 12/27/2024    HCT 27 (L) 08/25/2024     12/27/2024    GRAN 5.0 12/27/2024    GRAN 55.2 12/27/2024     Lab Results   Component Value Date    CHOL 154 10/24/2024    CHOL 234 (H) 04/15/2015    HDL 48 10/24/2024    HDL 45 04/15/2015    LDLCALC 78 10/24/2024     LDLCALC 156.0 04/15/2015    TRIG 142 10/24/2024    TRIG 165 (H) 04/15/2015        Encounter Medications[2]              [1]   Past Medical History:  Diagnosis Date    Allergy     Arthritis     Asthma     Candida esophagitis 11/15/2022    Chronic pain     HIV infection     Hypertension     Overactive bladder     Septic shock 08/12/2024    Spinal stenosis     Urine incontinence    [2]   Outpatient Encounter Medications as of 2/25/2025   Medication Sig Dispense Refill    acetaminophen (TYLENOL) 325 MG tablet Take 2 tablets (650 mg total) by mouth 3 (three) times daily.      albuterol (PROVENTIL HFA) 90 mcg/actuation inhaler Inhale 2 puffs into the lungs every 6 (six) hours as needed for Wheezing. Rescue 18 g 0    clarithromycin (BIAXIN) 500 MG tablet Take 500 mg by mouth once daily.      dolutegravir-lamivudine (DOVATO)  mg Tab Take 1 tablet by mouth once daily.      gabapentin (NEURONTIN) 400 MG capsule Take 400 mg by mouth 3 (three) times daily.      hydrOXYzine pamoate (VISTARIL) 25 MG Cap Take 25 mg by mouth every 8 (eight) hours as needed (anxiety).      ibuprofen (ADVIL,MOTRIN) 800 MG tablet Take 1 tablet (800 mg total) by mouth every 6 (six) hours as needed for Pain. 20 tablet 0    methocarbamoL (ROBAXIN) 500 MG Tab Take 500 mg by mouth 4 (four) times daily as needed (muscle spasms).      rifabutin (MYCOBUTIN) 150 mg Cap Take 300 mg by mouth once daily.      vitamin D (VITAMIN D3) 1000 units Tab Take 1 tablet (1,000 Units total) by mouth once daily.      [DISCONTINUED] HYDROcodone-acetaminophen (NORCO) 7.5-325 mg per tablet Take 1 tablet by mouth every 12 (twelve) hours as needed for Pain. 70 tablet 0    HYDROcodone-acetaminophen (NORCO) 7.5-325 mg per tablet Take 1 tablet by mouth every 12 (twelve) hours as needed for Pain. 10 tablet 0    levoFLOXacin (LEVAQUIN) 500 MG tablet Take 1 tablet (500 mg total) by mouth once daily. for 7 days 7 tablet 0    [DISCONTINUED] fjgbvxxqz-jwhgxsjf-mkoasda ala (BIKTARVY)  -25 mg (25 kg or greater) Take 1 tablet by mouth once daily.      [DISCONTINUED] ethambutoL (MYAMBUTOL) 400 MG Tab Take 2 tablets (800 mg total) by mouth once daily.      [DISCONTINUED] mirtazapine (REMERON SOL-TAB) 15 MG disintegrating tablet Take 1 tablet (15 mg total) by mouth nightly. 30 tablet 5    [DISCONTINUED] mupirocin (BACTROBAN) 2 % ointment Apply topically 3 (three) times daily. 15 g 0    [DISCONTINUED] oxybutynin (DITROPAN) 5 MG Tab Take 1 tablet (5 mg total) by mouth 3 (three) times daily as needed (bladder spasms (pain in bladder, urge to urinate)). 90 tablet 11    [DISCONTINUED] senna-docusate 8.6-50 mg (PERICOLACE) 8.6-50 mg per tablet Take 2 tablets by mouth once daily. 60 tablet 1    [DISCONTINUED] sulfamethoxazole-trimethoprim 800-160mg (BACTRIM DS) 800-160 mg Tab Take 1 tablet by mouth 3 (three) times a week.      [DISCONTINUED] tamsulosin (FLOMAX) 0.4 mg Cap Take 1 capsule (0.4 mg total) by mouth once daily. 30 capsule 0     No facility-administered encounter medications on file as of 2/25/2025.

## 2025-02-28 ENCOUNTER — TELEPHONE (OUTPATIENT)
Dept: PHARMACY | Facility: CLINIC | Age: 60
End: 2025-02-28
Payer: MEDICARE

## 2025-02-28 ENCOUNTER — OUTPATIENT CASE MANAGEMENT (OUTPATIENT)
Dept: ADMINISTRATIVE | Facility: OTHER | Age: 60
End: 2025-02-28
Payer: MEDICARE

## 2025-02-28 ENCOUNTER — TELEPHONE (OUTPATIENT)
Dept: INFECTIOUS DISEASES | Facility: CLINIC | Age: 60
End: 2025-02-28

## 2025-02-28 DIAGNOSIS — Z21 HIV POSITIVE: Primary | ICD-10-CM
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
## 2025-02-28 NOTE — LETTER
February 28, 2025    Amie Salmeron  [address] [place] [address]         Dear Ms. Salmeron,      My name is Wilmar Nogueira.  I am reaching out on behalf of [facility] Patient Assistance Team after receiving a referral from your Provider inquiring about assistance with your medication.  We have reviewed your current medication list and/or insurance status. Unfortunately, The Pharmacy Patient Assistance Team is unable to assist at this time due to the following reasons      The Pharmacy Patient Assistance Team does not assist with specialty medication. Your Provider will  send a new prescription to [facility] to initiate assistance and follow up with the Specialty Assistance Team.              Sincerely,   Wilmar JEAN @[phone]  Pharmacy Patient Assistance  [address] [place] [address]  [id number]  [place] [address]  Fax: [phone]  Email: [email or web address]

## 2025-02-28 NOTE — TELEPHONE ENCOUNTER
We have reviewed Ms. Salmeron current medication list and/or insurance status. Unfortunately, The Pharmacy Patient Assistance Team is unable to assist at this time due to the following reasons      The Pharmacy Patient Assistance Team does not assist with specialty medication. Please send a new prescription to [facility] to initiate assistance and follow up with the Specialty Assistance Team. (Dovato)          Wilmar Nogueira  Pharmacy Patient Assistance Team

## 2025-02-28 NOTE — PROGRESS NOTES
Outpatient Care Management   - Patient Assessment    Patient: Amie Salmeron  MRN:  [id number]  Date of Service:  2/28/2025  Completed by:  Silva Rodriguez LCSW  Referral Date: 12/10/2024    Reason for Visit   Patient presents with    Social Work Assessment     2/28/2025       Brief Summary:  received a referral from [facility] RN for the following HR SW psychosocial needs: SDOH, ACP assistance, and transportation.  The patient also requests assistance with prescription assistance, mental health, and medicaid rosario.    SW received referral from [facility] DAKOTA Leach on 12/17/2025 for SDOH, ACP assistance, and transportation.  SW made 4 call attempts and sent a letter to pt with no response and closed case on 1/21/2025.  Pt contacted OPCM RN today and then contacted SW.  SW and pt completed SDOH, initial assessment, PHQ-9, and KRISTA-7.  Pt explained that the reason SW was unable to reach her was that she was behind on her phone bill and her dad recently helped her pay it so she is able to make calls again.  Pt lives with her parents who are in their mid 80s.  Pt's mom has cancer and pt's dad is her full time caregiver.  Pt moved into their home to assist in caring for her, though she denies currently assisting to care for mom.  Pt receives disability income of $1,600/month and pays rent to her parents of $600/month.  Pt is independent in all ADLs, though reports trouble climbing stairs and walking with a walker due to a MAC infection in her back. Pt uses a wheelchair at times when she needs to ambulate far distances. Pt goes with her dad to grocery shop because she cannot  heavy objects, such as a 2 liter of soda.  Pt has an appt with pain management to discuss this on 3/6.  Pt reports that she is applying for medicaid but does not have access to a printer to complete application.  Pt has a HPOA on file but reports that it has been terminated and needs to be removed from her chart.  Pt denies interest  in completing updated HPOA or advanced directive.    Pt's reported needs are transportation, prescription assistance, mental health, and assistance applying for medicaid.  SW and pt discussed the following:  Transportation:  Pt does not have transportation of her own.  Pt relies upon her brother to drive her to dr arzola but has to wait until he gets off of work at 3:30pm each day.  SW asked pt if she is familiar with [facility] and she states that she is.  Pt was unaware that they offer transportation to and from medical appts.  SW to send pt information in the mail so that she can contact them to set this up.  Prescription assistance: Pt reports that she has HIV and her medication is $1,600/month after insurance coverage.  This is a new medication for her and reports that once she stops taking a medication for HIV she must switch to a different one, so she is unable to switch back to the previous medication.  Pt cannot afford this monthly fee.  Pt has 4 pills left.  She is on Dovato 50mg/300mg once daily.  SW to reach out to PAP to request assistance urgently.  SW searched [employer] for this medication and it costs over $3,000 with [employer] card.  SW also to message infectious disease provider to inform that pt only has 4 pills left and that SW will work on PAP, but it may not be approved quickly due to upcoming weekend and Mardi [place] holiday.  Pt called office today and is awaiting call from MD.  Mental Health: Pt scored 9 on PHQ-9 and 12 on KRISTA-7.  Pt reports feeling anxious and down very often, almost daily.  Pt reports that she worked as an  for 27 years until she became very sick with HIV and AIDS.  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  This inability to work seems to effect pt's mental health.  SW asked pt if she would be interested in pursuing medication or therapy to address mental health concerns.  Pt interested in both.  Pt has rosariot  with PCP on 3/19 and plans to discuss medication with him at that time.  Pt would like a therapist who is virtual since transportation is a concern for her.  SW to compile list of therapists who take pt's insurance and offer virtual visits and mail to pt.  SW also to message PCP to inform him of her desire to discuss medication at next appt.  Pt comfortable with this plan.  Medicaid rosario: Pt hoping to apply for medicaid/[place] to assist with her HIV medication.  She does not have a printer in her home and is unable to walk to the library even though it is a block away due to the pain in her back.  SW offered to print [employer] application on pt's behalf.  Pt agreed.  SW to mail to pt's home.     Care plan was created in collaboration with patient/caregiver input.   completed the SDOH questionnaire.     Silva Rodriguez LCSW

## 2025-03-05 ENCOUNTER — OUTPATIENT CASE MANAGEMENT (OUTPATIENT)
Dept: ADMINISTRATIVE | Facility: OTHER | Age: 60
End: 2025-03-05
Payer: MEDICARE

## 2025-03-05 RX ORDER — DOLUTEGRAVIR SODIUM AND LAMIVUDINE 50; 300 MG/1; MG/1
1 TABLET, FILM COATED ORAL DAILY
Qty: 30 TABLET | Refills: 11 | Status: SHIPPED | OUTPATIENT
Start: 2025-03-05 | End: 2026-03-05

## 2025-03-05 NOTE — PROGRESS NOTES
Outpatient Care Management   - Care Plan Follow Up    Patient: Amie Salmeron  MRN:  [id number]  Date of Service:  3/5/2025  Completed by:  Silva Rodriguez LCSW  Referral Date: 12/10/2024    No chief complaint on file.      Brief Summary: SAEID in communication with Nito Nogueira with PAP.  She shares that since Dovato is a specialty drug, it will need to go through [facility].  Once MD orders med to this pharmacy, someone will reach out to pt to begin prescription assistance process.  SAEID relayed information to pt's infectious disease MD, Dr Dorothea Etienne via inbox message.  SW to follow up with pt as scheduled on 3/7.    Complex Care Plan     Care plan was discussed and completed today with input from patient and/or caregiver.    Patient Instructions     No follow-ups on file.

## 2025-03-06 ENCOUNTER — OFFICE VISIT (OUTPATIENT)
Dept: PAIN MEDICINE | Facility: CLINIC | Age: 60
End: 2025-03-06
Payer: MEDICARE

## 2025-03-06 VITALS
DIASTOLIC BLOOD PRESSURE: 70 MMHG | WEIGHT: 142.19 LBS | HEART RATE: 87 BPM | HEIGHT: 61 IN | SYSTOLIC BLOOD PRESSURE: 105 MMHG | BODY MASS INDEX: 26.85 KG/M2

## 2025-03-06 DIAGNOSIS — M54.50 LUMBAR BACK PAIN: ICD-10-CM

## 2025-03-06 DIAGNOSIS — Z98.1 H/O SPINAL FUSION: Primary | ICD-10-CM

## 2025-03-06 DIAGNOSIS — M25.552 PAIN OF BOTH HIP JOINTS: Primary | ICD-10-CM

## 2025-03-06 DIAGNOSIS — G89.4 CHRONIC PAIN SYNDROME: Chronic | ICD-10-CM

## 2025-03-06 DIAGNOSIS — M25.551 PAIN OF BOTH HIP JOINTS: Primary | ICD-10-CM

## 2025-03-06 PROCEDURE — 1160F RVW MEDS BY RX/DR IN RCRD: CPT | Mod: CPTII,S$GLB,, | Performed by: PAIN MEDICINE

## 2025-03-06 PROCEDURE — 1159F MED LIST DOCD IN RCRD: CPT | Mod: CPTII,S$GLB,, | Performed by: PAIN MEDICINE

## 2025-03-06 PROCEDURE — 3078F DIAST BP <80 MM HG: CPT | Mod: CPTII,S$GLB,, | Performed by: PAIN MEDICINE

## 2025-03-06 PROCEDURE — 3008F BODY MASS INDEX DOCD: CPT | Mod: CPTII,S$GLB,, | Performed by: PAIN MEDICINE

## 2025-03-06 PROCEDURE — 99999 PR PBB SHADOW E&M-EST. PATIENT-LVL III: CPT | Mod: PBBFAC,,, | Performed by: PAIN MEDICINE

## 2025-03-06 PROCEDURE — 3074F SYST BP LT 130 MM HG: CPT | Mod: CPTII,S$GLB,, | Performed by: PAIN MEDICINE

## 2025-03-06 PROCEDURE — 99204 OFFICE O/P NEW MOD 45 MIN: CPT | Mod: S$GLB,,, | Performed by: PAIN MEDICINE

## 2025-03-06 RX ORDER — GABAPENTIN 800 MG/1
800 TABLET ORAL 3 TIMES DAILY
Qty: 90 TABLET | Refills: 11 | Status: SHIPPED | OUTPATIENT
Start: 2025-03-06 | End: 2026-03-06

## 2025-03-06 RX ORDER — HYDROCODONE BITARTRATE AND ACETAMINOPHEN 7.5; 325 MG/1; MG/1
1 TABLET ORAL EVERY 12 HOURS PRN
Qty: 30 TABLET | Refills: 0 | Status: SHIPPED | OUTPATIENT
Start: 2025-03-06 | End: 2025-04-05

## 2025-03-06 NOTE — PROGRESS NOTES
Subjective:     Patient ID: Amie Salmeron is a 60 y.o. female    Chief Complaint: Low-back Pain      Referred by: Manoj Dupont MD      HPI:    Initial Encounter (3/6/25):  Amie Salmeron is a 60 y.o. female who presents today with chronic low back pain.  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  Has had chronic low back pain ever since.  Pain is located in the midline thoracolumbar region radiates the lumbar paraspinals.  Some radicular pain into the lower extremities as well.  No focal weakness or bowel bladder dysfunction.  Pain is constant and worsened with activity.  It is very disruptive to her activities of daily living and her sleep.  She has been taking Norco 7.5-325 mg.  At mostly she will take this medication twice a day.  She states there are some days where she would not take it at all.  She feels that on average she would take 1 pill per day.  This pain is described in detail below.    Physical Therapy:  Yes.    Non-pharmacologic Treatment:  Rest helps         TENS?  No    Pain Medications:         Currently taking:  Gabapentin, ibuprofen, Tylenol, Norco    Has tried in the past:      Has not tried:  Muscle relaxants, TCAs, SNRIs, topical creams    Blood thinners:  None    Interventional Therapies:  None    Relevant Surgeries:   Thoracolumbar corpectomy and fusion secondary to disseminated MAC infection    Affecting sleep?  Yes    Affecting daily activities? yes    Depressive symptoms? No          SI/HI? No    Work status: Disabled    Pain Scores:    Best:       5/10  Worst:     10/10  Usually:   9/10  Today:    9/10    Pain Disability Index  Family/Home Responsibilities:: 9  Recreation:: 10  Social Activity:: 0  Occupation:: 10  Sexual Behavior:: 0  Self Care:: 9  Life-Support Activities:: 0  Pain Disability Index (PDI): 38    Review of Systems   Constitutional:  Negative for activity change, appetite change, chills, fatigue, fever and  unexpected weight change.   HENT:  Negative for hearing loss.    Eyes:  Negative for visual disturbance.   Respiratory:  Negative for chest tightness and shortness of breath.    Cardiovascular:  Negative for chest pain.   Gastrointestinal:  Negative for abdominal pain, constipation, diarrhea, nausea and vomiting.   Genitourinary:  Negative for difficulty urinating.   Musculoskeletal:  Positive for back pain, gait problem and myalgias. Negative for neck pain.   Skin:  Negative for rash.   Neurological:  Positive for numbness. Negative for dizziness, weakness, light-headedness and headaches.   Psychiatric/Behavioral:  Positive for sleep disturbance. Negative for hallucinations and suicidal ideas. The patient is not nervous/anxious.        Past Medical History:   Diagnosis Date    Allergy     Arthritis     Asthma     Candida esophagitis 11/15/2022    Chronic pain     HIV infection     Hypertension     Overactive bladder     Septic shock 08/12/2024    Spinal stenosis     Urine incontinence        Past Surgical History:   Procedure Laterality Date    ADENOIDECTOMY      APPENDECTOMY      BACK SURGERY      GERALD    CYST REMOVAL      CYSTOSCOPY W/ URETERAL STENT PLACEMENT Right 12/09/2024    Procedure: CYSTOSCOPY, WITH URETERAL STENT INSERTION;  Surgeon: Daniel Duong MD;  Location: [facility] OR;  Service: Urology;  Laterality: Right;    CYSTOSCOPY W/ URETERAL STENT REMOVAL  2/11/2025    Procedure: CYSTOSCOPY, WITH URETERAL STENT REMOVAL;  Surgeon: Daniel Duong MD;  Location: [facility] OR;  Service: Urology;;    CYSTOSCOPY WITH CALCULUS EXTRACTION Right 2/11/2025    Procedure: CYSTOSCOPY, WITH CALCULUS REMOVAL;  Surgeon: Daniel Duong MD;  Location: [facility] OR;  Service: Urology;  Laterality: Right;    CYSTOSCOPY, WITH URETERAL STENT INSERTION - Right Right 12/09/2024    CYSTOURETEROSCOPY, WITH HOLMIUM LASER LITHOTRIPSY OF URETERAL CALCULUS AND STENT INSERTION Right 2/11/2025    Procedure: CYSTOURETEROSCOPY, WITH HOLMIUM LASER  "LITHOTRIPSY OF URETERAL CALCULUS AND STENT INSERTION;  Surgeon: Daniel Duong MD;  Location: [facility];  Service: Urology;  Laterality: Right;  laser notified on 1/28/25/DME    CYSTOURETEROSCOPY,WITH HOLMIUM LASER LITHOTRIPSY OF URETERAL CALCULUS Right 02/11/2025    ureteral stent placement    HYSTERECTOMY      JOINT REPLACEMENT Right     knee, with revision    KNEE SURGERY Left     knee replacement    PERCUTANEOUS PINNING OF HIP Right 08/25/2024    Procedure: PINNING, HIP, PERCUTANEOUS, RIGHT;  Surgeon: Laureano Vyas MD;  Location: [address];  Service: Orthopedics;  Laterality: Right;    RHIZOTOMY      TONSILLECTOMY      TOTAL ANKLE ARTHROPLASTY Right        Social History[1]    Review of patient's allergies indicates:  No Known Allergies    Medications Ordered Prior to Encounter[2]    Objective:      /70 (BP Location: Right arm, Patient Position: Sitting)   Pulse 87   Ht 5' 1" (1.549 m)   Wt 64.5 kg (142 lb 3.2 oz)   BMI 26.87 kg/m²     Exam:  GEN:  Well developed, well nourished.  No acute distress.   HEENT:  No trauma.  Mucous membranes moist.  Nares patent bilaterally.  PSYCH: Normal affect. Thought content appropriate.  CHEST:  Breathing symmetric.  No audible wheezing.  ABD: Soft, non-distended.  SKIN:  Warm, pink, dry.  No rash on exposed areas.    EXT:  No cyanosis, clubbing, or edema.  No color change or changes in nail or hair growth.  NEURO/MUSCULOSKELETAL:  Fully alert, oriented, and appropriate. Speech normal irma. No cranial nerve deficits.   Gait:  Antalgic.  No focal motor deficits.       Imaging:    Narrative & Impression    EXAMINATION:  XR LUMBAR SPINE AP AND LATERAL     CLINICAL HISTORY:  back pain, hx of back problems;     TECHNIQUE:  XR LUMBAR SPINE AP AND LATERAL     COMPARISON:  01/12/2025     FINDINGS:  Redemonstration of the corpectomy and posterior fusion of the thoracolumbar spine.  No definitive evidence of hardware loosening or failure.     Dextro asymmetry " of the lumbar spine centered around L2-L3.     Vertebral body heights are unchanged.     Multilevel disc height loss.  Multilevel facet arthropathy.     Right-sided double-J stent catheter in unchanged position.  Prominent fecal material projecting over the central pelvis.     Impression:     As above.        Electronically signed by:David Galvez  Date:                                            01/25/2025  Time:                                           09:47       Assessment:       Encounter Diagnoses   Name Primary?    Chronic pain syndrome     H/O spinal fusion Yes    Lumbar back pain      Plan:       Amie was seen today for low-back pain.    Diagnoses and all orders for this visit:    H/O spinal fusion  -     HYDROcodone-acetaminophen (NORCO) 7.5-325 mg per tablet; Take 1 tablet by mouth every 12 (twelve) hours as needed for Pain.  -     gabapentin (NEURONTIN) 800 MG tablet; Take 1 tablet (800 mg total) by mouth 3 (three) times daily.  -     Pain Clinic Drug Screen    Chronic pain syndrome  -     Ambulatory referral/consult to Pain Clinic    Lumbar back pain  -     HYDROcodone-acetaminophen (NORCO) 7.5-325 mg per tablet; Take 1 tablet by mouth every 12 (twelve) hours as needed for Pain.  -     gabapentin (NEURONTIN) 800 MG tablet; Take 1 tablet (800 mg total) by mouth 3 (three) times daily.  -     Pain Clinic Drug Screen        Amie Salmeron is a 60 y.o. female with chronic low back and lower extremity pain.  Status post thoracolumbar corpectomy and fusion related to disseminated MAC infection vertebral bodies.  Has both axial and radicular components of pain.     Pertinent imaging studies reviewed by me. Imaging results were discussed with patient.  Gabapentin 800 mg t.i.d..    Opioid risk tool completed.  Low risk.    Prescription monitoring report reviewed today.  No inconsistencies noted.    Urine drug screen today.  I will review results when available.    Norco 7.5-325 mg q.12h p.r.n..  30 day supply  of 30 tablets provided today.  Given the severity of her pathology and surgical intervention, feel that patient is likely to have some degree of back pain for the rest of her life.  This pain seems to be well managed with relatively low doses of opioids.  I think it is appropriate to continue low-dose opioid pain medications.  Return to clinic in 3 weeks or sooner if needed.  At that time we will discuss efficacy of medications and make any necessary adjustments.  We will likely sign pain contract at that time if we decide to continue opioid pain medications chronically.          This note was created by combination of typed  and M-Modal dictation. Transcription and phonetic errors may be present.  If there are any questions, please contact me.           [1]   Social History  Socioeconomic History    Marital status: Single   Occupational History    Occupation: on disability   Tobacco Use    Smoking status: Never    Smokeless tobacco: Never   Substance and Sexual Activity    Alcohol use: Not Currently     Comment: not currently    Drug use: No    Sexual activity: Not Currently     Partners: Male     Birth control/protection: None     Social Drivers of Health     Financial Resource Strain: High Risk (2/28/2025)    Overall Financial Resource Strain (CARDIA)     Difficulty of Paying Living Expenses: Very hard   Food Insecurity: Food Insecurity Present (2/28/2025)    Hunger Vital Sign     Worried About Running Out of Food in the Last Year: Often true     Ran Out of Food in the Last Year: Sometimes true   Transportation Needs: Unmet Transportation Needs (2/28/2025)    PRAPARE - Transportation     Lack of Transportation (Medical): Yes     Lack of Transportation (Non-Medical): Yes   Physical Activity: Inactive (2/28/2025)    Exercise Vital Sign     Days of Exercise per Week: 0 days     Minutes of Exercise per Session: 0 min   Stress: Stress Concern Present (2/28/2025)    [employer] -  Occupational Stress Questionnaire     Feeling of Stress : Rather much   Housing Stability: High Risk (2/28/2025)    Housing Stability Vital Sign     Unable to Pay for Housing in the Last Year: Yes     Number of Times Moved in the Last Year: 0     Homeless in the Last Year: No   [2]   Current Outpatient Medications on File Prior to Visit   Medication Sig Dispense Refill    acetaminophen (TYLENOL) 325 MG tablet Take 2 tablets (650 mg total) by mouth 3 (three) times daily.      albuterol (PROVENTIL HFA) 90 mcg/actuation inhaler Inhale 2 puffs into the lungs every 6 (six) hours as needed for Wheezing. Rescue 18 g 0    clarithromycin (BIAXIN) 500 MG tablet Take 500 mg by mouth once daily.      dolutegravir-lamivudine (DOVATO)  mg Tab Take 1 tablet by mouth once daily. 30 tablet 11    hydrOXYzine pamoate (VISTARIL) 25 MG Cap Take 25 mg by mouth every 8 (eight) hours as needed (anxiety).      ibuprofen (ADVIL,MOTRIN) 800 MG tablet Take 1 tablet (800 mg total) by mouth every 6 (six) hours as needed for Pain. 20 tablet 0    methocarbamoL (ROBAXIN) 500 MG Tab Take 500 mg by mouth 4 (four) times daily as needed (muscle spasms).      rifabutin (MYCOBUTIN) 150 mg Cap Take 300 mg by mouth once daily.      vitamin D (VITAMIN D3) 1000 units Tab Take 1 tablet (1,000 Units total) by mouth once daily.       No current facility-administered medications on file prior to visit.

## 2025-03-07 ENCOUNTER — OUTPATIENT CASE MANAGEMENT (OUTPATIENT)
Dept: ADMINISTRATIVE | Facility: OTHER | Age: 60
End: 2025-03-07
Payer: MEDICARE

## 2025-03-07 NOTE — PROGRESS NOTES
Outpatient Care Management   - Care Plan Follow Up    Patient: Amie Salmeron  MRN:  [id number]  Date of Service:  3/7/2025  Completed by:  Silva Rodriguez LCSW  Referral Date: 12/10/2024    No chief complaint on file.      Brief Summary: SW contacted pt for follow up call.  Pt states that she has not received mailed resources (COA, therapy recs, LAHAP [place]) yet.    LAHAP [place]:  Pt shares that she was able to apply for LAHAP this morning over the phone.  She was able to speak with someone about getting the application expedited since she urgently needs her Dovato.  SW informed pt that when she receives mailed resources, she can discard the LAHAP application since she was able to apply this morning.  Prescription assistance: SAEID provided update that Dr. Mayers has sent her Dovato to [facility] and that they will be reaching out to her to complete assistance application.  SW encouraged pt to answer all phone calls so that she does not miss their call.  SAEID informed pt that Dr. Mayers is up to date on what is going on with pt's Dovato.  Pt expresses gratitude for SAEID assisting with this.    SAEID informed pt that SW will contact her again next week to ensure she has received mailed info.      Complex Care Plan     Care plan was discussed and completed today with input from patient and/or caregiver.    Patient Instructions     No follow-ups on file.

## 2025-03-19 ENCOUNTER — OFFICE VISIT (OUTPATIENT)
Dept: PRIMARY CARE CLINIC | Facility: CLINIC | Age: 60
End: 2025-03-19
Payer: MEDICARE

## 2025-03-19 VITALS
HEART RATE: 82 BPM | OXYGEN SATURATION: 99 % | WEIGHT: 143.19 LBS | HEIGHT: 61 IN | SYSTOLIC BLOOD PRESSURE: 106 MMHG | DIASTOLIC BLOOD PRESSURE: 70 MMHG | BODY MASS INDEX: 27.03 KG/M2

## 2025-03-19 DIAGNOSIS — G89.4 CHRONIC PAIN SYNDROME: Primary | Chronic | ICD-10-CM

## 2025-03-19 DIAGNOSIS — A31.2 DISSEMINATED MYCOBACTERIUM AVIUM-INTRACELLULARE COMPLEX: ICD-10-CM

## 2025-03-19 DIAGNOSIS — Z12.31 SCREENING MAMMOGRAM, ENCOUNTER FOR: ICD-10-CM

## 2025-03-19 DIAGNOSIS — Z23 NEED FOR VACCINATION: ICD-10-CM

## 2025-03-19 DIAGNOSIS — Z12.11 COLON CANCER SCREENING: ICD-10-CM

## 2025-03-19 DIAGNOSIS — Z21 HIV POSITIVE: ICD-10-CM

## 2025-03-19 DIAGNOSIS — Z98.1 H/O SPINAL FUSION: ICD-10-CM

## 2025-03-19 DIAGNOSIS — M54.50 LUMBAR BACK PAIN: ICD-10-CM

## 2025-03-19 PROCEDURE — 1160F RVW MEDS BY RX/DR IN RCRD: CPT | Mod: CPTII,S$GLB,, | Performed by: STUDENT IN AN ORGANIZED HEALTH CARE EDUCATION/TRAINING PROGRAM

## 2025-03-19 PROCEDURE — 3008F BODY MASS INDEX DOCD: CPT | Mod: CPTII,S$GLB,, | Performed by: STUDENT IN AN ORGANIZED HEALTH CARE EDUCATION/TRAINING PROGRAM

## 2025-03-19 PROCEDURE — 99999 PR PBB SHADOW E&M-EST. PATIENT-LVL IV: CPT | Mod: PBBFAC,,, | Performed by: STUDENT IN AN ORGANIZED HEALTH CARE EDUCATION/TRAINING PROGRAM

## 2025-03-19 PROCEDURE — 3074F SYST BP LT 130 MM HG: CPT | Mod: CPTII,S$GLB,, | Performed by: STUDENT IN AN ORGANIZED HEALTH CARE EDUCATION/TRAINING PROGRAM

## 2025-03-19 PROCEDURE — 99214 OFFICE O/P EST MOD 30 MIN: CPT | Mod: 25,S$GLB,, | Performed by: STUDENT IN AN ORGANIZED HEALTH CARE EDUCATION/TRAINING PROGRAM

## 2025-03-19 PROCEDURE — 1159F MED LIST DOCD IN RCRD: CPT | Mod: CPTII,S$GLB,, | Performed by: STUDENT IN AN ORGANIZED HEALTH CARE EDUCATION/TRAINING PROGRAM

## 2025-03-19 PROCEDURE — 90656 IIV3 VACC NO PRSV 0.5 ML IM: CPT | Mod: S$GLB,,, | Performed by: STUDENT IN AN ORGANIZED HEALTH CARE EDUCATION/TRAINING PROGRAM

## 2025-03-19 PROCEDURE — G0008 ADMIN INFLUENZA VIRUS VAC: HCPCS | Mod: S$GLB,,, | Performed by: STUDENT IN AN ORGANIZED HEALTH CARE EDUCATION/TRAINING PROGRAM

## 2025-03-19 PROCEDURE — 3078F DIAST BP <80 MM HG: CPT | Mod: CPTII,S$GLB,, | Performed by: STUDENT IN AN ORGANIZED HEALTH CARE EDUCATION/TRAINING PROGRAM

## 2025-03-19 NOTE — PROGRESS NOTES
Verified pt by name and . NKDA. Per physician orders pt was administered fluarix  IM to left arm using aseptic technique. Pt tolerated well. No adverse effects or pain reported. MD notified.

## 2025-03-19 NOTE — PROGRESS NOTES
Assessment:       1. Chronic pain syndrome    2. Lumbar back pain    3. H/O spinal fusion    4. Disseminated mycobacterium avium-intracellulare complex    5. HIV positive    6. Screening mammogram, encounter for    7. Need for vaccination    8. Colon cancer screening           Plan:     Assessment & Plan    Z21 HIV positive  A31.2 Disseminated mycobacterium avium-intracellulare complex  G89.4 Chronic pain syndrome  M54.50 Lumbar back pain  Z98.1 H/O spinal fusion  Z12.31 Screening mammogram, encounter for  Z23 Need for vaccination  Z12.11 Colon cancer screening    IMPRESSION:  - Reviewed recent labs from October/November, noting excellent cholesterol levels and low A1C (4.5).  - Recurrent UTIs resolved after referral to Dr. Duong.  - Evaluated need for annual screenings, recommending mammogram due to 25-year lapse.  - Assessed colon cancer screening options, opting for Cologuard due to patient preference and history of benign polyp.  - Determined flu vaccination appropriate for preventive care.    PLAN SUMMARY:  - Ordered Cologuard test for colon cancer screening  - Ordered mammogram for breast cancer screening  - Administered flu vaccine (Fluvrix, under 65 formulation)  - Can continue Norco 7.5/325 mg, up to every 12 hours as needed for pain per Pain Management and discuss with them at f/u appt.   - Can continue gabapentin 800 mg TID for neuropathic pain  - Follow up in 3 weeks to discuss medication response and consider pain contract for chronic opiate prescribing    G89.4 CHRONIC PAIN SYNDROME:  - Can continue Norco 7.5/325 mg, up to every 12 hours as needed for pain per Pain Management and discuss with them at f/u appt.   - Can continue gabapentin 800 mg TID for neuropathic pain  - Follow up in 3 weeks or sooner if needed to discuss medication response and consider pain contract for chronic opiate prescribing.    Z12.31 SCREENING MAMMOGRAM, ENCOUNTER FOR:  - Ordered mammogram for breast cancer  screening.    Z23 NEED FOR VACCINATION:  - Administered flu vaccine (Fluvrix, under 65 formulation).    Z12.11 COLON CANCER SCREENING:  - Ordered Cologuard test for colon cancer screening.  - Described home test kit procedure, emphasizing proper sample collection and timely shipping.             Chronic pain syndrome    Lumbar back pain    H/O spinal fusion    Disseminated mycobacterium avium-intracellulare complex    HIV positive    Screening mammogram, encounter for  -     Mammo Digital Screening Bilat; Future; Expected date: 03/19/2025    Need for vaccination  -     influenza (Flulaval, Fluzone, Fluarix) 45 mcg/0.5 mL IM vaccine (> or = 6 mo) 0.5 mL    Colon cancer screening  -     Cologuard Screening (Multitarget Stool DNA); Future; Expected date: 03/19/2025                This note was generated with the assistance of ambient listening technology. Verbal consent was obtained by the patient and accompanying visitor(s) for the recording of patient appointment to facilitate this note. I attest to having reviewed and edited the generated note for accuracy, though some syntax or spelling errors may persist. Please contact the author of this note for any clarification.      Subjective:           Patient ID: Amie Salmeron   Age:  60 y.o.  Sex: female     Chief Complaint:   Follow-up (Back pain from helping mother when fell. )      History of Present Illness:    Amie Salmeron is a 60 y.o. female who presents today with a chief complaint of Follow-up (Back pain from helping mother when fell. )  .      History of Present Illness    CHIEF COMPLAINT:  Amie presents today for follow up of back pain and caregiver strain    BACK PAIN AND PAIN MANAGEMENT:  She reports severe back pain, described as 'the worst pain imaginable'. She was recently evaluated by Dr. Garcia who initiated Norco 7.5/325 mg every 12 hours as needed and Gabapentin 800 mg TID for nerve pain. Her back pain is likely to persist long-term due to the  "extent of surgical interventions and underlying pathology. Her pain is exacerbated by caregiving responsibilities for her mother, which includes assisting with mobility and bathing.    MAC INFECTION:  She has an active MAC (Mycobacterium Avium Complex) infection contributing to her pain symptoms.    CURRENT MEDICATIONS:  She is currently taking Aleve and Clarithromycin (Biaxin) chronically. She discontinued Hydroxyzine due to inadequate anxiety relief and is not using Methocarbamol.    PAST MEDICAL HISTORY:  History includes resolved urinary symptoms treated by Dr. Duong, left ureteral calculus, and colonoscopy with polyp removal.    LABS:  A1C was 4.5 in October, on the lower end of normal range.    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 She assists her mother with activities of daily living.      ROS:  Musculoskeletal: +back pain  Psychiatric: +anxiety           Review of Systems   Constitutional:  Negative for activity change, fatigue, fever and unexpected weight change.   HENT:  Negative for congestion, nosebleeds, sinus pressure and sneezing.    Respiratory:  Negative for cough, chest tightness, shortness of breath and wheezing.    Cardiovascular:  Negative for chest pain, palpitations and leg swelling.   Gastrointestinal:  Negative for abdominal distention, constipation, diarrhea and nausea.   Genitourinary:  Negative for difficulty urinating and dysuria.   Musculoskeletal:  Positive for arthralgias, back pain, gait problem and myalgias.   Skin:  Negative for pallor and rash.   Neurological:  Positive for weakness. Negative for numbness and headaches.   Psychiatric/Behavioral:  Negative for agitation. The patient is nervous/anxious.            Objective:        Vitals:    03/19/25 0834   BP: 106/70   BP Location: Right arm   Patient Position: Sitting   Pulse: 82   SpO2: 99%   Weight: 64.9 kg (143 lb 3 oz)   Height: 5' 1" (1.549 m) "       Body mass index is 27.06 kg/m².      Physical Exam  Vitals reviewed.   Constitutional:       General: She is not in acute distress.     Appearance: Normal appearance. She is not ill-appearing.      Comments: Appears much better than last appt, some report of continued back pain, seeing pain management, but otherwise.  Good historian and no major complaints.    HENT:      Head: Normocephalic and atraumatic.      Right Ear: External ear normal.      Left Ear: External ear normal.      Nose: No rhinorrhea.      Mouth/Throat:      Mouth: Mucous membranes are moist.   Eyes:      Extraocular Movements: Extraocular movements intact.      Conjunctiva/sclera: Conjunctivae normal.   Cardiovascular:      Rate and Rhythm: Normal rate and regular rhythm.      Pulses: Normal pulses.   Pulmonary:      Effort: No respiratory distress.      Breath sounds: No wheezing.   Abdominal:      General: Abdomen is flat. There is no distension.   Musculoskeletal:      Right lower leg: Edema (1+) present.      Left lower leg: Edema (1+) present.   Skin:     Coloration: Skin is not jaundiced.      Findings: No bruising or lesion.   Neurological:      General: No focal deficit present.      Mental Status: She is alert and oriented to person, place, and time.      Motor: No weakness.      Gait: Gait normal.   Psychiatric:         Mood and Affect: Mood normal.         Physical Exam                    Past Medical History[1]    Lab Results   Component Value Date     12/27/2024    K 4.0 12/27/2024     12/27/2024    CO2 24 12/27/2024    BUN 17 12/27/2024    CREATININE 0.7 12/27/2024    GLUCOSE 83 11/26/2024    ANIONGAP 10 12/27/2024     Lab Results   Component Value Date    HGBA1C 4.5 (L) 10/24/2024    HGBA1C 4.9 08/25/2024     Lab Results   Component Value Date     (H) 08/11/2024    BNP 98 06/15/2024    BNP 47 12/15/2022       Lab Results   Component Value Date    WBC 9.13 12/27/2024    HGB 12.1 12/27/2024    HCT 38.8  2024    HCT 27 (L) 2024     2024    GRAN 5.0 2024    GRAN 55.2 2024     Lab Results   Component Value Date    CHOL 154 10/24/2024    CHOL 234 (H) 04/15/2015    HDL 48 10/24/2024    HDL 45 04/15/2015    LDLCALC 78 10/24/2024    LDLCALC 156.0 04/15/2015    TRIG 142 10/24/2024    TRIG 165 (H) 04/15/2015        Encounter Medications[2]              [1]   Past Medical History:  Diagnosis Date    Allergy     Arthritis     Asthma     Candida esophagitis 11/15/2022    Chronic pain     HIV infection     Hypertension     Overactive bladder     Septic shock 2024    Spinal stenosis     Urine incontinence    [2]   Outpatient Encounter Medications as of 3/19/2025   Medication Sig Dispense Refill    albuterol (PROVENTIL HFA) 90 mcg/actuation inhaler Inhale 2 puffs into the lungs every 6 (six) hours as needed for Wheezing. Rescue 18 g 0    clarithromycin (BIAXIN) 500 MG tablet Take 500 mg by mouth once daily.      dolutegravir-lamivudine (DOVATO)  mg Tab Take 1 tablet by mouth once daily. 30 tablet 11    gabapentin (NEURONTIN) 800 MG tablet Take 1 tablet (800 mg total) by mouth 3 (three) times daily. 90 tablet 11    HYDROcodone-acetaminophen (NORCO) 7.5-325 mg per tablet Take 1 tablet by mouth every 12 (twelve) hours as needed for Pain. 30 tablet 0    ibuprofen (ADVIL,MOTRIN) 800 MG tablet Take 1 tablet (800 mg total) by mouth every 6 (six) hours as needed for Pain. 20 tablet 0    methocarbamoL (ROBAXIN) 500 MG Tab Take 500 mg by mouth 4 (four) times daily as needed (muscle spasms).      rifabutin (MYCOBUTIN) 150 mg Cap Take 300 mg by mouth once daily.      vitamin D (VITAMIN D3) 1000 units Tab Take 1 tablet (1,000 Units total) by mouth once daily.      [DISCONTINUED] hydrOXYzine pamoate (VISTARIL) 25 MG Cap Take 25 mg by mouth every 8 (eight) hours as needed (anxiety).      [] levoFLOXacin (LEVAQUIN) 500 MG tablet Take 1 tablet (500 mg total) by mouth once daily. for 7 days 7  tablet 0    [DISCONTINUED] acetaminophen (TYLENOL) 325 MG tablet Take 2 tablets (650 mg total) by mouth 3 (three) times daily. (Patient not taking: Reported on 3/19/2025)      [DISCONTINUED] yswuhscnl-dfvbqinp-vjbuvxl ala (BIKTARVY) -25 mg (25 kg or greater) Take 1 tablet by mouth once daily.      [DISCONTINUED] dolutegravir-lamivudine (DOVATO)  mg Tab Take 1 tablet by mouth once daily.      [DISCONTINUED] dolutegravir-lamivudine  mg Tab Take 1 tablet by mouth once daily. 30 tablet 11    [DISCONTINUED] ethambutoL (MYAMBUTOL) 400 MG Tab Take 2 tablets (800 mg total) by mouth once daily.      [DISCONTINUED] gabapentin (NEURONTIN) 400 MG capsule Take 400 mg by mouth 3 (three) times daily.      [DISCONTINUED] HYDROcodone-acetaminophen (NORCO) 7.5-325 mg per tablet Take 1 tablet by mouth every 12 (twelve) hours as needed for Pain. 70 tablet 0    [DISCONTINUED] HYDROcodone-acetaminophen (NORCO) 7.5-325 mg per tablet Take 1 tablet by mouth every 12 (twelve) hours as needed for Pain. 10 tablet 0    [DISCONTINUED] mirtazapine (REMERON SOL-TAB) 15 MG disintegrating tablet Take 1 tablet (15 mg total) by mouth nightly. 30 tablet 5    [DISCONTINUED] mupirocin (BACTROBAN) 2 % ointment Apply topically 3 (three) times daily. 15 g 0    [DISCONTINUED] oxybutynin (DITROPAN) 5 MG Tab Take 1 tablet (5 mg total) by mouth 3 (three) times daily as needed (bladder spasms (pain in bladder, urge to urinate)). 90 tablet 11    [DISCONTINUED] senna-docusate 8.6-50 mg (PERICOLACE) 8.6-50 mg per tablet Take 2 tablets by mouth once daily. 60 tablet 1    [DISCONTINUED] sulfamethoxazole-trimethoprim 800-160mg (BACTRIM DS) 800-160 mg Tab Take 1 tablet by mouth 3 (three) times a week.      [DISCONTINUED] tamsulosin (FLOMAX) 0.4 mg Cap Take 1 capsule (0.4 mg total) by mouth once daily. 30 capsule 0     Facility-Administered Encounter Medications as of 3/19/2025   Medication Dose Route Frequency Provider Last Rate Last Admin     [COMPLETED] influenza (Flulaval, Fluzone, Fluarix) 45 mcg/0.5 mL IM vaccine (> or = 6 mo) 0.5 mL  0.5 mL Intramuscular 1 time in Clinic/HOD    0.5 mL at 03/19/25 0953

## 2025-03-19 NOTE — PATIENT INSTRUCTIONS
IMPRESSION:  - Reviewed recent labs from October/November, noting excellent cholesterol levels and low A1C (4.5).  - Recurrent UTIs resolved after referral to Dr. Duong.  - Evaluated need for annual screenings, recommending mammogram due to 25-year lapse.  - Assessed colon cancer screening options, opting for Cologuard due to patient preference and history of benign polyp.  - Determined flu vaccination appropriate for preventive care.    PLAN SUMMARY:  - Ordered Cologuard test for colon cancer screening  - Ordered mammogram for breast cancer screening  - Administered flu vaccine (Fluvrix, under 65 formulation)  - Continue Norco 7.5/325 mg, up to every 12 hours as needed for pain  - Continue gabapentin 800 mg TID for neuropathic pain  - Follow up in 3 weeks to discuss medication response and consider pain contract for chronic opiate prescribing    G89.4 CHRONIC PAIN SYNDROME:  - Continue Norco 7.5/325 mg, up to every 12 hours as needed for pain (30 pills should last 30 days, average 1 per day).  - Continue gabapentin 800 mg TID for neuropathic pain.  - Follow up in 3 weeks or sooner if needed to discuss medication response and consider pain contract for chronic opiate prescribing.    Z12.31 SCREENING MAMMOGRAM, ENCOUNTER FOR:  - Ordered mammogram for breast cancer screening.    Z23 NEED FOR VACCINATION:  - Administered flu vaccine (Fluvrix, under 65 formulation).    Z12.11 COLON CANCER SCREENING:  - Ordered Cologuard test for colon cancer screening.  - Described home test kit procedure, emphasizing proper sample collection and timely shipping.

## 2025-03-21 ENCOUNTER — PATIENT MESSAGE (OUTPATIENT)
Dept: ADMINISTRATIVE | Facility: OTHER | Age: 60
End: 2025-03-21
Payer: MEDICARE

## 2025-03-21 ENCOUNTER — OUTPATIENT CASE MANAGEMENT (OUTPATIENT)
Dept: ADMINISTRATIVE | Facility: OTHER | Age: 60
End: 2025-03-21
Payer: MEDICARE

## 2025-03-25 DIAGNOSIS — Z98.1 H/O SPINAL FUSION: ICD-10-CM

## 2025-03-25 DIAGNOSIS — M54.50 LUMBAR BACK PAIN: ICD-10-CM

## 2025-03-25 RX ORDER — HYDROCODONE BITARTRATE AND ACETAMINOPHEN 7.5; 325 MG/1; MG/1
1 TABLET ORAL EVERY 12 HOURS PRN
Qty: 30 TABLET | Refills: 0 | Status: CANCELLED | OUTPATIENT
Start: 2025-03-25 | End: 2025-04-24

## 2025-03-26 ENCOUNTER — OFFICE VISIT (OUTPATIENT)
Dept: PRIMARY CARE CLINIC | Facility: CLINIC | Age: 60
End: 2025-03-26
Payer: MEDICARE

## 2025-03-26 VITALS
HEIGHT: 61 IN | HEART RATE: 74 BPM | SYSTOLIC BLOOD PRESSURE: 110 MMHG | DIASTOLIC BLOOD PRESSURE: 70 MMHG | WEIGHT: 141.75 LBS | BODY MASS INDEX: 26.76 KG/M2 | OXYGEN SATURATION: 99 %

## 2025-03-26 DIAGNOSIS — G89.4 CHRONIC PAIN SYNDROME: Primary | ICD-10-CM

## 2025-03-26 DIAGNOSIS — M54.50 LUMBAR BACK PAIN: ICD-10-CM

## 2025-03-26 DIAGNOSIS — Z98.1 H/O SPINAL FUSION: ICD-10-CM

## 2025-03-26 PROCEDURE — 99213 OFFICE O/P EST LOW 20 MIN: CPT | Mod: S$GLB,,, | Performed by: STUDENT IN AN ORGANIZED HEALTH CARE EDUCATION/TRAINING PROGRAM

## 2025-03-26 PROCEDURE — 1160F RVW MEDS BY RX/DR IN RCRD: CPT | Mod: CPTII,S$GLB,, | Performed by: STUDENT IN AN ORGANIZED HEALTH CARE EDUCATION/TRAINING PROGRAM

## 2025-03-26 PROCEDURE — 3008F BODY MASS INDEX DOCD: CPT | Mod: CPTII,S$GLB,, | Performed by: STUDENT IN AN ORGANIZED HEALTH CARE EDUCATION/TRAINING PROGRAM

## 2025-03-26 PROCEDURE — 99999 PR PBB SHADOW E&M-EST. PATIENT-LVL IV: CPT | Mod: PBBFAC,,, | Performed by: STUDENT IN AN ORGANIZED HEALTH CARE EDUCATION/TRAINING PROGRAM

## 2025-03-26 PROCEDURE — 3074F SYST BP LT 130 MM HG: CPT | Mod: CPTII,S$GLB,, | Performed by: STUDENT IN AN ORGANIZED HEALTH CARE EDUCATION/TRAINING PROGRAM

## 2025-03-26 PROCEDURE — 1159F MED LIST DOCD IN RCRD: CPT | Mod: CPTII,S$GLB,, | Performed by: STUDENT IN AN ORGANIZED HEALTH CARE EDUCATION/TRAINING PROGRAM

## 2025-03-26 PROCEDURE — 3078F DIAST BP <80 MM HG: CPT | Mod: CPTII,S$GLB,, | Performed by: STUDENT IN AN ORGANIZED HEALTH CARE EDUCATION/TRAINING PROGRAM

## 2025-03-26 RX ORDER — HYDROCODONE BITARTRATE AND ACETAMINOPHEN 7.5; 325 MG/1; MG/1
1 TABLET ORAL EVERY 12 HOURS PRN
Qty: 16 TABLET | Refills: 0 | Status: SHIPPED | OUTPATIENT
Start: 2025-03-26 | End: 2025-04-03

## 2025-03-26 NOTE — PROGRESS NOTES
Assessment:       1. Chronic pain syndrome    2. Lumbar back pain    3. H/O spinal fusion           Plan:     Assessment & Plan    G89.4 Chronic pain syndrome  M54.50 Lumbar back pain  Z98.1 H/O spinal fusion    PLAN SUMMARY:  - Prescribed 8-day supply of hydrocodone 7.5/325 mg, sent to [facility] pharmacy  - Continue current pain management regimen  - Follow up with Dr. Garcia on April 3rd at 9am as scheduled    G89.4 CHRONIC PAIN SYNDROME:  - Assessed current pain management regimen.  - Amie taking approximately 2 hydrocodone 7.5/325 mg tablets daily, more than initially anticipated due to recent exacerbation of pain from assisting mother after a fall.  - Prescribed 8-day supply of hydrocodone 7.5/325 mg to bridge until appointment with Dr. Garcia, sent to [facility] pharmacy via e-scribe.  - Follow up with Dr. Garcia on April 3rd at 9am as scheduled.             Chronic pain syndrome  -     HYDROcodone-acetaminophen (NORCO) 7.5-325 mg per tablet; Take 1 tablet by mouth every 12 (twelve) hours as needed for Pain.  Dispense: 16 tablet; Refill: 0    Lumbar back pain  -     HYDROcodone-acetaminophen (NORCO) 7.5-325 mg per tablet; Take 1 tablet by mouth every 12 (twelve) hours as needed for Pain.  Dispense: 16 tablet; Refill: 0    H/O spinal fusion  -     HYDROcodone-acetaminophen (NORCO) 7.5-325 mg per tablet; Take 1 tablet by mouth every 12 (twelve) hours as needed for Pain.  Dispense: 16 tablet; Refill: 0                This note was generated with the assistance of ambient listening technology. Verbal consent was obtained by the patient and accompanying visitor(s) for the recording of patient appointment to facilitate this note. I attest to having reviewed and edited the generated note for accuracy, though some syntax or spelling errors may persist. Please contact the author of this note for any clarification.      Subjective:           Patient ID: Amie Salmeron   Age:  60 y.o.  Sex: female  "    Chief Complaint:   Low-back Pain (Pain medication)      History of Present Illness:    Amie Salmeron is a 60 y.o. female who presents today with a chief complaint of Low-back Pain (Pain medication)  .    Acute on Chronic Back Pain:   - patient has been having chronic back pain, seeing pain management, got a Hydrocodone rx 2.5 weeks ago that was supposed to last 30 days but had used BID for 2 weeks and ran out.  States over a week ago hurt her back further by trying to get her mom off the floor for 90 minutes before giving up.  Made back pain worse, so used meds 2x daily and ran out.   - refilling Hydrocodone 7.5/325 mg tabs BID for 8 days to get to pain med appt and discuss a long term plan.       History of Present Illness    CHIEF COMPLAINT:  Amie presents today for medication refill due to chronic pain    HISTORY OF PRESENT ILLNESS:  She recently sustained a back injury while attempting to assist her fallen mother. She spent approximately 90 minutes trying to lift her mother before stopping due to severe back pain, describing a sensation of her back "" during the incident. Since then, she has been bedridden. The pain is constant, worsens with activity, and has been very disruptive to her daily activities and sleep. She currently takes hydrocodone 7.5/325mg approximately twice daily for pain management.    MEDICAL HISTORY:  She has been diagnosed with MAC (Mycobacterium avium complex) infection. She reports being informed by her physician that this condition may not resolve completely and she will need to learn to live with it.    LABS:  October labs showed normal cholesterol levels and A1c of 4.5.      ROS:  General: +fatigue, +sleep disturbances  Musculoskeletal: +back pain, +pain with movement, +nightime pain           Review of Systems   Constitutional:  Negative for activity change, fatigue, fever and unexpected weight change.   HENT:  Negative for congestion, nosebleeds, sinus pressure and " "sneezing.    Respiratory:  Negative for cough, chest tightness, shortness of breath and wheezing.    Cardiovascular:  Negative for chest pain, palpitations and leg swelling.   Gastrointestinal:  Negative for abdominal distention, constipation, diarrhea and nausea.   Genitourinary:  Negative for difficulty urinating and dysuria.   Musculoskeletal:  Positive for arthralgias, back pain, gait problem and myalgias.   Skin:  Negative for pallor and rash.   Neurological:  Positive for weakness. Negative for numbness and headaches.   Psychiatric/Behavioral:  Negative for agitation. The patient is nervous/anxious.            Objective:        Vitals:    03/26/25 1042   BP: 110/70   BP Location: Right arm   Patient Position: Sitting   Pulse: 74   SpO2: 99%   Weight: 64.3 kg (141 lb 12.1 oz)   Height: 5' 1" (1.549 m)       Body mass index is 26.78 kg/m².      Physical Exam  Vitals reviewed.   Constitutional:       General: She is not in acute distress.     Appearance: Normal appearance. She is not ill-appearing.      Comments: Appears much better than last appt, some report of continued back pain, seeing pain management, but otherwise.  Good historian and no major complaints.    HENT:      Head: Normocephalic and atraumatic.      Right Ear: External ear normal.      Left Ear: External ear normal.      Nose: No rhinorrhea.      Mouth/Throat:      Mouth: Mucous membranes are moist.   Eyes:      Extraocular Movements: Extraocular movements intact.      Conjunctiva/sclera: Conjunctivae normal.   Cardiovascular:      Rate and Rhythm: Normal rate and regular rhythm.      Pulses: Normal pulses.   Pulmonary:      Effort: No respiratory distress.      Breath sounds: No wheezing.   Abdominal:      General: Abdomen is flat. There is no distension.   Musculoskeletal:      Right lower leg: No edema.      Left lower leg: No edema.   Skin:     Coloration: Skin is not jaundiced.      Findings: No bruising or lesion.   Neurological:      " General: No focal deficit present.      Mental Status: She is alert and oriented to person, place, and time.      Motor: No weakness.      Gait: Gait normal.   Psychiatric:         Mood and Affect: Mood normal.         Physical Exam                    Past Medical History[1]    Lab Results   Component Value Date     12/27/2024    K 4.0 12/27/2024     12/27/2024    CO2 24 12/27/2024    BUN 17 12/27/2024    CREATININE 0.7 12/27/2024    GLUCOSE 83 11/26/2024    ANIONGAP 10 12/27/2024     Lab Results   Component Value Date    HGBA1C 4.5 (L) 10/24/2024    HGBA1C 4.9 08/25/2024     Lab Results   Component Value Date     (H) 08/11/2024    BNP 98 06/15/2024    BNP 47 12/15/2022       Lab Results   Component Value Date    WBC 9.13 12/27/2024    HGB 12.1 12/27/2024    HCT 38.8 12/27/2024    HCT 27 (L) 08/25/2024     12/27/2024    GRAN 5.0 12/27/2024    GRAN 55.2 12/27/2024     Lab Results   Component Value Date    CHOL 154 10/24/2024    CHOL 234 (H) 04/15/2015    HDL 48 10/24/2024    HDL 45 04/15/2015    LDLCALC 78 10/24/2024    LDLCALC 156.0 04/15/2015    TRIG 142 10/24/2024    TRIG 165 (H) 04/15/2015        Encounter Medications[2]              [1]   Past Medical History:  Diagnosis Date    Allergy     Arthritis     Asthma     Candida esophagitis 11/15/2022    Chronic pain     HIV infection     Hypertension     Overactive bladder     Septic shock 08/12/2024    Spinal stenosis     Urine incontinence    [2]   Outpatient Encounter Medications as of 3/26/2025   Medication Sig Dispense Refill    albuterol (PROVENTIL HFA) 90 mcg/actuation inhaler Inhale 2 puffs into the lungs every 6 (six) hours as needed for Wheezing. Rescue 18 g 0    clarithromycin (BIAXIN) 500 MG tablet Take 500 mg by mouth once daily.      dolutegravir-lamivudine (DOVATO)  mg Tab Take 1 tablet by mouth once daily. 30 tablet 11    gabapentin (NEURONTIN) 800 MG tablet Take 1 tablet (800 mg total) by mouth 3 (three) times daily.  90 tablet 11    ibuprofen (ADVIL,MOTRIN) 800 MG tablet Take 1 tablet (800 mg total) by mouth every 6 (six) hours as needed for Pain. 20 tablet 0    methocarbamoL (ROBAXIN) 500 MG Tab Take 500 mg by mouth 4 (four) times daily as needed (muscle spasms).      rifabutin (MYCOBUTIN) 150 mg Cap Take 300 mg by mouth once daily.      vitamin D (VITAMIN D3) 1000 units Tab Take 1 tablet (1,000 Units total) by mouth once daily.      [DISCONTINUED] HYDROcodone-acetaminophen (NORCO) 7.5-325 mg per tablet Take 1 tablet by mouth every 12 (twelve) hours as needed for Pain. 30 tablet 0    HYDROcodone-acetaminophen (NORCO) 7.5-325 mg per tablet Take 1 tablet by mouth every 12 (twelve) hours as needed for Pain. 16 tablet 0     No facility-administered encounter medications on file as of 3/26/2025.

## 2025-03-26 NOTE — Clinical Note
Patient states that she has never gotten her cologuard at home, but we've ordered it more than once.  Anyone we can check on with this?

## 2025-03-26 NOTE — PATIENT INSTRUCTIONS
Assessment & Plan    G89.4 Chronic pain syndrome  M54.50 Lumbar back pain  Z98.1 H/O spinal fusion    PLAN SUMMARY:  - Prescribed 8-day supply of hydrocodone 7.5/325 mg, sent to [facility] pharmacy  - Continue current pain management regimen  - Follow up with Dr. Garcia on April 3rd at 9am as scheduled    Acute on Chronic Back Pain:   - patient has been having chronic back pain, seeing pain management, got a Hydrocodone rx 2.5 weeks ago that was supposed to last 30 days but had used BID for 2 weeks and ran out.  States over a week ago hurt her back further by trying to get her mom off the floor for 90 minutes before giving up.  Made back pain worse, so used meds 2x daily and ran out.   - refilling Hydrocodone 7.5/325 mg tabs BID for 8 days to get to pain med appt and discuss a long term plan.     G89.4 CHRONIC PAIN SYNDROME:  - Assessed current pain management regimen.  - Amie taking approximately 2 hydrocodone 7.5/325 mg tablets daily, more than initially anticipated due to recent exacerbation of pain from assisting mother after a fall.  - Prescribed 8-day supply of hydrocodone 7.5/325 mg to bridge until appointment with Dr. Garcia, sent to [facility] pharmacy via e-scribe.  - Follow up with Dr. Garcia on April 3rd at 9am as scheduled.             Chronic pain syndrome  -     HYDROcodone-acetaminophen (NORCO) 7.5-325 mg per tablet; Take 1 tablet by mouth every 12 (twelve) hours as needed for Pain.  Dispense: 16 tablet; Refill: 0    Lumbar back pain  -     HYDROcodone-acetaminophen (NORCO) 7.5-325 mg per tablet; Take 1 tablet by mouth every 12 (twelve) hours as needed for Pain.  Dispense: 16 tablet; Refill: 0    H/O spinal fusion  -     HYDROcodone-acetaminophen (NORCO) 7.5-325 mg per tablet; Take 1 tablet by mouth every 12 (twelve) hours as needed for Pain.  Dispense: 16 tablet; Refill: 0

## 2025-03-27 ENCOUNTER — TELEPHONE (OUTPATIENT)
Dept: PAIN MEDICINE | Facility: CLINIC | Age: 60
End: 2025-03-27
Payer: MEDICARE

## 2025-03-27 NOTE — TELEPHONE ENCOUNTER
Spoke to pt, pt states that she doesn't need a refill.  Seen Dr. Dupont and he gave her some meds to last her to the next appt.

## 2025-03-31 ENCOUNTER — OUTPATIENT CASE MANAGEMENT (OUTPATIENT)
Dept: ADMINISTRATIVE | Facility: OTHER | Age: 60
End: 2025-03-31
Payer: MEDICARE

## 2025-04-01 DIAGNOSIS — M54.50 LUMBAR BACK PAIN: ICD-10-CM

## 2025-04-01 DIAGNOSIS — Z98.1 H/O SPINAL FUSION: ICD-10-CM

## 2025-04-01 DIAGNOSIS — G89.4 CHRONIC PAIN SYNDROME: ICD-10-CM

## 2025-04-01 RX ORDER — HYDROCODONE BITARTRATE AND ACETAMINOPHEN 7.5; 325 MG/1; MG/1
1 TABLET ORAL EVERY 12 HOURS PRN
Qty: 16 TABLET | Refills: 0 | OUTPATIENT
Start: 2025-04-01 | End: 2025-04-09

## 2025-04-01 NOTE — TELEPHONE ENCOUNTER
No care due was identified.  [facility] Embedded Care Due Messages. Reference number: [id number].   4/01/2025 9:08:47 AM CDT

## 2025-04-03 ENCOUNTER — OFFICE VISIT (OUTPATIENT)
Dept: PAIN MEDICINE | Facility: CLINIC | Age: 60
End: 2025-04-03
Payer: MEDICARE

## 2025-04-03 VITALS
HEIGHT: 61 IN | SYSTOLIC BLOOD PRESSURE: 135 MMHG | WEIGHT: 142.75 LBS | BODY MASS INDEX: 26.95 KG/M2 | HEART RATE: 73 BPM | DIASTOLIC BLOOD PRESSURE: 88 MMHG

## 2025-04-03 DIAGNOSIS — Z98.1 H/O SPINAL FUSION: ICD-10-CM

## 2025-04-03 DIAGNOSIS — M54.50 LUMBAR BACK PAIN: ICD-10-CM

## 2025-04-03 DIAGNOSIS — G89.4 CHRONIC PAIN SYNDROME: ICD-10-CM

## 2025-04-03 PROCEDURE — 3075F SYST BP GE 130 - 139MM HG: CPT | Mod: HCNC,CPTII,S$GLB, | Performed by: PAIN MEDICINE

## 2025-04-03 PROCEDURE — 3008F BODY MASS INDEX DOCD: CPT | Mod: HCNC,CPTII,S$GLB, | Performed by: PAIN MEDICINE

## 2025-04-03 PROCEDURE — 99999 PR PBB SHADOW E&M-EST. PATIENT-LVL III: CPT | Mod: PBBFAC,HCNC,, | Performed by: PAIN MEDICINE

## 2025-04-03 PROCEDURE — 99214 OFFICE O/P EST MOD 30 MIN: CPT | Mod: HCNC,S$GLB,, | Performed by: PAIN MEDICINE

## 2025-04-03 PROCEDURE — 1160F RVW MEDS BY RX/DR IN RCRD: CPT | Mod: HCNC,CPTII,S$GLB, | Performed by: PAIN MEDICINE

## 2025-04-03 PROCEDURE — 3079F DIAST BP 80-89 MM HG: CPT | Mod: HCNC,CPTII,S$GLB, | Performed by: PAIN MEDICINE

## 2025-04-03 PROCEDURE — 1159F MED LIST DOCD IN RCRD: CPT | Mod: HCNC,CPTII,S$GLB, | Performed by: PAIN MEDICINE

## 2025-04-03 RX ORDER — HYDROCODONE BITARTRATE AND ACETAMINOPHEN 7.5; 325 MG/1; MG/1
1 TABLET ORAL EVERY 12 HOURS PRN
Qty: 60 TABLET | Refills: 0 | Status: SHIPPED | OUTPATIENT
Start: 2025-04-03 | End: 2025-05-03

## 2025-04-03 NOTE — PROGRESS NOTES
Outpatient Care Management   - Care Plan Follow Up    Patient: Amie Salmeron  MRN:  [id number]  Date of Service:  4/3/2025  Completed by:  Silva Rodriguez LCSW  Referral Date: 12/10/2024    Reason for Visit   Patient presents with    Other     3/14/2025  1st attempt to complete Follow-Up  for [facility], no vm available.  3/21/2025  2nd attempt to complete Follow-Up  for [facility], no vm available. Attempt letter sent via portal. Collaborating with [facility] RN for successful patient contact.   3/28/2025  3rd attempt to complete Follow-Up  for [facility], no vm available.  3/31/2025  In collaboration with [facility] RN        [facility] SW Follow Up Call     4/3/2025       Brief Summary: SAEID spoke with pt for follow up call.  Pt and SW discussed the following:  LAHAP:  Pt has been approved for LAHAP, so she is now able to afford all of her medication, which is a big relief.  Transportation:  Pt shares that she is still in need of assistance with transportation.  She did not receive information mailed by SAEID.  SAEID provided pt with information for [facility] and she plans to contact them to discuss.  She reports that a lot of her appts are in [facility] so this would be a big help.  SW also to mail print out to her again.  Mental Health:  Pt still interested in therapists in her area, since she did not receive mailed resources.  SAEID will re-mail this info to pt.     Complex Care Plan     Care plan was discussed and completed today with input from patient and/or caregiver.    Patient Instructions     No follow-ups on file.

## 2025-04-03 NOTE — PROGRESS NOTES
Subjective:     Patient ID: Amie Salmeron is a 60 y.o. female    Chief Complaint: Follow-up (4 week)      Referred by: No ref. provider found      HPI:    Interval History (4/3/25):  She returns today for follow up.  She reports that she continues to have low back pain as before.  She denies any changes in the quality or location of the pain since last encounter.  She denies any new or worsening symptoms She has been taking Norco 7.5 mg.  This was prescribed q.12 hours p.r.n..  At previous encounter, she stated that she took this medication on average once a day.  Stating that she would sometimes take 2, but on other days she would take none.  However, patient has run out of medicine much more quickly than anticipated.  She states that she has been taking this medication essentially twice per day.  She did get an additional 8 day supply from her primary care physician.  She states this medication is providing adequate relief without any adverse effects.  She is able to function at a higher level throughout the day as result of this medication.      Initial Encounter (3/6/25):  Amie Salmeron is a 60 y.o. female who presents today with chronic low back pain.  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  Has had chronic low back pain ever since.  Pain is located in the midline thoracolumbar region radiates the lumbar paraspinals.  Some radicular pain into the lower extremities as well.  No focal weakness or bowel bladder dysfunction.  Pain is constant and worsened with activity.  It is very disruptive to her activities of daily living and her sleep.  She has been taking Norco 7.5-325 mg.  At mostly she will take this medication twice a day.  She states there are some days where she would not take it at all.  She feels that on average she would take 1 pill per day.  This pain is described in detail below.    Physical Therapy:  Yes.    Non-pharmacologic Treatment:   Rest helps         TENS?  No    Pain Medications:         Currently taking:  Gabapentin, ibuprofen, Tylenol, Norco    Has tried in the past:      Has not tried:  Muscle relaxants, TCAs, SNRIs, topical creams    Blood thinners:  None    Interventional Therapies:  None    Relevant Surgeries:   Thoracolumbar corpectomy and fusion secondary to disseminated MAC infection    Affecting sleep?  Yes    Affecting daily activities? yes    Depressive symptoms? No          SI/HI? No    Work status: Disabled    Pain Scores:    Best:       5/10  Worst:     10/10  Usually:   9/10  Today:    9/10    Pain Disability Index  Family/Home Responsibilities:: 9  Recreation:: 9  Social Activity:: 0  Occupation:: 7  Sexual Behavior:: 0  Self Care:: 7  Life-Support Activities:: 0  Pain Disability Index (PDI): 32    Review of Systems   Constitutional:  Negative for activity change, appetite change, chills, fatigue, fever and unexpected weight change.   HENT:  Negative for hearing loss.    Eyes:  Negative for visual disturbance.   Respiratory:  Negative for chest tightness and shortness of breath.    Cardiovascular:  Negative for chest pain.   Gastrointestinal:  Negative for abdominal pain, constipation, diarrhea, nausea and vomiting.   Genitourinary:  Negative for difficulty urinating.   Musculoskeletal:  Positive for back pain, gait problem and myalgias. Negative for neck pain.   Skin:  Negative for rash.   Neurological:  Positive for numbness. Negative for dizziness, weakness, light-headedness and headaches.   Psychiatric/Behavioral:  Positive for sleep disturbance. Negative for hallucinations and suicidal ideas. The patient is not nervous/anxious.        Past Medical History:   Diagnosis Date    Allergy     Arthritis     Asthma     Candida esophagitis 11/15/2022    Chronic pain     HIV infection     Hypertension     Overactive bladder     Septic shock 08/12/2024    Spinal stenosis     Urine incontinence        Past Surgical History:  "  Procedure Laterality Date    ADENOIDECTOMY      APPENDECTOMY      BACK SURGERY      GERALD    CYST REMOVAL      CYSTOSCOPY W/ URETERAL STENT PLACEMENT Right 12/09/2024    Procedure: CYSTOSCOPY, WITH URETERAL STENT INSERTION;  Surgeon: Daniel Duong MD;  Location: [facility];  Service: Urology;  Laterality: Right;    CYSTOSCOPY W/ URETERAL STENT REMOVAL  2/11/2025    Procedure: CYSTOSCOPY, WITH URETERAL STENT REMOVAL;  Surgeon: Daniel Duong MD;  Location: [facility] OR;  Service: Urology;;    CYSTOSCOPY WITH CALCULUS EXTRACTION Right 2/11/2025    Procedure: CYSTOSCOPY, WITH CALCULUS REMOVAL;  Surgeon: Daniel Duong MD;  Location: [facility] OR;  Service: Urology;  Laterality: Right;    CYSTOSCOPY, WITH URETERAL STENT INSERTION - Right Right 12/09/2024    CYSTOURETEROSCOPY, WITH HOLMIUM LASER LITHOTRIPSY OF URETERAL CALCULUS AND STENT INSERTION Right 2/11/2025    Procedure: CYSTOURETEROSCOPY, WITH HOLMIUM LASER LITHOTRIPSY OF URETERAL CALCULUS AND STENT INSERTION;  Surgeon: Daniel Duong MD;  Location: [facility];  Service: Urology;  Laterality: Right;  laser notified on 1/28/25/DME    CYSTOURETEROSCOPY,WITH HOLMIUM LASER LITHOTRIPSY OF URETERAL CALCULUS Right 02/11/2025    ureteral stent placement    HYSTERECTOMY      JOINT REPLACEMENT Right     knee, with revision    KNEE SURGERY Left     knee replacement    PERCUTANEOUS PINNING OF HIP Right 08/25/2024    Procedure: PINNING, HIP, PERCUTANEOUS, RIGHT;  Surgeon: Laureano Vyas MD;  Location: [address];  Service: Orthopedics;  Laterality: Right;    RHIZOTOMY      TONSILLECTOMY      TOTAL ANKLE ARTHROPLASTY Right        Social History[1]    Review of patient's allergies indicates:  No Known Allergies    Medications Ordered Prior to Encounter[2]    Objective:      /88 (BP Location: Right arm, Patient Position: Sitting)   Pulse 73   Ht 5' 1" (1.549 m)   Wt 64.8 kg (142 lb 12 oz)   BMI 26.97 kg/m²     Exam:  GEN:  Well developed, well nourished.  No acute " distress.   HEENT:  No trauma.  Mucous membranes moist.  Nares patent bilaterally.  PSYCH: Normal affect. Thought content appropriate.  CHEST:  Breathing symmetric.  No audible wheezing.  ABD: Soft, non-distended.  SKIN:  Warm, pink, dry.  No rash on exposed areas.    EXT:  No cyanosis, clubbing, or edema.  No color change or changes in nail or hair growth.  NEURO/MUSCULOSKELETAL:  Fully alert, oriented, and appropriate. Speech normal irma. No cranial nerve deficits.   Gait:  Antalgic.  No focal motor deficits.       Imaging:    Narrative & Impression    EXAMINATION:  XR LUMBAR SPINE AP AND LATERAL     CLINICAL HISTORY:  back pain, hx of back problems;     TECHNIQUE:  XR LUMBAR SPINE AP AND LATERAL     COMPARISON:  01/12/2025     FINDINGS:  Redemonstration of the corpectomy and posterior fusion of the thoracolumbar spine.  No definitive evidence of hardware loosening or failure.     Dextro asymmetry of the lumbar spine centered around L2-L3.     Vertebral body heights are unchanged.     Multilevel disc height loss.  Multilevel facet arthropathy.     Right-sided double-J stent catheter in unchanged position.  Prominent fecal material projecting over the central pelvis.     Impression:     As above.        Electronically signed by:David Galvez  Date:                                            01/25/2025  Time:                                           09:47       Assessment:       Encounter Diagnoses   Name Primary?    Chronic pain syndrome     Lumbar back pain     H/O spinal fusion      Plan:       Amie was seen today for follow-up.    Diagnoses and all orders for this visit:    Chronic pain syndrome  -     HYDROcodone-acetaminophen (NORCO) 7.5-325 mg per tablet; Take 1 tablet by mouth every 12 (twelve) hours as needed for Pain.  -     Pain Clinic Drug Screen    Lumbar back pain  -     HYDROcodone-acetaminophen (NORCO) 7.5-325 mg per tablet; Take 1 tablet by mouth every 12 (twelve) hours as needed for Pain.  -      Pain Clinic Drug Screen    H/O spinal fusion  -     HYDROcodone-acetaminophen (NORCO) 7.5-325 mg per tablet; Take 1 tablet by mouth every 12 (twelve) hours as needed for Pain.  -     Pain Clinic Drug Screen        Amie Salmeron is a 60 y.o. female with chronic low back and lower extremity pain.  Status post thoracolumbar corpectomy and fusion related to disseminated MAC infection vertebral bodies.  Has both axial and radicular components of pain.     Pertinent imaging studies reviewed by me. Imaging results were discussed with patient.  Continue Gabapentin 800 mg t.i.d..    Opioid risk tool completed.  Low risk.    Prescription monitoring report reviewed today.  No inconsistencies noted.    Urine drug screen today.  I will review results when available.    Pain contract signed.  I stressed that she should only take this medication as prescribed or less.  I also stressed that she should not fill any controlled medications prescribed by other providers until she has discussed it with me 1st.  Patient expressed understanding and agreement.  Norco 7.5-325 mg q.12h p.r.n..  We will increase to 30 day supply of 60 tablets.  Given the severity of her pathology and surgical intervention, feel that patient is likely to have some degree of back pain for the rest of her life.  This pain seems to be well managed with relatively low doses of opioids.  I think it is appropriate to continue low-dose opioid pain medications.  Return to clinic in 3 weeks or sooner if needed.  At that time we will discuss efficacy of medications and make any necessary adjustments.        This note was created by combination of typed  and M-Modal dictation. Transcription and phonetic errors may be present.  If there are any questions, please contact me.             [1]   Social History  Socioeconomic History    Marital status: Single   Occupational History    Occupation: on disability   Tobacco Use    Smoking status: Never    Smokeless  tobacco: Never   Substance and Sexual Activity    Alcohol use: Not Currently     Comment: not currently    Drug use: No    Sexual activity: Not Currently     Partners: Male     Birth control/protection: None     Social Drivers of Health     Financial Resource Strain: High Risk (2/28/2025)    Overall Financial Resource Strain (CARDIA)     Difficulty of Paying Living Expenses: Very hard   Food Insecurity: Food Insecurity Present (2/28/2025)    Hunger Vital Sign     Worried About Running Out of Food in the Last Year: Often true     Ran Out of Food in the Last Year: Sometimes true   Transportation Needs: Unmet Transportation Needs (2/28/2025)    PRAPARE - Transportation     Lack of Transportation (Medical): Yes     Lack of Transportation (Non-Medical): Yes   Physical Activity: Inactive (2/28/2025)    Exercise Vital Sign     Days of Exercise per Week: 0 days     Minutes of Exercise per Session: 0 min   Stress: Stress Concern Present (2/28/2025)    [employer] - Occupational Stress Questionnaire     Feeling of Stress : Rather much   Housing Stability: High Risk (2/28/2025)    Housing Stability Vital Sign     Unable to Pay for Housing in the Last Year: Yes     Number of Times Moved in the Last Year: 0     Homeless in the Last Year: No   [2]   Current Outpatient Medications on File Prior to Visit   Medication Sig Dispense Refill    albuterol (PROVENTIL HFA) 90 mcg/actuation inhaler Inhale 2 puffs into the lungs every 6 (six) hours as needed for Wheezing. Rescue 18 g 0    clarithromycin (BIAXIN) 500 MG tablet Take 500 mg by mouth once daily.      dolutegravir-lamivudine (DOVATO)  mg Tab Take 1 tablet by mouth once daily. 30 tablet 11    gabapentin (NEURONTIN) 800 MG tablet Take 1 tablet (800 mg total) by mouth 3 (three) times daily. 90 tablet 11    ibuprofen (ADVIL,MOTRIN) 800 MG tablet Take 1 tablet (800 mg total) by mouth every 6 (six) hours as needed for Pain. 20 tablet 0     methocarbamoL (ROBAXIN) 500 MG Tab Take 500 mg by mouth 4 (four) times daily as needed (muscle spasms).      rifabutin (MYCOBUTIN) 150 mg Cap Take 300 mg by mouth once daily.      vitamin D (VITAMIN D3) 1000 units Tab Take 1 tablet (1,000 Units total) by mouth once daily.      [DISCONTINUED] HYDROcodone-acetaminophen (NORCO) 7.5-325 mg per tablet Take 1 tablet by mouth every 12 (twelve) hours as needed for Pain. 16 tablet 0     No current facility-administered medications on file prior to visit.

## 2025-04-09 ENCOUNTER — OFFICE VISIT (OUTPATIENT)
Dept: ORTHOPEDICS | Facility: CLINIC | Age: 60
End: 2025-04-09
Payer: MEDICARE

## 2025-04-09 VITALS
SYSTOLIC BLOOD PRESSURE: 109 MMHG | BODY MASS INDEX: 27.06 KG/M2 | HEART RATE: 91 BPM | DIASTOLIC BLOOD PRESSURE: 79 MMHG | WEIGHT: 143.31 LBS | HEIGHT: 61 IN

## 2025-04-09 DIAGNOSIS — M53.3 CHRONIC RIGHT SI JOINT PAIN: Primary | ICD-10-CM

## 2025-04-09 DIAGNOSIS — M54.50 LUMBAR BACK PAIN: ICD-10-CM

## 2025-04-09 DIAGNOSIS — G89.29 CHRONIC RIGHT SI JOINT PAIN: Primary | ICD-10-CM

## 2025-04-09 DIAGNOSIS — S72.001D CLOSED FRACTURE OF RIGHT HIP WITH ROUTINE HEALING, SUBSEQUENT ENCOUNTER: ICD-10-CM

## 2025-04-09 PROCEDURE — 1159F MED LIST DOCD IN RCRD: CPT | Mod: CPTII,S$GLB,,

## 2025-04-09 PROCEDURE — 3008F BODY MASS INDEX DOCD: CPT | Mod: CPTII,S$GLB,,

## 2025-04-09 PROCEDURE — 3078F DIAST BP <80 MM HG: CPT | Mod: CPTII,S$GLB,,

## 2025-04-09 PROCEDURE — 3074F SYST BP LT 130 MM HG: CPT | Mod: CPTII,S$GLB,,

## 2025-04-09 PROCEDURE — 99999 PR PBB SHADOW E&M-EST. PATIENT-LVL IV: CPT | Mod: PBBFAC,HCNC,,

## 2025-04-09 PROCEDURE — 99203 OFFICE O/P NEW LOW 30 MIN: CPT | Mod: S$GLB,,,

## 2025-04-09 PROCEDURE — 1160F RVW MEDS BY RX/DR IN RCRD: CPT | Mod: CPTII,S$GLB,,

## 2025-04-09 NOTE — PROGRESS NOTES
Patient ID: Amie Salmeron is a 60 y.o. female    Pain of the Left Hip and Pain of the Right Hip    History of Present Illness:    Amie Salmeron presents to clinic for bilateral hip pain.  Patient medically complex including history of HIV and previous spinal abscess and MAC injection.  She underwent right hip percutaneous pinning with Dr. Vyas in Sept 2024 due to a fall resulting in a hip fracture. She did not do PT after hip surgery. Denies any complications following her hip surgery. Reports no recent injury, states main concern today is after standing for about 5 mins she experiences a burning sensation which started 3 months ago. Burning located to posterior hip/low back without radiation. After her percutaneous hip pinning she was unable to f/u with anyone. She has seen pain management as well. She also is seeing ID in a few weeks, currently on abx regimen.     She is on Norco and ibuprofen, which does help hip pain. Denies groin pain.     Occupation: , lives with brother and parents    Ambulating: walker  Diabetic: no  Smoking: no  Hx of DVT/PE: no    PAST MEDICAL HISTORY:   Past Medical History:   Diagnosis Date    Allergy     Arthritis     Asthma     Candida esophagitis 11/15/2022    Chronic pain     HIV infection     Hypertension     Overactive bladder     Septic shock 08/12/2024    Spinal stenosis     Urine incontinence      PAST SURGICAL HISTORY:   Past Surgical History:   Procedure Laterality Date    ADENOIDECTOMY      APPENDECTOMY      BACK SURGERY      GERALD    CYST REMOVAL      CYSTOSCOPY W/ URETERAL STENT PLACEMENT Right 12/09/2024    Procedure: CYSTOSCOPY, WITH URETERAL STENT INSERTION;  Surgeon: Daniel Duong MD;  Location: [facility] OR;  Service: Urology;  Laterality: Right;    CYSTOSCOPY W/ URETERAL STENT REMOVAL  2/11/2025    Procedure: CYSTOSCOPY, WITH URETERAL STENT REMOVAL;  Surgeon: Daniel Duong MD;  Location: [facility] OR;  Service: Urology;;    CYSTOSCOPY WITH CALCULUS  EXTRACTION Right 2/11/2025    Procedure: CYSTOSCOPY, WITH CALCULUS REMOVAL;  Surgeon: Daniel Duong MD;  Location: [facility] OR;  Service: Urology;  Laterality: Right;    CYSTOSCOPY, WITH URETERAL STENT INSERTION - Right Right 12/09/2024    CYSTOURETEROSCOPY, WITH HOLMIUM LASER LITHOTRIPSY OF URETERAL CALCULUS AND STENT INSERTION Right 2/11/2025    Procedure: CYSTOURETEROSCOPY, WITH HOLMIUM LASER LITHOTRIPSY OF URETERAL CALCULUS AND STENT INSERTION;  Surgeon: Daniel Duong MD;  Location: [facility] OR;  Service: Urology;  Laterality: Right;  laser notified on 1/28/25/DME    CYSTOURETEROSCOPY,WITH HOLMIUM LASER LITHOTRIPSY OF URETERAL CALCULUS Right 02/11/2025    ureteral stent placement    HYSTERECTOMY      JOINT REPLACEMENT Right     knee, with revision    KNEE SURGERY Left     knee replacement    PERCUTANEOUS PINNING OF HIP Right 08/25/2024    Procedure: PINNING, HIP, PERCUTANEOUS, RIGHT;  Surgeon: Laureano Vyas MD;  Location: [address];  Service: Orthopedics;  Laterality: Right;    RHIZOTOMY      TONSILLECTOMY      TOTAL ANKLE ARTHROPLASTY Right      FAMILY HISTORY:   Family History   Problem Relation Name Age of Onset    Thyroid disease Mother      Hypertension Mother      Throat cancer Father          oral CA    Depression Sister      Hypertension Brother x3     Alcohol abuse Brother x3     Lung cancer Neg Hx      Colon cancer Neg Hx      Breast cancer Neg Hx      Stroke Neg Hx      Heart attack Neg Hx       SOCIAL HISTORY:   Social History     Occupational History    Occupation: on disability   Tobacco Use    Smoking status: Never    Smokeless tobacco: Never   Substance and Sexual Activity    Alcohol use: Not Currently     Comment: not currently    Drug use: No    Sexual activity: Not Currently     Partners: Male     Birth control/protection: None        MEDICATIONS: Current Medications[1]  ALLERGIES: Review of patient's allergies indicates:  No Known Allergies      Physical Exam     Vitals:     04/09/25 1025   BP: 109/79   Pulse: 91     Alert and oriented to person, place and time. No acute distress. Well-groomed, not ill appearing. Pupils round and reactive, normal respiratory effort, no audible wheezing.       General:  The patient is alert and oriented x 3.  Mood is pleasant.      right HIP EXAMINATION     OBSERVATION / INSPECTION  Gait:   Nonantalgic   Alignment:  Neutral   Scars:   + well healed, no infectious sign  Muscle atrophy: None   Effusion:  None   Warmth:  None   Leg lengths:   Equal     TENDERNESS         Trochanteric bursa   -   Piriformis    -   SI joint    +  Psoas tendon   -   Rectus insertion  -   Adductor insertion  -   Pubic symphysis  -     ROM: (* = pain)    Flexion:    100 degrees *  External rotation: 20 degrees  Internal rotation with axial load: 30 degrees  Internal rotation without axial load: 40 degrees  Abduction:  45 degrees  Adduction:   20 degrees    SPECIAL TESTS:  Pain w/ forced internal rotation (FADIR): Negative   Pain w/ forced external rotation (SPENCER): Negative   Circumduction test:    Negative   Stinchfield test:    Negative   Log roll:      Negative   Snapping hip (internal):   Negative   Sit-up pain:     Negative   Resisted sit-up pain:    Negative   Trendelenburg test:    Negative   Figure of 4 test    +       EXTREMITY NEURO-VASCULAR EXAMINATION:   Sensation:  Grossly intact to light touch all dermatomal regions.   Motor Function:  Fully intact motor function at hip, knee, foot and ankle    DTRs;  quadriceps and  achilles 2+.  No clonus and downgoing Babinski.    Vascular status:  DP and PT pulses 2+, brisk capillary refill, symmetric.    Skin: intact, compartments soft.    Imaging:     Bilateral hip X-rays ordered/reviewed by me showing no evidence of fracture or dislocation. There is no obvious malalignment. No evidence of masses, lesions or foreign bodies. S/p right hip screw fixation intact without evidence of hardware failure or  complication    Assessment & Plan    Chronic right SI joint pain  -     Ambulatory referral/consult to Orthopedics    Lumbar back pain  -     Ambulatory referral/consult to Orthopedics    Closed fracture of right hip with routine healing, subsequent encounter  -     Ambulatory referral/consult to Orthopedics         I made the decision to obtain old records of the patient including previous notes and imaging. New imaging was ordered today of the extremity or extremities evaluated. I independently reviewed and interpreted the radiographs and/or MRIs/CT scan today as well as prior imaging.    We discussed at length different treatment options including conservative vs surgical management. These include anti-inflammatories, acetaminophen, rest, ice, heat, formal physical therapy including strengthening and stretching exercises, home exercise programs, injections, dry needling, and finally surgical intervention.      Patient here for chronic right hip pain.  Discussed I actually think her pain is more SI joint related.  Her x-rays do not show any acute hardware failure or significant arthritis.  Negative exam for hip pathology today.  She may be a good candidate for physical therapy.  We will get her cleared by pain management as well as ID prior to starting given MAC infection to spine.    Follow up: pain and ID  X-rays next visit: none    All questions were answered and patient is agreeable to the above plan.                     [1]   Current Outpatient Medications:     albuterol (PROVENTIL HFA) 90 mcg/actuation inhaler, Inhale 2 puffs into the lungs every 6 (six) hours as needed for Wheezing. Rescue, Disp: 18 g, Rfl: 0    clarithromycin (BIAXIN) 500 MG tablet, Take 500 mg by mouth once daily., Disp: , Rfl:     dolutegravir-lamivudine (DOVATO)  mg Tab, Take 1 tablet by mouth once daily., Disp: 30 tablet, Rfl: 11    gabapentin (NEURONTIN) 800 MG tablet, Take 1 tablet (800 mg total) by mouth 3 (three) times  daily., Disp: 90 tablet, Rfl: 11    HYDROcodone-acetaminophen (NORCO) 7.5-325 mg per tablet, Take 1 tablet by mouth every 12 (twelve) hours as needed for Pain., Disp: 60 tablet, Rfl: 0    ibuprofen (ADVIL,MOTRIN) 800 MG tablet, Take 1 tablet (800 mg total) by mouth every 6 (six) hours as needed for Pain., Disp: 20 tablet, Rfl: 0    methocarbamoL (ROBAXIN) 500 MG Tab, Take 500 mg by mouth 4 (four) times daily as needed (muscle spasms)., Disp: , Rfl:     rifabutin (MYCOBUTIN) 150 mg Cap, Take 300 mg by mouth once daily., Disp: , Rfl:     vitamin D (VITAMIN D3) 1000 units Tab, Take 1 tablet (1,000 Units total) by mouth once daily., Disp: , Rfl:

## 2025-04-09 NOTE — PATIENT INSTRUCTIONS
Ask ID about possible injection and starting PT for SI joint pain    Ask harrison about injection

## 2025-04-10 ENCOUNTER — PATIENT MESSAGE (OUTPATIENT)
Dept: ORTHOPEDICS | Facility: CLINIC | Age: 60
End: 2025-04-10
Payer: MEDICARE

## 2025-04-10 DIAGNOSIS — M53.3 CHRONIC RIGHT SI JOINT PAIN: Primary | ICD-10-CM

## 2025-04-10 DIAGNOSIS — G89.29 CHRONIC RIGHT SI JOINT PAIN: Primary | ICD-10-CM

## 2025-04-17 ENCOUNTER — HOSPITAL ENCOUNTER (OUTPATIENT)
Dept: RADIOLOGY | Facility: HOSPITAL | Age: 60
Discharge: HOME | End: 2025-04-17
Attending: STUDENT IN AN ORGANIZED HEALTH CARE EDUCATION/TRAINING PROGRAM
Payer: MEDICARE

## 2025-04-17 DIAGNOSIS — Z12.31 SCREENING MAMMOGRAM, ENCOUNTER FOR: ICD-10-CM

## 2025-04-17 PROCEDURE — 77067 SCR MAMMO BI INCL CAD: CPT | Mod: TC

## 2025-04-22 ENCOUNTER — OUTPATIENT CASE MANAGEMENT (OUTPATIENT)
Dept: ADMINISTRATIVE | Facility: OTHER | Age: 60
End: 2025-04-22
Payer: MEDICARE

## 2025-04-23 ENCOUNTER — RESULTS FOLLOW-UP (OUTPATIENT)
Dept: PRIMARY CARE CLINIC | Facility: CLINIC | Age: 60
End: 2025-04-23

## 2025-04-23 ENCOUNTER — LAB VISIT (OUTPATIENT)
Dept: LAB | Facility: HOSPITAL | Age: 60
End: 2025-04-23
Attending: STUDENT IN AN ORGANIZED HEALTH CARE EDUCATION/TRAINING PROGRAM
Payer: MEDICARE

## 2025-04-23 ENCOUNTER — OFFICE VISIT (OUTPATIENT)
Dept: INFECTIOUS DISEASES | Facility: CLINIC | Age: 60
End: 2025-04-23
Payer: MEDICARE

## 2025-04-23 VITALS
HEART RATE: 73 BPM | OXYGEN SATURATION: 95 % | BODY MASS INDEX: 26.3 KG/M2 | WEIGHT: 139.31 LBS | HEIGHT: 61 IN | TEMPERATURE: 97 F | DIASTOLIC BLOOD PRESSURE: 72 MMHG | SYSTOLIC BLOOD PRESSURE: 114 MMHG

## 2025-04-23 DIAGNOSIS — Z86.19 HISTORY OF MAC INFECTION: ICD-10-CM

## 2025-04-23 DIAGNOSIS — S72.001D CLOSED FRACTURE OF NECK OF RIGHT FEMUR WITH ROUTINE HEALING: ICD-10-CM

## 2025-04-23 DIAGNOSIS — Z12.11 ENCOUNTER FOR SCREENING COLONOSCOPY: ICD-10-CM

## 2025-04-23 DIAGNOSIS — Z98.1 H/O SPINAL FUSION: ICD-10-CM

## 2025-04-23 DIAGNOSIS — Z21 HIV POSITIVE: ICD-10-CM

## 2025-04-23 DIAGNOSIS — M48.00 SPINAL STENOSIS, UNSPECIFIED SPINAL REGION: ICD-10-CM

## 2025-04-23 DIAGNOSIS — Z00.00 HEALTHCARE MAINTENANCE: Primary | ICD-10-CM

## 2025-04-23 DIAGNOSIS — F19.20 ADDICTION: ICD-10-CM

## 2025-04-23 DIAGNOSIS — F32.A DEPRESSION, UNSPECIFIED DEPRESSION TYPE: ICD-10-CM

## 2025-04-23 DIAGNOSIS — Z96.641 HX OF TOTAL HIP ARTHROPLASTY, RIGHT: ICD-10-CM

## 2025-04-23 LAB
ABSOLUTE EOSINOPHIL (SMH): 0.01 K/UL
ABSOLUTE MONOCYTE (SMH): 0.25 K/UL (ref 0.3–1)
ABSOLUTE NEUTROPHIL COUNT (SMH): 0.6 K/UL (ref 1.8–7.7)
ALBUMIN SERPL-MCNC: 3.7 G/DL (ref 3.5–5.2)
ALP SERPL-CCNC: 252 UNIT/L (ref 55–135)
ALT SERPL-CCNC: 10 UNIT/L (ref 10–44)
ANION GAP (SMH): 6 MMOL/L (ref 8–16)
AST SERPL-CCNC: 27 UNIT/L (ref 10–40)
BASOPHILS # BLD AUTO: 0.01 K/UL
BASOPHILS NFR BLD AUTO: 0.4 %
BILIRUB SERPL-MCNC: 0.3 MG/DL (ref 0.1–1)
BUN SERPL-MCNC: 22 MG/DL (ref 8–23)
C-REACTIVE PROTEIN (SMH): 0.3 MG/DL
CALCIUM SERPL-MCNC: 8.6 MG/DL (ref 8.7–10.5)
CHLORIDE SERPL-SCNC: 108 MMOL/L (ref 95–110)
CO2 SERPL-SCNC: 24 MMOL/L (ref 23–29)
CREAT SERPL-MCNC: 0.6 MG/DL (ref 0.5–1.4)
ERYTHROCYTE [DISTWIDTH] IN BLOOD BY AUTOMATED COUNT: 17 % (ref 11.5–14.5)
ERYTHROCYTE [SEDIMENTATION RATE] IN BLOOD: 28 MM/HR (ref 0–20)
GFR SERPLBLD CREATININE-BSD FMLA CKD-EPI: >60 ML/MIN/1.73/M2
GLUCOSE SERPL-MCNC: 83 MG/DL (ref 70–110)
HCT VFR BLD AUTO: 36 % (ref 37–48.5)
HGB BLD-MCNC: 11.2 GM/DL (ref 12–16)
IMM GRANULOCYTES # BLD AUTO: 0.01 K/UL (ref 0–0.04)
IMM GRANULOCYTES NFR BLD AUTO: 0.4 % (ref 0–0.5)
LYMPHOCYTES # BLD AUTO: 1.64 K/UL (ref 1–4.8)
MCH RBC QN AUTO: 24.5 PG (ref 27–31)
MCHC RBC AUTO-ENTMCNC: 31.1 G/DL (ref 32–36)
MCV RBC AUTO: 79 FL (ref 82–98)
NUCLEATED RBC (/100WBC) (SMH): 0 /100 WBC
OVALOCYTES BLD QL SMEAR: NORMAL
PLATELET # BLD AUTO: 153 K/UL (ref 150–450)
PLATELET BLD QL SMEAR: NORMAL
PMV BLD AUTO: 11 FL (ref 9.2–12.9)
POTASSIUM SERPL-SCNC: 4 MMOL/L (ref 3.5–5.1)
PROT SERPL-MCNC: 7.5 GM/DL (ref 6–8.4)
RBC # BLD AUTO: 4.57 M/UL (ref 4–5.4)
RELATIVE EOSINOPHIL (SMH): 0.4 % (ref 0–8)
RELATIVE LYMPHOCYTE (SMH): 64.8 % (ref 18–48)
RELATIVE MONOCYTE (SMH): 9.9 % (ref 4–15)
RELATIVE NEUTROPHIL (SMH): 24.1 % (ref 38–73)
SODIUM SERPL-SCNC: 138 MMOL/L (ref 136–145)
WBC # BLD AUTO: 2.53 K/UL (ref 3.9–12.7)

## 2025-04-23 PROCEDURE — 86480 TB TEST CELL IMMUN MEASURE: CPT

## 2025-04-23 PROCEDURE — 3078F DIAST BP <80 MM HG: CPT | Mod: CPTII,S$GLB,, | Performed by: STUDENT IN AN ORGANIZED HEALTH CARE EDUCATION/TRAINING PROGRAM

## 2025-04-23 PROCEDURE — 36415 COLL VENOUS BLD VENIPUNCTURE: CPT

## 2025-04-23 PROCEDURE — 1159F MED LIST DOCD IN RCRD: CPT | Mod: CPTII,S$GLB,, | Performed by: STUDENT IN AN ORGANIZED HEALTH CARE EDUCATION/TRAINING PROGRAM

## 2025-04-23 PROCEDURE — 99215 OFFICE O/P EST HI 40 MIN: CPT | Mod: S$GLB,,, | Performed by: STUDENT IN AN ORGANIZED HEALTH CARE EDUCATION/TRAINING PROGRAM

## 2025-04-23 PROCEDURE — 80053 COMPREHEN METABOLIC PANEL: CPT

## 2025-04-23 PROCEDURE — 85025 COMPLETE CBC W/AUTO DIFF WBC: CPT

## 2025-04-23 PROCEDURE — 3008F BODY MASS INDEX DOCD: CPT | Mod: CPTII,S$GLB,, | Performed by: STUDENT IN AN ORGANIZED HEALTH CARE EDUCATION/TRAINING PROGRAM

## 2025-04-23 PROCEDURE — G2211 COMPLEX E/M VISIT ADD ON: HCPCS | Mod: S$GLB,,, | Performed by: STUDENT IN AN ORGANIZED HEALTH CARE EDUCATION/TRAINING PROGRAM

## 2025-04-23 PROCEDURE — 3074F SYST BP LT 130 MM HG: CPT | Mod: CPTII,S$GLB,, | Performed by: STUDENT IN AN ORGANIZED HEALTH CARE EDUCATION/TRAINING PROGRAM

## 2025-04-23 PROCEDURE — 85651 RBC SED RATE NONAUTOMATED: CPT

## 2025-04-23 PROCEDURE — 86592 SYPHILIS TEST NON-TREP QUAL: CPT

## 2025-04-23 PROCEDURE — 87536 HIV-1 QUANT&REVRSE TRNSCRPJ: CPT

## 2025-04-23 PROCEDURE — 86140 C-REACTIVE PROTEIN: CPT

## 2025-04-23 PROCEDURE — 86361 T CELL ABSOLUTE COUNT: CPT

## 2025-04-23 RX ORDER — MIRTAZAPINE 15 MG/1
15 TABLET, ORALLY DISINTEGRATING ORAL
COMMUNITY
Start: 2025-03-24

## 2025-04-23 RX ORDER — BICTEGRAVIR SODIUM, EMTRICITABINE, AND TENOFOVIR ALAFENAMIDE FUMARATE 50; 200; 25 MG/1; MG/1; MG/1
1 TABLET ORAL DAILY
Qty: 30 TABLET | Refills: 11 | Status: ACTIVE | OUTPATIENT
Start: 2025-04-23 | End: 2026-04-23

## 2025-04-23 RX ORDER — ERTAPENEM 1 G/1
INJECTION, POWDER, LYOPHILIZED, FOR SOLUTION INTRAMUSCULAR; INTRAVENOUS
COMMUNITY
Start: 2024-12-27

## 2025-04-23 NOTE — PROGRESS NOTES
Subjective: Hospital follow up - Establish HIV care       Patient ID: Amie Salmeron is a 60 y.o. female.    Chief Complaint:: Follow-up and HIV Positive/AIDS    HPI Amie Salmeron is a 58 y.o. female to me for recent admission to [facility] for fever, she has past medical history of HIV diagnosed in 1981, never on anti-retroviral therapy, last CD4 46, ,000, COVID 19 in October, and prior L eye corneal ulcer s/p . She was admitted for failure to thrive, and persistent fever at home with associated dysphagia, and persistent cough.  She was found to have esophageal candidiasis.  Workup while at the hospital including respiratory culture negative for PCP.  CT abdomen pelvis significant for intra-abdominal lymph nodes.    Patient was discharged home on Biktarvy 1 tablet once a day, Bactrim ds 1 tablet once a day for PCP prophylaxis, and azithromycin 1200 mg once a week for MAC prophylaxis.    She is here for follow-up, complaining of painful ulcers around her perianal area which she states started 3 days ago. She states she is compliant to Biktarvy, has not missed any doses since discharged from the hospital last week. She mentions she is feeling better and her appetite is improved, still complaints of constipation, passing gas. Denies headache, nausea or vomit, no dysuria, no abdominal pain. States her cough is better as well.     Epidemiology  Not currently sexually active, last encounter +30y ago  Works delivering [employer]  Nonsmoker, no alcohol no IV drugs     INTERVAL HISTORY:  2/1/2023:  Interim reviewed, patient is here for follow-up, mother present.  Patient was not able to come for follow-up as instructed last visit.  She states she is been dealing with a lot at home.  Was able to obtain [facility] for medical assistance.  She states compliance to Biktarvy, has gained 14 lb since last visit.  She is complaining of epigastric pain, she states she is been taking ibuprofen around the clock without improvement, requesting  Norco.  Will refer to GI for endoscopy.  She did not do blood work for this visit, CD4 count viral load odor today.    5/2:  Interim reviewed, patient is here for follow-up.  Since last visit, she had a ER visit on February 22,2022, for chest pain, U tox positive for cocaine and opiates.  She reports she has not been partying since, she got scared with the symptoms she experienced after cocaine.  Explained at length importance of being substance free.  She reports compliance to Biktarvy, we do not have recent labs, we will get CD4 count and viral load today.  Last CD4 count less than 200, patient is still on Bactrim.  We will call her with results if Bactrim needs to be discontinued based on CD4 count.  She reports she is feeling great, has no acute complaints, requesting a letter to go back to work.  We will refer again for mammogram, gyn, and colonoscopy since she is due for all these tests.    8/3: interim reviewed, patient is here for follow-up. 8/21 at [facility] Dr Bhatti for possible steroid injections. She  has lost 6 lb since last visit.  She reports she has not been able to eat much because of her low back pain.  She reports overall she feels better, denies any constitutional symptoms.  Unfortunately, did not follow-up with gyn or GI, will send referrals again.  We will do blood work today and check CD4 count if greater than 200 will stop Bactrim prophylaxis.  She reports compliance to Biktarvy, does not miss any doses.    4/23/25:  Interim reviewed, patient is here for follow-up, last seen in August 3, 2023.  Patient mentions since last visit in August, she was still doing drugs with her , cocaine.  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  Back then CD4 count was 46, viral load 1,000,001 6 3000 copies.  She had lumbar fusion T11-L2 for chronic osteomyelitis in February of 2024.  Hospital course complicated by retroperitoneal/paravertebral abscess with ESBL E coli/MRSA/fusobacterium and MRSA bacteremia status post vancomycin and ertapenem IV from August 12 through September 23rd.  Patient mentions from then she went to a facility to recover and was in [facility] for 2 months where she could not continue her medication.  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  She was treated with rifampin, azithromycin, ethambutol After Genosure was obtained, attempts from Infectious Disease Department to reach out to the patient to switch her back to Biktarvy were unsuccessful.  Will send Biktarvy to specialty pharmacy today.  Patient reports she is feeling depressed, would like to see a psychiatrist outpatient.  She mentions her significant other is now no longer living with her, he is back on drugs doing cocaine, crack etc..  She mentioned she has gained weight, ambulating with a walker.  She was seen by Urology at [facility] Urology for laser lithotripsy and stone basket exchange with right ureteral stent exchange on February 11, 2025.  She has now established with primary Care, and is planning to see him sometime before September.  As per notes available on care everywhere, patient has not showed up to multiple appointments, she was considering going home on hospice but wanted to get treatment for MAC.  She has with flat affect, advised to follow up with Psychiatry outpatient.  We will see her back in a month once we get her blood work back.    Review of patient's allergies indicates:  No Known Allergies  Past Medical History:   Diagnosis Date    Allergy     Arthritis     Asthma     Candida esophagitis 11/15/2022    Chronic  pain     HIV infection     Hypertension     Overactive bladder     Septic shock 08/12/2024    Spinal stenosis     Urine incontinence      Past Surgical History:   Procedure Laterality Date    ADENOIDECTOMY      APPENDECTOMY      BACK SURGERY      GERALD    CYST REMOVAL      CYSTOSCOPY W/ URETERAL STENT PLACEMENT Right 12/09/2024    Procedure: CYSTOSCOPY, WITH URETERAL STENT INSERTION;  Surgeon: Daniel Duong MD;  Location: [facility];  Service: Urology;  Laterality: Right;    CYSTOSCOPY W/ URETERAL STENT REMOVAL  2/11/2025    Procedure: CYSTOSCOPY, WITH URETERAL STENT REMOVAL;  Surgeon: Daniel Duong MD;  Location: [facility] OR;  Service: Urology;;    CYSTOSCOPY WITH CALCULUS EXTRACTION Right 2/11/2025    Procedure: CYSTOSCOPY, WITH CALCULUS REMOVAL;  Surgeon: Daniel Duong MD;  Location: [facility] OR;  Service: Urology;  Laterality: Right;    CYSTOSCOPY, WITH URETERAL STENT INSERTION - Right Right 12/09/2024    CYSTOURETEROSCOPY, WITH HOLMIUM LASER LITHOTRIPSY OF URETERAL CALCULUS AND STENT INSERTION Right 2/11/2025    Procedure: CYSTOURETEROSCOPY, WITH HOLMIUM LASER LITHOTRIPSY OF URETERAL CALCULUS AND STENT INSERTION;  Surgeon: Daniel Duong MD;  Location: [facility];  Service: Urology;  Laterality: Right;  laser notified on 1/28/25/DME    CYSTOURETEROSCOPY,WITH HOLMIUM LASER LITHOTRIPSY OF URETERAL CALCULUS Right 02/11/2025    ureteral stent placement    HYSTERECTOMY      JOINT REPLACEMENT Right     knee, with revision    KNEE SURGERY Left     knee replacement    PERCUTANEOUS PINNING OF HIP Right 08/25/2024    Procedure: PINNING, HIP, PERCUTANEOUS, RIGHT;  Surgeon: Laureano Vyas MD;  Location: [address];  Service: Orthopedics;  Laterality: Right;    RHIZOTOMY      TONSILLECTOMY      TOTAL ANKLE ARTHROPLASTY Right      Social History     Tobacco Use    Smoking status: Never    Smokeless tobacco: Never   Substance Use Topics    Alcohol use: Not Currently     Comment: not currently     Social History  "    Occupational History    Occupation: on disability     Family History   Problem Relation Name Age of Onset    Thyroid disease Mother      Hypertension Mother      Throat cancer Father          oral CA    Depression Sister      Hypertension Brother x3     Alcohol abuse Brother x3     Lung cancer Neg Hx      Colon cancer Neg Hx      Breast cancer Neg Hx      Stroke Neg Hx      Heart attack Neg Hx           Review of Systems   Constitutional:  Negative for chills and fever.   HENT:  Negative for sinus pain.    Eyes:  Negative for pain.   Respiratory:  Negative for cough and shortness of breath.    Cardiovascular:  Negative for chest pain and leg swelling.   Gastrointestinal:  Negative for abdominal pain, blood in stool, constipation and nausea.   Genitourinary:  Negative for dysuria.   Musculoskeletal:  Positive for back pain.   Skin:  Negative for wound.   Neurological:  Negative for headaches.   Psychiatric/Behavioral:  Negative for confusion.            Objective:      Blood pressure 114/72, pulse 73, temperature 97.4 °F (36.3 °C), temperature source Temporal, height 5' 1" (1.549 m), weight 63.2 kg (139 lb 4.8 oz), SpO2 95%. Body mass index is 26.32 kg/m².  Physical Exam  Constitutional:       Appearance: She is normal weight.      Comments: She looks significantly better compared to last encounter, has gained 14 lb   HENT:      Mouth/Throat:      Mouth: Mucous membranes are moist.      Pharynx: Oropharynx is clear.      Comments: No thrush  Eyes:      Conjunctiva/sclera: Conjunctivae normal.      Pupils: Pupils are equal, round, and reactive to light.      Comments: Pinpoint pupils   Cardiovascular:      Rate and Rhythm: Normal rate and regular rhythm.      Pulses: Normal pulses.      Heart sounds: Normal heart sounds.   Pulmonary:      Effort: Pulmonary effort is normal.      Breath sounds: Normal breath sounds.   Abdominal:      General: Bowel sounds are normal.      Palpations: Abdomen is soft.      " Tenderness: There is no abdominal tenderness. There is no rebound.   Musculoskeletal:         General: Normal range of motion.      Cervical back: Normal range of motion and neck supple.      Right lower leg: No edema.      Left lower leg: No edema.      Comments: Low back pain   Skin:     General: Skin is warm.      Capillary Refill: Capillary refill takes less than 2 seconds.   Neurological:      General: No focal deficit present.      Mental Status: She is alert and oriented to person, place, and time.      Sensory: No sensory deficit.      Motor: No weakness.   Psychiatric:         Thought Content: Thought content normal.             Recent Diagnostics:  CXR: Interval improvement of bilateral pulmonary opacities, with persistent interstitial opacities as above. These could reflect incomplete resolution of infectious or inflammatory process, parenchymal scarring, atelectasis, or some combination thereof.    CT abdomen/pelvis 11/15:   1. Negative for para-aortic mass or retroperitoneal lymphadenopathy. Previously described findings reflect normal small bowel.  2. A few enlarged mesenteric lymph nodes, nonspecific.    CT chest/abdomen/pelvis:   1. 6.1 cm soft tissue density along the anterior and left anterolateral margin of the abdominal aorta in the mid abdomen as above. Several adjacent mildly prominent mesenteric lymph nodes are noted, the largest measuring 1.7 x 1.3 cm. The soft tissue density is suspicious for a confluent ilia mass, but could also be contributed to by unopacified small bowel loops. CT abdomen with oral and intravenous contrast is recommended for further assessment.  2. Previously noted bilateral upper lobe pulmonary infiltrates have shown near complete resolution. Scattered groundglass opacities in both lungs are likely infectious/inflammatory in nature, with slightly more prominent infiltrate in the lingula.     HIV table:  CD4 = 26-->43, ratio 4.3% very low      Latest Reference Range &  Units 10/15/22 06:03   Hep A Total Ab Negative  Negative   Hep B Core Total Ab Negative  Negative   Hep B S Ab  Non Reactive   Hepatitis B Surface Ag Negative  Negative   Hepatitis C Ab 0.0 - 0.9 s/co ratio 0.2   CRYPTOCOCCUS ANTIGEN, SERUM Negative  Negative   Quantiferon Mitogen Value IU/mL >10.00   Quantiferon Nil Value IU/mL 0.23   QuantiFERON-TB Gold Plus Negative  Negative   QuantiFERON TB1 Ag Value IU/mL 0.24   QuantiFERON TB2 Ag Value IU/mL 0.25   HIV 1 RNA Ultra copies/mL [id number]   RPR Non-reactive  Non-reactive     Summary of Mutations Observed:   RT: K22K/R, V35V/I, V60V/I, K101T, Q102K, C162S, Q174K,   V179I, L210L/S, R211K, P243A, V245K, R277K, T286A,   A288S, V293I   [place]: E10D, V72I, L101I, V113I, S119G, T122I, T124N, T125A,   V234L, D253E, D256E, D279D/G   [place]: I13V, E35D, M36I, K45R, L63P, I72V, V77I, I93I/L      Latest Reference Range & Units 11/16/22 17:15   Pneumocystis Smear, DFA Negative  Negative        Assessment and Plan:           History of MAC osteomyelitis status post T12-L1 corpectomy with T11-L2 fixation with new hardware placement on February/27/2024 complicated by psoas abscess, cultures then grew MRSA and ESBL E coli status post vancomycin and ertapenem for 6 weeks   Recent genotype with M184V mutation, Dovato discontinued  Latest CD4 count as per records on Care everywhere CD4 count 46, viral load 1,160,000 copies   Restart Biktarvy 1 tab PO once day   Bactrim DS 1 tab PO for PCP prophylaxis until CD4 >200   Will start vaccinations once CD4 is >200 (pneumococcus, meningococcus, flu and COVID)   RTC in 1 month    2. H/o genital Herpes, not active     3. Healthcare maintenance - finally establish with primary care in  [place]/Dr. Dupont   Mammogram 4/17 BI-RADS 1, negative   GI referral for colonoscopy   Gyn referral    6. Suspecting drug use, pinpoint pupils on exam, denies active use     HIV positive    Spinal stenosis, unspecified spinal region    Hx of total hip arthroplasty,  right    Closed fracture of neck of right femur with routine healing s/p hip pinning on 8/25/2024    H/O spinal fusion          This note was created using Dragon voice recognition software that occasionally misinterpreted phrases or words.

## 2025-04-24 ENCOUNTER — PATIENT MESSAGE (OUTPATIENT)
Dept: GASTROENTEROLOGY | Facility: CLINIC | Age: 60
End: 2025-04-24
Payer: MEDICARE

## 2025-04-24 ENCOUNTER — PATIENT MESSAGE (OUTPATIENT)
Dept: ADMINISTRATIVE | Facility: OTHER | Age: 60
End: 2025-04-24
Payer: MEDICARE

## 2025-04-24 ENCOUNTER — OUTPATIENT CASE MANAGEMENT (OUTPATIENT)
Dept: ADMINISTRATIVE | Facility: OTHER | Age: 60
End: 2025-04-24
Payer: MEDICARE

## 2025-04-25 LAB
BASOPHILS # BLD AUTO: 0 X10E3/UL (ref 0–0.2)
BASOPHILS NFR BLD AUTO: 1 %
CD3+CD4+ CELLS # BLD: 160 /UL (ref 359–1519)
CD3+CD4+ CELLS NFR BLD: 9.4 % (ref 30.8–58.5)
EOSINOPHIL # BLD AUTO: 0 X10E3/UL (ref 0–0.4)
EOSINOPHIL NFR BLD AUTO: 0 %
ERYTHROCYTE [DISTWIDTH] IN BLOOD BY AUTOMATED COUNT: 15.8 % (ref 11.7–15.4)
HCT VFR BLD AUTO: 37.3 % (ref 34–46.6)
HGB BLD-MCNC: 11.3 G/DL (ref 11.1–15.9)
IMM GRANULOCYTES # BLD AUTO: 0 X10E3/UL (ref 0–0.1)
IMM GRANULOCYTES NFR BLD AUTO: 0 %
LYMPHOCYTES # BLD AUTO: 1.7 X10E3/UL (ref 0.7–3.1)
LYMPHOCYTES NFR BLD AUTO: 67 %
MCH RBC QN AUTO: 24.4 PG (ref 26.6–33)
MCHC RBC AUTO-ENTMCNC: 30.3 G/DL (ref 31.5–35.7)
MCV RBC AUTO: 81 FL (ref 79–97)
MONOCYTES # BLD AUTO: 0.2 X10E3/UL (ref 0.1–0.9)
MONOCYTES NFR BLD AUTO: 9 %
MORPHOLOGY BLD-IMP: ABNORMAL
NEUTROPHILS # BLD AUTO: 0.6 X10E3/UL (ref 1.4–7)
NEUTROPHILS NFR BLD AUTO: 23 %
PLATELET # BLD AUTO: 167 X10E3/UL (ref 150–450)
RBC # BLD AUTO: 4.63 X10E6/UL (ref 3.77–5.28)
RPR SER-TITR: NON REACTIVE TITER
WBC # BLD AUTO: 2.5 X10E3/UL (ref 3.4–10.8)

## 2025-04-26 LAB
GAMMA INTERFERON BACKGROUND BLD IA-ACNC: 0.17 IU/ML
M TB IFN-G BLD-IMP: NEGATIVE
M TB IFN-G CD4+ BCKGRND COR BLD-ACNC: 0.16 IU/ML
M TB IFN-G CD4+CD8+ BCKGRND COR BLD-ACNC: 0.21 IU/ML
MITOGEN IGNF BCKGRD COR BLD-ACNC: >10 IU/ML
SERVICE CMNT-IMP: NORMAL

## 2025-04-28 DIAGNOSIS — Z00.00 ENCOUNTER FOR MEDICARE ANNUAL WELLNESS EXAM: ICD-10-CM

## 2025-04-28 LAB
HIV1 RNA # SERPL NAA+PROBE: <20 COPIES/ML
HIV1 RNA SERPL NAA+PROBE-LOG#: NORMAL {LOG_COPIES}/ML

## 2025-04-29 ENCOUNTER — RESULTS FOLLOW-UP (OUTPATIENT)
Dept: INFECTIOUS DISEASES | Facility: CLINIC | Age: 60
End: 2025-04-29

## 2025-04-29 ENCOUNTER — OFFICE VISIT (OUTPATIENT)
Dept: PAIN MEDICINE | Facility: CLINIC | Age: 60
End: 2025-04-29
Payer: MEDICARE

## 2025-04-29 VITALS
BODY MASS INDEX: 27.39 KG/M2 | DIASTOLIC BLOOD PRESSURE: 78 MMHG | HEIGHT: 61 IN | HEART RATE: 77 BPM | SYSTOLIC BLOOD PRESSURE: 116 MMHG | WEIGHT: 145.06 LBS

## 2025-04-29 DIAGNOSIS — G89.4 CHRONIC PAIN SYNDROME: ICD-10-CM

## 2025-04-29 DIAGNOSIS — M54.50 LUMBAR BACK PAIN: ICD-10-CM

## 2025-04-29 DIAGNOSIS — Z98.1 H/O SPINAL FUSION: ICD-10-CM

## 2025-04-29 DIAGNOSIS — Z98.1 H/O SPINAL FUSION: Primary | ICD-10-CM

## 2025-04-29 PROCEDURE — 99214 OFFICE O/P EST MOD 30 MIN: CPT | Mod: HCNC,S$GLB,,

## 2025-04-29 PROCEDURE — 3008F BODY MASS INDEX DOCD: CPT | Mod: CPTII,HCNC,S$GLB,

## 2025-04-29 PROCEDURE — 1159F MED LIST DOCD IN RCRD: CPT | Mod: CPTII,HCNC,S$GLB,

## 2025-04-29 PROCEDURE — 99999 PR PBB SHADOW E&M-EST. PATIENT-LVL IV: CPT | Mod: PBBFAC,HCNC,,

## 2025-04-29 PROCEDURE — 3074F SYST BP LT 130 MM HG: CPT | Mod: CPTII,HCNC,S$GLB,

## 2025-04-29 PROCEDURE — 1160F RVW MEDS BY RX/DR IN RCRD: CPT | Mod: CPTII,HCNC,S$GLB,

## 2025-04-29 PROCEDURE — 3078F DIAST BP <80 MM HG: CPT | Mod: CPTII,HCNC,S$GLB,

## 2025-04-29 RX ORDER — HYDROCODONE BITARTRATE AND ACETAMINOPHEN 7.5; 325 MG/1; MG/1
1 TABLET ORAL EVERY 12 HOURS PRN
Qty: 60 TABLET | Refills: 0 | Status: SHIPPED | OUTPATIENT
Start: 2025-06-02 | End: 2025-07-02

## 2025-04-29 RX ORDER — HYDROCODONE BITARTRATE AND ACETAMINOPHEN 7.5; 325 MG/1; MG/1
1 TABLET ORAL EVERY 12 HOURS PRN
Qty: 60 TABLET | Refills: 0 | Status: SHIPPED | OUTPATIENT
Start: 2025-07-02 | End: 2025-08-01

## 2025-04-29 RX ORDER — HYDROCODONE BITARTRATE AND ACETAMINOPHEN 7.5; 325 MG/1; MG/1
1 TABLET ORAL EVERY 12 HOURS PRN
Qty: 60 TABLET | Refills: 0 | Status: SHIPPED | OUTPATIENT
Start: 2025-05-03 | End: 2025-06-02

## 2025-04-29 NOTE — PROGRESS NOTES
Subjective:     Patient ID: Amie Salmeron is a 60 y.o. female    Chief Complaint: Follow-up (3 week ) and Back Pain      Referred by: No ref. provider found      HPI:    Interval History PA (04/29/2025):  Patient presents with increased lower back pain, specifically in the coccyx area, following a fall 2.5-3 weeks ago. Her heel caught in her brother's rug, causing her to fall when she tried to turn. She describes the pain as severe, stating it has been progressively worsening. Pain has significantly impaired her functioning, causing difficulty sleeping and inability to perform daily activities.    She attempted to seek care at the emergency room the night before but left due to long wait times. She has taken oxycodone twice since the fall, which was leftover from a previous hospital stay, despite being prescribed hydrocodone 7.5mg twice daily for chronic pain management. She inquires about using a tight back brace for support but is concerned about potential spinal compression.    She mentions having a previous back infection about a year ago, which required surgery. She expresses concern about attending her scheduled therapy session due to the current pain levels.    She denies any bruising in the affected area.     Interval History (4/3/25):  She returns today for follow up.  She reports that she continues to have low back pain as before.  She denies any changes in the quality or location of the pain since last encounter.  She denies any new or worsening symptoms She has been taking Norco 7.5 mg.  This was prescribed q.12 hours p.r.n..  At previous encounter, she stated that she took this medication on average once a day.  Stating that she would sometimes take 2, but on other days she would take none.  However, patient has run out of medicine much more quickly than anticipated.  She states that she has been taking this medication essentially twice per day.  She did get an additional 8 day supply from her  primary care physician.  She states this medication is providing adequate relief without any adverse effects.  She is able to function at a higher level throughout the day as result of this medication.      Initial Encounter (3/6/25):  Amie Salmeron is a 60 y.o. female who presents today with chronic low back pain.  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  Has had chronic low back pain ever since.  Pain is located in the midline thoracolumbar region radiates the lumbar paraspinals.  Some radicular pain into the lower extremities as well.  No focal weakness or bowel bladder dysfunction.  Pain is constant and worsened with activity.  It is very disruptive to her activities of daily living and her sleep.  She has been taking Norco 7.5-325 mg.  At mostly she will take this medication twice a day.  She states there are some days where she would not take it at all.  She feels that on average she would take 1 pill per day.  This pain is described in detail below.    Physical Therapy:  Yes.    Non-pharmacologic Treatment:  Rest helps         TENS?  No    Pain Medications:         Currently taking:  Gabapentin, ibuprofen, Tylenol, Norco    Has tried in the past:      Has not tried:  Muscle relaxants, TCAs, SNRIs, topical creams    Blood thinners:  None    Interventional Therapies:  None    Relevant Surgeries:   Thoracolumbar corpectomy and fusion secondary to disseminated MAC infection    Affecting sleep?  Yes    Affecting daily activities? yes    Depressive symptoms? No          SI/HI? No    Work status: Disabled    Pain Scores:    Best:       5/10  Worst:     10/10  Usually:   9/10  Today:    9/10    Pain Disability Index  Family/Home Responsibilities:: 10  Recreation:: 9  Social Activity:: 0  Occupation:: 10  Sexual Behavior:: 0  Self Care:: 7  Life-Support Activities:: 9  Pain Disability Index (PDI): 45    Review of Systems   Constitutional:  Negative for  activity change, appetite change, chills, fatigue, fever and unexpected weight change.   HENT:  Negative for hearing loss.    Eyes:  Negative for visual disturbance.   Respiratory:  Negative for chest tightness and shortness of breath.    Cardiovascular:  Negative for chest pain.   Gastrointestinal:  Negative for abdominal pain, constipation, diarrhea, nausea and vomiting.   Genitourinary:  Negative for difficulty urinating.   Musculoskeletal:  Positive for back pain, gait problem and myalgias. Negative for neck pain.   Skin:  Negative for rash.   Neurological:  Positive for numbness. Negative for dizziness, weakness, light-headedness and headaches.   Psychiatric/Behavioral:  Positive for sleep disturbance. Negative for hallucinations and suicidal ideas. The patient is not nervous/anxious.        Past Medical History:   Diagnosis Date    Allergy     Arthritis     Asthma     Candida esophagitis 11/15/2022    Chronic pain     HIV infection     Hypertension     Overactive bladder     Septic shock 08/12/2024    Spinal stenosis     Urine incontinence        Past Surgical History:   Procedure Laterality Date    ADENOIDECTOMY      APPENDECTOMY      BACK SURGERY      GERALD    CYST REMOVAL      CYSTOSCOPY W/ URETERAL STENT PLACEMENT Right 12/09/2024    Procedure: CYSTOSCOPY, WITH URETERAL STENT INSERTION;  Surgeon: Daniel Duong MD;  Location: [facility] OR;  Service: Urology;  Laterality: Right;    CYSTOSCOPY W/ URETERAL STENT REMOVAL  2/11/2025    Procedure: CYSTOSCOPY, WITH URETERAL STENT REMOVAL;  Surgeon: Daniel Duong MD;  Location: [facility] OR;  Service: Urology;;    CYSTOSCOPY WITH CALCULUS EXTRACTION Right 2/11/2025    Procedure: CYSTOSCOPY, WITH CALCULUS REMOVAL;  Surgeon: Daniel Duong MD;  Location: [facility] OR;  Service: Urology;  Laterality: Right;    CYSTOSCOPY, WITH URETERAL STENT INSERTION - Right Right 12/09/2024    CYSTOURETEROSCOPY, WITH HOLMIUM LASER LITHOTRIPSY OF URETERAL CALCULUS AND STENT INSERTION Right  "2/11/2025    Procedure: CYSTOURETEROSCOPY, WITH HOLMIUM LASER LITHOTRIPSY OF URETERAL CALCULUS AND STENT INSERTION;  Surgeon: Daniel Duong MD;  Location: [facility];  Service: Urology;  Laterality: Right;  laser notified on 1/28/25/DME    CYSTOURETEROSCOPY,WITH HOLMIUM LASER LITHOTRIPSY OF URETERAL CALCULUS Right 02/11/2025    ureteral stent placement    HYSTERECTOMY      JOINT REPLACEMENT Right     knee, with revision    KNEE SURGERY Left     knee replacement    PERCUTANEOUS PINNING OF HIP Right 08/25/2024    Procedure: PINNING, HIP, PERCUTANEOUS, RIGHT;  Surgeon: Laureano Vyas MD;  Location: [address];  Service: Orthopedics;  Laterality: Right;    RHIZOTOMY      TONSILLECTOMY      TOTAL ANKLE ARTHROPLASTY Right        Social History[1]    Review of patient's allergies indicates:  No Known Allergies    Medications Ordered Prior to Encounter[2]    Objective:      /78 (BP Location: Right arm, Patient Position: Sitting)   Pulse 77   Ht 5' 1" (1.549 m)   Wt 65.8 kg (145 lb 1 oz)   BMI 27.41 kg/m²     Exam:  GEN:  Well developed, well nourished.  No acute distress.  Normal pain behavior.  HEENT:  No trauma.  Mucous membranes moist.  Nares patent bilaterally.  PSYCH: Normal affect. Thought content appropriate.  CHEST:  Breathing symmetric.  No audible wheezing.  ABD: Soft, non-distended.  SKIN:  Warm, pink, dry.  No rash on exposed areas.    EXT:  No cyanosis, clubbing, or edema.  No color change or changes in nail or hair growth.  NEURO/MUSCULOSKELETAL:  Fully alert, oriented, and appropriate. Speech normal irma. No cranial nerve deficits.   Gait:  Antalgic.  No trendelenburg sign bilaterally.   Motor Strength:  5/5 motor strength throughout lower extremities.   Sensory:  No sensory deficit in the lower extremities.   Reflexes:  1+ and symmetric throughout.  Downgoing Babinski's bilaterally.  No clonus or spasticity.  L-Spine:  Limited ROM with pain on flexion and extension.  No pain with " axial/facet loading bilaterally.  Negative SLR bilaterally.   Diffuse TTP over midline lumbar spine, bilateral lumbar paraspinals, SI joints      Imaging:    Narrative & Impression    EXAMINATION:  XR LUMBAR SPINE AP AND LATERAL     CLINICAL HISTORY:  back pain, hx of back problems;     TECHNIQUE:  XR LUMBAR SPINE AP AND LATERAL     COMPARISON:  01/12/2025     FINDINGS:  Redemonstration of the corpectomy and posterior fusion of the thoracolumbar spine.  No definitive evidence of hardware loosening or failure.     Dextro asymmetry of the lumbar spine centered around L2-L3.     Vertebral body heights are unchanged.     Multilevel disc height loss.  Multilevel facet arthropathy.     Right-sided double-J stent catheter in unchanged position.  Prominent fecal material projecting over the central pelvis.     Impression:     As above.        Electronically signed by:David Galvez  Date:                                            01/25/2025  Time:                                           09:47       Assessment:       Encounter Diagnoses   Name Primary?    H/O spinal fusion Yes    Lumbar back pain     Chronic pain syndrome      Plan:       Amie was seen today for follow-up and back pain.    Diagnoses and all orders for this visit:    H/O spinal fusion  -     X-Ray Lumbar Complete Including Flex And Ext; Future  -     X-Ray Hips Bilateral 2 View Incl AP Pelvis; Future    Lumbar back pain  -     X-Ray Lumbar Complete Including Flex And Ext; Future  -     X-Ray Hips Bilateral 2 View Incl AP Pelvis; Future    Chronic pain syndrome  -     X-Ray Lumbar Complete Including Flex And Ext; Future  -     X-Ray Hips Bilateral 2 View Incl AP Pelvis; Future        Amie STEPH Salmeron is a 60 y.o. female with chronic low back and lower extremity pain.  Status post thoracolumbar corpectomy and fusion related to disseminated MAC infection vertebral bodies.  Has both axial and radicular components of pain.     Pertinent imaging studies  reviewed by me. Imaging results were discussed with patient.  Continue Gabapentin 800 mg t.i.d..    Opioid risk tool completed.  Low risk.    Prescription monitoring report reviewed today.  No inconsistencies noted.    Most recent urine drug screen completed on 04/03/2025.  Consistent with prescribed medications.  Plan on repeating UDS annually.    Pain contract signed.  I stressed that she should only take this medication as prescribed or less.  I also stressed that she should not fill any controlled medications prescribed by other providers until she has discussed it with me 1st.  Patient expressed understanding and agreement.  Continue Norco 7.5-325 mg q.12h p.r.n..  3, one-month supplies of 60 pills provided today.    Given the severity of her pathology and surgical intervention, feel that patient is likely to have some degree of back pain for the rest of her life.  This pain seems to be well managed with relatively low doses of opioids.  I think it is appropriate to continue low-dose opioid pain medications.  Ordered x-rays of lumbar spine and pelvis to rule out acute pathology.  Return to clinic in 3 months or sooner if needed.  At that time we will discuss efficacy of medications and make any necessary adjustments.        This note was created by combination of typed  and M-Modal dictation. Transcription and phonetic errors may be present.  If there are any questions, please contact me.               [1]   Social History  Socioeconomic History    Marital status: Single   Occupational History    Occupation: on disability   Tobacco Use    Smoking status: Never    Smokeless tobacco: Never   Substance and Sexual Activity    Alcohol use: Not Currently     Comment: not currently    Drug use: No    Sexual activity: Not Currently     Partners: Male     Birth control/protection: None     Social Drivers of Health     Financial Resource Strain: High Risk (2/28/2025)    Overall Financial Resource Strain  (CARDIA)     Difficulty of Paying Living Expenses: Very hard   Food Insecurity: Food Insecurity Present (2/28/2025)    Hunger Vital Sign     Worried About Running Out of Food in the Last Year: Often true     Ran Out of Food in the Last Year: Sometimes true   Transportation Needs: Unmet Transportation Needs (2/28/2025)    PRAPARE - Transportation     Lack of Transportation (Medical): Yes     Lack of Transportation (Non-Medical): Yes   Physical Activity: Inactive (2/28/2025)    Exercise Vital Sign     Days of Exercise per Week: 0 days     Minutes of Exercise per Session: 0 min   Stress: Stress Concern Present (2/28/2025)    [employer] [place] [employer] - Occupational Stress Questionnaire     Feeling of Stress : Rather much   Housing Stability: High Risk (2/28/2025)    Housing Stability Vital Sign     Unable to Pay for Housing in the Last Year: Yes     Number of Times Moved in the Last Year: 0     Homeless in the Last Year: No   [2]   Current Outpatient Medications on File Prior to Visit   Medication Sig Dispense Refill    albuterol (PROVENTIL HFA) 90 mcg/actuation inhaler Inhale 2 puffs into the lungs every 6 (six) hours as needed for Wheezing. Rescue 18 g 0    zdqtgwddj-gfbqdeeq-qspbodu ala (BIKTARVY) -25 mg (25 kg or greater) Take 1 tablet by mouth once daily. 30 tablet 11    clarithromycin (BIAXIN) 500 MG tablet Take 500 mg by mouth once daily.      gabapentin (NEURONTIN) 800 MG tablet Take 1 tablet (800 mg total) by mouth 3 (three) times daily. 90 tablet 11    HYDROcodone-acetaminophen (NORCO) 7.5-325 mg per tablet Take 1 tablet by mouth every 12 (twelve) hours as needed for Pain. 60 tablet 0    ibuprofen (ADVIL,MOTRIN) 800 MG tablet Take 1 tablet (800 mg total) by mouth every 6 (six) hours as needed for Pain. 20 tablet 0    methocarbamoL (ROBAXIN) 500 MG Tab Take 500 mg by mouth 4 (four) times daily as needed (muscle spasms).      mirtazapine (REMERON SOL-TAB) 15 MG disintegrating tablet Take 15  mg by mouth.      sulfamethoxazole-trimethoprim 800-160mg (BACTRIM DS) 800-160 mg Tab Take 1 tablet by mouth once daily. 30 tablet 2    vitamin D (VITAMIN D3) 1000 units Tab Take 1 tablet (1,000 Units total) by mouth once daily.      ertapenem (INVANZ) 1 gram injection  (Patient not taking: Reported on 4/29/2025)       No current facility-administered medications on file prior to visit.

## 2025-04-29 NOTE — PROGRESS NOTES
I left a message for patient to [facility]  4/29/25

## 2025-05-01 NOTE — PROGRESS NOTES
Outpatient Care Management   - Care Plan Follow Up    Patient: Amie Salmeron  MRN:  [id number]  Date of Service:  5/1/2025  Completed by:  Silva Rodriguez LCSW  Referral Date: 12/10/2024    Reason for Visit   Patient presents with    Other     4/17/2025  1st attempt to complete Follow-Up  for [facility], left message.  4/24/2025  2nd attempt to complete Follow-Up  for [facility], left message. Attempt letter sent via portal. Collaborating with O[facility] RN for successful patient contact.   5/1/2025  3rd attempt to complete Follow-Up  for [facility], left message. SW to close case and notify OPCM RN.       Brief Summary: 3rd attempt to complete Follow-Up  for [facility], left message. SW to close case and notify O[facility] RN.    Complex Care Plan     Care plan was discussed and completed today with input from patient and/or caregiver.    Patient Instructions     No follow-ups on file.

## 2025-05-25 DIAGNOSIS — Z21 HIV POSITIVE: ICD-10-CM

## 2025-05-26 RX ORDER — BICTEGRAVIR SODIUM, EMTRICITABINE, AND TENOFOVIR ALAFENAMIDE FUMARATE 50; 200; 25 MG/1; MG/1; MG/1
1 TABLET ORAL DAILY
Qty: 30 TABLET | Refills: 11 | Status: SHIPPED | OUTPATIENT
Start: 2025-05-26 | End: 2026-05-26

## 2025-06-11 ENCOUNTER — TELEPHONE (OUTPATIENT)
Dept: INFECTIOUS DISEASES | Facility: CLINIC | Age: 60
End: 2025-06-11
Payer: MEDICARE

## 2025-06-11 DIAGNOSIS — M54.50 LUMBAR BACK PAIN: ICD-10-CM

## 2025-06-11 DIAGNOSIS — Z21 HIV POSITIVE: ICD-10-CM

## 2025-06-11 DIAGNOSIS — Z98.1 H/O SPINAL FUSION: ICD-10-CM

## 2025-06-11 RX ORDER — GABAPENTIN 800 MG/1
800 TABLET ORAL 3 TIMES DAILY
Qty: 90 TABLET | Refills: 11 | Status: SHIPPED | OUTPATIENT
Start: 2025-06-11 | End: 2026-06-11

## 2025-06-11 RX ORDER — SULFAMETHOXAZOLE AND TRIMETHOPRIM 800; 160 MG/1; MG/1
1 TABLET ORAL DAILY
Qty: 30 TABLET | Refills: 2 | Status: SHIPPED | OUTPATIENT
Start: 2025-06-11 | End: 2025-09-09

## 2025-06-11 NOTE — TELEPHONE ENCOUNTER
I spoke with the patient to advise She does not need clarithromycin anymore; stay on the Bactrim   Per Dr Mayers's orders   J [facility]   6/11/25

## 2025-06-11 NOTE — TELEPHONE ENCOUNTER
Patient called  [phone]    She stated she cannot find her prescription for   Clarithromycin   I advised her Dr Mayers refilled Bactrim ; per a   Pharmacy request that we received    Patient stated it is the Clarithromycin she needs filled  At [facility] in [place], [place]     ROBERT [facility]  6/11/25

## 2025-06-25 ENCOUNTER — OFFICE VISIT (OUTPATIENT)
Dept: INFECTIOUS DISEASES | Facility: CLINIC | Age: 60
End: 2025-06-25
Payer: MEDICARE

## 2025-06-25 ENCOUNTER — LAB VISIT (OUTPATIENT)
Dept: LAB | Facility: HOSPITAL | Age: 60
End: 2025-06-25
Attending: STUDENT IN AN ORGANIZED HEALTH CARE EDUCATION/TRAINING PROGRAM
Payer: MEDICARE

## 2025-06-25 VITALS
SYSTOLIC BLOOD PRESSURE: 140 MMHG | TEMPERATURE: 98 F | HEIGHT: 61 IN | OXYGEN SATURATION: 95 % | BODY MASS INDEX: 27.87 KG/M2 | HEART RATE: 82 BPM | DIASTOLIC BLOOD PRESSURE: 74 MMHG | WEIGHT: 147.63 LBS

## 2025-06-25 DIAGNOSIS — M48.00 SPINAL STENOSIS, UNSPECIFIED SPINAL REGION: ICD-10-CM

## 2025-06-25 DIAGNOSIS — A31.2 DISSEMINATED MYCOBACTERIUM AVIUM-INTRACELLULARE COMPLEX: ICD-10-CM

## 2025-06-25 DIAGNOSIS — A49.02 MRSA INFECTION: ICD-10-CM

## 2025-06-25 DIAGNOSIS — Z21 HIV POSITIVE: ICD-10-CM

## 2025-06-25 DIAGNOSIS — Z98.1 H/O SPINAL FUSION: ICD-10-CM

## 2025-06-25 DIAGNOSIS — F32.A DEPRESSION, UNSPECIFIED DEPRESSION TYPE: Primary | ICD-10-CM

## 2025-06-25 DIAGNOSIS — Z12.11 ENCOUNTER FOR SCREENING COLONOSCOPY: ICD-10-CM

## 2025-06-25 DIAGNOSIS — Z00.00 HEALTHCARE MAINTENANCE: ICD-10-CM

## 2025-06-25 DIAGNOSIS — Z96.641 HX OF TOTAL HIP ARTHROPLASTY, RIGHT: ICD-10-CM

## 2025-06-25 LAB
ABSOLUTE EOSINOPHIL (SMH): 0.17 K/UL
ABSOLUTE MONOCYTE (SMH): 0.46 K/UL (ref 0.3–1)
ABSOLUTE NEUTROPHIL COUNT (SMH): 2 K/UL (ref 1.8–7.7)
ALBUMIN SERPL-MCNC: 3.9 G/DL (ref 3.5–5.2)
ALP SERPL-CCNC: 207 UNIT/L (ref 55–135)
ALT SERPL-CCNC: 9 UNIT/L (ref 10–44)
ANION GAP (SMH): 5 MMOL/L (ref 8–16)
AST SERPL-CCNC: 23 UNIT/L (ref 10–40)
BASOPHILS # BLD AUTO: 0.05 K/UL
BASOPHILS NFR BLD AUTO: 1.2 %
BILIRUB SERPL-MCNC: 0.4 MG/DL (ref 0.1–1)
BUN SERPL-MCNC: 18 MG/DL (ref 8–23)
CALCIUM SERPL-MCNC: 9.2 MG/DL (ref 8.7–10.5)
CHLORIDE SERPL-SCNC: 106 MMOL/L (ref 95–110)
CO2 SERPL-SCNC: 24 MMOL/L (ref 23–29)
CREAT SERPL-MCNC: 1 MG/DL (ref 0.5–1.4)
ERYTHROCYTE [DISTWIDTH] IN BLOOD BY AUTOMATED COUNT: 17.6 % (ref 11.5–14.5)
GFR SERPLBLD CREATININE-BSD FMLA CKD-EPI: >60 ML/MIN/1.73/M2
GLUCOSE SERPL-MCNC: 89 MG/DL (ref 70–110)
HCT VFR BLD AUTO: 38 % (ref 37–48.5)
HGB BLD-MCNC: 12 GM/DL (ref 12–16)
IMM GRANULOCYTES # BLD AUTO: 0.02 K/UL (ref 0–0.04)
IMM GRANULOCYTES NFR BLD AUTO: 0.5 % (ref 0–0.5)
LYMPHOCYTES # BLD AUTO: 1.35 K/UL (ref 1–4.8)
MCH RBC QN AUTO: 25.9 PG (ref 27–31)
MCHC RBC AUTO-ENTMCNC: 31.6 G/DL (ref 32–36)
MCV RBC AUTO: 82 FL (ref 82–98)
NUCLEATED RBC (/100WBC) (SMH): 0 /100 WBC
PLATELET # BLD AUTO: 229 K/UL (ref 150–450)
PMV BLD AUTO: 9.6 FL (ref 9.2–12.9)
POTASSIUM SERPL-SCNC: 4.5 MMOL/L (ref 3.5–5.1)
PROT SERPL-MCNC: 7.4 GM/DL (ref 6–8.4)
RBC # BLD AUTO: 4.63 M/UL (ref 4–5.4)
RELATIVE EOSINOPHIL (SMH): 4.2 % (ref 0–8)
RELATIVE LYMPHOCYTE (SMH): 33.4 % (ref 18–48)
RELATIVE MONOCYTE (SMH): 11.4 % (ref 4–15)
RELATIVE NEUTROPHIL (SMH): 49.3 % (ref 38–73)
SODIUM SERPL-SCNC: 135 MMOL/L (ref 136–145)
WBC # BLD AUTO: 4.04 K/UL (ref 3.9–12.7)

## 2025-06-25 PROCEDURE — 86480 TB TEST CELL IMMUN MEASURE: CPT | Mod: 59

## 2025-06-25 PROCEDURE — 86361 T CELL ABSOLUTE COUNT: CPT

## 2025-06-25 PROCEDURE — 87536 HIV-1 QUANT&REVRSE TRNSCRPJ: CPT

## 2025-06-25 PROCEDURE — 82040 ASSAY OF SERUM ALBUMIN: CPT

## 2025-06-25 PROCEDURE — 36415 COLL VENOUS BLD VENIPUNCTURE: CPT

## 2025-06-25 PROCEDURE — 85025 COMPLETE CBC W/AUTO DIFF WBC: CPT

## 2025-06-25 PROCEDURE — 86480 TB TEST CELL IMMUN MEASURE: CPT

## 2025-06-25 NOTE — PATIENT INSTRUCTIONS
Labs today  Continue Biktarvy 1 tab PO dialy  Bactrim DS 1 tab daily  Gyn and GI referrals   RTC in 3 months

## 2025-06-25 NOTE — PROGRESS NOTES
[facility] - Infectious Diseases   Department of Infectious Disease  Office Visit Note        PATIENT NAME: Amie Salmeron  YOB: 1965   MRN: [id number]  DATE OF VISIT: 06/25/2025    REASON FOR VISIT: Follow-up and HIV Positive/AIDS        HISTORY OF PRESENT ILLNESS     History of Present Illness    Patient presents today for follow-up of HIV management. She continues Biktarvy 1 tablet daily with good medication adherence. She is also taking Bactrim without reported issues. She reports persistent back pain rated 8/10 secondary to surgical krishna placement. She uses a back brace with minimal relief and is under care of a pain specialist with ongoing physical therapy. She continues gabapentin with significant pain relief. Due to pain intensity, she is unable to exercise or perform full range of activities. She recently recovered from a cold and reports residual intermittent cough with chest congestion. She denies fever, chills, night sweats, headache, neck pain, and shortness of breath. Her symptoms have significantly improved since initial illness. She reports weight gain from 142-143 lbs to 147 lbs since her hospital visit. She acknowledges increased food intake, particularly consuming larger portions when food is appealing, which she attributes to anxiety. She expresses concern about this weight gain. She reports intermittent depressive symptoms, with mood fluctuations related to limited mobility and transportation challenges due to her back issues.     Last labs from 4/23 CD4 count 160, ratio 9.4%, low, viral load undetectable.  RPR negative     TB gold was not done, will repeat blood work today including TB gold.    Patient has not been seen by gyn or GI for colonoscopy, referral placed again to see if patient can be seen here in [place].    From last clinic visit 4/23/25:  4/23/25:  Interim reviewed, patient is here for follow-up, last seen in August 3, 2023.  Patient mentions since last visit in  August, she was still doing drugs with her , cocaine.  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  Back then CD4 count was 46, viral load 1,000,001 6 3000 copies.  She had lumbar fusion T11-L2 for chronic osteomyelitis in February of 2024.  Hospital course complicated by retroperitoneal/paravertebral abscess with ESBL E coli/MRSA/fusobacterium and MRSA bacteremia status post vancomycin and ertapenem IV from August 12 through September 23rd.  Patient mentions from then she went to a facility to recover and was in [facility] for 2 months where she could not continue her medication.  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  She was treated with rifampin, azithromycin, ethambutol After Genosure was obtained, attempts from Infectious Disease Department to reach out to the patient to switch her back to Biktarvy were unsuccessful.  Will send Biktarvy to specialty pharmacy today.  Patient reports she is feeling depressed, would like to see a psychiatrist outpatient.  She mentions her significant other is now no longer living with her, he is back on drugs doing cocaine, crack etc..  She mentioned she has gained weight, ambulating with a walker.  She was seen by Urology at [facility] for laser lithotripsy and stone basket exchange with right ureteral stent exchange on February 11, 2025.  She has now established with primary Care, and is planning to see him sometime before September.  As per notes available on care everywhere, patient has not showed up to multiple appointments, she was considering going home on hospice but wanted to get  treatment for MAC.  She has with flat affect, advised to follow up with Psychiatry outpatient.  We will see her back in a month once we get her blood work back.       ROS:  General: -fever, -chills, +weight gain, +increased appetite  HEENT: -visual changes, -ear pain, -sinus congestion, -mouth pain, -trouble swallowing, -sore throat,-dental pain  Neck: -neck pain,-lumps  Cardiovascular: -chest pain, -palpitations,+lower extremity edema  Respiratory: -shortness of breath, +productive cough  Genitourinary: -dysuria, -frequency, -urgency or -hematuria   Gastrointestinal: +dry mouth  Musculoskeletal: +back pain  Neurological: -headache  Psychiatric: +depression         Outdoor activities:  Lives at home with her parents.  Unemployed.  Travel:  None recent  Implants:  Hardware T11-L2  Antibiotic history:  None recent      ALLERGIES AND CURRENT MEDICATIONS     Review of patient's allergies indicates:  No Known Allergies    Current Medications[1]    MEDICAL/SURGICAL/SOCIAL/FAMILY HISTORY     Past Surgical History:   Procedure Laterality Date    ADENOIDECTOMY      APPENDECTOMY      BACK SURGERY      GERALD    CYST REMOVAL      CYSTOSCOPY W/ URETERAL STENT PLACEMENT Right 12/09/2024    Procedure: CYSTOSCOPY, WITH URETERAL STENT INSERTION;  Surgeon: Daniel Duong MD;  Location: [facility] OR;  Service: Urology;  Laterality: Right;    CYSTOSCOPY W/ URETERAL STENT REMOVAL  2/11/2025    Procedure: CYSTOSCOPY, WITH URETERAL STENT REMOVAL;  Surgeon: Daniel Duong MD;  Location: [facility] OR;  Service: Urology;;    CYSTOSCOPY WITH CALCULUS EXTRACTION Right 2/11/2025    Procedure: CYSTOSCOPY, WITH CALCULUS REMOVAL;  Surgeon: Daniel Duong MD;  Location: [facility] OR;  Service: Urology;  Laterality: Right;    CYSTOSCOPY, WITH URETERAL STENT INSERTION - Right Right 12/09/2024    CYSTOURETEROSCOPY, WITH HOLMIUM LASER LITHOTRIPSY OF URETERAL CALCULUS AND STENT INSERTION Right 2/11/2025    Procedure: CYSTOURETEROSCOPY, WITH HOLMIUM LASER  LITHOTRIPSY OF URETERAL CALCULUS AND STENT INSERTION;  Surgeon: Daniel Duong MD;  Location: [facility];  Service: Urology;  Laterality: Right;  laser notified on 1/28/25/DME    CYSTOURETEROSCOPY,WITH HOLMIUM LASER LITHOTRIPSY OF URETERAL CALCULUS Right 02/11/2025    ureteral stent placement    HYSTERECTOMY      JOINT REPLACEMENT Right     knee, with revision    KNEE SURGERY Left     knee replacement    PERCUTANEOUS PINNING OF HIP Right 08/25/2024    Procedure: PINNING, HIP, PERCUTANEOUS, RIGHT;  Surgeon: Laureano Vyas MD;  Location: [address];  Service: Orthopedics;  Laterality: Right;    RHIZOTOMY      TONSILLECTOMY      TOTAL ANKLE ARTHROPLASTY Right      Past Medical History:   Diagnosis Date    Allergy     Arthritis     Asthma     Candida esophagitis 11/15/2022    Chronic pain     HIV infection     Hypertension     Overactive bladder     Septic shock 08/12/2024    Spinal stenosis     Urine incontinence      Family History   Problem Relation Name Age of Onset    Thyroid disease Mother      Hypertension Mother      Throat cancer Father          oral CA    Depression Sister      Hypertension Brother x3     Alcohol abuse Brother x3     Lung cancer Neg Hx      Colon cancer Neg Hx      Breast cancer Neg Hx      Stroke Neg Hx      Heart attack Neg Hx          Social History  Marital Status: Single  Alcohol History:  reports that she does not currently use alcohol.  Tobacco History:  reports that she has never smoked. She has never used smokeless tobacco.  Drug History:  reports no history of drug use.    PHYSICAL EXAM     Vital Signs  Wt Readings from Last 3 Encounters:   06/25/25 67 kg (147 lb 9.6 oz)   05/18/25 65.8 kg (145 lb 1 oz)   04/29/25 65.8 kg (145 lb 1 oz)     Temp Readings from Last 3 Encounters:   06/25/25 98 °F (36.7 °C) (Temporal)   05/18/25 99.1 °F (37.3 °C) (Oral)   04/28/25 97.6 °F (36.4 °C)     BP Readings from Last 3 Encounters:   06/25/25 (!) 140/74   05/18/25 (!) 144/84   04/29/25  116/78     Pulse Readings from Last 3 Encounters:   06/25/25 82   05/18/25 96   04/29/25 77     Physical Exam    Vitals: Weight: 147 lbs.  Respiratory: Normal respiratory effort. Clear to auscultation bilaterally. No rales. No rhonchi. No wheezing.       General:   lady sitting in the chair, ambulating with a cane, wearing a brace  Eyes: Eyes with no icterus or injection. Vision grossly normal  Ears: Hearing grossly normal.  Nose: Nares patent  Mouth: Moist mucous membranes, dentition is fair. No ulcerations, erythema or exudates.  Neck: Supple, no tenderness to palpation.  Cardiovascular: Regular rate and rhythm, no murmurs, no edema.    Respiratory: Clear to auscultation bilaterally, no tachypnea or increased work of breathing.  Gastrointestinal: Soft with active bowel sounds, no tenderness to palpation, no distention.  Genitourinary: No suprapubic tenderness.  Musculoskeletal: Moves all extremities, low back pain, awaiting, moving all extremities   Skin: Warm and dry, no obvious rashes.    Neuro: Oriented, conversant, follows commands.  Psych:  Sad, feels depressed      WOUND     N/A    VASCULAR ACCESS DEVICE     N/A    LABS AND DIAGNOSTICS       Significant Labs: I have reviewed all relevant and available labs and microbiology. .    CBC LAST 7 DAYS  Lab Results   Component Value Date    WBC 4.04 06/25/2025    RBC 4.63 06/25/2025    HGB 12.0 06/25/2025    HCT 38.0 06/25/2025    MCV 82 06/25/2025    MCH 25.9 (L) 06/25/2025    MCHC 31.6 (L) 06/25/2025    RDW 17.6 (H) 06/25/2025     06/25/2025    MPV 9.6 06/25/2025    GRAN 5.0 12/27/2024    GRAN 55.2 12/27/2024    LYMPH 1.7 04/23/2025    MONO 9 04/23/2025    MONO 0.2 04/23/2025    EOS 0.0 04/23/2025    BASO 0.0 04/23/2025    EOSINOPHIL 0 04/23/2025    BASOPHIL 1 04/23/2025         CHEMISTRY LAST 7 DAYS  CMP  Sodium   Date Value Ref Range Status   06/25/2025 135 (L) 136 - 145 mmol/L Final     Potassium   Date Value Ref Range Status   06/25/2025 4.5  "3.5 - 5.1 mmol/L Final     Chloride   Date Value Ref Range Status   06/25/2025 106 95 - 110 mmol/L Final     CO2   Date Value Ref Range Status   06/25/2025 24 23 - 29 mmol/L Final     Glucose   Date Value Ref Range Status   06/25/2025 89 70 - 110 mg/dL Final     BUN   Date Value Ref Range Status   06/25/2025 18 8 - 23 mg/dL Final     Creatinine   Date Value Ref Range Status   06/25/2025 1.0 0.5 - 1.4 mg/dL Final     Calcium   Date Value Ref Range Status   06/25/2025 9.2 8.7 - 10.5 mg/dL Final     Protein Total   Date Value Ref Range Status   06/25/2025 7.4 6.0 - 8.4 gm/dL Final     Albumin   Date Value Ref Range Status   06/25/2025 3.9 3.5 - 5.2 g/dL Final     Bilirubin Total   Date Value Ref Range Status   06/25/2025 0.4 0.1 - 1.0 mg/dL Final     Comment:     For infants and newborns, interpretation of results should be based   on gestational age, weight and in agreement with clinical   observations.    Premature Infant recommended reference ranges:   0-24 hours:  <8.0 mg/dL   24-48 hours: <12.0 mg/dL   3-5 days:    <15.0 mg/dL   6-29 days:   <15.0 mg/dL     ALP   Date Value Ref Range Status   06/25/2025 207 (H) 55 - 135 unit/L Final     AST   Date Value Ref Range Status   06/25/2025 23 10 - 40 unit/L Final     ALT   Date Value Ref Range Status   06/25/2025 9 (L) 10 - 44 unit/L Final     Anion Gap   Date Value Ref Range Status   06/25/2025 5 (L) 8 - 16 mmol/L Final     eGFR   Date Value Ref Range Status   06/25/2025 >60 >60 mL/min/1.73/m2 Final   12/27/2024 >60.0 >60 mL/min/1.73 m^2 Final       Estimated Creatinine Clearance: 51.1 mL/min (based on SCr of 1 mg/dL).    INFLAMMATORY/PROCAL  LAST 7 DAYS  @LABRCNTIP[PROCAL:7, ESR:7, CRP:7@  No results found for: "ESR"  C-Reactive Protein   Date Value Ref Range Status   11/21/2024 0.9 (H) <0.9 mg/dL Final   10/24/2024 0.8 <0.9 mg/dL Final   07/07/2024 3.7 (H) <0.9 mg/dL Final   06/16/2024 0.6 <0.9 mg/dL Final   04/26/2024 11.6 (H) <0.9 mg/dL Final   03/26/2024 2.8 (H) " <0.9 mg/dL Final   03/25/2024 3.2 (H) <0.9 mg/dL Final     CRP   Date Value Ref Range Status   04/23/2025 0.30 <1.00 mg/dL Final     Comment:     CRP-Normal Application expected values:          <1.0        mg/dL   Normal Range          1.0 - 5.0  mg/dL   Indicates mild inflammation          5.0 - 10.0 mg/dL   Indicates severe inflammation        >10.0        mg/dL   Represents serious processes and frequently                                 indicates the presence of a bacterial infection.    06/15/2024 7.3 0.0 - 8.2 mg/L Final   03/26/2024 25.1 (H) 0.0 - 8.2 mg/L Final   03/23/2024 42.0 (H) 0.0 - 8.2 mg/L Final   08/04/2023 6.64 (H) <0.76 mg/dL Final   12/06/2022 86.6 (H) 0.0 - 8.2 mg/L Final   11/15/2022 1.72 (H) <0.76 mg/dL Final   10/13/2022 1.56 (H) <0.76 mg/dL Final       PRIOR  MICROBIOLOGY:    No results found for the last 90 days.      LAST 7 DAYS MICROBIOLOGY   Microbiology Results (last 7 days)       ** No results found for the last 168 hours. **              CURRENT/PREVIOUS VISIT EKG  Results for orders placed or performed during the hospital encounter of 08/24/24   EKG 12-lead    Collection Time: 08/24/24 11:06 PM   Result Value Ref Range    QRS Duration 76 ms    [facility] QTC Calculation 409 ms    Narrative    Test Reason : I95.9,    Vent. Rate : 076 BPM     Atrial Rate : 076 BPM     P-R Int : 112 ms          QRS Dur : 076 ms      QT Int : 364 ms       P-R-T Axes : 058 075 060 degrees     QTc Int : 409 ms    Normal sinus rhythm  Normal ECG  When compared with ECG of 11-AUG-2024 14:43,  Nonspecific T wave abnormality no longer evident in Inferior leads  Nonspecific T wave abnormality no longer evident in Lateral leads  Confirmed by Keven BRADSHAW MD ([id number]) on 8/25/2024 10:40:50 AM    Referred By: [email or web address]   SELF           Confirmed By:Keven BRADSHAW MD       Significant Diagnostics: I have reviewed all relevant and available diagnostic results.      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   Evaluated recent negative syphilis test and decreased inflammation markers, indicating successful treatment of previous spinal infection.   Undetectable HIV viral load as of April, demonstrating effective viral suppression with Biktarvy.   Plan to repeat CD4 count due to previous drop in WBC count from prolonged infection.   Back pain attributed to surgical krishna rather than ongoing infection.   Assessed oral cavity for potential thrush.   Evaluated weight gain and discussed potential dietary factors.   Vaccines one CD4 count is >200          HIV positive  -     CD4 T-[place] Cells; Future; Expected date: 06/25/2025  -     Quantiferon Gold TB; Future; Expected date: 06/25/2025  -     CBC Auto Differential; Future; Expected date: 06/25/2025  -     Comprehensive Metabolic Panel; Future; Expected date: 06/25/2025  -     HIV RNA, quantitative, PCR; Future; Expected date: 06/25/2025    Hx of total hip arthroplasty, right    Spinal stenosis, unspecified spinal region    MRSA infection    H/O spinal fusion    Disseminated mycobacterium avium-intracellulare complex    Encounter for screening colonoscopy  -     Ambulatory referral/consult to Gastroenterology; Future; Expected date: 07/02/2025    Healthcare maintenance  -     Ambulatory referral/consult to Obstetrics / Gynecology; Future; Expected date: 07/02/2025        Follow up in about 3 months (around 9/25/2025).    Labs today  Continue Biktarvy 1 tab PO dialy  Bactrim DS 1 tab daily  Gyn and GI referrals   RTC in 3 months    I spent a total of 30 minutes on the day of the visit.  This includes face to  face time and non-face to face time preparing to see the patient (eg, review of tests), obtaining and/or reviewing separately obtained history, documenting clinical information in the electronic or other health record, independently interpreting results and communicating results to the patient/family/caregiver, or care coordinator.       Dorothea Etienne MD  Date of Service: 06/25/2025      This note was created using [employer]  voice recognition software that occasionally misinterpreted phrases or words.  This note was generated with the assistance of ambient listening technology. Verbal consent was obtained by the patient and accompanying visitor(s) for the recording of patient appointment to facilitate this note. I attest to having reviewed and edited the generated note for accuracy, though some syntax or spelling errors may persist. Please contact the author of this note for any clarification.           [1]   Current Outpatient Medications   Medication Sig Dispense Refill    albuterol (PROVENTIL HFA) 90 mcg/actuation inhaler Inhale 2 puffs into the lungs every 6 (six) hours as needed for Wheezing. Rescue 18 g 0    nbrdaxbqy-ocuzxjoa-vkdgerr ala (BIKTARVY) -25 mg (25 kg or greater) Take 1 tablet by mouth once daily. 30 tablet 11    gabapentin (NEURONTIN) 800 MG tablet Take 1 tablet (800 mg total) by mouth 3 (three) times daily. 90 tablet 11    HYDROcodone-acetaminophen (NORCO) 7.5-325 mg per tablet Take 1 tablet by mouth every 12 (twelve) hours as needed for Pain. 60 tablet 0    [START ON 7/2/2025] HYDROcodone-acetaminophen (NORCO) 7.5-325 mg per tablet Take 1 tablet by mouth every 12 (twelve) hours as needed for Pain. 60 tablet 0    ibuprofen (ADVIL,MOTRIN) 800 MG tablet Take 1 tablet (800 mg total) by mouth every 6 (six) hours as needed for Pain. 20 tablet 0    sulfamethoxazole-trimethoprim 800-160mg (BACTRIM DS) 800-160 mg Tab Take 1 tablet by mouth once daily. 30 tablet 2    vitamin D (VITAMIN  D3) 1000 units Tab Take 1 tablet (1,000 Units total) by mouth once daily.      clarithromycin (BIAXIN) 500 MG tablet Take 500 mg by mouth once daily.      ertapenem (INVANZ) 1 gram injection  (Patient not taking: Reported on 4/23/2025)      methocarbamoL (ROBAXIN) 500 MG Tab Take 500 mg by mouth 4 (four) times daily as needed (muscle spasms). (Patient not taking: Reported on 6/25/2025)      mirtazapine (REMERON SOL-TAB) 15 MG disintegrating tablet Take 15 mg by mouth. (Patient not taking: Reported on 6/25/2025)      omeprazole (PRILOSEC) 20 MG capsule Take 1 capsule (20 mg total) by mouth once daily. (Patient not taking: Reported on 6/25/2025) 30 capsule 1     No current facility-administered medications for this visit.

## 2025-06-27 LAB
BASOPHILS # BLD AUTO: 0.1 X10E3/UL (ref 0–0.2)
BASOPHILS NFR BLD AUTO: 1 %
CD3+CD4+ CELLS # BLD: 144 /UL (ref 359–1519)
CD3+CD4+ CELLS NFR BLD: 9.6 % (ref 30.8–58.5)
EOSINOPHIL # BLD AUTO: 0.2 X10E3/UL (ref 0–0.4)
EOSINOPHIL NFR BLD AUTO: 5 %
ERYTHROCYTE [DISTWIDTH] IN BLOOD BY AUTOMATED COUNT: 15.7 % (ref 11.7–15.4)
HCT VFR BLD AUTO: 40.4 % (ref 34–46.6)
HGB BLD-MCNC: 12 G/DL (ref 11.1–15.9)
IMM GRANULOCYTES # BLD AUTO: 0 X10E3/UL (ref 0–0.1)
IMM GRANULOCYTES NFR BLD AUTO: 0 %
LYMPHOCYTES # BLD AUTO: 1.5 X10E3/UL (ref 0.7–3.1)
LYMPHOCYTES NFR BLD AUTO: 37 %
MCH RBC QN AUTO: 25.2 PG (ref 26.6–33)
MCHC RBC AUTO-ENTMCNC: 29.7 G/DL (ref 31.5–35.7)
MCV RBC AUTO: 85 FL (ref 79–97)
MONOCYTES # BLD AUTO: 0.3 X10E3/UL (ref 0.1–0.9)
MONOCYTES NFR BLD AUTO: 8 %
NEUTROPHILS # BLD AUTO: 2 X10E3/UL (ref 1.4–7)
NEUTROPHILS NFR BLD AUTO: 49 %
PLATELET # BLD AUTO: 232 X10E3/UL (ref 150–450)
RBC # BLD AUTO: 4.77 X10E6/UL (ref 3.77–5.28)
WBC # BLD AUTO: 4 X10E3/UL (ref 3.4–10.8)

## 2025-06-28 ENCOUNTER — PATIENT MESSAGE (OUTPATIENT)
Dept: INFECTIOUS DISEASES | Facility: CLINIC | Age: 60
End: 2025-06-28
Payer: MEDICARE

## 2025-06-28 LAB
GAMMA INTERFERON BACKGROUND BLD IA-ACNC: 0.18 IU/ML
M TB IFN-G BLD-IMP: NEGATIVE
M TB IFN-G CD4+ BCKGRND COR BLD-ACNC: 0.18 IU/ML
M TB IFN-G CD4+CD8+ BCKGRND COR BLD-ACNC: 0.19 IU/ML
MITOGEN IGNF BCKGRD COR BLD-ACNC: >10 IU/ML
QUANTIFERON TB GOLD (INCUBATED): NORMAL
SERVICE CMNT-IMP: NORMAL

## 2025-06-30 LAB
HIV1 RNA # SERPL NAA+PROBE: 30 COPIES/ML
HIV1 RNA SERPL NAA+PROBE-LOG#: 1.48 LOG10COPY/ML

## 2025-07-01 ENCOUNTER — RESULTS FOLLOW-UP (OUTPATIENT)
Dept: INFECTIOUS DISEASES | Facility: CLINIC | Age: 60
End: 2025-07-01

## 2025-07-01 ENCOUNTER — PATIENT MESSAGE (OUTPATIENT)
Dept: GASTROENTEROLOGY | Facility: CLINIC | Age: 60
End: 2025-07-01
Payer: MEDICARE

## 2025-07-07 ENCOUNTER — OUTPATIENT CASE MANAGEMENT (OUTPATIENT)
Dept: ADMINISTRATIVE | Facility: OTHER | Age: 60
End: 2025-07-07
Payer: MEDICARE

## 2025-07-15 ENCOUNTER — OFFICE VISIT (OUTPATIENT)
Dept: PAIN MEDICINE | Facility: CLINIC | Age: 60
End: 2025-07-15
Payer: MEDICARE

## 2025-07-15 VITALS
SYSTOLIC BLOOD PRESSURE: 104 MMHG | HEIGHT: 61 IN | DIASTOLIC BLOOD PRESSURE: 73 MMHG | HEART RATE: 89 BPM | WEIGHT: 147.69 LBS | BODY MASS INDEX: 27.88 KG/M2

## 2025-07-15 DIAGNOSIS — M54.50 LUMBAR BACK PAIN: ICD-10-CM

## 2025-07-15 DIAGNOSIS — M54.50 LUMBAR BACK PAIN: Primary | ICD-10-CM

## 2025-07-15 DIAGNOSIS — G89.4 CHRONIC PAIN SYNDROME: ICD-10-CM

## 2025-07-15 DIAGNOSIS — Z98.1 H/O SPINAL FUSION: ICD-10-CM

## 2025-07-15 PROCEDURE — 99214 OFFICE O/P EST MOD 30 MIN: CPT | Mod: HCNC,S$GLB,,

## 2025-07-15 PROCEDURE — 3008F BODY MASS INDEX DOCD: CPT | Mod: CPTII,HCNC,S$GLB,

## 2025-07-15 PROCEDURE — 1159F MED LIST DOCD IN RCRD: CPT | Mod: CPTII,HCNC,S$GLB,

## 2025-07-15 PROCEDURE — 3074F SYST BP LT 130 MM HG: CPT | Mod: CPTII,HCNC,S$GLB,

## 2025-07-15 PROCEDURE — 99999 PR PBB SHADOW E&M-EST. PATIENT-LVL III: CPT | Mod: PBBFAC,HCNC,,

## 2025-07-15 PROCEDURE — 1160F RVW MEDS BY RX/DR IN RCRD: CPT | Mod: CPTII,HCNC,S$GLB,

## 2025-07-15 PROCEDURE — 3078F DIAST BP <80 MM HG: CPT | Mod: CPTII,HCNC,S$GLB,

## 2025-07-15 NOTE — PROGRESS NOTES
Subjective:     Patient ID: Amie Salmeron is a 60 y.o. female    Chief Complaint: Follow-up (11 week )      Referred by: No ref. provider found      HPI:    Interval History PA (07/15/2025):  Patient returns today for follow up of low back pain.  Today her pain is worse after taking parents to hospital and pushing mother in a wheelchair however pain is not always as bad. Today pain is a 9 as him but usually is a 6.5/10. Overall she is feeling better since last visit.  Pain is located on right lower back with occasional radiation down her leg. She denies any changes in the quality or location of the pain and states the coccyx pain after her fall has since improved. She continue to take Norco 7.5 325 Q 12 hours p.r.n. and gabapentin 300 t.i.d. both of which provide significant relief.  She also reports taking a 8 pills of 200 mg ibuprofen in between norco doses.  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  She is interested in restarting physical therapy.     Interval History PA (04/29/2025):  Patient presents with increased lower back pain, specifically in the coccyx area, following a fall 2.5-3 weeks ago. Her heel caught in her brother's rug, causing her to fall when she tried to turn. She describes the pain as severe, stating it has been progressively worsening. Pain has significantly impaired her functioning, causing difficulty sleeping and inability to perform daily activities.    She attempted to seek care at the emergency room the night before but left due to long wait times. She has taken oxycodone twice since the fall, which was leftover from a previous hospital stay, despite being prescribed hydrocodone 7.5mg twice daily for chronic pain management. She inquires about using a tight back brace for support but is concerned about potential spinal compression.    She mentions having a previous back infection about a year ago, which  required surgery. She expresses concern about attending her scheduled therapy session due to the current pain levels.    She denies any bruising in the affected area.     Interval History (4/3/25):  She returns today for follow up.  She reports that she continues to have low back pain as before.  She denies any changes in the quality or location of the pain since last encounter.  She denies any new or worsening symptoms She has been taking Norco 7.5 mg.  This was prescribed q.12 hours p.r.n..  At previous encounter, she stated that she took this medication on average once a day.  Stating that she would sometimes take 2, but on other days she would take none.  However, patient has run out of medicine much more quickly than anticipated.  She states that she has been taking this medication essentially twice per day.  She did get an additional 8 day supply from her primary care physician.  She states this medication is providing adequate relief without any adverse effects.  She is able to function at a higher level throughout the day as result of this medication.      Initial Encounter (3/6/25):  Amie Salmeron is a 60 y.o. female who presents today with chronic low back pain.  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  Has had chronic low back pain ever since.  Pain is located in the midline thoracolumbar region radiates the lumbar paraspinals.  Some radicular pain into the lower extremities as well.  No focal weakness or bowel bladder dysfunction.  Pain is constant and worsened with activity.  It is very disruptive to her activities of daily living and her sleep.  She has been taking Norco 7.5-325 mg.  At mostly she will take this medication twice a day.  She states there are some days where she would not take it at all.  She feels that on average she would take 1 pill per day.  This pain is described in detail below.    Physical Therapy:   Yes.    Non-pharmacologic Treatment:  Rest helps         TENS?  No    Pain Medications:         Currently taking:  Gabapentin, ibuprofen, Tylenol, Norco    Has tried in the past:      Has not tried:  Muscle relaxants, TCAs, SNRIs, topical creams    Blood thinners:  None    Interventional Therapies:  None    Relevant Surgeries:   Thoracolumbar corpectomy and fusion secondary to disseminated MAC infection    Affecting sleep?  Yes    Affecting daily activities? yes    Depressive symptoms? No          SI/HI? No    Work status: Disabled    Pain Scores:    Best:       5/10  Worst:     10/10  Usually:   9/10  Today:    9/10    Pain Disability Index  Family/Home Responsibilities:: 8  Recreation:: 9  Social Activity:: 0  Occupation:: 8  Sexual Behavior:: 0  Self Care:: 4  Life-Support Activities:: 8  Pain Disability Index (PDI): 37    Review of Systems   Constitutional:  Negative for activity change, appetite change, chills, fatigue, fever and unexpected weight change.   HENT:  Negative for hearing loss.    Eyes:  Negative for visual disturbance.   Respiratory:  Negative for chest tightness and shortness of breath.    Cardiovascular:  Negative for chest pain.   Gastrointestinal:  Negative for abdominal pain, constipation, diarrhea, nausea and vomiting.   Genitourinary:  Negative for difficulty urinating.   Musculoskeletal:  Positive for back pain, gait problem and myalgias. Negative for neck pain.   Skin:  Negative for rash.   Neurological:  Positive for numbness. Negative for dizziness, weakness, light-headedness and headaches.   Psychiatric/Behavioral:  Positive for sleep disturbance. Negative for hallucinations and suicidal ideas. The patient is not nervous/anxious.        Past Medical History:   Diagnosis Date    Allergy     Arthritis     Asthma     Candida esophagitis 11/15/2022    Chronic pain     HIV infection     Hypertension     Overactive bladder     Septic shock 08/12/2024    Spinal stenosis     Urine incontinence  "       Past Surgical History:   Procedure Laterality Date    ADENOIDECTOMY      APPENDECTOMY      BACK SURGERY      GERALD    CYST REMOVAL      CYSTOSCOPY W/ URETERAL STENT PLACEMENT Right 12/09/2024    Procedure: CYSTOSCOPY, WITH URETERAL STENT INSERTION;  Surgeon: Daniel Duong MD;  Location: [facility];  Service: Urology;  Laterality: Right;    CYSTOSCOPY W/ URETERAL STENT REMOVAL  2/11/2025    Procedure: CYSTOSCOPY, WITH URETERAL STENT REMOVAL;  Surgeon: Daniel Duong MD;  Location: [facility] OR;  Service: Urology;;    CYSTOSCOPY WITH CALCULUS EXTRACTION Right 2/11/2025    Procedure: CYSTOSCOPY, WITH CALCULUS REMOVAL;  Surgeon: Daniel Duong MD;  Location: [facility] OR;  Service: Urology;  Laterality: Right;    CYSTOSCOPY, WITH URETERAL STENT INSERTION - Right Right 12/09/2024    CYSTOURETEROSCOPY, WITH HOLMIUM LASER LITHOTRIPSY OF URETERAL CALCULUS AND STENT INSERTION Right 2/11/2025    Procedure: CYSTOURETEROSCOPY, WITH HOLMIUM LASER LITHOTRIPSY OF URETERAL CALCULUS AND STENT INSERTION;  Surgeon: Daniel Duong MD;  Location: [facility];  Service: Urology;  Laterality: Right;  laser notified on 1/28/25/DME    CYSTOURETEROSCOPY,WITH HOLMIUM LASER LITHOTRIPSY OF URETERAL CALCULUS Right 02/11/2025    ureteral stent placement    HYSTERECTOMY      JOINT REPLACEMENT Right     knee, with revision    KNEE SURGERY Left     knee replacement    PERCUTANEOUS PINNING OF HIP Right 08/25/2024    Procedure: PINNING, HIP, PERCUTANEOUS, RIGHT;  Surgeon: Laureano Vyas MD;  Location: [address];  Service: Orthopedics;  Laterality: Right;    RHIZOTOMY      TONSILLECTOMY      TOTAL ANKLE ARTHROPLASTY Right        Social History[1]    Review of patient's allergies indicates:  No Known Allergies    Medications Ordered Prior to Encounter[2]    Objective:      /73 (BP Location: Right arm, Patient Position: Sitting)   Pulse 89   Ht 5' 1" (1.549 m)   Wt 67 kg (147 lb 11.3 oz)   BMI 27.91 kg/m²     Exam:  GEN:  Well " developed, well nourished.  No acute distress.  Normal pain behavior.  HEENT:  No trauma.  Mucous membranes moist.  Nares patent bilaterally.  PSYCH: Normal affect. Thought content appropriate.  CHEST:  Breathing symmetric.  No audible wheezing.  ABD: Soft, non-distended.  SKIN:  Warm, pink, dry.  No rash on exposed areas.    EXT:  No cyanosis, clubbing, or edema.  No color change or changes in nail or hair growth.  NEURO/MUSCULOSKELETAL:  Fully alert, oriented, and appropriate. Speech normal irma. No cranial nerve deficits.   Gait:  Antalgic.  No trendelenburg sign bilaterally.   Motor Strength:  5/5 motor strength throughout lower extremities.   Sensory:  No sensory deficit in the lower extremities.   Reflexes:  1+ and symmetric throughout.  Downgoing Babinski's bilaterally.  No clonus or spasticity.  L-Spine:  Limited ROM with pain on flexion and extension.  No pain with axial/facet loading bilaterally.  Negative SLR bilaterally.   Diffuse TTP over midline lumbar spine, bilateral lumbar paraspinals, SI joints      Imaging:    Narrative & Impression    EXAMINATION:  XR LUMBAR SPINE AP AND LATERAL     CLINICAL HISTORY:  back pain, hx of back problems;     TECHNIQUE:  XR LUMBAR SPINE AP AND LATERAL     COMPARISON:  01/12/2025     FINDINGS:  Redemonstration of the corpectomy and posterior fusion of the thoracolumbar spine.  No definitive evidence of hardware loosening or failure.     Dextro asymmetry of the lumbar spine centered around L2-L3.     Vertebral body heights are unchanged.     Multilevel disc height loss.  Multilevel facet arthropathy.     Right-sided double-J stent catheter in unchanged position.  Prominent fecal material projecting over the central pelvis.     Impression:     As above.        Electronically signed by:David Galvez  Date:                                            01/25/2025  Time:                                           09:47       Assessment:       Encounter Diagnoses   Name  Primary?    Lumbar back pain Yes    Chronic pain syndrome     H/O spinal fusion        Plan:       Amie was seen today for follow-up.    Diagnoses and all orders for this visit:    Lumbar back pain    Chronic pain syndrome    H/O spinal fusion      Amie Salmeron is a 60 y.o. female with chronic low back and lower extremity pain.  Status post thoracolumbar corpectomy and fusion related to disseminated MAC infection vertebral bodies.  Has both axial and radicular components of pain.     Pertinent imaging studies reviewed by me. Imaging results were discussed with patient.  Continue Gabapentin 800 mg t.i.d..  Does not need refills at this time.   Opioid risk tool completed.  Low risk.    Prescription monitoring report reviewed today.  No inconsistencies noted.    Most recent urine drug screen completed on 04/03/2025.  Consistent with prescribed medications.  Plan on repeating UDS annually.    Pain contract signed.   Continue Norco 7.5-325 mg q.12h p.r.n..  3, one-month supplies of 60 pills provided today.    Given the severity of her pathology and surgical intervention, feel that patient is likely to have some degree of back pain for the rest of her life.  This pain seems to be well managed with relatively low doses of opioids.  I think it is appropriate to continue low-dose opioid pain medications.  Referral sent for physical therapy.   Return to clinic in 8 weeks or sooner if needed.  At that time we will discuss efficacy of medications and physical therapy and discuss possible procedures if appropriate.      This note was created by combination of typed  and M-Modal dictation. Transcription and phonetic errors may be present.  If there are any questions, please contact me.                 [1]   Social History  Socioeconomic History    Marital status: Single   Occupational History    Occupation: on disability   Tobacco Use    Smoking status: Never    Smokeless tobacco: Never   Substance and Sexual  Activity    Alcohol use: Not Currently     Comment: not currently    Drug use: No    Sexual activity: Not Currently     Partners: Male     Birth control/protection: None     Social Drivers of Health     Financial Resource Strain: High Risk (2/28/2025)    Overall Financial Resource Strain (CARDIA)     Difficulty of Paying Living Expenses: Very hard   Food Insecurity: Food Insecurity Present (2/28/2025)    Hunger Vital Sign     Worried About Running Out of Food in the Last Year: Often true     Ran Out of Food in the Last Year: Sometimes true   Transportation Needs: Unmet Transportation Needs (2/28/2025)    PRAPARE - Transportation     Lack of Transportation (Medical): Yes     Lack of Transportation (Non-Medical): Yes   Physical Activity: Inactive (2/28/2025)    Exercise Vital Sign     Days of Exercise per Week: 0 days     Minutes of Exercise per Session: 0 min   Stress: Stress Concern Present (2/28/2025)    [employer] [place] [employer] - Occupational Stress Questionnaire     Feeling of Stress : Rather much   Housing Stability: High Risk (2/28/2025)    Housing Stability Vital Sign     Unable to Pay for Housing in the Last Year: Yes     Number of Times Moved in the Last Year: 0     Homeless in the Last Year: No   [2]   Current Outpatient Medications on File Prior to Visit   Medication Sig Dispense Refill    albuterol (PROVENTIL HFA) 90 mcg/actuation inhaler Inhale 2 puffs into the lungs every 6 (six) hours as needed for Wheezing. Rescue 18 g 0    orcqgdrzt-jowhsyic-ulugicp ala (BIKTARVY) -25 mg (25 kg or greater) Take 1 tablet by mouth once daily. 30 tablet 11    gabapentin (NEURONTIN) 800 MG tablet Take 1 tablet (800 mg total) by mouth 3 (three) times daily. 90 tablet 11    HYDROcodone-acetaminophen (NORCO) 7.5-325 mg per tablet Take 1 tablet by mouth every 12 (twelve) hours as needed for Pain. 60 tablet 0    ibuprofen (ADVIL,MOTRIN) 800 MG tablet Take 1 tablet (800 mg total) by mouth every 6 (six)  hours as needed for Pain. 20 tablet 0    sulfamethoxazole-trimethoprim 800-160mg (BACTRIM DS) 800-160 mg Tab Take 1 tablet by mouth once daily. 30 tablet 2    vitamin D (VITAMIN D3) 1000 units Tab Take 1 tablet (1,000 Units total) by mouth once daily.      clarithromycin (BIAXIN) 500 MG tablet Take 500 mg by mouth once daily.      ertapenem (INVANZ) 1 gram injection  (Patient not taking: Reported on 7/15/2025)      methocarbamoL (ROBAXIN) 500 MG Tab Take 500 mg by mouth 4 (four) times daily as needed (muscle spasms). (Patient not taking: Reported on 7/15/2025)      mirtazapine (REMERON SOL-TAB) 15 MG disintegrating tablet Take 15 mg by mouth. (Patient not taking: Reported on 7/15/2025)      omeprazole (PRILOSEC) 20 MG capsule Take 1 capsule (20 mg total) by mouth once daily. (Patient not taking: Reported on 7/15/2025) 30 capsule 1     No current facility-administered medications on file prior to visit.

## 2025-07-17 RX ORDER — HYDROCODONE BITARTRATE AND ACETAMINOPHEN 7.5; 325 MG/1; MG/1
1 TABLET ORAL EVERY 12 HOURS PRN
Qty: 60 TABLET | Refills: 0 | Status: SHIPPED | OUTPATIENT
Start: 2025-08-31 | End: 2025-09-30

## 2025-07-17 RX ORDER — HYDROCODONE BITARTRATE AND ACETAMINOPHEN 7.5; 325 MG/1; MG/1
1 TABLET ORAL EVERY 12 HOURS PRN
Qty: 60 TABLET | Refills: 0 | Status: SHIPPED | OUTPATIENT
Start: 2025-09-30 | End: 2025-10-30

## 2025-07-17 RX ORDER — HYDROCODONE BITARTRATE AND ACETAMINOPHEN 7.5; 325 MG/1; MG/1
1 TABLET ORAL EVERY 12 HOURS PRN
Qty: 60 TABLET | Refills: 0 | Status: SHIPPED | OUTPATIENT
Start: 2025-08-01 | End: 2025-08-31

## 2025-08-01 ENCOUNTER — PATIENT MESSAGE (OUTPATIENT)
Dept: ORTHOPEDICS | Facility: CLINIC | Age: 60
End: 2025-08-01
Payer: MEDICARE

## 2025-08-19 ENCOUNTER — OFFICE VISIT (OUTPATIENT)
Dept: PAIN MEDICINE | Facility: CLINIC | Age: 60
End: 2025-08-19
Payer: MEDICARE

## 2025-08-19 VITALS
HEIGHT: 61 IN | DIASTOLIC BLOOD PRESSURE: 60 MMHG | WEIGHT: 147.69 LBS | BODY MASS INDEX: 27.88 KG/M2 | HEART RATE: 87 BPM | SYSTOLIC BLOOD PRESSURE: 92 MMHG

## 2025-08-19 DIAGNOSIS — Z98.1 H/O SPINAL FUSION: ICD-10-CM

## 2025-08-19 DIAGNOSIS — M46.20 OSTEOMYELITIS OF SPINE: ICD-10-CM

## 2025-08-19 DIAGNOSIS — M54.50 LUMBAR BACK PAIN: ICD-10-CM

## 2025-08-19 DIAGNOSIS — G89.4 CHRONIC PAIN SYNDROME: Primary | ICD-10-CM

## 2025-08-19 PROCEDURE — 1160F RVW MEDS BY RX/DR IN RCRD: CPT | Mod: CPTII,HCNC,S$GLB,

## 2025-08-19 PROCEDURE — 3008F BODY MASS INDEX DOCD: CPT | Mod: CPTII,HCNC,S$GLB,

## 2025-08-19 PROCEDURE — 3074F SYST BP LT 130 MM HG: CPT | Mod: CPTII,HCNC,S$GLB,

## 2025-08-19 PROCEDURE — 3078F DIAST BP <80 MM HG: CPT | Mod: CPTII,HCNC,S$GLB,

## 2025-08-19 PROCEDURE — 1159F MED LIST DOCD IN RCRD: CPT | Mod: CPTII,HCNC,S$GLB,

## 2025-08-19 PROCEDURE — 99214 OFFICE O/P EST MOD 30 MIN: CPT | Mod: HCNC,S$GLB,,

## 2025-08-19 PROCEDURE — 99999 PR PBB SHADOW E&M-EST. PATIENT-LVL III: CPT | Mod: PBBFAC,HCNC,,

## 2025-08-29 ENCOUNTER — TELEPHONE (OUTPATIENT)
Dept: OTOLARYNGOLOGY | Facility: CLINIC | Age: 60
End: 2025-08-29
Payer: MEDICARE

## (undated) DEVICE — DRAPE TOP 53X102IN

## (undated) DEVICE — Device

## (undated) DEVICE — SUT VICRYL PLUS 3-0 SH 18IN

## (undated) DEVICE — DRAPE IOBAN 2 STERI

## (undated) DEVICE — DRAPE C-ARM ELAS CLIP 42X120IN

## (undated) DEVICE — APPLICATOR CHLORAPREP ORN 26ML

## (undated) DEVICE — TRAY MINOR ORTHO OMC

## (undated) DEVICE — DRAPE C-ARMOR EQUIPMENT COVER

## (undated) DEVICE — SUT MONOCRYL 3-0 PS-2 UND

## (undated) DEVICE — ADHESIVE DERMABOND ADVANCED

## (undated) DEVICE — DRESSING AQUACEL AG RBBN 2X45

## (undated) DEVICE — GOWN AERO CHROME W/ TOWEL XL

## (undated) DEVICE — TAPE SURG DURAPORE 2 X10YD

## (undated) DEVICE — SUT VICRYL PLUS 0 CT1 18IN

## (undated) DEVICE — DRESSING AQUACEL AG ADV 3.5X6

## (undated) DEVICE — DRAPE THREE-QTR REINF 53X77IN

## (undated) DEVICE — DRAPE U SPLIT SHEET 54X76IN

## (undated) DEVICE — DRAPE STERI U-SHAPED 47X51IN

## (undated) DEVICE — DRAPE INCISE IOBAN 2 23X17IN

## (undated) DEVICE — SPONGE GAUZE 4X4 12 PLY STRL